# Patient Record
Sex: FEMALE | Race: WHITE | Employment: OTHER | URBAN - METROPOLITAN AREA
[De-identification: names, ages, dates, MRNs, and addresses within clinical notes are randomized per-mention and may not be internally consistent; named-entity substitution may affect disease eponyms.]

---

## 2017-01-03 ENCOUNTER — OFFICE VISIT (OUTPATIENT)
Dept: INTERNAL MEDICINE CLINIC | Facility: CLINIC | Age: 82
End: 2017-01-03

## 2017-01-03 VITALS
TEMPERATURE: 98 F | BODY MASS INDEX: 26.5 KG/M2 | OXYGEN SATURATION: 98 % | SYSTOLIC BLOOD PRESSURE: 154 MMHG | HEIGHT: 60 IN | WEIGHT: 135 LBS | DIASTOLIC BLOOD PRESSURE: 64 MMHG | HEART RATE: 65 BPM

## 2017-01-03 DIAGNOSIS — N39.0 UTI DUE TO KLEBSIELLA SPECIES: ICD-10-CM

## 2017-01-03 DIAGNOSIS — K52.9 GASTROENTERITIS: Primary | ICD-10-CM

## 2017-01-03 DIAGNOSIS — Z87.19 HISTORY OF SMALL BOWEL OBSTRUCTION: ICD-10-CM

## 2017-01-03 DIAGNOSIS — B96.89 UTI DUE TO KLEBSIELLA SPECIES: ICD-10-CM

## 2017-01-03 PROCEDURE — G0463 HOSPITAL OUTPT CLINIC VISIT: HCPCS | Performed by: INTERNAL MEDICINE

## 2017-01-03 PROCEDURE — 99214 OFFICE O/P EST MOD 30 MIN: CPT | Performed by: INTERNAL MEDICINE

## 2017-01-03 NOTE — PROGRESS NOTES
Gil Hannon is a 80year old female who presents for     Post hospital  Was hospitalized 12/26/16  to 12/29/16 with 3 days --presented with abd cramps. Was felt to have dehydration, acute cystitis-ESBL Klebsiella UTI, ileus versus enteritis. .   had v LLL nodule resolved on serial CT scans   • Diverticulitis 2010     hospitalized 3/10 and 5/10   • Lumbar stenosis 2004     MRI lumbar 2004-djd spinal stenosis-xray lumbar 12/13-DJD scoliosis, wedge comp T8, 9, 10, L1   • Osteopenia    • Renal insufficienc blood in the urine  Dermatologic:  No rash or itching.       EXAM:   /64 mmHg  Pulse 65  Temp(Src) 97.6 °F (36.4 °C)  Ht 5' (1.524 m)  Wt 135 lb (61.236 kg)  BMI 26.37 kg/m2  SpO2 98%      Wt Readings from Last 6 Encounters:  01/03/17 : 135 lb (61.236 further sx of SVT since on diltiazem  mg daily since 3/8/16 phone call.    30 day event monitor showed SVT. asthma COPD:  On Flovent inhaler 220 mcg 1 puff twice a day and Combivent Respimat.  Also takes Phenergan with codeine cough syrup as neede FLOVENT  MCG/ACT Inhalation Aerosol INHALE 1 PUFF TWICE DAILY Disp: 36 g Rfl: 3   Pantoprazole Sodium (PROTONIX) 40 MG Oral Tab EC Take 1 tablet (40 mg total) by mouth every morning before breakfast. Disp: 90 tablet Rfl: 3   promethazine-codeine 6

## 2017-01-04 ENCOUNTER — LAB ENCOUNTER (OUTPATIENT)
Dept: LAB | Age: 82
DRG: 481 | End: 2017-01-04
Attending: INTERNAL MEDICINE
Payer: MEDICARE

## 2017-01-04 ENCOUNTER — TELEPHONE (OUTPATIENT)
Dept: INTERNAL MEDICINE CLINIC | Facility: CLINIC | Age: 82
End: 2017-01-04

## 2017-01-04 DIAGNOSIS — K52.9 GASTROENTERITIS: ICD-10-CM

## 2017-01-04 DIAGNOSIS — N39.0 UTI DUE TO KLEBSIELLA SPECIES: ICD-10-CM

## 2017-01-04 DIAGNOSIS — B96.89 UTI DUE TO KLEBSIELLA SPECIES: ICD-10-CM

## 2017-01-04 LAB
BASOPHILS # BLD: 0 K/UL (ref 0–0.2)
BASOPHILS NFR BLD: 1 %
EOSINOPHIL # BLD: 0.2 K/UL (ref 0–0.7)
EOSINOPHIL NFR BLD: 4 %
ERYTHROCYTE [DISTWIDTH] IN BLOOD BY AUTOMATED COUNT: 14.8 % (ref 11–15)
HCT VFR BLD AUTO: 36.3 % (ref 35–48)
HGB BLD-MCNC: 12.3 G/DL (ref 12–16)
LYMPHOCYTES # BLD: 1.5 K/UL (ref 1–4)
LYMPHOCYTES NFR BLD: 32 %
MCH RBC QN AUTO: 30.4 PG (ref 27–32)
MCHC RBC AUTO-ENTMCNC: 33.9 G/DL (ref 32–37)
MCV RBC AUTO: 89.7 FL (ref 80–100)
MONOCYTES # BLD: 0.4 K/UL (ref 0–1)
MONOCYTES NFR BLD: 8 %
NEUTROPHILS # BLD AUTO: 2.6 K/UL (ref 1.8–7.7)
NEUTROPHILS NFR BLD: 55 %
PLATELET # BLD AUTO: 262 K/UL (ref 140–400)
PMV BLD AUTO: 8.6 FL (ref 7.4–10.3)
RBC # BLD AUTO: 4.04 M/UL (ref 3.7–5.4)
WBC # BLD AUTO: 4.7 K/UL (ref 4–11)

## 2017-01-04 PROCEDURE — 85025 COMPLETE CBC W/AUTO DIFF WBC: CPT

## 2017-01-04 PROCEDURE — 36415 COLL VENOUS BLD VENIPUNCTURE: CPT

## 2017-01-04 NOTE — TELEPHONE ENCOUNTER
To nursing,   please tell patient CBC done on 1/4/17–results are normal.  No anemia. Normal white blood cell count. Thanks.

## 2017-01-05 ENCOUNTER — HOSPITAL ENCOUNTER (INPATIENT)
Facility: HOSPITAL | Age: 82
LOS: 4 days | Discharge: NURSING, REHAB OR LONG-TERM CARE | DRG: 481 | End: 2017-01-09
Attending: EMERGENCY MEDICINE | Admitting: HOSPITALIST
Payer: MEDICARE

## 2017-01-05 ENCOUNTER — APPOINTMENT (OUTPATIENT)
Dept: GENERAL RADIOLOGY | Facility: HOSPITAL | Age: 82
DRG: 481 | End: 2017-01-05
Attending: EMERGENCY MEDICINE
Payer: MEDICARE

## 2017-01-05 DIAGNOSIS — S72.142A CLOSED DISPLACED INTERTROCHANTERIC FRACTURE OF LEFT FEMUR, INITIAL ENCOUNTER (HCC): Primary | ICD-10-CM

## 2017-01-05 PROBLEM — R73.9 HYPERGLYCEMIA: Status: ACTIVE | Noted: 2017-01-05

## 2017-01-05 LAB
ANION GAP SERPL CALC-SCNC: 11 MMOL/L (ref 0–18)
BASOPHILS # BLD: 0 K/UL (ref 0–0.2)
BASOPHILS NFR BLD: 1 %
BUN SERPL-MCNC: 15 MG/DL (ref 8–20)
BUN/CREAT SERPL: 15.2 (ref 10–20)
CALCIUM SERPL-MCNC: 9.3 MG/DL (ref 8.5–10.5)
CHLORIDE SERPL-SCNC: 93 MMOL/L (ref 95–110)
CO2 SERPL-SCNC: 25 MMOL/L (ref 22–32)
CREAT SERPL-MCNC: 0.99 MG/DL (ref 0.5–1.5)
EOSINOPHIL # BLD: 0.2 K/UL (ref 0–0.7)
EOSINOPHIL NFR BLD: 4 %
ERYTHROCYTE [DISTWIDTH] IN BLOOD BY AUTOMATED COUNT: 14.9 % (ref 11–15)
GLUCOSE BLDC GLUCOMTR-MCNC: 125 MG/DL (ref 70–99)
GLUCOSE SERPL-MCNC: 169 MG/DL (ref 70–99)
HCT VFR BLD AUTO: 36.8 % (ref 35–48)
HGB BLD-MCNC: 12.6 G/DL (ref 12–16)
LYMPHOCYTES # BLD: 1.3 K/UL (ref 1–4)
LYMPHOCYTES NFR BLD: 23 %
MCH RBC QN AUTO: 30.6 PG (ref 27–32)
MCHC RBC AUTO-ENTMCNC: 34.1 G/DL (ref 32–37)
MCV RBC AUTO: 89.7 FL (ref 80–100)
MONOCYTES # BLD: 0.4 K/UL (ref 0–1)
MONOCYTES NFR BLD: 7 %
MRSA NASAL: NEGATIVE
NEUTROPHILS # BLD AUTO: 3.7 K/UL (ref 1.8–7.7)
NEUTROPHILS NFR BLD: 66 %
OSMOLALITY UR CALC.SUM OF ELEC: 273 MOSM/KG (ref 275–295)
PLATELET # BLD AUTO: 257 K/UL (ref 140–400)
PMV BLD AUTO: 8.2 FL (ref 7.4–10.3)
POTASSIUM SERPL-SCNC: 4.7 MMOL/L (ref 3.3–5.1)
RBC # BLD AUTO: 4.11 M/UL (ref 3.7–5.4)
SODIUM SERPL-SCNC: 129 MMOL/L (ref 136–144)
STAPH A BY PCR: NEGATIVE
WBC # BLD AUTO: 5.6 K/UL (ref 4–11)

## 2017-01-05 PROCEDURE — 73502 X-RAY EXAM HIP UNI 2-3 VIEWS: CPT

## 2017-01-05 PROCEDURE — 99223 1ST HOSP IP/OBS HIGH 75: CPT | Performed by: HOSPITALIST

## 2017-01-05 RX ORDER — ONDANSETRON 2 MG/ML
4 INJECTION INTRAMUSCULAR; INTRAVENOUS EVERY 6 HOURS PRN
Status: DISCONTINUED | OUTPATIENT
Start: 2017-01-05 | End: 2017-01-09

## 2017-01-05 RX ORDER — MORPHINE SULFATE 4 MG/ML
4 INJECTION, SOLUTION INTRAMUSCULAR; INTRAVENOUS EVERY 2 HOUR PRN
Status: DISCONTINUED | OUTPATIENT
Start: 2017-01-05 | End: 2017-01-09

## 2017-01-05 RX ORDER — DEXTROSE MONOHYDRATE 25 G/50ML
50 INJECTION, SOLUTION INTRAVENOUS AS NEEDED
Status: DISCONTINUED | OUTPATIENT
Start: 2017-01-05 | End: 2017-01-09

## 2017-01-05 RX ORDER — MORPHINE SULFATE 2 MG/ML
2 INJECTION, SOLUTION INTRAMUSCULAR; INTRAVENOUS ONCE
Status: COMPLETED | OUTPATIENT
Start: 2017-01-05 | End: 2017-01-05

## 2017-01-05 RX ORDER — MORPHINE SULFATE 2 MG/ML
1 INJECTION, SOLUTION INTRAMUSCULAR; INTRAVENOUS EVERY 2 HOUR PRN
Status: DISCONTINUED | OUTPATIENT
Start: 2017-01-05 | End: 2017-01-09

## 2017-01-05 RX ORDER — MORPHINE SULFATE 2 MG/ML
2 INJECTION, SOLUTION INTRAMUSCULAR; INTRAVENOUS EVERY 2 HOUR PRN
Status: DISCONTINUED | OUTPATIENT
Start: 2017-01-05 | End: 2017-01-09

## 2017-01-05 RX ORDER — DEXTROSE, SODIUM CHLORIDE, AND POTASSIUM CHLORIDE 5; .45; .15 G/100ML; G/100ML; G/100ML
INJECTION INTRAVENOUS CONTINUOUS
Status: DISCONTINUED | OUTPATIENT
Start: 2017-01-05 | End: 2017-01-09

## 2017-01-05 RX ORDER — SODIUM CHLORIDE 9 MG/ML
INJECTION, SOLUTION INTRAVENOUS ONCE
Status: COMPLETED | OUTPATIENT
Start: 2017-01-05 | End: 2017-01-05

## 2017-01-05 NOTE — ED PROVIDER NOTES
Patient Seen in: [facility] Emergency Department    History   Patient presents with:  Lower Extremity Injury (musculoskeletal)    Stated Complaint: left hip pain    HPI    Patient presents the emergency department complaining of left hip pain afte Fornatto-both [facility]    KNEE REPLACEMENT SURGERY Left 2000    KNEE REPLACEMENT SURGERY Right 2008    ABDOMEN SURGERY PROC UNLISTED  2007    Comment laparotomy for TERESA and internal hernia    ABDOMEN SURGERY PROC UNLISTED  2009    Comment TERESA and small bow Brother    • Heart Disease Brother      cause of death-age 62   • Diabetes Sister    • Stroke Sister      cause of death age 66   • Heart Attack Brother      cause of death   • Cancer Brother       of pancreas or lung cancer   • 5 children [Other] [OTH Neurological: She is alert and oriented to person, place, and time. Skin: Skin is warm and dry. No rash noted. Nursing note and vitals reviewed.           ED Course     Labs Reviewed   BASIC METABOLIC PANEL (8) - Abnormal; Notable for the following: following:     POC Glucose  151 (*)     All other components within normal limits   POCT GLUCOSE - Abnormal; Notable for the following:     POC Glucose  151 (*)     All other components within normal limits   POCT GLUCOSE - Abnormal; Notable for the follow DIFFERENTIAL - Abnormal; Notable for the following:     RBC 2.58 (*)     HGB 7.9 (*)     HCT 23.1 (*)     RDW 15.2 (*)     All other components within normal limits   PHOSPHORUS - Normal   FERRITIN - Normal   MRSA/SA SCRN BY PCR:EMERG ORTHO SURG ONLY - Nor ------                     CBC W/ DIFFERENTIAL[[id number]]          Abnormal            Final result                 Please view results for these tests on the individual orders.    HEMOGLOBIN   PLATELET COUNT   DIFF/WBC   PHOSPHORUS   BASIC METABOLI femur ([facility] [[email or web address]]ca 75.) S72.142A 1/5/2017 Unknown    Hyperglycemia R73.9 1/5/2017 Yes

## 2017-01-05 NOTE — TELEPHONE ENCOUNTER
Called patient and left voicemail per hipaa relaying Dr Otero Neither message. Instructed patient to call back with any questions.

## 2017-01-05 NOTE — H&P
[facility]    PATIENT'S NAME: Guille Barber   ATTENDING PHYSICIAN: Valeriano Regalado MD   PATIENT ACCOUNT#:   [id number]    LOCATION:  [place] [address] [address] [place] [address]  MEDICAL RECORD #:   [id number]       YOB: 1931  ADMISSION DATE:       01/05/2 object at home, and she lost her balance falling on her left hip. Immediately after the fall was not able to bear weight on her left lower extremity. The patient denies any loss of consciousness, dizziness, or chest pain.   Other 12-point review of system

## 2017-01-06 ENCOUNTER — APPOINTMENT (OUTPATIENT)
Dept: GENERAL RADIOLOGY | Facility: HOSPITAL | Age: 82
DRG: 481 | End: 2017-01-06
Attending: ORTHOPAEDIC SURGERY
Payer: MEDICARE

## 2017-01-06 ENCOUNTER — ANESTHESIA (OUTPATIENT)
Dept: SURGERY | Facility: HOSPITAL | Age: 82
DRG: 481 | End: 2017-01-06
Payer: MEDICARE

## 2017-01-06 ENCOUNTER — SURGERY (OUTPATIENT)
Age: 82
End: 2017-01-06

## 2017-01-06 ENCOUNTER — ANESTHESIA EVENT (OUTPATIENT)
Dept: SURGERY | Facility: HOSPITAL | Age: 82
DRG: 481 | End: 2017-01-06
Payer: MEDICARE

## 2017-01-06 LAB
ANION GAP SERPL CALC-SCNC: 3 MMOL/L (ref 0–18)
ANTIBODY SCREEN: NEGATIVE
BASOPHILS # BLD: 0 K/UL (ref 0–0.2)
BASOPHILS NFR BLD: 0 %
BUN SERPL-MCNC: 9 MG/DL (ref 8–20)
BUN/CREAT SERPL: 10.5 (ref 10–20)
CALCIUM SERPL-MCNC: 8.4 MG/DL (ref 8.5–10.5)
CHLORIDE SERPL-SCNC: 98 MMOL/L (ref 95–110)
CO2 SERPL-SCNC: 26 MMOL/L (ref 22–32)
CREAT SERPL-MCNC: 0.86 MG/DL (ref 0.5–1.5)
EOSINOPHIL # BLD: 0.1 K/UL (ref 0–0.7)
EOSINOPHIL NFR BLD: 1 %
ERYTHROCYTE [DISTWIDTH] IN BLOOD BY AUTOMATED COUNT: 14.7 % (ref 11–15)
GLUCOSE BLDC GLUCOMTR-MCNC: 142 MG/DL (ref 70–99)
GLUCOSE BLDC GLUCOMTR-MCNC: 151 MG/DL (ref 70–99)
GLUCOSE BLDC GLUCOMTR-MCNC: 151 MG/DL (ref 70–99)
GLUCOSE BLDC GLUCOMTR-MCNC: 154 MG/DL (ref 70–99)
GLUCOSE BLDC GLUCOMTR-MCNC: 157 MG/DL (ref 70–99)
GLUCOSE BLDC GLUCOMTR-MCNC: 200 MG/DL (ref 70–99)
GLUCOSE SERPL-MCNC: 162 MG/DL (ref 70–99)
HCT VFR BLD AUTO: 30.7 % (ref 35–48)
HGB BLD-MCNC: 10.3 G/DL (ref 12–16)
LYMPHOCYTES # BLD: 1.2 K/UL (ref 1–4)
LYMPHOCYTES NFR BLD: 19 %
MCH RBC QN AUTO: 30 PG (ref 27–32)
MCHC RBC AUTO-ENTMCNC: 33.4 G/DL (ref 32–37)
MCV RBC AUTO: 89.9 FL (ref 80–100)
MONOCYTES # BLD: 0.7 K/UL (ref 0–1)
MONOCYTES NFR BLD: 11 %
NEUTROPHILS # BLD AUTO: 4.3 K/UL (ref 1.8–7.7)
NEUTROPHILS NFR BLD: 68 %
OSMOLALITY UR CALC.SUM OF ELEC: 266 MOSM/KG (ref 275–295)
PLATELET # BLD AUTO: 227 K/UL (ref 140–400)
PMV BLD AUTO: 8.5 FL (ref 7.4–10.3)
POTASSIUM SERPL-SCNC: 4.9 MMOL/L (ref 3.3–5.1)
POTASSIUM SERPL-SCNC: 5.4 MMOL/L (ref 3.3–5.1)
RBC # BLD AUTO: 3.42 M/UL (ref 3.7–5.4)
RH BLOOD TYPE: POSITIVE
SODIUM SERPL-SCNC: 127 MMOL/L (ref 136–144)
WBC # BLD AUTO: 6.4 K/UL (ref 4–11)

## 2017-01-06 PROCEDURE — 76001 XR OR HIP (2 VIEWS) >60 C-ARM  (CPT=73502/76001): CPT

## 2017-01-06 PROCEDURE — 99232 SBSQ HOSP IP/OBS MODERATE 35: CPT | Performed by: HOSPITALIST

## 2017-01-06 PROCEDURE — 73502 X-RAY EXAM HIP UNI 2-3 VIEWS: CPT

## 2017-01-06 PROCEDURE — 0QH734Z INSERTION OF INTERNAL FIXATION DEVICE INTO LEFT UPPER FEMUR, PERCUTANEOUS APPROACH: ICD-10-PCS | Performed by: ORTHOPAEDIC SURGERY

## 2017-01-06 DEVICE — IMPLANTABLE DEVICE: Type: IMPLANTABLE DEVICE | Site: HIP | Status: FUNCTIONAL

## 2017-01-06 DEVICE — PLATE TUBE STD 135DEG 4 HOLE: Type: IMPLANTABLE DEVICE | Site: HIP | Status: FUNCTIONAL

## 2017-01-06 RX ORDER — HYDROMORPHONE HYDROCHLORIDE 1 MG/ML
0.2 INJECTION, SOLUTION INTRAMUSCULAR; INTRAVENOUS; SUBCUTANEOUS EVERY 5 MIN PRN
Status: DISCONTINUED | OUTPATIENT
Start: 2017-01-06 | End: 2017-01-06 | Stop reason: HOSPADM

## 2017-01-06 RX ORDER — MORPHINE SULFATE 4 MG/ML
4 INJECTION, SOLUTION INTRAMUSCULAR; INTRAVENOUS EVERY 10 MIN PRN
Status: DISCONTINUED | OUTPATIENT
Start: 2017-01-06 | End: 2017-01-06 | Stop reason: HOSPADM

## 2017-01-06 RX ORDER — MORPHINE SULFATE 2 MG/ML
2 INJECTION, SOLUTION INTRAMUSCULAR; INTRAVENOUS EVERY 10 MIN PRN
Status: DISCONTINUED | OUTPATIENT
Start: 2017-01-06 | End: 2017-01-06 | Stop reason: HOSPADM

## 2017-01-06 RX ORDER — HYDROCODONE BITARTRATE AND ACETAMINOPHEN 5; 325 MG/1; MG/1
1 TABLET ORAL AS NEEDED
Status: DISCONTINUED | OUTPATIENT
Start: 2017-01-06 | End: 2017-01-06 | Stop reason: HOSPADM

## 2017-01-06 RX ORDER — BISACODYL 10 MG
10 SUPPOSITORY, RECTAL RECTAL
Status: DISCONTINUED | OUTPATIENT
Start: 2017-01-06 | End: 2017-01-09

## 2017-01-06 RX ORDER — DEXAMETHASONE SODIUM PHOSPHATE 4 MG/ML
VIAL (ML) INJECTION AS NEEDED
Status: DISCONTINUED | OUTPATIENT
Start: 2017-01-06 | End: 2017-01-06 | Stop reason: SURG

## 2017-01-06 RX ORDER — DOCUSATE SODIUM 100 MG/1
100 CAPSULE, LIQUID FILLED ORAL 2 TIMES DAILY
Status: DISCONTINUED | OUTPATIENT
Start: 2017-01-06 | End: 2017-01-09

## 2017-01-06 RX ORDER — SUCCINYLCHOLINE CHLORIDE 20 MG/ML
INJECTION INTRAMUSCULAR; INTRAVENOUS AS NEEDED
Status: DISCONTINUED | OUTPATIENT
Start: 2017-01-06 | End: 2017-01-06 | Stop reason: SURG

## 2017-01-06 RX ORDER — METOCLOPRAMIDE HYDROCHLORIDE 5 MG/ML
10 INJECTION INTRAMUSCULAR; INTRAVENOUS EVERY 6 HOURS PRN
Status: ACTIVE | OUTPATIENT
Start: 2017-01-06 | End: 2017-01-08

## 2017-01-06 RX ORDER — DILTIAZEM HYDROCHLORIDE 120 MG/1
120 CAPSULE, COATED, EXTENDED RELEASE ORAL DAILY
Status: DISCONTINUED | OUTPATIENT
Start: 2017-01-06 | End: 2017-01-09

## 2017-01-06 RX ORDER — ONDANSETRON 2 MG/ML
4 INJECTION INTRAMUSCULAR; INTRAVENOUS ONCE AS NEEDED
Status: DISCONTINUED | OUTPATIENT
Start: 2017-01-06 | End: 2017-01-06 | Stop reason: HOSPADM

## 2017-01-06 RX ORDER — LIDOCAINE HYDROCHLORIDE 10 MG/ML
INJECTION, SOLUTION EPIDURAL; INFILTRATION; INTRACAUDAL; PERINEURAL AS NEEDED
Status: DISCONTINUED | OUTPATIENT
Start: 2017-01-06 | End: 2017-01-06 | Stop reason: SURG

## 2017-01-06 RX ORDER — SODIUM PHOSPHATE, DIBASIC AND SODIUM PHOSPHATE, MONOBASIC 7; 19 G/133ML; G/133ML
1 ENEMA RECTAL ONCE AS NEEDED
Status: ACTIVE | OUTPATIENT
Start: 2017-01-06 | End: 2017-01-06

## 2017-01-06 RX ORDER — SODIUM CHLORIDE, SODIUM LACTATE, POTASSIUM CHLORIDE, CALCIUM CHLORIDE 600; 310; 30; 20 MG/100ML; MG/100ML; MG/100ML; MG/100ML
INJECTION, SOLUTION INTRAVENOUS CONTINUOUS
Status: DISCONTINUED | OUTPATIENT
Start: 2017-01-06 | End: 2017-01-09

## 2017-01-06 RX ORDER — TEMAZEPAM 15 MG/1
15 CAPSULE ORAL NIGHTLY PRN
Status: DISCONTINUED | OUTPATIENT
Start: 2017-01-06 | End: 2017-01-09

## 2017-01-06 RX ORDER — SENNOSIDES 8.6 MG
17.2 TABLET ORAL NIGHTLY
Status: DISCONTINUED | OUTPATIENT
Start: 2017-01-06 | End: 2017-01-09

## 2017-01-06 RX ORDER — PANTOPRAZOLE SODIUM 40 MG/1
40 TABLET, DELAYED RELEASE ORAL
Status: DISCONTINUED | OUTPATIENT
Start: 2017-01-07 | End: 2017-01-09

## 2017-01-06 RX ORDER — ONDANSETRON 2 MG/ML
INJECTION INTRAMUSCULAR; INTRAVENOUS AS NEEDED
Status: DISCONTINUED | OUTPATIENT
Start: 2017-01-06 | End: 2017-01-06 | Stop reason: SURG

## 2017-01-06 RX ORDER — NEOSTIGMINE METHYLSULFATE 0.5 MG/ML
INJECTION INTRAVENOUS AS NEEDED
Status: DISCONTINUED | OUTPATIENT
Start: 2017-01-06 | End: 2017-01-06 | Stop reason: SURG

## 2017-01-06 RX ORDER — DIPHENHYDRAMINE HYDROCHLORIDE 50 MG/ML
25 INJECTION INTRAMUSCULAR; INTRAVENOUS ONCE AS NEEDED
Status: ACTIVE | OUTPATIENT
Start: 2017-01-06 | End: 2017-01-06

## 2017-01-06 RX ORDER — DIPHENHYDRAMINE HYDROCHLORIDE 50 MG/ML
12.5 INJECTION INTRAMUSCULAR; INTRAVENOUS EVERY 4 HOURS PRN
Status: DISCONTINUED | OUTPATIENT
Start: 2017-01-06 | End: 2017-01-09

## 2017-01-06 RX ORDER — NALOXONE HYDROCHLORIDE 0.4 MG/ML
80 INJECTION, SOLUTION INTRAMUSCULAR; INTRAVENOUS; SUBCUTANEOUS AS NEEDED
Status: DISCONTINUED | OUTPATIENT
Start: 2017-01-06 | End: 2017-01-06 | Stop reason: HOSPADM

## 2017-01-06 RX ORDER — HYDROMORPHONE HYDROCHLORIDE 1 MG/ML
0.6 INJECTION, SOLUTION INTRAMUSCULAR; INTRAVENOUS; SUBCUTANEOUS EVERY 5 MIN PRN
Status: DISCONTINUED | OUTPATIENT
Start: 2017-01-06 | End: 2017-01-06 | Stop reason: HOSPADM

## 2017-01-06 RX ORDER — ROCURONIUM BROMIDE 10 MG/ML
INJECTION, SOLUTION INTRAVENOUS AS NEEDED
Status: DISCONTINUED | OUTPATIENT
Start: 2017-01-06 | End: 2017-01-06 | Stop reason: SURG

## 2017-01-06 RX ORDER — MORPHINE SULFATE 10 MG/ML
6 INJECTION, SOLUTION INTRAMUSCULAR; INTRAVENOUS EVERY 10 MIN PRN
Status: DISCONTINUED | OUTPATIENT
Start: 2017-01-06 | End: 2017-01-06 | Stop reason: HOSPADM

## 2017-01-06 RX ORDER — SODIUM CHLORIDE 9 MG/ML
INJECTION, SOLUTION INTRAVENOUS CONTINUOUS PRN
Status: DISCONTINUED | OUTPATIENT
Start: 2017-01-06 | End: 2017-01-06 | Stop reason: SURG

## 2017-01-06 RX ORDER — ENALAPRIL MALEATE 10 MG/1
10 TABLET ORAL DAILY
Status: DISCONTINUED | OUTPATIENT
Start: 2017-01-06 | End: 2017-01-09

## 2017-01-06 RX ORDER — ONDANSETRON 2 MG/ML
4 INJECTION INTRAMUSCULAR; INTRAVENOUS EVERY 4 HOURS PRN
Status: DISCONTINUED | OUTPATIENT
Start: 2017-01-06 | End: 2017-01-09

## 2017-01-06 RX ORDER — HYDROMORPHONE HYDROCHLORIDE 1 MG/ML
0.4 INJECTION, SOLUTION INTRAMUSCULAR; INTRAVENOUS; SUBCUTANEOUS EVERY 5 MIN PRN
Status: DISCONTINUED | OUTPATIENT
Start: 2017-01-06 | End: 2017-01-06 | Stop reason: HOSPADM

## 2017-01-06 RX ORDER — POLYETHYLENE GLYCOL 3350 17 G/17G
17 POWDER, FOR SOLUTION ORAL DAILY PRN
Status: DISCONTINUED | OUTPATIENT
Start: 2017-01-06 | End: 2017-01-08

## 2017-01-06 RX ORDER — HYDROCODONE BITARTRATE AND ACETAMINOPHEN 5; 325 MG/1; MG/1
2 TABLET ORAL AS NEEDED
Status: DISCONTINUED | OUTPATIENT
Start: 2017-01-06 | End: 2017-01-06 | Stop reason: HOSPADM

## 2017-01-06 RX ORDER — GLYCOPYRROLATE 0.2 MG/ML
INJECTION INTRAMUSCULAR; INTRAVENOUS AS NEEDED
Status: DISCONTINUED | OUTPATIENT
Start: 2017-01-06 | End: 2017-01-06 | Stop reason: SURG

## 2017-01-06 RX ORDER — DIPHENHYDRAMINE HCL 25 MG
25 CAPSULE ORAL EVERY 4 HOURS PRN
Status: DISCONTINUED | OUTPATIENT
Start: 2017-01-06 | End: 2017-01-09

## 2017-01-06 RX ORDER — HALOPERIDOL 5 MG/ML
0.25 INJECTION INTRAMUSCULAR ONCE AS NEEDED
Status: DISCONTINUED | OUTPATIENT
Start: 2017-01-06 | End: 2017-01-06 | Stop reason: HOSPADM

## 2017-01-06 RX ADMIN — NEOSTIGMINE METHYLSULFATE 2 MG: 0.5 INJECTION INTRAVENOUS at 14:10:00

## 2017-01-06 RX ADMIN — SUCCINYLCHOLINE CHLORIDE 80 MG: 20 INJECTION INTRAMUSCULAR; INTRAVENOUS at 13:08:00

## 2017-01-06 RX ADMIN — LIDOCAINE HYDROCHLORIDE 30 MG: 10 INJECTION, SOLUTION EPIDURAL; INFILTRATION; INTRACAUDAL; PERINEURAL at 13:08:00

## 2017-01-06 RX ADMIN — ROCURONIUM BROMIDE 15 MG: 10 INJECTION, SOLUTION INTRAVENOUS at 13:14:00

## 2017-01-06 RX ADMIN — SODIUM CHLORIDE: 9 INJECTION, SOLUTION INTRAVENOUS at 13:02:00

## 2017-01-06 RX ADMIN — SODIUM CHLORIDE: 9 INJECTION, SOLUTION INTRAVENOUS at 13:13:00

## 2017-01-06 RX ADMIN — GLYCOPYRROLATE 0.4 MG: 0.2 INJECTION INTRAMUSCULAR; INTRAVENOUS at 14:10:00

## 2017-01-06 RX ADMIN — ONDANSETRON 4 MG: 2 INJECTION INTRAMUSCULAR; INTRAVENOUS at 13:35:00

## 2017-01-06 RX ADMIN — DEXAMETHASONE SODIUM PHOSPHATE 4 MG: 4 MG/ML VIAL (ML) INJECTION at 13:05:00

## 2017-01-06 RX ADMIN — SODIUM CHLORIDE: 9 INJECTION, SOLUTION INTRAVENOUS at 14:10:00

## 2017-01-06 RX ADMIN — ROCURONIUM BROMIDE 5 MG: 10 INJECTION, SOLUTION INTRAVENOUS at 13:08:00

## 2017-01-06 RX ADMIN — SODIUM CHLORIDE: 9 INJECTION, SOLUTION INTRAVENOUS at 14:15:00

## 2017-01-06 NOTE — DISCHARGE PLANNING
1/6CM-MD orders received in regards to discharge planning. The Patient was seen at bedside. The Patient resides with her son in [place] in a single family home.   Prior to hospitalization, the Patient was driving, doing grocery shopping, errands, and is in

## 2017-01-06 NOTE — CONSULTS
[facility]    Report of Consultation    Leandra Santos Patient Status:  Inpatient    1931 MRN [id number]   Location [facility] 4W/SW/SE Attending Tigist Orantes MD   Hosp Day # 1 PCP Pablo Farris MD     Date of Admiss History    APPENDECTOMY      CHOLECYSTECTOMY      OTHER SURGICAL HISTORY      Comment cystocele repair    OTHER SURGICAL HISTORY Bilateral     Comment ear surgery-Dr. Garry Lozano eardrums    KNEE REPLACEMENT SURGERY Left 2000    KNEE REPLACEMENT SURGERY 50 mL, Intravenous, PRN  •  CeFAZolin Sodium (ANCEF/KEFZOL) IVPB premix 2 g, 2 g, Intravenous, Once    Review of Systems:  10 point review of symptoms found to be Non Contributory    Physical Exam:    General: Alert, orientated x3. Cooperative.   No appare

## 2017-01-06 NOTE — ANESTHESIA PREPROCEDURE EVALUATION
Anesthesia PreOp Note    HPI:     Rosemary Mo is a 80year old female who presents for preoperative consultation requested by: Niki Peralta MD    Date of Surgery: 1/5/2017 - 1/6/2017    Procedure(s):  HIP PINNING  Indication: Closed displaced intert 6/2016   • Glaucoma    • Uterine prolapse 2006     had pessary in 2006 Dr. Hanny Villegas sling procedure Dr Karthik Khan in 6/2016   • COPD (chronic obstructive pulmonary disease) [facility])    • Diverticulosis 2003     cscopy '03, '07, '10   • Renal calculus 2010 every 6 (six) hours as needed for cough.  take one teaspoon every 6hours as needed for cough Disp: 180 mL Rfl: 3 Taking   ZETIA 10 MG Oral Tab TAKE 1 TABLET BY MOUTH EVERY DAY Disp: 90 tablet Rfl: 3 Taking   Diltiazem HCl ER Coated Beads (CARDIZEM CD) 120 M Intravenous Q2H PRN Anshu Case MD 2 mg at 01/06/17 4835   Or        morphINE sulfate (PF) 4 MG/ML injection 4 mg 4 mg Intravenous Q2H PRN Anshu Case MD    ondansetron HCl (ZOFRAN) injection 4 mg 4 mg Intravenous Q6H PRN Anshu Case MD    d 127* 01/06/2017   K 4.9 01/06/2017   CL 98 01/06/2017   CO2 26 01/06/2017   BUN 9 01/06/2017   CREATSERUM 0.86 01/06/2017   * 01/06/2017   CA 8.4* 01/06/2017          Vital Signs:   height is 1.549 m (5' 1\") and weight is 60.782 kg (134 lb).  Her or

## 2017-01-06 NOTE — ANESTHESIA POSTPROCEDURE EVALUATION
Patient: Brionna Orozco    Procedure Summary     Date Anesthesia Start Anesthesia Stop Room / Location    01/06/17 1302 1431 300 [facility] MAIN OR 11 / 300 [facility] MAIN OR       Procedure Diagnosis Surgeon Responsible Provider    HIP PINNING (Left Hip) Closed displaced int

## 2017-01-06 NOTE — PROGRESS NOTES
[facility]  Progress  Note     Gil Hannon Patient Status:  Inpatient    1931  80year old CSN [id number]   Location 417/417-A Attending Robby Hernandez MD   Hosp Day # 1 PCP Kristie Paredes MD     ASSESSMENT/PLAN    1.      Acut last 72 hours.

## 2017-01-06 NOTE — BRIEF OP NOTE
[facility] OPERATING ROOM  Brief Op Note     Elle Screws Location: OR   CSN [id number] MRN [id number]   Admission Date 2017 Operation Date 2017   Attending Physician Oren Redd MD Operating Physician Kathi Zapata MD       Pre-Operati

## 2017-01-07 LAB
ANION GAP SERPL CALC-SCNC: 6 MMOL/L (ref 0–18)
BASOPHILS # BLD: 0 K/UL (ref 0–0.2)
BASOPHILS NFR BLD: 0 %
BUN SERPL-MCNC: 9 MG/DL (ref 8–20)
BUN/CREAT SERPL: 10.1 (ref 10–20)
CALCIUM SERPL-MCNC: 8.1 MG/DL (ref 8.5–10.5)
CHLORIDE SERPL-SCNC: 98 MMOL/L (ref 95–110)
CO2 SERPL-SCNC: 24 MMOL/L (ref 22–32)
CREAT SERPL-MCNC: 0.89 MG/DL (ref 0.5–1.5)
EOSINOPHIL # BLD: 0 K/UL (ref 0–0.7)
EOSINOPHIL NFR BLD: 0 %
ERYTHROCYTE [DISTWIDTH] IN BLOOD BY AUTOMATED COUNT: 14.8 % (ref 11–15)
GLUCOSE BLDC GLUCOMTR-MCNC: 126 MG/DL (ref 70–99)
GLUCOSE BLDC GLUCOMTR-MCNC: 129 MG/DL (ref 70–99)
GLUCOSE BLDC GLUCOMTR-MCNC: 139 MG/DL (ref 70–99)
GLUCOSE BLDC GLUCOMTR-MCNC: 162 MG/DL (ref 70–99)
GLUCOSE SERPL-MCNC: 146 MG/DL (ref 70–99)
HCT VFR BLD AUTO: 28.2 % (ref 35–48)
HGB BLD-MCNC: 9.6 G/DL (ref 12–16)
IRON SATN MFR SERPL: 13 % (ref 15–50)
IRON SERPL-MCNC: 28 MCG/DL (ref 28–170)
LYMPHOCYTES # BLD: 0.8 K/UL (ref 1–4)
LYMPHOCYTES NFR BLD: 12 %
MAGNESIUM SERPL-MCNC: 1.5 MG/DL (ref 1.8–2.5)
MCH RBC QN AUTO: 30.8 PG (ref 27–32)
MCHC RBC AUTO-ENTMCNC: 34.2 G/DL (ref 32–37)
MCV RBC AUTO: 90.1 FL (ref 80–100)
MONOCYTES # BLD: 0.7 K/UL (ref 0–1)
MONOCYTES NFR BLD: 10 %
NEUTROPHILS # BLD AUTO: 5.3 K/UL (ref 1.8–7.7)
NEUTROPHILS NFR BLD: 77 %
OSMOLALITY UR CALC.SUM OF ELEC: 267 MOSM/KG (ref 275–295)
PHOSPHATE SERPL-MCNC: 2.7 MG/DL (ref 2.4–4.7)
PLATELET # BLD AUTO: 214 K/UL (ref 140–400)
PMV BLD AUTO: 9.2 FL (ref 7.4–10.3)
POTASSIUM SERPL-SCNC: 4.6 MMOL/L (ref 3.3–5.1)
RBC # BLD AUTO: 3.13 M/UL (ref 3.7–5.4)
SODIUM SERPL-SCNC: 128 MMOL/L (ref 136–144)
TIBC SERPL-MCNC: 215 MCG/DL (ref 228–428)
TRANSFERRIN SERPL-MCNC: 163 MG/DL (ref 192–382)
WBC # BLD AUTO: 6.8 K/UL (ref 4–11)

## 2017-01-07 PROCEDURE — 99232 SBSQ HOSP IP/OBS MODERATE 35: CPT | Performed by: HOSPITALIST

## 2017-01-07 RX ORDER — MAGNESIUM OXIDE 400 MG (241.3 MG MAGNESIUM) TABLET
800 TABLET ONCE
Status: COMPLETED | OUTPATIENT
Start: 2017-01-07 | End: 2017-01-07

## 2017-01-07 RX ORDER — SODIUM CHLORIDE 9 MG/ML
INJECTION, SOLUTION INTRAVENOUS CONTINUOUS
Status: DISCONTINUED | OUTPATIENT
Start: 2017-01-07 | End: 2017-01-09

## 2017-01-07 RX ORDER — HYDROCODONE BITARTRATE AND ACETAMINOPHEN 5; 325 MG/1; MG/1
1 TABLET ORAL EVERY 4 HOURS PRN
Status: DISCONTINUED | OUTPATIENT
Start: 2017-01-07 | End: 2017-01-09

## 2017-01-07 NOTE — OPERATIVE REPORT
[facility]    PATIENT'S NAME: Rodrick Hwang   ATTENDING PHYSICIAN: Solo Wright MD   OPERATING PHYSICIAN: Akila Vallejo MD   PATIENT ACCOUNT#:   [de-identified]    LOCATION:  [facility] [address] PACU [address] [place] [address]  MEDICAL RECORD #:   [id number]       DATE OF screw and held in position. Four screws were placed in standard fashion. C-arm fluoroscopy confirmed proper alignment of the fracture on AP and lateral x-rays and proper alignment of the hardware.   The wound was then irrigated; 0 Vicryl was used to repai

## 2017-01-07 NOTE — PROGRESS NOTES
[facility]    Progress Note    Deal Diver Patient Status:  Inpatient    1931 MRN [id number]   Location [facility] 4W/SW/SE Attending Roosvelt Habermann, MD   Hosp Day # 2 PCP Oksana Perkins MD     Subjective:  Maria C Mcknight

## 2017-01-07 NOTE — DISCHARGE PLANNING
SW follow up w/ SNF choices w/ pt. Pt requested referrals be sent to [facility] as first choice and [facility] as second choice. SW contacted [facility] for referrals. SW contacted [facility] for DON screen.     Maura Velasco, [address]

## 2017-01-07 NOTE — PLAN OF CARE
DISCHARGE PLANNING    • Discharge to home or other facility with appropriate resources Progressing        Diabetes/Glucose Control    • Glucose maintained within prescribed range Progressing        PAIN - ADULT    • Verbalizes/displays adequate comfort lev

## 2017-01-07 NOTE — PHYSICAL THERAPY NOTE
PHYSICAL THERAPY HIP EVALUATION - INPATIENT     Room Number: 417/417-A  Evaluation Date: 1/7/2017  Type of Evaluation:Initial   Physician Order: PT Eval and Treat    Presenting Problem: L hip pin  Reason for Therapy: Mobility Dysfunction and Discharge Plan CHOLECYSTECTOMY      OTHER SURGICAL HISTORY      Comment cystocele repair    OTHER SURGICAL HISTORY Bilateral     Comment ear surgery-Dr. Adela Velez eardrums    KNEE REPLACEMENT SURGERY Left 2000    KNEE REPLACEMENT SURGERY Right 2008    ABDOMEN SURGERY Tolerated sitting EOB for several minutes and an additional 5-6 minutes of activity. AM-PAC '6-Clicks' INPATIENT SHORT FORM - BASIC MOBILITY  How much difficulty does the patient currently have. ..  -   Turning over in bed (including adjusting bedclothe transfers, gait, self cares. The patient is below her baseline and would benefit from skilled inpatient PT to address the above deficits to assist patient in returning to prior level of function.     DISCHARGE RECOMMENDATIONS  PT Discharge Recommendations:

## 2017-01-07 NOTE — OCCUPATIONAL THERAPY NOTE
OCCUPATIONAL THERAPY EVALUATION - INPATIENT      Room Number: 417/417-A  Evaluation Date: 1/7/2017  Type of Evaluation: Initial  Presenting Problem: L hip fx, S/P pinning.      Physician Order: IP Consult to Occupational Therapy  Reason for Therapy: ADL/IAD • Diverticulosis 2003     cscopy '03, '07, '10   • Renal calculus 2010     on CT abd 5/10-6 mm or less in L lower kidney   • Lung nodule 4/06-3/03     LLL nodule resolved on serial CT scans   • Diverticulitis 2010     hospitalized 3/10 and 5/10   • Olive as Tolerated    PAIN ASSESSMENT  Rating: 3  Location: L hip  Management Techniques: Relaxation;Repositioning; Other (Comment) (ice)    ACTIVITY TOLERANCE  Room air    COGNITION  WFL for ADL    RANGE OF MOTION   Upper extremity ROM is within functional limit OTR/L  Occupational Therapist

## 2017-01-08 LAB
ANION GAP SERPL CALC-SCNC: 8 MMOL/L (ref 0–18)
BASOPHILS # BLD: 0 K/UL (ref 0–0.2)
BASOPHILS NFR BLD: 0 %
BUN SERPL-MCNC: 13 MG/DL (ref 8–20)
BUN/CREAT SERPL: 14.3 (ref 10–20)
CALCIUM SERPL-MCNC: 8.1 MG/DL (ref 8.5–10.5)
CHLORIDE SERPL-SCNC: 98 MMOL/L (ref 95–110)
CO2 SERPL-SCNC: 24 MMOL/L (ref 22–32)
CREAT SERPL-MCNC: 0.91 MG/DL (ref 0.5–1.5)
EOSINOPHIL # BLD: 0 K/UL (ref 0–0.7)
EOSINOPHIL NFR BLD: 1 %
ERYTHROCYTE [DISTWIDTH] IN BLOOD BY AUTOMATED COUNT: 15.2 % (ref 11–15)
FERRITIN SERPL IA-MCNC: 72 NG/ML (ref 11–307)
GLUCOSE BLDC GLUCOMTR-MCNC: 163 MG/DL (ref 70–99)
GLUCOSE BLDC GLUCOMTR-MCNC: 173 MG/DL (ref 70–99)
GLUCOSE BLDC GLUCOMTR-MCNC: 174 MG/DL (ref 70–99)
GLUCOSE BLDC GLUCOMTR-MCNC: 179 MG/DL (ref 70–99)
GLUCOSE SERPL-MCNC: 162 MG/DL (ref 70–99)
HCT VFR BLD AUTO: 23.1 % (ref 35–48)
HGB BLD-MCNC: 7.9 G/DL (ref 12–16)
LYMPHOCYTES # BLD: 1.2 K/UL (ref 1–4)
LYMPHOCYTES NFR BLD: 20 %
MAGNESIUM SERPL-MCNC: 1.6 MG/DL (ref 1.8–2.5)
MCH RBC QN AUTO: 30.5 PG (ref 27–32)
MCHC RBC AUTO-ENTMCNC: 34.2 G/DL (ref 32–37)
MCV RBC AUTO: 89.4 FL (ref 80–100)
MONOCYTES # BLD: 0.7 K/UL (ref 0–1)
MONOCYTES NFR BLD: 12 %
NEUTROPHILS # BLD AUTO: 4 K/UL (ref 1.8–7.7)
NEUTROPHILS NFR BLD: 68 %
OSMOLALITY UR CALC.SUM OF ELEC: 274 MOSM/KG (ref 275–295)
PLATELET # BLD AUTO: 207 K/UL (ref 140–400)
PMV BLD AUTO: 8.5 FL (ref 7.4–10.3)
POTASSIUM SERPL-SCNC: 4.4 MMOL/L (ref 3.3–5.1)
RBC # BLD AUTO: 2.58 M/UL (ref 3.7–5.4)
SODIUM SERPL-SCNC: 130 MMOL/L (ref 136–144)
WBC # BLD AUTO: 5.9 K/UL (ref 4–11)

## 2017-01-08 PROCEDURE — 99232 SBSQ HOSP IP/OBS MODERATE 35: CPT | Performed by: HOSPITALIST

## 2017-01-08 RX ORDER — HYDROCODONE BITARTRATE AND ACETAMINOPHEN 5; 325 MG/1; MG/1
1 TABLET ORAL EVERY 4 HOURS PRN
Qty: 40 TABLET | Refills: 0 | Status: SHIPPED | OUTPATIENT
Start: 2017-01-08 | End: 2017-01-24

## 2017-01-08 RX ORDER — POLYETHYLENE GLYCOL 3350 17 G/17G
17 POWDER, FOR SOLUTION ORAL DAILY
Status: DISCONTINUED | OUTPATIENT
Start: 2017-01-09 | End: 2017-01-09

## 2017-01-08 NOTE — PHYSICAL THERAPY NOTE
PHYSICAL THERAPY TREATMENT NOTE - INPATIENT    Room Number: 417/417-A       Presenting Problem: L hip pin    Problem List  Principal Problem:    Closed displaced intertrochanteric fracture of left femur, initial encounter [facility])  Active Problems:    Closed adjusting bedclothes, sheets and blankets)?: A Little   -   Sitting down on and standing up from a chair with arms (e.g., wheelchair, bedside commode, etc.): A Little   -   Moving from lying on back to sitting on the side of the bed?: A Little   How much h

## 2017-01-08 NOTE — PROGRESS NOTES
[facility]    Progress Note    Amy Bailey Patient Status:  Inpatient    1931 MRN [id number]   Location [facility] 4W/SW/SE Attending Derick Harris MD   Hosp Day # 3 PCP Dread Ga MD       Subjective:   Doing

## 2017-01-09 VITALS
BODY MASS INDEX: 25.3 KG/M2 | HEIGHT: 61 IN | RESPIRATION RATE: 16 BRPM | DIASTOLIC BLOOD PRESSURE: 48 MMHG | HEART RATE: 77 BPM | WEIGHT: 134 LBS | OXYGEN SATURATION: 99 % | TEMPERATURE: 98 F | SYSTOLIC BLOOD PRESSURE: 122 MMHG

## 2017-01-09 LAB
ANION GAP SERPL CALC-SCNC: 7 MMOL/L (ref 0–18)
BASOPHILS # BLD: 0 K/UL (ref 0–0.2)
BASOPHILS NFR BLD: 0 %
BUN SERPL-MCNC: 19 MG/DL (ref 8–20)
BUN/CREAT SERPL: 20.9 (ref 10–20)
CALCIUM SERPL-MCNC: 8.2 MG/DL (ref 8.5–10.5)
CHLORIDE SERPL-SCNC: 96 MMOL/L (ref 95–110)
CO2 SERPL-SCNC: 25 MMOL/L (ref 22–32)
CREAT SERPL-MCNC: 0.91 MG/DL (ref 0.5–1.5)
EOSINOPHIL # BLD: 0.1 K/UL (ref 0–0.7)
EOSINOPHIL NFR BLD: 1 %
GLUCOSE BLDC GLUCOMTR-MCNC: 139 MG/DL (ref 70–99)
GLUCOSE BLDC GLUCOMTR-MCNC: 160 MG/DL (ref 70–99)
GLUCOSE SERPL-MCNC: 152 MG/DL (ref 70–99)
HGB BLD-MCNC: 7.7 G/DL (ref 12–16)
LYMPHOCYTES # BLD: 1.2 K/UL (ref 1–4)
LYMPHOCYTES NFR BLD: 18 %
MONOCYTES # BLD: 0.6 K/UL (ref 0–1)
MONOCYTES NFR BLD: 9 %
NEUTROPHILS # BLD AUTO: 4.9 K/UL (ref 1.8–7.7)
NEUTROPHILS NFR BLD: 72 %
OSMOLALITY UR CALC.SUM OF ELEC: 271 MOSM/KG (ref 275–295)
PHOSPHATE SERPL-MCNC: 2.3 MG/DL (ref 2.4–4.7)
PLATELET # BLD AUTO: 228 K/UL (ref 140–400)
POTASSIUM SERPL-SCNC: 4.6 MMOL/L (ref 3.3–5.1)
SODIUM SERPL-SCNC: 128 MMOL/L (ref 136–144)
WBC # BLD AUTO: 6.8 K/UL (ref 4–11)

## 2017-01-09 PROCEDURE — 99239 HOSP IP/OBS DSCHRG MGMT >30: CPT | Performed by: HOSPITALIST

## 2017-01-09 RX ORDER — ENALAPRIL MALEATE 5 MG/1
5 TABLET ORAL DAILY
Status: DISCONTINUED | OUTPATIENT
Start: 2017-01-10 | End: 2017-01-09

## 2017-01-09 NOTE — PROGRESS NOTES
[facility]    Progress Note    Domi Muniz Patient Status:  Inpatient    1931 MRN [id number]   Location [facility] 4W/SW/SE Attending Chester Crouch MD   Hosp Day # 4 PCP Demarcus Conner MD       Subjective:   Doing

## 2017-01-09 NOTE — OCCUPATIONAL THERAPY NOTE
OCCUPATIONAL THERAPY TREATMENT NOTE - INPATIENT     Room Number: 417/417-A    Presenting Problem: L hip fx, S/P pinning.      Problem List  Principal Problem:    Closed displaced intertrochanteric fracture of left femur[facility]  Active Prob Putting on and taking off regular upper body clothing?: A Little  -   Taking care of personal grooming such as brushing teeth?: None  -   Eating meals?: None    AM-PAC Score:  Score: 17  Approx Degree of Impairment: 50.11%  Standardized Score (AM-PAC Scale

## 2017-01-09 NOTE — DISCHARGE PLANNING
The pt. Is scheduled to discharge to [facility] today 1/9 at 3p, via [address]. The pt. Is aware of medicar costs. The pt. Is calling her son to notify him of the discharge.     Report Mendel 113, [address] [facility]

## 2017-01-09 NOTE — PROGRESS NOTES
[facility]    Progress Note    Holger Door Patient Status:  Inpatient    1931 MRN [id number]   Location [facility] 4W/SW/SE Attending Chelsie Wakefield MD   Hosp Day # 4 PCP Marychuy Adams MD     Subjective:  Marcus Maria

## 2017-01-09 NOTE — DISCHARGE PLANNING
CARSON spoke with the pt. And her son regarding rehab options. The pt's first choice is [facility], second is [facility] and third is [address] [place] [address] [facility]. Referrals have been sent to both. Waiting on bed confirmation for probable discharge today.   Insurance approval is

## 2017-01-09 NOTE — PHYSICAL THERAPY NOTE
PHYSICAL THERAPY TREATMENT NOTE - INPATIENT    Room Number: 417/417-A       Presenting Problem: L hip pin    Problem List  Principal Problem:    Closed displaced intertrochanteric fracture of left femur, initial encounter [facility])  Active Problems:    Closed sheets and blankets)?: A Little   -   Sitting down on and standing up from a chair with arms (e.g., wheelchair, bedside commode, etc.): A Lot   -   Moving from lying on back to sitting on the side of the bed?: A Little   How much help from another person d

## 2017-01-10 NOTE — DISCHARGE SUMMARY
[facility] HOSPITALIST  DISCHARGE SUMMARY     Savannah Troy Patient Status:  Inpatient    1931 MRN [id number]   Location [facility] 4W/SW/SE Attending No att. providers found   Hosp Day # 4 PCP Zeke Mcintosh MD     DATE OF ADMISSION:  rebound/guarding. Neuro: Normal reflexes, CN. Sensory/motor exams grossly normal deficit. Coordination  and gait normal.   MS: Incision clean dry and intact  Skin: Skin is warm and dry. No rashes, erythema, diaphoresis. Psych: Normal mood and affect.  B Polyethylene Glycol 3350        Take  by mouth.  take 15 milliliter (17G)  by oral route  every day powder mixed with 8 oz. water, juice, soda, coffee, or tea    Refills:  0       Pantoprazole Sodium 40 MG Tbec   Last time this was given:  40 mg on 1/9/201 follow-up instructions were reviewed with the patient and they verbalized understanding and agreement. They understand to return to the emergency room for any concerning signs or symptoms. Greater than 30 minutes spent on discharge.

## 2017-01-12 NOTE — PAYOR COMM NOTE
IF YOU NEED SOMETHING ADDITIONAL THAT I DIDN'T SEND, JUST LMK! THANKS! Darinel Lakhani [phone]    ADMISSION DATE:       01/05/2017    HISTORY AND PHYSICAL EXAMINATION    REASON FOR ADMISSION:  Left intertrochanteric femur fracture.     HISTORY OF PRESENT Katherne Crumb left lower extremity.  The patient denies any loss of consciousness, dizziness, or chest pain.  Other 12-point review of systems is negative.      PHYSICAL EXAMINATION:     GENERAL:  Alert.  Oriented to time, place and person.  Moderate distress.   VITAL SI controlled.  Eager to have surgery.  No chest pain or shortness of breath.     OBJECTIVE    Vital signs:  Temp:  [97.2 °F (36.2 °C)-99.1 °F (37.3 °C)] 97.9 °F (36.6 °C)  Pulse:  [55-86] 76  Resp:  [12-21] 16  BP: (121-193)/(48-77) 128/77 mmHg    Physical Ex 01/07/17  0157  01/07/17  0450  01/07/17  0820    BP:  149/57  138/62  139/50  104/81    Pulse:  81  88  75  77    Temp:  97.9 °F (36.6 °C)  98 °F (36.7 °C)  98 °F (36.7 °C)  98.4 °F (36.9 °C)    TempSrc:  Oral  Oral  Oral  Oral    Resp:  18  18  18  18  01/07/2017    HGB  9.6*  01/07/2017    HCT  28.2*  01/07/2017    PLT  214  01/07/2017    CREATSERUM  0.89  01/07/2017    BUN  9  01/07/2017    NA  128*  01/07/2017    K  4.6  01/07/2017    CL  98  01/07/2017    CO2  24  01/07/2017    GLU  146*  01/07/2017  insulin. 4.       Hyponatremia.  Stable    Consider discharge to skilled nursing facility for rehab tomorrow if hemoglobin is stable.         1/9  DATE OF ADMISSION: 1/5/2017  DATE OF DISCHARGE: 01/10/2017  DISPOSITION: Rehab  CONDITION ON DISCHARGE: Allison Harvey Coordination  and gait normal.   MS: Incision clean dry and intact  Skin: Skin is warm and dry. No rashes, erythema, diaphoresis. Psych:   Normal mood and affect.  Behavior and judgment normal.     DISCHARGE MEDICATIONS      Discharge Medications        S Polyethylene Glycol 3350           Take  by mouth.  take 15 milliliter (17G)  by oral route  every day powder mixed with 8 oz. water, juice, soda, coffee, or tea      Refills:  0          Pantoprazole Sodium 40 MG Tbec    Last time this was given:  40 mg on [address]  [phone]    In 2 weeks  Post Discharge Followup    The above plan and follow-up instructions were reviewed with the patient and they verbalized understanding and agreement.  They understand to return to the emergency room for any concerning

## 2017-01-18 ENCOUNTER — TELEPHONE (OUTPATIENT)
Dept: INTERNAL MEDICINE CLINIC | Facility: CLINIC | Age: 82
End: 2017-01-18

## 2017-01-18 NOTE — TELEPHONE ENCOUNTER
Pt is pt is at a rehab center now and she will be moving to assisting live in place not sure where yet but she will need in order.  Call daughter Haroon Mendez [phone] (F)

## 2017-01-18 NOTE — TELEPHONE ENCOUNTER
Daughter, Dirk Guillen, states pt fractured hip and is currently in rehab. Pt will will discharged on Monday and will be going to assisted living facility.  Daughter states that they have not yet figured out which assisted living facility pt will go to but they wi

## 2017-01-24 ENCOUNTER — LAB ENCOUNTER (OUTPATIENT)
Dept: LAB | Age: 82
End: 2017-01-24
Attending: INTERNAL MEDICINE
Payer: MEDICARE

## 2017-01-24 ENCOUNTER — OFFICE VISIT (OUTPATIENT)
Dept: INTERNAL MEDICINE CLINIC | Facility: CLINIC | Age: 82
End: 2017-01-24

## 2017-01-24 VITALS
OXYGEN SATURATION: 97 % | DIASTOLIC BLOOD PRESSURE: 60 MMHG | HEIGHT: 61 IN | HEART RATE: 63 BPM | BODY MASS INDEX: 24.92 KG/M2 | SYSTOLIC BLOOD PRESSURE: 132 MMHG | TEMPERATURE: 98 F | WEIGHT: 132 LBS

## 2017-01-24 DIAGNOSIS — D62 ANEMIA DUE TO BLOOD LOSS, ACUTE: ICD-10-CM

## 2017-01-24 DIAGNOSIS — I10 ESSENTIAL HYPERTENSION: ICD-10-CM

## 2017-01-24 DIAGNOSIS — S72.002S CLOSED LEFT HIP FRACTURE, SEQUELA: Primary | ICD-10-CM

## 2017-01-24 LAB
BASOPHILS # BLD: 0 K/UL (ref 0–0.2)
BASOPHILS NFR BLD: 1 %
EOSINOPHIL # BLD: 0.1 K/UL (ref 0–0.7)
EOSINOPHIL NFR BLD: 2 %
ERYTHROCYTE [DISTWIDTH] IN BLOOD BY AUTOMATED COUNT: 16.6 % (ref 11–15)
HCT VFR BLD AUTO: 29.3 % (ref 35–48)
HGB BLD-MCNC: 9.7 G/DL (ref 12–16)
LYMPHOCYTES # BLD: 1.2 K/UL (ref 1–4)
LYMPHOCYTES NFR BLD: 22 %
MCH RBC QN AUTO: 30.5 PG (ref 27–32)
MCHC RBC AUTO-ENTMCNC: 33.2 G/DL (ref 32–37)
MCV RBC AUTO: 91.8 FL (ref 80–100)
MONOCYTES # BLD: 0.3 K/UL (ref 0–1)
MONOCYTES NFR BLD: 7 %
NEUTROPHILS # BLD AUTO: 3.6 K/UL (ref 1.8–7.7)
NEUTROPHILS NFR BLD: 69 %
PLATELET # BLD AUTO: 472 K/UL (ref 140–400)
PMV BLD AUTO: 8.5 FL (ref 7.4–10.3)
RBC # BLD AUTO: 3.2 M/UL (ref 3.7–5.4)
WBC # BLD AUTO: 5.2 K/UL (ref 4–11)

## 2017-01-24 PROCEDURE — 85025 COMPLETE CBC W/AUTO DIFF WBC: CPT

## 2017-01-24 PROCEDURE — G0463 HOSPITAL OUTPT CLINIC VISIT: HCPCS | Performed by: INTERNAL MEDICINE

## 2017-01-24 PROCEDURE — 99214 OFFICE O/P EST MOD 30 MIN: CPT | Performed by: INTERNAL MEDICINE

## 2017-01-24 PROCEDURE — 36415 COLL VENOUS BLD VENIPUNCTURE: CPT

## 2017-01-24 RX ORDER — FUROSEMIDE 40 MG/1
40 TABLET ORAL DAILY
COMMUNITY
End: 2017-03-08

## 2017-01-24 RX ORDER — HYDROCODONE BITARTRATE AND ACETAMINOPHEN 5; 325 MG/1; MG/1
1 TABLET ORAL EVERY 4 HOURS PRN
Qty: 100 TABLET | Refills: 0 | Status: SHIPPED | OUTPATIENT
Start: 2017-01-24 | End: 2017-07-11

## 2017-01-24 RX ORDER — LISINOPRIL 10 MG/1
10 TABLET ORAL DAILY
COMMUNITY
End: 2017-03-08

## 2017-01-24 NOTE — PROGRESS NOTES
Ministerio Lund is a 80year old female who presents for    Here with her dtr Rizwana Mccoy.       Post hospital 1/5–1/9/17 at [facility] and then [facility] rehab  Had fx L hip--on x-ray was intertrochanteric left proximal femur fracture–after fell going up UTI treated in [facility] with meropenem when in hosp for enteritis. (Dr Susie Maldonado).            Past Surgical History    APPENDECTOMY      CHOLECYSTECTOMY      OTHER SURGICAL HISTORY      Comment cystocele repair    OTHER SURGICAL HISTORY Bilateral     Comment ear BMI 24.95 kg/m2  SpO2 97%      Wt Readings from Last 6 Encounters:  01/24/17 : 132 lb (59.875 kg)  01/05/17 : 134 lb (60.782 kg)  01/03/17 : 135 lb (61.236 kg)  12/26/16 : 130 lb 2 oz (59.024 kg)  10/26/16 : 140 lb (63.504 kg)  08/09/16 : 141 lb (63.957 k AVM --hgb ok off Fe. Healthcare maintenance  Mammo was ok 4/11/16. cscopy was in 5/10 Dr. Darnell Numbers with diver int hem. 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 Vaccines: had Pneumovax 11/98 and had PCV13 in 11/2014.  Shingles vac is available at a pharmacy.    Flu leanne Diltiazem HCl ER Coated Beads (CARDIZEM CD) 120 MG Oral Capsule SR 24 Hr Take 1 capsule (120 mg total) by mouth daily.  Disp: 90 capsule Rfl: 3   Ipratropium-Albuterol (COMBIVENT RESPIMAT)  MCG/ACT Inhalation Aero Soln Inhale 1 puff into the lungs 4

## 2017-01-25 ENCOUNTER — TELEPHONE (OUTPATIENT)
Dept: INTERNAL MEDICINE CLINIC | Facility: CLINIC | Age: 82
End: 2017-01-25

## 2017-01-25 NOTE — TELEPHONE ENCOUNTER
To nursing,   please tell patient the CBC done on 1/24/17–results show the anemia is improving. Her hemoglobin is now 9.7 compared to 8.8 on 1/19/17. If she can tolerate an iron pill,  take over-the-counter ferrous sulfate 65 mg daily.     If she cannot

## 2017-01-26 ENCOUNTER — TELEPHONE (OUTPATIENT)
Dept: INTERNAL MEDICINE CLINIC | Facility: CLINIC | Age: 82
End: 2017-01-26

## 2017-01-26 RX ORDER — FERROUS SULFATE TAB EC 324 MG (65 MG FE EQUIVALENT) 324 (65 FE) MG
TABLET DELAYED RESPONSE ORAL
Qty: 1 TABLET | Refills: 0 | COMMUNITY
Start: 2017-01-26 | End: 2017-03-20

## 2017-01-26 NOTE — TELEPHONE ENCOUNTER
[address] [facility] nurse called  Did Dr Kt Ordonez want pt to continue medications begun at nursing home? Will Dr Kt Ordonez be signing orders for Home Care?   Please call Guera to discuss/advise  Tasked to nursing

## 2017-01-26 NOTE — TELEPHONE ENCOUNTER
Called patients daughter per hipaa and relayed DR. LEONE message  -verbalized understanding .  Med module updated

## 2017-01-27 NOTE — TELEPHONE ENCOUNTER
Call in morning 1/27 to confirm medications from nursing home. Confirmed with home health after hours that ok to start home health, and ok to continue medications.

## 2017-01-30 ENCOUNTER — TELEPHONE (OUTPATIENT)
Dept: INTERNAL MEDICINE CLINIC | Facility: CLINIC | Age: 82
End: 2017-01-30

## 2017-01-31 ENCOUNTER — TELEPHONE (OUTPATIENT)
Dept: INTERNAL MEDICINE CLINIC | Facility: CLINIC | Age: 82
End: 2017-01-31

## 2017-01-31 NOTE — TELEPHONE ENCOUNTER
Spoke with PA at urgent care where pt was seen today for LLE edema. Pt with recent hip fx treated at [address] [place] [address] on xarelto. Was recently d/c'ed from SNF (rehab) -- during her stay, she was treated for LLE edema with lasix.   She was discharged home a few days

## 2017-02-02 ENCOUNTER — TELEPHONE (OUTPATIENT)
Dept: INTERNAL MEDICINE CLINIC | Facility: CLINIC | Age: 82
End: 2017-02-02

## 2017-02-02 DIAGNOSIS — Z98.890 STATUS POST HIP SURGERY: Primary | ICD-10-CM

## 2017-02-02 NOTE — TELEPHONE ENCOUNTER
Dr. Clifton Sheehan, can you advise for Dr. Yennifer Amaro had left hip pinning at [address] [place] [address] beginning of January after a femur fx. Has been getting PT at inpatient rehab. She is getting discharged from rehab-- ok to order outpatient PT through [facility]? Referral pended.

## 2017-02-02 NOTE — TELEPHONE ENCOUNTER
Pt needs an order to Out Patient Therapy, Pt has been released from rehab on 1/23, please call daughter Chalice Boxer [phone]    Tasked to nursing      She would like this to go to  on call

## 2017-02-03 NOTE — TELEPHONE ENCOUNTER
Karla English called back. Relayed message from Dr. Keila Alberts. She verbalized understanding and states she will call Dr. Peña Pitts office for order.

## 2017-02-06 RX ORDER — PROMETHAZINE HYDROCHLORIDE AND CODEINE PHOSPHATE 6.25; 1 MG/5ML; MG/5ML
SYRUP ORAL
Qty: 180 ML | OUTPATIENT
Start: 2017-02-06

## 2017-02-06 NOTE — TELEPHONE ENCOUNTER
Per Dr. Kendra Robertson recent OV note, patient uses TAC cream for eczema PRN. Ok to order Dr. Riccardo Leonard (on call for Dr. Kendra Robertson). Order pended.

## 2017-02-07 RX ORDER — PROMETHAZINE HYDROCHLORIDE AND CODEINE PHOSPHATE 6.25; 1 MG/5ML; MG/5ML
5 SYRUP ORAL EVERY 6 HOURS PRN
Qty: 180 ML | Refills: 0 | COMMUNITY
Start: 2017-02-07 | End: 2017-05-08

## 2017-02-07 NOTE — TELEPHONE ENCOUNTER
No information Re: promethazine with codeine use; Rx denied; when did she start? Dose she need? How often does she take?  Please call pt and inquire

## 2017-02-07 NOTE — TELEPHONE ENCOUNTER
[place];   Only one refill used---called in one refill in Dr.Fabs roy

## 2017-02-07 NOTE — TELEPHONE ENCOUNTER
Spoke to patient who states that she takes the promethazine with codeine cough syrup every bedtime and sometimes during the day if needed, patient states that she has chronic COPD and Jd Wilkinson is aware that she is taking it.  Patient states the she did

## 2017-02-08 RX ORDER — DILTIAZEM HYDROCHLORIDE 120 MG/1
CAPSULE, COATED, EXTENDED RELEASE ORAL
Qty: 90 CAPSULE | Refills: 3 | Status: SHIPPED | OUTPATIENT
Start: 2017-02-08 | End: 2017-12-18

## 2017-03-08 ENCOUNTER — LAB ENCOUNTER (OUTPATIENT)
Dept: LAB | Age: 82
End: 2017-03-08
Attending: INTERNAL MEDICINE
Payer: MEDICARE

## 2017-03-08 ENCOUNTER — OFFICE VISIT (OUTPATIENT)
Dept: INTERNAL MEDICINE CLINIC | Facility: CLINIC | Age: 82
End: 2017-03-08

## 2017-03-08 VITALS
HEART RATE: 72 BPM | WEIGHT: 130 LBS | SYSTOLIC BLOOD PRESSURE: 140 MMHG | BODY MASS INDEX: 25.52 KG/M2 | TEMPERATURE: 97 F | DIASTOLIC BLOOD PRESSURE: 60 MMHG | HEIGHT: 60 IN | OXYGEN SATURATION: 98 %

## 2017-03-08 DIAGNOSIS — I10 ESSENTIAL HYPERTENSION: ICD-10-CM

## 2017-03-08 DIAGNOSIS — D62 ANEMIA DUE TO BLOOD LOSS, ACUTE: ICD-10-CM

## 2017-03-08 DIAGNOSIS — M79.89 LEFT LEG SWELLING: ICD-10-CM

## 2017-03-08 DIAGNOSIS — S72.002S CLOSED LEFT HIP FRACTURE, SEQUELA: Primary | ICD-10-CM

## 2017-03-08 LAB
ANION GAP SERPL CALC-SCNC: 9 MMOL/L (ref 0–18)
BASOPHILS # BLD: 0.1 K/UL (ref 0–0.2)
BASOPHILS NFR BLD: 1 %
BUN SERPL-MCNC: 15 MG/DL (ref 8–20)
BUN/CREAT SERPL: 18.8 (ref 10–20)
CALCIUM SERPL-MCNC: 9.5 MG/DL (ref 8.5–10.5)
CHLORIDE SERPL-SCNC: 92 MMOL/L (ref 95–110)
CO2 SERPL-SCNC: 26 MMOL/L (ref 22–32)
CREAT SERPL-MCNC: 0.8 MG/DL (ref 0.5–1.5)
EOSINOPHIL # BLD: 0.1 K/UL (ref 0–0.7)
EOSINOPHIL NFR BLD: 1 %
ERYTHROCYTE [DISTWIDTH] IN BLOOD BY AUTOMATED COUNT: 15.2 % (ref 11–15)
GLUCOSE SERPL-MCNC: 109 MG/DL (ref 70–99)
HCT VFR BLD AUTO: 33.1 % (ref 35–48)
HGB BLD-MCNC: 11.1 G/DL (ref 12–16)
LYMPHOCYTES # BLD: 1.7 K/UL (ref 1–4)
LYMPHOCYTES NFR BLD: 23 %
MCH RBC QN AUTO: 29.8 PG (ref 27–32)
MCHC RBC AUTO-ENTMCNC: 33.6 G/DL (ref 32–37)
MCV RBC AUTO: 88.6 FL (ref 80–100)
MONOCYTES # BLD: 0.6 K/UL (ref 0–1)
MONOCYTES NFR BLD: 8 %
NEUTROPHILS # BLD AUTO: 4.8 K/UL (ref 1.8–7.7)
NEUTROPHILS NFR BLD: 67 %
OSMOLALITY UR CALC.SUM OF ELEC: 265 MOSM/KG (ref 275–295)
PLATELET # BLD AUTO: 304 K/UL (ref 140–400)
PMV BLD AUTO: 8.8 FL (ref 7.4–10.3)
POTASSIUM SERPL-SCNC: 4.5 MMOL/L (ref 3.3–5.1)
RBC # BLD AUTO: 3.74 M/UL (ref 3.7–5.4)
SODIUM SERPL-SCNC: 127 MMOL/L (ref 136–144)
WBC # BLD AUTO: 7.2 K/UL (ref 4–11)

## 2017-03-08 PROCEDURE — 99214 OFFICE O/P EST MOD 30 MIN: CPT | Performed by: INTERNAL MEDICINE

## 2017-03-08 PROCEDURE — G0463 HOSPITAL OUTPT CLINIC VISIT: HCPCS | Performed by: INTERNAL MEDICINE

## 2017-03-08 PROCEDURE — 85025 COMPLETE CBC W/AUTO DIFF WBC: CPT

## 2017-03-08 PROCEDURE — 36415 COLL VENOUS BLD VENIPUNCTURE: CPT

## 2017-03-08 PROCEDURE — 80048 BASIC METABOLIC PNL TOTAL CA: CPT

## 2017-03-08 RX ORDER — FUROSEMIDE 20 MG/1
20 TABLET ORAL DAILY
Qty: 90 TABLET | Refills: 3 | Status: SHIPPED | OUTPATIENT
Start: 2017-03-08 | End: 2017-04-14

## 2017-03-08 NOTE — PROGRESS NOTES
Holger Turpin is a 80year old female who presents for     Check at 1 1/2 months. L hip fx post pinning in 1/2017-Dr. Cathy Jauregui.    Doing well. Walking with walker. Is going to PT. Is back at her home--her son is with her. Pain in hip is better.  Mariano Kunz monitor 2/2016-SVT          Past Surgical History    APPENDECTOMY      CHOLECYSTECTOMY      OTHER SURGICAL HISTORY      Comment cystocele repair    OTHER SURGICAL HISTORY Bilateral     Comment ear surgery-Dr. Beatriz Gauthier eardrmaribel    KNEE REPLACEMENT SURG kg)  01/31/17 : 141 lb (63.957 kg)  01/24/17 : 132 lb (59.875 kg)  01/05/17 : 134 lb (60.782 kg)  01/03/17 : 135 lb (61.236 kg)  12/26/16 : 130 lb 2 oz (59.024 kg)      General: appears well nourished and in no acute distress  Lungs: clear to auscultation 11/98 and had PCV13 in 11/2014. Shingles vac is available at a pharmacy.    Flu shot was gotten already.    MRI of panc duct strictures-area of dilation on MRI in 5/13 --no change to 3/12--no f/u felt needed as radiologist feels is sequelae of prior pancrea Polyethylene Glycol 3350 (MIRALAX) Oral Powder Take  by mouth.  take 15 milliliter (17G)  by oral route  every day powder mixed with 8 oz. water, juice, soda, coffee, or tea Disp:  Rfl:          Kristen Cristina MD  3/8/2017

## 2017-03-10 ENCOUNTER — TELEPHONE (OUTPATIENT)
Dept: INTERNAL MEDICINE CLINIC | Facility: CLINIC | Age: 82
End: 2017-03-10

## 2017-03-10 ENCOUNTER — TELEPHONE (OUTPATIENT)
Dept: GASTROENTEROLOGY | Facility: CLINIC | Age: 82
End: 2017-03-10

## 2017-03-10 DIAGNOSIS — E87.1 HYPONATREMIA: Primary | ICD-10-CM

## 2017-03-10 NOTE — TELEPHONE ENCOUNTER
To nursing,   please tell pt lab done on 3/8/17-cbc bmp-  Results show her anemia is improved--Hgb up to 11.1--from 9.7 in January after the hip pinning. Stop the iron pills.      Her BMP shows low sodium level of 127--with normal kidney function (creat 0.8

## 2017-03-10 NOTE — TELEPHONE ENCOUNTER
Pt states that she has a history of bowel obstruction and is having problems with constipation and would like to be seen sooner than first available. Please call.

## 2017-03-10 NOTE — TELEPHONE ENCOUNTER
Pt contacted and notified that she needs to go to the ED is sxs worsen. She accepted appt with [facility] on 3/17/17 @9:30am, [facility].

## 2017-03-14 RX ORDER — CYCLOBENZAPRINE HCL 5 MG
5 TABLET ORAL AS NEEDED
COMMUNITY
End: 2017-12-18 | Stop reason: ALTCHOICE

## 2017-03-14 NOTE — TELEPHONE ENCOUNTER
To nursing, please tell pt if the foot tingling doesn't improve, see me in the office so I can check it. Thanks.

## 2017-03-14 NOTE — TELEPHONE ENCOUNTER
Patient called back. Relayed message from Dr. Arthor Bernheim. Patient verbalized understanding of information and repeated back instructions.  Patient also wanted to mention to Dr. Arthor Bernheim that she went to see the doctor who did her hip and was given Cyclobenzaprine 5 mg PRN

## 2017-03-15 RX ORDER — ENALAPRIL MALEATE 10 MG/1
TABLET ORAL
Qty: 90 TABLET | Refills: 3 | Status: SHIPPED | OUTPATIENT
Start: 2017-03-15 | End: 2018-03-25

## 2017-03-15 NOTE — TELEPHONE ENCOUNTER
I agree - especially in light of patient's history (bowel obstruction) she should not take this lightly. I am happy to see this patient on 3/17 at the scheduled time.  If patient calls back with worsening symptoms between now and then, I agree with ARELIS Ceja

## 2017-03-16 NOTE — TELEPHONE ENCOUNTER
Per verbal order Negin BELLAMY, she cannot see a 2nd opinion pt. She spoke to Dr Aron Monsalve and he will see pt next Monday at [facility], arrival 9:45am. Pt contacted and given detailed directions to [place] [facility] [place] Cancelled appt with Negin for tomorrow.

## 2017-03-16 NOTE — TELEPHONE ENCOUNTER
Discussed with Dr. Nikole Guillen - review of chart brings to question possible second opinion and there was a referral for the patient to see a ?surgeon at the [facility]. A second opinion should ultimately be a consult with a physician.  In an attempt to avoid having

## 2017-03-17 NOTE — TELEPHONE ENCOUNTER
Pt wanted to clarify if this was for testing or just speaking with the doctor. Reviewed this was just a consultation and she will be speaking with the doctor.  No testing on Monday

## 2017-03-20 ENCOUNTER — OFFICE VISIT (OUTPATIENT)
Dept: GASTROENTEROLOGY | Facility: CLINIC | Age: 82
End: 2017-03-20

## 2017-03-20 VITALS
HEART RATE: 67 BPM | HEIGHT: 59.5 IN | DIASTOLIC BLOOD PRESSURE: 61 MMHG | BODY MASS INDEX: 26.06 KG/M2 | SYSTOLIC BLOOD PRESSURE: 127 MMHG | WEIGHT: 131 LBS

## 2017-03-20 DIAGNOSIS — Z87.19 HISTORY OF SMALL BOWEL OBSTRUCTION: Primary | ICD-10-CM

## 2017-03-20 DIAGNOSIS — R93.3 ABNORMAL COMPUTED TOMOGRAPHY OF SMALL INTESTINE: ICD-10-CM

## 2017-03-20 DIAGNOSIS — K59.01 SLOW TRANSIT CONSTIPATION: ICD-10-CM

## 2017-03-20 DIAGNOSIS — R14.0 ABDOMINAL DISTENTION, NON-GASEOUS: ICD-10-CM

## 2017-03-20 DIAGNOSIS — R10.84 ABDOMINAL PAIN, GENERALIZED: ICD-10-CM

## 2017-03-20 PROCEDURE — 99204 OFFICE O/P NEW MOD 45 MIN: CPT | Performed by: INTERNAL MEDICINE

## 2017-03-20 PROCEDURE — G0463 HOSPITAL OUTPT CLINIC VISIT: HCPCS | Performed by: INTERNAL MEDICINE

## 2017-03-20 NOTE — PROGRESS NOTES
HPI:    Patient ID: Zunilda Sánchez is a jud 80year old woman with complex abdominal surgical history below, history of hypertension, elevated glucose levels, asthma/COPD, dyslipidemia who presents for further evaluation of history of small bowel obstr this and at another times she has managed to weather this out at home. This worsening abdominal cramping accompanies and follows the distention.   When this resolves spontaneously, she describes fairly severe diarrhea, emptying out, and then being very wea resection of both scars and the anastomosis was performed with side to side re-anastomosis with stapler. Dr. Rosa Maria Clark operative report of December 18, 2007 reviewed. Preoperative diagnosis was complete small bowel obstruction.     Surgical repor daily basis. Patient complains of excessive gas, bloating, and belching which may be  precipitated by Lactulose use. She denies vomiting. There has been no  gross bleeding. Colonoscopy was advised to further evaluate.      TECHNIQUE: After 4 mg of intra lobe. No                      biliary ductal dilatation or suspicious hepatic                      lesion. BILIARY:            Post cholecystectomy. No biliary ductal dilatation. SPLEEN:             No enlargement or focal lesion.   STOMACH:            No present within the vagina. No visible                      mass.  Pelvic organs appropriate for patient age. BONES:              Moderate degenerative endplate change and disc disease                      within the thoracolumbar spine.  No suspicious bony for Health, MRI              MRCP Josh [address], 5/07/2013, 13:12.      INDICATIONS:  Generalized abdominal pain and history of small bowel                obstruction.  Anastomotic stricture suggested on recent CT.            MRI ENTEROGRAPHY FINDING distention has improved since recently                      performed CT. There is subtle wall thickening and                      luminal narrowing at the level of the anastomosis (29                      image 30).  This appears to be fixed luminal narrow ====================    Final result (12/27/2016  8:02 AM) Open        Study Result      PROCEDURE:  CT ABDOMEN PELVIS IV CONTRAST NO ORAL (ER)      Darlin Chairez, MRI ENTEROGRAPHY ABDOMEN [facility], 6/14/2016, 10:34.  Trentonpaulo Hernandez colon.  URINARY BLADDER:    Mildly thickened bladder wall. ASCITES:                        None. PELVIC ORGANS:        Uterine fibroids including a 2.7 cm anterior uterine body fibroid with subserosal component.  Trace gas in the endometrial canal likely changes. HPI    Review of Systems         Current Outpatient Prescriptions:  ENALAPRIL MALEATE 10 MG Oral Tab TAKE 1 TABLET DAILY Disp: 90 tablet Rfl: 3   Cyclobenzaprine HCl 5 MG Oral Tab Take 5 mg by mouth as needed for Muscle spasms.  Disp:  Rfl: Comment:Other reaction(s): Unknown   PHYSICAL EXAM:   Physical Exam   Constitutional: She is oriented to person, place, and time. She appears well-developed. Comes in using a 4 point walker. Fairly spry and animated.   Unable to climb up on if episodes worsen. 3.  We discussed avoidance of any raw vegetables and following a low roughage diet. Already quite attentive to that. Raw spinach may have caused one of the recent obstructive events.     4.  As always, come in to the emergency room

## 2017-03-24 ENCOUNTER — APPOINTMENT (OUTPATIENT)
Dept: LAB | Age: 82
End: 2017-03-24
Attending: INTERNAL MEDICINE
Payer: MEDICARE

## 2017-03-24 DIAGNOSIS — E87.1 HYPONATREMIA: ICD-10-CM

## 2017-03-24 LAB
ANION GAP SERPL CALC-SCNC: 10 MMOL/L (ref 0–18)
BUN SERPL-MCNC: 17 MG/DL (ref 8–20)
BUN/CREAT SERPL: 18.7 (ref 10–20)
CALCIUM SERPL-MCNC: 9.4 MG/DL (ref 8.5–10.5)
CHLORIDE SERPL-SCNC: 99 MMOL/L (ref 95–110)
CO2 SERPL-SCNC: 27 MMOL/L (ref 22–32)
CREAT SERPL-MCNC: 0.91 MG/DL (ref 0.5–1.5)
GLUCOSE SERPL-MCNC: 117 MG/DL (ref 70–99)
OSMOLALITY UR CALC.SUM OF ELEC: 285 MOSM/KG (ref 275–295)
POTASSIUM SERPL-SCNC: 5.1 MMOL/L (ref 3.3–5.1)
SODIUM SERPL-SCNC: 136 MMOL/L (ref 136–144)

## 2017-03-24 PROCEDURE — 80048 BASIC METABOLIC PNL TOTAL CA: CPT

## 2017-03-24 PROCEDURE — 36415 COLL VENOUS BLD VENIPUNCTURE: CPT

## 2017-03-28 ENCOUNTER — TELEPHONE (OUTPATIENT)
Dept: INTERNAL MEDICINE CLINIC | Facility: CLINIC | Age: 82
End: 2017-03-28

## 2017-03-28 NOTE — TELEPHONE ENCOUNTER
To nursing, please tell patient lab test done on 3/24/17–BMP–results are ok. Her serum sodium is now up to normal at 136–compared to 127 on 3/8/17. See me on 5/8/17 as planned–can move up appointment to sooner if she is having any problems.     Thanks

## 2017-04-14 ENCOUNTER — OFFICE VISIT (OUTPATIENT)
Dept: INTERNAL MEDICINE CLINIC | Facility: CLINIC | Age: 82
End: 2017-04-14

## 2017-04-14 VITALS
SYSTOLIC BLOOD PRESSURE: 126 MMHG | BODY MASS INDEX: 25.13 KG/M2 | HEIGHT: 60 IN | HEART RATE: 72 BPM | DIASTOLIC BLOOD PRESSURE: 58 MMHG | TEMPERATURE: 98 F | WEIGHT: 128 LBS

## 2017-04-14 DIAGNOSIS — M79.89 BILATERAL SWELLING OF FEET: ICD-10-CM

## 2017-04-14 DIAGNOSIS — E11.40 CONTROLLED TYPE 2 DIABETES MELLITUS WITH DIABETIC NEUROPATHY, WITHOUT LONG-TERM CURRENT USE OF INSULIN (HCC): ICD-10-CM

## 2017-04-14 DIAGNOSIS — G62.9 NEUROPATHY: Primary | ICD-10-CM

## 2017-04-14 PROCEDURE — 99214 OFFICE O/P EST MOD 30 MIN: CPT | Performed by: INTERNAL MEDICINE

## 2017-04-14 PROCEDURE — G0463 HOSPITAL OUTPT CLINIC VISIT: HCPCS | Performed by: INTERNAL MEDICINE

## 2017-04-14 RX ORDER — GABAPENTIN 100 MG/1
100 CAPSULE ORAL NIGHTLY
Qty: 90 CAPSULE | Refills: 3 | Status: SHIPPED | OUTPATIENT
Start: 2017-04-14 | End: 2018-04-28

## 2017-04-14 RX ORDER — FUROSEMIDE 20 MG/1
TABLET ORAL
Qty: 90 TABLET | Refills: 3 | COMMUNITY
Start: 2017-04-14 | End: 2018-04-04

## 2017-04-14 NOTE — PROGRESS NOTES
Tiarra James is a 80year old female who presents for     Last seen 3/8/17. Pain and swelling in the feet.:  Has pain and swelling on both feet for over a month. The feet-- \"the bones and everything are being stressed out.  All those little bones a wedge comp T8, 9, 10, L1   • Osteopenia    • Renal insufficiency    • UTI due to Klebsiella species 12/2016     ESBL Klebsiella UTI treated in [facility] with meropenem when in hosp for enteritis. (Dr Brian Aldrich).     • SVT (supraventricular tachycardia) ([facility] [address] constipation.          EXAM:   /58 mmHg  Pulse 72  Temp(Src) 97.7 °F (36.5 °C) (Oral)  Ht 5' (1.524 m)  Wt 128 lb (58.06 kg)  BMI 25.00 kg/m2      Wt Readings from Last 6 Encounters:  04/14/17 : 128 lb (58.06 kg)  03/20/17 : 131 lb (59.421 kg)  03/08/ Fridays. Disp: 90 tablet Rfl: 3   gabapentin 100 MG Oral Cap Take 1 capsule (100 mg total) by mouth nightly.  Disp: 90 capsule Rfl: 3   ENALAPRIL MALEATE 10 MG Oral Tab TAKE 1 TABLET DAILY Disp: 90 tablet Rfl: 3   Cyclobenzaprine HCl 5 MG Oral Tab Take 5 mg

## 2017-04-17 ENCOUNTER — APPOINTMENT (OUTPATIENT)
Dept: LAB | Age: 82
End: 2017-04-17
Attending: INTERNAL MEDICINE
Payer: MEDICARE

## 2017-04-17 DIAGNOSIS — E11.40 CONTROLLED TYPE 2 DIABETES MELLITUS WITH DIABETIC NEUROPATHY, WITHOUT LONG-TERM CURRENT USE OF INSULIN (HCC): ICD-10-CM

## 2017-04-17 DIAGNOSIS — G62.9 NEUROPATHY: ICD-10-CM

## 2017-04-17 PROCEDURE — 83036 HEMOGLOBIN GLYCOSYLATED A1C: CPT

## 2017-04-17 PROCEDURE — 36415 COLL VENOUS BLD VENIPUNCTURE: CPT

## 2017-04-17 PROCEDURE — 84443 ASSAY THYROID STIM HORMONE: CPT

## 2017-04-17 PROCEDURE — 82607 VITAMIN B-12: CPT

## 2017-04-18 ENCOUNTER — TELEPHONE (OUTPATIENT)
Dept: INTERNAL MEDICINE CLINIC | Facility: CLINIC | Age: 82
End: 2017-04-18

## 2017-04-18 NOTE — TELEPHONE ENCOUNTER
No answer x2 at pt's home. Dr. Denisa Cervantes' message relayed to pt's daughter Muriel Singh who verbalized understanding. Med module updated.

## 2017-04-18 NOTE — TELEPHONE ENCOUNTER
To nursing, please tell pt lab done on 4/17/17–TSH, B12 level, A1c–results are ok except B12 level is elevated (> 1500). Please stop the oral B12 tablets. Thanks.

## 2017-05-08 ENCOUNTER — OFFICE VISIT (OUTPATIENT)
Dept: INTERNAL MEDICINE CLINIC | Facility: CLINIC | Age: 82
End: 2017-05-08

## 2017-05-08 VITALS
TEMPERATURE: 98 F | WEIGHT: 131 LBS | DIASTOLIC BLOOD PRESSURE: 60 MMHG | HEART RATE: 71 BPM | RESPIRATION RATE: 18 BRPM | SYSTOLIC BLOOD PRESSURE: 120 MMHG | OXYGEN SATURATION: 98 % | BODY MASS INDEX: 25.72 KG/M2 | HEIGHT: 60 IN

## 2017-05-08 DIAGNOSIS — J44.9 ASTHMA WITH COPD (CHRONIC OBSTRUCTIVE PULMONARY DISEASE) (HCC): ICD-10-CM

## 2017-05-08 DIAGNOSIS — E11.40 CONTROLLED TYPE 2 DIABETES MELLITUS WITH DIABETIC NEUROPATHY, WITHOUT LONG-TERM CURRENT USE OF INSULIN (HCC): ICD-10-CM

## 2017-05-08 DIAGNOSIS — I10 ESSENTIAL HYPERTENSION: Primary | ICD-10-CM

## 2017-05-08 DIAGNOSIS — G62.9 NEUROPATHY: ICD-10-CM

## 2017-05-08 PROBLEM — J44.89 ASTHMA WITH COPD (CHRONIC OBSTRUCTIVE PULMONARY DISEASE) (HCC): Chronic | Status: ACTIVE | Noted: 2017-05-08

## 2017-05-08 PROBLEM — J44.89 ASTHMA WITH COPD (CHRONIC OBSTRUCTIVE PULMONARY DISEASE): Status: ACTIVE | Noted: 2017-05-08

## 2017-05-08 PROBLEM — J44.89 ASTHMA WITH COPD (CHRONIC OBSTRUCTIVE PULMONARY DISEASE): Chronic | Status: ACTIVE | Noted: 2017-05-08

## 2017-05-08 PROBLEM — J44.89 ASTHMA WITH COPD (CHRONIC OBSTRUCTIVE PULMONARY DISEASE) (HCC): Status: ACTIVE | Noted: 2017-05-08

## 2017-05-08 PROCEDURE — 99214 OFFICE O/P EST MOD 30 MIN: CPT | Performed by: INTERNAL MEDICINE

## 2017-05-08 PROCEDURE — G0463 HOSPITAL OUTPT CLINIC VISIT: HCPCS | Performed by: INTERNAL MEDICINE

## 2017-05-08 NOTE — PROGRESS NOTES
Joselito Ramirez is a 80year old female who presents for     Last seen 4/14/17. Pain and swelling in feet follow up-  Is doing better. wears sandals at home that \"don't irritate the bone\". Soaking in epsom salts helps. Didn't do EMG.  Pt also feels Deven).     • SVT (supraventricular tachycardia) ([facility])      event monitor 2/2016-SVT          Past Surgical History    APPENDECTOMY      CHOLECYSTECTOMY      OTHER SURGICAL HISTORY      Comment cystocele repair    OTHER SURGICAL HISTORY Bilateral     Com (59.421 kg)  BMI 25.58 kg/m2  SpO2 98%      Wt Readings from Last 6 Encounters:  05/08/17 : 131 lb (59.421 kg)  04/14/17 : 128 lb (58.06 kg)  03/20/17 : 131 lb (59.421 kg)  03/08/17 : 130 lb (58.968 kg)  01/31/17 : 141 lb (63.957 kg)  01/24/17 : 132 lb (59 3/22/16. 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 Healthcare maintenance  Mammo was ok 4/11/16. Pt wants to forego mammo this yr (2017). rachel-bennie was in 5/10 Dr. Mer Alanis with diver int hem. 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   Vaccines: had Pneumovax 11/98 and Disp: 90 tablet Rfl: 3   ZETIA 10 MG Oral Tab TAKE 1 TABLET BY MOUTH EVERY DAY Disp: 90 tablet Rfl: 3   Polyethylene Glycol 3350 (MIRALAX) Oral Powder Take  by mouth.  take 15 milliliter (17G)  by oral route  every day powder mixed with 8 oz. water, juice,

## 2017-06-09 ENCOUNTER — TELEPHONE (OUTPATIENT)
Dept: INTERNAL MEDICINE CLINIC | Facility: CLINIC | Age: 82
End: 2017-06-09

## 2017-06-09 RX ORDER — PROMETHAZINE HYDROCHLORIDE AND CODEINE PHOSPHATE 6.25; 1 MG/5ML; MG/5ML
5 SYRUP ORAL EVERY 6 HOURS PRN
Qty: 180 ML | Refills: 3 | Status: SHIPPED
Start: 2017-06-09 | End: 2017-12-18

## 2017-06-09 NOTE — TELEPHONE ENCOUNTER
To nursing, refill printed to fax to [facility] for promethazine with codeine–180 mL with 3 refills. Please let patient know you faxed the refill to [facility]. Thanks.

## 2017-06-09 NOTE — TELEPHONE ENCOUNTER
Pt requesting RX renewal for:  Promethazine with codeine syrup  Pt uses [facility] for this refill  Pt is out of medication  Please call pt if RX needs to be picked up  Tasked to [employer]

## 2017-06-12 NOTE — TELEPHONE ENCOUNTER
PtShayd Kimball Rx sent to [facility] pharmacy  On [address]   Ph. # [phone]    Routed to Rx

## 2017-06-14 RX ORDER — PROMETHAZINE HYDROCHLORIDE AND CODEINE PHOSPHATE 6.25; 1 MG/5ML; MG/5ML
SYRUP ORAL
Qty: 180 ML | Refills: 0 | OUTPATIENT
Start: 2017-06-14

## 2017-06-14 NOTE — TELEPHONE ENCOUNTER
To nursing, please call the requesting pharmacy– promethazine with codeine was already sent in on 6/9/17 template. If they did not get it and you need to do it again by a phone call in, please do this. Thanks.

## 2017-06-21 RX ORDER — EZETIMIBE 10 MG/1
TABLET ORAL
Qty: 90 TABLET | Refills: 1 | Status: SHIPPED | OUTPATIENT
Start: 2017-06-21 | End: 2017-12-18

## 2017-07-11 ENCOUNTER — OFFICE VISIT (OUTPATIENT)
Dept: INTERNAL MEDICINE CLINIC | Facility: CLINIC | Age: 82
End: 2017-07-11

## 2017-07-11 VITALS
HEIGHT: 59 IN | HEART RATE: 72 BPM | SYSTOLIC BLOOD PRESSURE: 118 MMHG | TEMPERATURE: 98 F | BODY MASS INDEX: 25.8 KG/M2 | DIASTOLIC BLOOD PRESSURE: 60 MMHG | WEIGHT: 128 LBS | OXYGEN SATURATION: 97 %

## 2017-07-11 DIAGNOSIS — I10 ESSENTIAL HYPERTENSION: ICD-10-CM

## 2017-07-11 DIAGNOSIS — G62.9 NEUROPATHY: ICD-10-CM

## 2017-07-11 DIAGNOSIS — E11.40 CONTROLLED TYPE 2 DIABETES MELLITUS WITH DIABETIC NEUROPATHY, WITHOUT LONG-TERM CURRENT USE OF INSULIN (HCC): ICD-10-CM

## 2017-07-11 DIAGNOSIS — J44.9 ASTHMA WITH COPD (CHRONIC OBSTRUCTIVE PULMONARY DISEASE) (HCC): ICD-10-CM

## 2017-07-11 DIAGNOSIS — J40 BRONCHITIS: ICD-10-CM

## 2017-07-11 PROCEDURE — 99214 OFFICE O/P EST MOD 30 MIN: CPT | Performed by: INTERNAL MEDICINE

## 2017-07-11 PROCEDURE — G0463 HOSPITAL OUTPT CLINIC VISIT: HCPCS | Performed by: INTERNAL MEDICINE

## 2017-07-11 RX ORDER — CEFUROXIME AXETIL 250 MG/1
250 TABLET ORAL 2 TIMES DAILY
Qty: 20 TABLET | Refills: 0 | Status: SHIPPED | OUTPATIENT
Start: 2017-07-11 | End: 2017-09-13 | Stop reason: ALTCHOICE

## 2017-07-11 RX ORDER — HYDROCODONE BITARTRATE AND ACETAMINOPHEN 5; 325 MG/1; MG/1
1 TABLET ORAL EVERY 6 HOURS PRN
Qty: 100 TABLET | Refills: 0 | Status: SHIPPED | OUTPATIENT
Start: 2017-07-11 | End: 2018-03-12

## 2017-07-11 RX ORDER — CEFUROXIME AXETIL 250 MG/1
250 TABLET ORAL 2 TIMES DAILY
Qty: 20 TABLET | Refills: 0 | Status: SHIPPED | OUTPATIENT
Start: 2017-07-11 | End: 2017-07-11

## 2017-07-11 NOTE — PROGRESS NOTES
Brionna Orozco is a 80year old female who presents for     Check at 2 mo. Coughing up thick yellow mucus for 2 wks. Pt feels it is from post nasal drip. Blows nose of thick yellow mucus. No wheezing or SOB. No fever. Tried prometh with cod syrup.  He hernia  2009: ABDOMEN SURGERY PROC UNLISTED      Comment: TERESA and small bowel resection-Dr Jaramillo  No date: APPENDECTOMY  No date: CHOLECYSTECTOMY  1/2017: FRACTURE SURGERY Left      Comment: L hip fx pinning 1/2017 Dr. Domenic Doll  2000: Jackelin Cleaves Last 6 Encounters:  07/11/17 : 128 lb (58.1 kg)  05/08/17 : 131 lb (59.4 kg)  04/14/17 : 128 lb (58.1 kg)  03/20/17 : 131 lb (59.4 kg)  03/08/17 : 130 lb (59 kg)  01/31/17 : 141 lb (64 kg)      General: appears well nourished and in no acute distress  HEEN diver int hem. 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 Vaccines: Pneumovax 11/98, PCV13 in 11/2014. Shingles vac avail at pharmacy.  Flu shot was gotten.    MRI of panc duct strictures-area of dilation on MRI in 5/13 --no change to 3/12--no f/u felt needed as radiologi  MCG/ACT Inhalation Aerosol INHALE 1 PUFF TWICE DAILY Disp: 36 g Rfl: 3   Pantoprazole Sodium (PROTONIX) 40 MG Oral Tab EC Take 1 tablet (40 mg total) by mouth every morning before breakfast. Disp: 90 tablet Rfl: 3   Polyethylene Glycol 3350 (MIRALA

## 2017-07-19 NOTE — TELEPHONE ENCOUNTER
Refill sent to [address] [place] [address]. 2% cream-apply twice daily as needed. 30 g with 2 refills.

## 2017-08-07 RX ORDER — PANTOPRAZOLE SODIUM 40 MG/1
TABLET, DELAYED RELEASE ORAL
Qty: 90 TABLET | Refills: 3 | Status: SHIPPED | OUTPATIENT
Start: 2017-08-07 | End: 2018-08-31

## 2017-08-07 NOTE — TELEPHONE ENCOUNTER
Pt confirmed all 3 pharmacies - using [facility] for pantoprazole.   Refill request is for a maintenance medication and has met the criteria specified in the Ambulatory Medication Refill Standing Order for eligibility, visits, laboratory, alerts and was sent to

## 2017-08-21 RX ORDER — AMOXICILLIN 250 MG
2 CAPSULE ORAL 2 TIMES DAILY PRN
Qty: 60 TABLET | Refills: 1 | Status: SHIPPED | OUTPATIENT
Start: 2017-08-21 | End: 2018-01-03

## 2017-08-21 NOTE — TELEPHONE ENCOUNTER
Patient needs refill for:    Originally ordered by Dr. Karlee Parra MD    Sennalax 8.6 - 50mg  -  Two times daily, but patient sometimes takes it less, as she also takes Miralax at night.     Send to [place]

## 2017-08-21 NOTE — TELEPHONE ENCOUNTER
I sent to [facility] pharmacy-refill for Senna-time S-take 2 tabs bid prn constipation. #60 with 1 refill.   (Sennosides–docusate sodium)

## 2017-09-13 ENCOUNTER — OFFICE VISIT (OUTPATIENT)
Dept: INTERNAL MEDICINE CLINIC | Facility: CLINIC | Age: 82
End: 2017-09-13

## 2017-09-13 VITALS
BODY MASS INDEX: 26.21 KG/M2 | HEIGHT: 59 IN | HEART RATE: 75 BPM | DIASTOLIC BLOOD PRESSURE: 50 MMHG | SYSTOLIC BLOOD PRESSURE: 138 MMHG | OXYGEN SATURATION: 98 % | TEMPERATURE: 99 F | WEIGHT: 130 LBS

## 2017-09-13 DIAGNOSIS — E11.40 CONTROLLED TYPE 2 DIABETES MELLITUS WITH DIABETIC NEUROPATHY, WITHOUT LONG-TERM CURRENT USE OF INSULIN (HCC): ICD-10-CM

## 2017-09-13 DIAGNOSIS — G62.9 NEUROPATHY: ICD-10-CM

## 2017-09-13 DIAGNOSIS — H91.93 BILATERAL HEARING LOSS, UNSPECIFIED HEARING LOSS TYPE: ICD-10-CM

## 2017-09-13 DIAGNOSIS — I10 ESSENTIAL HYPERTENSION: ICD-10-CM

## 2017-09-13 DIAGNOSIS — J44.9 ASTHMA WITH COPD (CHRONIC OBSTRUCTIVE PULMONARY DISEASE) (HCC): ICD-10-CM

## 2017-09-13 PROCEDURE — G0008 ADMIN INFLUENZA VIRUS VAC: HCPCS | Performed by: INTERNAL MEDICINE

## 2017-09-13 PROCEDURE — G0463 HOSPITAL OUTPT CLINIC VISIT: HCPCS | Performed by: INTERNAL MEDICINE

## 2017-09-13 PROCEDURE — 90653 IIV ADJUVANT VACCINE IM: CPT | Performed by: INTERNAL MEDICINE

## 2017-09-13 PROCEDURE — 99214 OFFICE O/P EST MOD 30 MIN: CPT | Performed by: INTERNAL MEDICINE

## 2017-09-13 NOTE — PROGRESS NOTES
Amy Bailey is a 80year old female who presents for     Check at 2 mo. Feels good. Hearing problem--\"My kids tell me I'm having trouble hearing. \" hard to hear TV. Hard to hear speakers who talk fast fast. 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  Hx bi in 2006 Dr. Abraham Jay sling procedure Dr Rhea Jiménez in 6/2016   • UTI due to Klebsiella species 12/2016    ESBL Klebsiella UTI treated in [address] [place] [address] with meropenem when in hosp for enteritis. (Dr Rip Holden).        Past Surgical History:  2007: ABDOMEN SURGERY PRO arm, Patient Position: Sitting, Cuff Size: adult)   Pulse 75   Temp 98.6 °F (37 °C)   Ht 4' 11\" (1.499 m)   Wt 130 lb (59 kg)   SpO2 98%   BMI 26.26 kg/m²       Wt Readings from Last 6 Encounters:  09/13/17 : 130 lb (59 kg)  07/11/17 : 128 lb (58.1 kg)  0 30 day event monitor 2/2016-showed SVT.      Hyperlipidemia--On Zetia 10 mg daily.  on 10/19/16.     GERD-on protonix 40 mg daily started 3/22/16. 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      Healthcare maintenance  Mammo ok 4/11/16.  Pt wants to as needed for Muscle spasms. Disp:  Rfl:    triamcinolone acetonide 0.1 % External Cream Apply topically 2 (two) times daily as needed. Disp: 1 Tube Rfl: 3   DILTIAZEM HCL ER COATED BEADS 120 MG Oral Capsule SR 24 Hr TAKE 1 CAPSULE BY MOUTH DAILY.  Disp: 90

## 2017-12-14 ENCOUNTER — LAB ENCOUNTER (OUTPATIENT)
Dept: LAB | Age: 82
End: 2017-12-14
Attending: INTERNAL MEDICINE
Payer: MEDICARE

## 2017-12-14 DIAGNOSIS — I10 ESSENTIAL HYPERTENSION: ICD-10-CM

## 2017-12-14 DIAGNOSIS — E11.40 CONTROLLED TYPE 2 DIABETES MELLITUS WITH DIABETIC NEUROPATHY, WITHOUT LONG-TERM CURRENT USE OF INSULIN (HCC): ICD-10-CM

## 2017-12-14 PROCEDURE — 80053 COMPREHEN METABOLIC PANEL: CPT

## 2017-12-14 PROCEDURE — 83036 HEMOGLOBIN GLYCOSYLATED A1C: CPT

## 2017-12-14 PROCEDURE — 80061 LIPID PANEL: CPT

## 2017-12-14 PROCEDURE — 36415 COLL VENOUS BLD VENIPUNCTURE: CPT

## 2017-12-14 PROCEDURE — 85025 COMPLETE CBC W/AUTO DIFF WBC: CPT

## 2017-12-18 ENCOUNTER — OFFICE VISIT (OUTPATIENT)
Dept: INTERNAL MEDICINE CLINIC | Facility: CLINIC | Age: 82
End: 2017-12-18

## 2017-12-18 VITALS
TEMPERATURE: 98 F | BODY MASS INDEX: 26.37 KG/M2 | SYSTOLIC BLOOD PRESSURE: 114 MMHG | HEIGHT: 59 IN | WEIGHT: 130.81 LBS | HEART RATE: 73 BPM | DIASTOLIC BLOOD PRESSURE: 56 MMHG | OXYGEN SATURATION: 98 %

## 2017-12-18 DIAGNOSIS — I10 ESSENTIAL HYPERTENSION: ICD-10-CM

## 2017-12-18 DIAGNOSIS — J40 BRONCHITIS: ICD-10-CM

## 2017-12-18 DIAGNOSIS — J44.9 ASTHMA WITH COPD (CHRONIC OBSTRUCTIVE PULMONARY DISEASE) (HCC): ICD-10-CM

## 2017-12-18 DIAGNOSIS — R00.2 PALPITATIONS: Primary | ICD-10-CM

## 2017-12-18 DIAGNOSIS — I47.1 SVT (SUPRAVENTRICULAR TACHYCARDIA) (HCC): ICD-10-CM

## 2017-12-18 PROCEDURE — G0463 HOSPITAL OUTPT CLINIC VISIT: HCPCS | Performed by: INTERNAL MEDICINE

## 2017-12-18 PROCEDURE — 99214 OFFICE O/P EST MOD 30 MIN: CPT | Performed by: INTERNAL MEDICINE

## 2017-12-18 RX ORDER — PREDNISONE 10 MG/1
TABLET ORAL
Qty: 20 TABLET | Refills: 0 | Status: SHIPPED | OUTPATIENT
Start: 2017-12-18 | End: 2018-04-04

## 2017-12-18 RX ORDER — EZETIMIBE 10 MG/1
10 TABLET ORAL
Qty: 90 TABLET | Refills: 3 | Status: SHIPPED | OUTPATIENT
Start: 2017-12-18 | End: 2018-10-15 | Stop reason: ALTCHOICE

## 2017-12-18 RX ORDER — PROMETHAZINE HYDROCHLORIDE AND CODEINE PHOSPHATE 6.25; 1 MG/5ML; MG/5ML
5 SYRUP ORAL EVERY 6 HOURS PRN
Qty: 180 ML | Refills: 3 | Status: SHIPPED
Start: 2017-12-18 | End: 2018-07-15

## 2017-12-18 RX ORDER — AZITHROMYCIN 250 MG/1
TABLET, FILM COATED ORAL
Qty: 1 PACKAGE | Refills: 0 | Status: SHIPPED | OUTPATIENT
Start: 2017-12-18 | End: 2018-04-04

## 2017-12-18 RX ORDER — DILTIAZEM HYDROCHLORIDE 120 MG/1
120 CAPSULE, COATED, EXTENDED RELEASE ORAL
Qty: 90 CAPSULE | Refills: 3 | Status: SHIPPED | OUTPATIENT
Start: 2017-12-18 | End: 2018-12-17

## 2017-12-18 NOTE — PROGRESS NOTES
Madai Oliveira is a 80year old female who presents for     Check at 3 mo.      Feels ok. getting a \"feeling\" sometimes which \"lasts a few seconds, I don't even feel like I need to sit down. I don't know if it's a lightheadedness.  I check my pulse and i prolapse 2006    had pessary in 2006 Dr. Thu Garibay sling procedure Dr Melisa Hansen in 6/2016   • UTI due to Klebsiella species 12/2016    ESBL Klebsiella UTI treated in [address] [place] [address] with meropenem when in hosp for enteritis. (Dr Alfa Crouch).        Past Surgical Histor constipation        EXAM:   /56 (BP Location: Right arm, Patient Position: Sitting, Cuff Size: adult)   Pulse 73   Temp 97.8 °F (36.6 °C) (Oral)   Ht 4' 11\" (1.499 m)   Wt 130 lb 12.8 oz (59.3 kg)   SpO2 98%   BMI 26.42 kg/m²       Wt Readings from takes senalax bid prn for constipation. Dr. Yokasta Duarte 3/20/17- recom avoid raw veg and if sx  ER and do CT scan.     SVT-on diltiazem  mg daily. 30 day event monitor 2/2016-showed SVT.      Hyperlipidemia--On Zetia 10 mg daily.  LDL 96 on 12/14/17.     needed for constipation.  Disp: 60 tablet Rfl: 1   PANTOPRAZOLE SODIUM 40 MG Oral Tab EC TAKE 1 TABLET BY MOUTH EVERY MORNING , BEFORE BREAKFAST Disp: 90 tablet Rfl: 3   PROCTO-MED HC 2.5 % Rectal Cream APPLY RECTALLY TWICE DAILY AS NEEDED Disp: 30 g Rfl: 2

## 2018-01-03 RX ORDER — AMOXICILLIN 250 MG
2 CAPSULE ORAL 2 TIMES DAILY PRN
Qty: 60 TABLET | Refills: 1 | Status: SHIPPED | OUTPATIENT
Start: 2018-01-03 | End: 2018-04-28

## 2018-01-03 NOTE — TELEPHONE ENCOUNTER
Refill of Sennosides–docusate sodium (senna–time S) 2 twice daily as needed constipation. #60 with 1 refill. Sent to [facility].

## 2018-03-12 RX ORDER — HYDROCODONE BITARTRATE AND ACETAMINOPHEN 5; 325 MG/1; MG/1
1 TABLET ORAL EVERY 6 HOURS PRN
Qty: 100 TABLET | Refills: 0 | Status: SHIPPED | OUTPATIENT
Start: 2018-03-12 | End: 2018-09-24

## 2018-03-12 NOTE — TELEPHONE ENCOUNTER
Patient requesting refill on Hydrocodone-acetaminophen. Please call when ready to   Patient [phone]  Routed to rx.

## 2018-03-12 NOTE — TELEPHONE ENCOUNTER
To Dr. Kb Fall MD:  The above refill request is for a controlled substance. Please indicate yes or no to refill 30 days supply plus one refill.   If more refills are appropriate, please indicate quantity

## 2018-03-13 NOTE — TELEPHONE ENCOUNTER
disregard msg Dr. Americo Jade for [address] was found and placed at the  for pickup. Patient called and notified.

## 2018-03-26 RX ORDER — ENALAPRIL MALEATE 10 MG/1
TABLET ORAL
Qty: 90 TABLET | Refills: 3 | Status: SHIPPED | OUTPATIENT
Start: 2018-03-26 | End: 2019-04-04

## 2018-03-26 NOTE — TELEPHONE ENCOUNTER
To  - p lease call pt to schedule f/u - was to return for 1 month check in January. Then back to Rx - thank you!

## 2018-04-04 ENCOUNTER — OFFICE VISIT (OUTPATIENT)
Dept: INTERNAL MEDICINE CLINIC | Facility: CLINIC | Age: 83
End: 2018-04-04

## 2018-04-04 VITALS
BODY MASS INDEX: 27.42 KG/M2 | WEIGHT: 136 LBS | HEART RATE: 68 BPM | TEMPERATURE: 98 F | DIASTOLIC BLOOD PRESSURE: 60 MMHG | SYSTOLIC BLOOD PRESSURE: 122 MMHG | HEIGHT: 59 IN

## 2018-04-04 DIAGNOSIS — G62.9 NEUROPATHY: ICD-10-CM

## 2018-04-04 DIAGNOSIS — I10 ESSENTIAL HYPERTENSION: ICD-10-CM

## 2018-04-04 DIAGNOSIS — E11.40 CONTROLLED TYPE 2 DIABETES MELLITUS WITH DIABETIC NEUROPATHY, WITHOUT LONG-TERM CURRENT USE OF INSULIN (HCC): ICD-10-CM

## 2018-04-04 DIAGNOSIS — J44.9 ASTHMA WITH COPD (CHRONIC OBSTRUCTIVE PULMONARY DISEASE) (HCC): ICD-10-CM

## 2018-04-04 DIAGNOSIS — G56.03 BILATERAL CARPAL TUNNEL SYNDROME: Primary | ICD-10-CM

## 2018-04-04 DIAGNOSIS — Z12.31 VISIT FOR SCREENING MAMMOGRAM: ICD-10-CM

## 2018-04-04 PROCEDURE — G0463 HOSPITAL OUTPT CLINIC VISIT: HCPCS | Performed by: INTERNAL MEDICINE

## 2018-04-04 PROCEDURE — 99214 OFFICE O/P EST MOD 30 MIN: CPT | Performed by: INTERNAL MEDICINE

## 2018-04-04 NOTE — PROGRESS NOTES
Anisha Walden is a 80year old female who presents for     Check at 4 mo. \"I have carpal tunnel. \"   \"the fingers are numb and tingly at night when they don't move. \"  Thomas Soliz a glove and tried soaking in epsom salts.    R hand in last few mo and the Comment: laparotomy for TERESA and internal hernia  2009: ABDOMEN SURGERY PROC UNLISTED      Comment: TERESA and small bowel resection-Dr Jaramillo  No date: APPENDECTOMY  No date: CHOLECYSTECTOMY  1/2017: FRACTURE SURGERY Left      Comment: L hip fx pinning 1/ (59.3 kg)  09/13/17 : 130 lb (59 kg)  07/11/17 : 128 lb (58.1 kg)  05/08/17 : 131 lb (59.4 kg)  04/14/17 : 128 lb (58.1 kg)      General: appears well nourished and in no acute distress  Lungs: clear to auscultation  Heart: regular rhythm, S1S2 normal with Shingles vac avail at pharmacy.  Flu shot 9/13/17.   MRI panc duct strictures-area of dilation on MRI in 5/13 --no change to 3/12--no f/u felt needed as radiologist feels is sequelae of prior pancreatitis.   has TAC cream--uses prn for eczema.       lab 4/1 Powder Take  by mouth.  take 15 milliliter (17G)  by oral route  every day powder mixed with 8 oz. water, juice, soda, coffee, or tea Disp:  Rfl:          Cydney Vick MD  4/4/2018

## 2018-04-16 NOTE — TELEPHONE ENCOUNTER
Patient called for refill of sennosides docusate   Has 2 tablets left - requests refill to be sent to [place] today DISPLAY PLAN FREE TEXT

## 2018-04-23 ENCOUNTER — HOSPITAL ENCOUNTER (OUTPATIENT)
Dept: MAMMOGRAPHY | Facility: HOSPITAL | Age: 83
Discharge: HOME | End: 2018-04-23
Attending: INTERNAL MEDICINE
Payer: MEDICARE

## 2018-04-23 DIAGNOSIS — Z12.31 VISIT FOR SCREENING MAMMOGRAM: ICD-10-CM

## 2018-04-23 PROCEDURE — 77067 SCR MAMMO BI INCL CAD: CPT | Performed by: INTERNAL MEDICINE

## 2018-04-29 RX ORDER — ASPIRIN 81 MG
TABLET, DELAYED RELEASE (ENTERIC COATED) ORAL
Qty: 60 TABLET | Refills: 1 | Status: SHIPPED | OUTPATIENT
Start: 2018-04-29 | End: 2018-09-14

## 2018-04-29 RX ORDER — GABAPENTIN 100 MG/1
CAPSULE ORAL
Qty: 90 CAPSULE | Refills: 3 | Status: SHIPPED | OUTPATIENT
Start: 2018-04-29 | End: 2019-05-13

## 2018-06-11 ENCOUNTER — OFFICE VISIT (OUTPATIENT)
Dept: OTOLARYNGOLOGY | Facility: CLINIC | Age: 83
End: 2018-06-11

## 2018-06-11 ENCOUNTER — OFFICE VISIT (OUTPATIENT)
Dept: AUDIOLOGY | Facility: CLINIC | Age: 83
End: 2018-06-11

## 2018-06-11 VITALS
HEART RATE: 71 BPM | SYSTOLIC BLOOD PRESSURE: 130 MMHG | HEIGHT: 59 IN | WEIGHT: 136 LBS | DIASTOLIC BLOOD PRESSURE: 59 MMHG | BODY MASS INDEX: 27.42 KG/M2 | TEMPERATURE: 99 F

## 2018-06-11 DIAGNOSIS — H69.93 EUSTACHIAN TUBE DISORDER, BILATERAL: ICD-10-CM

## 2018-06-11 DIAGNOSIS — H90.6 MIXED HEARING LOSS, BILATERAL: ICD-10-CM

## 2018-06-11 DIAGNOSIS — H91.93 BILATERAL HEARING LOSS, UNSPECIFIED HEARING LOSS TYPE: Primary | ICD-10-CM

## 2018-06-11 PROCEDURE — 92557 COMPREHENSIVE HEARING TEST: CPT | Performed by: AUDIOLOGIST

## 2018-06-11 PROCEDURE — 99203 OFFICE O/P NEW LOW 30 MIN: CPT | Performed by: OTOLARYNGOLOGY

## 2018-06-11 PROCEDURE — G0463 HOSPITAL OUTPT CLINIC VISIT: HCPCS | Performed by: OTOLARYNGOLOGY

## 2018-06-11 PROCEDURE — 92567 TYMPANOMETRY: CPT | Performed by: AUDIOLOGIST

## 2018-06-12 NOTE — PROGRESS NOTES
Nicko Pearson is a 80year old female. Patient presents with:  Consult  Hearing Loss    HPI:   She has been experiencing problems with her hearing for many years. 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   She still has occasiona Diverticulitis 2010    hospitalized 3/10 and 5/10   • Diverticulosis 2003    cscopy '03, '07, '10   • GERD (gastroesophageal reflux disease)    • Glaucoma    • Hyperlipidemia    • Hypertension, essential    • Irritable bowel syndrome    • Lumbar stenosis 2 Tonsils - Normal, Tongue - Normal    Nasal Normal External nose - Normal. Nasal septum - Normal, Turbinates - Normal   Neurological Normal Memory - Normal. Cranial nerves - Cranial nerves II through XII grossly intact.    Neck Exam Normal Inspection - [place]

## 2018-06-12 NOTE — PROGRESS NOTES
AUDIOGRAM     Uyen Castillo was referred for testing by Bonilla MIRANDA   12/11/1931  [id number]    Pt noted history of middle ear surgery and overall decreased hearing.     Otoscopic Inspection:  Right ear:  No cerumen  Left ear:  No cerumen    Audiomet

## 2018-07-11 ENCOUNTER — OFFICE VISIT (OUTPATIENT)
Dept: INTERNAL MEDICINE CLINIC | Facility: CLINIC | Age: 83
End: 2018-07-11

## 2018-07-11 VITALS
BODY MASS INDEX: 29.97 KG/M2 | TEMPERATURE: 98 F | HEART RATE: 67 BPM | DIASTOLIC BLOOD PRESSURE: 54 MMHG | WEIGHT: 137 LBS | SYSTOLIC BLOOD PRESSURE: 110 MMHG | HEIGHT: 56.5 IN | OXYGEN SATURATION: 97 %

## 2018-07-11 DIAGNOSIS — E78.00 HYPERCHOLESTEROLEMIA: ICD-10-CM

## 2018-07-11 DIAGNOSIS — Z00.00 MEDICARE ANNUAL WELLNESS VISIT, SUBSEQUENT: ICD-10-CM

## 2018-07-11 DIAGNOSIS — K21.9 GASTROESOPHAGEAL REFLUX DISEASE WITHOUT ESOPHAGITIS: ICD-10-CM

## 2018-07-11 DIAGNOSIS — H91.93 HEARING DIFFICULTY OF BOTH EARS: ICD-10-CM

## 2018-07-11 DIAGNOSIS — Z87.19 HISTORY OF SMALL BOWEL OBSTRUCTION: ICD-10-CM

## 2018-07-11 DIAGNOSIS — M15.9 PRIMARY OSTEOARTHRITIS INVOLVING MULTIPLE JOINTS: ICD-10-CM

## 2018-07-11 DIAGNOSIS — Z87.81 HISTORY OF FRACTURE OF LEFT HIP: ICD-10-CM

## 2018-07-11 DIAGNOSIS — I47.1 SVT (SUPRAVENTRICULAR TACHYCARDIA) (HCC): ICD-10-CM

## 2018-07-11 DIAGNOSIS — L84 CALLUS OF FOOT: ICD-10-CM

## 2018-07-11 DIAGNOSIS — G56.03 BILATERAL CARPAL TUNNEL SYNDROME: ICD-10-CM

## 2018-07-11 DIAGNOSIS — E11.40 CONTROLLED TYPE 2 DIABETES MELLITUS WITH DIABETIC NEUROPATHY, WITHOUT LONG-TERM CURRENT USE OF INSULIN (HCC): ICD-10-CM

## 2018-07-11 DIAGNOSIS — G62.9 NEUROPATHY: ICD-10-CM

## 2018-07-11 DIAGNOSIS — J44.9 ASTHMA WITH COPD (CHRONIC OBSTRUCTIVE PULMONARY DISEASE) (HCC): ICD-10-CM

## 2018-07-11 DIAGNOSIS — I10 ESSENTIAL HYPERTENSION: ICD-10-CM

## 2018-07-11 PROBLEM — I47.10 SVT (SUPRAVENTRICULAR TACHYCARDIA) (HCC): Status: ACTIVE | Noted: 2018-07-11

## 2018-07-11 PROBLEM — I47.10 SVT (SUPRAVENTRICULAR TACHYCARDIA): Status: ACTIVE | Noted: 2018-07-11

## 2018-07-11 PROCEDURE — G0439 PPPS, SUBSEQ VISIT: HCPCS | Performed by: INTERNAL MEDICINE

## 2018-07-11 NOTE — PROGRESS NOTES
Joselito Ramirez is a 80year old female who presents for     Check at [facility] and Medicare annual wellness (medicare advantage).      carpal tunnel--\"it's good. I put the splints on at night. \"      Hx asthma with COPD--no current sx.     Still taking 1/2 no and internal hernia  2009: ABDOMEN SURGERY PROC UNLISTED      Comment: TERESA and small bowel resection-Dr Jaramillo  No date: APPENDECTOMY  No date: CHOLECYSTECTOMY  1/2017: FRACTURE SURGERY Left      Comment: L hip fx pinning 1/2017 Dr. Fab Morales  2000: Geraldo La Encounters:  07/11/18 : 137 lb (62.1 kg)  06/11/18 : 136 lb (61.7 kg)  04/04/18 : 136 lb (61.7 kg)  12/18/17 : 130 lb 12.8 oz (59.3 kg)  09/13/17 : 130 lb (59 kg)  07/11/17 : 128 lb (58.1 kg)      General: appears well nourished and in no acute distress  N primary--takes 1/2 norco daily. Callus R 2nd toe--refer to Dr. Iman Vargas maintenance  Mammo ok 4/23/18-ok. dexa 2010 osteopenia.   c-bennie  5/10 Dr. Josemanuel Guevara -diver int hem. 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   Vaccines: Pneumovax 11/98, Prevnar 11/ capsule Rfl: 3   Fluticasone Propionate HFA (FLOVENT HFA) 220 MCG/ACT Inhalation Aerosol Inhale 1 puff into the lungs 2 (two) times daily.  Disp: 36 g Rfl: 11   PANTOPRAZOLE SODIUM 40 MG Oral Tab EC TAKE 1 TABLET BY MOUTH EVERY MORNING , BEFORE BREAKFAST Maddi Sorensen Fall/Risk Scorin          Depression Screening (PHQ-2/PHQ-9): Over the LAST 2 WEEKS   Little interest or pleasure in doing things (over the last two weeks)?: Not at all    Feeling down, depressed, or hopeless (over th Correct    Recall \"Ball\": Correct    Recall \"Flag\": Correct    Recall \"Tree\": Correct

## 2018-07-30 ENCOUNTER — TELEPHONE (OUTPATIENT)
Dept: INTERNAL MEDICINE CLINIC | Facility: CLINIC | Age: 83
End: 2018-07-30

## 2018-07-30 RX ORDER — PROMETHAZINE HYDROCHLORIDE AND CODEINE PHOSPHATE 6.25; 1 MG/5ML; MG/5ML
2.5 SYRUP ORAL EVERY 6 HOURS PRN
Qty: 180 ML | Refills: 1 | COMMUNITY
Start: 2018-07-30 | End: 2018-12-17

## 2018-07-30 NOTE — TELEPHONE ENCOUNTER
Please advise - called patient who states she has chronic cough and takes it only when she goes to [employer] or sometimes at night time  ( syrup with codeine)- to DR. Severo Pastures

## 2018-07-30 NOTE — TELEPHONE ENCOUNTER
Pt is asking for a prescription for cough syrup with codeine. She said she doesn't take it all the time, just when she goes to [employer], and not every 6 hours.  Pt uses [facility] in [place].   To Nursing

## 2018-07-30 NOTE — TELEPHONE ENCOUNTER
To nursing, please call in refill for Promethazine with codeine syrup 6.25–10 mg/5 mL--take 1 teaspoon every 6 hours as needed cough. Dispense 180 mL with 1 refill. Thanks.

## 2018-08-31 RX ORDER — PANTOPRAZOLE SODIUM 40 MG/1
TABLET, DELAYED RELEASE ORAL
Qty: 90 TABLET | Refills: 3 | Status: SHIPPED | OUTPATIENT
Start: 2018-08-31 | End: 2019-06-23

## 2018-09-14 RX ORDER — ASPIRIN 81 MG
TABLET, DELAYED RELEASE (ENTERIC COATED) ORAL
Qty: 100 TABLET | Refills: 6 | Status: SHIPPED | OUTPATIENT
Start: 2018-09-14 | End: 2019-10-21

## 2018-09-24 RX ORDER — HYDROCODONE BITARTRATE AND ACETAMINOPHEN 5; 325 MG/1; MG/1
1 TABLET ORAL EVERY 6 HOURS PRN
Qty: 100 TABLET | Refills: 0 | Status: SHIPPED | OUTPATIENT
Start: 2018-09-24 | End: 2019-01-18

## 2018-09-24 RX ORDER — HYDROCODONE BITARTRATE AND ACETAMINOPHEN 5; 325 MG/1; MG/1
TABLET ORAL
Qty: 100 TABLET | Refills: 0 | OUTPATIENT
Start: 2018-09-24

## 2018-09-24 NOTE — TELEPHONE ENCOUNTER
To MD:  The above refill request is for a controlled substance. Please indicate yes or no to refill 30 days supply plus one refill.   If more refills are appropriate, please indicate quantity    LR - 3/12/2018 # 100/0

## 2018-10-11 ENCOUNTER — LAB ENCOUNTER (OUTPATIENT)
Dept: LAB | Age: 83
End: 2018-10-11
Attending: INTERNAL MEDICINE
Payer: MEDICARE

## 2018-10-11 DIAGNOSIS — E11.40 CONTROLLED TYPE 2 DIABETES MELLITUS WITH DIABETIC NEUROPATHY, WITHOUT LONG-TERM CURRENT USE OF INSULIN (HCC): ICD-10-CM

## 2018-10-11 PROCEDURE — 80061 LIPID PANEL: CPT

## 2018-10-11 PROCEDURE — 87077 CULTURE AEROBIC IDENTIFY: CPT | Performed by: INTERNAL MEDICINE

## 2018-10-11 PROCEDURE — 87186 SC STD MICRODIL/AGAR DIL: CPT | Performed by: INTERNAL MEDICINE

## 2018-10-11 PROCEDURE — 82570 ASSAY OF URINE CREATININE: CPT

## 2018-10-11 PROCEDURE — 84443 ASSAY THYROID STIM HORMONE: CPT

## 2018-10-11 PROCEDURE — 87086 URINE CULTURE/COLONY COUNT: CPT | Performed by: INTERNAL MEDICINE

## 2018-10-11 PROCEDURE — 82043 UR ALBUMIN QUANTITATIVE: CPT

## 2018-10-11 PROCEDURE — 36415 COLL VENOUS BLD VENIPUNCTURE: CPT

## 2018-10-11 PROCEDURE — 83036 HEMOGLOBIN GLYCOSYLATED A1C: CPT

## 2018-10-11 PROCEDURE — 81001 URINALYSIS AUTO W/SCOPE: CPT | Performed by: INTERNAL MEDICINE

## 2018-10-11 PROCEDURE — 80053 COMPREHEN METABOLIC PANEL: CPT

## 2018-10-11 PROCEDURE — 85025 COMPLETE CBC W/AUTO DIFF WBC: CPT

## 2018-10-15 ENCOUNTER — OFFICE VISIT (OUTPATIENT)
Dept: INTERNAL MEDICINE CLINIC | Facility: CLINIC | Age: 83
End: 2018-10-15
Payer: COMMERCIAL

## 2018-10-15 VITALS
WEIGHT: 133.81 LBS | OXYGEN SATURATION: 99 % | BODY MASS INDEX: 29.27 KG/M2 | HEIGHT: 56.5 IN | HEART RATE: 70 BPM | SYSTOLIC BLOOD PRESSURE: 122 MMHG | DIASTOLIC BLOOD PRESSURE: 52 MMHG | TEMPERATURE: 98 F

## 2018-10-15 DIAGNOSIS — E78.00 HYPERCHOLESTEROLEMIA: ICD-10-CM

## 2018-10-15 DIAGNOSIS — I10 ESSENTIAL HYPERTENSION: Primary | ICD-10-CM

## 2018-10-15 DIAGNOSIS — N39.0 ACUTE UTI: ICD-10-CM

## 2018-10-15 DIAGNOSIS — J44.9 ASTHMA WITH COPD (CHRONIC OBSTRUCTIVE PULMONARY DISEASE) (HCC): ICD-10-CM

## 2018-10-15 DIAGNOSIS — E11.40 CONTROLLED TYPE 2 DIABETES MELLITUS WITH DIABETIC NEUROPATHY, WITHOUT LONG-TERM CURRENT USE OF INSULIN (HCC): ICD-10-CM

## 2018-10-15 DIAGNOSIS — G62.9 NEUROPATHY: ICD-10-CM

## 2018-10-15 PROCEDURE — G0463 HOSPITAL OUTPT CLINIC VISIT: HCPCS | Performed by: INTERNAL MEDICINE

## 2018-10-15 PROCEDURE — 99214 OFFICE O/P EST MOD 30 MIN: CPT | Performed by: INTERNAL MEDICINE

## 2018-10-15 PROCEDURE — G0008 ADMIN INFLUENZA VIRUS VAC: HCPCS | Performed by: INTERNAL MEDICINE

## 2018-10-15 PROCEDURE — 90653 IIV ADJUVANT VACCINE IM: CPT | Performed by: INTERNAL MEDICINE

## 2018-10-15 RX ORDER — NITROFURANTOIN 25; 75 MG/1; MG/1
100 CAPSULE ORAL 2 TIMES DAILY
Qty: 14 CAPSULE | Refills: 0 | Status: SHIPPED | OUTPATIENT
Start: 2018-10-15 | End: 2019-01-18

## 2018-10-15 RX ORDER — ATORVASTATIN CALCIUM 10 MG/1
10 TABLET, FILM COATED ORAL DAILY
Qty: 90 TABLET | Refills: 3 | Status: SHIPPED | OUTPATIENT
Start: 2018-10-15 | End: 2019-10-02

## 2018-10-15 NOTE — PROGRESS NOTES
Nicko Pearson is a 80year old female who presents for     Check at 3 mo       Hx asthma with COPD--no current sx. Takes 1/2 tsp prometh w cod at bedtime for yrs.  Helps prevent chr bronchitis cough at night.       Still taking 1/2 norco at [facility] needs it ABDOMEN SURGERY PROC UNLISTED  2009    TERESA and small bowel resection-Dr Jaramillo   • APPENDECTOMY     • CHOLECYSTECTOMY     • FRACTURE SURGERY Left 1/2017    L hip fx pinning 1/2017 Dr. Brown Host   • HIP PINNING Left 1/6/2017    Performed by Mark Cerrato 12.8 oz (60.7 kg)   SpO2 99%   BMI 29.47 kg/m²       Wt Readings from Last 6 Encounters:  10/15/18 : 133 lb 12.8 oz (60.7 kg)  07/11/18 : 137 lb (62.1 kg)  06/11/18 : 136 lb (61.7 kg)  04/04/18 : 136 lb (61.7 kg)  12/18/17 : 130 lb 12.8 oz (59.3 kg)  09/13 scan.     GERD-on protonix 40 mg daily started 3/22/16. 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      Osteoarthritis multiple sites, primary--takes 1/2 norco daily.      Carpal tunnel bilat--wrist splints bilat at night.  EMGs if not better.     [employer] Take 1 capsule (120 mg total) by mouth once daily. Disp: 90 capsule Rfl: 3   Fluticasone Propionate HFA (FLOVENT HFA) 220 MCG/ACT Inhalation Aerosol Inhale 1 puff into the lungs 2 (two) times daily.  Disp: 36 g Rfl: 11   PROCTO-MED HC 2.5 % Rectal Cream LANCE

## 2018-12-12 NOTE — TELEPHONE ENCOUNTER
Refill sent to [address] [place] [address] for Anusol HC 2.5% rectal cream twice daily as needed. 30 g with 2 refills.

## 2018-12-17 RX ORDER — PROMETHAZINE HYDROCHLORIDE AND CODEINE PHOSPHATE 6.25; 1 MG/5ML; MG/5ML
2.5 SYRUP ORAL EVERY 6 HOURS PRN
Qty: 180 ML | Refills: 1 | Status: SHIPPED
Start: 2018-12-17 | End: 2019-04-16

## 2018-12-17 RX ORDER — DILTIAZEM HYDROCHLORIDE 120 MG/1
120 CAPSULE, COATED, EXTENDED RELEASE ORAL
Qty: 90 CAPSULE | Refills: 3 | Status: SHIPPED | OUTPATIENT
Start: 2018-12-17 | End: 2019-11-27

## 2018-12-17 NOTE — TELEPHONE ENCOUNTER
Pt requesting a refill on DilTIAZem HCl ER Coated Beads 120 MG Oral Capsule SR 24 Hr. Best call back is [phone].    Pharmacy is [facility]    Pt also wondering about the medication, promethazine-codeine 6.25-10 MG/5ML Oral Syrup, pt states she does not vahid

## 2018-12-17 NOTE — TELEPHONE ENCOUNTER
To nursing, please fax refill to [phone] for   promethazine–codeine 6.25-10 mg / 5 mL oral syrup–take 2.5 mL by mouth every 6 hours as needed for cough. Dispense 180 ml.  1 refill. Please let patient know you faxed in the refill. Thanks.

## 2019-01-18 ENCOUNTER — OFFICE VISIT (OUTPATIENT)
Dept: INTERNAL MEDICINE CLINIC | Facility: CLINIC | Age: 84
End: 2019-01-18
Payer: COMMERCIAL

## 2019-01-18 VITALS
BODY MASS INDEX: 28.87 KG/M2 | HEIGHT: 56.5 IN | HEART RATE: 68 BPM | TEMPERATURE: 98 F | WEIGHT: 132 LBS | DIASTOLIC BLOOD PRESSURE: 60 MMHG | SYSTOLIC BLOOD PRESSURE: 136 MMHG

## 2019-01-18 DIAGNOSIS — J44.9 ASTHMA WITH COPD (CHRONIC OBSTRUCTIVE PULMONARY DISEASE) (HCC): Primary | ICD-10-CM

## 2019-01-18 DIAGNOSIS — E78.00 HYPERCHOLESTEROLEMIA: ICD-10-CM

## 2019-01-18 DIAGNOSIS — G62.9 NEUROPATHY: ICD-10-CM

## 2019-01-18 DIAGNOSIS — L30.9 ECZEMA, UNSPECIFIED TYPE: ICD-10-CM

## 2019-01-18 DIAGNOSIS — E11.40 CONTROLLED TYPE 2 DIABETES MELLITUS WITH DIABETIC NEUROPATHY, WITHOUT LONG-TERM CURRENT USE OF INSULIN (HCC): ICD-10-CM

## 2019-01-18 PROCEDURE — 99214 OFFICE O/P EST MOD 30 MIN: CPT | Performed by: INTERNAL MEDICINE

## 2019-01-18 PROCEDURE — G0463 HOSPITAL OUTPT CLINIC VISIT: HCPCS | Performed by: INTERNAL MEDICINE

## 2019-01-18 RX ORDER — HYDROCODONE BITARTRATE AND ACETAMINOPHEN 5; 325 MG/1; MG/1
1 TABLET ORAL EVERY 6 HOURS PRN
Qty: 100 TABLET | Refills: 0 | Status: SHIPPED | OUTPATIENT
Start: 2019-01-18 | End: 2019-07-30

## 2019-01-18 NOTE — PROGRESS NOTES
Estuardo Ordonez is a 80year old female who presents for     Check at 3 mo       Hx asthma with COPD--no current sx. Takes 1/2 tsp prometh w cod at bedtime for yrs. Had a cough before [place] and now it's gone.      Taking 1/2 norco at bedtime.   Gladis Caceres I in hosp for enteritis. (Dr Amadeo Gay).        Past Surgical History:   Procedure Laterality Date   • ABDOMEN SURGERY PROC UNLISTED  2007    laparotomy for TERESA and internal hernia   • ABDOMEN SURGERY PROC UNLISTED  2009    TERESA and small bowel resection- [facility]. 136/60   Pulse 68   Temp 97.6 °F (36.4 °C) (Oral)   Ht 4' 8.5\" (1.435 m)   Wt 132 lb (59.9 kg)   BMI 29.07 kg/m²       Wt Readings from Last 6 Encounters:  01/18/19 : 132 lb (59.9 kg)  10/15/18 : 133 lb 12.8 oz (60.7 kg)  07/11/18 : 137 lb (62.1 kg)  06/1 prn for constipation. Dr. Ricardo Long 3/20/17- recom avoid raw veg and if sx ER and do CT scan.     GERD-on protonix 40 mg daily started 3/22/16.  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      Osteoarthritis multiple sites, primary--takes 1/2 norco daily.     TAKE 2 TABLETS BY MOUTH TWICE DAILY AS NEEDED FOR CONSTIPATION Disp: 100 tablet Rfl: 6   PANTOPRAZOLE SODIUM 40 MG Oral Tab EC TAKE 1 TABLET BY MOUTH EVERY MORNING BEFORE BREAKFAST Disp: 90 tablet Rfl: 3   GABAPENTIN 100 MG Oral Cap TAKE 1 CAPSULE BY MOUTH

## 2019-01-30 RX ORDER — FLUTICASONE PROPIONATE 220 UG/1
AEROSOL, METERED RESPIRATORY (INHALATION)
Qty: 36 G | Refills: 11 | Status: SHIPPED | OUTPATIENT
Start: 2019-01-30 | End: 2019-10-28

## 2019-04-04 RX ORDER — ENALAPRIL MALEATE 10 MG/1
TABLET ORAL
Qty: 90 TABLET | Refills: 3 | Status: SHIPPED | OUTPATIENT
Start: 2019-04-04 | End: 2020-05-01

## 2019-04-04 NOTE — TELEPHONE ENCOUNTER
Spoke with patient. Reminded her she is due for a 3 month follow up this month and due for fasting blood tests. Patient verbalized understanding. I transferred her to  to make appt.     Refill request is for a maintenance medication and has met t

## 2019-04-06 ENCOUNTER — APPOINTMENT (OUTPATIENT)
Dept: LAB | Age: 84
End: 2019-04-06
Attending: INTERNAL MEDICINE
Payer: MEDICARE

## 2019-04-06 DIAGNOSIS — E78.00 HYPERCHOLESTEROLEMIA: ICD-10-CM

## 2019-04-06 DIAGNOSIS — E11.40 CONTROLLED TYPE 2 DIABETES MELLITUS WITH DIABETIC NEUROPATHY, WITHOUT LONG-TERM CURRENT USE OF INSULIN (HCC): ICD-10-CM

## 2019-04-06 PROCEDURE — 36415 COLL VENOUS BLD VENIPUNCTURE: CPT

## 2019-04-06 PROCEDURE — 80053 COMPREHEN METABOLIC PANEL: CPT

## 2019-04-06 PROCEDURE — 82043 UR ALBUMIN QUANTITATIVE: CPT

## 2019-04-06 PROCEDURE — 83036 HEMOGLOBIN GLYCOSYLATED A1C: CPT

## 2019-04-06 PROCEDURE — 82570 ASSAY OF URINE CREATININE: CPT

## 2019-04-06 PROCEDURE — 80061 LIPID PANEL: CPT

## 2019-04-16 ENCOUNTER — OFFICE VISIT (OUTPATIENT)
Dept: INTERNAL MEDICINE CLINIC | Facility: CLINIC | Age: 84
End: 2019-04-16
Payer: COMMERCIAL

## 2019-04-16 VITALS
WEIGHT: 137 LBS | TEMPERATURE: 98 F | SYSTOLIC BLOOD PRESSURE: 140 MMHG | HEART RATE: 65 BPM | BODY MASS INDEX: 29.97 KG/M2 | DIASTOLIC BLOOD PRESSURE: 54 MMHG | HEIGHT: 56.5 IN | OXYGEN SATURATION: 98 %

## 2019-04-16 DIAGNOSIS — E78.00 HYPERCHOLESTEROLEMIA: ICD-10-CM

## 2019-04-16 DIAGNOSIS — J44.9 ASTHMA WITH COPD (CHRONIC OBSTRUCTIVE PULMONARY DISEASE) (HCC): Primary | ICD-10-CM

## 2019-04-16 DIAGNOSIS — Z12.31 VISIT FOR SCREENING MAMMOGRAM: ICD-10-CM

## 2019-04-16 DIAGNOSIS — I10 ESSENTIAL HYPERTENSION: ICD-10-CM

## 2019-04-16 DIAGNOSIS — N18.30 CKD (CHRONIC KIDNEY DISEASE) STAGE 3, GFR 30-59 ML/MIN (HCC): Chronic | ICD-10-CM

## 2019-04-16 DIAGNOSIS — G62.9 NEUROPATHY: ICD-10-CM

## 2019-04-16 DIAGNOSIS — E11.40 CONTROLLED TYPE 2 DIABETES MELLITUS WITH DIABETIC NEUROPATHY, WITHOUT LONG-TERM CURRENT USE OF INSULIN (HCC): ICD-10-CM

## 2019-04-16 PROCEDURE — 99214 OFFICE O/P EST MOD 30 MIN: CPT | Performed by: INTERNAL MEDICINE

## 2019-04-16 PROCEDURE — G0463 HOSPITAL OUTPT CLINIC VISIT: HCPCS | Performed by: INTERNAL MEDICINE

## 2019-04-16 RX ORDER — PROMETHAZINE HYDROCHLORIDE AND CODEINE PHOSPHATE 6.25; 1 MG/5ML; MG/5ML
2.5 SYRUP ORAL EVERY 6 HOURS PRN
Qty: 180 ML | Refills: 1 | Status: SHIPPED | OUTPATIENT
Start: 2019-04-16 | End: 2019-07-30

## 2019-04-16 NOTE — PROGRESS NOTES
Uyen Castillo is a 80year old female who presents for     Check at 3 mo       Hx asthma with COPD--no current sx. Takes 1/2 tsp prometh w cod at bedtime for yrs. Needs it before going to [employer].     Taking 1/2 norco at bedtime.   but feels she needs it b Procedure Laterality Date   • ABDOMEN SURGERY PROC UNLISTED  2007    laparotomy for TERESA and internal hernia   • ABDOMEN SURGERY PROC UNLISTED  2009    TERESA and small bowel resection-Dr Jaramillo   • APPENDECTOMY     • CHOLECYSTECTOMY     • FRACTURE SURGE (Oral)   Ht 4' 8.5\" (1.435 m)   Wt 137 lb (62.1 kg)   SpO2 98%   BMI 30.17 kg/m²       Wt Readings from Last 6 Encounters:  04/16/19 : 137 lb (62.1 kg)  01/18/19 : 132 lb (59.9 kg)  10/15/18 : 133 lb 12.8 oz (60.7 kg)  07/11/18 : 137 lb (62.1 kg)  06/11/1 daily started 3/22/16. 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      Osteoarthritis multiple sites, primary--takes 1/2 norco daily.      Carpal tunnel bilat--wrist splints bilat at night.  EMGs if not better.     Healthcare maintenance  Mammo ok 4/23/18-o total) by mouth daily.  Disp: 90 tablet Rfl: 3   SENNA PLUS 8.6-50 MG Oral Tab TAKE 2 TABLETS BY MOUTH TWICE DAILY AS NEEDED FOR CONSTIPATION Disp: 100 tablet Rfl: 6   PANTOPRAZOLE SODIUM 40 MG Oral Tab EC TAKE 1 TABLET BY MOUTH EVERY MORNING BEFORE BREAKFA

## 2019-05-13 RX ORDER — GABAPENTIN 100 MG/1
CAPSULE ORAL
Qty: 90 CAPSULE | Refills: 3 | Status: SHIPPED | OUTPATIENT
Start: 2019-05-13 | End: 2020-05-15

## 2019-06-18 RX ORDER — PANTOPRAZOLE SODIUM 40 MG/1
TABLET, DELAYED RELEASE ORAL
Qty: 90 TABLET | Refills: 3 | OUTPATIENT
Start: 2019-06-18

## 2019-06-18 NOTE — TELEPHONE ENCOUNTER
Current refill request refused due to refill is either a duplicate request or has active refills at the pharmacy. Check previous templates.     Requested Prescriptions     Refused Prescriptions Disp Refills   • PANTOPRAZOLE SODIUM 40 MG Oral Tab EC [Pharma

## 2019-06-27 RX ORDER — PANTOPRAZOLE SODIUM 40 MG/1
TABLET, DELAYED RELEASE ORAL
Qty: 90 TABLET | Refills: 3 | Status: SHIPPED | OUTPATIENT
Start: 2019-06-27 | End: 2020-07-27

## 2019-07-22 ENCOUNTER — MA CHART PREP (OUTPATIENT)
Dept: FAMILY MEDICINE CLINIC | Facility: CLINIC | Age: 84
End: 2019-07-22

## 2019-07-22 PROBLEM — H91.93 BILATERAL HEARING LOSS: Status: ACTIVE | Noted: 2019-07-22

## 2019-07-30 ENCOUNTER — OFFICE VISIT (OUTPATIENT)
Dept: INTERNAL MEDICINE CLINIC | Facility: CLINIC | Age: 84
End: 2019-07-30
Payer: COMMERCIAL

## 2019-07-30 ENCOUNTER — TELEPHONE (OUTPATIENT)
Dept: INTERNAL MEDICINE CLINIC | Facility: CLINIC | Age: 84
End: 2019-07-30

## 2019-07-30 VITALS
DIASTOLIC BLOOD PRESSURE: 64 MMHG | HEART RATE: 65 BPM | BODY MASS INDEX: 29.18 KG/M2 | OXYGEN SATURATION: 95 % | TEMPERATURE: 98 F | SYSTOLIC BLOOD PRESSURE: 124 MMHG | WEIGHT: 133.38 LBS | HEIGHT: 56.5 IN

## 2019-07-30 DIAGNOSIS — H91.93 BILATERAL HEARING LOSS, UNSPECIFIED HEARING LOSS TYPE: ICD-10-CM

## 2019-07-30 DIAGNOSIS — R09.89 LEFT CAROTID BRUIT: ICD-10-CM

## 2019-07-30 DIAGNOSIS — M15.9 PRIMARY OSTEOARTHRITIS INVOLVING MULTIPLE JOINTS: ICD-10-CM

## 2019-07-30 DIAGNOSIS — J44.9 ASTHMA WITH COPD (CHRONIC OBSTRUCTIVE PULMONARY DISEASE) (HCC): ICD-10-CM

## 2019-07-30 DIAGNOSIS — G62.9 NEUROPATHY: ICD-10-CM

## 2019-07-30 DIAGNOSIS — K21.9 GASTROESOPHAGEAL REFLUX DISEASE WITHOUT ESOPHAGITIS: ICD-10-CM

## 2019-07-30 DIAGNOSIS — G56.03 BILATERAL CARPAL TUNNEL SYNDROME: ICD-10-CM

## 2019-07-30 DIAGNOSIS — I10 ESSENTIAL HYPERTENSION: ICD-10-CM

## 2019-07-30 DIAGNOSIS — E11.40 CONTROLLED TYPE 2 DIABETES MELLITUS WITH DIABETIC NEUROPATHY, WITHOUT LONG-TERM CURRENT USE OF INSULIN (HCC): ICD-10-CM

## 2019-07-30 DIAGNOSIS — N18.30 CKD (CHRONIC KIDNEY DISEASE) STAGE 3, GFR 30-59 ML/MIN (HCC): ICD-10-CM

## 2019-07-30 DIAGNOSIS — I47.1 SVT (SUPRAVENTRICULAR TACHYCARDIA) (HCC): ICD-10-CM

## 2019-07-30 DIAGNOSIS — E78.00 HYPERCHOLESTEROLEMIA: ICD-10-CM

## 2019-07-30 DIAGNOSIS — L30.9 ECZEMA, UNSPECIFIED TYPE: ICD-10-CM

## 2019-07-30 DIAGNOSIS — M75.01 ADHESIVE CAPSULITIS OF RIGHT SHOULDER: ICD-10-CM

## 2019-07-30 DIAGNOSIS — Z00.00 MEDICARE ANNUAL WELLNESS VISIT, SUBSEQUENT: Primary | ICD-10-CM

## 2019-07-30 DIAGNOSIS — Z87.19 HISTORY OF SMALL BOWEL OBSTRUCTION: ICD-10-CM

## 2019-07-30 DIAGNOSIS — E11.21 TYPE 2 DIABETES WITH NEPHROPATHY (HCC): ICD-10-CM

## 2019-07-30 PROCEDURE — 96160 PT-FOCUSED HLTH RISK ASSMT: CPT | Performed by: INTERNAL MEDICINE

## 2019-07-30 PROCEDURE — G0439 PPPS, SUBSEQ VISIT: HCPCS | Performed by: INTERNAL MEDICINE

## 2019-07-30 PROCEDURE — 99397 PER PM REEVAL EST PAT 65+ YR: CPT | Performed by: INTERNAL MEDICINE

## 2019-07-30 RX ORDER — HYDROCODONE BITARTRATE AND ACETAMINOPHEN 5; 325 MG/1; MG/1
1 TABLET ORAL 2 TIMES DAILY PRN
Qty: 60 TABLET | Refills: 0 | COMMUNITY
Start: 2019-07-30 | End: 2019-12-12

## 2019-07-30 RX ORDER — PROMETHAZINE HYDROCHLORIDE AND CODEINE PHOSPHATE 6.25; 1 MG/5ML; MG/5ML
2.5 SYRUP ORAL EVERY 6 HOURS PRN
Qty: 180 ML | Refills: 1 | Status: SHIPPED | OUTPATIENT
Start: 2019-07-30 | End: 2019-12-05

## 2019-07-30 RX ORDER — HYDROCODONE BITARTRATE AND ACETAMINOPHEN 5; 325 MG/1; MG/1
1 TABLET ORAL 2 TIMES DAILY PRN
Qty: 100 TABLET | Refills: 0 | Status: SHIPPED | OUTPATIENT
Start: 2019-07-30 | End: 2019-07-30

## 2019-07-30 NOTE — PROGRESS NOTES
Aleyda Fournier is a 80year old female who presents for     Check at 3 mo and Medicare annual    Going thru dental work.      Hx asthma with COPD--no current sx.  Takes 1/2 tsp prometh w cod at bedtime for yrs.     Taking 1/2 norco at bedtime--nothing else Procedure Laterality Date   • ABDOMEN SURGERY PROC UNLISTED  2007    laparotomy for TERESA and internal hernia   • ABDOMEN SURGERY PROC UNLISTED  2009    TERESA and small bowel resection-Dr Jaramillo   • APPENDECTOMY     • CHOLECYSTECTOMY     • FRACTURE SURGE (60.5 kg)   SpO2 95%   BMI 29.38 kg/m²       Wt Readings from Last 6 Encounters:  07/30/19 : 133 lb 6 oz (60.5 kg)  04/16/19 : 137 lb (62.1 kg)  01/18/19 : 132 lb (59.9 kg)  10/15/18 : 133 lb 12.8 oz (60.7 kg)  07/11/18 : 137 lb (62.1 kg)  06/11/18 : 136 l 3/20/17- recom avoid raw veg and if sx ER and do CT scan.     GERD-protonix 40 mg daily started 3/22/16.  EGD 2007 with duod AVM --hgb ok off Fe.      Osteoarthritis multiple sites, primary--takes 1/2 norco daily.      Carpal tunnel bilat--wrist splints vilma tablet Rfl: 3   FLOVENT  MCG/ACT Inhalation Aerosol INHALE 1 PUFF INTO THE LUNGS TWICE DAILY Disp: 36 g Rfl: 11   triamcinolone acetonide 0.1 % External Cream Apply topically 2 (two) times daily as needed.  Disp: 1 Tube Rfl: 3   DilTIAZem HCl ER Coat help    Are you able to afford your medications?: Yes    Hearing Problems?: Yes     Functional Status     Hearing Problems?: Yes    Vision Problems? : No    Difficulty walking?: No    Difficulty dressing or bathing?: No    Problems with daily activities? : such as at a meeting or place of [employer]:  Yes   Many people I talk to seem to mumble (or don't speak clearly):  Sometimes People get annoyed because I misunderstand what they say:  Sometimes   I misunderstand what others are saying and make inappropriate Pap+HPV every 5 yrs age 33-67, age 72 and older at high risk   There are no preventive care reminders to display for this patient.  Update Health Maintenance if applicable    Chlamydia  Annually if high risk No results found for: CHLAMYDIA No flowsheet data No flowsheet data found.

## 2019-07-30 NOTE — TELEPHONE ENCOUNTER
Received call from Aguilar gerald at [facility]. She reports the law only allows them to fill one month of Norco at a time. They are unable to keep additional tablets on file. I provided okay for them to fill 30 day supply of Norco, qty 60.  Med module updat

## 2019-08-19 ENCOUNTER — HOSPITAL ENCOUNTER (OUTPATIENT)
Dept: ULTRASOUND IMAGING | Facility: HOSPITAL | Age: 84
Discharge: HOME | End: 2019-08-19
Attending: INTERNAL MEDICINE
Payer: MEDICARE

## 2019-08-19 DIAGNOSIS — R09.89 LEFT CAROTID BRUIT: ICD-10-CM

## 2019-08-19 PROCEDURE — 93880 EXTRACRANIAL BILAT STUDY: CPT | Performed by: INTERNAL MEDICINE

## 2019-08-20 ENCOUNTER — TELEPHONE (OUTPATIENT)
Dept: INTERNAL MEDICINE CLINIC | Facility: CLINIC | Age: 84
End: 2019-08-20

## 2019-08-20 NOTE — TELEPHONE ENCOUNTER
To nursing, please tell patient carotid Dopplers results are okay–there is some plaque, but no significant narrowing. Continue atorvastatin 10 mg daily. Thanks. Note to self–  8/19/19–carotid Dopplers– bilateral plaque, no significant stenosis.

## 2019-08-20 NOTE — TELEPHONE ENCOUNTER
Spoke to patient and relayed MD message and instructions, patient verbalizes understanding.  Patient states she is pretty sure she has been taking atorvastatin 10mg daily but she would like to check her medication cabinet and call us back after she confirms

## 2019-08-20 NOTE — TELEPHONE ENCOUNTER
Patient called back. She confirmed she is taking Atorvastatin 10 mg daily and will continue this as directed.

## 2019-10-02 RX ORDER — ATORVASTATIN CALCIUM 10 MG/1
TABLET, FILM COATED ORAL
Qty: 90 TABLET | Refills: 3 | Status: ON HOLD | OUTPATIENT
Start: 2019-10-02 | End: 2020-06-24

## 2019-10-21 RX ORDER — DOCUSATE SODIUM, SENNOSIDES 50; 8.6 MG/1; MG/1
TABLET ORAL
Qty: 100 TABLET | Refills: 6 | Status: SHIPPED | OUTPATIENT
Start: 2019-10-21 | End: 2021-03-02

## 2019-10-22 NOTE — TELEPHONE ENCOUNTER
Pt has appt 10/28/19    Refill request is for a maintenance medication and has met the criteria specified in the Ambulatory Medication Refill Standing Order for eligibility, visits, laboratory, alerts and was sent to the requested pharmacy.     Requested Pr

## 2019-10-28 ENCOUNTER — OFFICE VISIT (OUTPATIENT)
Dept: INTERNAL MEDICINE CLINIC | Facility: CLINIC | Age: 84
End: 2019-10-28
Payer: COMMERCIAL

## 2019-10-28 VITALS
BODY MASS INDEX: 29.31 KG/M2 | DIASTOLIC BLOOD PRESSURE: 70 MMHG | SYSTOLIC BLOOD PRESSURE: 110 MMHG | WEIGHT: 134 LBS | HEART RATE: 72 BPM | TEMPERATURE: 98 F | HEIGHT: 56.5 IN

## 2019-10-28 DIAGNOSIS — G62.9 NEUROPATHY: ICD-10-CM

## 2019-10-28 DIAGNOSIS — E78.00 HYPERCHOLESTEROLEMIA: ICD-10-CM

## 2019-10-28 DIAGNOSIS — J44.9 ASTHMA WITH COPD (CHRONIC OBSTRUCTIVE PULMONARY DISEASE) (HCC): Primary | ICD-10-CM

## 2019-10-28 DIAGNOSIS — I10 ESSENTIAL HYPERTENSION: ICD-10-CM

## 2019-10-28 DIAGNOSIS — N18.30 CKD (CHRONIC KIDNEY DISEASE) STAGE 3, GFR 30-59 ML/MIN (HCC): ICD-10-CM

## 2019-10-28 DIAGNOSIS — E11.40 CONTROLLED TYPE 2 DIABETES MELLITUS WITH DIABETIC NEUROPATHY, WITHOUT LONG-TERM CURRENT USE OF INSULIN (HCC): ICD-10-CM

## 2019-10-28 PROCEDURE — 99214 OFFICE O/P EST MOD 30 MIN: CPT | Performed by: INTERNAL MEDICINE

## 2019-10-28 PROCEDURE — G0463 HOSPITAL OUTPT CLINIC VISIT: HCPCS | Performed by: INTERNAL MEDICINE

## 2019-10-28 PROCEDURE — 90662 IIV NO PRSV INCREASED AG IM: CPT | Performed by: INTERNAL MEDICINE

## 2019-10-28 PROCEDURE — G0008 ADMIN INFLUENZA VIRUS VAC: HCPCS | Performed by: INTERNAL MEDICINE

## 2019-10-28 NOTE — PROGRESS NOTES
Cris Sharpe is a 80year old female who presents for     Check at 3 mo     Going thru dental work--still working on [employer]" of implants.      Hx asthma with COPD--no current sx.  Takes 1/2 tsp prometh w cod at bedtime for yrs.     HTN--enalapril when in hosp for enteritis. (Dr Tyson Ferreira).        Past Surgical History:   Procedure Laterality Date   • ABDOMEN SURGERY PROC UNLISTED  2007    laparotomy for TERESA and internal hernia   • ABDOMEN SURGERY PROC UNLISTED  2009    TERESA and small bowel resection-D Pulse 72   Temp 97.8 °F (36.6 °C) (Oral)   Ht 4' 8.5\" (1.435 m)   Wt 134 lb (60.8 kg)   BMI 29.51 kg/m²       Wt Readings from Last 6 Encounters:  10/28/19 : 134 lb (60.8 kg)  07/30/19 : 133 lb 6 oz (60.5 kg)  04/16/19 : 137 lb (62.1 kg)  01/18/19 : 132 recurrent partial SBOs -MRI enterography 6/2016--no high grade obstruction-but poss anastomotic stricture.     miralax and takes senalax bid prn for constipation.  Dr. Branden Gonzalez 3/20/17- recom avoid raw veg and if sx ER and do CT scan.     GERD-protonix 40 mg as needed for Pain., Disp: 60 tablet, Rfl: 0  PANTOPRAZOLE SODIUM 40 MG Oral Tab EC, TAKE 1 TABLET BY MOUTH EVERY MORNING BEFORE BREAKFAST, Disp: 90 tablet, Rfl: 3  GABAPENTIN 100 MG Oral Cap, TAKE 1 CAPSULE BY MOUTH NIGHTLY, Disp: 90 capsule, Rfl: 3  ENAL

## 2019-11-04 ENCOUNTER — TELEPHONE (OUTPATIENT)
Dept: INTERNAL MEDICINE CLINIC | Facility: CLINIC | Age: 84
End: 2019-11-04

## 2019-11-04 ENCOUNTER — APPOINTMENT (OUTPATIENT)
Dept: CT IMAGING | Facility: HOSPITAL | Age: 84
End: 2019-11-04
Attending: EMERGENCY MEDICINE
Payer: MEDICARE

## 2019-11-04 ENCOUNTER — HOSPITAL ENCOUNTER (EMERGENCY)
Facility: HOSPITAL | Age: 84
Discharge: HOME | End: 2019-11-05
Attending: EMERGENCY MEDICINE
Payer: MEDICARE

## 2019-11-04 ENCOUNTER — APPOINTMENT (OUTPATIENT)
Dept: GENERAL RADIOLOGY | Facility: HOSPITAL | Age: 84
End: 2019-11-04
Attending: EMERGENCY MEDICINE
Payer: MEDICARE

## 2019-11-04 DIAGNOSIS — K59.00 CONSTIPATION, UNSPECIFIED CONSTIPATION TYPE: Primary | ICD-10-CM

## 2019-11-04 DIAGNOSIS — N30.00 ACUTE CYSTITIS WITHOUT HEMATURIA: ICD-10-CM

## 2019-11-04 PROCEDURE — 74019 RADEX ABDOMEN 2 VIEWS: CPT | Performed by: EMERGENCY MEDICINE

## 2019-11-04 PROCEDURE — 85025 COMPLETE CBC W/AUTO DIFF WBC: CPT | Performed by: EMERGENCY MEDICINE

## 2019-11-04 PROCEDURE — 74176 CT ABD & PELVIS W/O CONTRAST: CPT | Performed by: EMERGENCY MEDICINE

## 2019-11-04 PROCEDURE — 99285 EMERGENCY DEPT VISIT HI MDM: CPT

## 2019-11-04 PROCEDURE — 87186 SC STD MICRODIL/AGAR DIL: CPT | Performed by: EMERGENCY MEDICINE

## 2019-11-04 PROCEDURE — 87077 CULTURE AEROBIC IDENTIFY: CPT | Performed by: EMERGENCY MEDICINE

## 2019-11-04 PROCEDURE — 87086 URINE CULTURE/COLONY COUNT: CPT | Performed by: EMERGENCY MEDICINE

## 2019-11-04 PROCEDURE — 81001 URINALYSIS AUTO W/SCOPE: CPT | Performed by: EMERGENCY MEDICINE

## 2019-11-04 PROCEDURE — 80048 BASIC METABOLIC PNL TOTAL CA: CPT | Performed by: EMERGENCY MEDICINE

## 2019-11-04 PROCEDURE — 96360 HYDRATION IV INFUSION INIT: CPT

## 2019-11-04 PROCEDURE — 96361 HYDRATE IV INFUSION ADD-ON: CPT

## 2019-11-04 RX ORDER — SODIUM CHLORIDE 9 MG/ML
125 INJECTION, SOLUTION INTRAVENOUS CONTINUOUS
Status: DISCONTINUED | OUTPATIENT
Start: 2019-11-04 | End: 2019-11-05

## 2019-11-04 NOTE — TELEPHONE ENCOUNTER
To nursing, if not having abdominal pain, ask her to give herself a Dulcolax suppository. If she does not have results with this, go to ER. If having abdominal pain, go directly to ER. She has had bowel obstructions in the past.  Thanks.

## 2019-11-04 NOTE — ED INITIAL ASSESSMENT (HPI)
The patient who reports history of bowel obstruction complains of constipation for 5 days despite use of dulcolax suppository at 1400 pm today.

## 2019-11-04 NOTE — TELEPHONE ENCOUNTER
Patient calling. Has not had a bowel movement for 6 days. Friday night did edema. Started laxative on [place] morning 11/3. No movement yet. Having no cramping or discomfort. Received shot in back for sciatica 10/30.  Wondering if this could contribu

## 2019-11-05 VITALS
RESPIRATION RATE: 18 BRPM | HEIGHT: 59 IN | DIASTOLIC BLOOD PRESSURE: 53 MMHG | SYSTOLIC BLOOD PRESSURE: 123 MMHG | OXYGEN SATURATION: 95 % | HEART RATE: 65 BPM | TEMPERATURE: 98 F | BODY MASS INDEX: 27 KG/M2

## 2019-11-05 RX ORDER — CEPHALEXIN 250 MG/1
250 CAPSULE ORAL 3 TIMES DAILY
Qty: 9 CAPSULE | Refills: 0 | Status: SHIPPED | OUTPATIENT
Start: 2019-11-05 | End: 2019-11-08

## 2019-11-05 NOTE — ED NOTES
Patient states she has not had a  Bowel movement in 6 days, hx of bowel obstructions, denies any pain at this time.

## 2019-11-05 NOTE — ED PROVIDER NOTES
Patient Seen in: [facility] AND [facility] Emergency Department      History   Patient presents with:  Constipation (gastrointestinal)    Stated Complaint:     HPI    Patient is an 79-year-old female with a history of constipation and small bowel obstruction who SURGERY PROC UNLISTED  2009    TERESA and small bowel resection-Dr Jaramillo   • APPENDECTOMY     • CHOLECYSTECTOMY     • FRACTURE SURGERY Left 1/2017    L hip fx pinning 1/2017 Dr. Marybel Palumbo   • HIP PINNING Left 1/6/2017    Performed by Cris Rose MD at [facility]: Pulmonary effort is normal.      Breath sounds: Normal breath sounds. Abdominal:      General: Bowel sounds are normal. There is no distension. Palpations: Abdomen is soft. There is no mass. Tenderness: There is no tenderness.  There i DIFFERENTIAL[[id number]]          Abnormal            Final result                 Please view results for these tests on the individual orders.    RAINBOW DRAW BLUE   RAINBOW DRAW LAVENDER   RAINBOW DRAW LIGHT GREEN   RAINBOW DRAW GOLD   URINE CULTURE, ROUT hydronephrosis. Bilateral renal cystiv lesions have increased in size. Aortic calcification without aneurysm. Streak artifact from left hip intramedullary shea and femoral neck screw.   Infraumbilical midline ventral wall hernia contains minimal small

## 2019-11-05 NOTE — TELEPHONE ENCOUNTER
I spoke with patient and relayed Dr. Laisha Adair message. She verbalized understanding. Invited patient to call back with any questions or concerns. Explained that she can always call MD on call if she needs anything. She verbalized understanding.

## 2019-11-05 NOTE — TELEPHONE ENCOUNTER
To nursing, please tell patient to take MiraLAX 1 teaspoon in juice or water up to 3 times a day until good bowel movements. Have her see me in the office this week. Thanks. ER visit 11/4/19–. Obstructive series–nonobstructive bowel gas pattern.   C

## 2019-11-05 NOTE — TELEPHONE ENCOUNTER
To Dr. Jenni Howe - F/U - see below - pt did go to ER (suppository did not work)- see note. Pt reports \"just a tiny little piece of stool came out\" after ER enema. Pt is making sure she is taking Senna. Usual BM \"is little pieces of stool\".   Should p

## 2019-11-05 NOTE — TELEPHONE ENCOUNTER
I spoke with patient and relayed Dr. Maryan Marcus message. She verbalized understanding. She asked if she should continue with senna. She would like a call back to clarify. Scheduled follow up on 11/8 at 1:30 p.m.

## 2019-11-08 ENCOUNTER — OFFICE VISIT (OUTPATIENT)
Dept: INTERNAL MEDICINE CLINIC | Facility: CLINIC | Age: 84
End: 2019-11-08
Payer: COMMERCIAL

## 2019-11-08 VITALS
DIASTOLIC BLOOD PRESSURE: 60 MMHG | SYSTOLIC BLOOD PRESSURE: 136 MMHG | WEIGHT: 134 LBS | TEMPERATURE: 98 F | HEART RATE: 72 BPM | BODY MASS INDEX: 27 KG/M2

## 2019-11-08 DIAGNOSIS — K43.9 VENTRAL HERNIA WITHOUT OBSTRUCTION OR GANGRENE: ICD-10-CM

## 2019-11-08 DIAGNOSIS — N39.0 ACUTE UTI: ICD-10-CM

## 2019-11-08 DIAGNOSIS — K59.01 SLOW TRANSIT CONSTIPATION: Primary | ICD-10-CM

## 2019-11-08 DIAGNOSIS — Z87.19 HISTORY OF SMALL BOWEL OBSTRUCTION: ICD-10-CM

## 2019-11-08 PROCEDURE — G0463 HOSPITAL OUTPT CLINIC VISIT: HCPCS | Performed by: INTERNAL MEDICINE

## 2019-11-08 PROCEDURE — 99214 OFFICE O/P EST MOD 30 MIN: CPT | Performed by: INTERNAL MEDICINE

## 2019-11-08 NOTE — PROGRESS NOTES
Ian Calderon is a 80year old female who presents for   Last seen 10/28/19. ER follow up   ER visit 11/4/19– for constipation. No BM for 6 days. Had tried laxative and dulcolax suppos. Obstructive series–nonobstructive bowel gas pattern.   CT abdom Klebsiella species 12/2016    ESBL Klebsiella UTI treated in [facility] with meropenem when in hosp for enteritis. (Dr Russell Baker).        Past Surgical History:   Procedure Laterality Date   • ABDOMEN SURGERY PROC UNLISTED  2007    laparotomy for TERESA and internal h /60   Pulse 72   Temp 97.7 °F (36.5 °C) (Oral)   Wt 134 lb (60.8 kg)   BMI 27.06 kg/m²       Wt Readings from Last 6 Encounters:  11/08/19 : 134 lb (60.8 kg)  10/28/19 : 134 lb (60.8 kg)  07/30/19 : 133 lb 6 oz (60.5 kg)  04/16/19 : 137 lb (62.1 kg daily. LDL 82 on 4/6/19, LDL goal is <100.      Neuropathy -feet pain. gabapentin 100 mg HS. norco 1/2 tab at HS. declined EMG.   Saw podatrist Dr. Alden Tay.     Hypertension--enalapril 10 mg daily, diltiazem  mg daily. lasix 20 mg MWF prn swelling.     C Ucx ER 11/4/19-2 organisms on -e coli and klebsiella. .     Ret in 3 days.     Patient expresses understanding of above issues and plan.   This is a 25 minute visit and greater than 50% of the time was spent counseling the patient and/or coordinating care

## 2019-11-11 ENCOUNTER — OFFICE VISIT (OUTPATIENT)
Dept: INTERNAL MEDICINE CLINIC | Facility: CLINIC | Age: 84
End: 2019-11-11
Payer: COMMERCIAL

## 2019-11-11 VITALS
SYSTOLIC BLOOD PRESSURE: 130 MMHG | WEIGHT: 134 LBS | TEMPERATURE: 98 F | DIASTOLIC BLOOD PRESSURE: 80 MMHG | HEIGHT: 59 IN | BODY MASS INDEX: 27.01 KG/M2

## 2019-11-11 DIAGNOSIS — M54.50 ACUTE LEFT-SIDED LOW BACK PAIN WITHOUT SCIATICA: ICD-10-CM

## 2019-11-11 DIAGNOSIS — K59.00 INTRACTABLE CONSTIPATION: Primary | ICD-10-CM

## 2019-11-11 PROCEDURE — G0463 HOSPITAL OUTPT CLINIC VISIT: HCPCS | Performed by: INTERNAL MEDICINE

## 2019-11-11 PROCEDURE — 99213 OFFICE O/P EST LOW 20 MIN: CPT | Performed by: INTERNAL MEDICINE

## 2019-11-11 RX ORDER — LACTULOSE 10 G/15ML
SOLUTION ORAL
Qty: 1 BOTTLE | Refills: 1 | Status: SHIPPED | OUTPATIENT
Start: 2019-11-11 | End: 2021-03-02

## 2019-11-11 NOTE — PROGRESS NOTES
Altha Rubinstein is a 80year old female who presents for     3 day check    Constipation follow up. After 3 doses MOM 30 ml, pt passes stools x 3 yesterday--12 am yest like \"chocolate pudding\" stools, \"not a real diarrhea. \", 2 am yest--pieces of stoo Past Surgical History:   Procedure Laterality Date   • ABDOMEN SURGERY PROC UNLISTED  2007    laparotomy for TERESA and internal hernia   • ABDOMEN SURGERY PROC UNLISTED  2009    TERESA and small bowel resection-Dr Jaramillo   • APPENDECTOMY     • CHOLECYSTEC (59.9 kg)    /80 on med asst check --on my recheck is 130/80  General: appears well nourished and in no acute distress  Breasts: no mass or axillary adenopathy at 4/16/19 visit  Abdomen: soft, nontender, NABS. Ventral hernia upper abd--soft nontend. podatrist Dr. Amelia Romo.     Hypertension--enalapril 10 mg daily, diltiazem  mg daily. lasix 20 mg MWF prn swelling. 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 Urine MA 33 on 4/6/19. Cont enalapril.      Eczema--TAC cr plan.    - XR LUMBAR SPINE (MIN 4 VIEWS) (CPT=72110); Future      Current Outpatient Medications   Medication Sig Dispense Refill   • lactulose 10 GM/15ML Oral Solution Take 2 tbsp three times a day until passing bowel movements.  1 Bottle 1   • SENNA-PLUS

## 2019-11-13 ENCOUNTER — HOSPITAL ENCOUNTER (OUTPATIENT)
Dept: GENERAL RADIOLOGY | Facility: HOSPITAL | Age: 84
Discharge: HOME | End: 2019-11-13
Attending: INTERNAL MEDICINE
Payer: MEDICARE

## 2019-11-13 DIAGNOSIS — M54.50 ACUTE LEFT-SIDED LOW BACK PAIN WITHOUT SCIATICA: ICD-10-CM

## 2019-11-13 PROCEDURE — 72110 X-RAY EXAM L-2 SPINE 4/>VWS: CPT | Performed by: INTERNAL MEDICINE

## 2019-11-14 ENCOUNTER — TELEPHONE (OUTPATIENT)
Dept: INTERNAL MEDICINE CLINIC | Facility: CLINIC | Age: 84
End: 2019-11-14

## 2019-11-14 NOTE — TELEPHONE ENCOUNTER
To nursing,   Please tell pt 11/14/19--xray lumbar spine--shows as curvature (Dextroscoliosis) w advanced multilevel degenerative arthritis. No fracture. Can try over the counter salonpas patch or bengay. Tylenol is ok.    I'd like her to avoid norco du

## 2019-11-18 ENCOUNTER — OFFICE VISIT (OUTPATIENT)
Dept: INTERNAL MEDICINE CLINIC | Facility: CLINIC | Age: 84
End: 2019-11-18
Payer: COMMERCIAL

## 2019-11-18 VITALS
HEART RATE: 55 BPM | WEIGHT: 134 LBS | SYSTOLIC BLOOD PRESSURE: 142 MMHG | BODY MASS INDEX: 27 KG/M2 | TEMPERATURE: 98 F | OXYGEN SATURATION: 99 % | RESPIRATION RATE: 16 BRPM | DIASTOLIC BLOOD PRESSURE: 58 MMHG

## 2019-11-18 DIAGNOSIS — M54.50 ACUTE LEFT-SIDED LOW BACK PAIN WITHOUT SCIATICA: ICD-10-CM

## 2019-11-18 DIAGNOSIS — M47.816 ARTHRITIS OF LUMBAR SPINE: ICD-10-CM

## 2019-11-18 DIAGNOSIS — K59.00 INTRACTABLE CONSTIPATION: Primary | ICD-10-CM

## 2019-11-18 PROCEDURE — 99213 OFFICE O/P EST LOW 20 MIN: CPT | Performed by: INTERNAL MEDICINE

## 2019-11-18 PROCEDURE — G0463 HOSPITAL OUTPT CLINIC VISIT: HCPCS | Performed by: INTERNAL MEDICINE

## 2019-11-18 RX ORDER — METHYLPREDNISOLONE 4 MG/1
TABLET ORAL
Qty: 1 KIT | Refills: 0 | Status: SHIPPED | OUTPATIENT
Start: 2019-11-18 | End: 2019-12-10

## 2019-11-18 NOTE — PROGRESS NOTES
Gil Hannon is a 80year old female who presents for     7 day check    Constipation follow up. After started lactulose, had small amount of stools until 2 days ago (Saturday 11/16) had \"a good amount of stool. \"  Also passed \"5 little circles of s hernia   • ABDOMEN SURGERY PROC UNLISTED  2009    TERESA and small bowel resection-Dr Jaramillo   • APPENDECTOMY     • CHOLECYSTECTOMY     • FRACTURE SURGERY Left 1/2017    L hip fx pinning 1/2017 Dr. Franklyn Grace   • HIP PINNING Left 1/6/2017    Performed by Brittany Marcial distress  Breasts: no mass or axillary adenopathy at 4/16/19 visit  Abdomen: soft, nontender. Rectal-no mass--no mass or stool in rectal vault when checked 11/8/19. Back--mild tend L paralumbar. SLR neg.   Extremities: no edema  Neurologic: alert and o daily. lasix 20 mg MWF prn swelling. 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 Urine MA 33 on 4/6/19. Cont enalapril.      Eczema--TAC cream prn.      SVT--taking diltiazem  mg daily. 30 day event monitor 2/2016-s Medication Sig Dispense Refill   • methylPREDNISolone (MEDROL) 4 MG Oral Tablet Therapy Pack As directed. 1 kit 0   • lactulose 10 GM/15ML Oral Solution Take 2 tbsp three times a day until passing bowel movements.  1 Bottle 1   • SENNA-PLUS 8.6-50 MG Oral

## 2019-11-20 ENCOUNTER — TELEPHONE (OUTPATIENT)
Dept: INTERNAL MEDICINE CLINIC | Facility: CLINIC | Age: 84
End: 2019-11-20

## 2019-11-20 DIAGNOSIS — R39.9 UTI SYMPTOMS: Primary | ICD-10-CM

## 2019-11-20 NOTE — TELEPHONE ENCOUNTER
Patient called back and I relayed Dr Anat Sharma message to her. She verbalized understanding and will try to do the urine tests tomorrow. She wants to do the tests at [facility], not [facility]. I did discuss this with her.

## 2019-11-20 NOTE — TELEPHONE ENCOUNTER
Patient was here on Monday & forgot to mention that she is experiencing frequent urination with slight burning. Her urine also seems to be noticeably warm. [facility] uses [facility].       She can be reached at:  [phone]

## 2019-11-20 NOTE — TELEPHONE ENCOUNTER
I spoke to Collins Soria. She states her urinary symptoms started yesterday. She feels like she has to urinate, but only a small amount comes out and the urine is very warm. It appears to be a darker color. She has to go frequently.   She has burning with urinatio

## 2019-11-20 NOTE — TELEPHONE ENCOUNTER
To nursing, tell pt I entered orders for her to do Urinalysis and uine culture. Thanks. Note to self--  Recent UTI-rx in ER 11/4/19-UA 11/4/19 ER-29 wbc 4 rbc. Ucx ER 11/4/19-e coli and klebsiella.    Sens to cefazolin.   keflex 250 mg tid #9 was rx

## 2019-11-21 ENCOUNTER — TELEPHONE (OUTPATIENT)
Dept: INTERNAL MEDICINE CLINIC | Facility: CLINIC | Age: 84
End: 2019-11-21

## 2019-11-21 ENCOUNTER — APPOINTMENT (OUTPATIENT)
Dept: LAB | Facility: HOSPITAL | Age: 84
End: 2019-11-21
Attending: INTERNAL MEDICINE
Payer: MEDICARE

## 2019-11-21 PROCEDURE — 87088 URINE BACTERIA CULTURE: CPT | Performed by: INTERNAL MEDICINE

## 2019-11-21 PROCEDURE — 87086 URINE CULTURE/COLONY COUNT: CPT | Performed by: INTERNAL MEDICINE

## 2019-11-21 PROCEDURE — 87186 SC STD MICRODIL/AGAR DIL: CPT | Performed by: INTERNAL MEDICINE

## 2019-11-21 PROCEDURE — 81001 URINALYSIS AUTO W/SCOPE: CPT | Performed by: INTERNAL MEDICINE

## 2019-11-23 RX ORDER — NITROFURANTOIN 25; 75 MG/1; MG/1
100 CAPSULE ORAL 2 TIMES DAILY
Qty: 14 CAPSULE | Refills: 0 | Status: SHIPPED | OUTPATIENT
Start: 2019-11-23 | End: 2019-12-10

## 2019-11-23 NOTE — TELEPHONE ENCOUNTER
[phone]  I called pt--discussed results. Ucx >100K E. coli. Rx sent to [facility] for macrobid bid #14. Sees me next 12/10/19.

## 2019-11-27 ENCOUNTER — TELEPHONE (OUTPATIENT)
Dept: INTERNAL MEDICINE CLINIC | Facility: CLINIC | Age: 84
End: 2019-11-27

## 2019-11-27 RX ORDER — DIAZEPAM 2 MG/1
TABLET ORAL
Qty: 2 TABLET | Refills: 0 | Status: SHIPPED | OUTPATIENT
Start: 2019-11-27 | End: 2020-01-13 | Stop reason: ALTCHOICE

## 2019-11-27 RX ORDER — DILTIAZEM HYDROCHLORIDE 120 MG/1
CAPSULE, COATED, EXTENDED RELEASE ORAL
Qty: 90 CAPSULE | Refills: 3 | Status: SHIPPED | OUTPATIENT
Start: 2019-11-27 | End: 2020-11-18

## 2019-11-27 NOTE — TELEPHONE ENCOUNTER
To nursing, I sent Rx to [facility] for Valium 2 mg–take 1 tablet 1 hour before MRI scan, or may repeat dose in 1 hour if needed. She should not drive after taking this. Thanks.

## 2019-11-27 NOTE — TELEPHONE ENCOUNTER
Pt. Is having an MRI done on Friday she is requesting something to make her relax please advise ph. # [phone]   Felipe ph.  # [phone]  Routed high to clinical

## 2019-11-29 ENCOUNTER — HOSPITAL ENCOUNTER (OUTPATIENT)
Dept: MRI IMAGING | Facility: HOSPITAL | Age: 84
Discharge: HOME | End: 2019-11-29
Attending: INTERNAL MEDICINE
Payer: MEDICARE

## 2019-11-29 DIAGNOSIS — M54.50 ACUTE LEFT-SIDED LOW BACK PAIN WITHOUT SCIATICA: ICD-10-CM

## 2019-11-29 PROCEDURE — 72148 MRI LUMBAR SPINE W/O DYE: CPT | Performed by: INTERNAL MEDICINE

## 2019-12-01 ENCOUNTER — TELEPHONE (OUTPATIENT)
Dept: INTERNAL MEDICINE CLINIC | Facility: CLINIC | Age: 84
End: 2019-12-01

## 2019-12-01 NOTE — TELEPHONE ENCOUNTER
To nursing, please tell pt MRI lumbar spine 11/29/19 shows a compression fracture of the 11th thoracic vertebrae. This is consistent with effects of osteoporosis. There is also arthritis of the lumbar spine.    If back pain isn't getting better, next step

## 2019-12-05 RX ORDER — PROMETHAZINE HYDROCHLORIDE AND CODEINE PHOSPHATE 6.25; 1 MG/5ML; MG/5ML
2.5 SYRUP ORAL EVERY 6 HOURS PRN
Qty: 180 ML | Refills: 1 | Status: SHIPPED | OUTPATIENT
Start: 2019-12-05 | End: 2020-01-13

## 2019-12-05 NOTE — TELEPHONE ENCOUNTER
Refill sent electronically to [facility] for promethazine w codeine 6.25-10 mg/5 ml. Disp 180 ml with 1 RF.

## 2019-12-05 NOTE — TELEPHONE ENCOUNTER
Patent asking for refill for:     Promethazine w/Codeine cough syrup be sent to [facility]. Patient has a chronic cough & uses 1/2 teaspoon for sleeping & when in [employer].       Patient can be reached at:  [phone]

## 2019-12-05 NOTE — TELEPHONE ENCOUNTER
To Dr. Radha Fall MD:  The above refill request is for a controlled substance. Please review pended medication order. Print and sign for staff to fax to pharmacy or prescribe electronically.

## 2019-12-10 ENCOUNTER — APPOINTMENT (OUTPATIENT)
Dept: LAB | Age: 84
End: 2019-12-10
Attending: INTERNAL MEDICINE
Payer: MEDICARE

## 2019-12-10 ENCOUNTER — OFFICE VISIT (OUTPATIENT)
Dept: INTERNAL MEDICINE CLINIC | Facility: CLINIC | Age: 84
End: 2019-12-10
Payer: COMMERCIAL

## 2019-12-10 VITALS
BODY MASS INDEX: 25.8 KG/M2 | TEMPERATURE: 99 F | DIASTOLIC BLOOD PRESSURE: 62 MMHG | HEIGHT: 59 IN | SYSTOLIC BLOOD PRESSURE: 144 MMHG | HEART RATE: 72 BPM | WEIGHT: 128 LBS

## 2019-12-10 DIAGNOSIS — E11.40 CONTROLLED TYPE 2 DIABETES MELLITUS WITH DIABETIC NEUROPATHY, WITHOUT LONG-TERM CURRENT USE OF INSULIN (HCC): ICD-10-CM

## 2019-12-10 DIAGNOSIS — S22.080A COMPRESSION FRACTURE OF T11 VERTEBRA, INITIAL ENCOUNTER (HCC): ICD-10-CM

## 2019-12-10 DIAGNOSIS — S22.080A COMPRESSION FRACTURE OF T11 VERTEBRA, INITIAL ENCOUNTER (HCC): Primary | ICD-10-CM

## 2019-12-10 DIAGNOSIS — N39.0 E. COLI UTI: ICD-10-CM

## 2019-12-10 DIAGNOSIS — K59.01 SLOW TRANSIT CONSTIPATION: ICD-10-CM

## 2019-12-10 DIAGNOSIS — B96.20 E. COLI UTI: ICD-10-CM

## 2019-12-10 DIAGNOSIS — M80.00XA AGE-RELATED OSTEOPOROSIS WITH CURRENT PATHOLOGICAL FRACTURE, INITIAL ENCOUNTER: ICD-10-CM

## 2019-12-10 LAB
ALBUMIN SERPL-MCNC: 3.5 G/DL
ALP SERPL-CCNC: 141 U/L
ALT SERPL-CCNC: 18 U/L
ANION GAP SERPL CALC-SCNC: 5 MMOL/L
AST SERPL-CCNC: 13 U/L
BILIRUB SERPL-MCNC: 0.4 MG/DL
BUN SERPL-MCNC: 14 MG/DL
CALCIUM SERPL-MCNC: 9.6 MG/DL
CHLORIDE SERPL-SCNC: 99 MMOL/L
CO2 SERPL-SCNC: 28 MMOL/L
CREAT SERPL-MCNC: 0.93 MG/DL
GLOBULIN SER-MCNC: 3.9 G/DL
GLUCOSE SERPL-MCNC: 117 MG/DL
LENGTH OF FAST TIME PATIENT: NO H
POTASSIUM SERPL-SCNC: 4.9 MMOL/L
PROT SERPL-MCNC: 7.4 G/DL
SODIUM SERPL-SCNC: 132 MMOL/L

## 2019-12-10 PROCEDURE — 83036 HEMOGLOBIN GLYCOSYLATED A1C: CPT

## 2019-12-10 PROCEDURE — 86334 IMMUNOFIX E-PHORESIS SERUM: CPT

## 2019-12-10 PROCEDURE — 82306 VITAMIN D 25 HYDROXY: CPT | Performed by: INTERNAL MEDICINE

## 2019-12-10 PROCEDURE — 36415 COLL VENOUS BLD VENIPUNCTURE: CPT

## 2019-12-10 PROCEDURE — G0463 HOSPITAL OUTPT CLINIC VISIT: HCPCS | Performed by: INTERNAL MEDICINE

## 2019-12-10 PROCEDURE — 84165 PROTEIN E-PHORESIS SERUM: CPT

## 2019-12-10 PROCEDURE — 80053 COMPREHEN METABOLIC PANEL: CPT

## 2019-12-10 PROCEDURE — 99214 OFFICE O/P EST MOD 30 MIN: CPT | Performed by: INTERNAL MEDICINE

## 2019-12-10 RX ORDER — ALENDRONATE SODIUM 70 MG/1
70 TABLET ORAL WEEKLY
Qty: 12 TABLET | Refills: 3 | Status: SHIPPED | OUTPATIENT
Start: 2019-12-10 | End: 2020-11-27

## 2019-12-10 NOTE — PROGRESS NOTES
Anisha Walden is a 80year old female who presents for     3-4-week check    Constipation follow up. \"today I had a good one\" (BM). Off and on small stools. Taking senna, lactulose. Discussed doing colonoscopy--pt not wanting to do at present. Nestor in 6/2016   • UTI due to Klebsiella species 12/2016    ESBL Klebsiella UTI treated in [address] [place] [address] with meropenem when in hosp for enteritis. (Dr Deepika Mane).        Past Surgical History:   Procedure Laterality Date   • ABDOMEN SURGERY PROC UNLISTED  2007 Encounters:  12/10/19 : 128 lb (58.1 kg)  11/18/19 : 134 lb (60.8 kg)  11/11/19 : 134 lb (60.8 kg)  11/08/19 : 134 lb (60.8 kg)  10/28/19 : 134 lb (60.8 kg)  07/30/19 : 133 lb 6 oz (60.5 kg)      General: appears well nourished and in no acute distress  He 2001. A1c 6.7 on 4/6/19. .     Hypercholesterolemia- lipitor 10 mg daily. LDL 82 on 4/6/19, LDL goal is <100.      Neuropathy -feet pain. gabapentin 100 mg HS. norco 1/2 tab at HS. declined EMG.   Saw podatrist Dr. Lata Jung.     Hypertension--enalapril 10 mg da ER-cbc--hgb 11.3. bmp--Na 131, creat 0.88 gfr 59, UA-29 wbc 4 rbc. Ucx ER 11/4/19-2 organisms on -e coli and klebsiella. .    Check lab today Vit D level, ZACH SPE A1c CMP.    Ret in 1 mo.     Patient expresses understanding of above issues and plan. 12/10/2019 ) 180 mL 1   • HYDROcodone-acetaminophen 5-325 MG Oral Tab Take 1 tablet by mouth 2 (two) times daily as needed for Pain.  60 tablet 0         Rashaad Reynolds MD  12/10/2019

## 2019-12-12 ENCOUNTER — TELEPHONE (OUTPATIENT)
Dept: INTERNAL MEDICINE CLINIC | Facility: CLINIC | Age: 84
End: 2019-12-12

## 2019-12-12 NOTE — TELEPHONE ENCOUNTER
TO MD:   The pended medication is for a schedule CII medication;   Please review   Print and sign if appropriate and staff will contact the patient for . Last written 7/30/19 #60 with 0 refills. Per [place]  last dispensed 7/30/19.      Per OV 12/

## 2019-12-16 RX ORDER — HYDROCODONE BITARTRATE AND ACETAMINOPHEN 5; 325 MG/1; MG/1
TABLET ORAL
Qty: 60 TABLET | Refills: 0 | Status: ON HOLD | OUTPATIENT
Start: 2019-12-16 | End: 2021-01-09

## 2019-12-17 ENCOUNTER — TELEPHONE (OUTPATIENT)
Dept: INTERNAL MEDICINE CLINIC | Facility: CLINIC | Age: 84
End: 2019-12-17

## 2019-12-17 RX ORDER — VITAMIN B COMPLEX
TABLET ORAL
Qty: 60 TABLET | Refills: 0 | COMMUNITY
Start: 2019-12-17 | End: 2020-01-13

## 2019-12-17 NOTE — TELEPHONE ENCOUNTER
To nursing, please tell patient lab done last week–results are okay except low vitamin D level. Start over-the-counter vitamin D 2000 units daily. Thanks. Note to self–  12/10/19–Vit D level, ZACH SPE A1c CMP--A1c 7.1. Vitamin D 18.8.   Start vitam

## 2019-12-17 NOTE — TELEPHONE ENCOUNTER
I spoke with patient and relayed Dr. Mely Prince message. She verbalized understanding. She says she has a brace from ortho. She asks if she should have injection on Friday. She is asking if she will need more tests before the injection on Friday.  She is s

## 2019-12-17 NOTE — TELEPHONE ENCOUNTER
I spoke with patient and relayed Dr. Liudmila Thurman message. She verbalized understanding. She says maybe she is just scheduled for a consult on Friday. I recommended that she call the doctor she has an appointment to clarify what her appointment will be.  She

## 2019-12-17 NOTE — TELEPHONE ENCOUNTER
Problem: Kidney Disease, Chronic/End Stage Renal Disease (Adult)  Goal: Signs and Symptoms of Listed Potential Problems Will be Absent, Minimized or Managed (Kidney Disease, Chronic/End Stage Renal Disease)  Outcome: Ongoing (interventions implemented as appropriate)  Pt pain free with no nausea or distress        To nursing, I have no tests for her to do before injection. Thanks. (?kyphoplasty-Dr Silva).

## 2020-01-01 ENCOUNTER — EXTERNAL RECORD (OUTPATIENT)
Dept: OTHER | Age: 85
End: 2020-01-01

## 2020-01-12 NOTE — PROGRESS NOTES
Yani Shields is a 80year old female who presents for     1 mo check    Back pain-follow up T11 compr fx. Is better. \"I don't think it's too bad. \"  \"I used to get the backaches from sitting but I don't get them as much. \"  Dr Linette Melendrez gave a back br monitor 2/2016-SVT   • Type 2 diabetes mellitus ([facility] [address] 2001   • Uterine prolapse    • Uterine prolapse 2006    had pessary in 2006 Dr. Michele Bello sling procedure Dr Mojgan Ward in 6/2016   • UTI due to Klebsiella species 12/2016    ESBL Klebsiella UTI treate Unknown      EXAM:   /60   Pulse 85   Temp 98.1 °F (36.7 °C) (Oral)   Resp 16   Ht 4' 11\" (1.499 m)   Wt 127 lb (57.6 kg)   SpO2 98%   BMI 25.65 kg/m²       Wt Readings from Last 6 Encounters:  01/13/20 : 127 lb (57.6 kg)  12/10/19 : 128 lb (58.1 kg 11/21/19--UA--167 wbc, 21 rbc. Ucx >100K E. Coli. Rx Macrobid. Prior UTI rx in ER 11/4/19-UA 29 wbc 4 rbc. Ucx 11/4/19-e coli and klebsiella. rx keflex. Asthma with COPD- Flovent inhaler 220 mcg 1 p bid. Past rx Combivent Respimat.   Phenergan w codei strictures-area of dilation on MRI in 5/13 --no change to 3/12--no f/u felt needed as radiologist feels is sequelae of prior pancreatitis.      Lab 10/11/18- cbc cmp lipid tsh A1c ua/ma- A1c 6.3, LDL 1145, Ua 20 wbc's Ucx >100K klebsiella, Urine MA 58 but Tab TAKE 1 TABLET DAILY 90 tablet 3   • Fluticasone Propionate HFA (FLOVENT HFA) 220 MCG/ACT Inhalation Aerosol Inhale 1 puff into the lungs 2 (two) times daily. 36 g 11   • Polyethylene Glycol 3350 (MIRALAX) Oral Powder Take  by mouth.  take 15 milliliter

## 2020-01-13 ENCOUNTER — OFFICE VISIT (OUTPATIENT)
Dept: INTERNAL MEDICINE CLINIC | Facility: CLINIC | Age: 85
End: 2020-01-13
Payer: COMMERCIAL

## 2020-01-13 VITALS
RESPIRATION RATE: 16 BRPM | SYSTOLIC BLOOD PRESSURE: 140 MMHG | BODY MASS INDEX: 25.6 KG/M2 | WEIGHT: 127 LBS | HEART RATE: 85 BPM | DIASTOLIC BLOOD PRESSURE: 60 MMHG | OXYGEN SATURATION: 98 % | TEMPERATURE: 98 F | HEIGHT: 59 IN

## 2020-01-13 DIAGNOSIS — S22.080A COMPRESSION FRACTURE OF T11 VERTEBRA, INITIAL ENCOUNTER (HCC): Primary | ICD-10-CM

## 2020-01-13 DIAGNOSIS — K59.01 SLOW TRANSIT CONSTIPATION: ICD-10-CM

## 2020-01-13 DIAGNOSIS — M25.552 LEFT HIP PAIN: ICD-10-CM

## 2020-01-13 DIAGNOSIS — E55.9 VITAMIN D DEFICIENCY: ICD-10-CM

## 2020-01-13 DIAGNOSIS — M80.00XA AGE-RELATED OSTEOPOROSIS WITH CURRENT PATHOLOGICAL FRACTURE, INITIAL ENCOUNTER: ICD-10-CM

## 2020-01-13 PROCEDURE — G0463 HOSPITAL OUTPT CLINIC VISIT: HCPCS | Performed by: INTERNAL MEDICINE

## 2020-01-13 PROCEDURE — 99213 OFFICE O/P EST LOW 20 MIN: CPT | Performed by: INTERNAL MEDICINE

## 2020-01-13 RX ORDER — PROMETHAZINE HYDROCHLORIDE AND CODEINE PHOSPHATE 6.25; 1 MG/5ML; MG/5ML
2.5 SYRUP ORAL EVERY 6 HOURS PRN
Qty: 180 ML | Refills: 1 | Status: SHIPPED | OUTPATIENT
Start: 2020-01-13 | End: 2020-07-02

## 2020-01-13 RX ORDER — VITAMIN B COMPLEX
2 TABLET ORAL DAILY
Qty: 120 TABLET | Refills: 6 | Status: SHIPPED | OUTPATIENT
Start: 2020-01-13 | End: 2021-01-26

## 2020-01-17 ENCOUNTER — TELEPHONE (OUTPATIENT)
Dept: INTERNAL MEDICINE CLINIC | Facility: CLINIC | Age: 85
End: 2020-01-17

## 2020-02-25 ENCOUNTER — OFFICE VISIT (OUTPATIENT)
Dept: INTERNAL MEDICINE CLINIC | Facility: CLINIC | Age: 85
End: 2020-02-25
Payer: COMMERCIAL

## 2020-02-25 VITALS
BODY MASS INDEX: 25.8 KG/M2 | HEART RATE: 72 BPM | TEMPERATURE: 98 F | DIASTOLIC BLOOD PRESSURE: 62 MMHG | SYSTOLIC BLOOD PRESSURE: 138 MMHG | WEIGHT: 128 LBS | HEIGHT: 59 IN

## 2020-02-25 DIAGNOSIS — M80.00XD AGE-RELATED OSTEOPOROSIS WITH CURRENT PATHOLOGICAL FRACTURE WITH ROUTINE HEALING, SUBSEQUENT ENCOUNTER: ICD-10-CM

## 2020-02-25 DIAGNOSIS — K63.9 BOWEL TROUBLE: Primary | ICD-10-CM

## 2020-02-25 DIAGNOSIS — S22.080D COMPRESSION FRACTURE OF T11 VERTEBRA WITH ROUTINE HEALING, SUBSEQUENT ENCOUNTER: ICD-10-CM

## 2020-02-25 PROCEDURE — G0463 HOSPITAL OUTPT CLINIC VISIT: HCPCS | Performed by: INTERNAL MEDICINE

## 2020-02-25 PROCEDURE — 99213 OFFICE O/P EST LOW 20 MIN: CPT | Performed by: INTERNAL MEDICINE

## 2020-02-25 NOTE — PROGRESS NOTES
Joselito Ramirez is a 80year old female who presents for     6 wk check    Back pain-follow up T11 compr fx. \"it's doing ok. I still wear my brace. \"Dr Shrerell Santoro gave a back brace 12/9/19.    Dr Tigre Nielsen 12/20/19 said pain better and hold off on kyphoplas diabetes mellitus ([facility] [address] 2001   • Uterine prolapse    • Uterine prolapse 2006    had pessary in 2006 Dr. Partha Craig sling procedure Dr Yessy Mcclain in 6/2016   • UTI due to Klebsiella species 12/2016    ESBL Klebsiella UTI treated in [address] [place] [address] with meropenem when i Temp 97.7 °F (36.5 °C) (Oral)   Ht 4' 11\" (1.499 m)   Wt 128 lb (58.1 kg)   BMI 25.85 kg/m²       Wt Readings from Last 6 Encounters:  02/25/20 : 128 lb (58.1 kg)  01/13/20 : 127 lb (57.6 kg)  12/10/19 : 128 lb (58.1 kg)  11/18/19 : 134 lb (60.8 kg)  11 pharmacy. E. coli UTI-lab 11/21/19--UA--167 wbc, 21 rbc. Ucx >100K E. Coli. Rx Macrobid. Prior UTI rx in ER 11/4/19-UA 29 wbc 4 rbc. Ucx 11/4/19-e coli and klebsiella. rx keflex. Asthma with COPD- Flovent inhaler 220 mcg 1 p bid.  Past rx Combiven 10/28/19.   MRI panc duct strictures-area of dilation on MRI in 5/13 --no change to 3/12--no f/u felt needed as radiologist feels is sequelae of prior pancreatitis.      Lab 10/11/18- cbc cmp lipid tsh A1c ua/ma- A1c 6.3, LDL 1145, Ua 20 wbc's Ucx >100K kl 2.5 % Rectal Cream APPLY RECTALLY TWICE DAILY AS NEEDED 30 g 2   • PANTOPRAZOLE SODIUM 40 MG Oral Tab EC TAKE 1 TABLET BY MOUTH EVERY MORNING BEFORE BREAKFAST 90 tablet 3   • GABAPENTIN 100 MG Oral Cap TAKE 1 CAPSULE BY MOUTH NIGHTLY 90 capsule 3   • ENALA

## 2020-05-01 RX ORDER — ENALAPRIL MALEATE 10 MG/1
TABLET ORAL
Qty: 90 TABLET | Refills: 3 | Status: SHIPPED | OUTPATIENT
Start: 2020-05-01 | End: 2020-06-18

## 2020-05-13 ENCOUNTER — TELEPHONE (OUTPATIENT)
Dept: INTERNAL MEDICINE CLINIC | Facility: CLINIC | Age: 85
End: 2020-05-13

## 2020-05-13 ENCOUNTER — HOSPITAL ENCOUNTER (OUTPATIENT)
Dept: CT IMAGING | Facility: HOSPITAL | Age: 85
Discharge: HOME | End: 2020-05-13
Attending: INTERNAL MEDICINE
Payer: MEDICARE

## 2020-05-13 ENCOUNTER — HOSPITAL ENCOUNTER (OUTPATIENT)
Dept: GENERAL RADIOLOGY | Facility: HOSPITAL | Age: 85
Discharge: HOME | End: 2020-05-13
Attending: INTERNAL MEDICINE
Payer: MEDICARE

## 2020-05-13 ENCOUNTER — OFFICE VISIT (OUTPATIENT)
Dept: INTERNAL MEDICINE CLINIC | Facility: CLINIC | Age: 85
End: 2020-05-13
Payer: COMMERCIAL

## 2020-05-13 VITALS
WEIGHT: 128 LBS | HEART RATE: 67 BPM | BODY MASS INDEX: 26 KG/M2 | SYSTOLIC BLOOD PRESSURE: 110 MMHG | TEMPERATURE: 97 F | OXYGEN SATURATION: 98 % | RESPIRATION RATE: 16 BRPM | DIASTOLIC BLOOD PRESSURE: 58 MMHG

## 2020-05-13 DIAGNOSIS — S01.01XA LACERATION OF OCCIPITAL SCALP, INITIAL ENCOUNTER: ICD-10-CM

## 2020-05-13 DIAGNOSIS — M54.50 ACUTE LEFT-SIDED LOW BACK PAIN WITHOUT SCIATICA: ICD-10-CM

## 2020-05-13 DIAGNOSIS — S09.90XA TRAUMATIC INJURY OF HEAD, INITIAL ENCOUNTER: ICD-10-CM

## 2020-05-13 DIAGNOSIS — R55 SYNCOPE, UNSPECIFIED SYNCOPE TYPE: Primary | ICD-10-CM

## 2020-05-13 PROCEDURE — 72110 X-RAY EXAM L-2 SPINE 4/>VWS: CPT | Performed by: INTERNAL MEDICINE

## 2020-05-13 PROCEDURE — 93010 ELECTROCARDIOGRAM REPORT: CPT | Performed by: INTERNAL MEDICINE

## 2020-05-13 PROCEDURE — G0463 HOSPITAL OUTPT CLINIC VISIT: HCPCS | Performed by: INTERNAL MEDICINE

## 2020-05-13 PROCEDURE — 99215 OFFICE O/P EST HI 40 MIN: CPT | Performed by: INTERNAL MEDICINE

## 2020-05-13 PROCEDURE — 93005 ELECTROCARDIOGRAM TRACING: CPT | Performed by: INTERNAL MEDICINE

## 2020-05-13 PROCEDURE — 70450 CT HEAD/BRAIN W/O DYE: CPT | Performed by: INTERNAL MEDICINE

## 2020-05-13 NOTE — TELEPHONE ENCOUNTER
Pt called, scheduled appt for today at 2:00  Has back issue, pt did fall yesterday wanted to be seen by Dr Stephanie Means  Pt took laxative so hopes she will be ok at that time to come in to the office  Britta Gauthier for pt to be scheduled  Tasked to Dr Stephanie Means as Gisel Navarrete

## 2020-05-13 NOTE — TELEPHONE ENCOUNTER
To nursing, please let CT dept know that I saw the stat hold and call CT head results. They might have called and I missed their call. I tried to call back but went into voice mail. Tell them to tell pt that results ok. Pt can leave.    Please tell pt resu

## 2020-05-13 NOTE — PROGRESS NOTES
Spoke to Lorri Severe on 5/13/2020 at [facility] from [address] [facility] and was advised no PA required for stat CT head or brain.

## 2020-05-13 NOTE — PROGRESS NOTES
Alex Hall is a 80year old female who presents for     Fall/syncope  Pt notes yesterday at home at 8:30 am and \"I started putting up my blinds and I started to walk away, not dizzy or anything.  I felt that little flush, and the next thing I knew I w 10, L1   • Lung nodule 4/06-3/03    LLL nodule resolved on serial CT scans   • Osteoarthritis    • Osteopenia    • Renal calculus 2010    on CT abd 5/10-6 mm or less in L lower kidney   • Renal insufficiency    • Shingles 2011   • Small bowel obstruction ( Sister         stroke- age 80      Social History:   Social History    Tobacco Use      Smoking status: Never Smoker      Smokeless tobacco: Never Used    Alcohol use: No      Alcohol/week: 0.0 standard drinks    Drug use: No         Allergies:    Adhe longer sinus bradycardia). Do 48 hr holter. Check cbc cmp. No driving--pt notes she no longer drives and her license lapsed. - ELECTROCARDIOGRAM, COMPLETE  - CARD MONITOR HOLTER 48 HOUR (CPT=93225);  Future  - CBC WITH DIFFERENTIAL WITH PLATELET; 4 rbc. Ucx 11/4/19-e coli and klebsiella. rx keflex. Asthma with COPD- Flovent inhaler 220 mcg 1 p bid. Past Combivent Respimat. Phenergan w codeine syrup as needed. (usually 1/2 tsp at HS).       Type 2 diabetes w neuropathy--Diet control since 2001. A ur ma 33--was 58.  11/4/19 ER-cbc--hgb 11.3. bmp--Na 131, creat 0.88 gfr 59, UA-29 wbc 4 rbc. Ucx ER 11/4/19-2 organisms on -e coli and klebsiella. .  12/10/19–Vit D, ZACH SPE A1c CMP--A1c 7.1. Vit D 18.8. Lab today--cbc cmp .     Ret in 2 wks.  .    Powder Take  by mouth.  take 15 milliliter (17G)  by oral route  every day powder mixed with 8 oz. water, juice, soda, coffee, or tea     • HYDROCODONE-ACETAMINOPHEN 5-325 MG Oral Tab TAKE 1 TABLET BY MOUTH TWICE DAILY AS NEEDED FOR PAIN (Patient not taking

## 2020-05-13 NOTE — TELEPHONE ENCOUNTER
Notified CT. They said pt was notified and left already.     Relayed MD's message to patient---verbalized understanding

## 2020-05-15 RX ORDER — GABAPENTIN 100 MG/1
CAPSULE ORAL
Qty: 90 CAPSULE | Refills: 3 | Status: SHIPPED | OUTPATIENT
Start: 2020-05-15 | End: 2021-08-11

## 2020-05-15 NOTE — TELEPHONE ENCOUNTER
Refill request received for gabapentin. Per 5/13/20 office visit note pt had recent fall. To Dr. Bryan Merino for review.

## 2020-05-19 ENCOUNTER — HOSPITAL ENCOUNTER (OUTPATIENT)
Dept: CV DIAGNOSTICS | Facility: HOSPITAL | Age: 85
Discharge: HOME | End: 2020-05-19
Attending: INTERNAL MEDICINE
Payer: MEDICARE

## 2020-05-19 DIAGNOSIS — R55 SYNCOPE, UNSPECIFIED SYNCOPE TYPE: ICD-10-CM

## 2020-05-19 PROCEDURE — 93227 XTRNL ECG REC<48 HR R&I: CPT | Performed by: INTERNAL MEDICINE

## 2020-05-19 PROCEDURE — 93225 XTRNL ECG REC<48 HRS REC: CPT | Performed by: INTERNAL MEDICINE

## 2020-05-19 PROCEDURE — 93226 XTRNL ECG REC<48 HR SCAN A/R: CPT | Performed by: INTERNAL MEDICINE

## 2020-05-27 ENCOUNTER — TELEPHONE (OUTPATIENT)
Dept: INTERNAL MEDICINE CLINIC | Facility: CLINIC | Age: 85
End: 2020-05-27

## 2020-05-27 NOTE — TELEPHONE ENCOUNTER
STASTCB on voicemail. To nursing,   When pt calls back--or please try her tomorrow, tell her the home monitor results show some episodes of fast heart beating that can last up to 3 min. She should see a cardiologist for this.    She saw Dr. Yasir Vital

## 2020-05-28 NOTE — TELEPHONE ENCOUNTER
Called and Relayed MD's message to patient---verbalized understanding  Contact information provided for Dr. Siena Chao.  If she has any trouble scheduling she will call us back

## 2020-06-01 NOTE — TELEPHONE ENCOUNTER
Please call pt  She scheduled with Dr Sarah Brar for 6/8/20  Is this date ok with Dr Daniele Childers?   Tasked to nursing

## 2020-06-02 RX ORDER — HYDROCORTISONE 25 MG/G
CREAM TOPICAL
Qty: 30 G | Refills: 2 | Status: SHIPPED | OUTPATIENT
Start: 2020-06-02

## 2020-06-04 PROBLEM — I10 HYPERTENSION: Status: ACTIVE | Noted: 2020-06-04

## 2020-06-04 PROBLEM — I47.10 SVT (SUPRAVENTRICULAR TACHYCARDIA): Status: ACTIVE | Noted: 2020-06-04

## 2020-06-04 PROBLEM — G62.9 NEUROPATHY: Status: ACTIVE | Noted: 2020-06-04

## 2020-06-04 PROBLEM — N18.9 CKD (CHRONIC KIDNEY DISEASE): Status: ACTIVE | Noted: 2020-06-04

## 2020-06-04 PROBLEM — E78.00 HYPERCHOLESTEROLEMIA: Status: ACTIVE | Noted: 2020-06-04

## 2020-06-04 PROBLEM — J44.89 ASTHMA WITH COPD: Status: ACTIVE | Noted: 2020-06-04

## 2020-06-04 PROBLEM — E11.21 CONTROLLED TYPE 2 DIABETES MELLITUS WITH DIABETIC NEPHROPATHY, WITHOUT LONG-TERM CURRENT USE OF INSULIN (CMD): Status: ACTIVE | Noted: 2020-06-04

## 2020-06-04 PROBLEM — G56.00 CARPAL TUNNEL SYNDROME: Status: ACTIVE | Noted: 2020-06-04

## 2020-06-04 RX ORDER — ENALAPRIL MALEATE 10 MG/1
10 TABLET ORAL DAILY
COMMUNITY
Start: 2020-05-01 | End: 2020-06-08 | Stop reason: SDUPTHER

## 2020-06-04 RX ORDER — TRIAMCINOLONE ACETONIDE 1 MG/G
1 CREAM TOPICAL 2 TIMES DAILY
COMMUNITY
Start: 2020-01-13

## 2020-06-04 RX ORDER — GABAPENTIN 100 MG/1
100 CAPSULE ORAL NIGHTLY
COMMUNITY
Start: 2020-05-15

## 2020-06-04 RX ORDER — VITAMIN B COMPLEX
2 TABLET ORAL DAILY
COMMUNITY
Start: 2020-01-13

## 2020-06-04 RX ORDER — ALENDRONATE SODIUM 70 MG/1
70 TABLET ORAL
COMMUNITY
Start: 2019-12-10

## 2020-06-04 RX ORDER — DILTIAZEM HYDROCHLORIDE 120 MG/1
120 CAPSULE, COATED, EXTENDED RELEASE ORAL DAILY
COMMUNITY
Start: 2019-11-27

## 2020-06-04 RX ORDER — HYDROCODONE BITARTRATE AND ACETAMINOPHEN 5; 325 MG/1; MG/1
1 TABLET ORAL 2 TIMES DAILY PRN
COMMUNITY
Start: 2019-12-16

## 2020-06-04 RX ORDER — FLUTICASONE PROPIONATE 220 UG/1
1 AEROSOL, METERED RESPIRATORY (INHALATION) 2 TIMES DAILY
COMMUNITY
Start: 2020-02-04

## 2020-06-04 RX ORDER — AMOXICILLIN 250 MG
1 CAPSULE ORAL 2 TIMES DAILY
COMMUNITY
Start: 2019-10-21

## 2020-06-04 RX ORDER — ATORVASTATIN CALCIUM 10 MG/1
10 TABLET, FILM COATED ORAL DAILY
COMMUNITY
Start: 2019-10-02

## 2020-06-04 RX ORDER — PANTOPRAZOLE SODIUM 40 MG/1
40 TABLET, DELAYED RELEASE ORAL DAILY
COMMUNITY
Start: 2019-06-27

## 2020-06-04 RX ORDER — PROMETHAZINE HYDROCHLORIDE AND CODEINE PHOSPHATE 6.25; 1 MG/5ML; MG/5ML
2.5 SYRUP ORAL EVERY 6 HOURS PRN
COMMUNITY
Start: 2020-01-13

## 2020-06-08 ENCOUNTER — OFFICE VISIT (OUTPATIENT)
Dept: CARDIOLOGY | Age: 85
End: 2020-06-08

## 2020-06-08 VITALS
SYSTOLIC BLOOD PRESSURE: 126 MMHG | HEART RATE: 74 BPM | DIASTOLIC BLOOD PRESSURE: 46 MMHG | WEIGHT: 130 LBS | OXYGEN SATURATION: 97 % | HEIGHT: 58 IN | BODY MASS INDEX: 27.29 KG/M2

## 2020-06-08 DIAGNOSIS — R94.31 ABNORMAL ELECTROCARDIOGRAM (ECG) (EKG): ICD-10-CM

## 2020-06-08 DIAGNOSIS — E78.00 HYPERCHOLESTEROLEMIA: ICD-10-CM

## 2020-06-08 DIAGNOSIS — I47.10 SVT (SUPRAVENTRICULAR TACHYCARDIA): Primary | ICD-10-CM

## 2020-06-08 DIAGNOSIS — R55 SYNCOPE AND COLLAPSE: ICD-10-CM

## 2020-06-08 DIAGNOSIS — N18.9 CHRONIC KIDNEY DISEASE, UNSPECIFIED CKD STAGE: ICD-10-CM

## 2020-06-08 DIAGNOSIS — I10 ESSENTIAL HYPERTENSION: ICD-10-CM

## 2020-06-08 PROCEDURE — 93000 ELECTROCARDIOGRAM COMPLETE: CPT | Performed by: INTERNAL MEDICINE

## 2020-06-08 PROCEDURE — 99214 OFFICE O/P EST MOD 30 MIN: CPT | Performed by: INTERNAL MEDICINE

## 2020-06-08 RX ORDER — ENALAPRIL MALEATE 5 MG/1
5 TABLET ORAL DAILY
Qty: 30 TABLET | Refills: 3 | Status: SHIPPED | OUTPATIENT
Start: 2020-06-08

## 2020-06-08 SDOH — HEALTH STABILITY: MENTAL HEALTH: HOW OFTEN DO YOU HAVE A DRINK CONTAINING ALCOHOL?: NEVER

## 2020-06-08 ASSESSMENT — PATIENT HEALTH QUESTIONNAIRE - PHQ9
SUM OF ALL RESPONSES TO PHQ9 QUESTIONS 1 AND 2: 0
CLINICAL INTERPRETATION OF PHQ2 SCORE: NO FURTHER SCREENING NEEDED
SUM OF ALL RESPONSES TO PHQ9 QUESTIONS 1 AND 2: 0
2. FEELING DOWN, DEPRESSED OR HOPELESS: NOT AT ALL
1. LITTLE INTEREST OR PLEASURE IN DOING THINGS: NOT AT ALL
CLINICAL INTERPRETATION OF PHQ9 SCORE: NO FURTHER SCREENING NEEDED

## 2020-06-12 ENCOUNTER — APPOINTMENT (OUTPATIENT)
Dept: CARDIOLOGY | Age: 85
End: 2020-06-12
Attending: INTERNAL MEDICINE

## 2020-06-12 ENCOUNTER — ANCILLARY PROCEDURE (OUTPATIENT)
Dept: CARDIOLOGY | Age: 85
End: 2020-06-12
Attending: INTERNAL MEDICINE

## 2020-06-12 ENCOUNTER — TELEPHONE (OUTPATIENT)
Dept: CARDIOLOGY | Age: 85
End: 2020-06-12

## 2020-06-12 VITALS — BODY MASS INDEX: 27.29 KG/M2 | HEIGHT: 58 IN | WEIGHT: 130 LBS

## 2020-06-12 DIAGNOSIS — R94.31 ABNORMAL ELECTROCARDIOGRAM (ECG) (EKG): ICD-10-CM

## 2020-06-12 DIAGNOSIS — E78.00 HYPERCHOLESTEROLEMIA: ICD-10-CM

## 2020-06-12 DIAGNOSIS — R55 SYNCOPE AND COLLAPSE: ICD-10-CM

## 2020-06-12 DIAGNOSIS — I47.10 SVT (SUPRAVENTRICULAR TACHYCARDIA) (CMD): ICD-10-CM

## 2020-06-12 DIAGNOSIS — I10 ESSENTIAL HYPERTENSION: ICD-10-CM

## 2020-06-12 DIAGNOSIS — N18.9 CHRONIC KIDNEY DISEASE, UNSPECIFIED CKD STAGE: ICD-10-CM

## 2020-06-12 DIAGNOSIS — I47.10 SVT (SUPRAVENTRICULAR TACHYCARDIA): ICD-10-CM

## 2020-06-12 LAB
LV EF: 79 %
STRESS POST EXERCISE DUR SEC: 36 SEC
STRESS TARGET HR: 132 BPM

## 2020-06-12 PROCEDURE — 78452 HT MUSCLE IMAGE SPECT MULT: CPT | Performed by: INTERNAL MEDICINE

## 2020-06-12 PROCEDURE — 93015 CV STRESS TEST SUPVJ I&R: CPT | Performed by: INTERNAL MEDICINE

## 2020-06-12 PROCEDURE — 93306 TTE W/DOPPLER COMPLETE: CPT | Performed by: INTERNAL MEDICINE

## 2020-06-12 PROCEDURE — A9502 TC99M TETROFOSMIN: HCPCS | Performed by: INTERNAL MEDICINE

## 2020-06-12 RX ORDER — REGADENOSON 0.08 MG/ML
0.4 INJECTION, SOLUTION INTRAVENOUS ONCE
Status: COMPLETED | OUTPATIENT
Start: 2020-06-12 | End: 2020-06-12

## 2020-06-12 RX ADMIN — REGADENOSON 0.4 MG: 0.08 INJECTION, SOLUTION INTRAVENOUS at 10:10

## 2020-06-12 ASSESSMENT — EXERCISE STRESS TEST
PEAK_HR: 67
PEAK_HR: 74
STAGE_CATEGORIES: RECOVERY 1
STAGE_CATEGORIES: 1
PEAK_BP: 128/52
STAGE_CATEGORIES: RECOVERY 0
COMMENTS: INJECTED AT :30
PEAK_RPP: 7936
PEAK_RPP: 8140
STAGE_CATEGORIES: RESTING
STAGE_CATEGORIES: RECOVERY 2
COMMENTS: 4 MINUTE RECOVERY
PEAK_BP: 128/50
PEAK_BP: 120/48
PEAK_HR: 62
PEAK_RPP: 9240
STOPPAGE_REASON: PROTOCOL COMPLETE
STRESS_SYMPTOMS: LIGHTHEADEDNESS
COMMENTS: 2 MINUTE RECOVERY
PEAK_BP: 110/50
PEAK_RPP: 10496
PEAK_HR: 77
PEAK_HR: 82

## 2020-06-16 ENCOUNTER — TELEPHONE (OUTPATIENT)
Dept: CARDIOLOGY | Age: 85
End: 2020-06-16

## 2020-06-16 DIAGNOSIS — R94.39 ABNORMAL NUCLEAR STRESS TEST: Primary | ICD-10-CM

## 2020-06-17 PROBLEM — R94.39 ABNORMAL NUCLEAR STRESS TEST: Status: ACTIVE | Noted: 2020-06-17

## 2020-06-18 ENCOUNTER — TELEPHONE (OUTPATIENT)
Dept: CARDIOLOGY | Age: 85
End: 2020-06-18

## 2020-06-18 ENCOUNTER — TELEPHONE (OUTPATIENT)
Dept: INTERNAL MEDICINE CLINIC | Facility: CLINIC | Age: 85
End: 2020-06-18

## 2020-06-18 RX ORDER — ENALAPRIL MALEATE 10 MG/1
5 TABLET ORAL DAILY
Qty: 90 TABLET | Refills: 3 | COMMUNITY
Start: 2020-06-18 | End: 2020-10-19 | Stop reason: DRUGHIGH

## 2020-06-18 NOTE — TELEPHONE ENCOUNTER
[phone]  I called pt. Pt saw Dr Duckworth Loss 6/8/20. He said SVT controlled on cardizem 120 mg daily. He cut enalapril 5 mg daily. No further syncopal episodes. He ordered regadenosin stress test done 6/12/20-reversible apical defect.    Pt is sched

## 2020-06-18 NOTE — TELEPHONE ENCOUNTER
To Dr. Kb Ellington - se below. Attempted to call pt - ringing to busy. See Dr. Fadia Martinez notes in Epic under Encounters - as well as ordered cardiology tests.

## 2020-06-18 NOTE — TELEPHONE ENCOUNTER
Pt like  to know she is still seeing Karan Bullard and she have more tests coming up with him. She wanted to know if  is getting the reports.

## 2020-06-19 ENCOUNTER — HOSPITAL ENCOUNTER (OUTPATIENT)
Dept: GENERAL RADIOLOGY | Facility: HOSPITAL | Age: 85
Discharge: HOME | End: 2020-06-19
Attending: INTERNAL MEDICINE
Payer: MEDICARE

## 2020-06-19 ENCOUNTER — LAB ENCOUNTER (OUTPATIENT)
Dept: LAB | Facility: HOSPITAL | Age: 85
End: 2020-06-19
Attending: INTERNAL MEDICINE
Payer: MEDICARE

## 2020-06-19 DIAGNOSIS — Z01.818 PRE-OP TESTING: ICD-10-CM

## 2020-06-19 LAB
BUN SERPL-MCNC: 22 MG/DL
CALCIUM SERPL-MCNC: 9.1 MG/DL
CHLORIDE SERPL-SCNC: 102 MMOL/L
CREAT SERPL-MCNC: 0.96 MG/DL
GLUCOSE SERPL-MCNC: 113 MG/DL
HCT VFR BLD CALC: 32.6 %
HGB BLD-MCNC: 10.8 G/DL
PLATELET # BLD: 297 K/MCL
POTASSIUM SERPL-SCNC: 4.8 MMOL/L
RBC # BLD: 3.68 10*6/UL
SODIUM SERPL-SCNC: 133 MMOL/L
WBC # BLD: 5.7 K/MCL

## 2020-06-19 PROCEDURE — 80048 BASIC METABOLIC PNL TOTAL CA: CPT

## 2020-06-19 PROCEDURE — 85025 COMPLETE CBC W/AUTO DIFF WBC: CPT

## 2020-06-19 PROCEDURE — 36415 COLL VENOUS BLD VENIPUNCTURE: CPT

## 2020-06-19 PROCEDURE — 71046 X-RAY EXAM CHEST 2 VIEWS: CPT | Performed by: INTERNAL MEDICINE

## 2020-06-20 ENCOUNTER — LAB ENCOUNTER (OUTPATIENT)
Dept: LAB | Facility: HOSPITAL | Age: 85
End: 2020-06-20
Attending: INTERNAL MEDICINE
Payer: MEDICARE

## 2020-06-20 DIAGNOSIS — Z01.818 PRE-OP TESTING: ICD-10-CM

## 2020-06-20 LAB
SARS-COV-2 RNA SPEC QL NAA+PROBE: NOT DETECTED
SPECIMEN SOURCE: NORMAL

## 2020-06-22 ENCOUNTER — CLINICAL ABSTRACT (OUTPATIENT)
Dept: CARDIOLOGY | Age: 85
End: 2020-06-22

## 2020-06-23 ENCOUNTER — HOSPITAL ENCOUNTER (OUTPATIENT)
Dept: INTERVENTIONAL RADIOLOGY/VASCULAR | Facility: HOSPITAL | Age: 85
Discharge: HOME | End: 2020-06-24
Attending: INTERNAL MEDICINE | Admitting: INTERNAL MEDICINE
Payer: MEDICARE

## 2020-06-23 DIAGNOSIS — Z01.818 PRE-OP TESTING: Primary | ICD-10-CM

## 2020-06-23 DIAGNOSIS — R94.39 ABNORMAL STRESS TEST: ICD-10-CM

## 2020-06-23 PROCEDURE — 99152 MOD SED SAME PHYS/QHP 5/>YRS: CPT | Performed by: INTERNAL MEDICINE

## 2020-06-23 PROCEDURE — 027135Z DILATION OF CORONARY ARTERY, TWO ARTERIES WITH TWO DRUG-ELUTING INTRALUMINAL DEVICES, PERCUTANEOUS APPROACH: ICD-10-PCS | Performed by: INTERNAL MEDICINE

## 2020-06-23 PROCEDURE — 93454 CORONARY ARTERY ANGIO S&I: CPT

## 2020-06-23 PROCEDURE — 92978 ENDOLUMINL IVUS OCT C 1ST: CPT | Performed by: INTERNAL MEDICINE

## 2020-06-23 PROCEDURE — 92928 PRQ TCAT PLMT NTRAC ST 1 LES: CPT | Performed by: INTERNAL MEDICINE

## 2020-06-23 PROCEDURE — 92978 ENDOLUMINL IVUS OCT C 1ST: CPT

## 2020-06-23 PROCEDURE — 99152 MOD SED SAME PHYS/QHP 5/>YRS: CPT

## 2020-06-23 PROCEDURE — B240ZZ3 ULTRASONOGRAPHY OF SINGLE CORONARY ARTERY, INTRAVASCULAR: ICD-10-PCS | Performed by: INTERNAL MEDICINE

## 2020-06-23 PROCEDURE — 93454 CORONARY ARTERY ANGIO S&I: CPT | Performed by: INTERNAL MEDICINE

## 2020-06-23 PROCEDURE — B2111ZZ FLUOROSCOPY OF MULTIPLE CORONARY ARTERIES USING LOW OSMOLAR CONTRAST: ICD-10-PCS | Performed by: INTERNAL MEDICINE

## 2020-06-23 PROCEDURE — 99153 MOD SED SAME PHYS/QHP EA: CPT

## 2020-06-23 PROCEDURE — 92979 ENDOLUMINL IVUS OCT C EA: CPT

## 2020-06-23 RX ORDER — TRAMADOL HYDROCHLORIDE 50 MG/1
50 TABLET ORAL EVERY 6 HOURS PRN
Status: DISCONTINUED | OUTPATIENT
Start: 2020-06-23 | End: 2020-06-24

## 2020-06-23 RX ORDER — ENALAPRIL MALEATE 2.5 MG/1
5 TABLET ORAL DAILY
Status: DISCONTINUED | OUTPATIENT
Start: 2020-06-23 | End: 2020-06-24

## 2020-06-23 RX ORDER — GABAPENTIN 100 MG/1
100 CAPSULE ORAL NIGHTLY
Status: DISCONTINUED | OUTPATIENT
Start: 2020-06-23 | End: 2020-06-24

## 2020-06-23 RX ORDER — TRAMADOL HYDROCHLORIDE 50 MG/1
100 TABLET ORAL EVERY 6 HOURS PRN
Status: DISCONTINUED | OUTPATIENT
Start: 2020-06-23 | End: 2020-06-24

## 2020-06-23 RX ORDER — CLOPIDOGREL BISULFATE 75 MG/1
600 TABLET ORAL ONCE
Status: COMPLETED | OUTPATIENT
Start: 2020-06-23 | End: 2020-06-23

## 2020-06-23 RX ORDER — ASPIRIN 81 MG/1
81 TABLET ORAL DAILY
Status: DISCONTINUED | OUTPATIENT
Start: 2020-06-24 | End: 2020-06-24

## 2020-06-23 RX ORDER — NITROGLYCERIN 20 MG/100ML
INJECTION INTRAVENOUS
Status: COMPLETED
Start: 2020-06-23 | End: 2020-06-23

## 2020-06-23 RX ORDER — CLOPIDOGREL BISULFATE 75 MG/1
75 TABLET ORAL DAILY
Status: DISCONTINUED | OUTPATIENT
Start: 2020-06-24 | End: 2020-06-24

## 2020-06-23 RX ORDER — ATORVASTATIN CALCIUM 10 MG/1
10 TABLET, FILM COATED ORAL DAILY
Status: DISCONTINUED | OUTPATIENT
Start: 2020-06-23 | End: 2020-06-24

## 2020-06-23 RX ORDER — SODIUM CHLORIDE 9 MG/ML
INJECTION, SOLUTION INTRAVENOUS CONTINUOUS
Status: ACTIVE | OUTPATIENT
Start: 2020-06-23 | End: 2020-06-23

## 2020-06-23 RX ORDER — CLOPIDOGREL BISULFATE 75 MG/1
TABLET ORAL
Status: COMPLETED
Start: 2020-06-23 | End: 2020-06-23

## 2020-06-23 RX ORDER — PANTOPRAZOLE SODIUM 40 MG/1
40 TABLET, DELAYED RELEASE ORAL
Status: DISCONTINUED | OUTPATIENT
Start: 2020-06-24 | End: 2020-06-24

## 2020-06-23 RX ORDER — LIDOCAINE HYDROCHLORIDE 20 MG/ML
INJECTION, SOLUTION EPIDURAL; INFILTRATION; INTRACAUDAL; PERINEURAL
Status: COMPLETED
Start: 2020-06-23 | End: 2020-06-23

## 2020-06-23 RX ORDER — MIDAZOLAM HYDROCHLORIDE 1 MG/ML
INJECTION INTRAMUSCULAR; INTRAVENOUS
Status: COMPLETED
Start: 2020-06-23 | End: 2020-06-23

## 2020-06-23 RX ORDER — DILTIAZEM HYDROCHLORIDE 120 MG/1
120 CAPSULE, EXTENDED RELEASE ORAL DAILY
Status: DISCONTINUED | OUTPATIENT
Start: 2020-06-23 | End: 2020-06-24

## 2020-06-23 RX ORDER — SODIUM CHLORIDE 9 MG/ML
INJECTION, SOLUTION INTRAVENOUS
Status: ACTIVE | OUTPATIENT
Start: 2020-06-23 | End: 2020-06-23

## 2020-06-23 RX ORDER — ACETAMINOPHEN 325 MG/1
650 TABLET ORAL EVERY 4 HOURS PRN
Status: DISCONTINUED | OUTPATIENT
Start: 2020-06-23 | End: 2020-06-24

## 2020-06-23 RX ADMIN — GABAPENTIN 100 MG: 100 CAPSULE ORAL at 20:00:00

## 2020-06-23 RX ADMIN — DILTIAZEM HYDROCHLORIDE 120 MG: 120 CAPSULE, EXTENDED RELEASE ORAL at 14:48:00

## 2020-06-23 RX ADMIN — ENALAPRIL MALEATE 5 MG: 2.5 TABLET ORAL at 14:48:00

## 2020-06-23 RX ADMIN — SODIUM CHLORIDE: 9 INJECTION, SOLUTION INTRAVENOUS at 13:33:00

## 2020-06-23 RX ADMIN — ATORVASTATIN CALCIUM 10 MG: 10 TABLET, FILM COATED ORAL at 14:48:00

## 2020-06-23 NOTE — INTERVAL H&P NOTE
Pre-op Diagnosis: * No pre-op diagnosis entered *    The above referenced H&P was reviewed by Georgine Rinne, MD on 6/23/2020, the patient was examined and no significant changes have occurred in the patient's condition since the H&P was performed.   I discu

## 2020-06-23 NOTE — PROGRESS NOTES
Angiomax drip at 20.7cc/hr discontinued at 12:15pm. Patient voided prior to transport to room. Groin site dry and intact. Pt voices no co's.

## 2020-06-23 NOTE — PROCEDURES
[place]  S/DUSTIN Cardiac Cath Procedure Note  Joselito Ramirez Patient Status:  Outpatient in a Bed    1931 MRN [id number]   Location [facility] Attending [place],  [facility] Se Day # 0 PCP Ana María Angulo, Selective coronary angiography performed with JR4 catheter for RCA and JL3.5 catheter for LCA. Angiography performed in standard projections. Selective right femoral angiogram done assess anatomy for closure.       Specimen sent to: No specimen

## 2020-06-23 NOTE — PROGRESS NOTES
Therapeutic interchange from flovent HFA 220mcg  to Arniuity 200mcg per P&T approved protocol.      Margarie Kocher, AnniaD

## 2020-06-24 VITALS
OXYGEN SATURATION: 96 % | HEIGHT: 59 IN | WEIGHT: 128.63 LBS | RESPIRATION RATE: 17 BRPM | HEART RATE: 70 BPM | DIASTOLIC BLOOD PRESSURE: 41 MMHG | BODY MASS INDEX: 25.93 KG/M2 | SYSTOLIC BLOOD PRESSURE: 123 MMHG | TEMPERATURE: 97 F

## 2020-06-24 PROCEDURE — 99214 OFFICE O/P EST MOD 30 MIN: CPT | Performed by: NURSE PRACTITIONER

## 2020-06-24 PROCEDURE — 80048 BASIC METABOLIC PNL TOTAL CA: CPT | Performed by: INTERNAL MEDICINE

## 2020-06-24 RX ORDER — ATORVASTATIN CALCIUM 20 MG/1
10 TABLET, FILM COATED ORAL DAILY
Qty: 30 TABLET | Refills: 2 | Status: SHIPPED | OUTPATIENT
Start: 2020-06-24 | End: 2020-07-30

## 2020-06-24 RX ORDER — CLOPIDOGREL BISULFATE 75 MG/1
75 TABLET ORAL DAILY
Qty: 30 TABLET | Refills: 11 | Status: SHIPPED | OUTPATIENT
Start: 2020-06-24 | End: 2021-10-19

## 2020-06-24 RX ORDER — ASPIRIN 81 MG/1
81 TABLET ORAL DAILY
Qty: 30 TABLET | Refills: 11 | Status: SHIPPED | OUTPATIENT
Start: 2020-06-24 | End: 2021-02-19

## 2020-06-24 RX ADMIN — CLOPIDOGREL BISULFATE 75 MG: 75 TABLET ORAL at 08:51:00

## 2020-06-24 RX ADMIN — ASPIRIN 81 MG: 81 TABLET ORAL at 08:51:00

## 2020-06-24 RX ADMIN — PANTOPRAZOLE SODIUM 40 MG: 40 TABLET, DELAYED RELEASE ORAL at 06:00:00

## 2020-06-24 RX ADMIN — ENALAPRIL MALEATE 5 MG: 2.5 TABLET ORAL at 08:51:00

## 2020-06-24 RX ADMIN — DILTIAZEM HYDROCHLORIDE 120 MG: 120 CAPSULE, EXTENDED RELEASE ORAL at 08:50:00

## 2020-06-24 RX ADMIN — ATORVASTATIN CALCIUM 10 MG: 10 TABLET, FILM COATED ORAL at 08:51:00

## 2020-06-24 NOTE — PROGRESS NOTES
[facility] - [facility]    Progress Note    Asia Rodriguez Patient Status:  Observation    1931 MRN [id number]   Location [address] [facility] Attending Margie Medeiros MD   Hosp Day # 0 PCP J Luis Tsai MD        Subjective:     Yamel Ramirez ALB 3.75 12/10/2019    ALB 3.5 12/10/2019    ALKPHO 141 12/10/2019    BILT 0.4 12/10/2019    TP 6.9 12/10/2019    TP 7.4 12/10/2019    AST 13 (L) 12/10/2019    ALT 18 12/10/2019    TSH 1.36 10/11/2018    MG 1.6 (L) 01/08/2017    PHOS 2.3 (L) 01/09/2017

## 2020-06-24 NOTE — CARDIAC REHAB
Cardiac Rehab Phase I    Activity:  Distance Per self  Assistance needed No  Patient tolerated activity Well per patient. Education:  Handouts provided and reviewed: [address] Diet: Healthy Cardiac diet reviewed.     Disease Pr

## 2020-06-24 NOTE — DIETARY NOTE
NUTRITION EDUCATION NOTE    Received consult for nutrition education per cardiac rehab order set. Appropriate education and handout(s) provided. See education section of Epic for specifics.     Vianney Pryor RDN, LDN  Clinical Nutrition  Ext [phone]

## 2020-06-24 NOTE — PLAN OF CARE
Problem: Patient Centered Care  Goal: Patient preferences are identified and integrated in the patient's plan of care  Description  Interventions:  - What would you like us to know as we care for you?  From home  - Provide timely, complete, and accurate i

## 2020-06-25 ENCOUNTER — PATIENT OUTREACH (OUTPATIENT)
Dept: CASE MANAGEMENT | Age: 85
End: 2020-06-25

## 2020-06-25 DIAGNOSIS — I47.1 SVT (SUPRAVENTRICULAR TACHYCARDIA) (HCC): ICD-10-CM

## 2020-06-25 DIAGNOSIS — Z02.9 ENCOUNTERS FOR UNSPECIFIED ADMINISTRATIVE PURPOSE: ICD-10-CM

## 2020-06-25 PROCEDURE — 1111F DSCHRG MED/CURRENT MED MERGE: CPT

## 2020-06-25 NOTE — PROGRESS NOTES
Initial Post Discharge Follow Up   Discharge Date: 6/24/20  Contact Date: 6/25/2020    Consent Verification:  Assessment Completed With: Patient  HIPAA Verified?   Yes    Discharge Dx:  Abnormal stress test    General:   • How have you been since your Raf capsule 3   • Fluticasone Propionate HFA (FLOVENT HFA) 220 MCG/ACT Inhalation Aerosol Inhale 1 puff into the lungs 2 (two) times daily. 3 Inhaler 3   • triamcinolone acetonide 0.1 % External Cream Apply topically 2 (two) times daily as needed.  1 Tube 3   • taking your medication as prescribed? No  Are you having any concerns with constipation? No    Referrals/orders at D/C:  Home Health/Services ordered at D/C? No  DME ordered at D/C? No      Needs post D/C:   Now that you are home, are there any needs or co outpatient medications with patient,  and orders reviewed and discussed. Any changes or updates to medications and or orders sent to PCP.

## 2020-06-27 PROBLEM — I25.10 CORONARY ARTERY DISEASE INVOLVING NATIVE CORONARY ARTERY OF NATIVE HEART WITHOUT ANGINA PECTORIS: Status: ACTIVE | Noted: 2020-06-27

## 2020-06-27 PROBLEM — Z95.5 PRESENCE OF CORONARY ANGIOPLASTY IMPLANT AND GRAFT: Status: ACTIVE | Noted: 2020-06-27

## 2020-06-30 ENCOUNTER — OFFICE VISIT (OUTPATIENT)
Dept: CARDIOLOGY | Age: 85
End: 2020-06-30

## 2020-06-30 VITALS
DIASTOLIC BLOOD PRESSURE: 48 MMHG | SYSTOLIC BLOOD PRESSURE: 118 MMHG | WEIGHT: 130 LBS | HEIGHT: 58 IN | BODY MASS INDEX: 27.29 KG/M2 | HEART RATE: 61 BPM | OXYGEN SATURATION: 97 %

## 2020-06-30 DIAGNOSIS — I25.10 CORONARY ARTERY DISEASE INVOLVING NATIVE CORONARY ARTERY OF NATIVE HEART WITHOUT ANGINA PECTORIS: Primary | ICD-10-CM

## 2020-06-30 DIAGNOSIS — I10 ESSENTIAL HYPERTENSION: ICD-10-CM

## 2020-06-30 DIAGNOSIS — E78.00 HYPERCHOLESTEROLEMIA: ICD-10-CM

## 2020-06-30 DIAGNOSIS — Z95.5 PRESENCE OF CORONARY ANGIOPLASTY IMPLANT AND GRAFT: ICD-10-CM

## 2020-06-30 PROCEDURE — 99214 OFFICE O/P EST MOD 30 MIN: CPT | Performed by: NURSE PRACTITIONER

## 2020-06-30 RX ORDER — CLOPIDOGREL BISULFATE 75 MG/1
75 TABLET ORAL DAILY
COMMUNITY
Start: 2020-06-24

## 2020-06-30 RX ORDER — HYDROCORTISONE 25 MG/G
CREAM TOPICAL
COMMUNITY
Start: 2020-06-02

## 2020-06-30 RX ORDER — BIMATOPROST 0.1 MG/ML
SOLUTION/ DROPS OPHTHALMIC
COMMUNITY
Start: 2020-05-15

## 2020-06-30 RX ORDER — ASPIRIN 81 MG/1
81 TABLET ORAL DAILY
COMMUNITY
Start: 2020-06-24 | End: 2021-02-01 | Stop reason: ALTCHOICE

## 2020-06-30 SDOH — HEALTH STABILITY: MENTAL HEALTH: HOW OFTEN DO YOU HAVE A DRINK CONTAINING ALCOHOL?: NEVER

## 2020-06-30 ASSESSMENT — PATIENT HEALTH QUESTIONNAIRE - PHQ9
CLINICAL INTERPRETATION OF PHQ2 SCORE: NO FURTHER SCREENING NEEDED
CLINICAL INTERPRETATION OF PHQ9 SCORE: NO FURTHER SCREENING NEEDED
2. FEELING DOWN, DEPRESSED OR HOPELESS: NOT AT ALL
1. LITTLE INTEREST OR PLEASURE IN DOING THINGS: NOT AT ALL
SUM OF ALL RESPONSES TO PHQ9 QUESTIONS 1 AND 2: 0
SUM OF ALL RESPONSES TO PHQ9 QUESTIONS 1 AND 2: 0

## 2020-07-01 ENCOUNTER — LAB ENCOUNTER (OUTPATIENT)
Dept: LAB | Age: 85
End: 2020-07-01
Attending: INTERNAL MEDICINE
Payer: MEDICARE

## 2020-07-01 ENCOUNTER — TELEPHONE (OUTPATIENT)
Dept: INTERNAL MEDICINE CLINIC | Facility: CLINIC | Age: 85
End: 2020-07-01

## 2020-07-01 ENCOUNTER — OFFICE VISIT (OUTPATIENT)
Dept: INTERNAL MEDICINE CLINIC | Facility: CLINIC | Age: 85
End: 2020-07-01
Payer: COMMERCIAL

## 2020-07-01 VITALS
DIASTOLIC BLOOD PRESSURE: 70 MMHG | HEIGHT: 59 IN | SYSTOLIC BLOOD PRESSURE: 110 MMHG | TEMPERATURE: 98 F | BODY MASS INDEX: 26.21 KG/M2 | WEIGHT: 130 LBS | HEART RATE: 72 BPM

## 2020-07-01 DIAGNOSIS — E55.9 VITAMIN D DEFICIENCY: ICD-10-CM

## 2020-07-01 DIAGNOSIS — E78.00 HYPERCHOLESTEROLEMIA: ICD-10-CM

## 2020-07-01 DIAGNOSIS — R55 SYNCOPE, UNSPECIFIED SYNCOPE TYPE: ICD-10-CM

## 2020-07-01 DIAGNOSIS — I25.10 CORONARY ARTERY DISEASE INVOLVING NATIVE CORONARY ARTERY OF NATIVE HEART WITHOUT ANGINA PECTORIS: Primary | ICD-10-CM

## 2020-07-01 DIAGNOSIS — I47.1 SVT (SUPRAVENTRICULAR TACHYCARDIA) (HCC): ICD-10-CM

## 2020-07-01 DIAGNOSIS — E11.21 TYPE 2 DIABETES WITH NEPHROPATHY (HCC): ICD-10-CM

## 2020-07-01 DIAGNOSIS — Z87.898 HISTORY OF SYNCOPE: ICD-10-CM

## 2020-07-01 DIAGNOSIS — Z95.5 STENTED CORONARY ARTERY: ICD-10-CM

## 2020-07-01 DIAGNOSIS — D64.9 MILD ANEMIA: Primary | ICD-10-CM

## 2020-07-01 LAB
ALBUMIN SERPL-MCNC: 3.8 G/DL
ALBUMIN SERPL-MCNC: 3.8 G/DL (ref 3.4–5)
ALBUMIN/GLOB SERPL: 1 {RATIO} (ref 1–2)
ALP LIVER SERPL-CCNC: 101 U/L (ref 55–142)
ALP SERPL-CCNC: 101 U/L
ALT SERPL-CCNC: 19 U/L (ref 13–56)
ALT SERPL-CCNC: 19 UNITS/L
ANION GAP SERPL CALC-SCNC: 8 MMOL/L
ANION GAP SERPL CALC-SCNC: 8 MMOL/L (ref 0–18)
AST SERPL-CCNC: 15 U/L (ref 15–37)
AST SERPL-CCNC: 15 UNITS/L
BASOPHILS # BLD AUTO: 0.03 X10(3) UL (ref 0–0.2)
BASOPHILS NFR BLD AUTO: 0.6 %
BILIRUB SERPL-MCNC: 0.5 MG/DL
BILIRUB SERPL-MCNC: 0.5 MG/DL (ref 0.1–2)
BUN BLD-MCNC: 24 MG/DL (ref 7–18)
BUN SERPL-MCNC: 24 MG/DL
BUN/CREAT SERPL: 21.8 (ref 10–20)
CALCIUM BLD-MCNC: 9.1 MG/DL (ref 8.5–10.1)
CALCIUM SERPL-MCNC: 9.1 MG/DL
CHLORIDE SERPL-SCNC: 101 MMOL/L
CHLORIDE SERPL-SCNC: 101 MMOL/L (ref 98–112)
CHOLEST SERPL-MCNC: 119 MG/DL
CHOLEST SMN-MCNC: 119 MG/DL (ref ?–200)
CHOLEST/HDLC SERPL: NORMAL {RATIO}
CO2 SERPL-SCNC: 25 MMOL/L
CO2 SERPL-SCNC: 25 MMOL/L (ref 21–32)
CREAT BLD-MCNC: 1.1 MG/DL (ref 0.55–1.02)
CREAT SERPL-MCNC: 1.1 MG/DL
DEPRECATED RDW RBC AUTO: 49.8 FL (ref 35.1–46.3)
EOSINOPHIL # BLD AUTO: 0.2 X10(3) UL (ref 0–0.7)
EOSINOPHIL NFR BLD AUTO: 4 %
ERYTHROCYTE [DISTWIDTH] IN BLOOD BY AUTOMATED COUNT: 15.2 % (ref 11–15)
EST. AVERAGE GLUCOSE BLD GHB EST-MCNC: 137 MG/DL (ref 68–126)
GLOBULIN PLAS-MCNC: 3.8 G/DL (ref 2.8–4.4)
GLOBULIN SER-MCNC: 3.8 G/DL
GLUCOSE BLD-MCNC: 100 MG/DL (ref 70–99)
GLUCOSE SERPL-MCNC: 100 MG/DL
HBA1C MFR BLD HPLC: 6.4 % (ref ?–5.7)
HCT VFR BLD AUTO: 30.3 % (ref 35–48)
HCT VFR BLD CALC: 30.3 %
HDLC SERPL-MCNC: 34 MG/DL
HDLC SERPL-MCNC: 34 MG/DL (ref 40–59)
HGB BLD-MCNC: 10 G/DL
HGB BLD-MCNC: 10 G/DL (ref 12–16)
IMM GRANULOCYTES # BLD AUTO: 0.01 X10(3) UL (ref 0–1)
IMM GRANULOCYTES NFR BLD: 0.2 %
LDLC SERPL CALC-MCNC: 64 MG/DL
LDLC SERPL CALC-MCNC: 64 MG/DL (ref ?–100)
LENGTH OF FAST TIME PATIENT: YES H
LENGTH OF FAST TIME PATIENT: YES H
LYMPHOCYTES # BLD AUTO: 1.54 X10(3) UL (ref 1–4)
LYMPHOCYTES NFR BLD AUTO: 31 %
M PROTEIN MFR SERPL ELPH: 7.6 G/DL (ref 6.4–8.2)
MCH RBC QN AUTO: 29.5 PG
MCH RBC QN AUTO: 29.5 PG (ref 26–34)
MCHC RBC AUTO-ENTMCNC: 33 G/DL
MCHC RBC AUTO-ENTMCNC: 33 G/DL (ref 31–37)
MCV RBC AUTO: 89.4 FL
MCV RBC AUTO: 89.4 FL (ref 80–100)
MONOCYTES # BLD AUTO: 0.41 X10(3) UL (ref 0.1–1)
MONOCYTES NFR BLD AUTO: 8.2 %
NEUTROPHILS # BLD AUTO: 2.78 X10 (3) UL (ref 1.5–7.7)
NEUTROPHILS # BLD AUTO: 2.78 X10(3) UL (ref 1.5–7.7)
NEUTROPHILS NFR BLD AUTO: 56 %
NONHDLC SERPL-MCNC: 85 MG/DL
NONHDLC SERPL-MCNC: 85 MG/DL (ref ?–130)
OSMOLALITY SERPL CALC.SUM OF ELEC: 282 MOSM/KG (ref 275–295)
PATIENT FASTING Y/N/NP: YES
PATIENT FASTING Y/N/NP: YES
PLATELET # BLD AUTO: 256 10(3)UL (ref 150–450)
PLATELET # BLD: 256 K/MCL
POTASSIUM SERPL-SCNC: 4.6 MMOL/L
POTASSIUM SERPL-SCNC: 4.6 MMOL/L (ref 3.5–5.1)
PROT SERPL-MCNC: 7.6 G/DL
RBC # BLD AUTO: 3.39 X10(6)UL (ref 3.8–5.3)
RBC # BLD: 3.39 10*6/UL
SODIUM SERPL-SCNC: 134 MMOL/L
SODIUM SERPL-SCNC: 134 MMOL/L (ref 136–145)
TRIGL SERPL-MCNC: 104 MG/DL
TRIGL SERPL-MCNC: 104 MG/DL (ref 30–149)
TSI SER-ACNC: 1.67 MIU/ML (ref 0.36–3.74)
VLDLC SERPL CALC-MCNC: 21 MG/DL
VLDLC SERPL CALC-MCNC: 21 MG/DL (ref 0–30)
WBC # BLD AUTO: 5 X10(3) UL (ref 4–11)
WBC # BLD: 5 K/MCL

## 2020-07-01 PROCEDURE — 80061 LIPID PANEL: CPT

## 2020-07-01 PROCEDURE — 83036 HEMOGLOBIN GLYCOSYLATED A1C: CPT

## 2020-07-01 PROCEDURE — 80053 COMPREHEN METABOLIC PANEL: CPT

## 2020-07-01 PROCEDURE — 85025 COMPLETE CBC W/AUTO DIFF WBC: CPT

## 2020-07-01 PROCEDURE — 36415 COLL VENOUS BLD VENIPUNCTURE: CPT

## 2020-07-01 PROCEDURE — 99495 TRANSJ CARE MGMT MOD F2F 14D: CPT | Performed by: INTERNAL MEDICINE

## 2020-07-01 PROCEDURE — 84443 ASSAY THYROID STIM HORMONE: CPT

## 2020-07-01 NOTE — PROGRESS NOTES
Zahra Giordano is a 80year old female who presents for     TCM visit  Hospitalized 6/23–6/24/20 at [facility] for cardiac cath after abnormal outpatient Regadenoson stress test 6/12/20 (Dr. Donita Oshea) showed reversible apical defect.   Cardiac cath 6 (supraventricular tachycardia) ([facility]     event monitor 2/2016-SVT   • Syncope 05/2020    48-hour Holter 5/19/20--bursts of atrial tachycardia up to 3 minutes, PVCs, rare Wenkebach. Saw Dr. Carlos Camacho.    • Type 2 diabetes mellitus ([facility] 2001   • Uterine prola drinks    Drug use: No         Allergies:    Adhesive Tape               Comment:Other reaction(s): ADHESIVE TAPE  Latex                       Comment:Other reaction(s): Unknown     Review of systems:  Constitutional:  No fever, loss of appetite or uninten TR.    Hypercholesterolemia- lipitor 10 mg daily. LDL 82 on 4/6/19, LDL goal <100. check lipids. - CBC WITH DIFFERENTIAL WITH PLATELET; Future  - COMP METABOLIC PANEL (14); Future  - LIPID PANEL;  Future  - TSH W REFLEX TO FREE T4; Future    Type 2 diabete Cont enalapril.      Eczema--TAC cream prn.      SVT--taking diltiazem  mg daily. 30 day event monitor 2/2016-showed SVT.      Bilat hearing loss-Dr. Melisa Herr and Zara Lovell on 6/11/18 recom hearing aides. hx eardrum surgery and possibly has a tub Dispense Refill   • aspirin 81 MG Oral Tab EC Take 1 tablet (81 mg total) by mouth daily. 30 tablet 11   • Clopidogrel Bisulfate 75 MG Oral Tab Take 1 tablet (75 mg total) by mouth daily.  30 tablet 11   • atorvastatin 20 MG Oral Tab Take 0.5 tablets (10 mg Dorota Oneil MD  7/1/2020         Gil Hannon is a 80year old female here today for hospital follow up.    She was discharged from Inpatient hospital, [facility]  to Home   Admission Date: 6/23/20   Discharge Date: 6/24/20  Hospital Discharge Diag

## 2020-07-01 NOTE — TELEPHONE ENCOUNTER
To nursing, please tell patient lab results are okay except mild anemia with hemoglobin 10.0. Cholesterol level is good with LDL cholesterol 64. Recommend she repeat her CBC in 1 month to be sure it is stable.   Also do creatinine and vitamin D level then

## 2020-07-02 ENCOUNTER — TELEPHONE (OUTPATIENT)
Dept: INTERNAL MEDICINE CLINIC | Facility: CLINIC | Age: 85
End: 2020-07-02

## 2020-07-02 RX ORDER — PROMETHAZINE HYDROCHLORIDE AND CODEINE PHOSPHATE 6.25; 1 MG/5ML; MG/5ML
SOLUTION ORAL
Qty: 180 ML | Refills: 1 | Status: ON HOLD | OUTPATIENT
Start: 2020-07-02 | End: 2021-01-09

## 2020-07-02 NOTE — TELEPHONE ENCOUNTER
Refill sent electronically to [facility] for promethazine w codeine 6.25-10 mg/5 ml. Take 2.5 ml q 6 hrs prn cough. Disp 180 ml with 1 RF.

## 2020-07-15 ENCOUNTER — TELEPHONE (OUTPATIENT)
Dept: CASE MANAGEMENT | Age: 85
End: 2020-07-15

## 2020-07-15 NOTE — TELEPHONE ENCOUNTER
Patient is eligible for a 2020 Medicare Advantage Supervisit. Discussed in detail w/patient. Appt scheduled for 9/4/20.

## 2020-07-24 ENCOUNTER — OFFICE VISIT (OUTPATIENT)
Dept: CARDIOLOGY | Age: 85
End: 2020-07-24

## 2020-07-24 VITALS
SYSTOLIC BLOOD PRESSURE: 124 MMHG | HEART RATE: 72 BPM | HEIGHT: 58 IN | OXYGEN SATURATION: 97 % | BODY MASS INDEX: 27.5 KG/M2 | WEIGHT: 131 LBS | DIASTOLIC BLOOD PRESSURE: 50 MMHG

## 2020-07-24 DIAGNOSIS — I25.10 CORONARY ARTERY DISEASE INVOLVING NATIVE CORONARY ARTERY OF NATIVE HEART WITHOUT ANGINA PECTORIS: ICD-10-CM

## 2020-07-24 DIAGNOSIS — I10 ESSENTIAL HYPERTENSION: ICD-10-CM

## 2020-07-24 DIAGNOSIS — J44.89 ASTHMA WITH COPD: ICD-10-CM

## 2020-07-24 DIAGNOSIS — G62.9 NEUROPATHY: Primary | ICD-10-CM

## 2020-07-24 DIAGNOSIS — I47.10 SVT (SUPRAVENTRICULAR TACHYCARDIA): ICD-10-CM

## 2020-07-24 DIAGNOSIS — Z95.5 PRESENCE OF CORONARY ANGIOPLASTY IMPLANT AND GRAFT: ICD-10-CM

## 2020-07-24 PROCEDURE — 99214 OFFICE O/P EST MOD 30 MIN: CPT | Performed by: INTERNAL MEDICINE

## 2020-07-24 RX ORDER — POLYETHYLENE GLYCOL 3350 17 G/17G
17 POWDER, FOR SOLUTION ORAL NIGHTLY
COMMUNITY

## 2020-07-24 ASSESSMENT — PATIENT HEALTH QUESTIONNAIRE - PHQ9
CLINICAL INTERPRETATION OF PHQ9 SCORE: NO FURTHER SCREENING NEEDED
1. LITTLE INTEREST OR PLEASURE IN DOING THINGS: NOT AT ALL
2. FEELING DOWN, DEPRESSED OR HOPELESS: NOT AT ALL
CLINICAL INTERPRETATION OF PHQ2 SCORE: NO FURTHER SCREENING NEEDED
SUM OF ALL RESPONSES TO PHQ9 QUESTIONS 1 AND 2: 0
SUM OF ALL RESPONSES TO PHQ9 QUESTIONS 1 AND 2: 0

## 2020-07-27 RX ORDER — PANTOPRAZOLE SODIUM 40 MG/1
TABLET, DELAYED RELEASE ORAL
Qty: 90 TABLET | Refills: 3 | Status: SHIPPED | OUTPATIENT
Start: 2020-07-27 | End: 2021-07-28

## 2020-07-29 ENCOUNTER — TELEPHONE (OUTPATIENT)
Dept: INTERNAL MEDICINE CLINIC | Facility: CLINIC | Age: 85
End: 2020-07-29

## 2020-07-29 RX ORDER — ATORVASTATIN CALCIUM 20 MG/1
10 TABLET, FILM COATED ORAL DAILY
Qty: 30 TABLET | Refills: 2 | Status: CANCELLED | OUTPATIENT
Start: 2020-07-29

## 2020-07-29 NOTE — TELEPHONE ENCOUNTER
Patient has been taking atorvastatin 10 MG Oral Tab. Patient states she has been taking 10 MG, not 20 MG, per Dr Ingrid Villalobos. Patient she has been taking it once a day. Patient would like refill to be sent to [facility].      Best call back for patient

## 2020-07-30 RX ORDER — ATORVASTATIN CALCIUM 10 MG/1
10 TABLET, FILM COATED ORAL NIGHTLY
Qty: 90 TABLET | Refills: 3 | Status: SHIPPED | OUTPATIENT
Start: 2020-07-30 | End: 2021-10-05

## 2020-08-03 NOTE — TELEPHONE ENCOUNTER
Per OV 7/1/20  Eczema--TAC cream prn.     Requested Prescriptions     Signed Prescriptions Disp Refills   • TRIAMCINOLONE ACETONIDE 0.1 % External Cream 30 g 2     Sig: AAA BID PRN     Authorizing Provider: Michael Meléndez     Ordering User: Andrea Davenport

## 2020-09-04 ENCOUNTER — LAB ENCOUNTER (OUTPATIENT)
Dept: LAB | Age: 85
End: 2020-09-04
Attending: INTERNAL MEDICINE
Payer: MEDICARE

## 2020-09-04 ENCOUNTER — OFFICE VISIT (OUTPATIENT)
Dept: INTERNAL MEDICINE CLINIC | Facility: CLINIC | Age: 85
End: 2020-09-04
Payer: COMMERCIAL

## 2020-09-04 VITALS
SYSTOLIC BLOOD PRESSURE: 126 MMHG | OXYGEN SATURATION: 99 % | RESPIRATION RATE: 16 BRPM | BODY MASS INDEX: 26 KG/M2 | TEMPERATURE: 98 F | HEART RATE: 71 BPM | WEIGHT: 128 LBS | DIASTOLIC BLOOD PRESSURE: 68 MMHG

## 2020-09-04 DIAGNOSIS — L30.9 ECZEMA, UNSPECIFIED TYPE: ICD-10-CM

## 2020-09-04 DIAGNOSIS — D64.9 ANEMIA, UNSPECIFIED TYPE: ICD-10-CM

## 2020-09-04 DIAGNOSIS — Z87.19 HISTORY OF SMALL BOWEL OBSTRUCTION: ICD-10-CM

## 2020-09-04 DIAGNOSIS — J44.9 ASTHMA WITH COPD (CHRONIC OBSTRUCTIVE PULMONARY DISEASE) (HCC): ICD-10-CM

## 2020-09-04 DIAGNOSIS — M81.0 AGE-RELATED OSTEOPOROSIS WITHOUT CURRENT PATHOLOGICAL FRACTURE: ICD-10-CM

## 2020-09-04 DIAGNOSIS — E11.21 TYPE 2 DIABETES WITH NEPHROPATHY (HCC): ICD-10-CM

## 2020-09-04 DIAGNOSIS — Z87.898 HISTORY OF SYNCOPE: ICD-10-CM

## 2020-09-04 DIAGNOSIS — E78.00 HYPERCHOLESTEROLEMIA: ICD-10-CM

## 2020-09-04 DIAGNOSIS — Z87.440 HISTORY OF UTI: ICD-10-CM

## 2020-09-04 DIAGNOSIS — E55.9 VITAMIN D DEFICIENCY: ICD-10-CM

## 2020-09-04 DIAGNOSIS — I10 ESSENTIAL HYPERTENSION: ICD-10-CM

## 2020-09-04 DIAGNOSIS — Z12.31 VISIT FOR SCREENING MAMMOGRAM: ICD-10-CM

## 2020-09-04 DIAGNOSIS — Z95.5 STENTED CORONARY ARTERY: ICD-10-CM

## 2020-09-04 DIAGNOSIS — E11.22 CKD STAGE 3 DUE TO TYPE 2 DIABETES MELLITUS (HCC): ICD-10-CM

## 2020-09-04 DIAGNOSIS — G62.9 NEUROPATHY: ICD-10-CM

## 2020-09-04 DIAGNOSIS — D64.9 MILD ANEMIA: ICD-10-CM

## 2020-09-04 DIAGNOSIS — K21.9 GASTROESOPHAGEAL REFLUX DISEASE WITHOUT ESOPHAGITIS: ICD-10-CM

## 2020-09-04 DIAGNOSIS — H91.93 BILATERAL HEARING LOSS, UNSPECIFIED HEARING LOSS TYPE: ICD-10-CM

## 2020-09-04 DIAGNOSIS — G56.03 CARPAL TUNNEL SYNDROME, BILATERAL: ICD-10-CM

## 2020-09-04 DIAGNOSIS — Z00.00 MEDICARE ANNUAL WELLNESS VISIT, SUBSEQUENT: Primary | ICD-10-CM

## 2020-09-04 DIAGNOSIS — I25.10 CORONARY ARTERY DISEASE INVOLVING NATIVE CORONARY ARTERY OF NATIVE HEART WITHOUT ANGINA PECTORIS: ICD-10-CM

## 2020-09-04 DIAGNOSIS — I47.1 SVT (SUPRAVENTRICULAR TACHYCARDIA) (HCC): ICD-10-CM

## 2020-09-04 DIAGNOSIS — R09.89 BRUIT OF LEFT CAROTID ARTERY: ICD-10-CM

## 2020-09-04 DIAGNOSIS — Z87.81 HISTORY OF VERTEBRAL COMPRESSION FRACTURE: ICD-10-CM

## 2020-09-04 DIAGNOSIS — M15.9 PRIMARY OSTEOARTHRITIS INVOLVING MULTIPLE JOINTS: ICD-10-CM

## 2020-09-04 DIAGNOSIS — N18.30 CKD STAGE 3 DUE TO TYPE 2 DIABETES MELLITUS (HCC): ICD-10-CM

## 2020-09-04 DIAGNOSIS — K63.9 BOWEL TROUBLE: ICD-10-CM

## 2020-09-04 LAB
BASOPHILS # BLD AUTO: 0.03 X10(3) UL (ref 0–0.2)
BASOPHILS NFR BLD AUTO: 0.7 %
CREAT BLD-MCNC: 1.03 MG/DL (ref 0.55–1.02)
DEPRECATED RDW RBC AUTO: 47.4 FL (ref 35.1–46.3)
EOSINOPHIL # BLD AUTO: 0.25 X10(3) UL (ref 0–0.7)
EOSINOPHIL NFR BLD AUTO: 5.8 %
ERYTHROCYTE [DISTWIDTH] IN BLOOD BY AUTOMATED COUNT: 14.5 % (ref 11–15)
HCT VFR BLD AUTO: 32.6 % (ref 35–48)
HGB BLD-MCNC: 10.6 G/DL (ref 12–16)
IMM GRANULOCYTES # BLD AUTO: 0.01 X10(3) UL (ref 0–1)
IMM GRANULOCYTES NFR BLD: 0.2 %
LYMPHOCYTES # BLD AUTO: 1.36 X10(3) UL (ref 1–4)
LYMPHOCYTES NFR BLD AUTO: 31.6 %
MCH RBC QN AUTO: 29 PG (ref 26–34)
MCHC RBC AUTO-ENTMCNC: 32.5 G/DL (ref 31–37)
MCV RBC AUTO: 89.3 FL (ref 80–100)
MONOCYTES # BLD AUTO: 0.37 X10(3) UL (ref 0.1–1)
MONOCYTES NFR BLD AUTO: 8.6 %
NEUTROPHILS # BLD AUTO: 2.29 X10 (3) UL (ref 1.5–7.7)
NEUTROPHILS # BLD AUTO: 2.29 X10(3) UL (ref 1.5–7.7)
NEUTROPHILS NFR BLD AUTO: 53.1 %
PLATELET # BLD AUTO: 267 10(3)UL (ref 150–450)
RBC # BLD AUTO: 3.65 X10(6)UL (ref 3.8–5.3)
WBC # BLD AUTO: 4.3 X10(3) UL (ref 4–11)

## 2020-09-04 PROCEDURE — G0439 PPPS, SUBSEQ VISIT: HCPCS | Performed by: INTERNAL MEDICINE

## 2020-09-04 PROCEDURE — 82306 VITAMIN D 25 HYDROXY: CPT

## 2020-09-04 PROCEDURE — 3074F SYST BP LT 130 MM HG: CPT | Performed by: INTERNAL MEDICINE

## 2020-09-04 PROCEDURE — 3078F DIAST BP <80 MM HG: CPT | Performed by: INTERNAL MEDICINE

## 2020-09-04 PROCEDURE — 85025 COMPLETE CBC W/AUTO DIFF WBC: CPT

## 2020-09-04 PROCEDURE — 82565 ASSAY OF CREATININE: CPT

## 2020-09-04 PROCEDURE — 96160 PT-FOCUSED HLTH RISK ASSMT: CPT | Performed by: INTERNAL MEDICINE

## 2020-09-04 PROCEDURE — 99397 PER PM REEVAL EST PAT 65+ YR: CPT | Performed by: INTERNAL MEDICINE

## 2020-09-04 PROCEDURE — 36415 COLL VENOUS BLD VENIPUNCTURE: CPT

## 2020-09-04 NOTE — PROGRESS NOTES
Joselito Ramirez is a 80year old female who presents for     Check at     Carpal tunnel bilat--\"getting worse\". \"If fingers bent, in a long position\"--feels numbness \"a constant funny feeling at the tips or my fingers. \"  Tried wrist splints.  Tried g procedure Dr Lyly Stokes in 6/2016   • UTI due to Klebsiella species 12/2016    ESBL Klebsiella UTI treated in [address] [place] [address] with meropenem when in hosp for enteritis. (Dr Natalie Muniz).        Past Surgical History:   Procedure Laterality Date   • ABDOMEN SURGERY PROC [facility] pain  Gastrointestinal: No abdominal pain, vomiting, diarrhea -- bowels moving well at present. No blood in stool or black stool.            EXAM:   /68   Pulse 71   Temp 97.8 °F (36.6 °C) (Temporal)   Resp 16   Wt 128 lb (58.1 kg)   SpO2 99%   BMI fx T11 ~50%,  Also DJD. Dr. Tonny GONZÁLES 12/9/19-rx brace, Dr Rosa Monterroso 12/20/19--not need kyphoplasty. Osteoporosis-calcium in diet (pills might constipate more). Vit D 1000 u/d added 12/2019.  started fosamax 70 mg weekly -rx at 12/10/19 vi 9/4/20 visit--sx worse. - EMG; Future    L carotid bruit--8/19/19–carotid Dopplers– bilateral plaque, no significant stenosis. Anemia-Hgb 10 on 7/1/20. Repeat cbc.  Discussed may need to see GI.      Healthcare maintenance  Mammo ok 4/23/18-ok. mammo or total) by mouth daily. 30 tablet 11   • Enalapril Maleate 10 MG Oral Tab Take 0.5 tablets (5 mg total) by mouth daily.  90 tablet 3   • Hydrocortisone 2.5 % External Cream APPLY RECTALLY TWICE DAILY AS NEEDED 30 g 2   • GABAPENTIN 100 MG Oral Cap 1C PO NIGH some help    Toileting: Able without help    Dressing: Need some help    Eating: Need some help    Driving: Cannot do without help    Preparing your meals: Able without help    Managing money/bills: Able without help    Taking medications as prescribed:  Ab Value   07/01/2020 100 (H)    No flowsheet data found.    Cardiovascular Disease Screening     LDL Annually LDL Cholesterol (mg/dL)   Date Value   07/01/2020 64        EKG One Time     Colorectal Cancer Screening      Colonoscopy Screen every 10 years There Persistent     Medications (ACE/ARB, digoxin, diuretics)    Potassium  Annually Potassium (mmol/L)   Date Value   07/01/2020 4.6     POTASSIUM (P) (mmol/L)   Date Value   07/06/2015 4.8    No flowsheet data found.     Creatinine  Annually Creatinine (mg/dL)

## 2020-09-07 LAB — 25(OH)D3 SERPL-MCNC: 43.8 NG/ML (ref 30–100)

## 2020-09-08 ENCOUNTER — TELEPHONE (OUTPATIENT)
Dept: INTERNAL MEDICINE CLINIC | Facility: CLINIC | Age: 85
End: 2020-09-08

## 2020-09-08 NOTE — TELEPHONE ENCOUNTER
To nursing, please tell patient lab tests show vitamin D level is now normal.  Kidney function is stable. Her hemoglobin is 10.6 relatively stable-but still anemic.   The next step for persistent anemia is to see the gastroenterologist Dr. Magdaleno Kmi

## 2020-09-08 NOTE — TELEPHONE ENCOUNTER
Spoke with patient and relayed message from Dr. Demetrius Montanez. Patient verbalized understanding of message and instructions. Patient verbalized agreement with plan. She prefers to see Dr. Giovanni Lomax if she has seen him before.  This RN provided her with Dr. Giovanni Lomax'

## 2020-09-10 ENCOUNTER — TELEPHONE (OUTPATIENT)
Dept: INTERNAL MEDICINE CLINIC | Facility: CLINIC | Age: 85
End: 2020-09-10

## 2020-09-10 NOTE — TELEPHONE ENCOUNTER
Please call pt  She called to schedule colonoscopy with Dr Joey Hughes but first available appt is not until 1/4/20  Would Dr aT Walsh recommend someone else for pt?   Tasked to nursing

## 2020-09-10 NOTE — TELEPHONE ENCOUNTER
To nursing, please see if [facility] APRN at GI office can see pt in the next few wks. Reason --anemia. (has seen Dr Jp Pace in the past). Otherwise, try different group Dr Liza Law.  Thanks,

## 2020-09-14 NOTE — TELEPHONE ENCOUNTER
Recent labs reviewed. Hemoglobin was running 7.7g-9.7g January 2017, then 11.0g-11.3g 4442–1756, 10.8g on 6/19/2020, 10.6g 9/4/2020. Colonoscopy report of May 2010 reviewed - reassuring.     Ms. Maria Fernanda Griffiths was on a four-point walker when I saw her last in

## 2020-09-14 NOTE — TELEPHONE ENCOUNTER
Prisca Wheat, thank you so much for your potential help. My ideas summarized below. Ms. Vann Darrius should see one of us for an office visit next few weeks, first available for an add-on spot, RN hold spot.     - cb

## 2020-09-14 NOTE — TELEPHONE ENCOUNTER
Dr. Tavares Dickson recommendations are noted and appreciated.      GI nursing staff: I do not typically see Dr. Jacy Mejia pts, however I would be happy to make an exception as it has been >3 yrs since Dr. Dorita Reynaga last saw this pt and I can forward my OV notes to

## 2020-09-14 NOTE — TELEPHONE ENCOUNTER
Dr Zander Pérez, please see Erasto GONZÁLES and Dr Daniele Childers messages below. Was it ok for Erasto Baugh to see, or do you prefer to? If so, please advise when you could see, as earliest at this point is Oct 19. Thanks.

## 2020-09-15 NOTE — TELEPHONE ENCOUNTER
I attempted to reach patient to offer appt with [facility], as she has earlier availability. Phone rings, then disconnects. Will attempt again later.

## 2020-09-15 NOTE — TELEPHONE ENCOUNTER
Attempted to reach patient again. Phone rings several times, then disconnects. I contacted Robina Diego and they have the same # [phone].  I looked in verbal releases, and have SHAYNA for daughter Hilda's phone [phone]--[place] [employer] message on voicemail to call

## 2020-09-15 NOTE — TELEPHONE ENCOUNTER
Pt returned call and scheduled appt with Juan Tay on 10/5/20 at [facility].

## 2020-09-15 NOTE — TELEPHONE ENCOUNTER
Dr. Olivia Mccabe recommendations are noted and very much appreciated.  Pt may be seen by either myself or add to Dr. Olivia Mccabe schedule as indicated below

## 2020-09-21 NOTE — H&P
[address] [facility] [address] Gastroenterology                                                                                                  Clinic History and Physical     Pa constipation - describes very hard stool \"like jelly beans\" that can progress to watery stool accompanied by occasional cramping that self resolves. Reports her bowels are now more stable. PRN use of senna and fiber supplements are helpful.  Increased acti essential    • Irritable bowel syndrome    • Lumbar stenosis 2004    MRI lumbar 2004-djd spinal stenosis-xray lumbar 12/13-DJD scoliosis, wedge comp T8, 9, 10, L1   • Lung nodule 4/06-3/03    LLL nodule resolved on serial CT scans   • Osteoarthritis    • O • Stroke Sister         cause of death age 66   • Heart Attack Brother         cause of death   • Cancer Brother          of pancreas or lung cancer   • Other (5 children [Other]) Other    • Glaucoma Brother    • Other (intestinal blockage [Other]) B Oral Solution Take 2 tbsp three times a day until passing bowel movements.  1 Bottle 1   • SENNA-PLUS 8.6-50 MG Oral Tab TAKE 2 TABLETS BY MOUTH TWICE DAILY AS NEEDED FOR CONSTIPATION 100 tablet 6   • Polyethylene Glycol 3350 (MIRALAX) Oral Powder Take  by evident.    Neuro: Alert and oriented x4, and patient is having movements of all 4 extremities   Psych: Pt has a normal mood and affect, behavior is normal    Nursing note and vitals reviewed    Labs/Imaging:     Patient's labs and imaging were reviewed and cautiously observe in light of this. This would be reasonable. Additionally, she has not noted any significant lower GI bleeding or pain. Her weight is stable. Will forward my office visit note to Dr. Parish Gambino.   If the patient continues t diagnostic information/codes. All questions were answered to the patient’s satisfaction.  The patient has agreed to sign an informed consent and elected to proceed with upper endoscopy/enteroscopy with possible intervention [i.e. polypectomy, ablation, saran

## 2020-10-05 ENCOUNTER — OFFICE VISIT (OUTPATIENT)
Dept: GASTROENTEROLOGY | Facility: CLINIC | Age: 85
End: 2020-10-05
Payer: COMMERCIAL

## 2020-10-05 ENCOUNTER — TELEPHONE (OUTPATIENT)
Dept: GASTROENTEROLOGY | Facility: CLINIC | Age: 85
End: 2020-10-05

## 2020-10-05 VITALS
HEART RATE: 62 BPM | BODY MASS INDEX: 26.21 KG/M2 | HEIGHT: 59 IN | SYSTOLIC BLOOD PRESSURE: 150 MMHG | WEIGHT: 130 LBS | DIASTOLIC BLOOD PRESSURE: 70 MMHG

## 2020-10-05 DIAGNOSIS — D64.9 ANEMIA, UNSPECIFIED TYPE: Primary | ICD-10-CM

## 2020-10-05 DIAGNOSIS — K59.00 CONSTIPATION, UNSPECIFIED CONSTIPATION TYPE: ICD-10-CM

## 2020-10-05 PROCEDURE — 3078F DIAST BP <80 MM HG: CPT | Performed by: NURSE PRACTITIONER

## 2020-10-05 PROCEDURE — 99203 OFFICE O/P NEW LOW 30 MIN: CPT | Performed by: NURSE PRACTITIONER

## 2020-10-05 PROCEDURE — 3008F BODY MASS INDEX DOCD: CPT | Performed by: NURSE PRACTITIONER

## 2020-10-05 PROCEDURE — G0463 HOSPITAL OUTPT CLINIC VISIT: HCPCS | Performed by: NURSE PRACTITIONER

## 2020-10-05 PROCEDURE — 3077F SYST BP >= 140 MM HG: CPT | Performed by: NURSE PRACTITIONER

## 2020-10-05 NOTE — TELEPHONE ENCOUNTER
Scheduled for:  EGD [id number]  Provider Name:  Dr. Kelle Beckwith  Date:  10/14/2020  Location:  [facility]  Sedation:  MAC  Time:  1:30 pm, (pt is aware that [facility] will call the day before to confirm arrival time)  Prep:  NPO after midnight  Meds/Allergies Reconciled?:  Brina Kauffman

## 2020-10-05 NOTE — TELEPHONE ENCOUNTER
Dr Lonny Carrizales,    Please advise if this pt could stop Plavix for 5 days prior to his colonoscopy on 10/14/2020

## 2020-10-05 NOTE — TELEPHONE ENCOUNTER
GI RN's    Patient is schedule next Wednesday, 10/14/2020 for EGD. Please see Nathalie's note below. Thank you.     \"-Anti-platelets and anti-coagulants: ASA - continue as prescribed, Plavix (Please contact prescribing MD (Dr. Jo Valladares) for specific recommend

## 2020-10-05 NOTE — TELEPHONE ENCOUNTER
VISH Beckwith - please see my office visit note related to office visit today related to topic as detailed below.

## 2020-10-05 NOTE — PATIENT INSTRUCTIONS
-Schedule EGD w/ possible biopsy w/Dr. Bob Bryson w/ MAC  Dx: anemia  -Eligible for NE: No r/t medical hx  -Anti-platelets and anti-coagulants: ASA - continue as prescribed, Plavix (Please contact prescribing MD (Dr. Annel Wilkinson) for specific recommendations inclu

## 2020-10-06 NOTE — TELEPHONE ENCOUNTER
Not a patient at [address] [facility],   [address] clinic cardiology. Please contact [facility] at fax [phone] for question.

## 2020-10-07 NOTE — TELEPHONE ENCOUNTER
Fax sent to Dr Roberto Carlos Cain office @ [facility] to ask re: Plavix hold orders

## 2020-10-07 NOTE — TELEPHONE ENCOUNTER
I spoke to Fredo Mcdonough at Dr Logan Ruiz office and she states they received the fax and it was given to either [employer] or [employer]

## 2020-10-08 ENCOUNTER — TELEPHONE (OUTPATIENT)
Dept: CARDIOLOGY | Age: 85
End: 2020-10-08

## 2020-10-08 ENCOUNTER — TELEPHONE (OUTPATIENT)
Dept: GASTROENTEROLOGY | Facility: CLINIC | Age: 85
End: 2020-10-08

## 2020-10-08 ENCOUNTER — TELEPHONE (OUTPATIENT)
Dept: INTERNAL MEDICINE CLINIC | Facility: CLINIC | Age: 85
End: 2020-10-08

## 2020-10-08 NOTE — TELEPHONE ENCOUNTER
To Dr. Murali Rodriguez - see below  Spoke with pt who states she would rather not go thru the bowel prep which is her mail reluctance to colonoscopy.   Explained to pt that EGD and colonoscopy examine 2 different areas of the body and both may be needed, Micaela Olivo

## 2020-10-08 NOTE — TELEPHONE ENCOUNTER
Please call pt  She is wondering if it is ok with Dr Sarah Albert to get endoscopy rather colonoscopy?  Test is recommended by Dr Fritz Paris  Pt would like to discuss with Dr Sarah Albert  Pt understood Dr Sarah Albert out of office today  Teresita Sandoval to leave message if pt unab

## 2020-10-08 NOTE — TELEPHONE ENCOUNTER
I spoke with Manasa Ray at Dr. Lora Daley office and she will send message to the nurse regarding Plavix orders.     Awaiting orders

## 2020-10-08 NOTE — TELEPHONE ENCOUNTER
Sim Chaparro from [facility] calling stating patient is confused as to what procedure she is having on 10/14/2020. Patient contacted and advised she is scheduled for EGD on 10/14/2020 with Dr. Ricardo Long. Patient voiced understanding.

## 2020-10-09 ENCOUNTER — TELEPHONE (OUTPATIENT)
Dept: GASTROENTEROLOGY | Facility: CLINIC | Age: 85
End: 2020-10-09

## 2020-10-09 NOTE — TELEPHONE ENCOUNTER
Saw Bethanne Hammans APRN 10/5/20-anemia and constipation. Pt decided to forego colonoscopy and just do EGD. I discussed my preference is for her to also do colonoscopy keshia b/c she has bowel sx.  Pt expresses understanding and only wishes to do EGD for now

## 2020-10-09 NOTE — TELEPHONE ENCOUNTER
EGD scheduled for 10/14/2020. Review nurse called from [address] [place] [address] requesting cardiac clearance from Dr. Chapo Clemens. Request sent to Dr. Chapo Clemens at [phone] with return confirmation. Phone [phone]  Patient had stent placed in 06/2020.     Awaiting Plavix

## 2020-10-09 NOTE — TELEPHONE ENCOUNTER
EGD scheduled for 10/14/2020. I spoke with Lamberto Tafoya from Dr. Sellers [place] office who states they received our faxes and will consult with Dr. Chitra Tavares today and get back to us either today or Monday. Patient had recent stent placed in June.      Awaiting cardi rolling walker

## 2020-10-10 NOTE — TELEPHONE ENCOUNTER
My office encounter of 3/20/2017 reviewed regarding abdominal symptoms and complex abdominal surgical history. History of chronic constipation and MiraLAX, Senokot use. Recent visit GUI Bravo of 10/5/2020 reviewed.   Dr. Kary Pham had sent he

## 2020-10-10 NOTE — TELEPHONE ENCOUNTER
Dr. Herlinda Kwan--    Please see Dr. Doss's/Cardiology response below regarding holding plavix prior to upcoming EGD on 10/14/2020. Please advise if pt may proceed as scheduled WITHOUT holding anticoagulation or if pt should reschedule, thank you.

## 2020-10-10 NOTE — TELEPHONE ENCOUNTER
Telephone Encounter - Heidi Jones RN - 10/09/2020 4:57 PM CDT  Will send below to Dr. Brooke Noonan at [phone]    Electronically signed by Sydell Aase, RN at 10/09/2020 4:57 PM CDT      Telephone Encounter - Jenae Pastor MD - 10/09/2020 4:42 P

## 2020-10-11 ENCOUNTER — LAB REQUISITION (OUTPATIENT)
Dept: LAB | Facility: HOSPITAL | Age: 85
End: 2020-10-11
Payer: MEDICARE

## 2020-10-11 DIAGNOSIS — Z01.818 ENCOUNTER FOR OTHER PREPROCEDURAL EXAMINATION: ICD-10-CM

## 2020-10-12 NOTE — TELEPHONE ENCOUNTER
Spoke to Ursula at Dr. Iam Mirza office ([phone]) to review that  [facility] has reviewed their recommendations and patient will proceed with EGD w/o holding anticoagulation.  However, the [facility] is still requesting a letter of cardiac clearance in order to

## 2020-10-12 NOTE — TELEPHONE ENCOUNTER
EGD cardiac clearance received from cardiology via fax/ sent copy to scan/sent copy to [facility] via fax at [phone], confirmation received 10/12/2020 @ 6724:    Pt also contacted and informed of all. She is scheduled at [facility] (not [facility]).  Pt verbalized unders

## 2020-10-12 NOTE — TELEPHONE ENCOUNTER
Spoke to [facility] office ([phone]) and reviewed Dr. Huong Pelaez is OK with not holding anticoagulation, however, [facility] still requires a cardiac clearance letter.  She states she will discuss w/ covering provider and fax us a cardiac clearance ASAP,

## 2020-10-14 ENCOUNTER — SURGERY CENTER DOCUMENTATION (OUTPATIENT)
Dept: SURGERY | Age: 85
End: 2020-10-14

## 2020-10-14 NOTE — PROCEDURES
EGD PROCEDURE REPORT    DATE OF PROCEDURE:  10/14/2020    PCP: Fco Son MD     PREOPERATIVE DIAGNOSIS:  Anemia, history duodenal AVM lesion     POSTOPERATIVE DIAGNOSIS:  See impression. SURGEON:  KEYSHAWN Sellers has fluctuated over the past 4 years [7.7g - 11.3g - 12.6g] . · Continue to discuss a safe daily bowel regimen to prevent the previously documented constipation and fecal retention.    This delicate 64-BBTY-HAB woman with a history of recent coronary art

## 2020-10-19 ENCOUNTER — TELEPHONE (OUTPATIENT)
Dept: INTERNAL MEDICINE CLINIC | Facility: CLINIC | Age: 85
End: 2020-10-19

## 2020-10-19 RX ORDER — ENALAPRIL MALEATE 5 MG/1
5 TABLET ORAL DAILY
Qty: 90 TABLET | Refills: 3 | Status: SHIPPED | OUTPATIENT
Start: 2020-10-19 | End: 2021-12-14

## 2020-10-19 RX ORDER — ENALAPRIL MALEATE 10 MG/1
5 TABLET ORAL DAILY
Qty: 90 TABLET | Refills: 3 | Status: CANCELLED | OUTPATIENT
Start: 2020-10-19

## 2020-10-19 NOTE — TELEPHONE ENCOUNTER
Please tell patient I sent Rx to [facility] for  Enalapril 5 mg daily #90 w 3 refills.   thx.

## 2020-10-19 NOTE — TELEPHONE ENCOUNTER
Patient calling regarding her medication, Enalapril Maleate 10 MG Oral Tab. Patient states she saw Dr Chapo Clemens who informed her to take half the dosage that Dr Alicia Bridges prescribed.  Patient informed pharmacy of this change and the pharmacy is now looking fo

## 2020-11-06 ENCOUNTER — TELEPHONE (OUTPATIENT)
Dept: GASTROENTEROLOGY | Facility: CLINIC | Age: 85
End: 2020-11-06

## 2020-11-06 NOTE — TELEPHONE ENCOUNTER
Overdue reminder letter mailed out to patient.     Labs:   Ferritin      Iron And Tibc      CBC W Differential W Platelet

## 2020-11-10 ENCOUNTER — LAB ENCOUNTER (OUTPATIENT)
Dept: LAB | Age: 85
End: 2020-11-10
Attending: INTERNAL MEDICINE
Payer: MEDICARE

## 2020-11-10 ENCOUNTER — OFFICE VISIT (OUTPATIENT)
Dept: INTERNAL MEDICINE CLINIC | Facility: CLINIC | Age: 85
End: 2020-11-10
Payer: COMMERCIAL

## 2020-11-10 VITALS
WEIGHT: 130 LBS | TEMPERATURE: 97 F | HEART RATE: 78 BPM | SYSTOLIC BLOOD PRESSURE: 144 MMHG | OXYGEN SATURATION: 99 % | BODY MASS INDEX: 26 KG/M2 | RESPIRATION RATE: 18 BRPM | DIASTOLIC BLOOD PRESSURE: 60 MMHG

## 2020-11-10 DIAGNOSIS — I10 ESSENTIAL HYPERTENSION: ICD-10-CM

## 2020-11-10 DIAGNOSIS — R09.82 POST-NASAL DRAINAGE: Primary | ICD-10-CM

## 2020-11-10 DIAGNOSIS — D64.9 ANEMIA, UNSPECIFIED TYPE: ICD-10-CM

## 2020-11-10 DIAGNOSIS — D64.9 CHRONIC ANEMIA: ICD-10-CM

## 2020-11-10 PROCEDURE — 82728 ASSAY OF FERRITIN: CPT

## 2020-11-10 PROCEDURE — 3078F DIAST BP <80 MM HG: CPT | Performed by: INTERNAL MEDICINE

## 2020-11-10 PROCEDURE — 3077F SYST BP >= 140 MM HG: CPT | Performed by: INTERNAL MEDICINE

## 2020-11-10 PROCEDURE — 85025 COMPLETE CBC W/AUTO DIFF WBC: CPT

## 2020-11-10 PROCEDURE — 99214 OFFICE O/P EST MOD 30 MIN: CPT | Performed by: INTERNAL MEDICINE

## 2020-11-10 PROCEDURE — G0008 ADMIN INFLUENZA VIRUS VAC: HCPCS | Performed by: INTERNAL MEDICINE

## 2020-11-10 PROCEDURE — 90662 IIV NO PRSV INCREASED AG IM: CPT | Performed by: INTERNAL MEDICINE

## 2020-11-10 PROCEDURE — 83540 ASSAY OF IRON: CPT

## 2020-11-10 PROCEDURE — G0463 HOSPITAL OUTPT CLINIC VISIT: HCPCS | Performed by: INTERNAL MEDICINE

## 2020-11-10 PROCEDURE — 84466 ASSAY OF TRANSFERRIN: CPT

## 2020-11-10 PROCEDURE — 36415 COLL VENOUS BLD VENIPUNCTURE: CPT

## 2020-11-10 NOTE — PROGRESS NOTES
Rosemary Mo is a 80year old female who presents for     Check at 2 mo. Chronic PN drip. \"hard time coughing it up. \" using nose drops. Saw Tomas Fernandez APRN 10/5/20-anemia and constipation.    Had EGD 10/14/20 Dr June joaquin's ring 05/2020    48-hour Holter 5/19/20--bursts of atrial tachycardia up to 3 minutes, PVCs, rare Wenkebach. Saw Dr. Tono Kidd.    • Type 2 diabetes mellitus ([facility] 2001   • Uterine prolapse    • Uterine prolapse 2006    had pessary in 2006 Dr. Isidro Carrero sling p Comment:Other reaction(s): ADHESIVE TAPE  Latex                       Comment:Other reaction(s): Unknown     Review of systems:  Constitutional:  No fever, loss of appetite or unintentional weight loss  Respiratory: No dyspnea  Cardiac: No chest pain  Dre Romario–Stent PCI of LAD and RCA. Taking aspirin 81 mg daily and Plavix 75 mg daily. History of syncope  SVT (supraventricular tachycardia) (HCC)-on cardizem 120 mg daily  Hx 30 day event monitor 2/2016-showed SVT.    48-hr Holter 5/19/20--bursts atri HS. decl LE EMG. Saw Dr. Delvin Holm. CKD 3 assoc w diabetes---creat 1.1 GFR 45 on 7/1/20. Ur MA 33 on 4/6/19. Cont enalapril.      Eczema--TAC cream prn.        Bilat hearing loss-Dr. Quique Connor and Love Nguyen.NICOLE on 6/11/18 recom hearing aides. hx eardrum surg than 50% of the time was spent counseling the patient and/or coordinating care. Current Outpatient Medications   Medication Sig Dispense Refill   • Enalapril Maleate 5 MG Oral Tab Take 1 tablet (5 mg total) by mouth daily.  90 tablet 3   • TRIAMCINO AS NEEDED FOR PAIN (Patient not taking: Reported on 10/5/2020) 60 tablet 0         Jenny Salcedo MD  11/10/2020

## 2020-11-11 ENCOUNTER — TELEPHONE (OUTPATIENT)
Dept: GASTROENTEROLOGY | Facility: CLINIC | Age: 85
End: 2020-11-11

## 2020-11-11 NOTE — TELEPHONE ENCOUNTER
----- Message from GUI Claudio sent at 11/11/2020  7:49 AM CST -----  Nursing: Please let the patient know that her hemoglobin has improved compared to last few months.   She has a known history of anemia that has fluctuated but has been in the 10-

## 2020-11-12 ENCOUNTER — TELEPHONE (OUTPATIENT)
Dept: INTERNAL MEDICINE CLINIC | Facility: CLINIC | Age: 85
End: 2020-11-12

## 2020-11-12 NOTE — TELEPHONE ENCOUNTER
Pt returned call from automated call  Is pt due to schedule any labs or tests?   Pt was a bit confused  Please call pt to advise  Tasked to nursing

## 2020-11-12 NOTE — TELEPHONE ENCOUNTER
Called patient checked chart due back in 5 months according to Dr. Kt Ordonez note and had labs in July 2020.

## 2020-11-18 RX ORDER — DILTIAZEM HYDROCHLORIDE 120 MG/1
CAPSULE, COATED, EXTENDED RELEASE ORAL
Qty: 90 CAPSULE | Refills: 3 | Status: SHIPPED | OUTPATIENT
Start: 2020-11-18 | End: 2022-01-06

## 2020-11-27 RX ORDER — ALENDRONATE SODIUM 70 MG/1
TABLET ORAL
Qty: 12 TABLET | Refills: 3 | Status: SHIPPED | OUTPATIENT
Start: 2020-11-27 | End: 2021-12-28

## 2020-12-02 ENCOUNTER — TELEPHONE (OUTPATIENT)
Dept: NEUROLOGY | Facility: CLINIC | Age: 85
End: 2020-12-02

## 2020-12-02 ENCOUNTER — OFFICE VISIT (OUTPATIENT)
Dept: NEUROLOGY | Facility: CLINIC | Age: 85
End: 2020-12-02
Payer: COMMERCIAL

## 2020-12-02 VITALS — BODY MASS INDEX: 26.61 KG/M2 | WEIGHT: 132 LBS | HEIGHT: 59 IN

## 2020-12-02 DIAGNOSIS — I10 ESSENTIAL HYPERTENSION: ICD-10-CM

## 2020-12-02 DIAGNOSIS — Z95.5 STENTED CORONARY ARTERY: ICD-10-CM

## 2020-12-02 DIAGNOSIS — I47.1 SVT (SUPRAVENTRICULAR TACHYCARDIA) (HCC): ICD-10-CM

## 2020-12-02 DIAGNOSIS — G56.03 BILATERAL CARPAL TUNNEL SYNDROME: Primary | ICD-10-CM

## 2020-12-02 DIAGNOSIS — N18.30 CKD STAGE 3 DUE TO TYPE 2 DIABETES MELLITUS (HCC): ICD-10-CM

## 2020-12-02 DIAGNOSIS — I25.10 CORONARY ARTERY DISEASE INVOLVING NATIVE CORONARY ARTERY OF NATIVE HEART WITHOUT ANGINA PECTORIS: ICD-10-CM

## 2020-12-02 DIAGNOSIS — E11.22 CKD STAGE 3 DUE TO TYPE 2 DIABETES MELLITUS (HCC): ICD-10-CM

## 2020-12-02 PROCEDURE — 3008F BODY MASS INDEX DOCD: CPT | Performed by: PHYSICAL MEDICINE & REHABILITATION

## 2020-12-02 PROCEDURE — 99204 OFFICE O/P NEW MOD 45 MIN: CPT | Performed by: PHYSICAL MEDICINE & REHABILITATION

## 2020-12-02 NOTE — PATIENT INSTRUCTIONS
-EMG of bilateral upper extremities  -Occupational therapy   -Carpal tunnel splints at nighttime  -Ultrasound guided median nerve sheath injection

## 2020-12-02 NOTE — TELEPHONE ENCOUNTER
Checked [facility] Provider Portal to initiate authorization for Ultrasound guided right median nerve sheath injection CPT [id number] Marilia Villegas required-Decision ID# [id number]    Status: Authorized-no authorization required per health plan    Routing to [facility]

## 2020-12-03 NOTE — PROGRESS NOTES
[address] PATIENT EVALUATION    Consultation as a request of Dr. Katarzyna Deleon    Chief Complaint: Hand pain    HISTORY OF PRESENT ILLNESS:   Patient presents with:  Carpal Tunnel Syndrome: New right handed p pulmonary disease) [facility])    • Coronary artery disease 06/2020    Cardiac cath 6/23/20 Dr. Fela Roman PCI of LAD and RCA was performed.    • Diverticulitis 2010    hospitalized 3/10 and 5/10   • Diverticulosis 2003    cscopy '03, '07, '10   • GERD (gastro Bilateral     ear surgery-Dr. Paresh Pérez eardrums   • SLING  6/30/2016    [facility] Yamile Felix         CURRENT MEDICATIONS:     Current Outpatient Medications   Medication Sig Dispense Refill   • ALENDRONATE 70 MG Oral Tab TAKE 1 T 10/5/2020) 60 tablet 0   • lactulose 10 GM/15ML Oral Solution Take 2 tbsp three times a day until passing bowel movements.  1 Bottle 1         ALLERGIES:     Adhesive Tape               Comment:Other reaction(s): ADHESIVE TAPE  Latex                       C denies   Neurological  Loss of Strength Since last Visit: denies  Tingling/Numbness: admits  Balance: denies   Psychiatric  Anxiety: denies  Depressed Mood: denies       PHYSICAL EXAM:   Ht 59\"   Wt 132 lb (59.9 kg)   BMI 26.66 kg/m²   General: No immedia 3.8 07/01/2020    GLOBULIN 3.8 07/01/2020    AGRATIO 1.3 07/06/2015     (L) 07/01/2020    K 4.6 07/01/2020     07/01/2020    CO2 25.0 07/01/2020     No results found for: PTP, PT, INR  Lab Results   Component Value Date    VITD 43.8 09/04/2020

## 2020-12-09 ENCOUNTER — OFFICE VISIT (OUTPATIENT)
Dept: NEUROLOGY | Facility: CLINIC | Age: 85
End: 2020-12-09
Payer: COMMERCIAL

## 2020-12-09 VITALS — WEIGHT: 132 LBS | HEIGHT: 59 IN | BODY MASS INDEX: 26.61 KG/M2

## 2020-12-09 DIAGNOSIS — G56.03 BILATERAL CARPAL TUNNEL SYNDROME: Primary | ICD-10-CM

## 2020-12-09 PROCEDURE — 76942 ECHO GUIDE FOR BIOPSY: CPT | Performed by: PHYSICAL MEDICINE & REHABILITATION

## 2020-12-09 PROCEDURE — 3008F BODY MASS INDEX DOCD: CPT | Performed by: PHYSICAL MEDICINE & REHABILITATION

## 2020-12-09 PROCEDURE — 20526 THER INJECTION CARP TUNNEL: CPT | Performed by: PHYSICAL MEDICINE & REHABILITATION

## 2020-12-09 RX ORDER — TRIAMCINOLONE ACETONIDE 40 MG/ML
40 INJECTION, SUSPENSION INTRA-ARTICULAR; INTRAMUSCULAR ONCE
Status: COMPLETED | OUTPATIENT
Start: 2020-12-09 | End: 2020-12-09

## 2020-12-09 RX ORDER — LIDOCAINE HYDROCHLORIDE 10 MG/ML
1 INJECTION, SOLUTION INFILTRATION; PERINEURAL ONCE
Status: COMPLETED | OUTPATIENT
Start: 2020-12-09 | End: 2020-12-09

## 2020-12-10 NOTE — PROCEDURES
Procedure:  Ultrasound guided right carpal tunnel injection  The risks, benefits and anticipated outcomes of the procedure, the risks and benefits of the alternatives to the procedure, and the roles and tasks of the personnel to be involved, were discussed

## 2020-12-28 ENCOUNTER — TELEPHONE (OUTPATIENT)
Dept: CARDIOLOGY | Age: 85
End: 2020-12-28

## 2021-01-01 ENCOUNTER — EXTERNAL RECORD (OUTPATIENT)
Dept: HEALTH INFORMATION MANAGEMENT | Facility: OTHER | Age: 86
End: 2021-01-01

## 2021-01-04 ENCOUNTER — APPOINTMENT (OUTPATIENT)
Dept: CT IMAGING | Facility: HOSPITAL | Age: 86
DRG: 177 | End: 2021-01-04
Attending: EMERGENCY MEDICINE
Payer: MEDICARE

## 2021-01-04 ENCOUNTER — LAB ENCOUNTER (OUTPATIENT)
Dept: LAB | Facility: HOSPITAL | Age: 86
End: 2021-01-04
Attending: INTERNAL MEDICINE
Payer: MEDICARE

## 2021-01-04 ENCOUNTER — HOSPITAL ENCOUNTER (INPATIENT)
Facility: HOSPITAL | Age: 86
LOS: 4 days | Discharge: HOME HEALTH | DRG: 177 | End: 2021-01-09
Attending: EMERGENCY MEDICINE | Admitting: HOSPITALIST
Payer: MEDICARE

## 2021-01-04 ENCOUNTER — TELEPHONE (OUTPATIENT)
Dept: INTERNAL MEDICINE CLINIC | Facility: CLINIC | Age: 86
End: 2021-01-04

## 2021-01-04 DIAGNOSIS — E87.1 HYPONATREMIA: ICD-10-CM

## 2021-01-04 DIAGNOSIS — Z20.822 EXPOSURE TO COVID-19 VIRUS: Primary | ICD-10-CM

## 2021-01-04 DIAGNOSIS — Z20.822 EXPOSURE TO COVID-19 VIRUS: ICD-10-CM

## 2021-01-04 DIAGNOSIS — N30.00 ACUTE CYSTITIS WITHOUT HEMATURIA: Primary | ICD-10-CM

## 2021-01-04 LAB
ALBUMIN SERPL-MCNC: 3.2 G/DL (ref 3.4–5)
ALBUMIN/GLOB SERPL: 0.8 {RATIO} (ref 1–2)
ALP LIVER SERPL-CCNC: 68 U/L
ALT SERPL-CCNC: 33 U/L
ANION GAP SERPL CALC-SCNC: 8 MMOL/L (ref 0–18)
AST SERPL-CCNC: 38 U/L (ref 15–37)
BASOPHILS # BLD AUTO: 0.01 X10(3) UL (ref 0–0.2)
BASOPHILS NFR BLD AUTO: 0.3 %
BILIRUB SERPL-MCNC: 0.8 MG/DL (ref 0.1–2)
BILIRUB UR QL: NEGATIVE
BUN BLD-MCNC: 18 MG/DL (ref 7–18)
BUN/CREAT SERPL: 18.4 (ref 10–20)
CALCIUM BLD-MCNC: 8.2 MG/DL (ref 8.5–10.1)
CHLORIDE SERPL-SCNC: 97 MMOL/L (ref 98–112)
CO2 SERPL-SCNC: 24 MMOL/L (ref 21–32)
COLOR UR: YELLOW
CREAT BLD-MCNC: 0.98 MG/DL
DEPRECATED RDW RBC AUTO: 51.4 FL (ref 35.1–46.3)
EOSINOPHIL # BLD AUTO: 0 X10(3) UL (ref 0–0.7)
EOSINOPHIL NFR BLD AUTO: 0 %
ERYTHROCYTE [DISTWIDTH] IN BLOOD BY AUTOMATED COUNT: 15.7 % (ref 11–15)
GLOBULIN PLAS-MCNC: 3.9 G/DL (ref 2.8–4.4)
GLUCOSE BLD-MCNC: 104 MG/DL (ref 70–99)
GLUCOSE UR-MCNC: NEGATIVE MG/DL
HCT VFR BLD AUTO: 33.2 %
HGB BLD-MCNC: 11.2 G/DL
IMM GRANULOCYTES # BLD AUTO: 0.01 X10(3) UL (ref 0–1)
IMM GRANULOCYTES NFR BLD: 0.3 %
KETONES UR-MCNC: 20 MG/DL
LYMPHOCYTES # BLD AUTO: 0.35 X10(3) UL (ref 1–4)
LYMPHOCYTES NFR BLD AUTO: 8.9 %
M PROTEIN MFR SERPL ELPH: 7.1 G/DL (ref 6.4–8.2)
MCH RBC QN AUTO: 29.9 PG (ref 26–34)
MCHC RBC AUTO-ENTMCNC: 33.7 G/DL (ref 31–37)
MCV RBC AUTO: 88.8 FL
MONOCYTES # BLD AUTO: 0.22 X10(3) UL (ref 0.1–1)
MONOCYTES NFR BLD AUTO: 5.6 %
NEUTROPHILS # BLD AUTO: 3.36 X10 (3) UL (ref 1.5–7.7)
NEUTROPHILS # BLD AUTO: 3.36 X10(3) UL (ref 1.5–7.7)
NEUTROPHILS NFR BLD AUTO: 84.9 %
NITRITE UR QL STRIP.AUTO: POSITIVE
OSMOLALITY SERPL CALC.SUM OF ELEC: 270 MOSM/KG (ref 275–295)
PH UR: 5 [PH] (ref 5–8)
PLATELET # BLD AUTO: 161 10(3)UL (ref 150–450)
POTASSIUM SERPL-SCNC: 4.1 MMOL/L (ref 3.5–5.1)
PROT UR-MCNC: >=500 MG/DL
RBC # BLD AUTO: 3.74 X10(6)UL
RBC #/AREA URNS AUTO: 2 /HPF
SODIUM SERPL-SCNC: 129 MMOL/L (ref 136–145)
SP GR UR STRIP: 1.02 (ref 1–1.03)
UROBILINOGEN UR STRIP-ACNC: 2
WBC # BLD AUTO: 4 X10(3) UL (ref 4–11)
WBC #/AREA URNS AUTO: 30 /HPF

## 2021-01-04 PROCEDURE — 74176 CT ABD & PELVIS W/O CONTRAST: CPT | Performed by: EMERGENCY MEDICINE

## 2021-01-04 NOTE — TELEPHONE ENCOUNTER
Left message to call back. Please assist with phone or video visit per Dr. Siobhan Chavez message below.

## 2021-01-04 NOTE — TELEPHONE ENCOUNTER
To nursing, agree with Covid test ordered. Patient needs to self isolate/quarantine at home for 14 days from last exposure to her son. Can do video or phone visit in regards to her digestive issues. Can do at 3:30 pm today. Thanks.      1. Exposure t

## 2021-01-04 NOTE — TELEPHONE ENCOUNTER
Respiratory infection triage:    Fever:  [x]  No fever  []  Fever>100.4    Cough:  [] Tight cough  [] Cough with exertion  [] Dry cough  [] Sputum production, Color:     Breathing:  [] Mild shortness of breath interfering with activity  [] Wheezing  [] Mennie Flavin

## 2021-01-04 NOTE — TELEPHONE ENCOUNTER
Pt's son tested positive for Covid  Was with him about 5 days ago while he was in visiting from [place]    Also having digestive issues   -  which has been going on for some time  She is feeling weak from this  Wanted to schedule an appt with Belen Mejia

## 2021-01-05 ENCOUNTER — APPOINTMENT (OUTPATIENT)
Dept: ULTRASOUND IMAGING | Facility: HOSPITAL | Age: 86
DRG: 177 | End: 2021-01-05
Attending: INTERNAL MEDICINE
Payer: MEDICARE

## 2021-01-05 ENCOUNTER — APPOINTMENT (OUTPATIENT)
Dept: GENERAL RADIOLOGY | Facility: HOSPITAL | Age: 86
DRG: 177 | End: 2021-01-05
Attending: NURSE PRACTITIONER
Payer: MEDICARE

## 2021-01-05 ENCOUNTER — APPOINTMENT (OUTPATIENT)
Dept: NUCLEAR MEDICINE | Facility: HOSPITAL | Age: 86
DRG: 177 | End: 2021-01-05
Attending: INTERNAL MEDICINE
Payer: MEDICARE

## 2021-01-05 PROBLEM — E87.1 HYPONATREMIA: Status: ACTIVE | Noted: 2021-01-05

## 2021-01-05 LAB
BASOPHILS # BLD AUTO: 0.01 X10(3) UL (ref 0–0.2)
BASOPHILS NFR BLD AUTO: 0.4 %
CK SERPL-CCNC: 160 U/L
CRP SERPL-MCNC: 7.46 MG/DL (ref ?–0.3)
D DIMER PPP FEU-MCNC: 2.09 UG/ML FEU (ref ?–0.89)
DEPRECATED HBV CORE AB SER IA-ACNC: 145.2 NG/ML
DEPRECATED RDW RBC AUTO: 51.8 FL (ref 35.1–46.3)
EOSINOPHIL # BLD AUTO: 0 X10(3) UL (ref 0–0.7)
EOSINOPHIL NFR BLD AUTO: 0 %
ERYTHROCYTE [DISTWIDTH] IN BLOOD BY AUTOMATED COUNT: 15.8 % (ref 11–15)
EST. AVERAGE GLUCOSE BLD GHB EST-MCNC: 140 MG/DL
EST. AVERAGE GLUCOSE BLD GHB EST-MCNC: 140 MG/DL (ref 68–126)
GLUCOSE BLDC GLUCOMTR-MCNC: 112 MG/DL (ref 70–99)
GLUCOSE BLDC GLUCOMTR-MCNC: 88 MG/DL (ref 70–99)
HBA1C MFR BLD HPLC: 6.5 % (ref ?–5.7)
HBA1C MFR BLD: 6.5 %
HCT VFR BLD AUTO: 28.7 %
HGB BLD-MCNC: 9.6 G/DL
IMM GRANULOCYTES # BLD AUTO: 0.01 X10(3) UL (ref 0–1)
IMM GRANULOCYTES NFR BLD: 0.4 %
LDH SERPL L TO P-CCNC: 216 U/L
LYMPHOCYTES # BLD AUTO: 0.59 X10(3) UL (ref 1–4)
LYMPHOCYTES NFR BLD AUTO: 20.8 %
MCH RBC QN AUTO: 29.8 PG (ref 26–34)
MCHC RBC AUTO-ENTMCNC: 33.4 G/DL (ref 31–37)
MCV RBC AUTO: 89.1 FL
MONOCYTES # BLD AUTO: 0.25 X10(3) UL (ref 0.1–1)
MONOCYTES NFR BLD AUTO: 8.8 %
NEUTROPHILS # BLD AUTO: 1.97 X10 (3) UL (ref 1.5–7.7)
NEUTROPHILS # BLD AUTO: 1.97 X10(3) UL (ref 1.5–7.7)
NEUTROPHILS NFR BLD AUTO: 69.6 %
NT-PROBNP SERPL-MCNC: 823 PG/ML
NT-PROBNP SERPL-MCNC: 823 PG/ML (ref ?–450)
PLATELET # BLD AUTO: 133 10(3)UL (ref 150–450)
PROCALCITONIN SERPL-MCNC: 0.1 NG/ML (ref ?–0.16)
RBC # BLD AUTO: 3.22 X10(6)UL
SARS-COV-2 RNA RESP QL NAA+PROBE: DETECTED
TROPONIN I SERPL-MCNC: <0.045 NG/ML (ref ?–0.04)
WBC # BLD AUTO: 2.8 X10(3) UL (ref 4–11)

## 2021-01-05 PROCEDURE — 99223 1ST HOSP IP/OBS HIGH 75: CPT | Performed by: HOSPITALIST

## 2021-01-05 PROCEDURE — 99223 1ST HOSP IP/OBS HIGH 75: CPT | Performed by: INTERNAL MEDICINE

## 2021-01-05 PROCEDURE — 99222 1ST HOSP IP/OBS MODERATE 55: CPT | Performed by: INTERNAL MEDICINE

## 2021-01-05 PROCEDURE — XW033E5 INTRODUCTION OF REMDESIVIR ANTI-INFECTIVE INTO PERIPHERAL VEIN, PERCUTANEOUS APPROACH, NEW TECHNOLOGY GROUP 5: ICD-10-PCS | Performed by: HOSPITALIST

## 2021-01-05 PROCEDURE — 71045 X-RAY EXAM CHEST 1 VIEW: CPT | Performed by: NURSE PRACTITIONER

## 2021-01-05 PROCEDURE — 93970 EXTREMITY STUDY: CPT | Performed by: INTERNAL MEDICINE

## 2021-01-05 PROCEDURE — 78580 LUNG PERFUSION IMAGING: CPT | Performed by: INTERNAL MEDICINE

## 2021-01-05 RX ORDER — ONDANSETRON 2 MG/ML
4 INJECTION INTRAMUSCULAR; INTRAVENOUS EVERY 6 HOURS PRN
Status: DISCONTINUED | OUTPATIENT
Start: 2021-01-05 | End: 2021-01-09

## 2021-01-05 RX ORDER — FAMOTIDINE 20 MG/1
20 TABLET ORAL DAILY
Status: DISCONTINUED | OUTPATIENT
Start: 2021-01-05 | End: 2021-01-05

## 2021-01-05 RX ORDER — METOCLOPRAMIDE HYDROCHLORIDE 5 MG/ML
5 INJECTION INTRAMUSCULAR; INTRAVENOUS EVERY 8 HOURS PRN
Status: DISCONTINUED | OUTPATIENT
Start: 2021-01-05 | End: 2021-01-09

## 2021-01-05 RX ORDER — DILTIAZEM HYDROCHLORIDE 120 MG/1
120 CAPSULE, EXTENDED RELEASE ORAL DAILY
Status: DISCONTINUED | OUTPATIENT
Start: 2021-01-05 | End: 2021-01-09

## 2021-01-05 RX ORDER — PANTOPRAZOLE SODIUM 40 MG/1
40 TABLET, DELAYED RELEASE ORAL
Status: DISCONTINUED | OUTPATIENT
Start: 2021-01-05 | End: 2021-01-09

## 2021-01-05 RX ORDER — SODIUM CHLORIDE 9 MG/ML
INJECTION, SOLUTION INTRAVENOUS CONTINUOUS
Status: ACTIVE | OUTPATIENT
Start: 2021-01-05 | End: 2021-01-05

## 2021-01-05 RX ORDER — ASPIRIN 81 MG/1
81 TABLET ORAL DAILY
Status: DISCONTINUED | OUTPATIENT
Start: 2021-01-05 | End: 2021-01-09

## 2021-01-05 RX ORDER — DEXTROSE MONOHYDRATE 25 G/50ML
50 INJECTION, SOLUTION INTRAVENOUS
Status: DISCONTINUED | OUTPATIENT
Start: 2021-01-05 | End: 2021-01-09

## 2021-01-05 RX ORDER — HEPARIN SODIUM 5000 [USP'U]/ML
5000 INJECTION, SOLUTION INTRAVENOUS; SUBCUTANEOUS EVERY 12 HOURS SCHEDULED
Status: DISCONTINUED | OUTPATIENT
Start: 2021-01-05 | End: 2021-01-06

## 2021-01-05 RX ORDER — DILTIAZEM HYDROCHLORIDE 5 MG/ML
10 INJECTION INTRAVENOUS ONCE
Status: COMPLETED | OUTPATIENT
Start: 2021-01-05 | End: 2021-01-06

## 2021-01-05 RX ORDER — CLOPIDOGREL BISULFATE 75 MG/1
75 TABLET ORAL DAILY
Status: DISCONTINUED | OUTPATIENT
Start: 2021-01-05 | End: 2021-01-09

## 2021-01-05 RX ORDER — ZINC SULFATE 50(220)MG
220 CAPSULE ORAL DAILY
Status: DISCONTINUED | OUTPATIENT
Start: 2021-01-05 | End: 2021-01-09

## 2021-01-05 RX ORDER — ATORVASTATIN CALCIUM 10 MG/1
10 TABLET, FILM COATED ORAL NIGHTLY
Status: DISCONTINUED | OUTPATIENT
Start: 2021-01-05 | End: 2021-01-09

## 2021-01-05 RX ORDER — GABAPENTIN 100 MG/1
100 CAPSULE ORAL NIGHTLY
Status: DISCONTINUED | OUTPATIENT
Start: 2021-01-05 | End: 2021-01-09

## 2021-01-05 RX ORDER — ASCORBIC ACID 500 MG
500 TABLET ORAL DAILY
Status: DISCONTINUED | OUTPATIENT
Start: 2021-01-05 | End: 2021-01-09

## 2021-01-05 RX ORDER — MAGNESIUM OXIDE 400 MG (241.3 MG MAGNESIUM) TABLET
3 TABLET NIGHTLY PRN
Status: DISCONTINUED | OUTPATIENT
Start: 2021-01-05 | End: 2021-01-09

## 2021-01-05 RX ORDER — ENALAPRIL MALEATE 5 MG/1
5 TABLET ORAL DAILY
Status: DISCONTINUED | OUTPATIENT
Start: 2021-01-05 | End: 2021-01-09

## 2021-01-05 NOTE — PLAN OF CARE
Problem: Patient Centered Care  Goal: Patient preferences are identified and integrated in the patient's plan of care  Description: Interventions:  - What would you like us to know as we care for you? \"I live with my son. \"  - Provide timely, complete, values  - Obtain nutritional consult as needed  - Optimize oral hygiene and moisture  - Encourage food from home; allow for food preferences  - Enhance eating environment  Outcome: Progressing     A+Ox3, room air, no c/o SOB or pain at this time.   IV remde

## 2021-01-05 NOTE — PROGRESS NOTES
[facility] Pharmacy Note:  Renal Dose Adjustment    Gil Hannon has been prescribed famotidine (PEPCID) 20 mg orally every 12 hours. Estimated Creatinine Clearance: 35.2 mL/min (based on SCr of 0.98 mg/dL).     Calculated creatinine clearance is < 50 ml/min

## 2021-01-05 NOTE — ED INITIAL ASSESSMENT (HPI)
Patient complaining of abdominal pain, nausea and weakness for the past few days. States she spoke with her PCP today who recommended she be evaluated in the ED. Denies vomiting or diarrhea.

## 2021-01-05 NOTE — CM/SW NOTE
1530:  CARSON received MDO for POLST. CARSON placed POLST form on chart. MD to discuss w/ pt/family, fill out middle section of POLST form, and sign prior to SW/CTL/RN witnessing POLST form. CARSON self-referred to patient chart for eventual DC planning needs.

## 2021-01-05 NOTE — PAYOR COMM NOTE
Appropriate for inpatient status per guidelines for loss of appetite and abd pain due to COVID pneumonia.  Also with UTI and AF.       --------------  ADMISSION REVIEW     Payor: [address] [place] [address] #:  [id number]  Authorization Number: [id number]    • Shingles 2011   • Small bowel obstruction ([address] [id number], 6/2016    multiple hospitalizations for [id number], 6/2016   • SVT (supraventricular tachycardia) ([facility]     event monitor 2/2016-SVT   • Syncope 05/2020    48-hour Holter 5/19 Constitutional: AAOx3, well nourished, NAD  Head: Normocephalic and atraumatic. Ears: TM's clear b/l  Nose: Nose normal.   Mouth/Throat: MMM, post OP clear with no exudates  Eyes: Conjunctivae and EOM are normal. PERRLA  Neck: Normal range of motion.  Dylan Magui Abnormality         Status                     ---------                               -----------         ------                     CBC W/ DIFFERENTIAL[[id number]]          Abnormal            Final result                 Ple she denied any shortness of breath and reported a mild cough. She was not hypoxic when admitted to the ER. Her Covid test came back positive. She was seen by ID and pulmonary and the decision was made to start remdesivir.   She also will have a V/Q and l adenopathy, no carotid bruit, no JVD, supple, symmetrical, trachea midline and thyroid not enlarged, symmetric, no tenderness/mass/nodules  Back: symmetric, no curvature. ROM normal. No CVA tenderness.   Pulmonary:  clear to auscultation bilaterally  Cardio confluent regions of opacification which either represent more pronounced scar or superimposed pneumonia. Traction bronchiectasis in both lower lungs. 2. No acute finding in abdomen/pelvis.  3. Ventral incisional hernia containing nonobstructed protruded t cardiology consult.  -Restart oral Cardizem and monitor on telemetry.   No indication for anticoagulation at this time.     HTN - cont oral meds      HL - cont statin      Diabetes Accu-Cheks before meals and at bedtime     DVT prophylaxis with heparin.    remdesivir at this time  -Hold Decadron at this time given no significant hypoxia noted  -Hold Actemra at this time  -Ceftriaxone per ID recommendations  -DVT prophylaxis: Heparin  -Reviewed notes, labs imaging            1/5 CARDS    Recommendations:  Pro 20 151/74 96 % 130 lb None (Room air)

## 2021-01-05 NOTE — H&P
[address] Patient Status:  Inpatient    1931 MRN [id number]   Location [facility] Attending Kenya Salinas, [facility] Day # 0 PCP Phoebe Hamlin MD     Date:  2021  Date of Lumbar stenosis 2004    MRI lumbar 2004-djd spinal stenosis-xray lumbar 12/13-DJD scoliosis, wedge comp T8, 9, 10, L1   • Lung nodule 4/06-3/03    LLL nodule resolved on serial CT scans   • Osteoarthritis    • Osteopenia    • Renal calculus 2010    on CT a Attack Brother         cause of death   • Cancer Brother          of pancreas or lung cancer   • Other (5 children [Other]) Other    • Glaucoma Brother    • Other (intestinal blockage [Other]) Brother         cause of death age 80   • Stroke Sister MCG/ACT Inhalation Aerosol, Inhale 1 puff into the lungs 2 (two) times daily. •  Cholecalciferol (VITAMIN D) 25 MCG (1000 UT) Oral Tab, Take 2 tablets by mouth daily.     •  HYDROCODONE-ACETAMINOPHEN 5-325 MG Oral Tab, TAKE 1 TABLET BY MOUTH TWICE DAILY S1, S2 normal, no murmur, click, rub or gallop, regular rate and rhythm  Abdominal: soft, non-tender; bowel sounds normal; no masses,  no organomegaly  Extremities: extremities normal, atraumatic, no cyanosis or edema  Pulses: 2+ and symmetric  Skin: Skin density foci in small bowel loops likely reflect ingested high density contents-similar to previous. . 6. Post cholecystectomy and appendectomy. 7. Chronic pancreatic atrophy. 8. Severe chronic T11 vertebral compression fracture.  1.   Dictated by (CST): Sim history, reviewing previous medical records, going over test results/imaging and discussing plan of care. All questions answered.        Alonzo Chicas,   6/8/2834  **Certification      PHYSICIAN Certification of Need for Inpatient Hospitalization - Init

## 2021-01-05 NOTE — PLAN OF CARE
Received a call from in2nite that patient has heart rate of 145-aflutte at 0609r. /60, t 97.3. Dr. Edmar Ferris was in the nurses' station, ordered to transfer to  to start a Cardizem drip.   Patient converted back to sinus rhythm even before any i

## 2021-01-05 NOTE — CONSULTS
[facility]    Cardiology Consultation    Lisa Paulino Patient Status:  Inpatient    1931 MRN [id number]   Location [address] [facility] Attending Aman Obando, [address] [facility] Day # 0 PCP Brendno Mckenna MD     2021  Reason fo • Shingles 2011   • Small bowel obstruction ([facility] [id number], 6/2016    multiple hospitalizations for [id number], 6/2016   • SVT (supraventricular tachycardia) ([facility]     event monitor 2/2016-SVT   • Syncope 05/2020    48-hour Holter 5/19/20--bursts of has never used smokeless tobacco. She reports that she does not drink alcohol or use drugs.     Allergies:    Adhesive Tape               Comment:Other reaction(s): ADHESIVE TAPE  Latex                       Comment:Other reaction(s): Unknown    Medications sodium chloride 0.9% 250 mL IVPB, 100 mg, Intravenous, Q24H    Review of Systems:  GENERAL HEALTH: feels weak.   SKIN: denies any unusual skin lesions or rashes  RESPIRATORY: denies shortness of breath with exertion  CARDIOVASCULAR: See HPI  GI: denies abdo and 1.3 cm left Baker's cysts.     Dictated by (CST): Alissa Palacio MD on 1/05/2021 at 2:32 PM     Finalized by (CST): Alissa Palacio MD on 1/05/2021 at 2:34 PM          Ct Abdomen+pelvis(cpt=74176)    Result Date: 1/5/2021  CONCLUSION:  1. Scarring 3 due to type 2 diabetes mellitus (HCC)     Bilateral hearing loss     Coronary artery disease involving native coronary artery of native heart without angina pectoris     Stented coronary artery     Acute cystitis without hematuria     Hyponatremia

## 2021-01-05 NOTE — CONSULTS
[facility] - [facility] ID CONSULT NOTE    Cris Sharpe Patient Status:  Inpatient    1931 MRN [id number]   Location [address] [facility] Attending Bouchra Sitnson, [address] [facility] [address] Day # 0 PCP Lico Reno MD       Reason for [facility] • Renal calculus 2010    on CT abd 5/10-6 mm or less in L lower kidney   • Renal insufficiency    • Shingles 2011   • Small bowel obstruction ([address] [id number], 6/2016    multiple hospitalizations for [id number], 6/2016   • SVT (supraventricular tachycard cause of death age 80   • Stroke Sister         stroke- age 80      reports that she has never smoked. She has never used smokeless tobacco. She reports that she does not drink alcohol or use drugs.     Allergies:    Adhesive Tape               Com Review of Systems:  CONSTITUTIONAL:  No weight loss, +weakness or +fatigue. HEENT:  Eyes:  No visual loss, blurred vision, double vision or yellow sclerae. Ears, Nose, Throat:  No hearing loss, sneezing, congestion, runny nose or sore throat.   SKIN:  No r 57-year-old female with a history of ESBL Klebsiella UTI, uterine prolapse status post vaginal sling in 2016, diabetes, shingles, CKD, HTN, HLD, CAD, COPD was admitted for fatigue and found to be Covid positive.   Multiple family members positive including

## 2021-01-05 NOTE — CONSULTS
[facility]    Report of Consultation    Layne Angeles Patient Status:  Inpatient    1931 MRN [id number]   Location [address] [facility] Attending Chris Tse, [address] [facility] Day # 0 PCP Nani Schulte MD     Date of Admission: mellitus ([facility] 2001   • Uterine prolapse    • Uterine prolapse 2006    had pessary in 2006 Dr. Julian English sling procedure Dr Kylie Cisneros in 6/2016   • UTI due to Klebsiella species 12/2016    ESBL Klebsiella UTI treated in [facility] with meropenem when in hosp fo with stairs.             Current Medications:    •  sodium chloride 0.9% IV bolus 500 mL, 500 mL, Intravenous, PRN    •  Heparin Sodium (Porcine) 5000 UNIT/ML injection 5,000 Units, 5,000 Units, Subcutaneous, Q12H INA    •  melatonin tab TABS 3 mg, 3 mg, Or (5 mg total) by mouth daily. •  atorvastatin 10 MG Oral Tab, Take 1 tablet (10 mg total) by mouth nightly.     •  PANTOPRAZOLE SODIUM 40 MG Oral Tab EC, TAKE 1 TABLET BY MOUTH EVERY MORNING BEFORE BREAKFAST    •  Clopidogrel Bisulfate 75 MG Oral Tab, Natasha Ang nausea, vomiting, abdominal pain  : denies dysuria, hematuria  Musculoskeletal: denies arthralgia, myalgia  Integumentary: denies rash, itching  Neurological: denies syncope, weakness, dizziness,   Psychiatric: denies depression, anxiety  Hematologic: de bowel loops likely reflect ingested high density contents-similar to previous. . 6. Post cholecystectomy and appendectomy. 7. Chronic pancreatic atrophy. 8. Severe chronic T11 vertebral compression fracture.  1.   Dictated by (CST): Karina Hughes MD on 1/05

## 2021-01-05 NOTE — PROGRESS NOTES
Pt covid+ 1/4. Symptoms started 12/29 with loss of appetite and abdominal discomfort. Currently on RA SpO2 96%, afebrile. Inflammatory markers: CRP and d-dimer elevated; LDH and Ferritin WNL.     CCP On hold  Actemra On hold  RDV day 1/5  Continue Hepari

## 2021-01-05 NOTE — HOME CARE LIAISON
Received referral from [address] [place] [address]. Residential  unable to take this patient at this jorge . Pt re-referred to [facility] and reserved via [employer]. CARSON Plascencia updated.

## 2021-01-05 NOTE — ED PROVIDER NOTES
Patient Seen in: [facility] AND [facility] Emergency Department      History   Patient presents with:  Abdomen/Flank Pain  Dehydration     Stated Complaint: abdominal pain/weakness    HPI/Subjective:   HPI    12-year-old female with past medical history signific minutes, PVCs, rare Wenkebach. Saw Dr. Paul Leader.    • Type 2 diabetes mellitus ([facility] 2001   • Uterine prolapse    • Uterine prolapse 2006    had pessary in 2006 Dr. Cedric Hernandez sling procedure Dr Mata Nuñez in 6/2016   • UTI due to Klebsiella species 12/2016 normal.   Mouth/Throat: MMM, post OP clear with no exudates  Eyes: Conjunctivae and EOM are normal. PERRLA  Neck: Normal range of motion. Neck supple. No tracheal deviation present.    CV: s1s2+, RRR, no m/r/g, normal distal pulses  Pulmonary/Chest: CTA b/l -----------         ------                     CBC W/ DIFFERENTIAL[[id number]]          Abnormal            Final result                 Please view results for these tests on the individual orders.    [facility] has received 0.9 normal saline fluid bolus. [facility] patient for [facility]. Patient and family are comfortable with plan and have no further questions.     Critical care time exclusive of procedure time spent on

## 2021-01-06 ENCOUNTER — APPOINTMENT (OUTPATIENT)
Dept: CV DIAGNOSTICS | Facility: HOSPITAL | Age: 86
DRG: 177 | End: 2021-01-06
Attending: HOSPITALIST
Payer: MEDICARE

## 2021-01-06 ENCOUNTER — TELEPHONE (OUTPATIENT)
Dept: INTERNAL MEDICINE CLINIC | Facility: CLINIC | Age: 86
End: 2021-01-06

## 2021-01-06 LAB
ALBUMIN SERPL-MCNC: 2.3 G/DL (ref 3.4–5)
ALBUMIN/GLOB SERPL: 0.7 {RATIO} (ref 1–2)
ALP LIVER SERPL-CCNC: 57 U/L
ALT SERPL-CCNC: 23 U/L
ANION GAP SERPL CALC-SCNC: 9 MMOL/L (ref 0–18)
ANTIBODY SCREEN: NEGATIVE
AST SERPL-CCNC: 35 U/L (ref 15–37)
BASOPHILS # BLD AUTO: 0 X10(3) UL (ref 0–0.2)
BASOPHILS NFR BLD AUTO: 0 %
BILIRUB SERPL-MCNC: 0.3 MG/DL (ref 0.1–2)
BUN BLD-MCNC: 15 MG/DL (ref 7–18)
BUN/CREAT SERPL: 19.2 (ref 10–20)
CALCIUM BLD-MCNC: 7.2 MG/DL (ref 8.5–10.1)
CHLORIDE SERPL-SCNC: 103 MMOL/L (ref 98–112)
CO2 SERPL-SCNC: 20 MMOL/L (ref 21–32)
CREAT BLD-MCNC: 0.78 MG/DL
CRP SERPL-MCNC: 5.74 MG/DL (ref ?–0.3)
D DIMER PPP FEU-MCNC: 2.36 UG/ML FEU (ref ?–0.89)
DEPRECATED HBV CORE AB SER IA-ACNC: 156.9 NG/ML
DEPRECATED RDW RBC AUTO: 50.4 FL (ref 35.1–46.3)
EOSINOPHIL # BLD AUTO: 0.01 X10(3) UL (ref 0–0.7)
EOSINOPHIL NFR BLD AUTO: 0.3 %
ERYTHROCYTE [DISTWIDTH] IN BLOOD BY AUTOMATED COUNT: 15.5 % (ref 11–15)
GLOBULIN PLAS-MCNC: 3.4 G/DL (ref 2.8–4.4)
GLUCOSE BLD-MCNC: 88 MG/DL (ref 70–99)
GLUCOSE BLDC GLUCOMTR-MCNC: 127 MG/DL (ref 70–99)
GLUCOSE BLDC GLUCOMTR-MCNC: 128 MG/DL (ref 70–99)
GLUCOSE BLDC GLUCOMTR-MCNC: 129 MG/DL (ref 70–99)
GLUCOSE BLDC GLUCOMTR-MCNC: 98 MG/DL (ref 70–99)
HCT VFR BLD AUTO: 28.2 %
HGB BLD-MCNC: 9.4 G/DL
IMM GRANULOCYTES # BLD AUTO: 0.02 X10(3) UL (ref 0–1)
IMM GRANULOCYTES NFR BLD: 0.6 %
LDH SERPL L TO P-CCNC: 251 U/L
LYMPHOCYTES # BLD AUTO: 0.47 X10(3) UL (ref 1–4)
LYMPHOCYTES NFR BLD AUTO: 14.5 %
M PROTEIN MFR SERPL ELPH: 5.7 G/DL (ref 6.4–8.2)
MCH RBC QN AUTO: 29.5 PG (ref 26–34)
MCHC RBC AUTO-ENTMCNC: 33.3 G/DL (ref 31–37)
MCV RBC AUTO: 88.4 FL
MONOCYTES # BLD AUTO: 0.2 X10(3) UL (ref 0.1–1)
MONOCYTES NFR BLD AUTO: 6.2 %
NEUTROPHILS # BLD AUTO: 2.54 X10 (3) UL (ref 1.5–7.7)
NEUTROPHILS # BLD AUTO: 2.54 X10(3) UL (ref 1.5–7.7)
NEUTROPHILS NFR BLD AUTO: 78.4 %
OSMOLALITY SERPL CALC.SUM OF ELEC: 274 MOSM/KG (ref 275–295)
PLATELET # BLD AUTO: 141 10(3)UL (ref 150–450)
POTASSIUM SERPL-SCNC: 3.7 MMOL/L (ref 3.5–5.1)
RBC # BLD AUTO: 3.19 X10(6)UL
RH BLOOD TYPE: POSITIVE
SARS-COV-2 BY PCR: DETECTED
SODIUM SERPL-SCNC: 132 MMOL/L (ref 136–145)
WBC # BLD AUTO: 3.2 X10(3) UL (ref 4–11)

## 2021-01-06 PROCEDURE — 99232 SBSQ HOSP IP/OBS MODERATE 35: CPT | Performed by: INTERNAL MEDICINE

## 2021-01-06 PROCEDURE — 93306 TTE W/DOPPLER COMPLETE: CPT | Performed by: HOSPITALIST

## 2021-01-06 PROCEDURE — 99232 SBSQ HOSP IP/OBS MODERATE 35: CPT | Performed by: NURSE PRACTITIONER

## 2021-01-06 PROCEDURE — XW13325 TRANSFUSION OF CONVALESCENT PLASMA (NONAUTOLOGOUS) INTO PERIPHERAL VEIN, PERCUTANEOUS APPROACH, NEW TECHNOLOGY GROUP 5: ICD-10-PCS | Performed by: HOSPITALIST

## 2021-01-06 PROCEDURE — 99233 SBSQ HOSP IP/OBS HIGH 50: CPT | Performed by: HOSPITALIST

## 2021-01-06 RX ORDER — SODIUM CHLORIDE 9 MG/ML
INJECTION, SOLUTION INTRAVENOUS ONCE
Status: COMPLETED | OUTPATIENT
Start: 2021-01-06 | End: 2021-01-06

## 2021-01-06 RX ORDER — ACETAMINOPHEN 325 MG/1
TABLET ORAL
Status: DISPENSED
Start: 2021-01-06 | End: 2021-01-06

## 2021-01-06 RX ORDER — SENNOSIDES 8.6 MG
8.6 TABLET ORAL 2 TIMES DAILY
Status: DISCONTINUED | OUTPATIENT
Start: 2021-01-06 | End: 2021-01-09

## 2021-01-06 RX ORDER — LATANOPROST 50 UG/ML
1 SOLUTION/ DROPS OPHTHALMIC NIGHTLY
Status: DISCONTINUED | OUTPATIENT
Start: 2021-01-06 | End: 2021-01-09

## 2021-01-06 RX ORDER — ACETAMINOPHEN 325 MG/1
650 TABLET ORAL EVERY 6 HOURS PRN
Status: DISCONTINUED | OUTPATIENT
Start: 2021-01-06 | End: 2021-01-09

## 2021-01-06 NOTE — TELEPHONE ENCOUNTER
To nursing, form signed okay to fax back. Thanks. Note to self–  Patient currently in hospital with Covid pneumonia– arranging eventual [facility] care [facility] [employer].   [employer] [facility] asking me to sign H&P by the hospitalist Isreal Schultz

## 2021-01-06 NOTE — PROGRESS NOTES
Pt covid+ 1/4. Symptoms started 12/29  Currently on RA SpO2 95%, afebrile. Temp 101.2 @0230 on 1/6  Inflammatory markers trending up except CRP trending down.   1/5 VQ scan low probability for PE.     CCP ordered 1/6  Actemra On hold  RDV day 2/5  Continue

## 2021-01-06 NOTE — TELEPHONE ENCOUNTER
Patient currently in the hospital with Covid pneumonia.   I note in her inpatient chart the following note from the hospital  dated 1/5/21:     1600 Addendum:   [facility]SON was notified by [facility] Patel that they are unable to accept; however, [facility] [place]

## 2021-01-06 NOTE — PLAN OF CARE
Pt on room air, remdesivir started. On IV rocephin for UTI and PO cardizem for UTI.  Pt had a 9 min run of afib/flutter around midnight per tele, HR was 100-120s and again around 1 AM when pt was up to the bathroom, HR 150s, converted back to NSR both times additional Care Plan goals for specific interventions  Outcome: Progressing     Problem: GASTROINTESTINAL - ADULT  Goal: Maintains adequate nutritional intake (undernourished)  Description: INTERVENTIONS:  - Monitor percentage of each meal consumed  - Iden

## 2021-01-06 NOTE — PROGRESS NOTES
[facility]    Progress Note    Anisha Escort Patient Status:  Inpatient    1931 MRN [id number]   Location [address] [facility] Attending Eloy Pittman Day # 1 PCP Rashaad Reynolds MD        Subjective:     HEN consult.  -Restart oral Cardizem and monitor on telemetry. No indication for anticoagulation at this time.     HTN - cont oral meds      HL - cont statin      Diabetes Accu-Cheks before meals and at bedtime     DVT prophylaxis with heparin.     xr images r and appendectomy. 7. Chronic pancreatic atrophy. 8. Severe chronic T11 vertebral compression fracture.  1.   Dictated by (CST): Elisa Corrales MD on 1/05/2021 at 7:13 AM     Finalized by (CST): Elisa Corrales MD on 1/05/2021 at 7:22 AM          Nm Lung Perf

## 2021-01-06 NOTE — PROGRESS NOTES
[facility]     Progress Note        Alex Hall Patient Status:  Inpatient    1931 MRN [id number]   Location 12[address] [facility] Attending Eloy Pittman Day # 1 PCP Dandre Goodrich MD       Subjective:   Laretta Runner Daily    •  Clopidogrel Bisulfate (PLAVIX) tab 75 mg, 75 mg, Oral, Daily    •  dilTIAZem (DILACOR XR) 24 hr cap 120 mg, 120 mg, Oral, Daily    •  Enalapril Maleate (VASOTEC) tab 5 mg, 5 mg, Oral, Daily    •  Fluticasone Furoate (ARNUITY ELLIPTA) 100 MCG/AC (ylt=02720)    Result Date: 1/5/2021  CONCLUSION:   No evidence of bilateral lower extremity DVT. 1.8 cm right and 1.3 cm left Baker's cysts.     Dictated by (CST): Alexx Maria MD on 1/05/2021 at 2:32 PM     Finalized by (CST): Alexx Maria MD o voltage in limb leads. ABNORMAL When compared with ECG of 01/05/2017 12:49:52 IL interval has decreased Electronically signed on 01/05/2021 at 12:23 by Claudette Saddler, MD      Assessment   1. COVID-19 pneumonia  2. COPD  3. UTI  4.   Diabetes mellitus

## 2021-01-06 NOTE — PROGRESS NOTES
[facility]    Progress Note    Jovanny Banegas Patient Status:  Inpatient    1931 MRN [id number]   Location [facility] Attending Mikayla Trinidad,*   Hosp Day # 1 PCP Oral Lizarraga MD        Subjective:     Alin Diaz 01/06/2021    CA 7.2 (L) 01/06/2021    ALB 2.3 (L) 01/06/2021    ALKPHO 57 01/06/2021    BILT 0.3 01/06/2021    TP 5.7 (L) 01/06/2021    AST 35 01/06/2021    ALT 23 01/06/2021    TSH 1.670 07/01/2020    DDIMER 2.36 (H) 01/06/2021    CRP 5.74 (H) 01/06/2021 represent more pronounced scar or superimposed pneumonia.     Dictated by (CST): Alissa Gale MD on 1/05/2021 at 8:28 AM     Finalized by (CST): Alissa Gale MD on 1/05/2021 at 8:30 AM          Ekg 12-lead    Result Date: 1/6/2021  ECG Report  Interpret

## 2021-01-06 NOTE — PROGRESS NOTES
INFECTIOUS DISEASE PROGRESS NOTE  [facility] ID TELEMEDICINE PROGRESS NOTE    Ministerio Lund Patient Status:  Inpatient    1931 MRN [id number]   Location [facility]5W Attending Eloy Pittman 41-year-old female with a history of ESBL Klebsiella UTI, uterine prolapse status post vaginal sling in 2016, diabetes, shingles, CKD, HTN, HLD, CAD, COPD was admitted for fatigue and found to be Covid positive.   Multiple family members positive including

## 2021-01-06 NOTE — PROGRESS NOTES
@ 0140, tele called pt back in afib/flutter 130s. Dr Isela Wright on call for cardiology, made aware pt only on heparin sub q, no new orders to be evaluated in AM.     HR sustaining 140s-150s, hard to tell if afib rvr or sinus tachy.  12Lead EKG ordered and read by

## 2021-01-06 NOTE — COVID NURSING ASSESSMENT
COVID-19 Daily Discharge Readiness-Nursing    O2 Sat at Rest:  100%   O2 Sat with Exertion: 99% on    RA  Temperature max from last 24 hrs: Temp (24hrs), Av.2 °F (36.8 °C), Min:97.3 °F (36.3 °C), Max:100.1 °F (37.8 °C)    Inflammatory Markers:   Recent

## 2021-01-06 NOTE — TELEPHONE ENCOUNTER
Received orders from [facility] via fax for Dr Nargis Wyatt to sign and fax back to them. Forms placed in Dr Nargis Wyatt' mailbox with barcode.

## 2021-01-07 LAB
ALBUMIN SERPL-MCNC: 2.5 G/DL (ref 3.4–5)
ALBUMIN/GLOB SERPL: 0.8 {RATIO} (ref 1–2)
ALP LIVER SERPL-CCNC: 97 U/L
ALT SERPL-CCNC: 27 U/L
ANION GAP SERPL CALC-SCNC: 7 MMOL/L (ref 0–18)
AST SERPL-CCNC: 36 U/L (ref 15–37)
BASOPHILS # BLD AUTO: 0 X10(3) UL (ref 0–0.2)
BASOPHILS NFR BLD AUTO: 0 %
BILIRUB SERPL-MCNC: 0.3 MG/DL (ref 0.1–2)
BUN BLD-MCNC: 22 MG/DL (ref 7–18)
BUN/CREAT SERPL: 27.5 (ref 10–20)
CALCIUM BLD-MCNC: 7.5 MG/DL (ref 8.5–10.1)
CHLORIDE SERPL-SCNC: 105 MMOL/L (ref 98–112)
CO2 SERPL-SCNC: 23 MMOL/L (ref 21–32)
CREAT BLD-MCNC: 0.8 MG/DL
CRP SERPL-MCNC: 5.98 MG/DL (ref ?–0.3)
D DIMER PPP FEU-MCNC: 2.07 UG/ML FEU (ref ?–0.89)
DEPRECATED HBV CORE AB SER IA-ACNC: 134 NG/ML
DEPRECATED RDW RBC AUTO: 51.4 FL (ref 35.1–46.3)
EOSINOPHIL # BLD AUTO: 0.04 X10(3) UL (ref 0–0.7)
EOSINOPHIL NFR BLD AUTO: 1.7 %
ERYTHROCYTE [DISTWIDTH] IN BLOOD BY AUTOMATED COUNT: 15.8 % (ref 11–15)
GLOBULIN PLAS-MCNC: 3.3 G/DL (ref 2.8–4.4)
GLUCOSE BLD-MCNC: 117 MG/DL (ref 70–99)
GLUCOSE BLDC GLUCOMTR-MCNC: 112 MG/DL (ref 70–99)
GLUCOSE BLDC GLUCOMTR-MCNC: 115 MG/DL (ref 70–99)
GLUCOSE BLDC GLUCOMTR-MCNC: 124 MG/DL (ref 70–99)
GLUCOSE BLDC GLUCOMTR-MCNC: 133 MG/DL (ref 70–99)
HCT VFR BLD AUTO: 27.2 %
HGB BLD-MCNC: 8.9 G/DL
IMM GRANULOCYTES # BLD AUTO: 0.01 X10(3) UL (ref 0–1)
IMM GRANULOCYTES NFR BLD: 0.4 %
LDH SERPL L TO P-CCNC: 284 U/L
LYMPHOCYTES # BLD AUTO: 0.55 X10(3) UL (ref 1–4)
LYMPHOCYTES NFR BLD AUTO: 23.3 %
M PROTEIN MFR SERPL ELPH: 5.8 G/DL (ref 6.4–8.2)
MCH RBC QN AUTO: 29.3 PG (ref 26–34)
MCHC RBC AUTO-ENTMCNC: 32.7 G/DL (ref 31–37)
MCV RBC AUTO: 89.5 FL
MONOCYTES # BLD AUTO: 0.18 X10(3) UL (ref 0.1–1)
MONOCYTES NFR BLD AUTO: 7.6 %
NEUTROPHILS # BLD AUTO: 1.58 X10 (3) UL (ref 1.5–7.7)
NEUTROPHILS # BLD AUTO: 1.58 X10(3) UL (ref 1.5–7.7)
NEUTROPHILS NFR BLD AUTO: 67 %
OSMOLALITY SERPL CALC.SUM OF ELEC: 284 MOSM/KG (ref 275–295)
PLATELET # BLD AUTO: 153 10(3)UL (ref 150–450)
POTASSIUM SERPL-SCNC: 3.6 MMOL/L (ref 3.5–5.1)
RBC # BLD AUTO: 3.04 X10(6)UL
SODIUM SERPL-SCNC: 135 MMOL/L (ref 136–145)
WBC # BLD AUTO: 2.4 X10(3) UL (ref 4–11)

## 2021-01-07 PROCEDURE — 99233 SBSQ HOSP IP/OBS HIGH 50: CPT | Performed by: HOSPITALIST

## 2021-01-07 PROCEDURE — 99232 SBSQ HOSP IP/OBS MODERATE 35: CPT | Performed by: NURSE PRACTITIONER

## 2021-01-07 PROCEDURE — 99232 SBSQ HOSP IP/OBS MODERATE 35: CPT | Performed by: PHYSICIAN ASSISTANT

## 2021-01-07 RX ORDER — POLYETHYLENE GLYCOL 3350 17 G/17G
17 POWDER, FOR SOLUTION ORAL DAILY
Status: DISCONTINUED | OUTPATIENT
Start: 2021-01-07 | End: 2021-01-09

## 2021-01-07 RX ORDER — CIPROFLOXACIN 500 MG/1
500 TABLET, FILM COATED ORAL EVERY 12 HOURS SCHEDULED
Status: DISCONTINUED | OUTPATIENT
Start: 2021-01-07 | End: 2021-01-09

## 2021-01-07 NOTE — PROGRESS NOTES
Pt covid+ 1/4. Symptoms started 12/29  Currently on RA SpO2 98%, afebrile. Temp 101.2 @0230 on 1/6  Inflammatory markers trending up except CRP trending down.   1/5 VQ scan low probability for PE.     CCP given 1/6  Actemra On hold  RDV day 3/5  Continue He

## 2021-01-07 NOTE — PROGRESS NOTES
[facility]    Progress Note    Lisa Paulino Patient Status:  Inpatient    1931 MRN [id number]   Location [facility] Attending Eloy Pittman Day # 2 PCP Brendon Mckenna MD        Subjective:    The p baseline  -Plan: Continue Arnuity Ellipta    DVT prophylaxis: Eliquis    Results:     Lab Results   Component Value Date    WBC 2.4 (L) 01/07/2021    HGB 8.9 (L) 01/07/2021    HCT 27.2 (L) 01/07/2021    .0 01/07/2021    CREATSERUM 0.80 01/07/2021 01/05/2021 06:46:34 SVT is new Electronically signed on 01/06/2021 at 15:52 by MD Mellisa Roberts PA-C  [facility] Pulmonary Medicine  1/7/2021

## 2021-01-07 NOTE — PLAN OF CARE
Pt resting comfortably, no calls from tele overnight. S/P CCP and 2nd dose of Remdesivir. On ceftriaxone for UTI.    Problem: Patient Centered Care  Goal: Patient preferences are identified and integrated in the patient's plan of care  Description: Interven contributing to decreased intake, treat as appropriate  - Assist with meals as needed  - Monitor I&O, WT and lab values  - Obtain nutritional consult as needed  - Optimize oral hygiene and moisture  - Encourage food from home; allow for food preferences  -

## 2021-01-07 NOTE — PROGRESS NOTES
INFECTIOUS DISEASE PROGRESS NOTE  [facility] ID TELEMEDICINE PROGRESS NOTE    Julian Flores Patient Status:  Inpatient    1931 MRN [id number]   Location [facility]5W Attending Eloy Pittman 80-year-old female with a history of ESBL Klebsiella UTI, uterine prolapse status post vaginal sling in 2016, diabetes, shingles, CKD, HTN, HLD, CAD, COPD was admitted for fatigue and found to be Covid positive.   Multiple family members positive including

## 2021-01-07 NOTE — PROGRESS NOTES
[facility]    Progress Note    Alex Hall Patient Status:  Inpatient    1931 MRN [id number]   Location 12[address] [facility] Attending Eloy Pittman Day # 2 PCP Dandre Goodrich MD        Subjective:     HEN oral Cardizem and monitor on telemetry. No indication for anticoagulation at this time.     HTN - cont oral meds      HL - cont statin      Diabetes Accu-Cheks before meals and at bedtime     DVT prophylaxis with heparin.     Dispo: dw son who prefers marquez 01/05/2021 06:46:34 SVT is new Electronically signed on 01/06/2021 at 15:52 by MD Bravo Tim MD  1/6/2021

## 2021-01-07 NOTE — PLAN OF CARE
Patient cooperative and pleasant; forgetful at times; asks about medications; able to get non-formulary substitute for lumigan; pt received 2 of 4 doses remdesivir and currently getting dose of plasma; patient ambulates well with walker; likes to sit in [place] Short Term Goal  Description: Patient's Short Term Goal: to feel better    Interventions:   - antiemetics as ordered  - small frequent meals  - increase PO fluid intake  - See additional Care Plan goals for specific interventions  Outcome: Progressing

## 2021-01-07 NOTE — PROGRESS NOTES
[facility]    Progress Note    Cris Sharpe Patient Status:  Inpatient    1931 MRN [id number]   Location 1265 [facility] Attending James Loera,*   Hosp Day # 2 PCP Lico Reno MD        Subjective:     Yonny Diane 07/01/2020    DDIMER 2.07 (H) 01/07/2021    CRP 5.98 (H) 01/07/2021    MG 1.6 (L) 01/08/2017    PHOS 2.3 (L) 01/09/2017    TROP <0.045 01/05/2021     01/05/2021    B12 >1,500 (H) 04/17/2017       Us Venous Doppler Leg Bilat - Diag Img (cpt=93970)

## 2021-01-08 LAB
ALBUMIN SERPL-MCNC: 2.6 G/DL (ref 3.4–5)
ALBUMIN/GLOB SERPL: 0.9 {RATIO} (ref 1–2)
ALP LIVER SERPL-CCNC: 70 U/L
ALT SERPL-CCNC: 29 U/L
ANION GAP SERPL CALC-SCNC: 7 MMOL/L (ref 0–18)
AST SERPL-CCNC: 33 U/L (ref 15–37)
BASOPHILS # BLD AUTO: 0.01 X10(3) UL (ref 0–0.2)
BASOPHILS NFR BLD AUTO: 0.4 %
BILIRUB SERPL-MCNC: 0.4 MG/DL (ref 0.1–2)
BLOOD TYPE BARCODE: 6200
BUN BLD-MCNC: 20 MG/DL (ref 7–18)
BUN/CREAT SERPL: 26.7 (ref 10–20)
CALCIUM BLD-MCNC: 7.7 MG/DL (ref 8.5–10.1)
CHLORIDE SERPL-SCNC: 104 MMOL/L (ref 98–112)
CO2 SERPL-SCNC: 25 MMOL/L (ref 21–32)
CREAT BLD-MCNC: 0.75 MG/DL
D DIMER PPP FEU-MCNC: 1.98 UG/ML FEU (ref ?–0.89)
DEPRECATED RDW RBC AUTO: 52.2 FL (ref 35.1–46.3)
EOSINOPHIL # BLD AUTO: 0.04 X10(3) UL (ref 0–0.7)
EOSINOPHIL NFR BLD AUTO: 1.5 %
ERYTHROCYTE [DISTWIDTH] IN BLOOD BY AUTOMATED COUNT: 15.8 % (ref 11–15)
GLOBULIN PLAS-MCNC: 3 G/DL (ref 2.8–4.4)
GLUCOSE BLD-MCNC: 101 MG/DL (ref 70–99)
GLUCOSE BLDC GLUCOMTR-MCNC: 103 MG/DL (ref 70–99)
GLUCOSE BLDC GLUCOMTR-MCNC: 121 MG/DL (ref 70–99)
GLUCOSE BLDC GLUCOMTR-MCNC: 126 MG/DL (ref 70–99)
GLUCOSE BLDC GLUCOMTR-MCNC: 144 MG/DL (ref 70–99)
HAV IGM SER QL: 1.8 MG/DL (ref 1.6–2.6)
HCT VFR BLD AUTO: 27.5 %
HGB BLD-MCNC: 9 G/DL
IMM GRANULOCYTES # BLD AUTO: 0.02 X10(3) UL (ref 0–1)
IMM GRANULOCYTES NFR BLD: 0.7 %
LYMPHOCYTES # BLD AUTO: 0.6 X10(3) UL (ref 1–4)
LYMPHOCYTES NFR BLD AUTO: 22.1 %
M PROTEIN MFR SERPL ELPH: 5.6 G/DL (ref 6.4–8.2)
MCH RBC QN AUTO: 29.4 PG (ref 26–34)
MCHC RBC AUTO-ENTMCNC: 32.7 G/DL (ref 31–37)
MCV RBC AUTO: 89.9 FL
MONOCYTES # BLD AUTO: 0.25 X10(3) UL (ref 0.1–1)
MONOCYTES NFR BLD AUTO: 9.2 %
NEUTROPHILS # BLD AUTO: 1.79 X10 (3) UL (ref 1.5–7.7)
NEUTROPHILS # BLD AUTO: 1.79 X10(3) UL (ref 1.5–7.7)
NEUTROPHILS NFR BLD AUTO: 66.1 %
OSMOLALITY SERPL CALC.SUM OF ELEC: 285 MOSM/KG (ref 275–295)
PHOSPHATE SERPL-MCNC: 2.5 MG/DL (ref 2.5–4.9)
PLATELET # BLD AUTO: 149 10(3)UL (ref 150–450)
POTASSIUM SERPL-SCNC: 3.9 MMOL/L (ref 3.5–5.1)
RBC # BLD AUTO: 3.06 X10(6)UL
SODIUM SERPL-SCNC: 136 MMOL/L (ref 136–145)
WBC # BLD AUTO: 2.7 X10(3) UL (ref 4–11)

## 2021-01-08 PROCEDURE — 99233 SBSQ HOSP IP/OBS HIGH 50: CPT | Performed by: HOSPITALIST

## 2021-01-08 PROCEDURE — 99232 SBSQ HOSP IP/OBS MODERATE 35: CPT | Performed by: INTERNAL MEDICINE

## 2021-01-08 RX ORDER — CIPROFLOXACIN 500 MG/1
500 TABLET, FILM COATED ORAL EVERY 12 HOURS SCHEDULED
Qty: 10 TABLET | Refills: 0 | Status: SHIPPED | OUTPATIENT
Start: 2021-01-08 | End: 2021-01-13

## 2021-01-08 RX ORDER — VANCOMYCIN HYDROCHLORIDE 125 MG/1
125 CAPSULE ORAL DAILY
Status: DISCONTINUED | OUTPATIENT
Start: 2021-01-08 | End: 2021-01-09

## 2021-01-08 RX ORDER — MAGNESIUM OXIDE 400 MG (241.3 MG MAGNESIUM) TABLET
400 TABLET ONCE
Status: COMPLETED | OUTPATIENT
Start: 2021-01-08 | End: 2021-01-08

## 2021-01-08 NOTE — PHYSICAL THERAPY NOTE
PHYSICAL THERAPY EVALUATION - INPATIENT     Room Number: 060/538-F  Evaluation Date: 1/8/2021  Type of Evaluation: Initial   Physician Order: PT Eval and Treat    Presenting Problem: acute cystitis  +COVID (1/4/21)  Reason for Therapy: Mobility Dysfunctio and intermittent supervision. Patient will benefit from continued IP PT services to address these deficits in preparation for discharge. DISCHARGE RECOMMENDATIONS  PT Discharge Recommendations: Home with home health PT; Intermittent Supervision    UVALDO prolapse    • Uterine prolapse 2006    had pessary in 2006 Dr. Brown Body sling procedure Dr Zhu Lab in 6/2016   • UTI due to Klebsiella species 12/2016    ESBL Klebsiella UTI treated in [address] [place] [address] with meropenem when in hosp for enteritis. (Dr Jarrell Rivas). BALANCE  Static Sitting: Good  Dynamic Sitting: Fair +  Static Standing: Fair  Dynamic Standing: Fair -    ACTIVITY TOLERANCE  Pulse: 54  Heart Rate Source: Monitor     BP: 128/51  BP Location: Right arm  BP Method: Automatic  Patient Position: Sitting assistance level: supervision with walker - rolling     Goal #2  Current Status    Goal #3 Patient is able to ambulate 100 feet with assist device: walker - rolling at assistance level: supervision   Goal #3   Current Status    Goal #4 Patient will negotia

## 2021-01-08 NOTE — CM/SW NOTE
Received MD order for \"MINA vs C. \" PT/OT evaluated pt today for discharge recommendations. They are recommending [address] at time of discharge. Pt is accepted by [facility] for [facility]. Will baldo order complete.      &

## 2021-01-08 NOTE — PLAN OF CARE
Patient looking forward to going home; continually asking about if she can take her remdesevir early; this RN promised her all her medication times are in the computer and she will get them when it's time; patient understood; patient concerned about not ha small frequent meals  - increase PO fluid intake  - See additional Care Plan goals for specific interventions  Outcome: Progressing     Problem: GASTROINTESTINAL - ADULT  Goal: Maintains adequate nutritional intake (undernourished)  Description: INTERVENTI

## 2021-01-08 NOTE — CM/SW NOTE
Spoke with Dr. Mahnaz Hwang, and he stated that the family was interested in rehab. Spoke to PT/OT. OT stated that pt is standby assist to supervision for ADL's would not be appropriate for a MINA stay.  Pt is also insurance - insurance would most likely deny a

## 2021-01-08 NOTE — OCCUPATIONAL THERAPY NOTE
OCCUPATIONAL THERAPY EVALUATION - INPATIENT      Room Number: 597/401-K  Evaluation Date: 1/8/2021  Type of Evaluation: Initial       Physician Order: IP Consult to Occupational Therapy  Reason for Therapy: ADL/IADL Dysfunction and Discharge Planning    OC provide assist while maintaining isolation and appropriate social distancing.     PPE worn: mask, gloves, goggles, gown, N95    Patient's SPO2 >93% on RA with activity and at rest.    DISCHARGE RECOMMENDATIONS  OT Discharge Recommendations: 24 hour care/sup Louis Gonzales.    • Type 2 diabetes mellitus ([facility] [address] 2001   • Uterine prolapse    • Uterine prolapse 2006    had pessary in 2006 Dr. Mendoza Pat sling procedure Dr Ambreen Jenkins in 6/2016   • UTI due to Klebsiella species 12/2016    ESBL Klebsiella UTI treated in [facility] limits     STRENGTH ASSESSMENT  Upper extremity strength is within functional limits   ACTIVITIES OF DAILY LIVING ASSESSMENT  AM-PAC ‘6-Clicks’ Inpatient Daily Activity Short Form  How much help from another person does the patient currently need…  -   Put

## 2021-01-08 NOTE — PROGRESS NOTES
[facility]    [facility] Note    Yani Shields Patient Status:  Inpatient    1931 MRN [id number]   Location [facility] Attending Indu Marino,*   Hosp Day # 3 PCP Alcira Jones MD        Subjective:     Argelia Flores  01/08/2021    K 3.9 01/08/2021     01/08/2021    CO2 25.0 01/08/2021     (H) 01/08/2021    CA 7.7 (L) 01/08/2021    ALB 2.6 (L) 01/08/2021    ALKPHO 70 01/08/2021    BILT 0.4 01/08/2021    TP 5.6 (L) 01/08/2021    AST 33 01/08/2021    A

## 2021-01-08 NOTE — PROGRESS NOTES
INFECTIOUS DISEASE PROGRESS NOTE  [facility] ID TELEMEDICINE PROGRESS NOTE    Cris Alberta Patient Status:  Inpatient    1931 MRN [id number]   Location [facility]5W Attending Cassi Pittman 15-year-old female with a history of ESBL Klebsiella UTI, uterine prolapse status post vaginal sling in 2016, diabetes, shingles, CKD, HTN, HLD, CAD, COPD was admitted for fatigue and found to be Covid positive.   Multiple family members positive including

## 2021-01-08 NOTE — PLAN OF CARE
Patient alert x4, can be forgetful at times. On RA. No complaints throughout the night. Educated pt to call for assistance, call light within reach.  Pt remains on IV RDV, IV ABX switched to Oral ABX, Pt also now on PO Vanco.     Problem: Patient Centered C (undernourished)  Description: INTERVENTIONS:  - Monitor percentage of each meal consumed  - Identify factors contributing to decreased intake, treat as appropriate  - Assist with meals as needed  - Monitor I&O, WT and lab values  - Obtain nutritional cons

## 2021-01-08 NOTE — PROGRESS NOTES
[facility]    Progress Note    Zunilda Sánchez Patient Status:  Inpatient    1931 MRN [id number]   Location 1265 [facility] Attending Eloy Pittman Day # 3 PCP Sue Lopez MD        Subjective:     HEN consult.  -Restart oral Cardizem and monitor on telemetry. No indication for anticoagulation at this time.     HTN - cont oral meds      HL - cont statin      Diabetes Accu-Cheks before meals and at bedtime     DVT prophylaxis with heparin.     Dispo: yudelka chanel

## 2021-01-09 VITALS
TEMPERATURE: 98 F | HEART RATE: 58 BPM | HEIGHT: 59 IN | BODY MASS INDEX: 26.11 KG/M2 | SYSTOLIC BLOOD PRESSURE: 136 MMHG | OXYGEN SATURATION: 98 % | RESPIRATION RATE: 18 BRPM | WEIGHT: 129.5 LBS | DIASTOLIC BLOOD PRESSURE: 39 MMHG

## 2021-01-09 LAB
ALBUMIN SERPL-MCNC: 2.3 G/DL
ALBUMIN SERPL-MCNC: 2.3 G/DL (ref 3.4–5)
ALBUMIN/GLOB SERPL: 0.7 {RATIO}
ALBUMIN/GLOB SERPL: 0.7 {RATIO} (ref 1–2)
ALP LIVER SERPL-CCNC: 66 U/L
ALP SERPL-CCNC: 66 U/L
ALT SERPL-CCNC: 27 U/L
ALT SERPL-CCNC: 27 UNITS/L
ANION GAP SERPL CALC-SCNC: 5 MMOL/L
ANION GAP SERPL CALC-SCNC: 5 MMOL/L (ref 0–18)
AST SERPL-CCNC: 31 U/L (ref 15–37)
AST SERPL-CCNC: 31 UNITS/L
BASOPHILS # BLD AUTO: 0.02 X10(3) UL (ref 0–0.2)
BASOPHILS NFR BLD AUTO: 0.6 %
BILIRUB SERPL-MCNC: 0.4 MG/DL
BILIRUB SERPL-MCNC: 0.4 MG/DL (ref 0.1–2)
BUN BLD-MCNC: 17 MG/DL (ref 7–18)
BUN SERPL-MCNC: 17 MG/DL
BUN/CREAT SERPL: 25
BUN/CREAT SERPL: 25 (ref 10–20)
CALCIUM BLD-MCNC: 8 MG/DL (ref 8.5–10.1)
CALCIUM SERPL-MCNC: 8 MG/DL
CHLORIDE SERPL-SCNC: 106 MMOL/L
CHLORIDE SERPL-SCNC: 106 MMOL/L (ref 98–112)
CO2 SERPL-SCNC: 24 MMOL/L
CO2 SERPL-SCNC: 24 MMOL/L (ref 21–32)
CREAT BLD-MCNC: 0.68 MG/DL
CREAT SERPL-MCNC: 0.68 MG/DL
DEPRECATED RDW RBC AUTO: 54.1 FL (ref 35.1–46.3)
EOSINOPHIL # BLD AUTO: 0.07 X10(3) UL (ref 0–0.7)
EOSINOPHIL NFR BLD AUTO: 2.3 %
ERYTHROCYTE [DISTWIDTH] IN BLOOD BY AUTOMATED COUNT: 16.2 % (ref 11–15)
GLOBULIN PLAS-MCNC: 3.2 G/DL (ref 2.8–4.4)
GLOBULIN SER-MCNC: 3.2 G/DL
GLUCOSE BLD-MCNC: 101 MG/DL (ref 70–99)
GLUCOSE BLDC GLUCOMTR-MCNC: 111 MG/DL (ref 70–99)
GLUCOSE BLDC GLUCOMTR-MCNC: 114 MG/DL (ref 70–99)
GLUCOSE SERPL-MCNC: 101 MG/DL
HAV IGM SER QL: 1.9 MG/DL (ref 1.6–2.6)
HCT VFR BLD AUTO: 26.4 %
HCT VFR BLD CALC: 26.4 %
HGB BLD-MCNC: 8.9 G/DL
HGB BLD-MCNC: 8.9 G/DL
IMM GRANULOCYTES # BLD AUTO: 0.03 X10(3) UL (ref 0–1)
IMM GRANULOCYTES NFR BLD: 1 %
LYMPHOCYTES # BLD AUTO: 0.72 X10(3) UL (ref 1–4)
LYMPHOCYTES NFR BLD AUTO: 23.2 %
M PROTEIN MFR SERPL ELPH: 5.5 G/DL (ref 6.4–8.2)
MCH RBC QN AUTO: 30.4 PG (ref 26–34)
MCHC RBC AUTO-ENTMCNC: 33.7 G/DL (ref 31–37)
MCV RBC AUTO: 90.1 FL
MONOCYTES # BLD AUTO: 0.27 X10(3) UL (ref 0.1–1)
MONOCYTES NFR BLD AUTO: 8.7 %
NEUTROPHILS # BLD AUTO: 1.99 X10 (3) UL (ref 1.5–7.7)
NEUTROPHILS # BLD AUTO: 1.99 X10(3) UL (ref 1.5–7.7)
NEUTROPHILS NFR BLD AUTO: 64.2 %
OSMOLALITY SERPL CALC.SUM OF ELEC: 282 MOSM/KG (ref 275–295)
PLATELET # BLD AUTO: 187 10(3)UL (ref 150–450)
PLATELET # BLD: 187 K/MCL
POTASSIUM SERPL-SCNC: 4.4 MMOL/L
POTASSIUM SERPL-SCNC: 4.4 MMOL/L (ref 3.5–5.1)
PROT SERPL-MCNC: 5.5 G/DL
RBC # BLD AUTO: 2.93 X10(6)UL
RBC # BLD: 2.93 10*6/UL
SODIUM SERPL-SCNC: 135 MMOL/L
SODIUM SERPL-SCNC: 135 MMOL/L (ref 136–145)
WBC # BLD AUTO: 3.1 X10(3) UL (ref 4–11)
WBC # BLD: 3.1 K/MCL

## 2021-01-09 PROCEDURE — 99239 HOSP IP/OBS DSCHRG MGMT >30: CPT | Performed by: HOSPITALIST

## 2021-01-09 RX ORDER — ZINC SULFATE 50(220)MG
220 CAPSULE ORAL DAILY
Qty: 30 CAPSULE | Refills: 0 | Status: SHIPPED | OUTPATIENT
Start: 2021-01-10 | End: 2021-01-12

## 2021-01-09 RX ORDER — MAGNESIUM OXIDE 400 MG (241.3 MG MAGNESIUM) TABLET
3 TABLET NIGHTLY
Qty: 30 TABLET | Refills: 0 | Status: SHIPPED | OUTPATIENT
Start: 2021-01-09 | End: 2021-01-12

## 2021-01-09 RX ORDER — ACETAMINOPHEN 325 MG/1
650 TABLET ORAL EVERY 6 HOURS PRN
Qty: 1 TABLET | Refills: 0 | Status: SHIPPED | COMMUNITY
Start: 2021-01-09 | End: 2021-01-12

## 2021-01-09 RX ORDER — ASCORBIC ACID 500 MG
500 TABLET ORAL DAILY
Qty: 30 TABLET | Refills: 0 | Status: SHIPPED | OUTPATIENT
Start: 2021-01-10

## 2021-01-09 RX ORDER — VANCOMYCIN HYDROCHLORIDE 125 MG/1
125 CAPSULE ORAL DAILY
Qty: 7 CAPSULE | Refills: 0 | Status: SHIPPED | OUTPATIENT
Start: 2021-01-10 | End: 2021-02-19

## 2021-01-09 NOTE — DISCHARGE SUMMARY
Dc summary#[id number]  > 30 min spent on [address] Discharge Diagnoses: rrafib/covid    Lace+ Score: 77  59-90 High Risk  29-58 Medium Risk  0-28   Low Risk. TCM Follow-Up Recommendation:  LACE > 58:  High Risk of readmission after discharge from the

## 2021-01-09 NOTE — CM/SW NOTE
RN requested Eliquis coupon 30 day free trial offer. CM sent via tube#150. DC summary in aidin to [facility] agency of  [facility]  [address] [facility], [address] [place] [address]  Phone: [phone]  Fax: [phone]    [employer] secure mess

## 2021-01-09 NOTE — PLAN OF CARE
Problem: Patient Centered Care  Goal: Patient preferences are identified and integrated in the patient's plan of care  Description: Interventions:  - What would you like us to know as we care for you? \"I live with my son. \"  - Provide timely, complete, values  - Obtain nutritional consult as needed  - Optimize oral hygiene and moisture  - Encourage food from home; allow for food preferences  - Enhance eating environment  Outcome: Progressing     Pt A&Ox4, forgetful, on RA. IV remdesivir.  PO ciprofloxacin

## 2021-01-09 NOTE — BH PROGRESS NOTE
COVID-19 Daily Discharge Readiness-Nursing    O2 Sat at Rest:  SPO2 on Room Air at Rest: 97  %   O2 Sat with Exertion:    % on    liters   Temperature max from last 24 hrs: Temp (24hrs), Av.8 °F (36.6 °C), Min:97.4 °F (36.3 °C), Max:98 °F (36.7 °C)

## 2021-01-09 NOTE — PLAN OF CARE
VS stable. Discharge orders received. Prescriptions faxed to [address] in [place] per patient request. Discharge medications and follow up appointments reviewed with patient.  Bleeding precautions while on Eliquis reviewed with patient, verbalized goals for specific interventions  Outcome: Completed     Problem: GASTROINTESTINAL - ADULT  Goal: Maintains adequate nutritional intake (undernourished)  Description: INTERVENTIONS:  - Monitor percentage of each meal consumed  - Identify factors contributi

## 2021-01-09 NOTE — PLAN OF CARE
Problem: Patient Centered Care  Goal: Patient preferences are identified and integrated in the patient's plan of care  Description: Interventions:  - What would you like us to know as we care for you? \"I live with my son. \"  - Provide timely, complete, values  - Obtain nutritional consult as needed  - Optimize oral hygiene and moisture  - Encourage food from home; allow for food preferences  - Enhance eating environment  Outcome: Progressing    Patient had no diarrhea, nausea or shortness of breath overn

## 2021-01-10 NOTE — PAYOR COMM NOTE
--------------  DISCHARGE REVIEW    Payor: [facility] Dawit Cantu Maiden #:  [id number]  Authorization Number: [id number]    Admit date: 1/5/21  Admit time:  0200  Discharge Date: 1/9/2021  4:07 PM     Admitting Physician: Kumar Evans MD

## 2021-01-11 ENCOUNTER — PATIENT OUTREACH (OUTPATIENT)
Dept: CASE MANAGEMENT | Age: 86
End: 2021-01-11

## 2021-01-11 DIAGNOSIS — Z02.0 ENCOUNTER FOR SCHOOL EXAMINATION: ICD-10-CM

## 2021-01-11 PROCEDURE — 1111F DSCHRG MED/CURRENT MED MERGE: CPT

## 2021-01-11 NOTE — DISCHARGE SUMMARY
[facility]    PATIENT'S NAME: Josh Jain   ATTENDING PHYSICIAN: Ganesh Pittman MD   PATIENT ACCOUNT#:   [id number]    LOCATION:  [address] [place] [address] RECORD #:   [id number]       YOB: 1931  ADMISSION DATE:       01/04/20 cooperative but hard of hearing. LABORATORY STUDIES:  Please see chart. ASSESSMENT AND PLAN:    1. COVID pneumonia on remdesivir. Patient has no VQ is low probability. She has no clots. Hopefully will continue to do well. 2.   Hyponatremia. Ciprofloxacin 500 mg q.12 h. for 5 days. Watch for Achilles tendon pain, visual changes. 21.   Melatonin 3 mg nightly for sleep. 22.   Vancomycin 125 mg daily for 7 days for C difficile prophylaxis. 23.   Zinc sulfate 220 mg daily.      Please note,

## 2021-01-11 NOTE — PROGRESS NOTES
1st attempt SCC/PNA apt request    [facility]  [address]  [phone]  [address]    Unable to contact patient. Will try again.

## 2021-01-11 NOTE — PROGRESS NOTES
Initial Post Discharge Follow Up   Discharge Date: 1/9/21  Contact Date: 1/11/2021    Consent Verification:  Assessment Completed With: Patient  HIPAA Verified?   Yes    Discharge Dx:  rrafib/covid      General:   • How have you been since your discharge 1 TABLET (70MG) BY MOUTH ONCE EACH WEEK 12 tablet 3   • DILTIAZEM HCL ER COATED BEADS 120 MG Oral Capsule SR 24 Hr TAKE 1 CAPSULE BY MOUTH EVERY DAY 90 capsule 3   • Enalapril Maleate 5 MG Oral Tab Take 1 tablet (5 mg total) by mouth daily.  90 tablet 3   • sure we are not missing anything? yes  • Are there any reasons that keep you from taking your medication as prescribed? No  Are you having any concerns with constipation? No    Referrals/orders at D/C:  Home Health/Services ordered at D/C?   Yes   What servi an appt with Dr. Artist Sacks at the end of this month       Interventions by GEORGIE: GEORGIE reviewed discharge instructions and when to seek medical attention with the patient. She states that her breathing is fine.  She does not have a pulse ox but denies having any

## 2021-01-12 ENCOUNTER — TELEPHONE (OUTPATIENT)
Dept: INTERNAL MEDICINE CLINIC | Facility: CLINIC | Age: 86
End: 2021-01-12

## 2021-01-12 ENCOUNTER — VIRTUAL PHONE E/M (OUTPATIENT)
Dept: INTERNAL MEDICINE CLINIC | Facility: CLINIC | Age: 86
End: 2021-01-12
Payer: COMMERCIAL

## 2021-01-12 DIAGNOSIS — N39.0 E. COLI UTI: ICD-10-CM

## 2021-01-12 DIAGNOSIS — I48.0 PAF (PAROXYSMAL ATRIAL FIBRILLATION) (HCC): ICD-10-CM

## 2021-01-12 DIAGNOSIS — U07.1 PNEUMONIA DUE TO COVID-19 VIRUS: Primary | ICD-10-CM

## 2021-01-12 DIAGNOSIS — J12.82 PNEUMONIA DUE TO COVID-19 VIRUS: Primary | ICD-10-CM

## 2021-01-12 DIAGNOSIS — D72.819 LEUKOPENIA, UNSPECIFIED TYPE: ICD-10-CM

## 2021-01-12 DIAGNOSIS — J44.9 ASTHMA WITH COPD (CHRONIC OBSTRUCTIVE PULMONARY DISEASE) (HCC): ICD-10-CM

## 2021-01-12 DIAGNOSIS — B96.20 E. COLI UTI: ICD-10-CM

## 2021-01-12 PROCEDURE — 99496 TRANSJ CARE MGMT HIGH F2F 7D: CPT | Performed by: INTERNAL MEDICINE

## 2021-01-12 RX ORDER — MELATONIN
3 NIGHTLY
COMMUNITY

## 2021-01-12 NOTE — PROGRESS NOTES
2nd attempt SCC/PNA apt request     [facility]  [address]  [phone]  [address]     Unable to contact patient. Will try again.

## 2021-01-12 NOTE — PROGRESS NOTES
Madai Oliveira is a 80year old female who presents for     Virtual Telephone Check-In    Madai Oliveira verbally consents to a Virtual/Telephone Check-In visit on 01/12/21.   Patient has been referred to the [facility] website at [email or web address] to re Hypertension, essential    • Irritable bowel syndrome    • Lumbar stenosis 2004    MRI lumbar 2004-djd spinal stenosis-xray lumbar 12/13-DJD scoliosis, wedge comp T8, 9, 10, L1   • Lung nodule 4/06-3/03    LLL nodule resolved on serial CT scans   • Osteoar • Stroke Sister         cause of death age 66   • Heart Attack Brother         cause of death   • Cancer Brother          of pancreas or lung cancer   • Other (5 children [Other]) Other    • Glaucoma Brother    • 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 WBC 3.1 hgb 8.9 on 1/9/21. Check cbc cmp.     5. Asthma with COPD (chronic obstructive pulmonary disease) ([facility] [address]  Doing well. Past dx--not entered at this visit. Chronic anemia-- Hgb 10.6 (stable) on 9/4/20.  Hgb daily.  pt stopped lactulose 30 ml tid prn. Was to see Dr Steve Cueva. Not wanting to do cscopy. Last cscopy 2010 -diverticulosis and int hemorrhoids.    CT abd pelvis ER 11/5/19– ventral abd scarring and incisional herniation large and small bowel, without  Flu shot 11/10/20.   MRI panc duct strictures-area of dilation on MRI in 5/13 --no change to 3/12--no f/u felt needed as radiologist feels is sequelae of prior pancreatitis.      Lab 4/6/19-cmp lipid A1c ur ma-creat 1.04, GFR 48, LDL 82, A1c 6.7, ur ma 33 EC Take 1 tablet (81 mg total) by mouth daily. 30 tablet 11   • Clopidogrel Bisulfate 75 MG Oral Tab Take 1 tablet (75 mg total) by mouth daily.  30 tablet 11   • Hydrocortisone 2.5 % External Cream APPLY RECTALLY TWICE DAILY AS NEEDED 30 g 2   • GABAPENTIN care  ? Education given to patient on self-management, independent living, and activities of daily living. ? Referrals established for Home health. Medication Reconciliation:  I am aware of an inpatient discharge within the last 30 days.   The discharge

## 2021-01-13 NOTE — PROGRESS NOTES
3rd attempt SCC/PNA apt request     [facility]  [address] [facility]  [phone]  [address]    Unable to contact patient after multiple attempts. No apt made. Closing encounter.

## 2021-01-25 RX ORDER — LANOLIN ALCOHOL/MO/W.PET/CERES
3 CREAM (GRAM) TOPICAL
COMMUNITY

## 2021-01-25 RX ORDER — ASCORBIC ACID 500 MG
500 TABLET ORAL
COMMUNITY
Start: 2021-01-10

## 2021-01-26 DIAGNOSIS — Z23 NEED FOR VACCINATION: ICD-10-CM

## 2021-01-26 RX ORDER — MELATONIN
2000 DAILY
Qty: 180 TABLET | Refills: 3 | Status: SHIPPED | OUTPATIENT
Start: 2021-01-26

## 2021-01-27 ENCOUNTER — APPOINTMENT (OUTPATIENT)
Dept: CARDIOLOGY | Age: 86
End: 2021-01-27

## 2021-01-28 ENCOUNTER — MED REC SCAN ONLY (OUTPATIENT)
Dept: INTERNAL MEDICINE CLINIC | Facility: CLINIC | Age: 86
End: 2021-01-28

## 2021-01-28 NOTE — PROGRESS NOTES
[employer] care givers [facility] plan of care signed/ LOV note,  faxed back to [phone], conformation received. Original sent to scan. Copy given to [facility].

## 2021-02-01 ENCOUNTER — OFFICE VISIT (OUTPATIENT)
Dept: CARDIOLOGY | Age: 86
End: 2021-02-01

## 2021-02-01 VITALS
SYSTOLIC BLOOD PRESSURE: 116 MMHG | HEART RATE: 69 BPM | DIASTOLIC BLOOD PRESSURE: 52 MMHG | WEIGHT: 130 LBS | BODY MASS INDEX: 27.29 KG/M2 | HEIGHT: 58 IN | OXYGEN SATURATION: 97 %

## 2021-02-01 DIAGNOSIS — I10 ESSENTIAL HYPERTENSION: Primary | ICD-10-CM

## 2021-02-01 DIAGNOSIS — I47.10 SVT (SUPRAVENTRICULAR TACHYCARDIA): ICD-10-CM

## 2021-02-01 DIAGNOSIS — N18.9 CHRONIC KIDNEY DISEASE, UNSPECIFIED CKD STAGE: ICD-10-CM

## 2021-02-01 DIAGNOSIS — E78.00 HYPERCHOLESTEROLEMIA: ICD-10-CM

## 2021-02-01 DIAGNOSIS — Z95.5 PRESENCE OF CORONARY ANGIOPLASTY IMPLANT AND GRAFT: ICD-10-CM

## 2021-02-01 PROCEDURE — 99214 OFFICE O/P EST MOD 30 MIN: CPT | Performed by: INTERNAL MEDICINE

## 2021-02-01 RX ORDER — ZINC SULFATE 50(220)MG
220 CAPSULE ORAL DAILY
COMMUNITY
Start: 2021-01-09

## 2021-02-01 SDOH — HEALTH STABILITY: MENTAL HEALTH: HOW OFTEN DO YOU HAVE A DRINK CONTAINING ALCOHOL?: NEVER

## 2021-02-01 ASSESSMENT — PATIENT HEALTH QUESTIONNAIRE - PHQ9
SUM OF ALL RESPONSES TO PHQ9 QUESTIONS 1 AND 2: 0
CLINICAL INTERPRETATION OF PHQ9 SCORE: NO FURTHER SCREENING NEEDED
2. FEELING DOWN, DEPRESSED OR HOPELESS: NOT AT ALL
CLINICAL INTERPRETATION OF PHQ2 SCORE: NO FURTHER SCREENING NEEDED
SUM OF ALL RESPONSES TO PHQ9 QUESTIONS 1 AND 2: 0
1. LITTLE INTEREST OR PLEASURE IN DOING THINGS: NOT AT ALL

## 2021-02-11 ENCOUNTER — TELEPHONE (OUTPATIENT)
Dept: NEUROLOGY | Facility: CLINIC | Age: 86
End: 2021-02-11

## 2021-02-17 ENCOUNTER — OFFICE VISIT (OUTPATIENT)
Dept: NEUROLOGY | Facility: CLINIC | Age: 86
End: 2021-02-17
Payer: COMMERCIAL

## 2021-02-17 VITALS
HEIGHT: 59 IN | BODY MASS INDEX: 26 KG/M2 | HEART RATE: 72 BPM | OXYGEN SATURATION: 97 % | DIASTOLIC BLOOD PRESSURE: 52 MMHG | SYSTOLIC BLOOD PRESSURE: 132 MMHG | WEIGHT: 129 LBS

## 2021-02-17 DIAGNOSIS — I25.10 CORONARY ARTERY DISEASE INVOLVING NATIVE CORONARY ARTERY OF NATIVE HEART WITHOUT ANGINA PECTORIS: ICD-10-CM

## 2021-02-17 DIAGNOSIS — G56.03 CARPAL TUNNEL SYNDROME ON BOTH SIDES: Primary | ICD-10-CM

## 2021-02-17 DIAGNOSIS — E11.22 CKD STAGE 3 DUE TO TYPE 2 DIABETES MELLITUS (HCC): ICD-10-CM

## 2021-02-17 DIAGNOSIS — I10 ESSENTIAL HYPERTENSION: ICD-10-CM

## 2021-02-17 DIAGNOSIS — N18.30 CKD STAGE 3 DUE TO TYPE 2 DIABETES MELLITUS (HCC): ICD-10-CM

## 2021-02-17 PROCEDURE — 3075F SYST BP GE 130 - 139MM HG: CPT | Performed by: PHYSICAL MEDICINE & REHABILITATION

## 2021-02-17 PROCEDURE — 3078F DIAST BP <80 MM HG: CPT | Performed by: PHYSICAL MEDICINE & REHABILITATION

## 2021-02-17 PROCEDURE — 99214 OFFICE O/P EST MOD 30 MIN: CPT | Performed by: PHYSICAL MEDICINE & REHABILITATION

## 2021-02-17 PROCEDURE — 3008F BODY MASS INDEX DOCD: CPT | Performed by: PHYSICAL MEDICINE & REHABILITATION

## 2021-02-17 NOTE — PATIENT INSTRUCTIONS
-Start occupational therapy and home exercises  -Night splints to be worn nightly   -Follow up in 6 weeks

## 2021-02-18 NOTE — PROGRESS NOTES
[address]  FOLLOW UP EVALUATION      Chief Complaint: Hand pain    HISTORY OF PRESENT ILLNESS:   Patient presents with: Injection: Ultrasound guided right carpal tunnel 12/09/2020.  Patient states that the problem. She feels that the pain is interfering with her ability to sleep and she often has to shake her hands in the middle the night to relieve the symptoms and has a positive flick sign. She has not had any imaging or other treatment for this problem. • ABDOMEN SURGERY PROC UNLISTED  2009    TERESA and small bowel resection-Dr Jaramillo   • APPENDECTOMY     • CHOLECYSTECTOMY     • FRACTURE SURGERY Left 1/2017    L hip fx pinning 1/2017 Dr. Karen Alatorre   • HIP PINNING Left 1/6/2017    Performed by Karen Alatorre, External Cream APPLY RECTALLY TWICE DAILY AS NEEDED 30 g 2   • GABAPENTIN 100 MG Oral Cap 1C PO NIGHTLY 90 capsule 3   • Fluticasone Propionate HFA (FLOVENT HFA) 220 MCG/ACT Inhalation Aerosol Inhale 1 puff into the lungs 2 (two) times daily.  3 Inhaler 3 Incontinence: denies   Musculoskeletal  Joint Stiffness: denies  Painful Joints: denies   Neurological  Loss of Strength Since last Visit: admits  Tingling/Numbness: admits       PHYSICAL EXAM:   /52   Pulse 72   Ht 59\"   Wt 129 lb (58.5 kg)   SpO2 01/09/2021    TP 5.5 (L) 01/09/2021    ALB 2.3 (L) 01/09/2021    GLOBULIN 3.2 01/09/2021    AGRATIO 1.3 07/06/2015     (L) 01/09/2021    K 4.4 01/09/2021     01/09/2021    CO2 24.0 01/09/2021     No results found for: PTP, PT, INR  Lab Results

## 2021-02-19 ENCOUNTER — OFFICE VISIT (OUTPATIENT)
Dept: INTERNAL MEDICINE CLINIC | Facility: CLINIC | Age: 86
End: 2021-02-19
Payer: COMMERCIAL

## 2021-02-19 ENCOUNTER — LAB ENCOUNTER (OUTPATIENT)
Dept: LAB | Age: 86
End: 2021-02-19
Attending: INTERNAL MEDICINE
Payer: MEDICARE

## 2021-02-19 ENCOUNTER — TELEPHONE (OUTPATIENT)
Dept: INTERNAL MEDICINE CLINIC | Facility: CLINIC | Age: 86
End: 2021-02-19

## 2021-02-19 VITALS
HEIGHT: 59 IN | WEIGHT: 130 LBS | TEMPERATURE: 98 F | DIASTOLIC BLOOD PRESSURE: 60 MMHG | OXYGEN SATURATION: 98 % | HEART RATE: 72 BPM | BODY MASS INDEX: 26.21 KG/M2 | SYSTOLIC BLOOD PRESSURE: 144 MMHG

## 2021-02-19 DIAGNOSIS — U07.1 PNEUMONIA DUE TO COVID-19 VIRUS: ICD-10-CM

## 2021-02-19 DIAGNOSIS — G56.00 CARPAL TUNNEL SYNDROME, UNSPECIFIED LATERALITY: ICD-10-CM

## 2021-02-19 DIAGNOSIS — J44.9 ASTHMA WITH COPD (CHRONIC OBSTRUCTIVE PULMONARY DISEASE) (HCC): ICD-10-CM

## 2021-02-19 DIAGNOSIS — J12.82 PNEUMONIA DUE TO COVID-19 VIRUS: ICD-10-CM

## 2021-02-19 DIAGNOSIS — I48.0 PAF (PAROXYSMAL ATRIAL FIBRILLATION) (HCC): ICD-10-CM

## 2021-02-19 DIAGNOSIS — D72.819 LEUKOPENIA, UNSPECIFIED TYPE: ICD-10-CM

## 2021-02-19 DIAGNOSIS — U07.1 PNEUMONIA DUE TO COVID-19 VIRUS: Primary | ICD-10-CM

## 2021-02-19 DIAGNOSIS — J12.82 PNEUMONIA DUE TO COVID-19 VIRUS: Primary | ICD-10-CM

## 2021-02-19 LAB
ALBUMIN SERPL-MCNC: 3.7 G/DL (ref 3.4–5)
ALBUMIN/GLOB SERPL: 0.9 {RATIO} (ref 1–2)
ALP LIVER SERPL-CCNC: 102 U/L
ALT SERPL-CCNC: 20 U/L
ANION GAP SERPL CALC-SCNC: 6 MMOL/L (ref 0–18)
AST SERPL-CCNC: 14 U/L (ref 15–37)
BASOPHILS # BLD AUTO: 0.06 X10(3) UL (ref 0–0.2)
BASOPHILS NFR BLD AUTO: 1.2 %
BILIRUB SERPL-MCNC: 0.6 MG/DL (ref 0.1–2)
BUN BLD-MCNC: 17 MG/DL (ref 7–18)
BUN/CREAT SERPL: 19.8 (ref 10–20)
CALCIUM BLD-MCNC: 9.8 MG/DL (ref 8.5–10.1)
CHLORIDE SERPL-SCNC: 99 MMOL/L (ref 98–112)
CO2 SERPL-SCNC: 28 MMOL/L (ref 21–32)
CREAT BLD-MCNC: 0.86 MG/DL
DEPRECATED RDW RBC AUTO: 53 FL (ref 35.1–46.3)
EOSINOPHIL # BLD AUTO: 0.1 X10(3) UL (ref 0–0.7)
EOSINOPHIL NFR BLD AUTO: 1.9 %
ERYTHROCYTE [DISTWIDTH] IN BLOOD BY AUTOMATED COUNT: 15.9 % (ref 11–15)
GLOBULIN PLAS-MCNC: 3.9 G/DL (ref 2.8–4.4)
GLUCOSE BLD-MCNC: 119 MG/DL (ref 70–99)
HCT VFR BLD AUTO: 31.3 %
HGB BLD-MCNC: 10.1 G/DL
IMM GRANULOCYTES # BLD AUTO: 0.02 X10(3) UL (ref 0–1)
IMM GRANULOCYTES NFR BLD: 0.4 %
LYMPHOCYTES # BLD AUTO: 1.21 X10(3) UL (ref 1–4)
LYMPHOCYTES NFR BLD AUTO: 23.5 %
M PROTEIN MFR SERPL ELPH: 7.6 G/DL (ref 6.4–8.2)
MCH RBC QN AUTO: 29.7 PG (ref 26–34)
MCHC RBC AUTO-ENTMCNC: 32.3 G/DL (ref 31–37)
MCV RBC AUTO: 92.1 FL
MONOCYTES # BLD AUTO: 0.55 X10(3) UL (ref 0.1–1)
MONOCYTES NFR BLD AUTO: 10.7 %
NEUTROPHILS # BLD AUTO: 3.2 X10 (3) UL (ref 1.5–7.7)
NEUTROPHILS # BLD AUTO: 3.2 X10(3) UL (ref 1.5–7.7)
NEUTROPHILS NFR BLD AUTO: 62.3 %
OSMOLALITY SERPL CALC.SUM OF ELEC: 279 MOSM/KG (ref 275–295)
PATIENT FASTING Y/N/NP: YES
PLATELET # BLD AUTO: 278 10(3)UL (ref 150–450)
POTASSIUM SERPL-SCNC: 5.1 MMOL/L (ref 3.5–5.1)
RBC # BLD AUTO: 3.4 X10(6)UL
SODIUM SERPL-SCNC: 133 MMOL/L (ref 136–145)
WBC # BLD AUTO: 5.1 X10(3) UL (ref 4–11)

## 2021-02-19 PROCEDURE — 99214 OFFICE O/P EST MOD 30 MIN: CPT | Performed by: INTERNAL MEDICINE

## 2021-02-19 PROCEDURE — 80053 COMPREHEN METABOLIC PANEL: CPT

## 2021-02-19 PROCEDURE — 3008F BODY MASS INDEX DOCD: CPT | Performed by: INTERNAL MEDICINE

## 2021-02-19 PROCEDURE — 3078F DIAST BP <80 MM HG: CPT | Performed by: INTERNAL MEDICINE

## 2021-02-19 PROCEDURE — 3077F SYST BP >= 140 MM HG: CPT | Performed by: INTERNAL MEDICINE

## 2021-02-19 PROCEDURE — 85025 COMPLETE CBC W/AUTO DIFF WBC: CPT

## 2021-02-19 PROCEDURE — 36415 COLL VENOUS BLD VENIPUNCTURE: CPT

## 2021-02-19 NOTE — PROGRESS NOTES
Rosemary Mo is a 80year old female who presents for     1 mo check. Last visit was phone visit 1/12/21. covid pneumonia  As review- hosp early 1/2021- Covid pneumonia rx remdesivir/ Covid conv. plasma. .  Feels she is doing well.  Coughs off and on-- Renal calculus 2010    on CT abd 5/10-6 mm or less in L lower kidney   • Renal insufficiency    • Shingles 2011   • Small bowel obstruction ([address] [id number], 6/2016    multiple hospitalizations for [id number], 6/2016   • SVT (supraventricular tachycardia age 80   • Stroke Sister         stroke- age 80      Social History:   Social History    Tobacco Use      Smoking status: Never Smoker      Smokeless tobacco: Never Used    Alcohol use: No      Alcohol/week: 0.0 standard drinks    Drug use:  No bilat at night. EMG ord at 9/4/20 visit--didn't do emg. Dr Lila Barahona inj R wrist 12/2020 (improved) and in 2/2021--he ord OT. Past dx--not entered at this visit. Chronic anemia--hgb 8.9 on 1/9/21 in hosp for covid. Hgb 10.6 (stable) on 9/4/20. abd pelvis ER 11/5/19– ventral abd scarring and incisional herniation large and small bowel, without obstruction. Heavy stool burden throughout proximal colon. Indeterm nodular opacity LLL–could be pleuro-parenchymal scarring–\"F/U recommended. \"    Vit d pancreatitis.        7/1/20–cbc cmp tsh lipid P9l--X0v 6.4 creat 1.1 GFR 45 LDL 64 Hgb 10.0.  9/4/20–CBC creat vit D–-creat 1.03 GFR 49 (Stable). Hgb 10.6 (stable). Vit D normal at 43.8.  11/10/20 per Kayli APRN--Hgb 11 fe sat ferriting nl .      Lab ord daily. 3 Inhaler 3   • lactulose 10 GM/15ML Oral Solution Take 2 tbsp three times a day until passing bowel movements.  1 Bottle 1   • SENNA-PLUS 8.6-50 MG Oral Tab TAKE 2 TABLETS BY MOUTH TWICE DAILY AS NEEDED FOR CONSTIPATION 100 tablet 6   • Polyethylene

## 2021-02-19 NOTE — TELEPHONE ENCOUNTER
To nursing, please tell patient lab results of today are okay. Her anemia is improved from when she was in the hospital last month. Her white blood cell count is now normal.  Thanks. Note to self–  2/19/21–CBC CMP–Hgb 10. 1–improved from 8.9 1/9/21.   Phyllis Graham

## 2021-02-22 ENCOUNTER — TELEPHONE (OUTPATIENT)
Dept: INTERNAL MEDICINE CLINIC | Facility: CLINIC | Age: 86
End: 2021-02-22

## 2021-02-23 RX ORDER — PROMETHAZINE HYDROCHLORIDE AND CODEINE PHOSPHATE 6.25; 1 MG/5ML; MG/5ML
SOLUTION ORAL
Qty: 180 ML | Refills: 1 | Status: SHIPPED | OUTPATIENT
Start: 2021-02-23 | End: 2021-08-16

## 2021-02-23 NOTE — TELEPHONE ENCOUNTER
Electronic refill sent to [address] [place] [address]  Promethazine–codeine 6.25-10 mg / 5 mL oral solution take 1/2 teaspoon q 6 hrs prn cough. 180 mL with 1 refill.

## 2021-02-23 NOTE — TELEPHONE ENCOUNTER
Promethazine/Codeine not on active med list. D/c'd by hospitalist Dr. Xiomy Camacho on 1/9/21; reason: \"stop taking at discharge\". Patient reports that she would like to have RX on hand for her \"chronic cough\".  She likes to take it when going to [facility]

## 2021-02-26 ENCOUNTER — TELEPHONE (OUTPATIENT)
Dept: PHYSICAL THERAPY | Facility: HOSPITAL | Age: 86
End: 2021-02-26

## 2021-03-01 ENCOUNTER — APPOINTMENT (OUTPATIENT)
Dept: PHYSICAL THERAPY | Age: 86
End: 2021-03-01
Attending: PHYSICAL MEDICINE & REHABILITATION
Payer: MEDICARE

## 2021-03-02 ENCOUNTER — TELEPHONE (OUTPATIENT)
Dept: INTERNAL MEDICINE CLINIC | Facility: CLINIC | Age: 86
End: 2021-03-02

## 2021-03-02 RX ORDER — ASPIRIN 81 MG
TABLET, DELAYED RELEASE (ENTERIC COATED) ORAL
Qty: 100 TABLET | Refills: 5 | Status: SHIPPED | OUTPATIENT
Start: 2021-03-02 | End: 2021-04-20 | Stop reason: ALTCHOICE

## 2021-03-02 RX ORDER — LACTULOSE 10 G/15ML
SOLUTION ORAL
Qty: 236 ML | Refills: 1 | Status: SHIPPED | OUTPATIENT
Start: 2021-03-02 | End: 2021-04-28

## 2021-03-02 NOTE — TELEPHONE ENCOUNTER
Refill sent to [facility]  Lactulose 10 g/15 mL take 2 tablespoons 3 times daily until passing BMs. Dispense 236 mL with 1 refill.

## 2021-03-02 NOTE — TELEPHONE ENCOUNTER
To Dr. Nargis Wyatt - see pended, 2/21/21 office visit note. Pt confirms she would like refill - takes only as needed. Pt asking for smaller amt - 2 smaller sizes available.   Pt also takes Senna

## 2021-03-03 ENCOUNTER — APPOINTMENT (OUTPATIENT)
Dept: OCCUPATIONAL MEDICINE | Facility: HOSPITAL | Age: 86
End: 2021-03-03
Attending: PHYSICAL MEDICINE & REHABILITATION
Payer: MEDICARE

## 2021-03-03 ENCOUNTER — APPOINTMENT (OUTPATIENT)
Dept: PHYSICAL THERAPY | Age: 86
End: 2021-03-03
Attending: PHYSICAL MEDICINE & REHABILITATION
Payer: MEDICARE

## 2021-03-08 ENCOUNTER — APPOINTMENT (OUTPATIENT)
Dept: PHYSICAL THERAPY | Age: 86
End: 2021-03-08
Payer: MEDICARE

## 2021-03-10 ENCOUNTER — APPOINTMENT (OUTPATIENT)
Dept: PHYSICAL THERAPY | Age: 86
End: 2021-03-10
Payer: MEDICARE

## 2021-03-15 ENCOUNTER — APPOINTMENT (OUTPATIENT)
Dept: PHYSICAL THERAPY | Age: 86
End: 2021-03-15
Payer: MEDICARE

## 2021-03-17 ENCOUNTER — APPOINTMENT (OUTPATIENT)
Dept: PHYSICAL THERAPY | Age: 86
End: 2021-03-17
Payer: MEDICARE

## 2021-03-18 ENCOUNTER — TELEPHONE (OUTPATIENT)
Dept: PHYSICAL THERAPY | Facility: HOSPITAL | Age: 86
End: 2021-03-18

## 2021-03-19 ENCOUNTER — APPOINTMENT (OUTPATIENT)
Dept: CT IMAGING | Facility: HOSPITAL | Age: 86
End: 2021-03-19
Attending: EMERGENCY MEDICINE
Payer: MEDICARE

## 2021-03-19 ENCOUNTER — HOSPITAL ENCOUNTER (EMERGENCY)
Facility: HOSPITAL | Age: 86
Discharge: HOME | End: 2021-03-20
Attending: EMERGENCY MEDICINE
Payer: MEDICARE

## 2021-03-19 DIAGNOSIS — S12.101A CLOSED NONDISPLACED FRACTURE OF SECOND CERVICAL VERTEBRA, UNSPECIFIED FRACTURE MORPHOLOGY, INITIAL ENCOUNTER (HCC): Primary | ICD-10-CM

## 2021-03-19 DIAGNOSIS — S01.311A COMPLEX LACERATION OF RIGHT EAR, INITIAL ENCOUNTER: ICD-10-CM

## 2021-03-19 LAB
ANION GAP SERPL CALC-SCNC: 7 MMOL/L (ref 0–18)
BASOPHILS # BLD AUTO: 0.03 X10(3) UL (ref 0–0.2)
BASOPHILS NFR BLD AUTO: 0.5 %
BUN BLD-MCNC: 18 MG/DL (ref 7–18)
BUN/CREAT SERPL: 19.6 (ref 10–20)
CALCIUM BLD-MCNC: 9.2 MG/DL (ref 8.5–10.1)
CHLORIDE SERPL-SCNC: 98 MMOL/L (ref 98–112)
CO2 SERPL-SCNC: 27 MMOL/L (ref 21–32)
CREAT BLD-MCNC: 0.92 MG/DL
DEPRECATED RDW RBC AUTO: 50 FL (ref 35.1–46.3)
EOSINOPHIL # BLD AUTO: 0.11 X10(3) UL (ref 0–0.7)
EOSINOPHIL NFR BLD AUTO: 1.9 %
ERYTHROCYTE [DISTWIDTH] IN BLOOD BY AUTOMATED COUNT: 15.1 % (ref 11–15)
GLUCOSE BLD-MCNC: 156 MG/DL (ref 70–99)
HCT VFR BLD AUTO: 30.8 %
HGB BLD-MCNC: 9.9 G/DL
IMM GRANULOCYTES # BLD AUTO: 0.05 X10(3) UL (ref 0–1)
IMM GRANULOCYTES NFR BLD: 0.9 %
LYMPHOCYTES # BLD AUTO: 1.14 X10(3) UL (ref 1–4)
LYMPHOCYTES NFR BLD AUTO: 19.7 %
MCH RBC QN AUTO: 28.9 PG (ref 26–34)
MCHC RBC AUTO-ENTMCNC: 32.1 G/DL (ref 31–37)
MCV RBC AUTO: 90.1 FL
MONOCYTES # BLD AUTO: 0.49 X10(3) UL (ref 0.1–1)
MONOCYTES NFR BLD AUTO: 8.5 %
NEUTROPHILS # BLD AUTO: 3.96 X10 (3) UL (ref 1.5–7.7)
NEUTROPHILS # BLD AUTO: 3.96 X10(3) UL (ref 1.5–7.7)
NEUTROPHILS NFR BLD AUTO: 68.5 %
OSMOLALITY SERPL CALC.SUM OF ELEC: 279 MOSM/KG (ref 275–295)
PLATELET # BLD AUTO: 271 10(3)UL (ref 150–450)
POTASSIUM SERPL-SCNC: 4.5 MMOL/L (ref 3.5–5.1)
RBC # BLD AUTO: 3.42 X10(6)UL
SODIUM SERPL-SCNC: 132 MMOL/L (ref 136–145)
WBC # BLD AUTO: 5.8 X10(3) UL (ref 4–11)

## 2021-03-19 PROCEDURE — 99285 EMERGENCY DEPT VISIT HI MDM: CPT

## 2021-03-19 PROCEDURE — 36415 COLL VENOUS BLD VENIPUNCTURE: CPT

## 2021-03-19 PROCEDURE — 70498 CT ANGIOGRAPHY NECK: CPT | Performed by: EMERGENCY MEDICINE

## 2021-03-19 PROCEDURE — 93005 ELECTROCARDIOGRAM TRACING: CPT

## 2021-03-19 PROCEDURE — 12011 RPR F/E/E/N/L/M 2.5 CM/<: CPT

## 2021-03-19 PROCEDURE — 70450 CT HEAD/BRAIN W/O DYE: CPT | Performed by: EMERGENCY MEDICINE

## 2021-03-19 PROCEDURE — 93010 ELECTROCARDIOGRAM REPORT: CPT | Performed by: EMERGENCY MEDICINE

## 2021-03-19 PROCEDURE — 85025 COMPLETE CBC W/AUTO DIFF WBC: CPT | Performed by: EMERGENCY MEDICINE

## 2021-03-19 PROCEDURE — 72125 CT NECK SPINE W/O DYE: CPT | Performed by: EMERGENCY MEDICINE

## 2021-03-19 PROCEDURE — 80048 BASIC METABOLIC PNL TOTAL CA: CPT | Performed by: EMERGENCY MEDICINE

## 2021-03-19 NOTE — ED INITIAL ASSESSMENT (HPI)
Pt brought via ems for a fall,pt reports that she stumbled and fell, pt reports she hit her head on a cabinet, denies loc, +eliquis use. Pt right ear laceration, bleeding controlled. Pt reports neck pain, pt arrived in ccollar.

## 2021-03-20 VITALS
RESPIRATION RATE: 18 BRPM | TEMPERATURE: 98 F | SYSTOLIC BLOOD PRESSURE: 183 MMHG | HEIGHT: 59 IN | DIASTOLIC BLOOD PRESSURE: 61 MMHG | WEIGHT: 132 LBS | HEART RATE: 80 BPM | BODY MASS INDEX: 26.61 KG/M2 | OXYGEN SATURATION: 96 %

## 2021-03-20 NOTE — ED NOTES
Patient and son requested a different size aspen collar because patient was uncomfortable. New Adult 2 1/4\" collar placed and patient states she is more comfortable. Patient sitting upright in bed and awaiting discharge.

## 2021-03-20 NOTE — ED PROVIDER NOTES
Patient Seen in: [facility] Emergency Department      History   Patient presents with:  Fall    Stated Complaint: fall    HPI/Subjective:   HPI    The patient is an 40-year-old female who lost her balance and stumbled striking her head on a dresse rare Chaparro. Saw Dr. Samanta Carrera.    • Type 2 diabetes mellitus ([facility] 2001   • Uterine prolapse    • Uterine prolapse 2006    had pessary in 2006 Dr. Hanny Villegas sling procedure Dr Karthik Khan in 6/2016   • UTI due to Klebsiella species 12/2016    ESBL Earlene Sheehan Constitutional:       General: She is not in acute distress. Appearance: Normal appearance. She is well-developed. She is not ill-appearing. HENT:      Head: Normocephalic and atraumatic.  No raccoon eyes, Hernandez's sign or contusion (No swelling or rash.   Neurological:      Mental Status: She is alert and oriented to person, place, and time. GCS: GCS eye subscore is 4. GCS verbal subscore is 5. GCS motor subscore is 6. Cranial Nerves: No cranial nerve deficit.       Sensory: No sensory defi explored. There were no deep structures involved. The wound was repaired with number two 5-0 nylon. The wound repair was simple. The procedure was performed by myself. Laceration repair:    Verbal consent was obtained from the patient.   The 1.5 c small hematoma. Dictated by (CST): Aleksandr Anand MD on 3/19/2021 at 8:20 PM     Finalized by (CST): Aleksandr Anand MD on 3/19/2021 at 8:25 PM          CT SPINE CERVICAL (CPT=72125)    Result Date: 3/19/2021  CONCLUSION:  1.  A minimally displaced fractu

## 2021-03-20 NOTE — ED PROVIDER NOTES
Signout from Dr. Camilo Matute pending CT listed below. Pt in rec'd collar already. Confirmed w/ pt and son she is on eliquis and plavix. D/w Dr. Renny Stearns and reviewed pt's medications and CT with him and he felt the pt is still safe for discharge home.

## 2021-03-22 ENCOUNTER — TELEPHONE (OUTPATIENT)
Dept: PHYSICAL THERAPY | Facility: HOSPITAL | Age: 86
End: 2021-03-22

## 2021-03-22 ENCOUNTER — OFFICE VISIT (OUTPATIENT)
Dept: INTERNAL MEDICINE CLINIC | Facility: CLINIC | Age: 86
End: 2021-03-22
Payer: COMMERCIAL

## 2021-03-22 VITALS
HEIGHT: 59 IN | WEIGHT: 132 LBS | BODY MASS INDEX: 26.61 KG/M2 | HEART RATE: 80 BPM | SYSTOLIC BLOOD PRESSURE: 144 MMHG | DIASTOLIC BLOOD PRESSURE: 68 MMHG | TEMPERATURE: 98 F

## 2021-03-22 DIAGNOSIS — S12.101D CLOSED NONDISPLACED FRACTURE OF SECOND CERVICAL VERTEBRA WITH ROUTINE HEALING, UNSPECIFIED FRACTURE MORPHOLOGY, SUBSEQUENT ENCOUNTER: Primary | ICD-10-CM

## 2021-03-22 DIAGNOSIS — S01.311D LACERATION OF RIGHT EAR, SUBSEQUENT ENCOUNTER: ICD-10-CM

## 2021-03-22 DIAGNOSIS — I77.74 DISSECTION, VERTEBRAL ARTERY (HCC): ICD-10-CM

## 2021-03-22 PROCEDURE — 3078F DIAST BP <80 MM HG: CPT | Performed by: INTERNAL MEDICINE

## 2021-03-22 PROCEDURE — 3077F SYST BP >= 140 MM HG: CPT | Performed by: INTERNAL MEDICINE

## 2021-03-22 PROCEDURE — 99215 OFFICE O/P EST HI 40 MIN: CPT | Performed by: INTERNAL MEDICINE

## 2021-03-22 PROCEDURE — 3008F BODY MASS INDEX DOCD: CPT | Performed by: INTERNAL MEDICINE

## 2021-03-22 NOTE — PROGRESS NOTES
Tiarra James is a 80year old female who presents for     Here with her son Clarice [place]  ER f/u  Seen in ER 3/19/21. Had fallen at home, lost balance (no LOC), and hit her head on a cabinet.   She had pain on the right side of her neck right ear had a laceratio cscopy '03, '07, '10   • Fracture of C2 vertebra, closed ([facility] [place] [address] 03/2021   • GERD (gastroesophageal reflux disease)    • Glaucoma    • Hyperlipidemia    • Hypertension, essential    • Irritable bowel syndrome    • Lumbar stenosis 2004    MRI lumbar 2004-djd SLING  2016    vag sling procedure at [facility] -Dr Lyly Stokes      Family History   Problem Relation Age of Onset   • Stroke Father         cause of death-age77   • Diabetes Mother          age 59   • Diabetes Brother    • Heart Disease Brother cervical vertebra with routine healing, unspecified fracture morphology, subsequent encounter  ER 3/19/21--CT cervical spine-- fracture C2 posterior lateral vertebral body and left C2 lamina. Pt is wearing [place] collar. ER spoke to Dr Bret Elizabeth.  Pt's s OT.    Chronic anemia--hgb 9.9 on 3/19/21--was 8.9 on 1/9/21 in hosp for covid. fe sat ferriting nl on 11/10/20 per Cristy Medina APRN. EGD 10/14/20 Dr López--mild schatzki's ring or peptic esophageal stricture GE junction--left alone.  Mild–mod atrophic jordana nodular opacity LLL–could be pleuro-parenchymal scarring–\"F/U recommended. \"    Vit d deficiency-12/10/19–Vitamin D 18. 8. added vitamin D 2000 units daily. Ck level. Hx UTI-lab 11/21/19--UA--167 wbc, 21 rbc. Ucx >100K E. Coli. Rx Macrobid.   Prior UTI 43.8.  11/10/20 per Erasto Baugh APRN--Hgb 11 fe sat ferriting nl .  2/19/21–CBC CMP–Hgb 10. 1–improved from 8.9 1/9/21. WBC 5.1, impr from 3.1 on 1/9/21.  3/19/21–CBC BMP in ER–Hgb 9.9. Hx chronic anemia. Ret in 1 wk-remove sutures.  .  (Consider booster TAKE 1 TABLET BY MOUTH EVERY MORNING BEFORE BREAKFAST 90 tablet 3   • Clopidogrel Bisulfate 75 MG Oral Tab Take 1 tablet (75 mg total) by mouth daily.  30 tablet 11   • Hydrocortisone 2.5 % External Cream APPLY RECTALLY TWICE DAILY AS NEEDED 30 g 2   • ASHVIN

## 2021-03-22 NOTE — CM/SW NOTE
Called by son Magi Gooden stating \"My mom needs rehab\"    Referrals made in [place]    Spoke with [employer] who stated \"we need Pt and OT evals to submit for ins authorization and a COVID screening\"    Discussed with patients son    Son cheyenne guerita

## 2021-03-23 ENCOUNTER — SNF ADMIT/H&P (OUTPATIENT)
Dept: INTERNAL MEDICINE CLINIC | Facility: SKILLED NURSING FACILITY | Age: 86
End: 2021-03-23

## 2021-03-23 ENCOUNTER — APPOINTMENT (OUTPATIENT)
Dept: OCCUPATIONAL MEDICINE | Facility: HOSPITAL | Age: 86
End: 2021-03-23
Attending: PHYSICAL MEDICINE & REHABILITATION
Payer: MEDICARE

## 2021-03-23 ENCOUNTER — HOSPITAL ENCOUNTER (EMERGENCY)
Facility: HOSPITAL | Age: 86
Discharge: HOME | End: 2021-03-23
Attending: EMERGENCY MEDICINE
Payer: MEDICARE

## 2021-03-23 VITALS
BODY MASS INDEX: 26.21 KG/M2 | HEIGHT: 59 IN | HEART RATE: 83 BPM | TEMPERATURE: 98 F | RESPIRATION RATE: 16 BRPM | SYSTOLIC BLOOD PRESSURE: 168 MMHG | WEIGHT: 130 LBS | DIASTOLIC BLOOD PRESSURE: 82 MMHG | OXYGEN SATURATION: 96 %

## 2021-03-23 DIAGNOSIS — S12.101D CLOSED NONDISPLACED FRACTURE OF SECOND CERVICAL VERTEBRA WITH ROUTINE HEALING, UNSPECIFIED FRACTURE MORPHOLOGY, SUBSEQUENT ENCOUNTER: ICD-10-CM

## 2021-03-23 DIAGNOSIS — R53.1 WEAKNESS: ICD-10-CM

## 2021-03-23 DIAGNOSIS — S12.101D CLOSED NONDISPLACED FRACTURE OF SECOND CERVICAL VERTEBRA WITH ROUTINE HEALING, UNSPECIFIED FRACTURE MORPHOLOGY, SUBSEQUENT ENCOUNTER: Primary | ICD-10-CM

## 2021-03-23 DIAGNOSIS — I77.74 VERTEBRAL ARTERY DISSECTION (HCC): ICD-10-CM

## 2021-03-23 LAB — SARS-COV-2 RNA RESP QL NAA+PROBE: NOT DETECTED

## 2021-03-23 PROCEDURE — 97166 OT EVAL MOD COMPLEX 45 MIN: CPT

## 2021-03-23 PROCEDURE — 99310 SBSQ NF CARE HIGH MDM 45: CPT | Performed by: NURSE PRACTITIONER

## 2021-03-23 PROCEDURE — 97162 PT EVAL MOD COMPLEX 30 MIN: CPT

## 2021-03-23 PROCEDURE — 1123F ACP DISCUSS/DSCN MKR DOCD: CPT | Performed by: NURSE PRACTITIONER

## 2021-03-23 PROCEDURE — 99285 EMERGENCY DEPT VISIT HI MDM: CPT

## 2021-03-23 RX ORDER — HYDROCODONE BITARTRATE AND ACETAMINOPHEN 5; 325 MG/1; MG/1
1 TABLET ORAL ONCE
Status: COMPLETED | OUTPATIENT
Start: 2021-03-23 | End: 2021-03-23

## 2021-03-23 NOTE — ED PROVIDER NOTES
Patient Seen in: [facility] Emergency Department      History   Patient presents with:   Other    Stated Complaint: SNF placement    HPI/Subjective:   HPI    Patient presents to the emergency department with son for placement in skilled nursing fac monitor 2/2016-SVT   • Syncope 05/2020    48-hour Holter 5/19/20--bursts of atrial tachycardia up to 3 minutes, PVCs, rare Wenkebach. Saw Dr. Joan Calderón.    • Type 2 diabetes mellitus ([facility] [address] 2001   • Uterine prolapse    • Uterine prolapse 2006    had pessary i Physical Exam  Vitals and nursing note reviewed. Constitutional:       General: She is not in acute distress. Appearance: She is well-developed. HENT:      Head: Normocephalic.       Nose: Nose normal.      Mouth/Throat:      Mouth: Mucous m healing, unspecified fracture morphology, subsequent encounter  (primary encounter diagnosis)    Disposition:  Discharge  3/23/2021  2:30 pm    Follow-up:  Roger Miles MD  [address] [place] [address]

## 2021-03-23 NOTE — ED INITIAL ASSESSMENT (HPI)
Pt here for rehab placement, pt currently has a c spine fracture, pt's son spoke with case management regarding pt to placed in rehab. Pt denies any new complaints today.

## 2021-03-23 NOTE — PROGRESS NOTES
HPI: Madai Oliveira  Is an 79 yo female who presented to [address] [place] [address] ER 3/19/21 after a fall hitting her head and was found to have a a C2 nondisplaced fracture and laceration of the right ear.  CT brain no acute intracranial finding except right occipital scalp l LLL nodule resolved on serial CT scans   • Osteoarthritis    • Osteopenia    • PAF (paroxysmal atrial fibrillation) ([facility] [address] 01/2021   • Pneumonia due to COVID-19 virus 01/2021    hosp and rec'd conv plasma and remdesivir.    • Renal calculus 2010    on CT abd Brother         cause of death   • Cancer Brother          of pancreas or lung cancer   • Other (5 children [Other]) Other    • Glaucoma Brother    • Other (intestinal blockage [Other]) Brother         cause of death age 80   • Stroke Sister         st body and left C2 lamina.     [place] collar  F/u Dr Norm Bowling- nursing to schedule  Imaging reviewed by dr Norm Bowling per ER  Tylenol for pain control    Dissection, vertebral artery   -CTA carotid arteries–ER 3/19/21--subacute appearing dissection flap involvin Marcelo Purpura Au.D on 6/11/18 recom hearing aides. hx eardrum surgery and possibly has a tube L eardrum.     L carotid bruit--8/19/19–carotid Dopplers– bilateral plaque, no significant stenosis    GUI Becker    03/23/21   3:39 PM

## 2021-03-23 NOTE — PHYSICAL THERAPY NOTE
PHYSICAL THERAPY EVALUATION - INPATIENT     Room Number: 46/46  Evaluation Date: 3/23/2021  Type of Evaluation: Initial   Physician Order: PT Eval and Treat    Presenting Problem: C2 fx; unable to manage at home  Reason for Therapy: Mobility Dysfunction a below her functional mobility baseline. She is NOT safe to return home.  Recommend sub-acute rehab at hospital d/c.     DISCHARGE RECOMMENDATIONS  PT Discharge Recommendations: Sub-acute rehabilitation    PLAN  PT Treatment Plan: Bed mobility;Transfer train hospitalizations for [id number], 6/2016   • SVT (supraventricular tachycardia) ([facility] [address]     event monitor 2/2016-SVT   • Syncope 05/2020    48-hour Holter 5/19/20--bursts of atrial tachycardia up to 3 minutes, PVCs, rare Wenkebach. Saw Dr. Joan Calderón.    • Ty Spine;Cervical brace ([place] collar)  Fall Risk: High fall risk    WEIGHT BEARING RESTRICTION  Weight Bearing Restriction: None                PAIN ASSESSMENT  Rating:  (did not rate)  Location: shoulder  Management Techniques: Activity promotion; Body mecha met;Call light within reach;RN aware of session/findings; All patient questions and concerns addressed; Family present; Discussed recommendations with /    CURRENT GOALS    Goals to be met by: 4/6/21  Patient Goal Patient's self-state

## 2021-03-23 NOTE — CM/SW NOTE
Spoke to Dr Anabel Garcia to see if he could see the pt in the ED but he is in the office. The pt has an appointment at [phone] today in [facility]. He mentioned that the pt will probably need long term care.     I spoke to Jenn Hernandez at [facility] and asked if we co

## 2021-03-23 NOTE — CM/SW NOTE
Per Madan Lamas at [facility] the pt can leave the ED at 2pm to go to [facility]. Contacted [facility] and medicar will arrive at 1430. PCS completed.

## 2021-03-23 NOTE — CM/SW NOTE
Spoke to Geovanna Dave at [facility] and he is waiting for the PT notes and COVID test results. As soon as we have the notes EDCM will place in [place].

## 2021-03-23 NOTE — CM/SW NOTE
PT notified of needed eval. They are going over their schedules for the day and will let us know ETA.

## 2021-03-23 NOTE — OCCUPATIONAL THERAPY NOTE
OCCUPATIONAL THERAPY EVALUATION - INPATIENT     Room Number: 46/46  Evaluation Date: 3/23/2021  Type of Evaluation: Initial       Physician Order: IP Consult to Occupational Therapy  Reason for Therapy: ADL/IADL Dysfunction and Discharge [address] [place] [address] Recommendations: TBD    PLAN  OT Treatment Plan: Balance activities; Energy conservation/work simplification techniques;ADL training;Functional transfer training; Endurance training;Patient/Family education;Patient/Family training;Equipment eval/education; Co sling procedure Dr Radha Mcrae in 6/2016   • UTI due to Klebsiella species 12/2016    ESBL Klebsiella UTI treated in [facility] with meropenem when in hosp for enteritis. (Dr Tyson Ferreira).         Past Surgical History  Past Surgical History:   Procedure Laterality Date perceived pain.      RANGE OF MOTION   B elbow flexion/extension WFL     STRENGTH ASSESSMENT   strength in B hands WFL    ACTIVITIES OF DAILY LIVING ASSESSMENT  AM-PAC ‘6-Clicks’ Inpatient Daily Activity Short Form  How much help from another person ching Comment:    Patient will complete item retrieval with sba  Comment:          Goals  on: 3/30  Frequency: 3-5x week    Cheryl Banda OTR/L  [facility]   #[phone]

## 2021-03-23 NOTE — ED NOTES
Report given to Sherif Olivares @ [facility] pt going to room [address] coming at 1430

## 2021-03-23 NOTE — ED NOTES
80year old female here for NH placement, pt has neck fracture after fall last week and needs more assistance than she can get at home

## 2021-03-24 ENCOUNTER — TELEPHONE (OUTPATIENT)
Dept: INTERNAL MEDICINE CLINIC | Facility: CLINIC | Age: 86
End: 2021-03-24

## 2021-03-24 ENCOUNTER — PATIENT MESSAGE (OUTPATIENT)
Dept: INTERNAL MEDICINE CLINIC | Facility: CLINIC | Age: 86
End: 2021-03-24

## 2021-03-24 NOTE — TELEPHONE ENCOUNTER
I sent pt's son Carlin Kennedy a my chart response:  Hi Nicholas, good to hear your mother is now at [facility]. [facility] has their own staff doctors, so I am not able to manage her care there.     Please contact her physician there directly, Dr Thao Navarro, who is man

## 2021-03-24 NOTE — TELEPHONE ENCOUNTER
----- Message from Lizbet Stiles sent at 3/24/2021 10:22 AM CDT -----  Regarding: Prescription Question  Contact: [phone]  I have a question about COMP METABOLIC PANEL (14) resulted on 3/24/21 at 9:00 AM.  This is her son, Willian Glass.   Thank you for your

## 2021-03-25 ENCOUNTER — EXTERNAL FACILITY (OUTPATIENT)
Dept: INTERNAL MEDICINE CLINIC | Facility: CLINIC | Age: 86
End: 2021-03-25

## 2021-03-25 ENCOUNTER — APPOINTMENT (OUTPATIENT)
Dept: OCCUPATIONAL MEDICINE | Facility: HOSPITAL | Age: 86
End: 2021-03-25
Payer: MEDICARE

## 2021-03-25 DIAGNOSIS — G56.03 BILATERAL CARPAL TUNNEL SYNDROME: ICD-10-CM

## 2021-03-25 DIAGNOSIS — I25.10 CORONARY ARTERY DISEASE INVOLVING NATIVE CORONARY ARTERY OF NATIVE HEART WITHOUT ANGINA PECTORIS: ICD-10-CM

## 2021-03-25 DIAGNOSIS — E78.00 HYPERCHOLESTEROLEMIA: ICD-10-CM

## 2021-03-25 DIAGNOSIS — M15.9 PRIMARY OSTEOARTHRITIS INVOLVING MULTIPLE JOINTS: ICD-10-CM

## 2021-03-25 DIAGNOSIS — G62.9 NEUROPATHY: ICD-10-CM

## 2021-03-25 DIAGNOSIS — H91.93 BILATERAL HEARING LOSS, UNSPECIFIED HEARING LOSS TYPE: ICD-10-CM

## 2021-03-25 DIAGNOSIS — I10 ESSENTIAL HYPERTENSION: ICD-10-CM

## 2021-03-25 PROCEDURE — 99306 1ST NF CARE HIGH MDM 50: CPT | Performed by: INTERNAL MEDICINE

## 2021-03-25 NOTE — PROGRESS NOTES
pt seen 3/25/21 at [facility]     seen in room, with complain of neck pain only on Tylenol which is not heling much  will add Tylenol with codeine and see how she does if not better will adjust the pain med gain     whos notes seen     will follow close       H disease)   • Glaucoma   • Hyperlipidemia   • Hypertension, essential   • Irritable bowel syndrome   • Lumbar stenosis 2004   MRI lumbar 2004-djd spinal stenosis-xray lumbar 12/13-DJD scoliosis, wedge comp T8, 9, 10, L1  • Lung nodule 4/06-3/03   LLL nodule death-age75  • Diabetes Mother         age 59  • Diabetes Brother   • Heart Disease Brother        cause of death-age 62  • Diabetes Sister   • Stroke Sister        cause of death age 66  • Heart Attack Brother        cause of death  • Cancer Brother appropriate      ASSESSMENT/PLAN  PT/OT/ST eval  Cbc cmp in am  F/u Dr Yvette Hamlin- nursing to schedule    Closed nondisplaced fracture of second cervical vertebra   ER 3/19/21--CT cervical spine-- fracture C2 posterior lateral vertebral body and left C2 lami compression fracture T11    Osteoporosis  vit d, fosamax    Constipation/possible IBS/ hx recurrent partial SBOs/GERD  Cont laxatives  Cont ppi  F/u GI    CKD3  Monitor bmp bun 18 creat 0.92 3/19    Bilateral hearing loss  Dr. Genna Damon and Rip Moore Au.D o

## 2021-03-26 ENCOUNTER — TELEPHONE (OUTPATIENT)
Dept: INTERNAL MEDICINE CLINIC | Facility: CLINIC | Age: 86
End: 2021-03-26

## 2021-03-26 NOTE — TELEPHONE ENCOUNTER
Daughter, Karla English, called back and informed  that she no longer needs a call back.  \"Everything has been taken care of.\"

## 2021-03-26 NOTE — TELEPHONE ENCOUNTER
I spoke with Eloy Dominguez. Jackson Emmanuel says patient is at [facility] and patient wishes to stay there. Jackson Emmanuel says she sounds better than yesterday. To Dr. Geeta Miranda.

## 2021-03-26 NOTE — TELEPHONE ENCOUNTER
Please call pt daughter, Arelis Cabot  Pt had asked daughter to call to ask Dr Moses Crenshaw to call her  She is in so much pain from rehab  Has been trying to do just can't  Doesn't think [facility] can respond quickly enough either when she needs assistance  Maybe

## 2021-03-29 ENCOUNTER — TELEPHONE (OUTPATIENT)
Dept: INTERNAL MEDICINE CLINIC | Facility: CLINIC | Age: 86
End: 2021-03-29

## 2021-03-29 ENCOUNTER — TELEPHONE (OUTPATIENT)
Dept: PHYSICAL THERAPY | Facility: HOSPITAL | Age: 86
End: 2021-03-29

## 2021-03-29 ENCOUNTER — SNF VISIT (OUTPATIENT)
Dept: INTERNAL MEDICINE CLINIC | Facility: SKILLED NURSING FACILITY | Age: 86
End: 2021-03-29

## 2021-03-29 ENCOUNTER — APPOINTMENT (OUTPATIENT)
Dept: OCCUPATIONAL MEDICINE | Facility: HOSPITAL | Age: 86
End: 2021-03-29
Attending: PHYSICAL MEDICINE & REHABILITATION
Payer: MEDICARE

## 2021-03-29 DIAGNOSIS — R53.1 WEAKNESS: ICD-10-CM

## 2021-03-29 DIAGNOSIS — K59.00 CONSTIPATION, UNSPECIFIED CONSTIPATION TYPE: ICD-10-CM

## 2021-03-29 DIAGNOSIS — S12.101D CLOSED NONDISPLACED FRACTURE OF SECOND CERVICAL VERTEBRA WITH ROUTINE HEALING, UNSPECIFIED FRACTURE MORPHOLOGY, SUBSEQUENT ENCOUNTER: ICD-10-CM

## 2021-03-29 DIAGNOSIS — N18.9 CHRONIC KIDNEY DISEASE, UNSPECIFIED CKD STAGE: ICD-10-CM

## 2021-03-29 PROCEDURE — 99309 SBSQ NF CARE MODERATE MDM 30: CPT | Performed by: NURSE PRACTITIONER

## 2021-03-29 NOTE — PROGRESS NOTES
HPI: Stevo Thornton  Is an 79 yo female who presented to [address] [place] [address] ER 3/19/21 after a fall hitting her head and was found to have a a C2 nondisplaced fracture and laceration of the right ear.  CT brain no acute intracranial finding except right occipital scalp l 4/06-3/03    LLL nodule resolved on serial CT scans   • Osteoarthritis    • Osteopenia    • PAF (paroxysmal atrial fibrillation) ([facility] [address] 01/2021   • Pneumonia due to COVID-19 virus 01/2021    hosp and rec'd conv plasma and remdesivir.    • Renal calculus 2010 MEDICATIONS: Reviewed on SNF EMR  VITALS: Reviewed   LABS/Imaging: Reviewed     SUBJECTIVE/REVIEW OF SYSTEMS: Seen in the morning. C/o pain in neck, waiting for pain pill. No bm in 4 days.  Did not eat breakfast because her brace is bothering her and feels patent. Dissection is most likely posttraumatic as there is an adjacent acute minimally displaced R C2 vertebral body fracture.   - Imaging reviewed by dr Chloe Villagran per ER, To see neurosurgery, Dr Chloe Villagran - nursing to schedule    [facility] ear laceration  S/u s

## 2021-03-29 NOTE — TELEPHONE ENCOUNTER
Son requesting to know if patient is able to obtain a new neck bracelet. Patient has been struggling to eat and [facility] is taking the initiative to order a new one or assist patient. Patient has had the same neck bracelet since 03/19 and might need a new one. Son is not sure who to reach out for help. Please advise.

## 2021-03-29 NOTE — TELEPHONE ENCOUNTER
I spoke with nurse practitioner at the [facility] [address]  she will call the son  to let him know that she has follow up with neurosurgery 3/31 and he  will need to change to c-collar if he thinks that's what is needed  in the meantime told her that the staff needs to help patient with drinking and eating. Labs noted from today sodium is slightly on the low side will repeat in 2 days.

## 2021-03-30 ENCOUNTER — TELEPHONE (OUTPATIENT)
Dept: INTERNAL MEDICINE CLINIC | Facility: CLINIC | Age: 86
End: 2021-03-30

## 2021-03-30 PROCEDURE — 99309 SBSQ NF CARE MODERATE MDM 30: CPT | Performed by: INTERNAL MEDICINE

## 2021-03-30 NOTE — TELEPHONE ENCOUNTER
To Dr. Sarah green -see below. Spoke with pt who would like more home health care while at [facility] to help with ADL as she is quite limited.    Advised pt to speak with [employer] worker who might be able to arrange for additional care - keyla

## 2021-03-30 NOTE — TELEPHONE ENCOUNTER
Pt. Is at [facility] and would like to speak with VASU Suarez regarding a broken bone in her neck pt is in excruciating pain ph.  # [phone]   Routed to clinical

## 2021-03-31 ENCOUNTER — TELEPHONE (OUTPATIENT)
Dept: INTERNAL MEDICINE CLINIC | Facility: CLINIC | Age: 86
End: 2021-03-31

## 2021-03-31 ENCOUNTER — APPOINTMENT (OUTPATIENT)
Dept: OCCUPATIONAL MEDICINE | Facility: HOSPITAL | Age: 86
End: 2021-03-31
Attending: PHYSICAL MEDICINE & REHABILITATION
Payer: MEDICARE

## 2021-03-31 ENCOUNTER — EXTERNAL FACILITY (OUTPATIENT)
Dept: INTERNAL MEDICINE CLINIC | Facility: CLINIC | Age: 86
End: 2021-03-31

## 2021-03-31 ENCOUNTER — SNF VISIT (OUTPATIENT)
Dept: INTERNAL MEDICINE CLINIC | Facility: SKILLED NURSING FACILITY | Age: 86
End: 2021-03-31

## 2021-03-31 DIAGNOSIS — E11.22 CKD STAGE 3 DUE TO TYPE 2 DIABETES MELLITUS (HCC): ICD-10-CM

## 2021-03-31 DIAGNOSIS — E87.1 HYPONATREMIA: ICD-10-CM

## 2021-03-31 DIAGNOSIS — S12.101D CLOSED NONDISPLACED FRACTURE OF SECOND CERVICAL VERTEBRA WITH ROUTINE HEALING, UNSPECIFIED FRACTURE MORPHOLOGY, SUBSEQUENT ENCOUNTER: ICD-10-CM

## 2021-03-31 DIAGNOSIS — I25.10 CORONARY ARTERY DISEASE INVOLVING NATIVE CORONARY ARTERY OF NATIVE HEART WITHOUT ANGINA PECTORIS: ICD-10-CM

## 2021-03-31 DIAGNOSIS — I10 ESSENTIAL HYPERTENSION: ICD-10-CM

## 2021-03-31 DIAGNOSIS — G56.03 BILATERAL CARPAL TUNNEL SYNDROME: ICD-10-CM

## 2021-03-31 DIAGNOSIS — N18.30 CKD STAGE 3 DUE TO TYPE 2 DIABETES MELLITUS (HCC): ICD-10-CM

## 2021-03-31 PROCEDURE — 99309 SBSQ NF CARE MODERATE MDM 30: CPT | Performed by: NURSE PRACTITIONER

## 2021-03-31 NOTE — PROGRESS NOTES
HPI: Bjorn Limon  Is an 81 yo female who presented to [address] [place] [address] ER 3/19/21 after a fall hitting her head and was found to have a a C2 nondisplaced fracture and laceration of the right ear.  CT brain no acute intracranial finding except right occipital scalp l • Lung nodule 4/06-3/03    LLL nodule resolved on serial CT scans   • Osteoarthritis    • Osteopenia    • PAF (paroxysmal atrial fibrillation) ([facility] [address] 01/2021   • Pneumonia due to COVID-19 virus 01/2021    hosp and rec'd conv plasma and remdesivir.    • [place] SYSTEMS: Seen in room. Neck brace is loose/ not fitting properly. Patient is not comfortable when therapists have tightened it. Being assisted with feedings because of pain and discomfort with brace. Seeing her neurosurgeon today. No sob.  No cp/palpitation grossly patent. Dissection is most likely posttraumatic as there is an adjacent acute minimally displaced R C2 vertebral body fracture.   - Imaging reviewed by dr Yuri Casey per ER, To see neurosurgery, Dr Yuri Casey - nursing to schedule    [facility] ear lacjob

## 2021-03-31 NOTE — TELEPHONE ENCOUNTER
Pt's son Jg Thakkar called. He states his mother is in [facility] [employer] with a fracture in her neck and back. Pt. And family are not happy with [employer]. They forgot to feed her today. They cancelled a Dr. Vania Jimenez. Without telling the pt.   Son asking if his Mom can

## 2021-03-31 NOTE — TELEPHONE ENCOUNTER
To nursing, please call patient's son and tell him he should talk to the  at [facility] about [facility] for [facility]. They are the ones who would help arrange this. Thanks.

## 2021-03-31 NOTE — PROGRESS NOTES
pt seen 3/30/2021 at [facility]    seen in room sitting in chair, c collar on , pt has been having a lot of discomfort due to the c collar , she has appointment with NS on 3/31/21 so will address the size and fitting on the c collar    staff aware to help pt wi there is an adjacent acute minimally displaced R C2 vertebral body fracture.   - Imaging reviewed by dr Fadi Lester per ER, To see neurosurgery, Dr Fadi Lester - nursing to schedule    [facility] ear laceration  S/u suture x 7-10days  Remove sutures Monday 3/29/21 by branden

## 2021-04-01 ENCOUNTER — EXTERNAL FACILITY (OUTPATIENT)
Dept: INTERNAL MEDICINE CLINIC | Facility: CLINIC | Age: 86
End: 2021-04-01

## 2021-04-01 DIAGNOSIS — E11.22 CKD STAGE 3 DUE TO TYPE 2 DIABETES MELLITUS (HCC): ICD-10-CM

## 2021-04-01 DIAGNOSIS — I25.10 CORONARY ARTERY DISEASE INVOLVING NATIVE CORONARY ARTERY OF NATIVE HEART WITHOUT ANGINA PECTORIS: ICD-10-CM

## 2021-04-01 DIAGNOSIS — E87.1 HYPONATREMIA: ICD-10-CM

## 2021-04-01 DIAGNOSIS — N18.30 CKD STAGE 3 DUE TO TYPE 2 DIABETES MELLITUS (HCC): ICD-10-CM

## 2021-04-01 DIAGNOSIS — I47.1 SVT (SUPRAVENTRICULAR TACHYCARDIA) (HCC): ICD-10-CM

## 2021-04-01 DIAGNOSIS — J44.9 ASTHMA WITH COPD (CHRONIC OBSTRUCTIVE PULMONARY DISEASE) (HCC): ICD-10-CM

## 2021-04-01 PROCEDURE — 99309 SBSQ NF CARE MODERATE MDM 30: CPT | Performed by: INTERNAL MEDICINE

## 2021-04-02 ENCOUNTER — APPOINTMENT (OUTPATIENT)
Dept: OCCUPATIONAL MEDICINE | Facility: HOSPITAL | Age: 86
End: 2021-04-02
Attending: PHYSICAL MEDICINE & REHABILITATION
Payer: MEDICARE

## 2021-04-02 ENCOUNTER — MED REC SCAN ONLY (OUTPATIENT)
Dept: INTERNAL MEDICINE CLINIC | Facility: CLINIC | Age: 86
End: 2021-04-02

## 2021-04-02 NOTE — PROGRESS NOTES
follow up , no complains , follow up on low na       SUBJECTIVE/REVIEW OF SYSTEMS:  feels ok ,     PHYSICAL EXAM:  GENERAL HEALTH:NAD  HEENT: R ear laceration with intact sutures, mucous membranes pink and moist, PERRLA, EOMI, sclera anicteric, conjunctiva 9/4/20 visit--didn't do emg. Dr Josie Alexander inj R wrist 12/2020 (improved) and in 2/2021--he ord OT. Chronic anemia  fe sat ferriting nl on 11/10/20    EGD 10/14/20 Dr López--mild schatzki's ring or peptic esophageal stricture GE junction--left alone.

## 2021-04-05 ENCOUNTER — SNF VISIT (OUTPATIENT)
Dept: INTERNAL MEDICINE CLINIC | Facility: SKILLED NURSING FACILITY | Age: 86
End: 2021-04-05

## 2021-04-05 ENCOUNTER — APPOINTMENT (OUTPATIENT)
Dept: OCCUPATIONAL MEDICINE | Facility: HOSPITAL | Age: 86
End: 2021-04-05
Attending: PHYSICAL MEDICINE & REHABILITATION
Payer: MEDICARE

## 2021-04-05 DIAGNOSIS — N30.00 ACUTE CYSTITIS WITHOUT HEMATURIA: ICD-10-CM

## 2021-04-05 DIAGNOSIS — E87.1 HYPONATREMIA: ICD-10-CM

## 2021-04-05 DIAGNOSIS — S12.101D CLOSED NONDISPLACED FRACTURE OF SECOND CERVICAL VERTEBRA WITH ROUTINE HEALING, UNSPECIFIED FRACTURE MORPHOLOGY, SUBSEQUENT ENCOUNTER: ICD-10-CM

## 2021-04-05 PROCEDURE — 99309 SBSQ NF CARE MODERATE MDM 30: CPT | Performed by: NURSE PRACTITIONER

## 2021-04-05 NOTE — PROGRESS NOTES
HPI: Christelle Zelaya  Is an 79 yo female who presented to [address] [place] [address] ER 3/19/21 after a fall hitting her head and was found to have a a C2 nondisplaced fracture and laceration of the right ear.  CT brain no acute intracranial finding except right occipital scalp l L1   • Lung nodule 4/06-3/03    LLL nodule resolved on serial CT scans   • Osteoarthritis    • Osteopenia    • PAF (paroxysmal atrial fibrillation) ([facility] [address] 01/2021   • Pneumonia due to COVID-19 virus 01/2021    hosp and rec'd conv plasma and remdesivir.    • SYSTEMS: Seen in room. More comfortable with neck brace, appears that she has a new brace. No sob. No cp/palpitations. No hematuria/dysruia/frequency. Afebrile.       PHYSICAL EXAM:  GENERAL HEALTH:NAD  HEENT:  mucous membranes pink and moist, PERRLA, EOMI, urine osmolality 535  4/5 na 125 started on sodium tabs 1g tid  1.2 L fluid restriction   BMP 4/7    UTI, Ecoli  macrobid     CKD3/chronic hyponatremia  Monitor bmp   Renal consult following  4/1  urine sodium 45 , urine osmolality 535  4/5 na 125 started

## 2021-04-06 PROCEDURE — 99308 SBSQ NF CARE LOW MDM 20: CPT | Performed by: INTERNAL MEDICINE

## 2021-04-07 ENCOUNTER — SNF VISIT (OUTPATIENT)
Dept: INTERNAL MEDICINE CLINIC | Facility: SKILLED NURSING FACILITY | Age: 86
End: 2021-04-07

## 2021-04-07 ENCOUNTER — APPOINTMENT (OUTPATIENT)
Dept: OCCUPATIONAL MEDICINE | Facility: HOSPITAL | Age: 86
End: 2021-04-07
Attending: PHYSICAL MEDICINE & REHABILITATION
Payer: MEDICARE

## 2021-04-07 ENCOUNTER — EXTERNAL FACILITY (OUTPATIENT)
Dept: INTERNAL MEDICINE CLINIC | Facility: CLINIC | Age: 86
End: 2021-04-07

## 2021-04-07 DIAGNOSIS — R60.0 EDEMA OF LOWER EXTREMITY: ICD-10-CM

## 2021-04-07 DIAGNOSIS — N18.30 CKD STAGE 3 DUE TO TYPE 2 DIABETES MELLITUS (HCC): ICD-10-CM

## 2021-04-07 DIAGNOSIS — G56.03 BILATERAL CARPAL TUNNEL SYNDROME: ICD-10-CM

## 2021-04-07 DIAGNOSIS — S12.101D CLOSED NONDISPLACED FRACTURE OF SECOND CERVICAL VERTEBRA WITH ROUTINE HEALING, UNSPECIFIED FRACTURE MORPHOLOGY, SUBSEQUENT ENCOUNTER: ICD-10-CM

## 2021-04-07 DIAGNOSIS — I47.1 SVT (SUPRAVENTRICULAR TACHYCARDIA) (HCC): ICD-10-CM

## 2021-04-07 DIAGNOSIS — R53.1 WEAKNESS: ICD-10-CM

## 2021-04-07 DIAGNOSIS — E87.1 HYPONATREMIA: ICD-10-CM

## 2021-04-07 DIAGNOSIS — I10 ESSENTIAL HYPERTENSION: ICD-10-CM

## 2021-04-07 DIAGNOSIS — I25.10 CORONARY ARTERY DISEASE INVOLVING NATIVE CORONARY ARTERY OF NATIVE HEART WITHOUT ANGINA PECTORIS: ICD-10-CM

## 2021-04-07 DIAGNOSIS — E11.22 CKD STAGE 3 DUE TO TYPE 2 DIABETES MELLITUS (HCC): ICD-10-CM

## 2021-04-07 DIAGNOSIS — E78.00 HYPERCHOLESTEROLEMIA: ICD-10-CM

## 2021-04-07 DIAGNOSIS — G62.9 NEUROPATHY: ICD-10-CM

## 2021-04-07 DIAGNOSIS — J44.9 ASTHMA WITH COPD (CHRONIC OBSTRUCTIVE PULMONARY DISEASE) (HCC): ICD-10-CM

## 2021-04-07 PROCEDURE — 99309 SBSQ NF CARE MODERATE MDM 30: CPT | Performed by: NURSE PRACTITIONER

## 2021-04-07 NOTE — PROGRESS NOTES
HPI: Christelle Zelaya  Is an 81 yo female who presented to [facility] ER 3/19/21 after a fall hitting her head and was found to have a a C2 nondisplaced fracture and laceration of the right ear.  CT brain no acute intracranial finding except right occipital scalp l L1   • Lung nodule 4/06-3/03    LLL nodule resolved on serial CT scans   • Osteoarthritis    • Osteopenia    • PAF (paroxysmal atrial fibrillation) ([facility] [address] 01/2021   • Pneumonia due to COVID-19 virus 01/2021    hosp and rec'd conv plasma and remdesivir.    • SYSTEMS: Seen in room. Increased edema of BLE. Patient not comfortable with tightness of the new neck brace as well and loosens it but it becomes too loose and it covers her mouth.  Brace has had to be adjusted daily by therapist. She has needs assistance w appearing dissection flap involving the distal R vertebral artery at the level of the R C2 foramen transversarium. This does not result in significant flow limitation more distal R vertebral artery and basilar artery appear grossly patent.   Dissection is 6/11/18 recom hearing aides. hx eardrum surgery and possibly has a tube L eardrum.     L carotid bruit--8/19/19–carotid Dopplers– bilateral plaque, no significant stenosis

## 2021-04-08 ENCOUNTER — TELEPHONE (OUTPATIENT)
Dept: INTERNAL MEDICINE CLINIC | Facility: CLINIC | Age: 86
End: 2021-04-08

## 2021-04-08 ENCOUNTER — PATIENT MESSAGE (OUTPATIENT)
Dept: INTERNAL MEDICINE CLINIC | Facility: CLINIC | Age: 86
End: 2021-04-08

## 2021-04-08 NOTE — TELEPHONE ENCOUNTER
To Dr. Nicola Collins - see message below. Spoke with son Melissa Guerrier who suggested I call pt for more detail. Pt cell phone: [phone]. L great toe on the bottom - tenderness. Pt unsure of onset - could not give too many details.   Advised office visit - spoke wi

## 2021-04-08 NOTE — TELEPHONE ENCOUNTER
To nursing, Pt is currently under the care of the medical staff at [facility] subacute rehab--Dr Kendall Griffin. She should show her toe to the nurse at [facility] today there so they can have Dr Wilfred Peterson or his nurse practicioner look at the toe.  She shouldn't

## 2021-04-08 NOTE — TELEPHONE ENCOUNTER
Spoke to patient and patients son Elijah Hernandez, relayed MD message. Patient & son verbalized understanding and agrees with plan. Instructed patient to call back with any questions or concerns. Spontaneous, unlabored and symmetrical

## 2021-04-08 NOTE — PROGRESS NOTES
pt seen 4/6/2021 at [facility]    seen in room, c collar on,       follow up , no complains , follow up on low na      latest nursing notes and vitals noted    latest labs noted        [address]:  feels ok ,     PHYSICAL EXAM:  [id number] [facility] well    Paroxysmal Afib  Cont eliquis and diltiazem    Asthma/copd  Cont inhaler    Carpal tunnel  -wrist splints bilat at night. EMG ord at 9/4/20 visit--didn't do emg. Dr Braswell People inj R wrist 12/2020 (improved) and in 2/2021--he ord OT.     Chronic an

## 2021-04-08 NOTE — TELEPHONE ENCOUNTER
Patient is calling she will be released from [facility] on Sunday 4/11  Her toe on her left foot may be getting infected  The toe is tender    She asked [address] [facility] to fax a request for an Amoxicilin, did Dr Sarah Albert receive the request  She doesn't

## 2021-04-09 PROCEDURE — 99308 SBSQ NF CARE LOW MDM 20: CPT | Performed by: INTERNAL MEDICINE

## 2021-04-10 ENCOUNTER — EXTERNAL FACILITY (OUTPATIENT)
Dept: INTERNAL MEDICINE CLINIC | Facility: CLINIC | Age: 86
End: 2021-04-10

## 2021-04-10 DIAGNOSIS — I25.10 CORONARY ARTERY DISEASE INVOLVING NATIVE CORONARY ARTERY OF NATIVE HEART WITHOUT ANGINA PECTORIS: ICD-10-CM

## 2021-04-10 DIAGNOSIS — I10 ESSENTIAL HYPERTENSION: ICD-10-CM

## 2021-04-10 DIAGNOSIS — G62.9 NEUROPATHY: ICD-10-CM

## 2021-04-10 DIAGNOSIS — N18.30 CKD STAGE 3 DUE TO TYPE 2 DIABETES MELLITUS (HCC): ICD-10-CM

## 2021-04-10 DIAGNOSIS — H91.93 BILATERAL HEARING LOSS, UNSPECIFIED HEARING LOSS TYPE: ICD-10-CM

## 2021-04-10 DIAGNOSIS — E78.00 HYPERCHOLESTEROLEMIA: ICD-10-CM

## 2021-04-10 DIAGNOSIS — J44.9 ASTHMA WITH COPD (CHRONIC OBSTRUCTIVE PULMONARY DISEASE) (HCC): ICD-10-CM

## 2021-04-10 DIAGNOSIS — E11.22 CKD STAGE 3 DUE TO TYPE 2 DIABETES MELLITUS (HCC): ICD-10-CM

## 2021-04-10 DIAGNOSIS — I47.1 SVT (SUPRAVENTRICULAR TACHYCARDIA) (HCC): ICD-10-CM

## 2021-04-10 NOTE — PROGRESS NOTES
pt seen 4/9/2021 at [facility]    seen in room, c collar on,       no new complaints     latest nursing notes and vitals noted    latest labs noted        SUBJECTIVE/REVIEW OF SYSTEMS:  feels ok ,     PHYSICAL EXAM:  GENERAL HEALTH:NAD  HEENT: R ear laceration diltiazem    Asthma/copd  Cont inhaler    Carpal tunnel  -wrist splints bilat at night. EMG ord at 9/4/20 visit--didn't do emg. Dr Aleyda Donahue inj R wrist 12/2020 (improved) and in 2/2021--he ord OT.     Chronic anemia  fe sat ferriting nl on 11/10/20    E

## 2021-04-14 ENCOUNTER — TELEPHONE (OUTPATIENT)
Dept: INTERNAL MEDICINE CLINIC | Facility: CLINIC | Age: 86
End: 2021-04-14

## 2021-04-19 ENCOUNTER — LAB REQUISITION (OUTPATIENT)
Dept: LAB | Facility: HOSPITAL | Age: 86
End: 2021-04-19
Payer: MEDICARE

## 2021-04-19 DIAGNOSIS — E11.9 TYPE 2 DIABETES MELLITUS WITHOUT COMPLICATIONS (HCC): ICD-10-CM

## 2021-04-19 PROCEDURE — 83036 HEMOGLOBIN GLYCOSYLATED A1C: CPT | Performed by: INTERNAL MEDICINE

## 2021-04-20 ENCOUNTER — OFFICE VISIT (OUTPATIENT)
Dept: INTERNAL MEDICINE CLINIC | Facility: CLINIC | Age: 86
End: 2021-04-20
Payer: COMMERCIAL

## 2021-04-20 VITALS
BODY MASS INDEX: 26 KG/M2 | OXYGEN SATURATION: 97 % | HEART RATE: 68 BPM | WEIGHT: 130 LBS | TEMPERATURE: 98 F | DIASTOLIC BLOOD PRESSURE: 60 MMHG | RESPIRATION RATE: 14 BRPM | SYSTOLIC BLOOD PRESSURE: 142 MMHG

## 2021-04-20 DIAGNOSIS — K21.9 GASTROESOPHAGEAL REFLUX DISEASE WITHOUT ESOPHAGITIS: ICD-10-CM

## 2021-04-20 DIAGNOSIS — M79.89 LEG SWELLING: ICD-10-CM

## 2021-04-20 DIAGNOSIS — S12.101S CLOSED NONDISPLACED FRACTURE OF SECOND CERVICAL VERTEBRA, UNSPECIFIED FRACTURE MORPHOLOGY, SEQUELA: Primary | ICD-10-CM

## 2021-04-20 DIAGNOSIS — E87.1 HYPONATREMIA: ICD-10-CM

## 2021-04-20 PROBLEM — T14.8XXA CLOSED FRACTURE: Status: ACTIVE | Noted: 2021-04-20

## 2021-04-20 PROCEDURE — 99215 OFFICE O/P EST HI 40 MIN: CPT | Performed by: INTERNAL MEDICINE

## 2021-04-20 PROCEDURE — 3077F SYST BP >= 140 MM HG: CPT | Performed by: INTERNAL MEDICINE

## 2021-04-20 PROCEDURE — 3078F DIAST BP <80 MM HG: CPT | Performed by: INTERNAL MEDICINE

## 2021-04-20 RX ORDER — ACETAMINOPHEN 500 MG
500 TABLET ORAL 2 TIMES DAILY
COMMUNITY

## 2021-04-20 RX ORDER — SACCHAROMYCES BOULARDII 250 MG
250 CAPSULE ORAL DAILY
COMMUNITY
End: 2021-04-28

## 2021-04-20 RX ORDER — FLUTICASONE PROPIONATE 50 MCG
SPRAY, SUSPENSION (ML) NASAL DAILY
COMMUNITY

## 2021-04-20 RX ORDER — BISACODYL 10 MG
10 SUPPOSITORY, RECTAL RECTAL DAILY PRN
COMMUNITY
End: 2021-09-09

## 2021-04-20 RX ORDER — SODIUM CHLORIDE 1000 MG
1 TABLET, SOLUBLE MISCELLANEOUS
COMMUNITY
End: 2021-04-20

## 2021-04-20 RX ORDER — ACETAMINOPHEN AND CODEINE PHOSPHATE 300; 30 MG/1; MG/1
1 TABLET ORAL EVERY 6 HOURS PRN
COMMUNITY
End: 2021-04-21

## 2021-04-20 NOTE — PROGRESS NOTES
Lisa Paulino is a 80year old female who presents for     Here with her son Loli Vu    Cervical spine fx C2 diagnosed in ER 3/19/21  ER directly transferred to [facility] on 3/22/21. Given Tylenol #3 for pain. Saw Dr. Norm Bowling in office 3/31/21.   Ne remdesivir.    • Renal calculus 2010    on CT abd 5/10-6 mm or less in L lower kidney   • Renal insufficiency    • Shingles 2011   • Small bowel obstruction ([facility] [address] [id number], 6/2016    multiple hospitalizations for [id number], 6/2016   • SVT (supraventric Aspen neck brace.    Lungs: clear to auscultation  Heart: regular rhythm, S1S2 normal without murmur   Breasts: no mass or axillary adenopathy at 7/1/20 visit  Abdomen: soft, nontender   Extremities: 1+ LE edema  Neurologic: alert and oriented      ASSESSME diltiazem  mg daily. lasix 20 mg MWF prn swelling. Coronary artery disease native coronary artery without angina pectoris  Stented coronary artery  Cardiac cath 6/23/20 Dr. Alesia Cat PCI of LAD and RCA.   Taking Plavix 75 mg daily--also on eliq covid--cipro with vanco prophylaxis. Asthma with COPD- Flovent inhaler 220 mcg 1 p bid. Past Combivent Respimat. Phenergan w codeine syrup as needed. (usually 1/2 tsp at HS).        Neuropathy-feet. gabapentin 100 mg HS. norco 1/2 tab at HS. decl LE EMG too.  Spent 45 min reviewing chart/precharting, history, exam, reviewing assessment/ plan, orders and completing documentation. Discussed w son, too.        Current Outpatient Medications   Medication Sig Dispense Refill   • saccharomyces boulardii 250 MG O by mouth nightly. 90 tablet 3   • PANTOPRAZOLE SODIUM 40 MG Oral Tab EC TAKE 1 TABLET BY MOUTH EVERY MORNING BEFORE BREAKFAST 90 tablet 3   • Clopidogrel Bisulfate 75 MG Oral Tab Take 1 tablet (75 mg total) by mouth daily.  30 tablet 11   • Hydrocortisone 2

## 2021-04-21 ENCOUNTER — APPOINTMENT (OUTPATIENT)
Dept: GENERAL RADIOLOGY | Facility: HOSPITAL | Age: 86
End: 2021-04-21
Attending: EMERGENCY MEDICINE
Payer: MEDICARE

## 2021-04-21 ENCOUNTER — HOSPITAL ENCOUNTER (EMERGENCY)
Facility: HOSPITAL | Age: 86
Discharge: HOME | End: 2021-04-21
Attending: EMERGENCY MEDICINE
Payer: MEDICARE

## 2021-04-21 ENCOUNTER — TELEPHONE (OUTPATIENT)
Dept: INTERNAL MEDICINE CLINIC | Facility: CLINIC | Age: 86
End: 2021-04-21

## 2021-04-21 VITALS
BODY MASS INDEX: 26.61 KG/M2 | SYSTOLIC BLOOD PRESSURE: 140 MMHG | OXYGEN SATURATION: 96 % | WEIGHT: 132 LBS | DIASTOLIC BLOOD PRESSURE: 62 MMHG | HEART RATE: 71 BPM | TEMPERATURE: 97 F | HEIGHT: 59 IN | RESPIRATION RATE: 20 BRPM

## 2021-04-21 DIAGNOSIS — S70.02XA CONTUSION OF LEFT HIP, INITIAL ENCOUNTER: Primary | ICD-10-CM

## 2021-04-21 DIAGNOSIS — R60.9 PERIPHERAL EDEMA: ICD-10-CM

## 2021-04-21 PROCEDURE — 73502 X-RAY EXAM HIP UNI 2-3 VIEWS: CPT | Performed by: EMERGENCY MEDICINE

## 2021-04-21 PROCEDURE — 83880 ASSAY OF NATRIURETIC PEPTIDE: CPT | Performed by: EMERGENCY MEDICINE

## 2021-04-21 PROCEDURE — 99284 EMERGENCY DEPT VISIT MOD MDM: CPT

## 2021-04-21 PROCEDURE — 85025 COMPLETE CBC W/AUTO DIFF WBC: CPT | Performed by: EMERGENCY MEDICINE

## 2021-04-21 PROCEDURE — 80048 BASIC METABOLIC PNL TOTAL CA: CPT | Performed by: EMERGENCY MEDICINE

## 2021-04-21 PROCEDURE — 36415 COLL VENOUS BLD VENIPUNCTURE: CPT

## 2021-04-21 RX ORDER — ACETAMINOPHEN AND CODEINE PHOSPHATE 300; 30 MG/1; MG/1
1 TABLET ORAL 2 TIMES DAILY PRN
Qty: 15 TABLET | Refills: 0 | COMMUNITY
Start: 2021-04-21 | End: 2022-02-01

## 2021-04-21 NOTE — TELEPHONE ENCOUNTER
Spoke with Vidhya Gottlieb from [facility] and relayed MD message, she verbalizes understanding. She will find an ambulance to call and take patient to [address] [place] [address]. Nursing to follow up tomorrow, 4/22.

## 2021-04-21 NOTE — TELEPHONE ENCOUNTER
Zohreh from [facility] is calling Pt. Had a fall 15-20 mins ago she was trying to self transfer pt. Has no injuries  Ph.  # [phone]  Routed to clinical

## 2021-04-21 NOTE — TELEPHONE ENCOUNTER
Spoke to Mohinder carranza from [facility], patient had fall to L hip while trying to transfer independently. Patient at first said she did not hit her head, then said she didn't remember. Mohinder carranza felt patient's head and noticed to bumps or pain.  She states there

## 2021-04-21 NOTE — ED INITIAL ASSESSMENT (HPI)
Patient presents to ER via EMS post mechanical fall. Patient was leaning while sitting in wheelchair and she fell out of the chair landing on left hip. Patient denies hitting head, no LOC.   Per EMS was sent due to previous C2 fracture, C-collar placed

## 2021-04-21 NOTE — TELEPHONE ENCOUNTER
To nursing, she currently has a cervical C2 fx in an [place] collar. I recom she go to ER to checked. Thanks.

## 2021-04-22 ENCOUNTER — HOSPITAL ENCOUNTER (OUTPATIENT)
Dept: CT IMAGING | Facility: HOSPITAL | Age: 86
Discharge: HOME | End: 2021-04-22
Attending: NEUROLOGICAL SURGERY
Payer: MEDICARE

## 2021-04-22 DIAGNOSIS — S12.100A: ICD-10-CM

## 2021-04-22 PROCEDURE — 72125 CT NECK SPINE W/O DYE: CPT | Performed by: NEUROLOGICAL SURGERY

## 2021-04-22 NOTE — ED QUICK NOTES
[facility] at bedside. Patient safe to DC home per MD.   Patient updated and aware of plan. Daughter also updated, and park [facility] aware of patient return.

## 2021-04-22 NOTE — TELEPHONE ENCOUNTER
Form faxed to [facility] [phone]
To nursing, please fax back to [facility] the forms I filled out (in my out pile). Thanks.     Note to self–  [facility] sent request for 2 Rx's with note Chely Goncalves RN patient is no longer in rehab she has returned to assisted living and
Normal

## 2021-04-22 NOTE — TELEPHONE ENCOUNTER
Noted.   ER visit 4/21/21 after a fall [facility] [address] [facility]. Xray L hip--no fx--prior ORIF without complication. Cbc bmp bnp---Hgb 10.1 -stable.

## 2021-04-22 NOTE — TELEPHONE ENCOUNTER
JILLI to Dr. Salvadore Skiff upon return----    Spoke to [facility] for update. Reports pt was discharged from ED, no new fractures noted after fall. ED MD did order CT cervical spine to monitor already existing c-spine fx today at 1pm at [address] [place] [address].      Reports patient is minim

## 2021-04-24 NOTE — ED PROVIDER NOTES
Patient Seen in: [facility] AND [facility] Emergency Department    History   Patient presents with:  Fall    Stated Complaint: mech fall    HPI    Patient complains of mechanical fall that occurred  Getting to wheelchair.   Patient complains of injury to l hip, a had pessary in 2006 Dr. Morales Sifuentes sling procedure Dr Yamile Felix in 6/2016   • UTI due to Klebsiella species 12/2016    ESBL Klebsiella UTI treated in [facility] with meropenem when in hosp for enteritis. (Dr Alycia Mathews).         Past Surgical History:   Procedure L times a day. bisacodyl 10 MG Rectal Suppos,  Place 10 mg rectally daily. Magnesium Hydroxide (MILK OF MAGNESIA OR),  Take by mouth daily as needed. psyllium 28 % Oral Powd Pack,  Take 1 packet by mouth 2 (two) times daily.    lactulose 10 GM/15ML Oral No      Review of Systems    Positive for stated complaint: mech fall  Other systems are as noted in HPI. Constitutional and vital signs reviewed. All other systems reviewed and negative except as noted above.     PSFH elements reviewed from today and Abnormality         Status                     ---------                               -----------         ------                     CBC W/ DIFFERENTIAL[[id number]]          Abnormal            Final result                 Please view results for by (CST): Rosa Baker MD on 4/22/2021 at 2:35 PM     Finalized by (CST): Rosa Baker MD on 4/22/2021 at 2:48 PM          XR HIP W OR WO PELVIS 2 OR 3 VIEWS, LEFT (CPT=73502)    Result Date: 4/21/2021  CONCLUSION:  1.   Post left hip ORIF without com

## 2021-04-26 ENCOUNTER — PATIENT MESSAGE (OUTPATIENT)
Dept: INTERNAL MEDICINE CLINIC | Facility: CLINIC | Age: 86
End: 2021-04-26

## 2021-04-26 RX ORDER — CEPHALEXIN 500 MG/1
500 CAPSULE ORAL 3 TIMES DAILY
Qty: 21 CAPSULE | Refills: 0 | Status: SHIPPED | OUTPATIENT
Start: 2021-04-26 | End: 2021-04-28

## 2021-04-26 RX ORDER — FUROSEMIDE 20 MG/1
20 TABLET ORAL DAILY PRN
Qty: 30 TABLET | Refills: 1 | Status: SHIPPED | OUTPATIENT
Start: 2021-04-26 | End: 2021-05-21

## 2021-04-26 NOTE — TELEPHONE ENCOUNTER
Patient reports swelling to bilateral feet and wound to L foot.  She reports her feet have been swelling for some time and she usually has a tiny hole on her L foot but she has noticed there is more swelling and the wound looks more red and has a little pus

## 2021-04-26 NOTE — TELEPHONE ENCOUNTER
Please call pt  She called to ask about appt availability  Scheduled appt for 4/28/21 at 11:30    Pt has been elevating feet 3 times a day    Tasked to nursing

## 2021-04-26 NOTE — TELEPHONE ENCOUNTER
Message noted. 1.  We can let son know that I received his message in the absence of Dr. Etelvina Danielle today. From the picture,  since there is a question of infection we can start Keflex 500 mg 3 times a day.   We can call in quantity #21 which will be for

## 2021-04-26 NOTE — TELEPHONE ENCOUNTER
Spoke to patient and relayed MD message and instructions, patient verbalizes understanding and agrees with plan.  Erx'd keflex as written by MD.

## 2021-04-27 NOTE — PROGRESS NOTES
Altha Rubinstein is a 80year old female who presents for     Here with her son Sergei Islam    Foot sore on skin and redness  Came home from [facility] 4/25/21. Patient's family noted a tiny opening left foot with wound red and pus draining.   Keflex 500 mg 3 times Osteoarthritis    • Osteopenia    • PAF (paroxysmal atrial fibrillation) ([facility] [address] 01/2021   • Pneumonia due to COVID-19 virus 01/2021    hosp and rec'd conv plasma and remdesivir.    • Renal calculus 2010    on CT abd 5/10-6 mm or less in L lower kidney   • Re kg)  02/19/21 : 130 lb (59 kg)      General: appears well nourished and in no acute distress  Neck: Is wearing an Aspen neck brace. Heart: regular rhythm, S1S2 normal without murmur    Extremities: 1+ LE edema. Edema not as tense as last wk.    L foot neena bilat at night. EMG ord at 9/4/20 visit--didn't do emg. Dr Jennie Dyer inj R wrist 12/2020 (improved) and in 2/2021--he ord OT. Chronic anemia--hgb 9.9 on 3/19/21--was 8.9 on 1/9/21 in hosp for covid. fe sat ferriting nl on 11/10/20 per Milka MESSER. incisional herniation large and small bowel, without obstruction. Heavy stool burden throughout proximal colon. Indeterm nodular opacity LLL–could be pleuro-parenchymal scarring–\"F/U recommended. \"    Vit d deficiency-12/10/19–Vitamin D 18. 8. added vitam GFR 45 LDL 64 Hgb 10.0.  9/4/20–CBC creat vit D–-creat 1.03 GFR 49 (Stable). Hgb 10.6 (stable). Vit D normal at 43.8.  11/10/20 per Nuzhat MESSER--Hgb 11 fe sat ferriting nl .  2/19/21–CBC CMP–Hgb 10. 1–improved from 8.9 1/9/21.   WBC 5.1, impr from 3.1 on 1 CAPSULE BY MOUTH EVERY DAY 90 capsule 3   • Enalapril Maleate 5 MG Oral Tab Take 1 tablet (5 mg total) by mouth daily. 90 tablet 3   • atorvastatin 10 MG Oral Tab Take 1 tablet (10 mg total) by mouth nightly.  90 tablet 3   • PANTOPRAZOLE SODIUM 40 MG Oral

## 2021-04-28 ENCOUNTER — PATIENT MESSAGE (OUTPATIENT)
Dept: INTERNAL MEDICINE CLINIC | Facility: CLINIC | Age: 86
End: 2021-04-28

## 2021-04-28 ENCOUNTER — OFFICE VISIT (OUTPATIENT)
Dept: INTERNAL MEDICINE CLINIC | Facility: CLINIC | Age: 86
End: 2021-04-28
Payer: COMMERCIAL

## 2021-04-28 VITALS
DIASTOLIC BLOOD PRESSURE: 60 MMHG | TEMPERATURE: 98 F | HEART RATE: 70 BPM | OXYGEN SATURATION: 99 % | SYSTOLIC BLOOD PRESSURE: 138 MMHG

## 2021-04-28 DIAGNOSIS — L97.521 ULCER OF LEFT FOOT, LIMITED TO BREAKDOWN OF SKIN (HCC): Primary | ICD-10-CM

## 2021-04-28 DIAGNOSIS — L03.116 CELLULITIS OF LEFT FOOT: ICD-10-CM

## 2021-04-28 DIAGNOSIS — M79.89 LEG SWELLING: ICD-10-CM

## 2021-04-28 PROCEDURE — 3075F SYST BP GE 130 - 139MM HG: CPT | Performed by: INTERNAL MEDICINE

## 2021-04-28 PROCEDURE — 3078F DIAST BP <80 MM HG: CPT | Performed by: INTERNAL MEDICINE

## 2021-04-28 PROCEDURE — 99214 OFFICE O/P EST MOD 30 MIN: CPT | Performed by: INTERNAL MEDICINE

## 2021-04-28 RX ORDER — APIXABAN 2.5 MG/1
TABLET, FILM COATED ORAL
Qty: 180 TABLET | Refills: 3 | Status: SHIPPED | OUTPATIENT
Start: 2021-04-28 | End: 2021-08-16

## 2021-04-28 RX ORDER — SERTRALINE HYDROCHLORIDE 25 MG/1
500 TABLET ORAL 3 TIMES DAILY
Qty: 21 CAPSULE | Refills: 0 | COMMUNITY
Start: 2021-04-28 | End: 2021-07-27 | Stop reason: ALTCHOICE

## 2021-04-28 NOTE — TELEPHONE ENCOUNTER
Eliquis has never been filled by this office.  To Dr. Liliane Carmen, please advise    Refill request has failed the Ambulatory Medication Refill Standing Order and is routed to the primary physician to review the following:    Requested Prescriptions     Pending Prescr
Refill sent to [facility] [address]:  eliquis 2.5 mg bid #180 with 3 RFs
no meds this am

## 2021-04-29 ENCOUNTER — IMMUNIZATION (OUTPATIENT)
Dept: LAB | Facility: HOSPITAL | Age: 86
End: 2021-04-29
Attending: HOSPITALIST
Payer: MEDICARE

## 2021-04-29 DIAGNOSIS — Z23 NEED FOR VACCINATION: Primary | ICD-10-CM

## 2021-04-29 PROCEDURE — 0011A SARSCOV2 VAC 100MCG/0.5ML IM: CPT

## 2021-05-03 RX ORDER — CEPHALEXIN 500 MG/1
500 CAPSULE ORAL 3 TIMES DAILY
Qty: 21 CAPSULE | Refills: 0 | OUTPATIENT
Start: 2021-05-03

## 2021-05-03 NOTE — TELEPHONE ENCOUNTER
Requested Prescriptions     Refused Prescriptions Disp Refills   • CEPHALEXIN 500 MG Oral Cap [Pharmacy Med Name: CEPHALEXIN 500MG] 21 capsule 0     Sig: TAKE 1 CAPSULE (500 MG TOTAL) BY MOUTH 3 (THREE) TIMES DAILY.      Refused By: Gricelda Zamora

## 2021-05-19 ENCOUNTER — TELEPHONE (OUTPATIENT)
Dept: INTERNAL MEDICINE CLINIC | Facility: CLINIC | Age: 86
End: 2021-05-19

## 2021-05-19 NOTE — TELEPHONE ENCOUNTER
Pat/OT[facility] left message on voicemail  Pt discharged today  Candy Barreto will fax discharge paperwork  Tasked to nursing as RAKEL

## 2021-05-20 NOTE — PROGRESS NOTES
Anisha Walden is a 80year old female who presents for     Here with her son Harvey Berkowitz    Check at 1 month    Is doing much better. Sore on L foot healed. No redness. Took keflex last mo. Leg swelling resolved. Hasn't taken lasix 20 mg in last 3 wks. 6/2016   • SVT (supraventricular tachycardia) ([facility]     event monitor 2/2016-SVT   • Syncope 05/2020    48-hour Holter 5/19/20--bursts of atrial tachycardia up to 3 minutes, PVCs, rare Wenkebach. Saw Dr. Paul Leader.    • Type 2 diabetes mellitus ([facility] 2001 foot dorsum --ulcer healed over, no redness. Neurologic: alert and oriented, walking w walker. ASSESSMENT AND PLAN:       Ulcer and cellulitis of left foot-4/28/21 visit resolved. Leg swelling--resolved.  Likely was v insuff and was taking sodium daily--also on eliquis    History of syncope  SVT (supraventricular tachycardia) (HCC)-on cardizem 120 mg daily  Hx 30 day event monitor 2/2016-SVT. 48-hr Holter 5/19/20--bursts atrial tachy up to 3 min, PVCs, rare Wenkebach. Saw Dr Chase Gaspar 6/8/20.  He c HS. decl LE EMG. Saw Dr. Lata Jung. CKD 3 assoc w diabetes---creat 1.1 GFR 45 on 7/1/20. Ur MA 33 on 4/6/19. Cont enalapril. Eczema--TAC cream prn.      Bilat hearing loss-Dr. Jenae Trivedi and Carlos Leonard Au.NICOLE on 6/11/18 recom hearing aides. hx eardrum surgery Application topically 2 (two) times daily. 30 g 1   • Acetaminophen-Codeine #3 300-30 MG Oral Tab Take 1 tablet by mouth 2 (two) times daily as needed for Pain. 15 tablet 0   • Fluticasone Propionate 50 MCG/ACT Nasal Suspension by Each Nare route daily. (17G)  by oral route  every day powder mixed with 8 oz. water, juice, soda, coffee, or tea      • cephALEXin (KEFLEX) 500 MG Oral Cap Take 1 capsule (500 mg total) by mouth 3 (three) times daily.  (Patient not taking: Reported on 5/21/2021 ) 21 capsule 0

## 2021-05-21 ENCOUNTER — OFFICE VISIT (OUTPATIENT)
Dept: INTERNAL MEDICINE CLINIC | Facility: CLINIC | Age: 86
End: 2021-05-21
Payer: COMMERCIAL

## 2021-05-21 VITALS
BODY MASS INDEX: 25.4 KG/M2 | HEIGHT: 59 IN | DIASTOLIC BLOOD PRESSURE: 58 MMHG | SYSTOLIC BLOOD PRESSURE: 120 MMHG | TEMPERATURE: 99 F | HEART RATE: 80 BPM | OXYGEN SATURATION: 97 % | WEIGHT: 126 LBS

## 2021-05-21 DIAGNOSIS — S12.101S CLOSED NONDISPLACED FRACTURE OF SECOND CERVICAL VERTEBRA, UNSPECIFIED FRACTURE MORPHOLOGY, SEQUELA: Primary | ICD-10-CM

## 2021-05-21 DIAGNOSIS — L97.521 ULCER OF LEFT FOOT, LIMITED TO BREAKDOWN OF SKIN (HCC): ICD-10-CM

## 2021-05-21 DIAGNOSIS — K21.9 GASTROESOPHAGEAL REFLUX DISEASE WITHOUT ESOPHAGITIS: ICD-10-CM

## 2021-05-21 PROCEDURE — 3008F BODY MASS INDEX DOCD: CPT | Performed by: INTERNAL MEDICINE

## 2021-05-21 PROCEDURE — 99214 OFFICE O/P EST MOD 30 MIN: CPT | Performed by: INTERNAL MEDICINE

## 2021-05-21 PROCEDURE — 3078F DIAST BP <80 MM HG: CPT | Performed by: INTERNAL MEDICINE

## 2021-05-21 PROCEDURE — 3074F SYST BP LT 130 MM HG: CPT | Performed by: INTERNAL MEDICINE

## 2021-05-27 ENCOUNTER — IMMUNIZATION (OUTPATIENT)
Dept: LAB | Facility: HOSPITAL | Age: 86
End: 2021-05-27
Attending: EMERGENCY MEDICINE
Payer: MEDICARE

## 2021-05-27 DIAGNOSIS — Z23 NEED FOR VACCINATION: Primary | ICD-10-CM

## 2021-05-27 PROCEDURE — 0012A SARSCOV2 VAC 100MCG/0.5ML IM: CPT

## 2021-06-09 ENCOUNTER — TELEPHONE (OUTPATIENT)
Dept: INTERNAL MEDICINE CLINIC | Facility: CLINIC | Age: 86
End: 2021-06-09

## 2021-06-09 ENCOUNTER — HOSPITAL ENCOUNTER (EMERGENCY)
Facility: HOSPITAL | Age: 86
Discharge: HOME | End: 2021-06-09
Attending: EMERGENCY MEDICINE
Payer: MEDICARE

## 2021-06-09 ENCOUNTER — APPOINTMENT (OUTPATIENT)
Dept: GENERAL RADIOLOGY | Facility: HOSPITAL | Age: 86
End: 2021-06-09
Attending: EMERGENCY MEDICINE
Payer: MEDICARE

## 2021-06-09 VITALS
SYSTOLIC BLOOD PRESSURE: 145 MMHG | HEART RATE: 82 BPM | DIASTOLIC BLOOD PRESSURE: 63 MMHG | TEMPERATURE: 101 F | HEIGHT: 59 IN | RESPIRATION RATE: 18 BRPM | BODY MASS INDEX: 26.61 KG/M2 | OXYGEN SATURATION: 97 % | WEIGHT: 132 LBS

## 2021-06-09 DIAGNOSIS — N30.00 ACUTE CYSTITIS WITHOUT HEMATURIA: Primary | ICD-10-CM

## 2021-06-09 PROCEDURE — 36415 COLL VENOUS BLD VENIPUNCTURE: CPT

## 2021-06-09 PROCEDURE — 99284 EMERGENCY DEPT VISIT MOD MDM: CPT

## 2021-06-09 PROCEDURE — 80048 BASIC METABOLIC PNL TOTAL CA: CPT | Performed by: EMERGENCY MEDICINE

## 2021-06-09 PROCEDURE — 87086 URINE CULTURE/COLONY COUNT: CPT | Performed by: EMERGENCY MEDICINE

## 2021-06-09 PROCEDURE — 71045 X-RAY EXAM CHEST 1 VIEW: CPT | Performed by: EMERGENCY MEDICINE

## 2021-06-09 PROCEDURE — 81001 URINALYSIS AUTO W/SCOPE: CPT | Performed by: EMERGENCY MEDICINE

## 2021-06-09 PROCEDURE — 85025 COMPLETE CBC W/AUTO DIFF WBC: CPT | Performed by: EMERGENCY MEDICINE

## 2021-06-09 PROCEDURE — 84145 PROCALCITONIN (PCT): CPT | Performed by: EMERGENCY MEDICINE

## 2021-06-09 RX ORDER — CEPHALEXIN 500 MG/1
500 CAPSULE ORAL 2 TIMES DAILY
Qty: 14 CAPSULE | Refills: 0 | Status: SHIPPED | OUTPATIENT
Start: 2021-06-09 | End: 2021-06-16

## 2021-06-09 RX ORDER — CEPHALEXIN 500 MG/1
500 CAPSULE ORAL ONCE
Status: COMPLETED | OUTPATIENT
Start: 2021-06-09 | End: 2021-06-09

## 2021-06-09 NOTE — TELEPHONE ENCOUNTER
Spoke with patient who reports chills and elevated temp since the day before last. The chills woke her up out of her sleep. Her teeth seem to be chattering a little while on the phone. She states she took her temp twice today and it was 101, then 102.  She

## 2021-06-09 NOTE — TELEPHONE ENCOUNTER
Pt experiencing bad chills off and on  Severe chills start in the afternoon and then fever spikes, it was between 101 and 102 when she took it last   Tasked to nursing

## 2021-06-09 NOTE — TELEPHONE ENCOUNTER
To nursing, please tell patient I instead advise she go to the emergency room. They will not have the ability to do all the testing in immediate care that they can in the emergency room. Thanks.

## 2021-06-09 NOTE — TELEPHONE ENCOUNTER
Spoke to patient and relayed MD message and instructions, patient verbalizes understanding and agrees with plan. She will have her son take her to [address] [place] [address]. Nursing to follow up tomorrow.

## 2021-06-10 NOTE — ED PROVIDER NOTES
Patient Seen in: [facility] AND [facility] Emergency Department      History   Patient presents with:  Fever    Stated Complaint: chills/fever    HPI/Subjective:   HPI    81 y/o female w/ MMP including COPD, GERD, pA-fib and prior Covid earlier this year here w/ prolapse 2006    had pessary in 2006 Dr. Culver Monday sling procedure Dr Frances Garcia in 6/2016   • UTI due to Klebsiella species 12/2016    ESBL Klebsiella UTI treated in [facility] with meropenem when in hosp for enteritis. (Dr Janie Dickson).                Past Surgica Normal rate, regular rhythm and intact distal pulses. Pulmonary/Chest: Effort normal. No respiratory distress. Mild cough noted  Abdominal: Soft. There is no tenderness. There is no guarding. Musculoskeletal: Normal range of motion.  No edema or tende these tests on the individual orders.    URINE CULTURE, ROUTINE          XR CHEST AP PORTABLE  (CPT=71045)    Result Date: 6/9/2021  PROCEDURE: XR CHEST AP PORTABLE  (CPT=71045) TIME: 20:35.   COMPARISON: [facility], CT ABDOMEN+PELVIS (CPT=7 dehydrated call her doctor tomorrow return before with any worsening or change.                              Disposition and Plan     Clinical Impression:  Acute cystitis without hematuria  (primary encounter diagnosis)     Disposition:  Discharge  6/9/2021

## 2021-06-10 NOTE — TELEPHONE ENCOUNTER
ER F/U: Pt states she is feeling better after 1st dose of Keflex in ER last night - reports no sx. Pt has not picked up remaining RX but states she will call pharmacy now to start ASAP.   Pt advised to complete full course of abx, maintain good fluid intak

## 2021-06-10 NOTE — TELEPHONE ENCOUNTER
Noted.  Seen in ER yesterday 6/9/21 for fever. Dx cystitis. CXR–no pneumonia. Lab CBC BMP UA/cx procalcitonin--Hgb 8.6, was 10.1 in April. Procalcitonin 0.77 (<0.16), UA 21-50 WBC, 3–5 RBC. Ucx–pending.   Was prescribed cephalexin 500 mg twice daily for

## 2021-06-17 ENCOUNTER — HOSPITAL ENCOUNTER (OUTPATIENT)
Dept: CT IMAGING | Facility: HOSPITAL | Age: 86
Discharge: HOME | End: 2021-06-17
Attending: PHYSICIAN ASSISTANT
Payer: MEDICARE

## 2021-06-17 DIAGNOSIS — S12.100D UNSPECIFIED DISPLACED FRACTURE OF SECOND CERVICAL VERTEBRA, SUBSEQUENT ENCOUNTER FOR FRACTURE WITH ROUTINE HEALING: ICD-10-CM

## 2021-06-17 PROCEDURE — 72125 CT NECK SPINE W/O DYE: CPT | Performed by: PHYSICIAN ASSISTANT

## 2021-06-18 ENCOUNTER — TELEPHONE (OUTPATIENT)
Dept: INTERNAL MEDICINE CLINIC | Facility: CLINIC | Age: 86
End: 2021-06-18

## 2021-06-18 NOTE — TELEPHONE ENCOUNTER
Patient calling to inform Dr. Gigi Das that she did CT Spine yesterday. Waiting for results.      Best call back number [phone]

## 2021-07-12 ENCOUNTER — TELEPHONE (OUTPATIENT)
Dept: CARDIOLOGY | Age: 86
End: 2021-07-12

## 2021-07-13 RX ORDER — CLOPIDOGREL BISULFATE 75 MG/1
TABLET ORAL
Qty: 30 TABLET | Refills: 10 | OUTPATIENT
Start: 2021-07-13

## 2021-07-26 ENCOUNTER — MA CHART PREP (OUTPATIENT)
Dept: FAMILY MEDICINE CLINIC | Facility: CLINIC | Age: 86
End: 2021-07-26

## 2021-07-26 NOTE — PROGRESS NOTES
Zunilda Sánchez is a 80year old female who presents for       Check at 2 months and Medicare annual    Seen in ER 6/9/21 for fever. Dx cystitis. CXR–no pneumonia. Lab CBC BMP UA/cx procalcitonin--Hgb 8.6, was 10.1 in April.   Procalcitonin 0.77 (<0.16), • Renal insufficiency    • Shingles 2011   • Small bowel obstruction ([facility] [address] [id number], 6/2016    multiple hospitalizations for [id number], 6/2016   • SVT (supraventricular tachycardia) ([facility] [address]     event monitor 2/2016-SVT   • Syncope 05/2020    48-hour H murmur    Breasts–no mass or axillary adenopathy. Abd-soft, nontender without mass or hepatosplenomegaly. Extremities: no edema  Neurologic: alert and oriented, walking w walker. R shoulder--decr rom--can only abd to ~45 degrees.        ASSESSMENT AND PL [place]. Saw Dr Purnima Fuller 6/8/20. He cut enalapril 5 mg daily. Echo [facility] 6/12/20–LVEF 55-60%, mild MR. LA mild to mod dilated, moderate TR. Hypercholesterolemia- lipitor 10 mg daily. LDL 64 on 7/1/20, LDL goal <100. .check lipids.      Type 2 diabe Rene 3/20/17- recom avoid raw veg and if sx ER and do CT scan.     Osteoarthritis multiple sites, primary--history taking 1/2 norco daily.      L carotid bruit--8/19/19–carotid Dopplers– bilateral plaque, no significant stenosis.     At risk for falls--fa Oral Tab Take 1 tablet by mouth 2 (two) times daily as needed for Pain. 15 tablet 0   • Fluticasone Propionate 50 MCG/ACT Nasal Suspension by Each Nare route daily. • acetaminophen 500 MG Oral Tab Take 500 mg by mouth 2 (two) times a day.      • bisacod 15 milliliter (17G)  by oral route  every day powder mixed with 8 oz. water, juice, soda, coffee, or tea            Esperanza Corbin MD  7/27/2021         General Health     In the past six months, have you lost more than 10 pounds without trying?: 2 - No Over the LAST 2 WEEKS      Little interest or pleasure in doing things (over the last two weeks)?: Not at all    Feeling down, depressed, or hopeless (over the last two weeks)?: Not at all    PHQ-2 SCORE: 0          Advance Directives     Do you have a hea flowsheet data found. Screening Mammogram      Mammogram  Annually No recommendations at this time Update Health Maintenance if applicable   Immunizations      Influenza No orders found for this or any previous visit.  Update Immunization Activity if marco No.

## 2021-07-27 ENCOUNTER — OFFICE VISIT (OUTPATIENT)
Dept: INTERNAL MEDICINE CLINIC | Facility: CLINIC | Age: 86
End: 2021-07-27
Payer: COMMERCIAL

## 2021-07-27 ENCOUNTER — LAB ENCOUNTER (OUTPATIENT)
Dept: LAB | Age: 86
End: 2021-07-27
Attending: INTERNAL MEDICINE
Payer: MEDICARE

## 2021-07-27 VITALS
TEMPERATURE: 98 F | DIASTOLIC BLOOD PRESSURE: 56 MMHG | BODY MASS INDEX: 26.54 KG/M2 | SYSTOLIC BLOOD PRESSURE: 124 MMHG | WEIGHT: 131.63 LBS | HEART RATE: 77 BPM | HEIGHT: 59 IN | OXYGEN SATURATION: 98 %

## 2021-07-27 DIAGNOSIS — I10 HYPERTENSION, ESSENTIAL: ICD-10-CM

## 2021-07-27 DIAGNOSIS — E11.40 TYPE 2 DIABETES MELLITUS WITH DIABETIC NEUROPATHY, WITHOUT LONG-TERM CURRENT USE OF INSULIN (HCC): ICD-10-CM

## 2021-07-27 DIAGNOSIS — Z91.81 AT RISK FOR FALLS: ICD-10-CM

## 2021-07-27 DIAGNOSIS — E11.22 CKD STAGE 3 DUE TO TYPE 2 DIABETES MELLITUS (HCC): ICD-10-CM

## 2021-07-27 DIAGNOSIS — N18.30 CKD STAGE 3 DUE TO TYPE 2 DIABETES MELLITUS (HCC): ICD-10-CM

## 2021-07-27 DIAGNOSIS — Z87.01 HISTORY OF PNEUMONIA: ICD-10-CM

## 2021-07-27 DIAGNOSIS — M81.0 AGE-RELATED OSTEOPOROSIS WITHOUT CURRENT PATHOLOGICAL FRACTURE: ICD-10-CM

## 2021-07-27 DIAGNOSIS — E78.00 HYPERCHOLESTEROLEMIA: ICD-10-CM

## 2021-07-27 DIAGNOSIS — H91.93 BILATERAL HEARING LOSS, UNSPECIFIED HEARING LOSS TYPE: ICD-10-CM

## 2021-07-27 DIAGNOSIS — J44.9 ASTHMA WITH COPD (CHRONIC OBSTRUCTIVE PULMONARY DISEASE) (HCC): ICD-10-CM

## 2021-07-27 DIAGNOSIS — D64.9 CHRONIC ANEMIA: ICD-10-CM

## 2021-07-27 DIAGNOSIS — G56.03 BILATERAL CARPAL TUNNEL SYNDROME: ICD-10-CM

## 2021-07-27 DIAGNOSIS — K21.9 GASTROESOPHAGEAL REFLUX DISEASE WITHOUT ESOPHAGITIS: ICD-10-CM

## 2021-07-27 DIAGNOSIS — Z90.49 HISTORY OF BOWEL RESECTION: ICD-10-CM

## 2021-07-27 DIAGNOSIS — K63.9 BOWEL TROUBLE: ICD-10-CM

## 2021-07-27 DIAGNOSIS — Z95.5 STENTED CORONARY ARTERY: ICD-10-CM

## 2021-07-27 DIAGNOSIS — M25.511 ACUTE PAIN OF RIGHT SHOULDER: ICD-10-CM

## 2021-07-27 DIAGNOSIS — E55.9 VITAMIN D DEFICIENCY: ICD-10-CM

## 2021-07-27 DIAGNOSIS — R09.89 LEFT CAROTID BRUIT: ICD-10-CM

## 2021-07-27 DIAGNOSIS — Z87.81 HISTORY OF VERTEBRAL COMPRESSION FRACTURE: ICD-10-CM

## 2021-07-27 DIAGNOSIS — Z00.00 MEDICARE ANNUAL WELLNESS VISIT, SUBSEQUENT: Primary | ICD-10-CM

## 2021-07-27 DIAGNOSIS — I47.1 SVT (SUPRAVENTRICULAR TACHYCARDIA) (HCC): ICD-10-CM

## 2021-07-27 DIAGNOSIS — S12.101S CLOSED NONDISPLACED FRACTURE OF SECOND CERVICAL VERTEBRA, UNSPECIFIED FRACTURE MORPHOLOGY, SEQUELA: ICD-10-CM

## 2021-07-27 DIAGNOSIS — M15.9 PRIMARY OSTEOARTHRITIS INVOLVING MULTIPLE JOINTS: ICD-10-CM

## 2021-07-27 DIAGNOSIS — I87.2 VENOUS INSUFFICIENCY OF BOTH LOWER EXTREMITIES: ICD-10-CM

## 2021-07-27 DIAGNOSIS — I48.0 PAF (PAROXYSMAL ATRIAL FIBRILLATION) (HCC): ICD-10-CM

## 2021-07-27 DIAGNOSIS — I25.10 CORONARY ARTERY DISEASE INVOLVING NATIVE CORONARY ARTERY OF NATIVE HEART WITHOUT ANGINA PECTORIS: ICD-10-CM

## 2021-07-27 DIAGNOSIS — Z87.440 HISTORY OF UTI: ICD-10-CM

## 2021-07-27 DIAGNOSIS — Z87.898 HISTORY OF SYNCOPE: ICD-10-CM

## 2021-07-27 DIAGNOSIS — G62.9 NEUROPATHY: ICD-10-CM

## 2021-07-27 DIAGNOSIS — L30.9 ECZEMA, UNSPECIFIED TYPE: ICD-10-CM

## 2021-07-27 LAB
ALBUMIN SERPL-MCNC: 3.6 G/DL (ref 3.4–5)
ALBUMIN/GLOB SERPL: 0.9 {RATIO} (ref 1–2)
ALP LIVER SERPL-CCNC: 94 U/L
ALT SERPL-CCNC: 16 U/L
ANION GAP SERPL CALC-SCNC: 9 MMOL/L (ref 0–18)
AST SERPL-CCNC: 10 U/L (ref 15–37)
BASOPHILS # BLD AUTO: 0.03 X10(3) UL (ref 0–0.2)
BASOPHILS NFR BLD AUTO: 0.5 %
BILIRUB SERPL-MCNC: 0.3 MG/DL (ref 0.1–2)
BUN BLD-MCNC: 20 MG/DL (ref 7–18)
BUN/CREAT SERPL: 20.8 (ref 10–20)
CALCIUM BLD-MCNC: 8.9 MG/DL (ref 8.5–10.1)
CHLORIDE SERPL-SCNC: 105 MMOL/L (ref 98–112)
CHOLEST SMN-MCNC: 122 MG/DL (ref ?–200)
CO2 SERPL-SCNC: 23 MMOL/L (ref 21–32)
CREAT BLD-MCNC: 0.96 MG/DL
DEPRECATED RDW RBC AUTO: 55.3 FL (ref 35.1–46.3)
EOSINOPHIL # BLD AUTO: 0.21 X10(3) UL (ref 0–0.7)
EOSINOPHIL NFR BLD AUTO: 3.8 %
ERYTHROCYTE [DISTWIDTH] IN BLOOD BY AUTOMATED COUNT: 17.2 % (ref 11–15)
GLOBULIN PLAS-MCNC: 4 G/DL (ref 2.8–4.4)
GLUCOSE BLD-MCNC: 124 MG/DL (ref 70–99)
HCT VFR BLD AUTO: 29.7 %
HDLC SERPL-MCNC: 37 MG/DL (ref 40–59)
HGB BLD-MCNC: 8.9 G/DL
IMM GRANULOCYTES # BLD AUTO: 0.02 X10(3) UL (ref 0–1)
IMM GRANULOCYTES NFR BLD: 0.4 %
LDLC SERPL CALC-MCNC: 67 MG/DL (ref ?–100)
LYMPHOCYTES # BLD AUTO: 1.35 X10(3) UL (ref 1–4)
LYMPHOCYTES NFR BLD AUTO: 24.2 %
M PROTEIN MFR SERPL ELPH: 7.6 G/DL (ref 6.4–8.2)
MCH RBC QN AUTO: 26.8 PG (ref 26–34)
MCHC RBC AUTO-ENTMCNC: 30 G/DL (ref 31–37)
MCV RBC AUTO: 89.5 FL
MONOCYTES # BLD AUTO: 0.51 X10(3) UL (ref 0.1–1)
MONOCYTES NFR BLD AUTO: 9.1 %
NEUTROPHILS # BLD AUTO: 3.47 X10 (3) UL (ref 1.5–7.7)
NEUTROPHILS # BLD AUTO: 3.47 X10(3) UL (ref 1.5–7.7)
NEUTROPHILS NFR BLD AUTO: 62 %
NONHDLC SERPL-MCNC: 85 MG/DL (ref ?–130)
OSMOLALITY SERPL CALC.SUM OF ELEC: 288 MOSM/KG (ref 275–295)
PATIENT FASTING Y/N/NP: NO
PATIENT FASTING Y/N/NP: NO
PLATELET # BLD AUTO: 286 10(3)UL (ref 150–450)
POTASSIUM SERPL-SCNC: 4.5 MMOL/L (ref 3.5–5.1)
RBC # BLD AUTO: 3.32 X10(6)UL
SODIUM SERPL-SCNC: 137 MMOL/L (ref 136–145)
TRIGL SERPL-MCNC: 97 MG/DL (ref 30–149)
TSI SER-ACNC: 1.56 MIU/ML (ref 0.36–3.74)
VLDLC SERPL CALC-MCNC: 15 MG/DL (ref 0–30)
WBC # BLD AUTO: 5.6 X10(3) UL (ref 4–11)

## 2021-07-27 PROCEDURE — G0439 PPPS, SUBSEQ VISIT: HCPCS | Performed by: INTERNAL MEDICINE

## 2021-07-27 PROCEDURE — 3078F DIAST BP <80 MM HG: CPT | Performed by: INTERNAL MEDICINE

## 2021-07-27 PROCEDURE — 82728 ASSAY OF FERRITIN: CPT

## 2021-07-27 PROCEDURE — 99397 PER PM REEVAL EST PAT 65+ YR: CPT | Performed by: INTERNAL MEDICINE

## 2021-07-27 PROCEDURE — 82607 VITAMIN B-12: CPT

## 2021-07-27 PROCEDURE — 96160 PT-FOCUSED HLTH RISK ASSMT: CPT | Performed by: INTERNAL MEDICINE

## 2021-07-27 PROCEDURE — 84443 ASSAY THYROID STIM HORMONE: CPT

## 2021-07-27 PROCEDURE — 80061 LIPID PANEL: CPT

## 2021-07-27 PROCEDURE — 3074F SYST BP LT 130 MM HG: CPT | Performed by: INTERNAL MEDICINE

## 2021-07-27 PROCEDURE — 83540 ASSAY OF IRON: CPT

## 2021-07-27 PROCEDURE — 36415 COLL VENOUS BLD VENIPUNCTURE: CPT

## 2021-07-27 PROCEDURE — 3008F BODY MASS INDEX DOCD: CPT | Performed by: INTERNAL MEDICINE

## 2021-07-27 PROCEDURE — 80053 COMPREHEN METABOLIC PANEL: CPT

## 2021-07-27 PROCEDURE — 85025 COMPLETE CBC W/AUTO DIFF WBC: CPT

## 2021-07-27 PROCEDURE — 84466 ASSAY OF TRANSFERRIN: CPT

## 2021-07-27 RX ORDER — CEPHALEXIN 500 MG/1
500 CAPSULE ORAL 2 TIMES DAILY
Qty: 14 CAPSULE | Refills: 0 | Status: SHIPPED | OUTPATIENT
Start: 2021-07-27 | End: 2021-08-16

## 2021-07-27 NOTE — PATIENT INSTRUCTIONS
complete power of  for health care form (if you don't have already) and bring in a copy for your file here. Xray Right shoulder. Fasting blood tests. See Dr Fredo Garcia for shoulder pain.

## 2021-07-28 ENCOUNTER — TELEPHONE (OUTPATIENT)
Dept: INTERNAL MEDICINE CLINIC | Facility: CLINIC | Age: 86
End: 2021-07-28

## 2021-07-28 DIAGNOSIS — E55.9 VITAMIN D DEFICIENCY: ICD-10-CM

## 2021-07-28 DIAGNOSIS — D64.9 CHRONIC ANEMIA: Primary | ICD-10-CM

## 2021-07-28 LAB
DEPRECATED HBV CORE AB SER IA-ACNC: 23.2 NG/ML
IRON SATURATION: 9 %
IRON SERPL-MCNC: 32 UG/DL
TOTAL IRON BINDING CAPACITY: 370 UG/DL (ref 240–450)
TRANSFERRIN SERPL-MCNC: 248 MG/DL (ref 200–360)
VIT B12 SERPL-MCNC: 199 PG/ML (ref 193–986)

## 2021-07-28 RX ORDER — FERROUS SULFATE 325(65) MG
325 TABLET ORAL
Refills: 0 | COMMUNITY
Start: 2021-07-28 | End: 2022-02-01

## 2021-07-28 RX ORDER — PANTOPRAZOLE SODIUM 40 MG/1
TABLET, DELAYED RELEASE ORAL
Qty: 90 TABLET | Refills: 3 | Status: SHIPPED | OUTPATIENT
Start: 2021-07-28

## 2021-07-28 NOTE — TELEPHONE ENCOUNTER
CBC CMP TSH lipid Vit D–7/27/21–-Hgb 8. 9–was 8.6 on 6/9/21. Vitamin D level wasn't done. .  Lab orders added to specimen:  1. Chronic anemia  - VITAMIN B12; Future  - IRON AND TIBC;  Future  - FERRITIN; Future    Addendum–added on lab results--Vit B12 199,

## 2021-07-28 NOTE — TELEPHONE ENCOUNTER
To nursing, please tell patient personally lab results okay except she has a continued anemia with hemoglobin 8.9. Additional labs show some deficiency of iron & deficiency of vitamin B12 which could contribute to anemia.   Ask her to write this down–start

## 2021-07-29 ENCOUNTER — OFFICE VISIT (OUTPATIENT)
Dept: NEUROLOGY | Facility: CLINIC | Age: 86
End: 2021-07-29
Payer: COMMERCIAL

## 2021-07-29 ENCOUNTER — TELEPHONE (OUTPATIENT)
Dept: NEUROLOGY | Facility: CLINIC | Age: 86
End: 2021-07-29

## 2021-07-29 ENCOUNTER — HOSPITAL ENCOUNTER (OUTPATIENT)
Dept: GENERAL RADIOLOGY | Facility: HOSPITAL | Age: 86
Discharge: HOME | End: 2021-07-29
Attending: INTERNAL MEDICINE
Payer: MEDICARE

## 2021-07-29 VITALS
OXYGEN SATURATION: 93 % | HEART RATE: 71 BPM | BODY MASS INDEX: 26.61 KG/M2 | DIASTOLIC BLOOD PRESSURE: 50 MMHG | HEIGHT: 59 IN | WEIGHT: 132 LBS | SYSTOLIC BLOOD PRESSURE: 122 MMHG

## 2021-07-29 DIAGNOSIS — M25.511 ACUTE PAIN OF RIGHT SHOULDER: ICD-10-CM

## 2021-07-29 DIAGNOSIS — E11.22 CKD STAGE 3 DUE TO TYPE 2 DIABETES MELLITUS (HCC): ICD-10-CM

## 2021-07-29 DIAGNOSIS — I47.1 SVT (SUPRAVENTRICULAR TACHYCARDIA) (HCC): ICD-10-CM

## 2021-07-29 DIAGNOSIS — N18.30 CKD STAGE 3 DUE TO TYPE 2 DIABETES MELLITUS (HCC): ICD-10-CM

## 2021-07-29 DIAGNOSIS — M19.011 OSTEOARTHRITIS OF RIGHT GLENOHUMERAL JOINT: Primary | ICD-10-CM

## 2021-07-29 DIAGNOSIS — Z95.5 STENTED CORONARY ARTERY: ICD-10-CM

## 2021-07-29 DIAGNOSIS — I10 ESSENTIAL HYPERTENSION: ICD-10-CM

## 2021-07-29 DIAGNOSIS — I25.10 CORONARY ARTERY DISEASE INVOLVING NATIVE CORONARY ARTERY OF NATIVE HEART WITHOUT ANGINA PECTORIS: ICD-10-CM

## 2021-07-29 DIAGNOSIS — G56.03 CARPAL TUNNEL SYNDROME ON BOTH SIDES: ICD-10-CM

## 2021-07-29 PROCEDURE — 99214 OFFICE O/P EST MOD 30 MIN: CPT | Performed by: PHYSICAL MEDICINE & REHABILITATION

## 2021-07-29 PROCEDURE — 3008F BODY MASS INDEX DOCD: CPT | Performed by: PHYSICAL MEDICINE & REHABILITATION

## 2021-07-29 PROCEDURE — 3078F DIAST BP <80 MM HG: CPT | Performed by: PHYSICAL MEDICINE & REHABILITATION

## 2021-07-29 PROCEDURE — 3074F SYST BP LT 130 MM HG: CPT | Performed by: PHYSICAL MEDICINE & REHABILITATION

## 2021-07-29 PROCEDURE — 73030 X-RAY EXAM OF SHOULDER: CPT | Performed by: INTERNAL MEDICINE

## 2021-07-29 NOTE — TELEPHONE ENCOUNTER
[facility] PPO   for authorization of approval of Right glenohumeral joint aspiration and injection with corticosteroid cpt codes 596393, . Talked to Sharri Bishop. Authorization is not required. Reference # [id number]. Will  inform Nursing.

## 2021-07-31 PROBLEM — M19.011 OSTEOARTHRITIS OF RIGHT GLENOHUMERAL JOINT: Status: ACTIVE | Noted: 2021-07-31

## 2021-07-31 NOTE — PROGRESS NOTES
[address]  FOLLOW UP EVALUATION      Chief Complaint: Shoulder pain    HISTORY OF PRESENT ILLNESS:   Patient presents with:  Carpal Tunnel Syndrome: LOV 02/17/21 c/o tingling/numbness in bilateral fingers. presents with bilateral hand pain right worse than left. Patient denies any injury or trauma. Her onset of symptoms was 3 to 6 months ago. She initially had mild symptoms however now they have progressed to worsening pain.   She is experiencing numbness resolved on serial CT scans   • Osteoarthritis    • Osteopenia    • PAF (paroxysmal atrial fibrillation) ([facility] [address] 01/2021   • Pneumonia due to COVID-19 virus 01/2021    hosp and rec'd conv plasma and remdesivir.    • Renal calculus 2010    on CT abd 5/10-6 mm MORNING BEFORE BREAKFAST 90 tablet 3   • ELIQUIS 2.5 MG Oral Tab TAKE 1 TABLET BY MOUTH TWICE DAILY 180 tablet 3   • silver sulfADIAZINE 1 % External Cream Apply 1 Application topically 2 (two) times daily.  30 g 1   • Acetaminophen-Codeine #3 300-30 MG Ora Aerosol Inhale 1 puff into the lungs 2 (two) times daily. 3 Inhaler 3   • Polyethylene Glycol 3350 (MIRALAX) Oral Powder Take by mouth nightly.  take 15 milliliter (17G)  by oral route  every day powder mixed with 8 oz. water, juice, soda, coffee, or tea /50   Pulse 71   Ht 59\"   Wt 132 lb (59.9 kg)   SpO2 93%   BMI 26.66 kg/m²   General: No immediate distress  Head: Normocephalic/ Atraumatic  Eyes: Extra-occular movements intact.    Ears: No auricular hematoma or deformities  Mouth: No lesions or 16 07/27/2021    ALKPHOS 83 07/06/2015    BILT 0.3 07/27/2021    TP 7.6 07/27/2021    ALB 3.6 07/27/2021    GLOBULIN 4.0 07/27/2021    AGRATIO 1.3 07/06/2015     07/27/2021    K 4.5 07/27/2021     07/27/2021    CO2 23.0 07/27/2021     No result

## 2021-08-04 ENCOUNTER — TELEPHONE (OUTPATIENT)
Dept: INTERNAL MEDICINE CLINIC | Facility: CLINIC | Age: 86
End: 2021-08-04

## 2021-08-04 NOTE — TELEPHONE ENCOUNTER
Pt asked that Dr Compa Machado please call  Pt was told by Dr Gladis Martinez office that she doesn't need to be seen going forward regarding the neck fracture  Should she now follow with Dr Aponte Peers? Did Dr Compa Machado receive all reports that Dr Jennifer Ruiz had?   Ta

## 2021-08-04 NOTE — TELEPHONE ENCOUNTER
Called Dr. Chris Silverman [phone] requested his office notes on patient. She is confused as to if she needs to see him again and is looking for direction as to how much she needs to wear her brace. Wait office notes.

## 2021-08-04 NOTE — TELEPHONE ENCOUNTER
To nursing, pls tell pt  Dr. Carmona Smikary recommendation at 6/25/21 visit with him--use cervical-collar when in motor vehicle and when upright and walking with walker. Remove collar when sitting and when in bed.   He said no additional follow-up is necessary

## 2021-08-11 RX ORDER — APIXABAN 2.5 MG/1
TABLET, FILM COATED ORAL
Qty: 60 TABLET | Refills: 1 | OUTPATIENT
Start: 2021-08-11

## 2021-08-11 RX ORDER — GABAPENTIN 100 MG/1
CAPSULE ORAL
Qty: 90 CAPSULE | Refills: 3 | Status: SHIPPED | OUTPATIENT
Start: 2021-08-11

## 2021-08-13 ENCOUNTER — TELEPHONE (OUTPATIENT)
Dept: INTERNAL MEDICINE CLINIC | Facility: CLINIC | Age: 86
End: 2021-08-13

## 2021-08-13 NOTE — PROGRESS NOTES
Colby Ramos is a 80year old female who presents for     Last seen 7/27/21    History of cervical fracture  Cervical spine fx C2 diagnosed in ER 3/19/21--was at [facility] til 4/25/21. Dr. Anabel Garcia removed neck brace 6/2021.   Dr. Agustina wilkinso fibrillation) ([facility] [address] 01/2021   • Pneumonia due to COVID-19 virus 01/2021    hosp and rec'd conv plasma and remdesivir.    • Renal calculus 2010    on CT abd 5/10-6 mm or less in L lower kidney   • Renal insufficiency    • Shingles 2011   • Small bowel obstru (57.2 kg)  04/21/21 : 132 lb (59.9 kg)      General: appears well nourished and in no acute distress  Neck: nontender, no nodes. Lungs-clear.   Heart: regular rhythm, S1S2 normal without murmur    Breasts–no mass or axillary adenopathy at 7/27/21 visit  A needed. (usually 1/2 tsp at HS).      Carpal tunnel--wrist splints bilat at night. EMG ord at 9/4/20 visit--didn't do emg. Dr Jerzy Villavicencio inj R wrist 12/2020 (improved) and in 2/2021--he ord OT.      Chronic anemia--Hgb 8.6 on 6/9/21 ER–was 9.9 on 3/19/21--w scarring and incisional herniation large and small bowel, without obstruction. Heavy stool burden throughout proximal colon. Indeterm nodular opacity LLL--> scarring on CT chest abdomen 1/4/21. Hx UTI-lab 11/21/19--UA--167 wbc, 21 rbc. Ucx >100K E.  Col 6/9/21.  added on lab--Vit B12 199, ferritin--nl. Fe sat 9%.  Started B12 1000 mcg daily and FeSO4 65 mg daily.      CBC vitamin D in early Sept.    Ret 10/26/21 as planned.     Patient expresses understanding of above issues and plan.        Current Outpat Bimatoprost (LUMIGAN OP) Place 1 drop into both eyes daily. Right eye.       • ALENDRONATE 70 MG Oral Tab TAKE 1 TABLET (70MG) BY MOUTH ONCE EACH WEEK 12 tablet 3   • DILTIAZEM HCL ER COATED BEADS 120 MG Oral Capsule SR 24 Hr TAKE 1 CAPSULE BY MOUTH EVERY D

## 2021-08-13 NOTE — TELEPHONE ENCOUNTER
Patient is calling she wants to see Dr Kelsey Bacon for her Neck Fracture    She has a care giver coming on Monday 8/16, can patient be seen in MD slot at 11:30?     Please call patient [phone]

## 2021-08-16 ENCOUNTER — OFFICE VISIT (OUTPATIENT)
Dept: INTERNAL MEDICINE CLINIC | Facility: CLINIC | Age: 86
End: 2021-08-16
Payer: COMMERCIAL

## 2021-08-16 VITALS
TEMPERATURE: 98 F | OXYGEN SATURATION: 98 % | HEART RATE: 75 BPM | DIASTOLIC BLOOD PRESSURE: 42 MMHG | WEIGHT: 133 LBS | HEIGHT: 59 IN | SYSTOLIC BLOOD PRESSURE: 126 MMHG | BODY MASS INDEX: 26.81 KG/M2

## 2021-08-16 DIAGNOSIS — M19.011 PRIMARY OSTEOARTHRITIS OF RIGHT SHOULDER: ICD-10-CM

## 2021-08-16 DIAGNOSIS — S12.101S CLOSED NONDISPLACED FRACTURE OF SECOND CERVICAL VERTEBRA, UNSPECIFIED FRACTURE MORPHOLOGY, SEQUELA: Primary | ICD-10-CM

## 2021-08-16 DIAGNOSIS — D64.9 CHRONIC ANEMIA: ICD-10-CM

## 2021-08-16 PROCEDURE — 3074F SYST BP LT 130 MM HG: CPT | Performed by: INTERNAL MEDICINE

## 2021-08-16 PROCEDURE — 3078F DIAST BP <80 MM HG: CPT | Performed by: INTERNAL MEDICINE

## 2021-08-16 PROCEDURE — 3008F BODY MASS INDEX DOCD: CPT | Performed by: INTERNAL MEDICINE

## 2021-08-16 PROCEDURE — 99214 OFFICE O/P EST MOD 30 MIN: CPT | Performed by: INTERNAL MEDICINE

## 2021-08-16 RX ORDER — PROMETHAZINE HYDROCHLORIDE AND CODEINE PHOSPHATE 6.25; 1 MG/5ML; MG/5ML
2.5 SOLUTION ORAL EVERY 6 HOURS PRN
Qty: 180 ML | Refills: 1 | Status: CANCELLED | OUTPATIENT
Start: 2021-08-16

## 2021-08-16 RX ORDER — PROMETHAZINE HYDROCHLORIDE AND CODEINE PHOSPHATE 6.25; 1 MG/5ML; MG/5ML
2.5 SOLUTION ORAL EVERY 6 HOURS PRN
Qty: 180 ML | Refills: 1 | Status: SHIPPED | OUTPATIENT
Start: 2021-08-16 | End: 2021-10-25

## 2021-08-16 NOTE — PATIENT INSTRUCTIONS
Lab tests in early September. Return 10/26/21 as planned. If you want a different opinion on your neck--see Dr Hari Pollock.

## 2021-08-17 ENCOUNTER — TELEPHONE (OUTPATIENT)
Dept: INTERNAL MEDICINE CLINIC | Facility: CLINIC | Age: 86
End: 2021-08-17

## 2021-08-17 DIAGNOSIS — Z12.31 ENCOUNTER FOR SCREENING MAMMOGRAM FOR MALIGNANT NEOPLASM OF BREAST: Primary | ICD-10-CM

## 2021-08-17 NOTE — TELEPHONE ENCOUNTER
[address] [facility] Central Scheduling is calling patient is trying to schedule a Mammogram    Her current Mammogram order expires on 9/4/21, there are no openings at the location she would like to go to until after 9/4/21    Please enter a new Mammogram order

## 2021-08-17 NOTE — TELEPHONE ENCOUNTER
To nursing--please tell central scheduling the mammogram order has been placed again today. Thanks. 1. Encounter for screening mammogram for malignant neoplasm of breast  - [facility] CARROLL 2D+3D SCREENING BILAT (CPT=[id number]);  Future

## 2021-08-17 NOTE — TELEPHONE ENCOUNTER
Spoke with Kimberly Montiel from central scheduling to inform her that mammo order is placed, she will reach out to patient to schedule.

## 2021-08-23 ENCOUNTER — OFFICE VISIT (OUTPATIENT)
Dept: NEUROLOGY | Facility: CLINIC | Age: 86
End: 2021-08-23
Payer: COMMERCIAL

## 2021-08-23 DIAGNOSIS — M19.011 OSTEOARTHRITIS OF RIGHT GLENOHUMERAL JOINT: Primary | ICD-10-CM

## 2021-08-23 PROCEDURE — 20611 DRAIN/INJ JOINT/BURSA W/US: CPT | Performed by: PHYSICAL MEDICINE & REHABILITATION

## 2021-08-24 ENCOUNTER — TELEPHONE (OUTPATIENT)
Dept: NEUROLOGY | Facility: CLINIC | Age: 86
End: 2021-08-24

## 2021-08-24 RX ORDER — LIDOCAINE HYDROCHLORIDE 10 MG/ML
7 INJECTION, SOLUTION INFILTRATION; PERINEURAL ONCE
Status: COMPLETED | OUTPATIENT
Start: 2021-08-24 | End: 2021-08-24

## 2021-08-24 RX ORDER — TRIAMCINOLONE ACETONIDE 40 MG/ML
40 INJECTION, SUSPENSION INTRA-ARTICULAR; INTRAMUSCULAR ONCE
Status: COMPLETED | OUTPATIENT
Start: 2021-08-24 | End: 2021-08-24

## 2021-08-24 RX ADMIN — LIDOCAINE HYDROCHLORIDE 7 ML: 10 INJECTION, SOLUTION INFILTRATION; PERINEURAL at 10:40:00

## 2021-08-24 RX ADMIN — TRIAMCINOLONE ACETONIDE 40 MG: 40 INJECTION, SUSPENSION INTRA-ARTICULAR; INTRAMUSCULAR at 10:40:00

## 2021-08-24 NOTE — PROCEDURES
Procedure:  Ultrasound guided RIGHT glenohumeral joint aspiration and injection with corticosteroid  The risks, benefits and anticipated outcomes of the procedure, the risks and benefits of the alternatives to the procedure, and the roles and tasks of the

## 2021-09-08 ENCOUNTER — TELEPHONE (OUTPATIENT)
Dept: INTERNAL MEDICINE CLINIC | Facility: CLINIC | Age: 86
End: 2021-09-08

## 2021-09-08 NOTE — TELEPHONE ENCOUNTER
To nursing, not sure why she is having difficulty with the appointment for Dr. Nyla Munroe. She can try to see him again in 2 weeks. Other option Dr. Leopold Cheney in [facility]. Thanks.

## 2021-09-08 NOTE — TELEPHONE ENCOUNTER
Patient is calling to find out if she should still Dr Janie Cobb or is there someone else that is recommended she see.   Patient has gone to his office twice thinking she had an appointment and both times Dr Luis Ewing was not in the office  She thought

## 2021-09-09 ENCOUNTER — OFFICE VISIT (OUTPATIENT)
Dept: PHYSICAL MEDICINE AND REHAB | Facility: CLINIC | Age: 86
End: 2021-09-09
Payer: COMMERCIAL

## 2021-09-09 ENCOUNTER — MED REC SCAN ONLY (OUTPATIENT)
Dept: PHYSICAL MEDICINE AND REHAB | Facility: CLINIC | Age: 86
End: 2021-09-09

## 2021-09-09 VITALS
DIASTOLIC BLOOD PRESSURE: 48 MMHG | SYSTOLIC BLOOD PRESSURE: 138 MMHG | HEART RATE: 71 BPM | OXYGEN SATURATION: 99 % | WEIGHT: 132 LBS | BODY MASS INDEX: 26.61 KG/M2 | HEIGHT: 59 IN

## 2021-09-09 DIAGNOSIS — G56.03 CARPAL TUNNEL SYNDROME ON BOTH SIDES: Primary | ICD-10-CM

## 2021-09-09 PROCEDURE — 3078F DIAST BP <80 MM HG: CPT | Performed by: PHYSICAL MEDICINE & REHABILITATION

## 2021-09-09 PROCEDURE — 3008F BODY MASS INDEX DOCD: CPT | Performed by: PHYSICAL MEDICINE & REHABILITATION

## 2021-09-09 PROCEDURE — 3075F SYST BP GE 130 - 139MM HG: CPT | Performed by: PHYSICAL MEDICINE & REHABILITATION

## 2021-09-09 PROCEDURE — 99214 OFFICE O/P EST MOD 30 MIN: CPT | Performed by: PHYSICAL MEDICINE & REHABILITATION

## 2021-09-09 PROCEDURE — 20526 THER INJECTION CARP TUNNEL: CPT | Performed by: PHYSICAL MEDICINE & REHABILITATION

## 2021-09-09 RX ORDER — LIDOCAINE HYDROCHLORIDE 10 MG/ML
1.5 INJECTION, SOLUTION INFILTRATION; PERINEURAL ONCE
Status: COMPLETED | OUTPATIENT
Start: 2021-09-09 | End: 2021-09-09

## 2021-09-09 RX ORDER — TRIAMCINOLONE ACETONIDE 40 MG/ML
20 INJECTION, SUSPENSION INTRA-ARTICULAR; INTRAMUSCULAR ONCE
Status: COMPLETED | OUTPATIENT
Start: 2021-09-09 | End: 2021-09-09

## 2021-09-09 NOTE — PROCEDURES
Procedure:  Ultrasound guided LEFT carpal tunnel injection  The risks, benefits and anticipated outcomes of the procedure, the risks and benefits of the alternatives to the procedure, and the roles and tasks of the personnel to be involved, were discussed

## 2021-09-09 NOTE — PROGRESS NOTES
[address]  FOLLOW UP EVALUATION      Chief Complaint: Shoulder pain    HISTORY OF PRESENT ILLNESS:   Patient presents with:  Shoulder Pain: f/u after  RIGHT glenohumeral joint aspiration.  Patient states she tunnel syndrome and hand pain. She had a ultrasound-guided right carpal tunnel injection which provided great improvement overall. She has some numbness tingling sensation still in the first 3 digits of the right hand.   Overall, she has noted good improv Cardiac cath 6/23/20 Dr. Rosenbaum Folds PCI of LAD and RCA was performed.    • Diverticulitis 2010    hospitalized 3/10 and 5/10   • Diverticulosis 2003    cscopy '03, '07, '10   • Fracture of C2 vertebra, closed ([facility] [address] 03/2021    ER 3/19/21--CT cervical s REPLACEMENT SURGERY Left 2000   • KNEE REPLACEMENT SURGERY Right 2008   • OTHER SURGICAL HISTORY      cystocele repair   • OTHER SURGICAL HISTORY Bilateral     ear surgery-Dr. Gregory Caba eardrums   • SLING  6/30/2016    vag sling procedure at [facility] - ONCE EACH WEEK 12 tablet 3   • DILTIAZEM HCL ER COATED BEADS 120 MG Oral Capsule SR 24 Hr TAKE 1 CAPSULE BY MOUTH EVERY DAY 90 capsule 3   • Enalapril Maleate 5 MG Oral Tab Take 1 tablet (5 mg total) by mouth daily.  90 tablet 3   • atorvastatin 10 MG Oral OF SYSTEMS:   Patient-reported ROS  Constitutional  Sleep Disturbance: denies   Cardiovascular  Chest Pain: denies  Irregular Heartbeat: denies   Gastrointestinal  Bowel Incontinence: denies  Heartburn: denies   Genitourinary  Difficulty Urinating: denies RDW 17.2 (H) 07/27/2021    .0 07/27/2021    MPV 8.3 10/11/2018     Lab Results   Component Value Date     (H) 07/27/2021    BUN 20 (H) 07/27/2021    BUNCREA 20.8 (H) 07/27/2021    CREATSERUM 0.96 07/27/2021    ANIONGAP 9 07/27/2021    GFRNAA

## 2021-09-21 NOTE — TELEPHONE ENCOUNTER
Patient is calling she saw Dr Aimee Whiting yesterday and he did not examine her or place any orders    Dr Aimee Whiting thinks she should see Dr Tong Less, patient thinks Dr Chung Shadow  told her he is finished seeing her she needs to see her primary    Pleas

## 2021-09-23 NOTE — TELEPHONE ENCOUNTER
Pt. Called stating she is a little confused about whats up ahead. She will be seeing Dr. Chiki Kidd on 10/8/21. Is there anything else she should know or be doing prior to the visit with Dr. Chiki Kidd?

## 2021-10-01 ENCOUNTER — HOSPITAL ENCOUNTER (OUTPATIENT)
Dept: MAMMOGRAPHY | Age: 86
Discharge: HOME | End: 2021-10-01
Attending: INTERNAL MEDICINE
Payer: MEDICARE

## 2021-10-01 DIAGNOSIS — Z12.31 ENCOUNTER FOR SCREENING MAMMOGRAM FOR MALIGNANT NEOPLASM OF BREAST: ICD-10-CM

## 2021-10-01 PROCEDURE — 77063 BREAST TOMOSYNTHESIS BI: CPT | Performed by: INTERNAL MEDICINE

## 2021-10-01 PROCEDURE — 77067 SCR MAMMO BI INCL CAD: CPT | Performed by: INTERNAL MEDICINE

## 2021-10-01 RX ORDER — LACTULOSE 10 G/15ML
SOLUTION ORAL
Qty: 236 ML | Refills: 0 | OUTPATIENT
Start: 2021-10-01

## 2021-10-01 NOTE — TELEPHONE ENCOUNTER
Not on active med list  Refill request has failed the Ambulatory Medication Refill Standing Order and is routed to the primary physician to review the following:    Requested Prescriptions     Refused Prescriptions Disp Refills   • LACTULOSE 10 GM/15ML Nancy Lou

## 2021-10-05 RX ORDER — ATORVASTATIN CALCIUM 10 MG/1
10 TABLET, FILM COATED ORAL NIGHTLY
Qty: 90 TABLET | Refills: 3 | Status: SHIPPED | OUTPATIENT
Start: 2021-10-05

## 2021-10-11 ENCOUNTER — OFFICE VISIT (OUTPATIENT)
Dept: PHYSICAL MEDICINE AND REHAB | Facility: CLINIC | Age: 86
End: 2021-10-11
Payer: COMMERCIAL

## 2021-10-11 VITALS
BODY MASS INDEX: 26.61 KG/M2 | WEIGHT: 132 LBS | DIASTOLIC BLOOD PRESSURE: 68 MMHG | SYSTOLIC BLOOD PRESSURE: 120 MMHG | RESPIRATION RATE: 16 BRPM | HEIGHT: 59 IN | HEART RATE: 64 BPM | OXYGEN SATURATION: 95 %

## 2021-10-11 DIAGNOSIS — I25.10 CORONARY ARTERY DISEASE INVOLVING NATIVE CORONARY ARTERY OF NATIVE HEART WITHOUT ANGINA PECTORIS: ICD-10-CM

## 2021-10-11 DIAGNOSIS — G56.03 CARPAL TUNNEL SYNDROME ON BOTH SIDES: Primary | ICD-10-CM

## 2021-10-11 DIAGNOSIS — E11.22 CKD STAGE 3 DUE TO TYPE 2 DIABETES MELLITUS (HCC): ICD-10-CM

## 2021-10-11 DIAGNOSIS — N18.30 CKD STAGE 3 DUE TO TYPE 2 DIABETES MELLITUS (HCC): ICD-10-CM

## 2021-10-11 DIAGNOSIS — I10 HYPERTENSION, ESSENTIAL: ICD-10-CM

## 2021-10-11 DIAGNOSIS — M19.011 OSTEOARTHRITIS OF RIGHT GLENOHUMERAL JOINT: ICD-10-CM

## 2021-10-11 PROCEDURE — 99213 OFFICE O/P EST LOW 20 MIN: CPT | Performed by: PHYSICAL MEDICINE & REHABILITATION

## 2021-10-11 PROCEDURE — 3074F SYST BP LT 130 MM HG: CPT | Performed by: PHYSICAL MEDICINE & REHABILITATION

## 2021-10-11 PROCEDURE — 3008F BODY MASS INDEX DOCD: CPT | Performed by: PHYSICAL MEDICINE & REHABILITATION

## 2021-10-11 PROCEDURE — 3078F DIAST BP <80 MM HG: CPT | Performed by: PHYSICAL MEDICINE & REHABILITATION

## 2021-10-11 RX ORDER — BIMATOPROST 0.01 %
1 DROPS OPHTHALMIC (EYE)
COMMUNITY
Start: 2021-09-17

## 2021-10-11 NOTE — PROGRESS NOTES
[address]  FOLLOW UP EVALUATION      Chief Complaint: Shoulder pain    HISTORY OF PRESENT ILLNESS:   Patient presents with: Follow - Up: f/u carpal tunnel syndrome in both hands.  c/o slight numbness when h shoulder where she has very severe limited range of motion. She has pain when sleeping at nighttime. She has completed occupational therapy for the left hand but had to stop due to her recent fall.   She notices pain with any movement of the shoulder note problem. She feels that the pain is interfering with her ability to sleep and she often has to shake her hands in the middle the night to relieve the symptoms and has a positive flick sign. She has not had any imaging or other treatment for this problem. Klebsiella species 12/2016    ESBL Klebsiella UTI treated in [address] [place] [address] with meropenem when in hosp for enteritis. (Dr Omaira Correa).           PAST SURGICAL HISTORY:     Past Surgical History:   Procedure Laterality Date   • ABDOMEN SURGERY PROC UNLISTED  2007    lap Pain. 15 tablet 0   • Fluticasone Propionate 50 MCG/ACT Nasal Suspension by Each Nare route daily. • acetaminophen 500 MG Oral Tab Take 500 mg by mouth 2 (two) times a day.      • psyllium 28 % Oral Powd Pack Take 1 packet by mouth 2 (two) times daily a cause of death age 66   • Heart Attack Brother         cause of death   • Cancer Brother          of pancreas or lung cancer   • Other (5 children [Other]) Other    • Glaucoma Brother    • Other (intestinal blockage [Other]) Brother         caus Aminah's nodes in the fingertips noted  Palpation: no ttp in carpal bones or areas of pain  ROM: intact to all planes of motion  Strength: 5/5  Sensation: Intact to light touch in all dermatomes of the lower extremities  Tinel's test: Positive for reprod left hand pain secondary to carpal tunnel.   She has responded well to the recent ultrasound-guided injection and I recommend that she continue her home exercise program, night splints and topical Voltaren gel which was advised to her today as well as icing

## 2021-10-15 RX ORDER — LACTULOSE 10 G/15ML
SOLUTION ORAL
Qty: 236 ML | Refills: 0 | Status: SHIPPED | OUTPATIENT
Start: 2021-10-15 | End: 2022-02-01

## 2021-10-15 NOTE — TELEPHONE ENCOUNTER
See 3/2/21 TT for MD order  Refill request is for a maintenance medication and has met the criteria specified in the Ambulatory Medication Refill Standing Order for eligibility, visits, laboratory, alerts and was sent to the requested pharmacy.     Requeste

## 2021-10-18 NOTE — TELEPHONE ENCOUNTER
Pt unable to get clopidogrel refilled from   Dr Antwan Antonio, pt was given a courtesy supply and only has one tablet left   Can Dr Alease Goltz refill for her

## 2021-10-19 RX ORDER — CLOPIDOGREL BISULFATE 75 MG/1
TABLET ORAL
Qty: 90 TABLET | Refills: 3 | Status: SHIPPED | OUTPATIENT
Start: 2021-10-19

## 2021-10-19 NOTE — TELEPHONE ENCOUNTER
To nursing, I sent refill for clopidogrel 75 mg daily #90 with 3 refills. Tell patient is also important she follow-up in the office with Dr. Jaz Pineda for her heart. Thanks.

## 2021-10-25 ENCOUNTER — TELEPHONE (OUTPATIENT)
Dept: INTERNAL MEDICINE CLINIC | Facility: CLINIC | Age: 86
End: 2021-10-25

## 2021-10-25 ENCOUNTER — OFFICE VISIT (OUTPATIENT)
Dept: INTERNAL MEDICINE CLINIC | Facility: CLINIC | Age: 86
End: 2021-10-25
Payer: COMMERCIAL

## 2021-10-25 ENCOUNTER — LAB ENCOUNTER (OUTPATIENT)
Dept: LAB | Age: 86
End: 2021-10-25
Attending: INTERNAL MEDICINE
Payer: MEDICARE

## 2021-10-25 VITALS
TEMPERATURE: 98 F | DIASTOLIC BLOOD PRESSURE: 70 MMHG | SYSTOLIC BLOOD PRESSURE: 120 MMHG | BODY MASS INDEX: 27.21 KG/M2 | HEIGHT: 59 IN | HEART RATE: 68 BPM | WEIGHT: 135 LBS

## 2021-10-25 DIAGNOSIS — D64.9 CHRONIC ANEMIA: ICD-10-CM

## 2021-10-25 DIAGNOSIS — M19.011 PRIMARY OSTEOARTHRITIS OF RIGHT SHOULDER: ICD-10-CM

## 2021-10-25 DIAGNOSIS — E55.9 VITAMIN D DEFICIENCY: ICD-10-CM

## 2021-10-25 DIAGNOSIS — S12.101S CLOSED NONDISPLACED FRACTURE OF SECOND CERVICAL VERTEBRA, UNSPECIFIED FRACTURE MORPHOLOGY, SEQUELA: Primary | ICD-10-CM

## 2021-10-25 PROCEDURE — 3078F DIAST BP <80 MM HG: CPT | Performed by: INTERNAL MEDICINE

## 2021-10-25 PROCEDURE — 3074F SYST BP LT 130 MM HG: CPT | Performed by: INTERNAL MEDICINE

## 2021-10-25 PROCEDURE — 3008F BODY MASS INDEX DOCD: CPT | Performed by: INTERNAL MEDICINE

## 2021-10-25 PROCEDURE — 90662 IIV NO PRSV INCREASED AG IM: CPT | Performed by: INTERNAL MEDICINE

## 2021-10-25 PROCEDURE — 85025 COMPLETE CBC W/AUTO DIFF WBC: CPT

## 2021-10-25 PROCEDURE — G0008 ADMIN INFLUENZA VIRUS VAC: HCPCS | Performed by: INTERNAL MEDICINE

## 2021-10-25 PROCEDURE — 36415 COLL VENOUS BLD VENIPUNCTURE: CPT

## 2021-10-25 PROCEDURE — 99214 OFFICE O/P EST MOD 30 MIN: CPT | Performed by: INTERNAL MEDICINE

## 2021-10-25 PROCEDURE — 82306 VITAMIN D 25 HYDROXY: CPT | Performed by: INTERNAL MEDICINE

## 2021-10-25 RX ORDER — PROMETHAZINE HYDROCHLORIDE AND CODEINE PHOSPHATE 6.25; 1 MG/5ML; MG/5ML
2.5 SOLUTION ORAL EVERY 6 HOURS PRN
Qty: 180 ML | Refills: 1 | Status: SHIPPED | OUTPATIENT
Start: 2021-10-25 | End: 2022-02-01

## 2021-10-25 NOTE — TELEPHONE ENCOUNTER
To nursing,  please tell patient lab results are good. Her anemia is much improved from July with hemoglobin 11.2, up from 8.9 in July. Continue iron and vitamin B12 pills daily. Vitamin D level is normal.  Continue vitamin D 2000 units daily.   Thanks

## 2021-10-25 NOTE — PROGRESS NOTES
Nicko Pearson is a 80year old female who presents for     Check at 2.5 mo. History of cervical fracture  Cervical spine fx C2 diagnosed in ER 3/19/21--was at [facility] til 4/25/21. Dr. Fransisco Xiao removed neck brace 6/2021.     Pt asked for another opinio lumbar 2004-djd spinal stenosis-xray lumbar 12/13-DJD scoliosis, wedge comp T8, 9, 10, L1   • Lung nodule 4/06-3/03    LLL nodule resolved on serial CT scans   • Osteoarthritis    • Osteopenia    • PAF (paroxysmal atrial fibrillation) ([address] 01/2021   • Pne lb (61.2 kg)   BMI 27.27 kg/m²       Wt Readings from Last 6 Encounters:  10/25/21 : 135 lb (61.2 kg)  10/11/21 : 132 lb (59.9 kg)  09/09/21 : 132 lb (59.9 kg)  08/16/21 : 133 lb (60.3 kg)  07/29/21 : 132 lb (59.9 kg)  07/27/21 : 131 lb 9.6 oz (59.7 kg) 220 mcg 1 p bid. Past Combivent Respimat. Phenergan w codeine syrup as needed. (usually 1/2 tsp at HS).      Carpal tunnel--wrist splints bilat at night. EMG ord at 9/4/20 visit--didn't do emg.   Dr Raudel Pratt inj R wrist 12/2020 (improved) and in 2/2021--h -diverticulosis and int hemorrhoids. CT abd pelvis ER 11/5/19– ventral abd scarring and incisional herniation large and small bowel, without obstruction. Heavy stool burden throughout proximal colon.   Indeterm nodular opacity LLL--> scarring on CT chest 1/9/21.  3/19/21–CBC BMP in ER–Hgb 9.9. Hx chronic anemia. 4/19/21--A1c 6.5.  7/27/21–CBC CMP TSH lipid–-Hgb 8. 9–was 8.6 on 6/9/21.  added on lab--Vit B12 199, ferritin--nl.  Fe sat 9%.  Started B12 1000 mcg daily and FeSO4 65 mg daily.      CBC vitamin D Pack Take 1 packet by mouth 2 (two) times daily as needed. • triamcinolone acetonide 0.1 % External Cream Apply 1 Application topically 2 (two) times daily as needed.  30 g 2   • Vitamin D3 25 MCG (1000 UT) Oral Tab Take 2 tablets (2,000 Units total)

## 2021-11-08 ENCOUNTER — TELEPHONE (OUTPATIENT)
Dept: INTERNAL MEDICINE CLINIC | Facility: CLINIC | Age: 86
End: 2021-11-08

## 2021-11-08 NOTE — TELEPHONE ENCOUNTER
Pt recommending Dr Yue Cates recommendation for a specialist/ENT for extra testing on left ear  If patient unable to answer please leave a message  Tasked to nursing

## 2021-11-12 ENCOUNTER — OFFICE VISIT (OUTPATIENT)
Dept: AUDIOLOGY | Facility: CLINIC | Age: 86
End: 2021-11-12
Payer: COMMERCIAL

## 2021-11-12 ENCOUNTER — OFFICE VISIT (OUTPATIENT)
Dept: OTOLARYNGOLOGY | Facility: CLINIC | Age: 86
End: 2021-11-12
Payer: COMMERCIAL

## 2021-11-12 VITALS — BODY MASS INDEX: 26.61 KG/M2 | HEIGHT: 59 IN | WEIGHT: 132 LBS

## 2021-11-12 DIAGNOSIS — H90.3 SENSORINEURAL HEARING LOSS (SNHL) OF BOTH EARS: Primary | ICD-10-CM

## 2021-11-12 DIAGNOSIS — H91.93 BILATERAL HEARING LOSS, UNSPECIFIED HEARING LOSS TYPE: Primary | ICD-10-CM

## 2021-11-12 PROCEDURE — 99203 OFFICE O/P NEW LOW 30 MIN: CPT | Performed by: OTOLARYNGOLOGY

## 2021-11-12 PROCEDURE — 3008F BODY MASS INDEX DOCD: CPT | Performed by: OTOLARYNGOLOGY

## 2021-11-12 PROCEDURE — 92567 TYMPANOMETRY: CPT | Performed by: AUDIOLOGIST

## 2021-11-12 PROCEDURE — 92557 COMPREHENSIVE HEARING TEST: CPT | Performed by: AUDIOLOGIST

## 2021-11-12 NOTE — PROGRESS NOTES
Kaz Olivas is a 80year old female.   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      • GERD (gastroesophageal reflux disease)    • Glaucoma ear surgery-Dr. Linda Catalan eardrums   • SLING  6/30/2016    vag sling procedure at [facility] -Dr Pamela Adams         REVIEW OF SYSTEMS    System Neg/Pos Details   Constitutional Negative Fatigue, fever and weight loss. ENMT Negative Drooling.    Eyes Nega Right: Normal Left: Normal. Septum -Normal  Turbinates - Right: Normal, Left: Normal.       Current Outpatient Medications:   •  CLOPIDOGREL 75 MG Oral Tab, TAKE 1 TABLET ORALLY ONCE A DAY., Disp: 90 tablet, Rfl: 3  •  LUMIGAN 0.01 % Ophthalmic Solution, P Take 1 tablet (5 mg total) by mouth daily. , Disp: 90 tablet, Rfl: 3  •  Hydrocortisone 2.5 % External Cream, APPLY RECTALLY TWICE DAILY AS NEEDED, Disp: 30 g, Rfl: 2  •  Fluticasone Propionate HFA (FLOVENT HFA) 220 MCG/ACT Inhalation Aerosol, Inhale 1 puff

## 2021-11-19 RX ORDER — FERROUS SULFATE 325(65) MG
325 TABLET ORAL
Refills: 0 | OUTPATIENT
Start: 2021-11-19

## 2021-11-19 NOTE — TELEPHONE ENCOUNTER
Per 7/28/21 telephone encounter, \"Ask her to write this down–start over-the-counter vitamin B12 1000 mcg daily and ferrous sulfate 65 mg daily. Next lab in 1 month–CBC and vitamin D level. Refill request denied as this is OTC.     Requested Prescriptio

## 2021-12-14 RX ORDER — ENALAPRIL MALEATE 5 MG/1
5 TABLET ORAL DAILY
Qty: 90 TABLET | Refills: 3 | Status: SHIPPED | OUTPATIENT
Start: 2021-12-14

## 2021-12-23 ENCOUNTER — IMMUNIZATION (OUTPATIENT)
Dept: LAB | Facility: HOSPITAL | Age: 86
End: 2021-12-23
Attending: EMERGENCY MEDICINE
Payer: MEDICARE

## 2021-12-23 DIAGNOSIS — Z23 NEED FOR VACCINATION: Primary | ICD-10-CM

## 2021-12-23 PROCEDURE — 0064A SARSCOV2 VAC 50MCG/0.25ML IM: CPT

## 2021-12-28 RX ORDER — ALENDRONATE SODIUM 70 MG/1
TABLET ORAL
Qty: 12 TABLET | Refills: 3 | Status: SHIPPED | OUTPATIENT
Start: 2021-12-28

## 2022-01-06 RX ORDER — DILTIAZEM HYDROCHLORIDE 120 MG/1
CAPSULE, COATED, EXTENDED RELEASE ORAL
Qty: 90 CAPSULE | Refills: 3 | Status: SHIPPED | OUTPATIENT
Start: 2022-01-06

## 2022-01-10 ENCOUNTER — TELEPHONE (OUTPATIENT)
Dept: NEUROLOGY | Facility: CLINIC | Age: 87
End: 2022-01-10

## 2022-01-10 ENCOUNTER — OFFICE VISIT (OUTPATIENT)
Dept: PHYSICAL MEDICINE AND REHAB | Facility: CLINIC | Age: 87
End: 2022-01-10
Payer: COMMERCIAL

## 2022-01-10 VITALS
SYSTOLIC BLOOD PRESSURE: 110 MMHG | OXYGEN SATURATION: 98 % | HEART RATE: 72 BPM | WEIGHT: 132 LBS | DIASTOLIC BLOOD PRESSURE: 50 MMHG | BODY MASS INDEX: 26.61 KG/M2 | HEIGHT: 59 IN

## 2022-01-10 DIAGNOSIS — N18.30 CKD STAGE 3 DUE TO TYPE 2 DIABETES MELLITUS (HCC): ICD-10-CM

## 2022-01-10 DIAGNOSIS — G56.01 CARPAL TUNNEL SYNDROME ON RIGHT: Primary | ICD-10-CM

## 2022-01-10 DIAGNOSIS — G56.03 CARPAL TUNNEL SYNDROME ON BOTH SIDES: ICD-10-CM

## 2022-01-10 DIAGNOSIS — I25.10 CORONARY ARTERY DISEASE INVOLVING NATIVE CORONARY ARTERY OF NATIVE HEART WITHOUT ANGINA PECTORIS: ICD-10-CM

## 2022-01-10 DIAGNOSIS — E11.22 CKD STAGE 3 DUE TO TYPE 2 DIABETES MELLITUS (HCC): ICD-10-CM

## 2022-01-10 DIAGNOSIS — I10 HYPERTENSION, ESSENTIAL: ICD-10-CM

## 2022-01-10 PROCEDURE — 3078F DIAST BP <80 MM HG: CPT | Performed by: PHYSICAL MEDICINE & REHABILITATION

## 2022-01-10 PROCEDURE — 76942 ECHO GUIDE FOR BIOPSY: CPT | Performed by: PHYSICAL MEDICINE & REHABILITATION

## 2022-01-10 PROCEDURE — 3008F BODY MASS INDEX DOCD: CPT | Performed by: PHYSICAL MEDICINE & REHABILITATION

## 2022-01-10 PROCEDURE — 99214 OFFICE O/P EST MOD 30 MIN: CPT | Performed by: PHYSICAL MEDICINE & REHABILITATION

## 2022-01-10 PROCEDURE — 3074F SYST BP LT 130 MM HG: CPT | Performed by: PHYSICAL MEDICINE & REHABILITATION

## 2022-01-10 PROCEDURE — 20526 THER INJECTION CARP TUNNEL: CPT | Performed by: PHYSICAL MEDICINE & REHABILITATION

## 2022-01-10 RX ORDER — TRIAMCINOLONE ACETONIDE 40 MG/ML
40 INJECTION, SUSPENSION INTRA-ARTICULAR; INTRAMUSCULAR ONCE
Status: COMPLETED | OUTPATIENT
Start: 2022-01-10 | End: 2022-01-10

## 2022-01-10 RX ORDER — LIDOCAINE HYDROCHLORIDE 10 MG/ML
1 INJECTION, SOLUTION INFILTRATION; PERINEURAL ONCE
Status: COMPLETED | OUTPATIENT
Start: 2022-01-10 | End: 2022-01-10

## 2022-01-10 NOTE — PROGRESS NOTES
[address]  FOLLOW UP EVALUATION      Chief Complaint: Right hand pain    HISTORY OF PRESENT ILLNESS:   Patient presents with:  Carpal Tunnel Syndrome: LOV: 10/11/2021 pt comesin today withbilateral carpal t return. She feels that the shoulder is not as worse as it was previously however. She rates it to be 4 out of 10. However the numbness tingling sensation in his left hand is the worst.  This continues to affect her function. She is wearing a c-collar. osteoarthritis who presents with bilateral hand pain right worse than left. Patient denies any injury or trauma. Her onset of symptoms was 3 to 6 months ago. She initially had mild symptoms however now they have progressed to worsening pain.   She is exp LLL nodule resolved on serial CT scans   • Osteoarthritis    • Osteopenia    • PAF (paroxysmal atrial fibrillation) ([facility] [address] 01/2021   • Pneumonia due to COVID-19 virus 01/2021    hosp and rec'd conv plasma and remdesivir.    • Renal calculus 2010    on CT 90 tablet 3   • Promethazine-Codeine 6.25-10 MG/5ML Oral Solution Take 2.5 mL by mouth every 6 (six) hours as needed. 180 mL 1   • CLOPIDOGREL 75 MG Oral Tab TAKE 1 TABLET ORALLY ONCE A DAY.  90 tablet 3   • LACTULOSE 10 GM/15ML Oral Solution TAKE 2 TABLESP into both eyes daily. Right eye. • Hydrocortisone 2.5 % External Cream APPLY RECTALLY TWICE DAILY AS NEEDED 30 g 2   • Fluticasone Propionate HFA (FLOVENT HFA) 220 MCG/ACT Inhalation Aerosol Inhale 1 puff into the lungs 2 (two) times daily.  3 Inhaler 59\"   Wt 132 lb (59.9 kg)   SpO2 98%   BMI 26.66 kg/m²   General: No immediate distress  Head: Normocephalic/ Atraumatic  Eyes: Extra-occular movements intact.    Ears: No auricular hematoma or deformities  Mouth: No lesions or ulcerations  Heart: peripher PTP, PT, INR  Lab Results   Component Value Date    VITD 47.0 10/25/2021       IMAGING:   Xray Right shoulder 7/29/21  I personally reviewed x-ray imaging which is notable for severe osteoarthritis of the right glenohumeral joint    All imaging results wer

## 2022-01-10 NOTE — TELEPHONE ENCOUNTER
This [employer] members plan does not currently require a prior authorization for these services  Ultrasound guided right carpal tunnel injection with corticosteroid cpt codes 01036, 59263, .  Decision ID #:[id number], Will inf

## 2022-02-01 ENCOUNTER — OFFICE VISIT (OUTPATIENT)
Dept: INTERNAL MEDICINE CLINIC | Facility: CLINIC | Age: 87
End: 2022-02-01
Payer: COMMERCIAL

## 2022-02-01 ENCOUNTER — HOSPITAL ENCOUNTER (OUTPATIENT)
Dept: GENERAL RADIOLOGY | Facility: HOSPITAL | Age: 87
Discharge: HOME | End: 2022-02-01
Attending: INTERNAL MEDICINE
Payer: MEDICARE

## 2022-02-01 VITALS
BODY MASS INDEX: 28.22 KG/M2 | TEMPERATURE: 98 F | SYSTOLIC BLOOD PRESSURE: 120 MMHG | WEIGHT: 140 LBS | HEIGHT: 59 IN | DIASTOLIC BLOOD PRESSURE: 60 MMHG | HEART RATE: 68 BPM

## 2022-02-01 DIAGNOSIS — D64.9 CHRONIC ANEMIA: Primary | ICD-10-CM

## 2022-02-01 DIAGNOSIS — R07.89 CHEST WALL PAIN: ICD-10-CM

## 2022-02-01 DIAGNOSIS — M19.011 PRIMARY OSTEOARTHRITIS OF RIGHT SHOULDER: ICD-10-CM

## 2022-02-01 DIAGNOSIS — S12.101S CLOSED NONDISPLACED FRACTURE OF SECOND CERVICAL VERTEBRA, UNSPECIFIED FRACTURE MORPHOLOGY, SEQUELA: ICD-10-CM

## 2022-02-01 DIAGNOSIS — E55.9 VITAMIN D DEFICIENCY: ICD-10-CM

## 2022-02-01 PROCEDURE — 99214 OFFICE O/P EST MOD 30 MIN: CPT | Performed by: INTERNAL MEDICINE

## 2022-02-01 PROCEDURE — 71046 X-RAY EXAM CHEST 2 VIEWS: CPT | Performed by: INTERNAL MEDICINE

## 2022-02-01 PROCEDURE — 3074F SYST BP LT 130 MM HG: CPT | Performed by: INTERNAL MEDICINE

## 2022-02-01 PROCEDURE — 3078F DIAST BP <80 MM HG: CPT | Performed by: INTERNAL MEDICINE

## 2022-02-01 PROCEDURE — 3008F BODY MASS INDEX DOCD: CPT | Performed by: INTERNAL MEDICINE

## 2022-02-01 PROCEDURE — 71100 X-RAY EXAM RIBS UNI 2 VIEWS: CPT | Performed by: INTERNAL MEDICINE

## 2022-02-01 RX ORDER — PROMETHAZINE HYDROCHLORIDE AND CODEINE PHOSPHATE 6.25; 1 MG/5ML; MG/5ML
2.5 SOLUTION ORAL EVERY 6 HOURS PRN
Qty: 180 ML | Refills: 1 | Status: SHIPPED | OUTPATIENT
Start: 2022-02-01

## 2022-02-01 RX ORDER — LACTULOSE 10 G/15ML
30 SOLUTION ORAL 3 TIMES DAILY PRN
Qty: 236 ML | Refills: 3 | Status: SHIPPED | OUTPATIENT
Start: 2022-02-01

## 2022-02-01 RX ORDER — FERROUS SULFATE 325(65) MG
325 TABLET ORAL
Qty: 90 TABLET | Refills: 3 | Status: SHIPPED | OUTPATIENT
Start: 2022-02-01

## 2022-02-02 ENCOUNTER — TELEPHONE (OUTPATIENT)
Dept: INTERNAL MEDICINE CLINIC | Facility: CLINIC | Age: 87
End: 2022-02-02

## 2022-02-04 ENCOUNTER — OFFICE VISIT (OUTPATIENT)
Dept: OTOLARYNGOLOGY | Facility: CLINIC | Age: 87
End: 2022-02-04
Payer: COMMERCIAL

## 2022-02-04 VITALS — WEIGHT: 140 LBS | HEIGHT: 59 IN | BODY MASS INDEX: 28.22 KG/M2

## 2022-02-04 DIAGNOSIS — H61.22 IMPACTED CERUMEN OF LEFT EAR: ICD-10-CM

## 2022-02-04 DIAGNOSIS — H91.93 BILATERAL HEARING LOSS, UNSPECIFIED HEARING LOSS TYPE: Primary | ICD-10-CM

## 2022-02-04 PROCEDURE — 3008F BODY MASS INDEX DOCD: CPT | Performed by: OTOLARYNGOLOGY

## 2022-02-04 PROCEDURE — 99213 OFFICE O/P EST LOW 20 MIN: CPT | Performed by: OTOLARYNGOLOGY

## 2022-02-04 PROCEDURE — 69210 REMOVE IMPACTED EAR WAX UNI: CPT | Performed by: OTOLARYNGOLOGY

## 2022-02-07 ENCOUNTER — OFFICE VISIT (OUTPATIENT)
Dept: PHYSICAL MEDICINE AND REHAB | Facility: CLINIC | Age: 87
End: 2022-02-07
Payer: COMMERCIAL

## 2022-02-07 ENCOUNTER — TELEPHONE (OUTPATIENT)
Dept: PHYSICAL MEDICINE AND REHAB | Facility: CLINIC | Age: 87
End: 2022-02-07

## 2022-02-07 VITALS
HEIGHT: 59 IN | HEART RATE: 62 BPM | WEIGHT: 140.19 LBS | BODY MASS INDEX: 28.26 KG/M2 | DIASTOLIC BLOOD PRESSURE: 58 MMHG | OXYGEN SATURATION: 98 % | SYSTOLIC BLOOD PRESSURE: 124 MMHG

## 2022-02-07 DIAGNOSIS — E11.22 CKD STAGE 3 DUE TO TYPE 2 DIABETES MELLITUS (HCC): ICD-10-CM

## 2022-02-07 DIAGNOSIS — I25.10 CORONARY ARTERY DISEASE INVOLVING NATIVE CORONARY ARTERY OF NATIVE HEART WITHOUT ANGINA PECTORIS: ICD-10-CM

## 2022-02-07 DIAGNOSIS — G56.01 CARPAL TUNNEL SYNDROME ON RIGHT: Primary | ICD-10-CM

## 2022-02-07 DIAGNOSIS — I10 HYPERTENSION, ESSENTIAL: ICD-10-CM

## 2022-02-07 DIAGNOSIS — N18.30 CKD STAGE 3 DUE TO TYPE 2 DIABETES MELLITUS (HCC): ICD-10-CM

## 2022-02-07 PROCEDURE — 99214 OFFICE O/P EST MOD 30 MIN: CPT | Performed by: PHYSICAL MEDICINE & REHABILITATION

## 2022-02-07 PROCEDURE — 3008F BODY MASS INDEX DOCD: CPT | Performed by: PHYSICAL MEDICINE & REHABILITATION

## 2022-02-07 PROCEDURE — 3078F DIAST BP <80 MM HG: CPT | Performed by: PHYSICAL MEDICINE & REHABILITATION

## 2022-02-07 PROCEDURE — 3074F SYST BP LT 130 MM HG: CPT | Performed by: PHYSICAL MEDICINE & REHABILITATION

## 2022-02-07 NOTE — PATIENT INSTRUCTIONS
-Continue brace at nighttime  -Copper gloves  -Voltaren gel as tolerated  -Start therapy when you can for the hand  -Follow up in 3 months

## 2022-03-15 ENCOUNTER — TELEPHONE (OUTPATIENT)
Dept: PHYSICAL THERAPY | Facility: HOSPITAL | Age: 87
End: 2022-03-15

## 2022-03-16 ENCOUNTER — OFFICE VISIT (OUTPATIENT)
Dept: OCCUPATIONAL MEDICINE | Facility: HOSPITAL | Age: 87
End: 2022-03-16
Attending: PHYSICAL MEDICINE & REHABILITATION
Payer: MEDICARE

## 2022-03-16 DIAGNOSIS — G56.01 CARPAL TUNNEL SYNDROME ON RIGHT: ICD-10-CM

## 2022-03-16 PROCEDURE — 97110 THERAPEUTIC EXERCISES: CPT | Performed by: OCCUPATIONAL THERAPIST

## 2022-03-16 PROCEDURE — 97166 OT EVAL MOD COMPLEX 45 MIN: CPT | Performed by: OCCUPATIONAL THERAPIST

## 2022-03-21 ENCOUNTER — OFFICE VISIT (OUTPATIENT)
Dept: OCCUPATIONAL MEDICINE | Facility: HOSPITAL | Age: 87
End: 2022-03-21
Attending: PHYSICAL MEDICINE & REHABILITATION
Payer: MEDICARE

## 2022-03-21 PROCEDURE — 97140 MANUAL THERAPY 1/> REGIONS: CPT | Performed by: OCCUPATIONAL THERAPIST

## 2022-03-21 PROCEDURE — 97110 THERAPEUTIC EXERCISES: CPT | Performed by: OCCUPATIONAL THERAPIST

## 2022-03-21 NOTE — PROGRESS NOTES
Dx:  Carpal tunnel syndrome on right (G56.01)        Authorized # of Visits:  8        Next MD visit: none scheduled  Fall Risk: standard         Precautions:COPD, Diabetes, Afib , bruises easily  Medication Changes since last visit?: No  Subjective: \"I had tingling in my hands this morning, not numb though. \"    Objective: Treatment began with moist heat follow by IASTM of forearm flexors, MFR of bilateral palms, digit tendon glides, wrist flexor stretches, gripping, fine motor exercises. See flow sheet for details    Date 03/16/22 03/21/22               Visit # 1  2                                  Evaluation x                Manual                   IASTM of bilateral forearm flexors, scraping gentle fanning   10 min                MFR of bilateral palms     5 min                                   Ther ex                   Digit MP flexion x10  x10               Digit IP flexion x10  x10                digit abduction/adduction stretch  x10  x10                wrist flexor stretches  x10     x10                  red web wrist flexor stretches 10 sec     x5                Velcro checkers, alternate two point pinch, replace with three point pinch of red CP    x40                                   HEP instruction     Review HEP issued on 03/16/22                                   Therapeutic Activity                                       Neuromuscular Re-education                                                             Modalities                                                                 Assessment: Observed increasing bruising along dorsum of hand with hand exercises. Patient reports she bruises easily. Performed IASTM with minimal pressure to flexors, no bruising observed volarly on forearm or palm. Patient requires frequent demonstration and instruction to complete exercises correctly. Goals:   Pt complaints of paresthesia will decrease by 50% in left hand.   Pt will be independent and compliant with comprehensive HEP to maintain progress achieved in OT. Patient to complete sensation assessment with Shanon stauffer by third visit. Patient will demonstrate increase in left wrist flexion to 65 degrees for ease in opening jar. Patient will demonstrate increase in left  strength to at least 20 lbs for ease in picking up bag of groceries.     Plan: Assess sensation next session, Continue with POC      Charges: MT, TE2   Total Timed Treatment: 40 min  Total Treatment Time: 45 min

## 2022-03-28 ENCOUNTER — OFFICE VISIT (OUTPATIENT)
Dept: OCCUPATIONAL MEDICINE | Facility: HOSPITAL | Age: 87
End: 2022-03-28
Attending: PHYSICAL MEDICINE & REHABILITATION
Payer: MEDICARE

## 2022-03-28 PROCEDURE — 97110 THERAPEUTIC EXERCISES: CPT | Performed by: OCCUPATIONAL THERAPIST

## 2022-03-28 PROCEDURE — 97035 APP MDLTY 1+ULTRASOUND EA 15: CPT | Performed by: OCCUPATIONAL THERAPIST

## 2022-03-28 PROCEDURE — 97140 MANUAL THERAPY 1/> REGIONS: CPT | Performed by: OCCUPATIONAL THERAPIST

## 2022-03-28 NOTE — PROGRESS NOTES
Dx:  Carpal tunnel syndrome on right (G56.01)        Authorized # of Visits:  8        Next MD visit: none scheduled  Fall Risk: standard         Precautions:COPD, Diabetes, Afib , bruises easily  Medication Changes since last visit?: No  Subjective: \"I tried the splint you suggested but it was different I took it back. \"    Objective: Treatment began with moist heat follow by STM of forearm flexors, digit tendon glides, wrist flexor stretches, gripping, fine motor exercises, ultrasound See flow sheet for details    Date 03/16/22 03/21/22 03/28/22             Visit # 1  2                                  Evaluation x                Manual                   IASTM of bilateral forearm flexors, scraping gentle fanning   10 min  STM of bilateral forearm 8 min              MFR of bilateral palms     5 min                                   Ther ex                   Digit MP flexion x10  x10  x10             Digit IP flexion x10  x10  x10              digit abduction/adduction stretch  x10  x10                wrist flexor stretches  x10     x10    x10              red web wrist flexor stretches 10 sec     x5  intrinsic hand strengthening with rubber band abd/adduction x 10, 2 sets              Velcro checkers, alternate two point pinch, replace with three point pinch of red CP    x40                                   HEP instruction     Review HEP issued on 03/16/22  Review HEP issued on 03/16/22                                 Therapeutic Activity                                       Neuromuscular Re-education                                                             Modalities                    moist heat      3 min              ultrasound cont. 3.0 w/cm2 ,1.0 w/cm2      8 min over right carpal tunnel                 Assessment: Patient continues to complain of paresthesia in fingertips. Left digits worse than right. Patient having difficulty with following HEP, reviewed exercises once again.  Patient prefers to continue to use her off the shelf wrist cockup braces , rejects recommended Futuro overnight brace. Initiated continuous ultrasound exercises today for left wrist to reduce nerve irritation in carpal tunnel. Goals:   Pt complaints of paresthesia will decrease by 50% in left hand. Pt will be independent and compliant with comprehensive HEP to maintain progress achieved in OT. Patient to complete sensation assessment with Suyapa stauffer by third visit. Patient will demonstrate increase in left wrist flexion to 65 degrees for ease in opening jar. Patient will demonstrate increase in left  strength to at least 20 lbs for ease in picking up bag of groceries.     Plan: Assess sensation next session, Continue with POC      Charges: MT, TE,US   Total Timed Treatment: 40 min  Total Treatment Time: 45 min

## 2022-04-06 ENCOUNTER — TELEPHONE (OUTPATIENT)
Dept: PHYSICAL THERAPY | Facility: HOSPITAL | Age: 87
End: 2022-04-06

## 2022-04-07 ENCOUNTER — OFFICE VISIT (OUTPATIENT)
Dept: OCCUPATIONAL MEDICINE | Facility: HOSPITAL | Age: 87
End: 2022-04-07
Attending: PHYSICAL MEDICINE & REHABILITATION
Payer: MEDICARE

## 2022-04-07 PROCEDURE — 97110 THERAPEUTIC EXERCISES: CPT | Performed by: OCCUPATIONAL THERAPIST

## 2022-04-07 PROCEDURE — 97140 MANUAL THERAPY 1/> REGIONS: CPT | Performed by: OCCUPATIONAL THERAPIST

## 2022-04-07 NOTE — PROGRESS NOTES
Dx:  Carpal tunnel syndrome on right (G56.01)        Authorized # of Visits:  8        Next MD visit: none scheduled  Fall Risk: standard         Precautions:COPD, Diabetes, Afib , bruises easily  Medication Changes since last visit?: No  Subjective: \"I still have some tingling in my fingers and numbness. \"    Objective: Treatment began with moist heat follow by STM of forearm flexors, digit tendon glides, wrist flexor stretches, gripping, fine motor exercises, ultrasound See flow sheet for details  Michellebejulian Luna assessment of volar fingertips       Right  D1  4.31 Diminished protective sensation  D2-D5  3.22 Diminished light touch       Left  D1-D3  3.61 Diminished light touch  D4-D5  2.83 Normal light touch      Date 03/16/22 03/21/22 03/28/22 04/07/22           Visit # 1  2  3  4                              Evaluation x                Manual                   IASTM of bilateral forearm flexors, scraping gentle fanning   10 min  STM of bilateral forearm 8 min  STM of bilateral forearm 8 min            MFR of bilateral palms     5 min                                   Ther ex                   Digit MP flexion x10  x10  x10  x10           Digit IP flexion x10  x10  x10              digit abduction/adduction stretch  x10  x10    wrist flexor stretches on table  x10            wrist flexor stretches  x10     x10    x10  red web wrist flexor stretches 10 sec  X5, bilateral            red web wrist flexor stretches 10 sec     x5  intrinsic hand strengthening with rubber band abd/adduction x 10, 2 sets  intrinsic hand strengthening with rubber band abd/adduction x 20  1set            Velcro checkers, alternate two point pinch, replace with three point pinch of red CP    x40    x40            Red web         x20           HEP instruction     Review HEP issued on 03/16/22  Review HEP issued on 03/16/22                                 Therapeutic Activity                                       Neuromuscular Re-education                                                             Modalities                    moist heat      3 min  3 min            ultrasound cont. 3.0 w/cm2 ,1.0 w/cm2      8 min over right carpal tunnel                 Assessment:  Patient having difficulty with following HEP, reviewed exercises once again. Assessed for sensation in bilateral volar digits, indicate diminished protective sensation in right thumb and diminished light touch in left thumb. Less paresthesia reported today. Goals:   Pt complaints of paresthesia will decrease by 50% in left hand. Pt will be independent and compliant with comprehensive HEP to maintain progress achieved in OT. Patient to complete sensation assessment with Meek stauffer by third visit. Marshall Corley .(Achieved)  Patient will demonstrate increase in left wrist flexion to 65 degrees for ease in opening jar. Patient will demonstrate increase in left  strength to at least 20 lbs for ease in picking up bag of groceries. Plan: Continue to work towards strengthening bilateral hands.       Charges: MT, TE2  Total Timed Treatment: 40 min  Total Treatment Time: 45 min

## 2022-04-08 ENCOUNTER — OFFICE VISIT (OUTPATIENT)
Dept: INTERNAL MEDICINE CLINIC | Facility: CLINIC | Age: 87
End: 2022-04-08
Payer: COMMERCIAL

## 2022-04-08 ENCOUNTER — TELEPHONE (OUTPATIENT)
Dept: INTERNAL MEDICINE CLINIC | Facility: CLINIC | Age: 87
End: 2022-04-08

## 2022-04-08 VITALS
HEIGHT: 59 IN | TEMPERATURE: 97 F | BODY MASS INDEX: 27.82 KG/M2 | DIASTOLIC BLOOD PRESSURE: 50 MMHG | WEIGHT: 138 LBS | HEART RATE: 81 BPM | SYSTOLIC BLOOD PRESSURE: 140 MMHG | OXYGEN SATURATION: 97 %

## 2022-04-08 DIAGNOSIS — S81.801A WOUND OF RIGHT LOWER EXTREMITY, INITIAL ENCOUNTER: Primary | ICD-10-CM

## 2022-04-08 PROCEDURE — 3077F SYST BP >= 140 MM HG: CPT | Performed by: INTERNAL MEDICINE

## 2022-04-08 PROCEDURE — 99213 OFFICE O/P EST LOW 20 MIN: CPT | Performed by: INTERNAL MEDICINE

## 2022-04-08 PROCEDURE — 3008F BODY MASS INDEX DOCD: CPT | Performed by: INTERNAL MEDICINE

## 2022-04-08 PROCEDURE — 3078F DIAST BP <80 MM HG: CPT | Performed by: INTERNAL MEDICINE

## 2022-04-08 NOTE — TELEPHONE ENCOUNTER
Pt was seen earlier today by Dr Johanna Viveros  Pt scheduled appt for wound clinic at [facility] for 4/18/22  Is it ok for patient to wait for that appointment? Should patient try somewhere else?   Tasked to nursing

## 2022-04-08 NOTE — TELEPHONE ENCOUNTER
To nursing, her appointment 4/18/22 is 10 days from now. Ask the wound center to move it up if possible. If they can't, ask pt to see a plastic surgeon who does wound evaluation and care--such as Dr Win Babb. Thanks.

## 2022-04-11 ENCOUNTER — TELEPHONE (OUTPATIENT)
Dept: INTERNAL MEDICINE CLINIC | Facility: CLINIC | Age: 87
End: 2022-04-11

## 2022-04-11 NOTE — TELEPHONE ENCOUNTER
To nursing, please tell patient to stop the aspirin if she is taking it. Kathy Ashford for pt to come in when she is able. Thanks.     (I already see documentation from the cardiologist about his recommendation not to take aspirin)

## 2022-04-11 NOTE — TELEPHONE ENCOUNTER
Dr. Sherri Mederos spoke with Marcello GONZÁLES and relayed your message. She verbalized understanding. She says that the office should check with patient's cardiologist. She feels she saw a bottle of prescription aspirin from Dr. Yulia Alejandro. Explained that I would relay this to Dr. Domonique Avalos. She says that the patient was not in any distress but that her lungs sounded \"junky\" on exam. Explained that I would call the patient to offer her an appointment. I spoke with Sharon Clancy. She is doing ok. She feels Tuesday might be filled up for her. She asks if she could wait a day or two. She will check her schedule and she will call in the morning.

## 2022-04-11 NOTE — TELEPHONE ENCOUNTER
Ortiz Banda nurse prac with [facility]  Seeing pt today for annual exam  Called with concern about pt's medication  Pt is taking Eliquis, Clopidogrel & Aspirin 81 mg  Requests call back to confirm that pt is supposed to be taking all 3 medications    Pt's lung sounds are also concerning, quite a bit of congestion - pt is coughing   - Rhonchi in both lower lung lobes, also has a lot of sinus drainage  Sounds like it could be pneumonia    Any questions Mike Link can be reached at [phone]

## 2022-04-11 NOTE — TELEPHONE ENCOUNTER
I spoke with Daria Xie and relayed Dr. Mark Lloyd message. She verbalized understanding. She says recently Dr. Jose Ruth asked her to start ASA again at her last appointment. She says the pill is a \"tiny little one\" like a baby aspirin. Patient tried to look through her prescriptions from [place]. Patient is having a hard time looking through her medications. She is not sure without her glasses. It may say 81mg? CHW 10 mg? She thinks this is from Dr Jose Ruth on 3/16/22 or this could be when they filled it for her. She says she will bring these in when she comes to see the doctor. She will plan to call back tomorrow.

## 2022-04-11 NOTE — TELEPHONE ENCOUNTER
I spoke with the wound care center. Currently, there are no sooner openings. However, they will put her on the top of the wait list in the event they have a cancellation. Left message to call back for patient to inform. In addition, patient can try getting in sooner with Dr. Serina Hoyos. Please provide contact information.

## 2022-04-12 NOTE — TELEPHONE ENCOUNTER
To nursing, please call [facility] and ask them to fax pt's last visit with Dr Noe Yañez. Thanks.

## 2022-04-12 NOTE — TELEPHONE ENCOUNTER
Patient calling back. Wound Center called patient today and gave her appointment for tomorrow 4/13 at 3:00.

## 2022-04-13 ENCOUNTER — TELEPHONE (OUTPATIENT)
Dept: INTERNAL MEDICINE CLINIC | Facility: CLINIC | Age: 87
End: 2022-04-13

## 2022-04-13 ENCOUNTER — OFFICE VISIT (OUTPATIENT)
Dept: WOUND CARE | Facility: HOSPITAL | Age: 87
End: 2022-04-13
Attending: INTERNAL MEDICINE
Payer: MEDICARE

## 2022-04-13 ENCOUNTER — OFFICE VISIT (OUTPATIENT)
Dept: OCCUPATIONAL MEDICINE | Facility: HOSPITAL | Age: 87
End: 2022-04-13
Attending: PHYSICAL MEDICINE & REHABILITATION
Payer: MEDICARE

## 2022-04-13 VITALS
RESPIRATION RATE: 20 BRPM | HEART RATE: 76 BPM | DIASTOLIC BLOOD PRESSURE: 80 MMHG | SYSTOLIC BLOOD PRESSURE: 142 MMHG | OXYGEN SATURATION: 100 % | TEMPERATURE: 98 F

## 2022-04-13 DIAGNOSIS — L97.913 NON-PRESSURE CHRONIC ULCER OF RIGHT LOWER LEG WITH NECROSIS OF MUSCLE (HCC): ICD-10-CM

## 2022-04-13 DIAGNOSIS — S81.801A WOUND OF RIGHT LOWER EXTREMITY, INITIAL ENCOUNTER: Primary | ICD-10-CM

## 2022-04-13 DIAGNOSIS — S91.301A WOUND OF RIGHT FOOT: ICD-10-CM

## 2022-04-13 DIAGNOSIS — M21.41 ACQUIRED RIGHT FLAT FOOT: ICD-10-CM

## 2022-04-13 PROBLEM — Z89.421 S/P AMPUTATION OF LESSER TOE, RIGHT (HCC): Status: ACTIVE | Noted: 2022-04-13

## 2022-04-13 PROCEDURE — 97140 MANUAL THERAPY 1/> REGIONS: CPT | Performed by: OCCUPATIONAL THERAPIST

## 2022-04-13 PROCEDURE — 99214 OFFICE O/P EST MOD 30 MIN: CPT

## 2022-04-13 PROCEDURE — 97597 DBRDMT OPN WND 1ST 20 CM/<: CPT | Performed by: NURSE PRACTITIONER

## 2022-04-13 PROCEDURE — 97110 THERAPEUTIC EXERCISES: CPT | Performed by: OCCUPATIONAL THERAPIST

## 2022-04-13 PROCEDURE — 97035 APP MDLTY 1+ULTRASOUND EA 15: CPT | Performed by: OCCUPATIONAL THERAPIST

## 2022-04-13 NOTE — PROGRESS NOTES
Dx:  Carpal tunnel syndrome on right (G56.01)        Authorized # of Visits:  8        Next MD visit: none scheduled  Fall Risk: standard         Precautions:COPD, Diabetes, Afib , bruises easily  Medication Changes since last visit?: No  Subjective: \"My left hand is worse than my right today, once I take off the brace the tingling starts. \"    Objective: Treatment began with moist heat follow by STM of forearm flexors, digit tendon glides, wrist flexor stretches, gripping, fine motor exercises, ultrasound See flow sheet for details  Measurement: left wrist  Flexion 60 degrees, Extension: 75 degrees, UD 35 degrees, RD 15 degrees           Date 03/16/22 03/21/22 03/28/22 04/07/22 04/13/22         Visit # 1  2  3  4  5                            Evaluation x                Manual                   IASTM of bilateral forearm flexors, scraping gentle fanning   10 min  STM of bilateral forearm 8 min  STM of bilateral forearm 8 min  STM of bilateral forearm 8 min          MFR of bilateral palms     5 min                                   Ther ex                   Digit MP flexion x10  x10  x10  x10  x10         Digit IP flexion x10  x10  x10    x10          digit abduction/adduction stretch  x10  x10    wrist flexor stretches on table  x10  digit abduction/adduction stretch x10          wrist flexor stretches  x10     x10    x10  red web wrist flexor stretches 10 sec  X5, bilateral            red web wrist flexor stretches 10 sec     x5  intrinsic hand strengthening with rubber band abd/adduction x 10, 2 sets  intrinsic hand strengthening with rubber band abd/adduction x 20  1set  intrinsic hand strengthening with rubber band abd/adduction x 20  1set          Velcro checkers, alternate two point pinch, replace with three point pinch of red CP    x40    x40  x40          Red web         x20           HEP instruction     Review HEP issued on 03/16/22  Review HEP issued on 03/16/22 Therapeutic Activity                                       Neuromuscular Re-education                                                             Modalities                    moist heat      3 min  3 min  3 min          ultrasound cont. 3.0 w/cm2 ,1.0 w/cm2      8 min over right carpal tunnel    50% pulsed 8 min over left carpal tunne             Assessment:  Frequent complaint of right shoulder pain with any use of right hand. Patient demonstrated independence with HEP after review. Patient having difficulty with following directions, requires repeated instruction. Post session today patient reporting hands veel \"abnormal\" , unable to clarify symptom. Improved left wrist flexion by 5 degrees. Patient expressed anxiety about podiatrist visit planned for later today due to sore on her foot. Goals:   Pt complaints of paresthesia will decrease by 50% in left hand. Pt will be independent and compliant with comprehensive HEP to maintain progress achieved in OT. ..(made progress toward)  Patient to complete sensation assessment with Sonia satuffer by third visit. Rj Burciaga .(Achieved)  Patient will demonstrate increase in left wrist flexion to 65 degrees for ease in opening jar. ..(made progress toward)  Patient will demonstrate increase in left  strength to at least 20 lbs for ease in picking up bag of groceries. Plan: Continue to work towards strengthening bilateral hands.     Charges: MT, TE,US Total Timed Treatment: 40 min  Total Treatment Time: 45 min

## 2022-04-13 NOTE — PROGRESS NOTES
Dx:  Carpal tunnel syndrome on right (G56.01)        Authorized # of Visits:  8        Next MD visit: none scheduled  Fall Risk: standard         Precautions:COPD, Diabetes, Afib , bruises easily  Medication Changes since last visit?: No  Subjective: \"My left hand is worse than my right today, once I take off the brace the tingling starts. \"    Objective: Treatment began with moist heat follow by STM of forearm flexors, digit tendon glides, wrist flexor stretches, gripping, fine motor exercises, ultrasound See flow sheet for details  Measurement: left wrist  Flexion 60 degrees, Extension: 75 degrees, UD 35 degrees, RD 15 degrees           Date 03/16/22 03/21/22 03/28/22 04/07/22 04/13/22         Visit # 1  2  3  4  5                            Evaluation x                Manual                   IASTM of bilateral forearm flexors, scraping gentle fanning   10 min  STM of bilateral forearm 8 min  STM of bilateral forearm 8 min  STM of bilateral forearm 8 min          MFR of bilateral palms     5 min                                   Ther ex                   Digit MP flexion x10  x10  x10  x10  x10         Digit IP flexion x10  x10  x10    x10          digit abduction/adduction stretch  x10  x10    wrist flexor stretches on table  x10  digit abduction/adduction stretch x10          wrist flexor stretches  x10     x10    x10  red web wrist flexor stretches 10 sec  X5, bilateral            red web wrist flexor stretches 10 sec     x5  intrinsic hand strengthening with rubber band abd/adduction x 10, 2 sets  intrinsic hand strengthening with rubber band abd/adduction x 20  1set  intrinsic hand strengthening with rubber band abd/adduction x 20  1set          Velcro checkers, alternate two point pinch, replace with three point pinch of red CP    x40    x40  x40          Red web         x20           HEP instruction     Review HEP issued on 03/16/22  Review HEP issued on 03/16/22 Therapeutic Activity                                       Neuromuscular Re-education                                                             Modalities                    moist heat      3 min  3 min  3 min          ultrasound cont. 3.0 w/cm2 ,1.0 w/cm2      8 min over right carpal tunnel    50% pulsed 8 min over left carpal tunne             Assessment:  Frequent complaint of right shoulder pain with any use of right hand. Patient demonstrated independence with HEP after review. Patient having difficulty with following directions, requires repeated instruction. Post session today patient reporting hands veel \"abnormal\" , unable to clarify symptom. Improved left wrist flexion by 5 degrees. Patient expressed anxiety about podiatrist visit planned for later today due to sore on her foot. Goals:   Pt complaints of paresthesia will decrease by 50% in left hand. Pt will be independent and compliant with comprehensive HEP to maintain progress achieved in OT. ..(made progress toward)  Patient to complete sensation assessment with Glory Sullivan filaments by third visit. Elsie Zarate .(Achieved)  Patient will demonstrate increase in left wrist flexion to 65 degrees for ease in opening jar. ..(made progress toward)  Patient will demonstrate increase in left  strength to at least 20 lbs for ease in picking up bag of groceries. Plan: Continue to work towards strengthening bilateral hands.     Charges: MT, TE2,US Total Timed Treatment: 40 min  Total Treatment Time: 45 min

## 2022-04-13 NOTE — TELEPHONE ENCOUNTER
Patient is calling she said she received a voicemail that Dr Mynor Snider wants her to stop in the office so she can listen to her lungs. Does patient need to schedule an appointment for this?   Please call patient [phone] ok to leave voicemail

## 2022-04-14 NOTE — TELEPHONE ENCOUNTER
To nursing,   pls tell pt the recommendation for visit here was based on [facility]'s home visit on 4/11/22 by nurse practicioner--her  lung exam.  By description the lung sounds were as I have usually heard in the office (rhonchi). Since I just saw pt 4/8/22, I only need her to make an appt to come in if she feels her condition has changed such as worsened cough or breathing. Thanks. (CXR 2/1/22--ok). Has known asthma/copd.

## 2022-04-15 ENCOUNTER — APPOINTMENT (OUTPATIENT)
Dept: OCCUPATIONAL MEDICINE | Facility: HOSPITAL | Age: 87
End: 2022-04-15
Attending: PHYSICAL MEDICINE & REHABILITATION
Payer: MEDICARE

## 2022-04-18 ENCOUNTER — APPOINTMENT (OUTPATIENT)
Dept: WOUND CARE | Facility: HOSPITAL | Age: 87
End: 2022-04-18
Attending: CLINICAL NURSE SPECIALIST
Payer: MEDICARE

## 2022-04-18 RX ORDER — MELATONIN
2000 DAILY
Qty: 180 TABLET | Refills: 2 | Status: SHIPPED | OUTPATIENT
Start: 2022-04-18

## 2022-04-20 ENCOUNTER — TELEPHONE (OUTPATIENT)
Dept: INTERNAL MEDICINE CLINIC | Facility: CLINIC | Age: 87
End: 2022-04-20

## 2022-04-20 ENCOUNTER — OFFICE VISIT (OUTPATIENT)
Dept: OCCUPATIONAL MEDICINE | Facility: HOSPITAL | Age: 87
End: 2022-04-20
Attending: PHYSICAL MEDICINE & REHABILITATION
Payer: MEDICARE

## 2022-04-20 ENCOUNTER — HOSPITAL ENCOUNTER (OUTPATIENT)
Dept: GENERAL RADIOLOGY | Facility: HOSPITAL | Age: 87
Discharge: HOME | End: 2022-04-20
Attending: INTERNAL MEDICINE
Payer: MEDICARE

## 2022-04-20 ENCOUNTER — OFFICE VISIT (OUTPATIENT)
Dept: WOUND CARE | Facility: HOSPITAL | Age: 87
End: 2022-04-20
Attending: NURSE PRACTITIONER
Payer: MEDICARE

## 2022-04-20 ENCOUNTER — LAB ENCOUNTER (OUTPATIENT)
Dept: LAB | Facility: HOSPITAL | Age: 87
End: 2022-04-20
Attending: NURSE PRACTITIONER
Payer: MEDICARE

## 2022-04-20 VITALS
DIASTOLIC BLOOD PRESSURE: 66 MMHG | SYSTOLIC BLOOD PRESSURE: 160 MMHG | HEART RATE: 75 BPM | OXYGEN SATURATION: 99 % | RESPIRATION RATE: 20 BRPM | TEMPERATURE: 97 F

## 2022-04-20 DIAGNOSIS — S81.801A WOUND OF RIGHT LOWER EXTREMITY, INITIAL ENCOUNTER: Primary | ICD-10-CM

## 2022-04-20 DIAGNOSIS — S81.801A WOUND OF RIGHT LOWER EXTREMITY, INITIAL ENCOUNTER: ICD-10-CM

## 2022-04-20 DIAGNOSIS — L97.913 NON-PRESSURE CHRONIC ULCER OF RIGHT LOWER LEG WITH NECROSIS OF MUSCLE (HCC): ICD-10-CM

## 2022-04-20 DIAGNOSIS — L97.912 NON-PRESSURE CHRONIC ULCER OF RIGHT LOWER LEG WITH FAT LAYER EXPOSED (HCC): ICD-10-CM

## 2022-04-20 LAB
ALBUMIN SERPL-MCNC: 3.8 G/DL (ref 3.4–5)
ALBUMIN/GLOB SERPL: 1.2 {RATIO} (ref 1–2)
ALP LIVER SERPL-CCNC: 90 U/L
ALT SERPL-CCNC: 17 U/L
ANION GAP SERPL CALC-SCNC: 6 MMOL/L (ref 0–18)
AST SERPL-CCNC: 18 U/L (ref 15–37)
BASOPHILS # BLD AUTO: 0.03 X10(3) UL (ref 0–0.2)
BASOPHILS NFR BLD AUTO: 0.4 %
BILIRUB SERPL-MCNC: 0.6 MG/DL (ref 0.1–2)
BUN BLD-MCNC: 19 MG/DL (ref 7–18)
BUN/CREAT SERPL: 17.4 (ref 10–20)
CALCIUM BLD-MCNC: 9.7 MG/DL (ref 8.5–10.1)
CHLORIDE SERPL-SCNC: 102 MMOL/L (ref 98–112)
CO2 SERPL-SCNC: 27 MMOL/L (ref 21–32)
CREAT BLD-MCNC: 1.09 MG/DL
CRP SERPL-MCNC: 1.16 MG/DL (ref ?–0.3)
DEPRECATED RDW RBC AUTO: 55.2 FL (ref 35.1–46.3)
EOSINOPHIL # BLD AUTO: 0.14 X10(3) UL (ref 0–0.7)
EOSINOPHIL NFR BLD AUTO: 2 %
ERYTHROCYTE [DISTWIDTH] IN BLOOD BY AUTOMATED COUNT: 15.6 % (ref 11–15)
ERYTHROCYTE [SEDIMENTATION RATE] IN BLOOD: 29 MM/HR
FASTING STATUS PATIENT QL REPORTED: NO
GLOBULIN PLAS-MCNC: 3.1 G/DL (ref 2.8–4.4)
GLUCOSE BLD-MCNC: 119 MG/DL (ref 70–99)
HCT VFR BLD AUTO: 32.4 %
HGB BLD-MCNC: 10.4 G/DL
IMM GRANULOCYTES # BLD AUTO: 0.02 X10(3) UL (ref 0–1)
IMM GRANULOCYTES NFR BLD: 0.3 %
LYMPHOCYTES # BLD AUTO: 1.55 X10(3) UL (ref 1–4)
LYMPHOCYTES NFR BLD AUTO: 22.3 %
MCH RBC QN AUTO: 31 PG (ref 26–34)
MCHC RBC AUTO-ENTMCNC: 32.1 G/DL (ref 31–37)
MCV RBC AUTO: 96.4 FL
MONOCYTES # BLD AUTO: 0.6 X10(3) UL (ref 0.1–1)
MONOCYTES NFR BLD AUTO: 8.6 %
NEUTROPHILS # BLD AUTO: 4.62 X10 (3) UL (ref 1.5–7.7)
NEUTROPHILS # BLD AUTO: 4.62 X10(3) UL (ref 1.5–7.7)
NEUTROPHILS NFR BLD AUTO: 66.4 %
OSMOLALITY SERPL CALC.SUM OF ELEC: 283 MOSM/KG (ref 275–295)
PLATELET # BLD AUTO: 353 10(3)UL (ref 150–450)
POTASSIUM SERPL-SCNC: 5.2 MMOL/L (ref 3.5–5.1)
PROT SERPL-MCNC: 6.9 G/DL (ref 6.4–8.2)
RBC # BLD AUTO: 3.36 X10(6)UL
SODIUM SERPL-SCNC: 135 MMOL/L (ref 136–145)
WBC # BLD AUTO: 7 X10(3) UL (ref 4–11)

## 2022-04-20 PROCEDURE — 97140 MANUAL THERAPY 1/> REGIONS: CPT | Performed by: OCCUPATIONAL THERAPIST

## 2022-04-20 PROCEDURE — 86140 C-REACTIVE PROTEIN: CPT

## 2022-04-20 PROCEDURE — 99214 OFFICE O/P EST MOD 30 MIN: CPT | Performed by: NURSE PRACTITIONER

## 2022-04-20 PROCEDURE — 85025 COMPLETE CBC W/AUTO DIFF WBC: CPT

## 2022-04-20 PROCEDURE — 80053 COMPREHEN METABOLIC PANEL: CPT

## 2022-04-20 PROCEDURE — 85652 RBC SED RATE AUTOMATED: CPT

## 2022-04-20 PROCEDURE — 73590 X-RAY EXAM OF LOWER LEG: CPT | Performed by: INTERNAL MEDICINE

## 2022-04-20 PROCEDURE — 97110 THERAPEUTIC EXERCISES: CPT | Performed by: OCCUPATIONAL THERAPIST

## 2022-04-20 PROCEDURE — 36415 COLL VENOUS BLD VENIPUNCTURE: CPT

## 2022-04-20 NOTE — PROGRESS NOTES
Dx:  Carpal tunnel syndrome on right (G56.01)        Authorized # of Visits:  8        Next MD visit: none scheduled  Fall Risk: standard         Precautions:COPD, Diabetes, Afib , bruises easily  Medication Changes since last visit?: No  Subjective: \"My left hand is worse th    Objective: Treatment began with moist heat follow by STM of forearm flexors, digit tendon glides, wrist flexor stretches, gripping, fine motor exercises, ultrasound See flow sheet for details  Measurement: left wrist  Flexion 60 degrees, Extension: 75 degrees, UD 35 degrees, RD 15 degrees           Date 03/16/22 03/21/22 03/28/22 04/07/22 04/13/22 04/20/22       Visit # 1  2  3  4  5  6                          Evaluation x                Manual                   IASTM of bilateral forearm flexors, scraping gentle fanning   10 min  STM of bilateral forearm 8 min  STM of bilateral forearm 8 min  STM of bilateral forearm 8 min  STM of bilateral forearm 8 min        MFR of bilateral palms     5 min                                   Ther ex                   Digit MP flexion x10  x10  x10  x10  x10  wrist extension stretch 4 sec bilaterally x 5       Digit IP flexion x10  x10  x10    x10  x10, bilaterally        digit abduction/adduction stretch  x10  x10    wrist flexor stretches on table  x10  digit abduction/adduction stretch x10  digit abduction/adduction stretch x10        wrist flexor stretches  x10     x10    x10  red web wrist flexor stretches 10 sec  X5, bilateral    red web wrist flexor stretches 10 sec  X5, bilateral        red web wrist flexor stretches 10 sec     x5  intrinsic hand strengthening with rubber band abd/adduction x 10, 2 sets  intrinsic hand strengthening with rubber band abd/adduction x 20  1set  intrinsic hand strengthening with rubber band abd/adduction x 20  1set  intrinsic hand strengthening with rubber band abd/adduction x 20  1set        Velcro checkers, alternate two point pinch, replace with three point pinch of red CP    x40    x40  x40  red putty , twist, pull, pinch 6 min        Red web         x20    pronation/supination 1 wt x 20       HEP instruction     Review HEP issued on 03/16/22  Review HEP issued on 03/16/22                                 Therapeutic Activity                                       Neuromuscular Re-education                                                             Modalities                    moist heat      3 min  3 min  3 min  3 min        ultrasound cont. 3.0 w/cm2 ,1.0 w/cm2      8 min over right carpal tunnel    50% pulsed 8 min over left carpal tunne             Assessment:  Patient more focused on activities today, upgraded strengthening exercises today with no increase in paresthesia. Goals:   Pt complaints of paresthesia will decrease by 50% in left hand. Pt will be independent and compliant with comprehensive HEP to maintain progress achieved in OT. ..(made progress toward)  Patient to complete sensation assessment with Luvenia Hernandez filaments by third visit. Stevan Aragon .(Achieved)  Patient will demonstrate increase in left wrist flexion to 65 degrees for ease in opening jar. ..(made progress toward)  Patient will demonstrate increase in left  strength to at least 20 lbs for ease in picking up bag of groceries. Plan: Continue to work towards strengthening bilateral hands. Reevaluate next session.     Charges: MT, TE2 Total Timed Treatment: 40 min  Total Treatment Time: 45 min

## 2022-04-21 ENCOUNTER — TELEPHONE (OUTPATIENT)
Dept: INTERNAL MEDICINE CLINIC | Facility: CLINIC | Age: 87
End: 2022-04-21

## 2022-04-22 ENCOUNTER — OFFICE VISIT (OUTPATIENT)
Dept: OCCUPATIONAL MEDICINE | Facility: HOSPITAL | Age: 87
End: 2022-04-22
Attending: INTERNAL MEDICINE
Payer: MEDICARE

## 2022-04-22 ENCOUNTER — APPOINTMENT (OUTPATIENT)
Dept: OCCUPATIONAL MEDICINE | Facility: HOSPITAL | Age: 87
End: 2022-04-22
Attending: PHYSICAL MEDICINE & REHABILITATION
Payer: MEDICARE

## 2022-04-22 PROCEDURE — 97110 THERAPEUTIC EXERCISES: CPT | Performed by: OCCUPATIONAL THERAPIST

## 2022-04-22 PROCEDURE — 97140 MANUAL THERAPY 1/> REGIONS: CPT | Performed by: OCCUPATIONAL THERAPIST

## 2022-04-22 NOTE — PROGRESS NOTES
Dx:  Carpal tunnel syndrome on right (G56.01)        Authorized # of Visits:  8        Next MD visit: none scheduled  Fall Risk: standard         Precautions:COPD, Diabetes, Afib , bruises easily  Medication Changes since last visit?: No  Subjective: \"My hands were worse this morning, it's hard to keep the splint on overnight. \"    Objective: Treatment began with reevaluation follow by moist heat follow by STM of forearm flexors, digit tendon glides, wrist flexor stretches, gripping, fine motor exercises.   See flow sheet for details    Date 03/16/22 03/21/22 03/28/22 04/07/22 04/13/22 04/20/22 04/22/22     Visit # 1  2  3  4  5  6  7                   (1cx)     Evaluation x                Manual                   IASTM of bilateral forearm flexors, scraping gentle fanning   10 min  STM of bilateral forearm 8 min  STM of bilateral forearm 8 min  STM of bilateral forearm 8 min  STM of bilateral forearm 8 min  STM of bilateral forearm 8 min      MFR of bilateral palms     5 min                                   Ther ex                   Digit MP flexion x10  x10  x10  x10  x10  wrist extension stretch 4 sec bilaterally x 5  wrist extension stretch 4 sec bilaterally x 5     Digit IP flexion x10  x10  x10    x10  x10, bilaterally  x10      digit abduction/adduction stretch  x10  x10    wrist flexor stretches on table  x10  digit abduction/adduction stretch x10  digit abduction/adduction stretch x10  digit abduction/adduction stretch x10      wrist flexor stretches  x10     x10    x10  red web wrist flexor stretches 10 sec  X5, bilateral    red web wrist flexor stretches 10 sec  X5, bilateral  red web wrist flexor stretches 10 sec  X5,      red web wrist flexor stretches 10 sec     x5  intrinsic hand strengthening with rubber band abd/adduction x 10, 2 sets  intrinsic hand strengthening with rubber band abd/adduction x 20  1set  intrinsic hand strengthening with rubber band abd/adduction x 20  1set  intrinsic hand strengthening with rubber band abd/adduction x 20  1set  intrinsic hand strengthening with rubber band abd/adduction x 20  1set      Velcro checkers, alternate two point pinch, replace with three point pinch of red CP    x40    x40  x40  red putty , twist, pull, pinch 6 min  red putty , twist, pull, pinch 6 min      Red web         x20    pronation/supination 1 wt x 20  pronation/supination 1 wt x 20     HEP instruction     Review HEP issued on 03/16/22  Review HEP issued on 03/16/22                                 Therapeutic Activity                                       Neuromuscular Re-education                                                             Modalities                    moist heat      3 min  3 min  3 min  3 min  3 min      ultrasound cont. 3.0 w/cm2 ,1.0 w/cm2      8 min over right carpal tunnel    50% pulsed 8 min over left carpal tunne             Assessment:  Patient complaining of increased paresthesia in AM upon waking. Discussed importance of wearing wrist braces overnight. Once again had patient try on Futuro overnight wrist brace and advised her to purchase for overnight. Patient demonstrates limited progress , recommend discharge. Goals: See OT Discharge note below for details. Plan: See OT Discharge note below for details. Charges: MT, TE2 Total Timed Treatment: 40 min  Total Treatment Time: 45 min              OT Discharge Summary    Pt has attended 7 cancelled 1, and no shown 0 visits in Occupational Therapy. Subjective: \"I wear the wrist braces a lot during the day, not so much overnight. \"    Assessment: Patient has made some progress in OT. She improved left wrist flexion by 10 degrees, and left  strength increased by 9 lbs. Paresthesia in both hands is unchanged. She has been compliant with tendon glide exercises however has not been compliant with using wrist brace overnight and prefers to wear throughout the day.  Once again discussed and had patient try on Futuro overnight wrist brace recommended she purchase for overnight. Patient progress has been limited, recommend discharge. Objective: See below for objective measurements    AROM right wrist flexion: 60 degrees, extension: 70 degrees  AROM left wrist flexion: 65 degrees, extension: 75 degrees    Right  strength: 17,16,20   AV lbs   Left  strength: 15,15,20   AVlbs  Right key pinch: 6 lbs     Left key pinch : 5 lbs  Right three point pinch: 5 lbs     Left three point pinch : 6 lbs      Goals:   Pt complaints of paresthesia will decrease by 50% in left hand. .. .(Not Achieved)  Pt will be independent and compliant with comprehensive HEP to maintain progress achieved in OT. Rj Burciaga .(Achieved)  Patient to complete sensation assessment with Sonia stauffer by third visit. Rj Burciaga .(Achieved)  Patient will demonstrate increase in left wrist flexion to 65 degrees for ease in opening jar. Rj Burciaga .(Achieved)  Patient will demonstrate increase in left  strength to at least 20 lbs for ease in picking up bag of groceries. .. .(Not Achieved)    Plan: Patient has achieved maximum progress in OT, recommend discharge and continue with HEP and using overnight wrist brace. Patient was advised of these findings, precautions, and treatment options and has agreed to actively participate in planning and for this course of care. Thank you for your referral. If you have any questions, please contact me at Dept: [phone].     Sincerely,  Electronically signed by therapist: LIANNA Abraham/ELIANE, EDMUNDT

## 2022-04-25 ENCOUNTER — TELEPHONE (OUTPATIENT)
Dept: INTERNAL MEDICINE CLINIC | Facility: CLINIC | Age: 87
End: 2022-04-25

## 2022-04-25 NOTE — TELEPHONE ENCOUNTER
Called Andrei Wagner and advised Dr. Urban Machado can't all him as he isnt on his moms HIPPA form. She has an upcoming visit and will nee to update her HIPPA form at that time. He expressed understanding.

## 2022-04-25 NOTE — TELEPHONE ENCOUNTER
Lexine Gosselin is calling to speak with Dr Mika Xiao. Jaz Sterling is looking for a call back from Dr Mika Xiao, not a nurse. Jaz Sterling states the patient is going to a wound care doctor and foot doctors. When visiting the patient yesterday, the patient's leg has a wrap around it due to a wound leaking and swelling in her feet. Jaz Sterling would like to know what's going on and get some more updates/information shared with him. Ph # [phone], Jaz Sterling is not on the patient's most recent HIPAA.

## 2022-04-27 ENCOUNTER — OFFICE VISIT (OUTPATIENT)
Dept: WOUND CARE | Facility: HOSPITAL | Age: 87
End: 2022-04-27
Attending: NURSE PRACTITIONER
Payer: MEDICARE

## 2022-04-27 VITALS
HEART RATE: 63 BPM | DIASTOLIC BLOOD PRESSURE: 56 MMHG | TEMPERATURE: 98 F | RESPIRATION RATE: 18 BRPM | SYSTOLIC BLOOD PRESSURE: 144 MMHG | OXYGEN SATURATION: 98 %

## 2022-04-27 DIAGNOSIS — S81.801D WOUND OF RIGHT LOWER EXTREMITY, SUBSEQUENT ENCOUNTER: Primary | ICD-10-CM

## 2022-04-27 DIAGNOSIS — L97.913 NON-PRESSURE CHRONIC ULCER OF RIGHT LOWER LEG WITH NECROSIS OF MUSCLE (HCC): ICD-10-CM

## 2022-04-27 PROCEDURE — 99213 OFFICE O/P EST LOW 20 MIN: CPT | Performed by: NURSE PRACTITIONER

## 2022-05-04 ENCOUNTER — OFFICE VISIT (OUTPATIENT)
Dept: WOUND CARE | Facility: HOSPITAL | Age: 87
End: 2022-05-04
Attending: NURSE PRACTITIONER
Payer: MEDICARE

## 2022-05-04 VITALS
TEMPERATURE: 97 F | DIASTOLIC BLOOD PRESSURE: 69 MMHG | RESPIRATION RATE: 18 BRPM | SYSTOLIC BLOOD PRESSURE: 143 MMHG | HEART RATE: 77 BPM

## 2022-05-04 DIAGNOSIS — M21.41 ACQUIRED RIGHT FLAT FOOT: ICD-10-CM

## 2022-05-04 DIAGNOSIS — L97.913 NON-PRESSURE CHRONIC ULCER OF RIGHT LOWER LEG WITH NECROSIS OF MUSCLE (HCC): ICD-10-CM

## 2022-05-04 DIAGNOSIS — S81.801D WOUND OF RIGHT LOWER EXTREMITY, SUBSEQUENT ENCOUNTER: ICD-10-CM

## 2022-05-04 DIAGNOSIS — S91.301A WOUND OF RIGHT FOOT: ICD-10-CM

## 2022-05-04 PROCEDURE — 99213 OFFICE O/P EST LOW 20 MIN: CPT | Performed by: NURSE PRACTITIONER

## 2022-05-06 ENCOUNTER — OFFICE VISIT (OUTPATIENT)
Dept: INTERNAL MEDICINE CLINIC | Facility: CLINIC | Age: 87
End: 2022-05-06
Payer: COMMERCIAL

## 2022-05-06 VITALS
TEMPERATURE: 98 F | WEIGHT: 141 LBS | DIASTOLIC BLOOD PRESSURE: 64 MMHG | BODY MASS INDEX: 28 KG/M2 | SYSTOLIC BLOOD PRESSURE: 144 MMHG | HEART RATE: 80 BPM | OXYGEN SATURATION: 98 %

## 2022-05-06 DIAGNOSIS — H93.91 PROBLEM OF RIGHT EAR: ICD-10-CM

## 2022-05-06 DIAGNOSIS — S81.801D WOUND OF RIGHT LOWER EXTREMITY, SUBSEQUENT ENCOUNTER: Primary | ICD-10-CM

## 2022-05-06 DIAGNOSIS — K58.9 IRRITABLE BOWEL SYNDROME, UNSPECIFIED TYPE: ICD-10-CM

## 2022-05-06 PROCEDURE — 3078F DIAST BP <80 MM HG: CPT | Performed by: INTERNAL MEDICINE

## 2022-05-06 PROCEDURE — 99214 OFFICE O/P EST MOD 30 MIN: CPT | Performed by: INTERNAL MEDICINE

## 2022-05-06 PROCEDURE — 3077F SYST BP >= 140 MM HG: CPT | Performed by: INTERNAL MEDICINE

## 2022-05-06 RX ORDER — WHEAT DEXTRIN 3 G/3.8 G
POWDER (GRAM) ORAL
Refills: 0 | COMMUNITY
Start: 2022-05-06

## 2022-05-06 RX ORDER — FERROUS SULFATE 325(65) MG
TABLET ORAL
Qty: 90 TABLET | Refills: 3 | COMMUNITY
Start: 2022-05-06

## 2022-05-06 NOTE — PATIENT INSTRUCTIONS
Continue follow-up with the wound clinic nurse. Cut back iron pill to Monday Wednesday Friday. Start Benefiber 2 teaspoons daily in juice or water.

## 2022-05-09 ENCOUNTER — TELEPHONE (OUTPATIENT)
Dept: PHYSICAL MEDICINE AND REHAB | Facility: CLINIC | Age: 87
End: 2022-05-09

## 2022-05-09 ENCOUNTER — OFFICE VISIT (OUTPATIENT)
Dept: PHYSICAL MEDICINE AND REHAB | Facility: CLINIC | Age: 87
End: 2022-05-09
Payer: COMMERCIAL

## 2022-05-09 VITALS
BODY MASS INDEX: 28.43 KG/M2 | SYSTOLIC BLOOD PRESSURE: 108 MMHG | HEART RATE: 62 BPM | OXYGEN SATURATION: 98 % | HEIGHT: 59 IN | DIASTOLIC BLOOD PRESSURE: 62 MMHG | WEIGHT: 141 LBS

## 2022-05-09 DIAGNOSIS — I10 HYPERTENSION, ESSENTIAL: ICD-10-CM

## 2022-05-09 DIAGNOSIS — I25.10 CORONARY ARTERY DISEASE INVOLVING NATIVE CORONARY ARTERY OF NATIVE HEART WITHOUT ANGINA PECTORIS: ICD-10-CM

## 2022-05-09 DIAGNOSIS — N18.30 CKD STAGE 3 DUE TO TYPE 2 DIABETES MELLITUS (HCC): ICD-10-CM

## 2022-05-09 DIAGNOSIS — G56.03 BILATERAL CARPAL TUNNEL SYNDROME: Primary | ICD-10-CM

## 2022-05-09 DIAGNOSIS — E11.22 CKD STAGE 3 DUE TO TYPE 2 DIABETES MELLITUS (HCC): ICD-10-CM

## 2022-05-09 RX ORDER — TRIAMCINOLONE ACETONIDE 40 MG/ML
20 INJECTION, SUSPENSION INTRA-ARTICULAR; INTRAMUSCULAR ONCE
Status: COMPLETED | OUTPATIENT
Start: 2022-05-09 | End: 2022-05-09

## 2022-05-09 RX ORDER — LIDOCAINE HYDROCHLORIDE 10 MG/ML
1.5 INJECTION, SOLUTION INFILTRATION; PERINEURAL ONCE
Status: COMPLETED | OUTPATIENT
Start: 2022-05-09 | End: 2022-05-09

## 2022-05-09 RX ADMIN — TRIAMCINOLONE ACETONIDE 20 MG: 40 INJECTION, SUSPENSION INTRA-ARTICULAR; INTRAMUSCULAR at 16:21:00

## 2022-05-09 RX ADMIN — LIDOCAINE HYDROCHLORIDE 1.5 ML: 10 INJECTION, SOLUTION INFILTRATION; PERINEURAL at 16:21:00

## 2022-05-09 NOTE — TELEPHONE ENCOUNTER
Initiated authorization for Ultrasound guided left median nerve sheath injection with corticosteroid CPT [id number] with [facility] online  Status: Approved valid 5/9/22-8/8/22  Notification or Prior Authorization is not required for the requested services  This [facility] plan does not currently require a prior authorization for these services. Notification is not a guarantee of coverage or payment.   Decision ID #:[id number]

## 2022-05-09 NOTE — PATIENT INSTRUCTIONS
Steroid Injection Information  What to expect: The injection contains Lidocaine (which numbs the area) and Kenalog (a steroid which decreases inflammation). You may have pain relief within hours of the injection due to the Lidocaine. The Kenalog can take a couple days, up to a couple weeks, to reach the full effect. It is also possible to have a slight increase in symptoms over the first few days, but that should resolve fairly quickly. How long will the injection last?: The length of response to an injection is variable. Literally a couple weeks to a couple years. The injection will decrease the inflammation and the pain will return if/when the inflammation returns. Activity Recommendations: For the first 24 hours after injection, keep the area clean and dry. It is ok to shower but don't soak in a tub during that time. No vigorous activity such as running or heavy lifting for the first week but other than that you can gradually resume your normal activities immediately. If you have a significant decrease in pain, be careful not to do too much too soon. Again, the key is GRADUAL resumption of activites. Things to look out for: Common injection side effects include soreness at the injection site, bruising, flushing of the face or skin, and a temporary increase in your blood sugars and/or blood pressure. Infection is very rare but please notify my office [facility] for [address] [phone]) if you develop any fevers, drainage from the injection site, or severe increase in pain. If it is the weekend, go to an Emergency Room.         Follow up in 3 months

## 2022-05-09 NOTE — PROCEDURES
Procedure:  Ultrasound guided LEFT carpal tunnel injection  The risks, benefits and anticipated outcomes of the procedure, the risks and benefits of the alternatives to the procedure, and the roles and tasks of the personnel to be involved, were discussed with the patient, and the patient consents to the procedure and agrees to proceed. UNIVERSAL PROTOCOL / SAFETY CHECKLIST  Sign in Communication: Completed  Time Out:  Team Confirms the Correct Patient, Correct Procedure, Correct Site and Site Marking, Correct Position (if applicable), Prep and Dry Time (if applicable). Affirmation of Time Out: YES  Sign Out Discussion: Completed if applicable    The procedure was carried out under sterile prep  with sterile gel. The target site was anesthetized with a topical ethyl chloride spray. Then, A  25ga 1.5in needle was introduced and advanced with ultrasound guidance from radial to ulnar direction using an in plane approach with the transducer in transverse orientation. Following negative aspiration, a mixture of 1cc of 1% lidocaine and 1 cc of Kenalog (40mg/ml) was injected. Permanent pictures were taken and stored in the PACS system. Ultrasound interpretation was performed prior to the procedure to identify the target and any adjacent neurovascular structures, and during real-time needle guidance confirming placement. Post-intervention interpretation was also performed confirming appropriate injectate flow and hemostasis. The patient tolerated the procedure without complication and was instructed in post-procedure precautions. Please see patient instructions.     Andrea Keating DO, FAAPMR & CAQSM  Physical Medicine and Rehabilitation/Sports Medicine  [facility]

## 2022-05-11 ENCOUNTER — OFFICE VISIT (OUTPATIENT)
Dept: WOUND CARE | Facility: HOSPITAL | Age: 87
End: 2022-05-11
Attending: NURSE PRACTITIONER
Payer: MEDICARE

## 2022-05-11 VITALS
RESPIRATION RATE: 18 BRPM | TEMPERATURE: 98 F | SYSTOLIC BLOOD PRESSURE: 138 MMHG | OXYGEN SATURATION: 96 % | HEART RATE: 65 BPM | DIASTOLIC BLOOD PRESSURE: 65 MMHG

## 2022-05-11 DIAGNOSIS — S81.801A WOUND OF RIGHT LOWER EXTREMITY, INITIAL ENCOUNTER: ICD-10-CM

## 2022-05-11 DIAGNOSIS — L97.913 NON-PRESSURE CHRONIC ULCER OF RIGHT LOWER LEG WITH NECROSIS OF MUSCLE (HCC): Primary | ICD-10-CM

## 2022-05-11 DIAGNOSIS — S81.801D WOUND OF RIGHT LOWER EXTREMITY, SUBSEQUENT ENCOUNTER: ICD-10-CM

## 2022-05-11 PROCEDURE — 97597 DBRDMT OPN WND 1ST 20 CM/<: CPT | Performed by: NURSE PRACTITIONER

## 2022-05-17 ENCOUNTER — OFFICE VISIT (OUTPATIENT)
Dept: WOUND CARE | Facility: HOSPITAL | Age: 87
End: 2022-05-17
Attending: NURSE PRACTITIONER
Payer: MEDICARE

## 2022-05-17 VITALS
SYSTOLIC BLOOD PRESSURE: 148 MMHG | OXYGEN SATURATION: 96 % | DIASTOLIC BLOOD PRESSURE: 64 MMHG | RESPIRATION RATE: 18 BRPM | TEMPERATURE: 97 F | HEART RATE: 67 BPM

## 2022-05-17 DIAGNOSIS — S81.801D WOUND OF RIGHT LOWER EXTREMITY, SUBSEQUENT ENCOUNTER: Primary | ICD-10-CM

## 2022-05-17 DIAGNOSIS — L97.913 NON-PRESSURE CHRONIC ULCER OF RIGHT LOWER LEG WITH NECROSIS OF MUSCLE (HCC): ICD-10-CM

## 2022-05-17 DIAGNOSIS — S91.301A WOUND OF RIGHT FOOT: ICD-10-CM

## 2022-05-17 PROCEDURE — 99213 OFFICE O/P EST LOW 20 MIN: CPT | Performed by: NURSE PRACTITIONER

## 2022-05-18 ENCOUNTER — TELEPHONE (OUTPATIENT)
Dept: INTERNAL MEDICINE CLINIC | Facility: CLINIC | Age: 87
End: 2022-05-18

## 2022-05-24 ENCOUNTER — OFFICE VISIT (OUTPATIENT)
Dept: WOUND CARE | Facility: HOSPITAL | Age: 87
End: 2022-05-24
Attending: NURSE PRACTITIONER
Payer: MEDICARE

## 2022-05-24 VITALS
TEMPERATURE: 97 F | OXYGEN SATURATION: 100 % | HEART RATE: 61 BPM | SYSTOLIC BLOOD PRESSURE: 136 MMHG | RESPIRATION RATE: 18 BRPM | DIASTOLIC BLOOD PRESSURE: 60 MMHG

## 2022-05-24 DIAGNOSIS — L97.913 NON-PRESSURE CHRONIC ULCER OF RIGHT LOWER LEG WITH NECROSIS OF MUSCLE (HCC): ICD-10-CM

## 2022-05-24 DIAGNOSIS — S91.301A WOUND OF RIGHT FOOT: ICD-10-CM

## 2022-05-24 PROCEDURE — 99213 OFFICE O/P EST LOW 20 MIN: CPT | Performed by: NURSE PRACTITIONER

## 2022-05-31 ENCOUNTER — OFFICE VISIT (OUTPATIENT)
Dept: WOUND CARE | Facility: HOSPITAL | Age: 87
End: 2022-05-31
Attending: NURSE PRACTITIONER
Payer: MEDICARE

## 2022-05-31 VITALS
HEART RATE: 66 BPM | DIASTOLIC BLOOD PRESSURE: 63 MMHG | OXYGEN SATURATION: 96 % | TEMPERATURE: 96 F | RESPIRATION RATE: 18 BRPM | SYSTOLIC BLOOD PRESSURE: 137 MMHG

## 2022-05-31 DIAGNOSIS — S91.301A WOUND OF RIGHT FOOT: ICD-10-CM

## 2022-05-31 DIAGNOSIS — L97.913 NON-PRESSURE CHRONIC ULCER OF RIGHT LOWER LEG WITH NECROSIS OF MUSCLE (HCC): ICD-10-CM

## 2022-05-31 PROCEDURE — 99213 OFFICE O/P EST LOW 20 MIN: CPT | Performed by: NURSE PRACTITIONER

## 2022-06-01 ENCOUNTER — OFFICE VISIT (OUTPATIENT)
Dept: INTERNAL MEDICINE CLINIC | Facility: CLINIC | Age: 87
End: 2022-06-01
Payer: COMMERCIAL

## 2022-06-01 VITALS
HEIGHT: 59 IN | BODY MASS INDEX: 26.81 KG/M2 | TEMPERATURE: 98 F | DIASTOLIC BLOOD PRESSURE: 70 MMHG | HEART RATE: 72 BPM | WEIGHT: 133 LBS | SYSTOLIC BLOOD PRESSURE: 130 MMHG

## 2022-06-01 DIAGNOSIS — G56.03 BILATERAL CARPAL TUNNEL SYNDROME: ICD-10-CM

## 2022-06-01 DIAGNOSIS — H91.93 BILATERAL HEARING LOSS, UNSPECIFIED HEARING LOSS TYPE: ICD-10-CM

## 2022-06-01 DIAGNOSIS — E55.9 VITAMIN D DEFICIENCY: ICD-10-CM

## 2022-06-01 DIAGNOSIS — M81.0 AGE-RELATED OSTEOPOROSIS WITHOUT CURRENT PATHOLOGICAL FRACTURE: ICD-10-CM

## 2022-06-01 DIAGNOSIS — Z00.00 MEDICARE ANNUAL WELLNESS VISIT, SUBSEQUENT: Primary | ICD-10-CM

## 2022-06-01 DIAGNOSIS — E53.8 B12 DEFICIENCY: ICD-10-CM

## 2022-06-01 DIAGNOSIS — I10 HYPERTENSION, ESSENTIAL: ICD-10-CM

## 2022-06-01 DIAGNOSIS — M19.011 PRIMARY OSTEOARTHRITIS OF RIGHT SHOULDER: ICD-10-CM

## 2022-06-01 DIAGNOSIS — I47.1 SVT (SUPRAVENTRICULAR TACHYCARDIA) (HCC): ICD-10-CM

## 2022-06-01 DIAGNOSIS — I87.2 VENOUS INSUFFICIENCY OF BOTH LOWER EXTREMITIES: ICD-10-CM

## 2022-06-01 DIAGNOSIS — K21.9 GASTROESOPHAGEAL REFLUX DISEASE WITHOUT ESOPHAGITIS: ICD-10-CM

## 2022-06-01 DIAGNOSIS — L30.9 ECZEMA, UNSPECIFIED TYPE: ICD-10-CM

## 2022-06-01 DIAGNOSIS — D64.9 CHRONIC ANEMIA: ICD-10-CM

## 2022-06-01 DIAGNOSIS — E61.1 IRON DEFICIENCY: ICD-10-CM

## 2022-06-01 DIAGNOSIS — Z87.19 HISTORY OF SMALL BOWEL OBSTRUCTION: ICD-10-CM

## 2022-06-01 DIAGNOSIS — N18.30 CKD STAGE 3 DUE TO TYPE 2 DIABETES MELLITUS (HCC): ICD-10-CM

## 2022-06-01 DIAGNOSIS — H93.91 PROBLEM OF RIGHT EAR: ICD-10-CM

## 2022-06-01 DIAGNOSIS — Z87.81 HISTORY OF CERVICAL FRACTURE: ICD-10-CM

## 2022-06-01 DIAGNOSIS — E78.00 HYPERCHOLESTEROLEMIA: ICD-10-CM

## 2022-06-01 DIAGNOSIS — Z87.81 HISTORY OF VERTEBRAL COMPRESSION FRACTURE: ICD-10-CM

## 2022-06-01 DIAGNOSIS — S81.801D WOUND OF RIGHT LOWER EXTREMITY, SUBSEQUENT ENCOUNTER: ICD-10-CM

## 2022-06-01 DIAGNOSIS — Z95.5 STENTED CORONARY ARTERY: ICD-10-CM

## 2022-06-01 DIAGNOSIS — M15.9 PRIMARY OSTEOARTHRITIS INVOLVING MULTIPLE JOINTS: ICD-10-CM

## 2022-06-01 DIAGNOSIS — E11.40 TYPE 2 DIABETES MELLITUS WITH DIABETIC NEUROPATHY, WITHOUT LONG-TERM CURRENT USE OF INSULIN (HCC): ICD-10-CM

## 2022-06-01 DIAGNOSIS — Z87.01 HISTORY OF PNEUMONIA: ICD-10-CM

## 2022-06-01 DIAGNOSIS — Z87.440 HISTORY OF UTI: ICD-10-CM

## 2022-06-01 DIAGNOSIS — G62.9 NEUROPATHY: ICD-10-CM

## 2022-06-01 DIAGNOSIS — K58.9 IRRITABLE BOWEL SYNDROME, UNSPECIFIED TYPE: ICD-10-CM

## 2022-06-01 DIAGNOSIS — R09.89 LEFT CAROTID BRUIT: ICD-10-CM

## 2022-06-01 DIAGNOSIS — E11.22 CKD STAGE 3 DUE TO TYPE 2 DIABETES MELLITUS (HCC): ICD-10-CM

## 2022-06-01 DIAGNOSIS — I48.0 PAF (PAROXYSMAL ATRIAL FIBRILLATION) (HCC): ICD-10-CM

## 2022-06-01 DIAGNOSIS — I25.10 CORONARY ARTERY DISEASE INVOLVING NATIVE CORONARY ARTERY OF NATIVE HEART WITHOUT ANGINA PECTORIS: ICD-10-CM

## 2022-06-01 DIAGNOSIS — J44.9 ASTHMA WITH COPD (CHRONIC OBSTRUCTIVE PULMONARY DISEASE) (HCC): ICD-10-CM

## 2022-06-01 PROCEDURE — 3075F SYST BP GE 130 - 139MM HG: CPT | Performed by: INTERNAL MEDICINE

## 2022-06-01 PROCEDURE — 96160 PT-FOCUSED HLTH RISK ASSMT: CPT | Performed by: INTERNAL MEDICINE

## 2022-06-01 PROCEDURE — G0439 PPPS, SUBSEQ VISIT: HCPCS | Performed by: INTERNAL MEDICINE

## 2022-06-01 PROCEDURE — 3078F DIAST BP <80 MM HG: CPT | Performed by: INTERNAL MEDICINE

## 2022-06-01 PROCEDURE — 99397 PER PM REEVAL EST PAT 65+ YR: CPT | Performed by: INTERNAL MEDICINE

## 2022-06-01 PROCEDURE — 3008F BODY MASS INDEX DOCD: CPT | Performed by: INTERNAL MEDICINE

## 2022-06-04 RX ORDER — FLUTICASONE PROPIONATE 220 UG/1
1 AEROSOL, METERED RESPIRATORY (INHALATION) 2 TIMES DAILY
Qty: 1 EACH | Refills: 3 | Status: SHIPPED | OUTPATIENT
Start: 2022-06-04

## 2022-06-07 ENCOUNTER — OFFICE VISIT (OUTPATIENT)
Dept: WOUND CARE | Facility: HOSPITAL | Age: 87
End: 2022-06-07
Attending: NURSE PRACTITIONER
Payer: MEDICARE

## 2022-06-07 VITALS
OXYGEN SATURATION: 95 % | DIASTOLIC BLOOD PRESSURE: 67 MMHG | TEMPERATURE: 97 F | RESPIRATION RATE: 18 BRPM | SYSTOLIC BLOOD PRESSURE: 152 MMHG | HEART RATE: 67 BPM

## 2022-06-07 DIAGNOSIS — L97.913 NON-PRESSURE CHRONIC ULCER OF RIGHT LOWER LEG WITH NECROSIS OF MUSCLE (HCC): Primary | ICD-10-CM

## 2022-06-07 DIAGNOSIS — S81.801D WOUND OF RIGHT LOWER EXTREMITY, SUBSEQUENT ENCOUNTER: ICD-10-CM

## 2022-06-07 PROBLEM — S81.801A WOUND OF RIGHT LEG: Status: ACTIVE | Noted: 2022-06-07

## 2022-06-07 PROCEDURE — 99213 OFFICE O/P EST LOW 20 MIN: CPT | Performed by: NURSE PRACTITIONER

## 2022-06-13 ENCOUNTER — TELEPHONE (OUTPATIENT)
Dept: INTERNAL MEDICINE CLINIC | Facility: CLINIC | Age: 87
End: 2022-06-13

## 2022-06-13 DIAGNOSIS — Z01.10 ENCOUNTER FOR HEARING EXAMINATION, UNSPECIFIED WHETHER ABNORMAL FINDINGS: Primary | ICD-10-CM

## 2022-06-13 NOTE — TELEPHONE ENCOUNTER
Please advise - called patient who states she was given audiologist name of DR. Saldaña but cannot be seen sooner then 7/18 - she would like another recommendation - to DR. SWIFT

## 2022-06-13 NOTE — TELEPHONE ENCOUNTER
As FYI to DR. SWIFT - called patient who has hearing aids , something is going on with her left ear and they are trying to figure out if anything can be done to improve hearing.  She did not see DR Hercules Fleischer yet - she called them again today and has a sooner appointment

## 2022-06-13 NOTE — TELEPHONE ENCOUNTER
Patient is calling to request another recommendation for someone to complete an Ear Exam. Patient states she was given a name of someone from Dr Domonique Avalos but thet cannot get the patient in until 7/18/2022. Patient is looking for something sooner.  # [phone]  Patient was made aware that Dr Domonique Avalos is out of the office all week, hoping for another physician can assist in her absence.

## 2022-06-13 NOTE — TELEPHONE ENCOUNTER
I placed order for audiologist Jeovany Capps. We can tell patient and Dr. Clifford aLu is absent I placed referral for Jeovany Capps.     Please ask her what type of hearing problem that she has and what Dr. Moe Sotomayor said her hearing problem was

## 2022-06-14 ENCOUNTER — OFFICE VISIT (OUTPATIENT)
Dept: WOUND CARE | Facility: HOSPITAL | Age: 87
End: 2022-06-14
Attending: NURSE PRACTITIONER
Payer: MEDICARE

## 2022-06-14 VITALS
SYSTOLIC BLOOD PRESSURE: 138 MMHG | OXYGEN SATURATION: 96 % | DIASTOLIC BLOOD PRESSURE: 64 MMHG | RESPIRATION RATE: 17 BRPM | HEART RATE: 75 BPM | TEMPERATURE: 97 F

## 2022-06-14 DIAGNOSIS — L97.913 NON-PRESSURE CHRONIC ULCER OF RIGHT LOWER LEG WITH NECROSIS OF MUSCLE (HCC): ICD-10-CM

## 2022-06-14 PROCEDURE — 99213 OFFICE O/P EST LOW 20 MIN: CPT | Performed by: NURSE PRACTITIONER

## 2022-06-15 ENCOUNTER — TELEPHONE (OUTPATIENT)
Dept: OTOLARYNGOLOGY | Facility: CLINIC | Age: 87
End: 2022-06-15

## 2022-06-15 NOTE — TELEPHONE ENCOUNTER
Pt. States she actually would like to schedule f/u with audiologist, will call back to schedule with them.

## 2022-06-15 NOTE — TELEPHONE ENCOUNTER
Per pt she has an appointment with [facility] for aids, but states she has notes from her appointment with Dr. Cecilia Martin stating she has an abnormally shaped ear canal and states when she pushes on a spot near her ear she can hear. Per pt asking if there are drops she could take or any other treatment.  Please advise

## 2022-06-21 ENCOUNTER — OFFICE VISIT (OUTPATIENT)
Dept: WOUND CARE | Facility: HOSPITAL | Age: 87
End: 2022-06-21
Attending: NURSE PRACTITIONER
Payer: MEDICARE

## 2022-06-21 VITALS
HEART RATE: 69 BPM | RESPIRATION RATE: 18 BRPM | TEMPERATURE: 97 F | DIASTOLIC BLOOD PRESSURE: 67 MMHG | OXYGEN SATURATION: 98 % | SYSTOLIC BLOOD PRESSURE: 159 MMHG

## 2022-06-21 DIAGNOSIS — L97.913 NON-PRESSURE CHRONIC ULCER OF RIGHT LOWER LEG WITH NECROSIS OF MUSCLE (HCC): ICD-10-CM

## 2022-06-21 PROCEDURE — 99213 OFFICE O/P EST LOW 20 MIN: CPT | Performed by: NURSE PRACTITIONER

## 2022-06-28 ENCOUNTER — OFFICE VISIT (OUTPATIENT)
Dept: WOUND CARE | Facility: HOSPITAL | Age: 87
End: 2022-06-28
Attending: NURSE PRACTITIONER
Payer: MEDICARE

## 2022-06-28 VITALS
DIASTOLIC BLOOD PRESSURE: 63 MMHG | RESPIRATION RATE: 16 BRPM | SYSTOLIC BLOOD PRESSURE: 168 MMHG | HEART RATE: 68 BPM | OXYGEN SATURATION: 95 % | TEMPERATURE: 97 F

## 2022-06-28 DIAGNOSIS — L97.913 NON-PRESSURE CHRONIC ULCER OF RIGHT LOWER LEG WITH NECROSIS OF MUSCLE (HCC): ICD-10-CM

## 2022-06-28 PROCEDURE — 99213 OFFICE O/P EST LOW 20 MIN: CPT | Performed by: NURSE PRACTITIONER

## 2022-06-29 ENCOUNTER — TELEPHONE (OUTPATIENT)
Dept: INTERNAL MEDICINE CLINIC | Facility: CLINIC | Age: 87
End: 2022-06-29

## 2022-06-29 NOTE — TELEPHONE ENCOUNTER
Please call patient  She has a tooth that has to be pulled  Pt is on blood thinner  How long should patient be off medication before and after appt?   Appt will be scheduled once patient has this information  Please call patient to advise  Tasked to nursing

## 2022-06-30 NOTE — TELEPHONE ENCOUNTER
LMTCB - will discuss with pt current guidelines recommend no change in [facility] around dental extractions unless requested by dentist;  She can hold one dose the evening after the procedure if bleeding still continuing

## 2022-07-01 ENCOUNTER — TELEPHONE (OUTPATIENT)
Dept: INTERNAL MEDICINE CLINIC | Facility: CLINIC | Age: 87
End: 2022-07-01

## 2022-07-01 RX ORDER — PROMETHAZINE HYDROCHLORIDE AND CODEINE PHOSPHATE 6.25; 1 MG/5ML; MG/5ML
SOLUTION ORAL
COMMUNITY
Start: 2022-06-30

## 2022-07-01 NOTE — TELEPHONE ENCOUNTER
Called patient who states she is taking the cough syrup not very often and less then directed - does not need refill on medication

## 2022-07-01 NOTE — TELEPHONE ENCOUNTER
Patient is calling she spoke to her dentist this afternoon  He said he prefers she stops her blood thinner the day before her procedure  Is this okay with Dr Jorge Magana?   Please call patient [phone]

## 2022-07-01 NOTE — TELEPHONE ENCOUNTER
Janessa Chery requesting refill for:    Promethazine w/codeine syrup  Take 2.5ML by mouth every 6 hours as needed    Tasked to [employer]

## 2022-07-01 NOTE — TELEPHONE ENCOUNTER
Spoke to pt and relayed info;   She confirmed the dentist is not asking for any changes to [facility] but pt wanted to check before

## 2022-07-05 ENCOUNTER — OFFICE VISIT (OUTPATIENT)
Dept: WOUND CARE | Facility: HOSPITAL | Age: 87
End: 2022-07-05
Attending: NURSE PRACTITIONER
Payer: MEDICARE

## 2022-07-05 VITALS
DIASTOLIC BLOOD PRESSURE: 69 MMHG | SYSTOLIC BLOOD PRESSURE: 146 MMHG | OXYGEN SATURATION: 98 % | HEART RATE: 69 BPM | RESPIRATION RATE: 20 BRPM | TEMPERATURE: 97 F

## 2022-07-05 DIAGNOSIS — S81.801D WOUND OF RIGHT LOWER EXTREMITY, SUBSEQUENT ENCOUNTER: ICD-10-CM

## 2022-07-05 PROCEDURE — 99213 OFFICE O/P EST LOW 20 MIN: CPT | Performed by: NURSE PRACTITIONER

## 2022-08-05 RX ORDER — HYDROCORTISONE 25 MG/G
CREAM TOPICAL
Qty: 30 G | Refills: 2 | Status: SHIPPED | OUTPATIENT
Start: 2022-08-05

## 2022-08-05 RX ORDER — MELATONIN
2000 DAILY
Qty: 180 TABLET | Refills: 1 | Status: SHIPPED | OUTPATIENT
Start: 2022-08-05

## 2022-08-11 ENCOUNTER — TELEPHONE (OUTPATIENT)
Dept: INTERNAL MEDICINE CLINIC | Facility: CLINIC | Age: 87
End: 2022-08-11

## 2022-08-11 RX ORDER — LACTULOSE 10 G/15ML
30 SOLUTION ORAL 3 TIMES DAILY PRN
Qty: 236 ML | Refills: 3 | Status: CANCELLED | OUTPATIENT
Start: 2022-08-11

## 2022-08-11 RX ORDER — MELATONIN
2000 DAILY
Qty: 180 TABLET | Refills: 1 | Status: CANCELLED | OUTPATIENT
Start: 2022-08-11

## 2022-08-12 RX ORDER — LACTULOSE 10 G/15ML
30 SOLUTION ORAL DAILY PRN
Qty: 237 ML | Refills: 3 | Status: SHIPPED | OUTPATIENT
Start: 2022-08-12

## 2022-08-12 NOTE — TELEPHONE ENCOUNTER
Pt. Called stating she does still take it occasionally. She said when she takes it, she only takes about a teaspoon of it once daily when she is using it. Also pt. Mentions that she does not use MyChart, her son goes in there. Pt. Prefers a phone call directly to her.

## 2022-08-15 ENCOUNTER — OFFICE VISIT (OUTPATIENT)
Dept: PHYSICAL MEDICINE AND REHAB | Facility: CLINIC | Age: 87
End: 2022-08-15
Payer: COMMERCIAL

## 2022-08-15 VITALS — SYSTOLIC BLOOD PRESSURE: 146 MMHG | DIASTOLIC BLOOD PRESSURE: 72 MMHG

## 2022-08-15 DIAGNOSIS — G56.03 BILATERAL CARPAL TUNNEL SYNDROME: Primary | ICD-10-CM

## 2022-08-15 DIAGNOSIS — I25.10 CORONARY ARTERY DISEASE INVOLVING NATIVE CORONARY ARTERY OF NATIVE HEART WITHOUT ANGINA PECTORIS: ICD-10-CM

## 2022-08-15 DIAGNOSIS — E11.22 CKD STAGE 3 DUE TO TYPE 2 DIABETES MELLITUS (HCC): ICD-10-CM

## 2022-08-15 DIAGNOSIS — M19.011 OSTEOARTHRITIS OF RIGHT GLENOHUMERAL JOINT: ICD-10-CM

## 2022-08-15 DIAGNOSIS — N18.30 CKD STAGE 3 DUE TO TYPE 2 DIABETES MELLITUS (HCC): ICD-10-CM

## 2022-08-15 DIAGNOSIS — I10 HYPERTENSION, ESSENTIAL: ICD-10-CM

## 2022-08-15 PROCEDURE — 3078F DIAST BP <80 MM HG: CPT | Performed by: PHYSICAL MEDICINE & REHABILITATION

## 2022-08-15 PROCEDURE — 1125F AMNT PAIN NOTED PAIN PRSNT: CPT | Performed by: PHYSICAL MEDICINE & REHABILITATION

## 2022-08-15 PROCEDURE — 3077F SYST BP >= 140 MM HG: CPT | Performed by: PHYSICAL MEDICINE & REHABILITATION

## 2022-08-15 PROCEDURE — 99214 OFFICE O/P EST MOD 30 MIN: CPT | Performed by: PHYSICAL MEDICINE & REHABILITATION

## 2022-09-02 ENCOUNTER — OFFICE VISIT (OUTPATIENT)
Dept: INTERNAL MEDICINE CLINIC | Facility: CLINIC | Age: 87
End: 2022-09-02
Payer: COMMERCIAL

## 2022-09-02 VITALS
DIASTOLIC BLOOD PRESSURE: 54 MMHG | BODY MASS INDEX: 27.05 KG/M2 | RESPIRATION RATE: 16 BRPM | HEART RATE: 73 BPM | WEIGHT: 134.19 LBS | OXYGEN SATURATION: 96 % | TEMPERATURE: 98 F | SYSTOLIC BLOOD PRESSURE: 128 MMHG | HEIGHT: 59 IN

## 2022-09-02 DIAGNOSIS — E78.00 HYPERCHOLESTEROLEMIA: ICD-10-CM

## 2022-09-02 DIAGNOSIS — J31.0 CHRONIC RHINITIS: Primary | ICD-10-CM

## 2022-09-02 DIAGNOSIS — J44.9 ASTHMA WITH COPD (CHRONIC OBSTRUCTIVE PULMONARY DISEASE) (HCC): ICD-10-CM

## 2022-09-02 DIAGNOSIS — E11.40 TYPE 2 DIABETES MELLITUS WITH DIABETIC NEUROPATHY, WITHOUT LONG-TERM CURRENT USE OF INSULIN (HCC): ICD-10-CM

## 2022-09-02 DIAGNOSIS — I10 HYPERTENSION, ESSENTIAL: ICD-10-CM

## 2022-09-02 PROCEDURE — 3008F BODY MASS INDEX DOCD: CPT | Performed by: INTERNAL MEDICINE

## 2022-09-02 PROCEDURE — 1126F AMNT PAIN NOTED NONE PRSNT: CPT | Performed by: INTERNAL MEDICINE

## 2022-09-02 PROCEDURE — 99214 OFFICE O/P EST MOD 30 MIN: CPT | Performed by: INTERNAL MEDICINE

## 2022-09-02 PROCEDURE — 3074F SYST BP LT 130 MM HG: CPT | Performed by: INTERNAL MEDICINE

## 2022-09-02 PROCEDURE — 3078F DIAST BP <80 MM HG: CPT | Performed by: INTERNAL MEDICINE

## 2022-09-02 RX ORDER — PROMETHAZINE HYDROCHLORIDE AND CODEINE PHOSPHATE 6.25; 1 MG/5ML; MG/5ML
2.5 SYRUP ORAL EVERY 6 HOURS PRN
Qty: 180 ML | Refills: 1 | Status: SHIPPED | OUTPATIENT
Start: 2022-09-02

## 2022-09-08 ENCOUNTER — TELEPHONE (OUTPATIENT)
Dept: INTERNAL MEDICINE CLINIC | Facility: CLINIC | Age: 87
End: 2022-09-08

## 2022-09-09 RX ORDER — AMOXICILLIN 250 MG
CAPSULE ORAL
Qty: 100 TABLET | Refills: 3 | Status: SHIPPED | OUTPATIENT
Start: 2022-09-09

## 2022-09-29 RX ORDER — LACTULOSE 10 G/15ML
30 SOLUTION ORAL DAILY PRN
Qty: 237 ML | Refills: 3 | OUTPATIENT
Start: 2022-09-29

## 2022-09-29 RX ORDER — ALENDRONATE SODIUM 70 MG/1
70 TABLET ORAL DAILY
Qty: 12 TABLET | Refills: 3 | OUTPATIENT
Start: 2022-09-29

## 2022-09-29 RX ORDER — ALENDRONATE SODIUM 70 MG/1
70 TABLET ORAL WEEKLY
Qty: 12 TABLET | Refills: 3 | Status: SHIPPED | OUTPATIENT
Start: 2022-09-29

## 2022-09-29 RX ORDER — ATORVASTATIN CALCIUM 10 MG/1
10 TABLET, FILM COATED ORAL NIGHTLY
Qty: 90 TABLET | Refills: 3 | Status: SHIPPED | OUTPATIENT
Start: 2022-09-29

## 2022-10-03 RX ORDER — ASPIRIN 81 MG/1
81 TABLET ORAL DAILY
Qty: 1 TABLET | Refills: 0 | Status: CANCELLED | OUTPATIENT
Start: 2022-10-03

## 2022-10-05 RX ORDER — DILTIAZEM HYDROCHLORIDE 120 MG/1
120 CAPSULE, COATED, EXTENDED RELEASE ORAL DAILY
Qty: 90 CAPSULE | Refills: 3 | Status: SHIPPED | OUTPATIENT
Start: 2022-10-05

## 2022-10-05 RX ORDER — LACTULOSE 10 G/15ML
30 SOLUTION ORAL DAILY PRN
Qty: 237 ML | Refills: 3 | Status: CANCELLED | OUTPATIENT
Start: 2022-10-05

## 2022-10-05 RX ORDER — TRIAMCINOLONE ACETONIDE 1 MG/G
1 CREAM TOPICAL 2 TIMES DAILY PRN
Qty: 30 G | Refills: 1 | Status: CANCELLED | OUTPATIENT
Start: 2022-10-05

## 2022-10-20 ENCOUNTER — OFFICE VISIT (OUTPATIENT)
Dept: PHYSICAL MEDICINE AND REHAB | Facility: CLINIC | Age: 87
End: 2022-10-20
Payer: COMMERCIAL

## 2022-10-20 ENCOUNTER — TELEPHONE (OUTPATIENT)
Dept: NEUROLOGY | Facility: CLINIC | Age: 87
End: 2022-10-20

## 2022-10-20 VITALS
HEART RATE: 64 BPM | BODY MASS INDEX: 26 KG/M2 | WEIGHT: 130 LBS | SYSTOLIC BLOOD PRESSURE: 134 MMHG | DIASTOLIC BLOOD PRESSURE: 62 MMHG | OXYGEN SATURATION: 98 %

## 2022-10-20 DIAGNOSIS — G56.03 CARPAL TUNNEL SYNDROME ON BOTH SIDES: Primary | ICD-10-CM

## 2022-10-20 PROCEDURE — 3075F SYST BP GE 130 - 139MM HG: CPT | Performed by: PHYSICAL MEDICINE & REHABILITATION

## 2022-10-20 PROCEDURE — 20526 THER INJECTION CARP TUNNEL: CPT | Performed by: PHYSICAL MEDICINE & REHABILITATION

## 2022-10-20 PROCEDURE — 99214 OFFICE O/P EST MOD 30 MIN: CPT | Performed by: PHYSICAL MEDICINE & REHABILITATION

## 2022-10-20 PROCEDURE — 1126F AMNT PAIN NOTED NONE PRSNT: CPT | Performed by: PHYSICAL MEDICINE & REHABILITATION

## 2022-10-20 PROCEDURE — 3078F DIAST BP <80 MM HG: CPT | Performed by: PHYSICAL MEDICINE & REHABILITATION

## 2022-10-20 PROCEDURE — 76942 ECHO GUIDE FOR BIOPSY: CPT | Performed by: PHYSICAL MEDICINE & REHABILITATION

## 2022-10-20 RX ORDER — LIDOCAINE HYDROCHLORIDE 10 MG/ML
1.5 INJECTION, SOLUTION INFILTRATION; PERINEURAL ONCE
Status: COMPLETED | OUTPATIENT
Start: 2022-10-20 | End: 2022-10-20

## 2022-10-20 RX ORDER — TRIAMCINOLONE ACETONIDE 40 MG/ML
20 INJECTION, SUSPENSION INTRA-ARTICULAR; INTRAMUSCULAR ONCE
Status: COMPLETED | OUTPATIENT
Start: 2022-10-20 | End: 2022-10-20

## 2022-10-20 NOTE — PATIENT INSTRUCTIONS
Steroid Injection Information  What to expect: The injection contains Lidocaine (which numbs the area) and Kenalog (a steroid which decreases inflammation). You may have pain relief within hours of the injection due to the Lidocaine. The Kenalog can take a couple days, up to a couple weeks, to reach the full effect. It is also possible to have a slight increase in symptoms over the first few days, but that should resolve fairly quickly. How long will the injection last?: The length of response to an injection is variable. Literally a couple weeks to a couple years. The injection will decrease the inflammation and the pain will return if/when the inflammation returns. Activity Recommendations: For the first 24 hours after injection, keep the area clean and dry. It is ok to shower but don't soak in a tub during that time. No vigorous activity such as running or heavy lifting for the first week but other than that you can gradually resume your normal activities immediately. If you have a significant decrease in pain, be careful not to do too much too soon. Again, the key is GRADUAL resumption of activites. Things to look out for: Common injection side effects include soreness at the injection site, bruising, flushing of the face or skin, and a temporary increase in your blood sugars and/or blood pressure. Infection is very rare but please notify my office [facility] for [address] [place] [phone]) if you develop any fevers, drainage from the injection site, or severe increase in pain. If it is the weekend, go to an Emergency Room.

## 2022-10-20 NOTE — TELEPHONE ENCOUNTER
This [employer] plan does not currently require a prior authorization for these services Ultrasound guided left carpal tunnel injection with corticosteroid cpt codes 38606, [id number], [id number] ID #:[id number]. Will inform Nursing.

## 2022-10-25 NOTE — PROCEDURES
Procedure:  Ultrasound guided LEFT carpal tunnel injection  The risks, benefits and anticipated outcomes of the procedure, the risks and benefits of the alternatives to the procedure, and the roles and tasks of the personnel to be involved, were discussed with the patient, and the patient consents to the procedure and agrees to proceed. UNIVERSAL PROTOCOL / SAFETY CHECKLIST  Sign in Communication: Completed  Time Out:  Team Confirms the Correct Patient, Correct Procedure, Correct Site and Site Marking, Correct Position (if applicable), Prep and Dry Time (if applicable). Affirmation of Time Out: YES  Sign Out Discussion: Completed if applicable    The procedure was carried out under sterile prep  with sterile gel. The target site was anesthetized with a topical ethyl chloride spray. Then, A  25ga 1.5in needle was introduced and advanced with ultrasound guidance from radial to ulnar direction using an in plane approach with the transducer in transverse orientation. Following negative aspiration, a mixture of 1cc of 1% lidocaine and 1cc of Kenalog (40mg/ml) was injected. Permanent pictures were taken and stored in the PACS system. Ultrasound interpretation was performed prior to the procedure to identify the target and any adjacent neurovascular structures, and during real-time needle guidance confirming placement. Post-intervention interpretation was also performed confirming appropriate injectate flow and hemostasis. The patient tolerated the procedure without complication and was instructed in post-procedure precautions. Please see patient instructions.     Heaven Gaston DO, FAAPMR & CAQSM  Physical Medicine and Rehabilitation/Sports Medicine  [facility]

## 2022-10-26 NOTE — TELEPHONE ENCOUNTER
Patient calling for refills. Amoxicillin patient uses when gums are tender.     Rossana Bragg on Fredy [address]ne

## 2022-10-27 RX ORDER — GABAPENTIN 100 MG/1
100 CAPSULE ORAL NIGHTLY
Qty: 90 CAPSULE | Refills: 3 | Status: SHIPPED | OUTPATIENT
Start: 2022-10-27

## 2022-10-27 RX ORDER — PANTOPRAZOLE SODIUM 40 MG/1
40 TABLET, DELAYED RELEASE ORAL
Qty: 90 TABLET | Refills: 3 | Status: SHIPPED | OUTPATIENT
Start: 2022-10-27

## 2022-10-27 NOTE — TELEPHONE ENCOUNTER
Refill request is for a maintenance medication and has met the criteria specified in the Ambulatory Medication Refill Standing Order for eligibility, visits, laboratory, alerts and was sent to the requested pharmacy - pantoprazole and gabapentin. Sandra Gomez, r/e pt's enalapril, her K level back in April was at 5.2, this was ordered by NP. Ok to refill enalapril? I also checked with pt about her amoxicillin request and she gets that from her cardiologist - she will contact them for refills, please disregard. Pt was reminded to do her labs as well.

## 2022-10-28 ENCOUNTER — LAB ENCOUNTER (OUTPATIENT)
Dept: LAB | Facility: HOSPITAL | Age: 87
End: 2022-10-28
Attending: INTERNAL MEDICINE
Payer: MEDICARE

## 2022-10-28 ENCOUNTER — TELEPHONE (OUTPATIENT)
Dept: INTERNAL MEDICINE CLINIC | Facility: CLINIC | Age: 87
End: 2022-10-28

## 2022-10-28 DIAGNOSIS — E11.40 TYPE 2 DIABETES MELLITUS WITH DIABETIC NEUROPATHY, WITHOUT LONG-TERM CURRENT USE OF INSULIN (HCC): ICD-10-CM

## 2022-10-28 DIAGNOSIS — E78.00 HYPERCHOLESTEROLEMIA: ICD-10-CM

## 2022-10-28 LAB
ALBUMIN SERPL-MCNC: 3.9 G/DL (ref 3.4–5)
ALBUMIN/GLOB SERPL: 1.1 {RATIO} (ref 1–2)
ALP LIVER SERPL-CCNC: 84 U/L
ALT SERPL-CCNC: 17 U/L
ANION GAP SERPL CALC-SCNC: 9 MMOL/L (ref 0–18)
AST SERPL-CCNC: 14 U/L (ref 15–37)
BASOPHILS # BLD AUTO: 0.03 X10(3) UL (ref 0–0.2)
BASOPHILS NFR BLD AUTO: 0.4 %
BILIRUB SERPL-MCNC: 0.6 MG/DL (ref 0.1–2)
BUN BLD-MCNC: 23 MG/DL (ref 7–18)
BUN/CREAT SERPL: 24.7 (ref 10–20)
CALCIUM BLD-MCNC: 9 MG/DL (ref 8.5–10.1)
CHLORIDE SERPL-SCNC: 100 MMOL/L (ref 98–112)
CHOLEST SERPL-MCNC: 125 MG/DL (ref ?–200)
CO2 SERPL-SCNC: 23 MMOL/L (ref 21–32)
CREAT BLD-MCNC: 0.93 MG/DL
DEPRECATED RDW RBC AUTO: 52.5 FL (ref 35.1–46.3)
EOSINOPHIL # BLD AUTO: 0.12 X10(3) UL (ref 0–0.7)
EOSINOPHIL NFR BLD AUTO: 1.4 %
ERYTHROCYTE [DISTWIDTH] IN BLOOD BY AUTOMATED COUNT: 15.4 % (ref 11–15)
EST. AVERAGE GLUCOSE BLD GHB EST-MCNC: 140 MG/DL (ref 68–126)
FASTING PATIENT LIPID ANSWER: YES
FASTING STATUS PATIENT QL REPORTED: YES
GFR SERPLBLD BASED ON 1.73 SQ M-ARVRAT: 58 ML/MIN/1.73M2 (ref 60–?)
GLOBULIN PLAS-MCNC: 3.6 G/DL (ref 2.8–4.4)
GLUCOSE BLD-MCNC: 111 MG/DL (ref 70–99)
HBA1C MFR BLD: 6.5 % (ref ?–5.7)
HCT VFR BLD AUTO: 36.1 %
HDLC SERPL-MCNC: 48 MG/DL (ref 40–59)
HGB BLD-MCNC: 11.6 G/DL
IMM GRANULOCYTES # BLD AUTO: 0.03 X10(3) UL (ref 0–1)
IMM GRANULOCYTES NFR BLD: 0.4 %
LDLC SERPL CALC-MCNC: 64 MG/DL (ref ?–100)
LYMPHOCYTES # BLD AUTO: 1.4 X10(3) UL (ref 1–4)
LYMPHOCYTES NFR BLD AUTO: 16.8 %
MCH RBC QN AUTO: 30.1 PG (ref 26–34)
MCHC RBC AUTO-ENTMCNC: 32.1 G/DL (ref 31–37)
MCV RBC AUTO: 93.5 FL
MONOCYTES # BLD AUTO: 0.61 X10(3) UL (ref 0.1–1)
MONOCYTES NFR BLD AUTO: 7.3 %
NEUTROPHILS # BLD AUTO: 6.14 X10 (3) UL (ref 1.5–7.7)
NEUTROPHILS # BLD AUTO: 6.14 X10(3) UL (ref 1.5–7.7)
NEUTROPHILS NFR BLD AUTO: 73.7 %
NONHDLC SERPL-MCNC: 77 MG/DL (ref ?–130)
OSMOLALITY SERPL CALC.SUM OF ELEC: 278 MOSM/KG (ref 275–295)
PLATELET # BLD AUTO: 314 10(3)UL (ref 150–450)
POTASSIUM SERPL-SCNC: 4.9 MMOL/L (ref 3.5–5.1)
PROT SERPL-MCNC: 7.5 G/DL (ref 6.4–8.2)
RBC # BLD AUTO: 3.86 X10(6)UL
SODIUM SERPL-SCNC: 132 MMOL/L (ref 136–145)
TRIGL SERPL-MCNC: 62 MG/DL (ref 30–149)
VLDLC SERPL CALC-MCNC: 9 MG/DL (ref 0–30)
WBC # BLD AUTO: 8.3 X10(3) UL (ref 4–11)

## 2022-10-28 PROCEDURE — 80061 LIPID PANEL: CPT

## 2022-10-28 PROCEDURE — 36415 COLL VENOUS BLD VENIPUNCTURE: CPT

## 2022-10-28 PROCEDURE — 85025 COMPLETE CBC W/AUTO DIFF WBC: CPT

## 2022-10-28 PROCEDURE — 80053 COMPREHEN METABOLIC PANEL: CPT

## 2022-10-28 PROCEDURE — 83036 HEMOGLOBIN GLYCOSYLATED A1C: CPT

## 2022-10-28 RX ORDER — ENALAPRIL MALEATE 5 MG/1
5 TABLET ORAL DAILY
Qty: 90 TABLET | Refills: 3 | Status: SHIPPED | OUTPATIENT
Start: 2022-10-28

## 2022-10-28 NOTE — TELEPHONE ENCOUNTER
To nursing, please tell patient by phone-lab results are okay. Thanks. Note to self-  10/28/22-CBC CMP lipid A1c-ok. A1c 6.5 LDL 64.

## 2022-11-03 ENCOUNTER — TELEPHONE (OUTPATIENT)
Dept: INTERNAL MEDICINE CLINIC | Facility: CLINIC | Age: 87
End: 2022-11-03

## 2022-11-03 ENCOUNTER — APPOINTMENT (OUTPATIENT)
Dept: GENERAL RADIOLOGY | Age: 87
End: 2022-11-03
Attending: EMERGENCY MEDICINE
Payer: MEDICARE

## 2022-11-03 ENCOUNTER — HOSPITAL ENCOUNTER (OUTPATIENT)
Age: 87
Discharge: HOME | End: 2022-11-03
Attending: EMERGENCY MEDICINE
Payer: MEDICARE

## 2022-11-03 VITALS
TEMPERATURE: 98 F | SYSTOLIC BLOOD PRESSURE: 179 MMHG | HEART RATE: 70 BPM | OXYGEN SATURATION: 99 % | DIASTOLIC BLOOD PRESSURE: 100 MMHG | RESPIRATION RATE: 18 BRPM

## 2022-11-03 DIAGNOSIS — S42.214A CLOSED NONDISPLACED FRACTURE OF SURGICAL NECK OF RIGHT HUMERUS, UNSPECIFIED FRACTURE MORPHOLOGY, INITIAL ENCOUNTER: ICD-10-CM
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
## 2022-11-03 PROCEDURE — 99213 OFFICE O/P EST LOW 20 MIN: CPT | Performed by: EMERGENCY MEDICINE

## 2022-11-03 PROCEDURE — 73080 X-RAY EXAM OF ELBOW: CPT | Performed by: EMERGENCY MEDICINE

## 2022-11-03 PROCEDURE — 73030 X-RAY EXAM OF SHOULDER: CPT | Performed by: EMERGENCY MEDICINE

## 2022-11-03 NOTE — TELEPHONE ENCOUNTER
Please call patient  She left voicemail message    She slid, against well, hit upper part of her shoulder. Patient called Dr Diego Hall office, couldn't see patient until next Thursday  It is painful, doesn't want to do harm by waiting too long  Pt does not want to go to ER if she doesn't have to  Can xray be ordered?   Tasked to nursing

## 2022-11-03 NOTE — TELEPHONE ENCOUNTER
I spoke with patient. She had a fall in her bathroom in the middle of the night a week ago. She uses a walker at times or a cane. She did have her walker at the time but her foot slipped on the tile floor. She did not want to fall forward. She hit her right shoulder and elbow. She slid down the wall. Her son helped her up. She has some pain still. She can't lift anything. She does not want to bend her elbow. There is a wait to see Dr. Palacio Comes and she is scheduled to see him next week. She thinks she needs x-rays. Feels like when she injured her deltoid. She is on blood thinners. Explained that she needs to be evaluated today. She is not sure she could do this. I recommended evaluation today at the urgent care or walk in clinic since she prefers to avoid the ER. I provided her with the addresses for [place], [facility] and [place]. She verbalized understanding. She will call her sons to see who can take her this afternoon. Explained that Dr. James Kidd will be back tomorrow morning. Invited her to call back with any questions or concerns. Nursing follow up tomorrow.

## 2022-11-04 ENCOUNTER — TELEPHONE (OUTPATIENT)
Dept: ORTHOPEDICS CLINIC | Facility: CLINIC | Age: 87
End: 2022-11-04

## 2022-11-04 NOTE — ED INITIAL ASSESSMENT (HPI)
Mechanical fall 5 days ago, c/o right elbow and shoulder pain. Pt struck against wall. Denies hitting head.  On eliquis

## 2022-11-04 NOTE — TELEPHONE ENCOUNTER
Patient was seen in urgent care for slight right shoulder fracture. Patient asking for sooner than December 5. Please call at [phone],[phone].

## 2022-11-04 NOTE — TELEPHONE ENCOUNTER
Spoke to patient. Explained we are booked up until December. Gave her the phone number of Suzanne perez.  She also has another ortho in mind also

## 2022-11-04 NOTE — TELEPHONE ENCOUNTER
I spoke with Andrew Leonard and relayed Dr. Horne Mood message. She verbalized understanding. She is doing pretty good today but she is being very careful. She will call over to Dr. Negro Childers office to see if she can get an appointment. She will call back to provide an update.

## 2022-11-04 NOTE — TELEPHONE ENCOUNTER
To nursing, tell pt I see the immediate care doctor found on xray what is suspicous for a fracture in her R humerus. Go ahead and see orthopedics as they recom. She could go to Dr Brittany Reynolds whom she saw in the past--or to Dr Arley Hurtado or associate as the immed care referred her. Thanks. Noted--was seen in [address] care 11/3/22--Dr Parson. Xray R elbow--no fx. Xray R humerus--advanced OA, findings suspicious for acute nondisplaced fx R humeral neck. Pt was referred to Ortho Dr Arley Hurtado.

## 2022-11-09 ENCOUNTER — TELEPHONE (OUTPATIENT)
Dept: INTERNAL MEDICINE CLINIC | Facility: CLINIC | Age: 87
End: 2022-11-09

## 2022-11-09 NOTE — TELEPHONE ENCOUNTER
Fax rec'd from [facility] regarding RX for:  Eliquis  Triamcinolone  Aspirin    \"Patient has prescriptions from another pharmacy that are not transferable to us  Because they have  or the refills have run out\"    Fax placed in green folder if needed    Tasked to [employer] No chief complaint on file.      PMD: Asif Esparza MD       History of Present Illness: Pt here today for follow up s/p CT placement for recurrent L pleural effusion s/p CABG.  He states that his SOB, FIGUEROA have significantly improved.  He does still have significant output however.  Denies fever, chills, or N/V/D.  States that he is urinating, ambulating, and defecating without difficulty.      ALLERGIES:   Allergen Reactions   • Aspirin ANAPHYLAXIS   • Penicillins HIVES     When pt was a small child        Outpatient Encounter Medications as of 10/11/2019   Medication Sig Dispense Refill   • furosemide (LASIX) 40 MG tablet Take 1 tablet by mouth 2 times daily. 30 tablet 0   • spironolactone (ALDACTONE) 25 MG tablet Take 0.5 tablets by mouth daily. 30 tablet 2   • carvedilol (COREG) 6.25 MG tablet Take 2.5 tablets by mouth 2 times daily (with meals). Dose was increased 8/30/19 450 tablet 1   • insulin glargine (LANTUS SOLOSTAR) 100 UNIT/ML pen-injector Inject 50 Units into the skin nightly.  75 mL 0   • aluminum hydrox-magnesium hydrox-simethicone 400-400-40 MG/5ML suspension Take 20 mLs by mouth as needed (indigestion).     • lisinopril (ZESTRIL) 2.5 MG tablet Take 1 tablet by mouth daily. Do not start before August 11, 2019. 30 tablet 2   • acetaminophen (TYLENOL) 325 MG tablet Take 2 tablets by mouth every 4 hours as needed for Pain. (Patient taking differently: Take 650 mg by mouth every 4 hours as needed for Pain. Pt is taking one 500mg tablet q 6-8 hours prn pain.) 120 tablet 0   • metformin (GLUCOPHAGE) 1000 MG tablet Take 1 tablet by mouth every 12 hours. 180 tablet 3   • fluticasone (FLONASE) 50 MCG/ACT nasal spray Spray 2 sprays in each nostril daily. 16 g 12   • albuterol 108 (90 Base) MCG/ACT inhaler Inhale 2 puffs into the lungs every 4 hours as needed for Shortness of Breath or Wheezing. 1 Inhaler 5   • clopidogrel (PLAVIX) 75 MG tablet Take 1 tablet by mouth daily. 90 tablet 3   • rivaroxaban  (XARELTO) 20 MG Tab Take 1 tablet by mouth daily (with dinner). 90 tablet 2   • atorvastatin (LIPITOR) 80 MG tablet Take 1 tablet by mouth daily. 90 tablet 3   • DISPENSE SOLOSTAR PEN NEEDLES   .00   CHECKS BLOOD SUGAR  DAILY 10 each 0   • Glucose Blood (ONE TOUCH ULTRA TEST) strip CHECK BLOOD SUGAR DAILY    .00 100 each 3   • DISPENSE SOFT TOUCH LANCETS    TESTS BLOOD SUGAR DAILY  .00 100 each 3   • Glucose Blood strip Patient to test once to twice daily  Dx 250.00 150 each 2     No facility-administered encounter medications on file as of 10/11/2019.        PAST MEDICAL HX:    Diabetes mellitus (CMS/[facility])                                   Essential (primary) hypertension                              RAD (reactive airway disease)                                 Pseudophakia of right eye                                     Cataract                                                      Basal cell carcinoma                                          Cerebral infarction (CMS/HCC)                   11/2018       ELKE on CPAP                                                   CAD (coronary artery disease)                   08/2019         Comment: s/p CABG x 2 vessels    Chronic combined systolic and diastolic HF (he*               Ischemic cardiomyopathy                                       High cholesterol                                              PAST SURGICAL HX:    OCCULT BLOOD TEST TUBE                          01/03/2012    CATARACT EXTRACTION W/ INTRAOCULAR LENS IMPLANT 08/21/2002      Comment: Dr Dillon    SKIN CANCER EXCISION                            03/01/2018      Comment: chest, back and neck    Social History     Socioeconomic History   • Marital status: /Civil Union     Spouse name: Not on file   • Number of children: Not on file   • Years of education: Not on file   • Highest education level: Not on file   Occupational History   • Not on file   Social Needs   • Financial resource  strain: Not on file   • Food insecurity:     Worry: Not on file     Inability: Not on file   • Transportation needs:     Medical: Not on file     Non-medical: Not on file   Tobacco Use   • Smoking status: Former Smoker     Packs/day: 0.00     Types: Cigars     Last attempt to quit: 1979     Years since quittin.8   • Smokeless tobacco: Never Used   • Tobacco comment: ages [age] smoked cigars   Substance and Sexual Activity   • Alcohol use: Yes     Alcohol/week: 0.0 standard drinks     Frequency: Monthly or less     Drinks per session: 1 or 2     Binge frequency: Never     Comment: rarely   • Drug use: Never   • Sexual activity: Not on file   Lifestyle   • Physical activity:     Days per week: Not on file     Minutes per session: Not on file   • Stress: Not on file   Relationships   • Social connections:     Talks on phone: Not on file     Gets together: Not on file     Attends [employer] service: Not on file     Active member of club or organization: Not on file     Attends meetings of clubs or organizations: Not on file     Relationship status: Not on file   • Intimate partner violence:     Fear of current or ex partner: Not on file     Emotionally abused: Not on file     Physically abused: Not on file     Forced sexual activity: Not on file   Other Topics Concern   • Not on file   Social History Narrative   • Not on file         Physical Exam:   There were no vitals taken for this visit.    Head is normocephalic.  Neck is supple.  No JVD, trachea is midline.  Heart: regular rate and rhythm without murmur, rub, or gallop  Lungs: L basilar crackles, but good air flow.   Chest nontender with normal contour. With intact median sternotomy incision. It is clean and dry with a good wound healing ridge developing at this time.  Abdomen is soft and non-tender +BS  Ext:  Wounds are C/D/I without SOI.  Good wound healing ridge is developing at this time.  No edema noted.      Current laboratory, and imaging studies were  reviewed with the patient.    Assessment and Plan:      Change out atrium today  Pt to fluid restrict to 1.5 L, and will continue diuretics as written now.   JOSÉ hose to BLE  Supplement magnesium BID.   F/U with Ismael Corado next week with Chem 7, and CXR.

## 2022-11-11 RX ORDER — ASPIRIN 81 MG/1
81 TABLET ORAL DAILY
Qty: 100 TABLET | Refills: 3 | Status: SHIPPED | OUTPATIENT
Start: 2022-11-11

## 2022-11-22 ENCOUNTER — TELEPHONE (OUTPATIENT)
Dept: INTERNAL MEDICINE CLINIC | Facility: CLINIC | Age: 87
End: 2022-11-22

## 2022-11-22 NOTE — TELEPHONE ENCOUNTER
Pt. Called stating Dr. Khushi Serna recommended a ENT for her but she has misplaced the name. Could we get that info again for her? She said when she puts a Q-tip in her ear, there is always a little blood on it. Pt. Can be reached at [phone].

## 2022-11-28 NOTE — TELEPHONE ENCOUNTER
Patient is calling back. Patient called Dr Lacho Howe office and they cannot see the patient until January. Patient is looking for more names for ENT physician.       # [phone]

## 2022-12-06 ENCOUNTER — OFFICE VISIT (OUTPATIENT)
Dept: OTOLARYNGOLOGY | Facility: CLINIC | Age: 87
End: 2022-12-06
Payer: COMMERCIAL

## 2022-12-06 VITALS — BODY MASS INDEX: 27.21 KG/M2 | WEIGHT: 135 LBS | HEIGHT: 59 IN

## 2022-12-06 DIAGNOSIS — H69.90 EUSTACHIAN TUBE DISORDER, UNSPECIFIED LATERALITY: Primary | ICD-10-CM

## 2022-12-06 PROCEDURE — 3008F BODY MASS INDEX DOCD: CPT | Performed by: STUDENT IN AN ORGANIZED HEALTH CARE EDUCATION/TRAINING PROGRAM

## 2022-12-06 PROCEDURE — 99213 OFFICE O/P EST LOW 20 MIN: CPT | Performed by: STUDENT IN AN ORGANIZED HEALTH CARE EDUCATION/TRAINING PROGRAM

## 2022-12-06 PROCEDURE — 69210 REMOVE IMPACTED EAR WAX UNI: CPT | Performed by: STUDENT IN AN ORGANIZED HEALTH CARE EDUCATION/TRAINING PROGRAM

## 2022-12-06 RX ORDER — FLUTICASONE PROPIONATE 50 MCG
2 SPRAY, SUSPENSION (ML) NASAL 2 TIMES DAILY
Qty: 16 G | Refills: 3 | Status: SHIPPED | OUTPATIENT
Start: 2022-12-06

## 2022-12-06 RX ORDER — LORATADINE 10 MG/1
10 TABLET ORAL DAILY
Qty: 30 TABLET | Refills: 3 | Status: SHIPPED | OUTPATIENT
Start: 2022-12-06

## 2022-12-07 ENCOUNTER — OFFICE VISIT (OUTPATIENT)
Dept: INTERNAL MEDICINE CLINIC | Facility: CLINIC | Age: 87
End: 2022-12-07
Payer: COMMERCIAL

## 2022-12-07 VITALS
OXYGEN SATURATION: 99 % | HEART RATE: 77 BPM | WEIGHT: 131 LBS | DIASTOLIC BLOOD PRESSURE: 58 MMHG | BODY MASS INDEX: 26.41 KG/M2 | TEMPERATURE: 98 F | HEIGHT: 59 IN | SYSTOLIC BLOOD PRESSURE: 146 MMHG

## 2022-12-07 DIAGNOSIS — E78.00 HYPERCHOLESTEROLEMIA: ICD-10-CM

## 2022-12-07 DIAGNOSIS — J44.9 ASTHMA WITH COPD (CHRONIC OBSTRUCTIVE PULMONARY DISEASE) (HCC): ICD-10-CM

## 2022-12-07 DIAGNOSIS — I10 HYPERTENSION, ESSENTIAL: ICD-10-CM

## 2022-12-07 DIAGNOSIS — J31.0 CHRONIC RHINITIS: Primary | ICD-10-CM

## 2022-12-07 DIAGNOSIS — K64.9 HEMORRHOIDS, UNSPECIFIED HEMORRHOID TYPE: ICD-10-CM

## 2022-12-07 DIAGNOSIS — E11.40 TYPE 2 DIABETES MELLITUS WITH DIABETIC NEUROPATHY, WITHOUT LONG-TERM CURRENT USE OF INSULIN (HCC): ICD-10-CM

## 2022-12-07 PROCEDURE — 3008F BODY MASS INDEX DOCD: CPT | Performed by: INTERNAL MEDICINE

## 2022-12-07 PROCEDURE — 99214 OFFICE O/P EST MOD 30 MIN: CPT | Performed by: INTERNAL MEDICINE

## 2022-12-07 PROCEDURE — 90662 IIV NO PRSV INCREASED AG IM: CPT | Performed by: INTERNAL MEDICINE

## 2022-12-07 PROCEDURE — G0008 ADMIN INFLUENZA VIRUS VAC: HCPCS | Performed by: INTERNAL MEDICINE

## 2022-12-07 PROCEDURE — 1126F AMNT PAIN NOTED NONE PRSNT: CPT | Performed by: INTERNAL MEDICINE

## 2022-12-07 PROCEDURE — 3077F SYST BP >= 140 MM HG: CPT | Performed by: INTERNAL MEDICINE

## 2022-12-07 PROCEDURE — 3078F DIAST BP <80 MM HG: CPT | Performed by: INTERNAL MEDICINE

## 2022-12-07 RX ORDER — PROMETHAZINE HYDROCHLORIDE AND CODEINE PHOSPHATE 6.25; 1 MG/5ML; MG/5ML
2.5 SOLUTION ORAL EVERY 6 HOURS PRN
Qty: 180 ML | Refills: 1 | Status: SHIPPED | OUTPATIENT
Start: 2022-12-07

## 2022-12-07 RX ORDER — TRIAMCINOLONE ACETONIDE 1 MG/G
1 CREAM TOPICAL 2 TIMES DAILY PRN
Qty: 30 G | Refills: 1 | Status: SHIPPED | OUTPATIENT
Start: 2022-12-07

## 2022-12-21 ENCOUNTER — OFFICE VISIT (OUTPATIENT)
Dept: OTOLARYNGOLOGY | Facility: CLINIC | Age: 87
End: 2022-12-21
Payer: COMMERCIAL

## 2022-12-21 VITALS — BODY MASS INDEX: 26 KG/M2 | WEIGHT: 131 LBS

## 2022-12-21 DIAGNOSIS — H90.3 SENSORINEURAL HEARING LOSS (SNHL) OF BOTH EARS: Primary | ICD-10-CM

## 2023-01-25 ENCOUNTER — OFFICE VISIT (OUTPATIENT)
Dept: PHYSICAL MEDICINE AND REHAB | Facility: CLINIC | Age: 88
End: 2023-01-25
Payer: COMMERCIAL

## 2023-01-25 ENCOUNTER — TELEPHONE (OUTPATIENT)
Dept: PHYSICAL MEDICINE AND REHAB | Facility: CLINIC | Age: 88
End: 2023-01-25

## 2023-01-25 VITALS — HEART RATE: 56 BPM | OXYGEN SATURATION: 100 % | HEIGHT: 59 IN | WEIGHT: 131 LBS | BODY MASS INDEX: 26.41 KG/M2

## 2023-01-25 DIAGNOSIS — I10 HYPERTENSION, ESSENTIAL: ICD-10-CM

## 2023-01-25 DIAGNOSIS — G56.03 CARPAL TUNNEL SYNDROME, BILATERAL: Primary | ICD-10-CM

## 2023-01-25 DIAGNOSIS — N18.30 CKD STAGE 3 DUE TO TYPE 2 DIABETES MELLITUS (HCC): ICD-10-CM

## 2023-01-25 DIAGNOSIS — E11.22 CKD STAGE 3 DUE TO TYPE 2 DIABETES MELLITUS (HCC): ICD-10-CM

## 2023-01-25 DIAGNOSIS — I25.10 CORONARY ARTERY DISEASE INVOLVING NATIVE CORONARY ARTERY OF NATIVE HEART WITHOUT ANGINA PECTORIS: ICD-10-CM

## 2023-01-25 DIAGNOSIS — M19.011 OSTEOARTHRITIS OF RIGHT GLENOHUMERAL JOINT: ICD-10-CM

## 2023-01-25 PROCEDURE — 20526 THER INJECTION CARP TUNNEL: CPT | Performed by: PHYSICAL MEDICINE & REHABILITATION

## 2023-01-25 PROCEDURE — 76942 ECHO GUIDE FOR BIOPSY: CPT | Performed by: PHYSICAL MEDICINE & REHABILITATION

## 2023-01-25 PROCEDURE — 99214 OFFICE O/P EST MOD 30 MIN: CPT | Performed by: PHYSICAL MEDICINE & REHABILITATION

## 2023-01-25 PROCEDURE — 1125F AMNT PAIN NOTED PAIN PRSNT: CPT | Performed by: PHYSICAL MEDICINE & REHABILITATION

## 2023-01-25 PROCEDURE — 3008F BODY MASS INDEX DOCD: CPT | Performed by: PHYSICAL MEDICINE & REHABILITATION

## 2023-01-25 RX ORDER — TRIAMCINOLONE ACETONIDE 40 MG/ML
40 INJECTION, SUSPENSION INTRA-ARTICULAR; INTRAMUSCULAR ONCE
Status: COMPLETED | OUTPATIENT
Start: 2023-01-25 | End: 2023-01-25

## 2023-01-25 RX ORDER — LIDOCAINE HYDROCHLORIDE 10 MG/ML
3 INJECTION, SOLUTION INFILTRATION; PERINEURAL ONCE
Status: COMPLETED | OUTPATIENT
Start: 2023-01-25 | End: 2023-01-25

## 2023-01-25 RX ADMIN — LIDOCAINE HYDROCHLORIDE 3 ML: 10 INJECTION, SOLUTION INFILTRATION; PERINEURAL at 15:13:00

## 2023-01-25 RX ADMIN — TRIAMCINOLONE ACETONIDE 40 MG: 40 INJECTION, SUSPENSION INTRA-ARTICULAR; INTRAMUSCULAR at 15:14:00

## 2023-01-25 NOTE — PATIENT INSTRUCTIONS
Steroid Injection Information  What to expect: The injection contains Lidocaine (which numbs the area) and Kenalog (a steroid which decreases inflammation). You may have pain relief within hours of the injection due to the Lidocaine. The Kenalog can take a couple days, up to a couple weeks, to reach the full effect. It is also possible to have a slight increase in symptoms over the first few days, but that should resolve fairly quickly. How long will the injection last?: The length of response to an injection is variable. Literally a couple weeks to a couple years. The injection will decrease the inflammation and the pain will return if/when the inflammation returns. Activity Recommendations: For the first 24 hours after injection, keep the area clean and dry. It is ok to shower but don't soak in a tub during that time. No vigorous activity such as running or heavy lifting for the first week but other than that you can gradually resume your normal activities immediately. If you have a significant decrease in pain, be careful not to do too much too soon. Again, the key is GRADUAL resumption of activites. Things to look out for: Common injection side effects include soreness at the injection site, bruising, flushing of the face or skin, and a temporary increase in your blood sugars and/or blood pressure. Infection is very rare but please notify my office [facility] for [address] [place] [phone]) if you develop any fevers, drainage from the injection site, or severe increase in pain. If it is the weekend, go to an Emergency Room.       3 months in office

## 2023-01-25 NOTE — TELEPHONE ENCOUNTER
Initiated authorization for Ultrasound guided bilateral carpal tunnel injection with corticosteroid CPT [id number] with [facility]  Status: Approved valid 1/25/23-4/25/23    Notification or Prior Authorization is not required for the requested services    This [facility] does not currently require a prior authorization for these services. If you have general questions about the prior authorization requirements, please call us at [phone] or visit [employer] > Clinician Resources > Advance and Admission Notification Requirements. The number above acknowledges your notification. Please write this number down for future reference. Notification is not a guarantee of coverage or payment.     Decision ID #:[id number]

## 2023-01-26 NOTE — PROCEDURES
Procedure:  Ultrasound guided BILATERAL carpal tunnel injection  The risks, benefits and anticipated outcomes of the procedure, the risks and benefits of the alternatives to the procedure, and the roles and tasks of the personnel to be involved, were discussed with the patient, and the patient consents to the procedure and agrees to proceed. UNIVERSAL PROTOCOL / SAFETY CHECKLIST  Sign in Communication: Completed  Time Out:  Team Confirms the Correct Patient, Correct Procedure, Correct Site and Site Marking, Correct Position (if applicable), Prep and Dry Time (if applicable). Affirmation of Time Out: YES  Sign Out Discussion: Completed if applicable    The procedure was carried out under sterile prep  with sterile gel. The target site was anesthetized with a topical ethyl chloride spray. Then, A  25ga 1.5in needle was introduced and advanced with ultrasound guidance from radial to ulnar direction using an in plane approach with the transducer in transverse orientation. Following negative aspiration, a mixture of 1cc of 1% lidocaine and 1cc of Kenalog (40mg/ml) was injected. Permanent pictures were taken and stored in the PACS system. Ultrasound interpretation was performed prior to the procedure to identify the target and any adjacent neurovascular structures, and during real-time needle guidance confirming placement. Post-intervention interpretation was also performed confirming appropriate injectate flow and hemostasis. The patient tolerated the procedure without complication and was instructed in post-procedure precautions. Please see patient instructions.     Justin Can DO, FAAPMR & CAQSM  Physical Medicine and Rehabilitation/Sports Medicine  MEDICAL CENTER [facility]

## 2023-01-27 NOTE — TELEPHONE ENCOUNTER
Pt called needs Rx for Atorvastatin 10 mg sent to [facility]

## 2023-01-29 RX ORDER — ATORVASTATIN CALCIUM 10 MG/1
10 TABLET, FILM COATED ORAL NIGHTLY
Qty: 90 TABLET | Refills: 3 | Status: SHIPPED | OUTPATIENT
Start: 2023-01-29

## 2023-02-02 ENCOUNTER — OFFICE VISIT (OUTPATIENT)
Dept: AUDIOLOGY | Facility: CLINIC | Age: 88
End: 2023-02-02
Payer: COMMERCIAL

## 2023-02-02 DIAGNOSIS — H90.6 MIXED CONDUCTIVE AND SENSORINEURAL HEARING LOSS OF BOTH EARS: Primary | ICD-10-CM

## 2023-02-02 PROCEDURE — 92567 TYMPANOMETRY: CPT | Performed by: AUDIOLOGIST

## 2023-02-02 PROCEDURE — 92557 COMPREHENSIVE HEARING TEST: CPT | Performed by: AUDIOLOGIST

## 2023-02-26 ENCOUNTER — TELEPHONE (OUTPATIENT)
Dept: INTERNAL MEDICINE CLINIC | Facility: CLINIC | Age: 88
End: 2023-02-26

## 2023-02-26 NOTE — TELEPHONE ENCOUNTER
On call telephone call from pt with a \"pharm Issue\". I called pt back and left a message for pt to call back as there was no answer. I will forward this message to Dr Gale Saab as an Young Yuan and to nursing to call pt to see what her problem was/is.

## 2023-02-27 ENCOUNTER — OFFICE VISIT (OUTPATIENT)
Dept: OTOLARYNGOLOGY | Facility: CLINIC | Age: 88
End: 2023-02-27

## 2023-02-27 DIAGNOSIS — H91.90 HEARING LOSS, UNSPECIFIED HEARING LOSS TYPE, UNSPECIFIED LATERALITY: Primary | ICD-10-CM

## 2023-02-27 DIAGNOSIS — H90.6 MIXED CONDUCTIVE AND SENSORINEURAL HEARING LOSS OF BOTH EARS: ICD-10-CM

## 2023-02-27 RX ORDER — LACTULOSE 10 G/15ML
SOLUTION ORAL
Qty: 237 ML | Refills: 0 | OUTPATIENT
Start: 2023-02-27

## 2023-02-27 RX ORDER — TOBRAMYCIN AND DEXAMETHASONE 3; 1 MG/ML; MG/ML
SUSPENSION/ DROPS OPHTHALMIC
Qty: 10 ML | Refills: 0 | Status: SHIPPED | OUTPATIENT
Start: 2023-02-27

## 2023-02-27 RX ORDER — LACTULOSE 10 G/15ML
30 SOLUTION ORAL DAILY PRN
Qty: 237 ML | Refills: 3 | Status: SHIPPED | OUTPATIENT
Start: 2023-02-27

## 2023-03-07 ENCOUNTER — OFFICE VISIT (OUTPATIENT)
Dept: INTERNAL MEDICINE CLINIC | Facility: CLINIC | Age: 88
End: 2023-03-07

## 2023-03-07 VITALS
TEMPERATURE: 99 F | SYSTOLIC BLOOD PRESSURE: 130 MMHG | DIASTOLIC BLOOD PRESSURE: 70 MMHG | HEIGHT: 59 IN | WEIGHT: 129 LBS | BODY MASS INDEX: 26 KG/M2 | HEART RATE: 72 BPM

## 2023-03-07 DIAGNOSIS — E78.00 HYPERCHOLESTEROLEMIA: ICD-10-CM

## 2023-03-07 DIAGNOSIS — I10 HYPERTENSION, ESSENTIAL: ICD-10-CM

## 2023-03-07 DIAGNOSIS — J31.0 CHRONIC RHINITIS: Primary | ICD-10-CM

## 2023-03-07 DIAGNOSIS — E11.40 TYPE 2 DIABETES MELLITUS WITH DIABETIC NEUROPATHY, WITHOUT LONG-TERM CURRENT USE OF INSULIN (HCC): ICD-10-CM

## 2023-03-07 DIAGNOSIS — J44.9 ASTHMA WITH COPD (CHRONIC OBSTRUCTIVE PULMONARY DISEASE) (HCC): ICD-10-CM

## 2023-03-07 PROCEDURE — 99214 OFFICE O/P EST MOD 30 MIN: CPT | Performed by: INTERNAL MEDICINE

## 2023-03-07 PROCEDURE — 3078F DIAST BP <80 MM HG: CPT | Performed by: INTERNAL MEDICINE

## 2023-03-07 PROCEDURE — 3075F SYST BP GE 130 - 139MM HG: CPT | Performed by: INTERNAL MEDICINE

## 2023-03-07 PROCEDURE — 3008F BODY MASS INDEX DOCD: CPT | Performed by: INTERNAL MEDICINE

## 2023-03-07 RX ORDER — PROMETHAZINE HYDROCHLORIDE AND CODEINE PHOSPHATE 6.25; 1 MG/5ML; MG/5ML
2.5 SOLUTION ORAL EVERY 6 HOURS PRN
Qty: 180 ML | Refills: 2 | Status: SHIPPED | OUTPATIENT
Start: 2023-03-07

## 2023-03-14 ENCOUNTER — HOSPITAL ENCOUNTER (OUTPATIENT)
Dept: CT IMAGING | Facility: HOSPITAL | Age: 88
Discharge: HOME | End: 2023-03-14
Attending: STUDENT IN AN ORGANIZED HEALTH CARE EDUCATION/TRAINING PROGRAM
Payer: MEDICARE

## 2023-03-14 DIAGNOSIS — H90.6 MIXED CONDUCTIVE AND SENSORINEURAL HEARING LOSS OF BOTH EARS: ICD-10-CM

## 2023-03-14 PROCEDURE — 70480 CT ORBIT/EAR/FOSSA W/O DYE: CPT | Performed by: STUDENT IN AN ORGANIZED HEALTH CARE EDUCATION/TRAINING PROGRAM

## 2023-03-15 ENCOUNTER — OFFICE VISIT (OUTPATIENT)
Dept: OTOLARYNGOLOGY | Facility: CLINIC | Age: 88
End: 2023-03-15

## 2023-03-15 DIAGNOSIS — J32.2 CHRONIC ETHMOIDAL SINUSITIS: ICD-10-CM

## 2023-03-15 DIAGNOSIS — J01.01 ACUTE RECURRENT MAXILLARY SINUSITIS: ICD-10-CM

## 2023-03-15 DIAGNOSIS — H70.93 MASTOIDITIS OF BOTH SIDES: ICD-10-CM

## 2023-03-15 DIAGNOSIS — H71.22 CHOLESTEATOMA OF MASTOID CAVITY, LEFT: ICD-10-CM

## 2023-03-15 DIAGNOSIS — H72.813: ICD-10-CM

## 2023-03-15 DIAGNOSIS — H91.90 HEARING LOSS, UNSPECIFIED HEARING LOSS TYPE, UNSPECIFIED LATERALITY: Primary | ICD-10-CM

## 2023-03-15 PROCEDURE — 99214 OFFICE O/P EST MOD 30 MIN: CPT | Performed by: SPECIALIST

## 2023-03-15 RX ORDER — AZITHROMYCIN 250 MG/1
TABLET, FILM COATED ORAL
Qty: 6 TABLET | Refills: 0 | Status: SHIPPED | OUTPATIENT
Start: 2023-03-15

## 2023-03-15 RX ORDER — OFLOXACIN 3 MG/ML
10 SOLUTION AURICULAR (OTIC) NIGHTLY
Qty: 10 ML | Refills: 0 | Status: SHIPPED | OUTPATIENT
Start: 2023-03-15 | End: 2023-03-25

## 2023-03-15 NOTE — PATIENT INSTRUCTIONS
Multiple issues including axillary and ethmoid sinusitis. Chronic otomastoiditis bilaterally. And bilateral perforations. There appears to be bone protruding through the left tympanic membrane. I placed you on a Z-Cornelio and Floxin drops. I took a culture from the left middle meatus. I will call you with the results of the culture.

## 2023-03-16 ENCOUNTER — TELEPHONE (OUTPATIENT)
Dept: OTOLARYNGOLOGY | Facility: CLINIC | Age: 88
End: 2023-03-16

## 2023-03-16 NOTE — PROGRESS NOTES
Message for patient. Growing E. coli. Likely this is the reason we are not getting it cleared up. We will call him once we get sensitivities. May need to change antibiotics and drops.

## 2023-03-17 ENCOUNTER — HOSPITAL ENCOUNTER (EMERGENCY)
Facility: HOSPITAL | Age: 88
Discharge: HOME | End: 2023-03-18
Attending: EMERGENCY MEDICINE
Payer: MEDICARE

## 2023-03-17 ENCOUNTER — APPOINTMENT (OUTPATIENT)
Dept: CT IMAGING | Facility: HOSPITAL | Age: 88
End: 2023-03-17
Attending: EMERGENCY MEDICINE
Payer: MEDICARE

## 2023-03-17 ENCOUNTER — TELEPHONE (OUTPATIENT)
Dept: OTOLARYNGOLOGY | Facility: CLINIC | Age: 88
End: 2023-03-17

## 2023-03-17 DIAGNOSIS — R55 SYNCOPE AND COLLAPSE: ICD-10-CM

## 2023-03-17 DIAGNOSIS — N30.00 ACUTE CYSTITIS WITHOUT HEMATURIA: Primary | ICD-10-CM

## 2023-03-17 LAB
ANION GAP SERPL CALC-SCNC: 9 MMOL/L (ref 0–18)
BASOPHILS # BLD AUTO: 0.03 X10(3) UL (ref 0–0.2)
BASOPHILS NFR BLD AUTO: 0.4 %
BILIRUB UR QL: NEGATIVE
BUN BLD-MCNC: 20 MG/DL (ref 7–18)
BUN/CREAT SERPL: 17.4 (ref 10–20)
CALCIUM BLD-MCNC: 9.6 MG/DL (ref 8.5–10.1)
CHLORIDE SERPL-SCNC: 102 MMOL/L (ref 98–112)
CLARITY UR: CLEAR
CO2 SERPL-SCNC: 25 MMOL/L (ref 21–32)
COLOR UR: YELLOW
CREAT BLD-MCNC: 1.15 MG/DL
DEPRECATED RDW RBC AUTO: 51.3 FL (ref 35.1–46.3)
EOSINOPHIL # BLD AUTO: 0.08 X10(3) UL (ref 0–0.7)
EOSINOPHIL NFR BLD AUTO: 1.1 %
ERYTHROCYTE [DISTWIDTH] IN BLOOD BY AUTOMATED COUNT: 14.8 % (ref 11–15)
FLUAV + FLUBV RNA SPEC NAA+PROBE: NEGATIVE
FLUAV + FLUBV RNA SPEC NAA+PROBE: NEGATIVE
GFR SERPLBLD BASED ON 1.73 SQ M-ARVRAT: 45 ML/MIN/1.73M2 (ref 60–?)
GLUCOSE BLD-MCNC: 162 MG/DL (ref 70–99)
GLUCOSE BLDC GLUCOMTR-MCNC: 167 MG/DL (ref 70–99)
GLUCOSE UR-MCNC: NEGATIVE MG/DL
HCT VFR BLD AUTO: 35.8 %
HGB BLD-MCNC: 11.8 G/DL
HGB UR QL STRIP.AUTO: NEGATIVE
HYALINE CASTS #/AREA URNS AUTO: PRESENT /LPF
IMM GRANULOCYTES # BLD AUTO: 0.03 X10(3) UL (ref 0–1)
IMM GRANULOCYTES NFR BLD: 0.4 %
KETONES UR-MCNC: NEGATIVE MG/DL
LYMPHOCYTES # BLD AUTO: 1.76 X10(3) UL (ref 1–4)
LYMPHOCYTES NFR BLD AUTO: 25 %
MCH RBC QN AUTO: 31 PG (ref 26–34)
MCHC RBC AUTO-ENTMCNC: 33 G/DL (ref 31–37)
MCV RBC AUTO: 94 FL
MONOCYTES # BLD AUTO: 0.39 X10(3) UL (ref 0.1–1)
MONOCYTES NFR BLD AUTO: 5.5 %
NEUTROPHILS # BLD AUTO: 4.76 X10 (3) UL (ref 1.5–7.7)
NEUTROPHILS # BLD AUTO: 4.76 X10(3) UL (ref 1.5–7.7)
NEUTROPHILS NFR BLD AUTO: 67.6 %
NITRITE UR QL STRIP.AUTO: POSITIVE
OSMOLALITY SERPL CALC.SUM OF ELEC: 288 MOSM/KG (ref 275–295)
PH UR: 6 [PH] (ref 5–8)
PLATELET # BLD AUTO: 436 10(3)UL (ref 150–450)
POTASSIUM SERPL-SCNC: 4.4 MMOL/L (ref 3.5–5.1)
PROT UR-MCNC: NEGATIVE MG/DL
RBC # BLD AUTO: 3.81 X10(6)UL
RSV RNA SPEC NAA+PROBE: NEGATIVE
SARS-COV-2 RNA RESP QL NAA+PROBE: NOT DETECTED
SODIUM SERPL-SCNC: 136 MMOL/L (ref 136–145)
SP GR UR STRIP: 1.01 (ref 1–1.03)
TROPONIN I HIGH SENSITIVITY: 10 NG/L
UROBILINOGEN UR STRIP-ACNC: <2
VIT C UR-MCNC: NEGATIVE MG/DL
WBC # BLD AUTO: 7.1 X10(3) UL (ref 4–11)
WBC CLUMPS UR QL AUTO: PRESENT /HPF

## 2023-03-17 PROCEDURE — 80048 BASIC METABOLIC PNL TOTAL CA: CPT | Performed by: EMERGENCY MEDICINE

## 2023-03-17 PROCEDURE — 70450 CT HEAD/BRAIN W/O DYE: CPT | Performed by: EMERGENCY MEDICINE

## 2023-03-17 PROCEDURE — 99285 EMERGENCY DEPT VISIT HI MDM: CPT

## 2023-03-17 PROCEDURE — 96365 THER/PROPH/DIAG IV INF INIT: CPT

## 2023-03-17 PROCEDURE — 84484 ASSAY OF TROPONIN QUANT: CPT | Performed by: EMERGENCY MEDICINE

## 2023-03-17 PROCEDURE — 87088 URINE BACTERIA CULTURE: CPT | Performed by: EMERGENCY MEDICINE

## 2023-03-17 PROCEDURE — 87186 SC STD MICRODIL/AGAR DIL: CPT | Performed by: EMERGENCY MEDICINE

## 2023-03-17 PROCEDURE — 0241U SARS-COV-2/FLU A AND B/RSV BY PCR (GENEXPERT): CPT | Performed by: EMERGENCY MEDICINE

## 2023-03-17 PROCEDURE — 85025 COMPLETE CBC W/AUTO DIFF WBC: CPT | Performed by: EMERGENCY MEDICINE

## 2023-03-17 PROCEDURE — 82962 GLUCOSE BLOOD TEST: CPT

## 2023-03-17 PROCEDURE — 93010 ELECTROCARDIOGRAM REPORT: CPT

## 2023-03-17 PROCEDURE — 87086 URINE CULTURE/COLONY COUNT: CPT | Performed by: EMERGENCY MEDICINE

## 2023-03-17 PROCEDURE — 93005 ELECTROCARDIOGRAM TRACING: CPT

## 2023-03-17 PROCEDURE — 81001 URINALYSIS AUTO W/SCOPE: CPT | Performed by: EMERGENCY MEDICINE

## 2023-03-17 RX ORDER — AMOXICILLIN 875 MG/1
875 TABLET, COATED ORAL 2 TIMES DAILY
Qty: 28 TABLET | Refills: 0 | Status: SHIPPED | OUTPATIENT
Start: 2023-03-17

## 2023-03-17 RX ORDER — MELATONIN
Qty: 180 TABLET | Refills: 3 | Status: SHIPPED | OUTPATIENT
Start: 2023-03-17

## 2023-03-17 RX ORDER — CEPHALEXIN 500 MG/1
500 CAPSULE ORAL 2 TIMES DAILY
Qty: 14 CAPSULE | Refills: 0 | Status: SHIPPED | OUTPATIENT
Start: 2023-03-17 | End: 2023-03-24

## 2023-03-18 VITALS
TEMPERATURE: 98 F | BODY MASS INDEX: 26 KG/M2 | OXYGEN SATURATION: 98 % | SYSTOLIC BLOOD PRESSURE: 144 MMHG | RESPIRATION RATE: 20 BRPM | HEART RATE: 67 BPM | DIASTOLIC BLOOD PRESSURE: 59 MMHG | WEIGHT: 130 LBS

## 2023-03-18 LAB
ATRIAL RATE: 64 BPM
P AXIS: 54 DEGREES
P-R INTERVAL: 176 MS
Q-T INTERVAL: 404 MS
QRS DURATION: 76 MS
QTC CALCULATION (BEZET): 416 MS
R AXIS: 12 DEGREES
T AXIS: 42 DEGREES
VENTRICULAR RATE: 64 BPM

## 2023-03-18 NOTE — ED QUICK NOTES
Patient A&OX4, denies SOB/CP/Dizziness. No pain. Discharge instructions given. All questions answered. Patient ambulatory home with family.

## 2023-03-18 NOTE — ED INITIAL ASSESSMENT (HPI)
Pt came into the ED after son found her in her chair slumped over unresponsive, EMS had to sternal rub her a couple times to get a response and was able to answer questions after a couple minutes and was A&Ox3. . Pt had no complaints to EMS. Per EMS vitals WNL.

## 2023-03-20 ENCOUNTER — TELEPHONE (OUTPATIENT)
Dept: OTOLARYNGOLOGY | Facility: CLINIC | Age: 88
End: 2023-03-20

## 2023-03-20 NOTE — TELEPHONE ENCOUNTER
Per pt ears states she still hears \"scratchy noise\" in ears. per pt did start new antibiotic as advised. Per pt no pain, itching or drainage from ears. Per pt will be following up with outside audiologist, to ensure hearing aids are working. Steffan Brunner, should pt return to clinic for re evaluation.

## 2023-03-20 NOTE — TELEPHONE ENCOUNTER
Per pt continues to have hearing loss, does not feel ear drops are helping, requesting to speak to RN. Please call thank you.

## 2023-03-23 NOTE — TELEPHONE ENCOUNTER
Spoke to patient's son, Jen Matias, informed him per Dr. Africa Waters, to schedule an appointment after she has completed her antibiotics. Appointment scheduled. Son understanding.

## 2023-04-06 RX ORDER — TRIAMCINOLONE ACETONIDE 1 MG/G
1 CREAM TOPICAL 2 TIMES DAILY PRN
Qty: 15 G | Refills: 1 | Status: SHIPPED | OUTPATIENT
Start: 2023-04-06

## 2023-04-10 ENCOUNTER — OFFICE VISIT (OUTPATIENT)
Dept: OTOLARYNGOLOGY | Facility: CLINIC | Age: 88
End: 2023-04-10

## 2023-04-10 DIAGNOSIS — H92.12 CHRONIC OTORRHEA OF LEFT EAR: ICD-10-CM

## 2023-04-10 DIAGNOSIS — H72.92 PERFORATION OF LEFT TYMPANIC MEMBRANE: ICD-10-CM

## 2023-04-10 DIAGNOSIS — H90.6 MIXED CONDUCTIVE AND SENSORINEURAL HEARING LOSS OF BOTH EARS: Primary | ICD-10-CM

## 2023-04-10 DIAGNOSIS — H70.93 MASTOIDITIS OF BOTH SIDES: ICD-10-CM

## 2023-04-10 PROCEDURE — 99214 OFFICE O/P EST MOD 30 MIN: CPT | Performed by: SPECIALIST

## 2023-04-10 NOTE — PATIENT INSTRUCTIONS
Reviewed your audiogram done in February. There is no further otorrhea on the left ear. I have asked you to make an appointment with Dr. Ceferino Bey to see whether you would be a surgical candidate or not. I have texted her your clinical information. Please notify me if you cannot get an appointment.

## 2023-04-12 ENCOUNTER — TELEPHONE (OUTPATIENT)
Dept: OTOLARYNGOLOGY | Facility: CLINIC | Age: 88
End: 2023-04-12

## 2023-04-12 NOTE — TELEPHONE ENCOUNTER
I reached out to Dr. Angel Pitts. She will be taking 2 months off prior to starting her practice on July 5. As this is the case, if the patient wanted surgery sooner she would need to see one of her other associates.

## 2023-04-14 ENCOUNTER — OFFICE VISIT (OUTPATIENT)
Dept: INTERNAL MEDICINE CLINIC | Facility: CLINIC | Age: 88
End: 2023-04-14

## 2023-04-14 ENCOUNTER — TELEPHONE (OUTPATIENT)
Dept: INTERNAL MEDICINE CLINIC | Facility: CLINIC | Age: 88
End: 2023-04-14

## 2023-04-14 VITALS
SYSTOLIC BLOOD PRESSURE: 122 MMHG | HEART RATE: 71 BPM | DIASTOLIC BLOOD PRESSURE: 40 MMHG | OXYGEN SATURATION: 97 % | WEIGHT: 130 LBS | BODY MASS INDEX: 26.21 KG/M2 | HEIGHT: 59 IN | TEMPERATURE: 98 F

## 2023-04-14 DIAGNOSIS — J44.9 ASTHMA WITH COPD (CHRONIC OBSTRUCTIVE PULMONARY DISEASE) (HCC): ICD-10-CM

## 2023-04-14 DIAGNOSIS — I10 HYPERTENSION, ESSENTIAL: Primary | ICD-10-CM

## 2023-04-14 DIAGNOSIS — I48.0 PAF (PAROXYSMAL ATRIAL FIBRILLATION) (HCC): ICD-10-CM

## 2023-04-14 DIAGNOSIS — H70.93 MASTOIDITIS OF BOTH SIDES: ICD-10-CM

## 2023-04-14 DIAGNOSIS — H90.6 MIXED CONDUCTIVE AND SENSORINEURAL HEARING LOSS OF BOTH EARS: ICD-10-CM

## 2023-04-14 DIAGNOSIS — H92.12 CHRONIC OTORRHEA OF LEFT EAR: ICD-10-CM

## 2023-04-14 DIAGNOSIS — H72.92 PERFORATION OF LEFT TYMPANIC MEMBRANE: ICD-10-CM

## 2023-04-14 DIAGNOSIS — Z95.5 STENTED CORONARY ARTERY: ICD-10-CM

## 2023-04-14 DIAGNOSIS — I25.10 CORONARY ARTERY DISEASE INVOLVING NATIVE CORONARY ARTERY OF NATIVE HEART WITHOUT ANGINA PECTORIS: ICD-10-CM

## 2023-04-14 DIAGNOSIS — I47.1 SVT (SUPRAVENTRICULAR TACHYCARDIA) (HCC): ICD-10-CM

## 2023-04-14 DIAGNOSIS — J31.0 CHRONIC RHINITIS: ICD-10-CM

## 2023-04-14 DIAGNOSIS — E11.40 TYPE 2 DIABETES MELLITUS WITH DIABETIC NEUROPATHY, WITHOUT LONG-TERM CURRENT USE OF INSULIN (HCC): ICD-10-CM

## 2023-04-14 PROCEDURE — 3078F DIAST BP <80 MM HG: CPT | Performed by: INTERNAL MEDICINE

## 2023-04-14 PROCEDURE — 1126F AMNT PAIN NOTED NONE PRSNT: CPT | Performed by: INTERNAL MEDICINE

## 2023-04-14 PROCEDURE — 3008F BODY MASS INDEX DOCD: CPT | Performed by: INTERNAL MEDICINE

## 2023-04-14 PROCEDURE — 3074F SYST BP LT 130 MM HG: CPT | Performed by: INTERNAL MEDICINE

## 2023-04-14 PROCEDURE — 99215 OFFICE O/P EST HI 40 MIN: CPT | Performed by: INTERNAL MEDICINE

## 2023-04-14 RX ORDER — BIMATOPROST 0.01 %
DROPS OPHTHALMIC (EYE)
COMMUNITY
Start: 2023-04-12

## 2023-04-14 NOTE — TELEPHONE ENCOUNTER
Hi Dr. Tamir Funes. Thank you for taking care of of Eron Sebastian. I saw her today since Dr. James Kidd retired. I know we are looking for her to have surgery. Cora Flores according to her son will not be available till July and he was asking for another recommendation for an ENT physician for her ear surgery. I did take the opportunity to give them the name of Dr. Justin Millan at [facility]. He has taken care of a couple of my patients in the past.  But I thought I would check with you to see if that is appropriate or if you had any other names since Cora Sandra will not be available to July and patient is anxious to have her surgery. Thank you in advance. Sam Huynh.

## 2023-04-18 NOTE — TELEPHONE ENCOUNTER
Swatchcloud msg not read. Left message for Kt to either view msg or call back to review over the phone.

## 2023-04-21 ENCOUNTER — TELEPHONE (OUTPATIENT)
Dept: OTOLARYNGOLOGY | Facility: CLINIC | Age: 88
End: 2023-04-21

## 2023-04-21 NOTE — TELEPHONE ENCOUNTER
Patients son requesting CT and Hearing Test results to be faxed to Dr. Coppola Canal office. States he forgot them and they are in office now. Recommended to look on [employer] as well. Please advise       FAX: [phone].  Please advise

## 2023-04-25 ENCOUNTER — OFFICE VISIT (OUTPATIENT)
Dept: PHYSICAL MEDICINE AND REHAB | Facility: CLINIC | Age: 88
End: 2023-04-25
Payer: COMMERCIAL

## 2023-04-25 VITALS — OXYGEN SATURATION: 98 % | WEIGHT: 130 LBS | HEART RATE: 72 BPM | BODY MASS INDEX: 26.21 KG/M2 | HEIGHT: 59 IN

## 2023-04-25 DIAGNOSIS — G56.03 CARPAL TUNNEL SYNDROME, BILATERAL: Primary | ICD-10-CM

## 2023-04-25 PROCEDURE — 3008F BODY MASS INDEX DOCD: CPT | Performed by: PHYSICAL MEDICINE & REHABILITATION

## 2023-04-25 PROCEDURE — 1125F AMNT PAIN NOTED PAIN PRSNT: CPT | Performed by: PHYSICAL MEDICINE & REHABILITATION

## 2023-04-25 PROCEDURE — 99213 OFFICE O/P EST LOW 20 MIN: CPT | Performed by: PHYSICAL MEDICINE & REHABILITATION

## 2023-04-26 ENCOUNTER — TELEPHONE (OUTPATIENT)
Dept: PHYSICAL MEDICINE AND REHAB | Facility: CLINIC | Age: 88
End: 2023-04-26

## 2023-05-03 RX ORDER — LORATADINE 10 MG/1
10 TABLET ORAL DAILY
Qty: 30 TABLET | Refills: 3 | Status: SHIPPED | OUTPATIENT
Start: 2023-05-03

## 2023-06-05 RX ORDER — FLUTICASONE PROPIONATE 50 MCG
SPRAY, SUSPENSION (ML) NASAL
Qty: 16 G | Refills: 0 | Status: SHIPPED | OUTPATIENT
Start: 2023-06-05

## 2023-06-15 RX ORDER — BIMATOPROST 0.1 MG/ML
SOLUTION/ DROPS OPHTHALMIC
Refills: 0 | OUTPATIENT
Start: 2023-06-15

## 2023-06-19 ENCOUNTER — OFFICE VISIT (OUTPATIENT)
Dept: PHYSICAL MEDICINE AND REHAB | Facility: CLINIC | Age: 88
End: 2023-06-19
Payer: COMMERCIAL

## 2023-06-19 VITALS — WEIGHT: 130 LBS | HEART RATE: 86 BPM | BODY MASS INDEX: 26.21 KG/M2 | HEIGHT: 59 IN | OXYGEN SATURATION: 96 %

## 2023-06-19 DIAGNOSIS — G56.03 CARPAL TUNNEL SYNDROME, BILATERAL: Primary | ICD-10-CM

## 2023-06-19 PROCEDURE — 99214 OFFICE O/P EST MOD 30 MIN: CPT | Performed by: PHYSICAL MEDICINE & REHABILITATION

## 2023-06-19 PROCEDURE — 76942 ECHO GUIDE FOR BIOPSY: CPT | Performed by: PHYSICAL MEDICINE & REHABILITATION

## 2023-06-19 PROCEDURE — 1160F RVW MEDS BY RX/DR IN RCRD: CPT | Performed by: PHYSICAL MEDICINE & REHABILITATION

## 2023-06-19 PROCEDURE — 1159F MED LIST DOCD IN RCRD: CPT | Performed by: PHYSICAL MEDICINE & REHABILITATION

## 2023-06-19 PROCEDURE — 3008F BODY MASS INDEX DOCD: CPT | Performed by: PHYSICAL MEDICINE & REHABILITATION

## 2023-06-19 PROCEDURE — 20526 THER INJECTION CARP TUNNEL: CPT | Performed by: PHYSICAL MEDICINE & REHABILITATION

## 2023-06-19 RX ORDER — TRIAMCINOLONE ACETONIDE 40 MG/ML
20 INJECTION, SUSPENSION INTRA-ARTICULAR; INTRAMUSCULAR ONCE
Status: COMPLETED | OUTPATIENT
Start: 2023-06-19 | End: 2023-06-19

## 2023-06-19 RX ORDER — LIDOCAINE HYDROCHLORIDE 10 MG/ML
1.5 INJECTION, SOLUTION INFILTRATION; PERINEURAL ONCE
Status: COMPLETED | OUTPATIENT
Start: 2023-06-19 | End: 2023-06-19

## 2023-06-19 RX ADMIN — LIDOCAINE HYDROCHLORIDE 1.5 ML: 10 INJECTION, SOLUTION INFILTRATION; PERINEURAL at 16:46:00

## 2023-06-19 RX ADMIN — TRIAMCINOLONE ACETONIDE 20 MG: 40 INJECTION, SUSPENSION INTRA-ARTICULAR; INTRAMUSCULAR at 16:47:00

## 2023-06-20 ENCOUNTER — TELEPHONE (OUTPATIENT)
Dept: PHYSICAL MEDICINE AND REHAB | Facility: CLINIC | Age: 88
End: 2023-06-20

## 2023-06-20 NOTE — TELEPHONE ENCOUNTER
Initiated authorization for Ultrasound guided right carpal tunnel injection with corticosteroid CPT 49323, , 34457 with [facility]  Status: Notification or Prior Authorization is not required for the requested services valid 6/19/23-9/13/23  This [employer] members plan does not currently require a prior authorization for these services. If you have general questions about the prior authorization requirements, please call us at [phone] or visit [email or web address] > Clinician Resources > Advance and Admission Notification Requirements. The number above acknowledges your notification. Please write this number down for future reference. Notification is not a guarantee of coverage or payment.     Decision ID #:[id number]    inj done in office

## 2023-06-21 NOTE — PROCEDURES
Procedure:  Ultrasound guided RIGHT carpal tunnel injection  The risks, benefits and anticipated outcomes of the procedure, the risks and benefits of the alternatives to the procedure, and the roles and tasks of the personnel to be involved, were discussed with the patient, and the patient consents to the procedure and agrees to proceed. UNIVERSAL PROTOCOL / SAFETY CHECKLIST  Sign in Communication: Completed  Time Out:  Team Confirms the Correct Patient, Correct Procedure, Correct Site and Site Marking, Correct Position (if applicable), Prep and Dry Time (if applicable). Affirmation of Time Out: YES  Sign Out Discussion: Completed if applicable    The procedure was carried out under sterile prep  with sterile gel. The target site was anesthetized with a topical ethyl chloride spray. Then, A  25ga 1.5in needle was introduced and advanced with ultrasound guidance from radial to ulnar direction using an in plane approach with the transducer in transverse orientation. Following negative aspiration, a mixture of 1cc of 1% lidocaine and 1cc of Kenalog (40mg/ml) was injected. Permanent pictures were taken and stored in the PACS system. Ultrasound interpretation was performed prior to the procedure to identify the target and any adjacent neurovascular structures, and during real-time needle guidance confirming placement. Post-intervention interpretation was also performed confirming appropriate injectate flow and hemostasis. The patient tolerated the procedure without complication and was instructed in post-procedure precautions. Please see patient instructions.     Oleksandr Real DO, FAAPMR & CAQSM  Physical Medicine and Rehabilitation/Sports Medicine  MEDICAL CENTER [facility]

## 2023-06-26 ENCOUNTER — TELEPHONE (OUTPATIENT)
Dept: PHYSICAL MEDICINE AND REHAB | Facility: CLINIC | Age: 88
End: 2023-06-26

## 2023-06-26 DIAGNOSIS — G56.03 CARPAL TUNNEL SYNDROME, BILATERAL: Primary | ICD-10-CM

## 2023-06-26 NOTE — TELEPHONE ENCOUNTER
Pts son ed called stating pt had a recent injection in pts hands on 6/19. Pt was under the impression that she was to get a second injection in the hand sometime this week.  Please give either pt a callback at her home number [phone], or call one of her sons who are listed as emergency contacts

## 2023-06-27 ENCOUNTER — TELEPHONE (OUTPATIENT)
Dept: PHYSICAL MEDICINE AND REHAB | Facility: CLINIC | Age: 88
End: 2023-06-27

## 2023-06-29 ENCOUNTER — OFFICE VISIT (OUTPATIENT)
Dept: PHYSICAL MEDICINE AND REHAB | Facility: CLINIC | Age: 88
End: 2023-06-29
Payer: COMMERCIAL

## 2023-06-29 DIAGNOSIS — G56.03 CARPAL TUNNEL SYNDROME, BILATERAL: Primary | ICD-10-CM

## 2023-07-06 RX ORDER — TRIAMCINOLONE ACETONIDE 1 MG/G
CREAM TOPICAL
Qty: 15 G | Refills: 1 | Status: SHIPPED | OUTPATIENT
Start: 2023-07-06

## 2023-07-06 NOTE — TELEPHONE ENCOUNTER
Refill request is for a maintenance medication and has met the criteria specified in the Ambulatory Medication Refill Standing Order for eligibility, visits, laboratory, alerts and was sent to the requested pharmacy.     Requested Prescriptions     Signed Prescriptions Disp Refills    triamcinolone 0.1 % External Cream 15 g 1     Sig: apply to the affected area twice daily as needed     Authorizing Provider: Carola Collazo     Ordering User: Elvin Guzman

## 2023-07-07 ENCOUNTER — OFFICE VISIT (OUTPATIENT)
Dept: OTOLARYNGOLOGY | Facility: CLINIC | Age: 88
End: 2023-07-07

## 2023-07-07 DIAGNOSIS — H90.6 MIXED HEARING LOSS, BILATERAL: Primary | ICD-10-CM

## 2023-07-07 DIAGNOSIS — H69.93 ETD (EUSTACHIAN TUBE DYSFUNCTION), BILATERAL: ICD-10-CM

## 2023-07-07 DIAGNOSIS — H70.13 CHRONIC MASTOIDITIS, BILATERAL: ICD-10-CM

## 2023-07-07 PROBLEM — I25.10 CORONARY ARTERY DISEASE INVOLVING NATIVE CORONARY ARTERY OF NATIVE HEART WITHOUT ANGINA PECTORIS: Status: ACTIVE | Noted: 2020-06-27

## 2023-07-07 PROBLEM — J44.89 ASTHMA WITH COPD (HCC): Status: ACTIVE | Noted: 2017-05-08

## 2023-07-07 PROBLEM — E11.21 CONTROLLED TYPE 2 DIABETES MELLITUS WITH DIABETIC NEPHROPATHY, WITHOUT LONG-TERM CURRENT USE OF INSULIN (HCC): Status: ACTIVE | Noted: 2020-06-04

## 2023-07-07 PROBLEM — J44.9 ASTHMA WITH COPD (HCC): Status: ACTIVE | Noted: 2017-05-08

## 2023-07-07 PROBLEM — J44.89 ASTHMA WITH COPD: Status: ACTIVE | Noted: 2017-05-08

## 2023-07-07 PROBLEM — S12.100A CLOSED FRACTURE OF SECOND CERVICAL VERTEBRA (HCC): Status: ACTIVE | Noted: 2021-03-31

## 2023-07-07 PROBLEM — R94.39 ABNORMAL NUCLEAR STRESS TEST: Status: ACTIVE | Noted: 2020-06-17

## 2023-07-07 PROBLEM — J44.9 ASTHMA WITH COPD: Status: ACTIVE | Noted: 2017-05-08

## 2023-07-07 PROBLEM — N18.9 CKD (CHRONIC KIDNEY DISEASE): Status: ACTIVE | Noted: 2020-06-04

## 2023-07-07 RX ORDER — DILTIAZEM HYDROCHLORIDE 120 MG/1
120 CAPSULE, EXTENDED RELEASE ORAL DAILY
COMMUNITY
Start: 2023-04-19

## 2023-07-10 NOTE — PROGRESS NOTES
NEW PATIENT PROGRESS NOTE  OTOLOGY/OTOLARYNGOLOGY    REF MD:  Carleen Murcia    PCP: Oswald Medina MD    CHIEF COMPLAINT:  Patient presents with:  Ear Problem: Hx hearing loss  Ear cleaning bilateral    HISTORY OF PRESENT ILLNESS: Monserrat Tavarez is a 80year old female who presents for evaluation of her ears and hearing. Communication is through her son and via typing on a word document due to the severity of the patient's hearing loss. Patient notes her right ear is her worse hearing ear. Had hearing aids from [facility], but appropriate fit/benefit were not achieved. Now planning to use hearing benefit through insurance to reobtain hearing aids. Has a history of ear surgery bilaterally, but it is not recent and she cannot further describe it. She was last seen by Dr. Paulette Ochoa at [facility]. She notes bilateral continuous tinnitus, non pulsatile. Denies vertigo, dizziness, otorrhea, otalgia. 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 Kong Wright PCI of LAD and RCA was performed. Deaf     Diverticulitis 2010    hospitalized 3/10 and 5/10    Diverticulosis 2003    cscopy '03, '07, '10    Fracture of C2 vertebra, closed ([facility] 03/2021    ER 3/19/21--CT cervical spine-- fx C2 posterior lateral vertebral body and left C2 lamina.       GERD (gastroesophageal reflux disease)     Glaucoma     Hyperlipidemia     Hypertension, essential     Irritable bowel syndrome     Lumbar stenosis 2004    MRI lumbar 2004-djd spinal stenosis-xray lumbar 12/13-DJD scoliosis, wedge comp T8, 9, 10, L1    Lung nodule 4/06-3/03    LLL nodule resolved on serial CT scans    Osteoarthritis     Osteopenia     PAF (paroxysmal atrial fibrillation) ([address] 01/2021    Pneumonia due to COVID-19 virus 01/2021    hosp and rec'd conv plasma and remdesivir. Renal calculus 2010    on CT abd 5/10-6 mm or less in L lower kidney    Renal insufficiency     Shingles 2011    Small bowel obstruction ([facility] [address] [id number], 6/2016    multiple hospitalizations for [id number], 6/2016    SVT (supraventricular tachycardia) ([facility] [address]     event monitor 2/2016-SVT    Syncope 05/2020    48-hour Holter 5/19/20--bursts of atrial tachycardia up to 3 minutes, PVCs, rare Wenkebach. Saw Dr. Bre Weathers. Type 2 diabetes mellitus ([facility] [address] 2001    Uterine prolapse     Uterine prolapse 2006    had pessary in 2006 Dr. Up Hard sling procedure Dr Talisha Rothman in 6/2016    UTI due to Klebsiella species 12/2016    ESBL Klebsiella UTI treated in [address] [place] [address] with meropenem when in hosp for enteritis. (Dr Vanessa Rosales). PAST SURGICAL HISTORY:    Past Surgical History:   Procedure Laterality Date    ABDOMEN SURGERY PROC UNLISTED  2007    laparotomy for TERESA and internal hernia    ABDOMEN SURGERY PROC UNLISTED  2009    TERESA and small bowel resection-Dr Jaramillo    APPENDECTOMY      CHOLECYSTECTOMY      FRACTURE SURGERY Left 1/2017    L hip fx pinning 1/2017 Dr. Annel Velez Right 2008    OTHER SURGICAL HISTORY      cystocele repair    OTHER SURGICAL HISTORY Bilateral     ear surgery-Dr. Nori Seip [facility]  6/30/2016    vag sling procedure at [facility] -Dr Talisha Rothman       dilTIAZem HCl ER Beads 120 MG Oral Capsule SR 24 Hr, Take 1 capsule (120 mg total) by mouth daily. , Disp: , Rfl:   triamcinolone 0.1 % External Cream, apply to the affected area twice daily as needed, Disp: 15 g, Rfl: 1  FLUTICASONE PROPIONATE 50 MCG/ACT Nasal Suspension, SPRAY 2 SPRAYS INTO EACH NOSTRIL  IN THE MORNING AND 2 SPRAYS BEFORE BEDTIME, Disp: 16 g, Rfl: 0  loratadine 10 MG Oral Tab, Take 1 tablet (10 mg total) by mouth daily. , Disp: 30 tablet, Rfl: 3  LUMIGAN 0.01 % Ophthalmic Solution, , Disp: , Rfl:   hydrocortisone 2.5 % External Cream, APPLY RECTALLY TWICE DAILY AS NEEDED, Disp: 30 g, Rfl: 2  CHOLECALCIFEROL 25 MCG (1000 UT) Oral Tab, TAKE TWO TABLETS BY MOUTH DAILY, Disp: 180 tablet, Rfl: 3  Promethazine-Codeine 6.25-10 MG/5ML Oral Solution, Take 2.5 mL by mouth every 6 (six) hours as needed. , Disp: 180 mL, Rfl: 2  lactulose 10 GM/15ML Oral Solution, Take 30 mL (20 g total) by mouth daily as needed (constipated bowel). , Disp: 237 mL, Rfl: 3  tobramycin-dexamethasone 0.3-0.1 % Ophthalmic Suspension, Apply 5 drops twice a day to both ears for 7 days. , Disp: 10 mL, Rfl: 0  atorvastatin 10 MG Oral Tab, Take 1 tablet (10 mg total) by mouth nightly., Disp: 90 tablet, Rfl: 3  apixaban (ELIQUIS) 2.5 MG Oral Tab, Take 1 tablet (2.5 mg total) by mouth 2 (two) times daily. , Disp: 180 tablet, Rfl: 3  aspirin 81 MG Oral Tab EC, Take 1 tablet (81 mg total) by mouth daily. , Disp: 100 tablet, Rfl: 3  enalapril 5 MG Oral Tab, Take 1 tablet (5 mg total) by mouth in the morning., Disp: 90 tablet, Rfl: 3  gabapentin 100 MG Oral Cap, Take 1 capsule (100 mg total) by mouth nightly., Disp: 90 capsule, Rfl: 3  pantoprazole 40 MG Oral Tab EC, Take 1 tablet (40 mg total) by mouth before breakfast., Disp: 90 tablet, Rfl: 3  dilTIAZem  MG Oral Capsule SR 24 Hr, Take 1 capsule (120 mg total) by mouth daily. , Disp: 90 capsule, Rfl: 3  alendronate 70 MG Oral Tab, Take 1 tablet (70 mg total) by mouth once a week., Disp: 12 tablet, Rfl: 3  sennosides-docusate (SENNA PLUS) 8.6-50 MG Oral Tab, Take 2 tablets twice daily as needed for constipation, Disp: 100 tablet, Rfl: 3  Fluticasone Propionate HFA (FLOVENT HFA) 220 MCG/ACT Inhalation Aerosol, Inhale 1 puff into the lungs 2 (two) times daily. , Disp: 1 each, Rfl: 3  Ferrous Sulfate 325 (65 Fe) MG Oral Tab, Take one tab on Mon Wed Fri, Disp: 90 tablet, Rfl: 3  Wheat Dextrin (BENEFIBER) Oral Powder, Take 2 teaspoons daily. , Disp: , Rfl: 0  Cyanocobalamin (VITAMIN B12) 1000 MCG Oral Tab CR, Take 1 tablet by mouth daily. , Disp: , Rfl: 0  acetaminophen 500 MG Oral Tab, Take 1 tablet (500 mg total) by mouth in the morning and 1 tablet (500 mg total) before bedtime. , Disp: , Rfl:   melatonin 3 MG Oral Tab, Take 1 tablet (3 mg total) by mouth nightly., Disp: , Rfl:   ascorbic acid 500 MG Oral Tab, Take 1 tablet (500 mg total) by mouth daily. , Disp: 30 tablet, Rfl: 0  Polyethylene Glycol 3350 17 GM/SCOOP Oral Powder, Take by mouth nightly. take 15 milliliter (17G)  by oral route  every day powder mixed with 8 oz. water, juice, soda, coffee, or tea , Disp: , Rfl:   amoxicillin 875 MG Oral Tab, Take 1 tablet (875 mg total) by mouth 2 (two) times daily. (Patient not taking: Reported on 7/7/2023), Disp: 28 tablet, Rfl: 0    No current facility-administered medications on file prior to visit. Allergies:   Latex                   UNKNOWN, HIVES    Comment:Other reaction(s): Unknown  Mastisol Adhesive       HIVES    Comment:Other reaction(s): ADHESIVE TAPE  Adhesive Tape               Comment:Other reaction(s): ADHESIVE TAPE    SOCIAL HISTORY:  Social History    Tobacco Use      Smoking status: Never      Smokeless tobacco: Never    Alcohol use: No      FAMILY HISTORY: Denies known family history of hearing loss, tinnitus, vertigo, or migraine. Denies known family history of head and neck cancer, thyroid cancer, bleeding disorders. REVIEW OF SYSTEMS:   Positives are in bold  Neuro: Headache, facial weakness, facial numbness, neck pain, vertigo  ENT: Hearing change, tinnitus, otorrhea, otalgia, aural fullness, ear pressure, vertigo, imbalance  Sinus pressure, rhinorrhea, congestion, facial pain, jaw pain, dysphagia, odynophagia, sore throat, voice changes, shortness of breath    EXAMINATION:  I washed my hands with an alcohol-based hand gel prior to examination  Constitutional:   --Vitals: not currently breastfeeding.   --General: no apparent distress, well-developed, conversant  Psych: affect pleasant and appropriate for age, alert and oriented  Neuro: Facial movement normal bilateral  Respiratory: No stridor, stertor or increased work of breathing  ENT:  --Ear: The bilateral ears were examined under binocular microscopy    Right ear microscopic exam:  Pinna: Normal, no lesions or masses. Mastoid: Nontender on palpation. External auditory canal: minimal cerumen removed - moisture in the EAC  Tympanic membrane: poor landmarks, extensive scarring, central perforation with mucoid drainage. Left ear microscopic exam:  Pinna: Normal, no lesions or masses. Mastoid: Nontender on palpation. External auditory canal: Clear, no masses, wet  Tympanic membrane: poor landmarks, extensive scarring, central perforation with mucoid drainage and nearby tube or extruding middle ear prosthesis - fixed, would not dislodge with gentle manipulation - left in place     Latest Audiogram Result (Hz) Exam performed: 2/2/2023 3:05 PM Last edited by SHASHANK Petty on 2/2/2023 3:26 PM        125 250  1500 2000 3000 4000 6000 8000    Right air:  90 85  80  80 90 100  95N    Left air:  55 55  60  65  65 95 95N    Left mastoid bone:   30  30  55  35      Right mastoid bone (masked):   50  35  55  40      Masking left (mastoid bone):   80  80    85         Reliability:  Good    Transducer:   Inserts    Technique:  Conventional Audiometry    Comments:            Latest Speech Audiometry  Last edited by SHASHANK Petyt on 2/2/2023 3:26 PM       Ear Method SAT SRT MCL UCL Notes    right live voice  85       left live voice  55        Ear Method Test/List Score (%) Intensity Mask/Noise   right live voice Half-List 44 95 75   left live voice 10 By Difficulty 92 80                      Latest Tympanogram Result       Probe Tone (Hz): 226 Exam performed: 2/2/2023 3:09 PM Last edited by SHASHANK Petty on 2/2/2023 3:26 PM      Tympanograms  These were drawn by a user, not generated from device data      Right Ear Left Ear                     Right Ear Left Ear    Tympanogram type:      Canal volume (mL): 0.7 1.9    Peak pressure (daPa):      Peak amplitude (mL):      Tympanogram width (daPa): Comments:                 ASSESSMENT/PLAN:  Stefania Santiago is a 80year old female with   (H90.6) Mixed hearing loss, bilateral  (primary encounter diagnosis)  (H69.93) ETD (Eustachian tube dysfunction), bilateral     IMPRESSION  Bilateral mixed hearing loss  History of ear surgery unspecified, with left mastoid cavity  Bilateral mucosalization of the TM    PLAN:  -Moisture in both ears, left ear mastoid cavity;   -Recommend - Alcohol/vinegar ear flushes - patient educated on use 1:1 mixture of 70% or greater isopropyl alcohol and vinegar. Directions/instruction/schedule were given to the patient. The goal of this treatment is to help dry out the affected ear.  -Patient is going to pursue hearing aids through her insurance. She will require a custom mold on the left - it is possible she can still obtain the hearing aids through insurance and return to [place] [address] audiology for the custom mold. Patient could also return to [facility] audiology since she has been seen there before.   -Follow-up in 1 month for ear check  -Discussed she is not a candidate for cochlear implant surgery at this time due to better hearing ear. Situation reviewed with the patient in detail. Bartolo Westbrook MD  Otology/Otolaryngology  [facility]   [address] [facility]  Phone [phone]  Fax [phone]

## 2023-07-18 ENCOUNTER — TELEPHONE (OUTPATIENT)
Dept: INTERNAL MEDICINE CLINIC | Facility: CLINIC | Age: 88
End: 2023-07-18

## 2023-07-18 NOTE — PROGRESS NOTES
OCCUPATIONAL THERAPY UPPER EXTREMITY EVALUATION:   Referring Physician: Dr. Sheyla Awan  Date of onset: 2020  Diagnosis: Carpal tunnel syndrome on right (G56.01)   Date of Service: 03/16/22       PATIENT SUMMARY:   Maite Aguilar is a 80year old y/o female who presents to therapy today with complaints of tingling in fingertips of both hands. Pt describes pain level : denies pain just tingling. History of current condition: Patient reports she began experiencing tingling in fingers beginning in 2020. She was referred to physiatrist who provided two cortisone injections in right wrist and one in left. She has some relief but short lived. Current functional limitations include: open jars, open packages,  1/2 gallon of milk,  coins  Normal describes prior level of function: using both hands independently for self care and homemaking tasks  Employment: Retired  Hand Dominance: right  Living Situation: Family    Pt goals include: reduce paresthesia in hands overnight and gain strength  Past medical history was reviewed with Chel Leon. . Significant findings include: small bowel blockage, CAD,COPD,OA, Afib,DM type 2 , Rotator cuff Tear in right        ASSESSMENT:   Chidi Whitehead is a pleasant elderly woman who came to therapy complaining of constant tingling in her fingertips. She is retired, lives in a house with her son. She has assistance from family members with [address] [employer] and transportation. She uses a cane to ambulate. Chel Leon reports being independently with self care, simple meal prep and some light housekeeping tasks. She enjoys knitting and crocheting. Assessment indicates positive Tinnels test for median nerve at bilateral wrists. Sensation to be assessed with Stefano Pale method. She has limited bilateral shoulder flexion also decreased bilateral wrist flexion as well. Digit MCGOWAN is [facility] however arthritic changes to joints evident. She has significant weakness in bilateral  strength.  Given the above noted deficits in ROM,sensation and strength, patient presents with impairments in occupation- based task performance for self care skills,home making and leisure skills. Anabela Katz could benefit from continued skilled OT to address the subcomponents of ROM,  strength and motor control to allow her the ability to regain above- noted skills. Based on clinical rationale this evaluation involved Moderate complexity decision making due to 3 performance deficits, as well as no modifications of tasks or assistance. Also involves a brief review of occupational profile and medical and therapy history. Precautions: COPD, Afib, Diabetes        OBJECTIVE:   OBSERVATION: Patient seen for OT evaluation ,tendon glides and HEP instruction    ORTHOTICS: recommend Futuro overnight wrist brace      SENSORY: Parasthesias: Yes    AROM:  Shoulder  Elbow Wrist   Flexion: R 90; L 90  Extension: R 45; L 45  Abduction: R 90; L 110   Flexion: R 140; L 140  Extension: R -10; L -10  Supination: R 65, L 75  Pronation: R 85, L 85 Flexion: R 60, L 55  Extension: R 70, L 75             DIGIT ROM: Bilateral digit MCGOWAN are WFL, arthritic changes in IP joint evident      Strength (lbs) Right            Trial          1           2            3          Average Left               Trial           1           2            3          Average   : 18 15 16 17    8 8 8 8      2 pt Pinch:                 3 pt Pinch: 3       5         Lateral Pinch: 6       4             Today's Treatment: Patient education provided on Diagnosis, POC and HEP.  Pt was instructed in and issued a HEP for: tendon glides  Date 03/16/22                 Visit # 1                                    Evaluation x                Manual                                                                             Ther ex                   Digit MP flexion x10                 Digit IP flexion x10                  digit abduction/adduction stretch  x10                  wrist flexor stretches  x10                                                                             HEP instruction                                        Therapeutic Activity                                       Neuromuscular Re-education                                                             Modalities                                                                 Charges: OT Eval x1, TE      Total Timed Treatment: 14 min     Total Treatment Time: 45 min       PLAN OF CARE:   Goals:      Pt complaints of paresthesia will decrease by 50% in left hand. Pt will be independent and compliant with comprehensive HEP to maintain progress achieved in OT. Patient to complete sensation assessment with Diane stauffer by third visit. Patient will demonstrate increase in left wrist flexion to 65 degrees for ease in opening jar. Patient will demonstrate increase in left  strength to at least 20 lbs for ease in picking up bag of groceries. Frequency / Duration: Patient will be seen for 1-2 x/week or a total of 8 visits over a 60 day period. Treatment will include: Manual Therapy, Soft tissue mobilization, AROM, PROM, Strengthening, Therapeutic Activity, Moist heat, cryotherapy, Ultrasound, Whirlpool (fluidotherapy), Paraffin, Electrical Stim, manual median nerve glides,Orthosis,  Neuromuscular Re-education, Patient education, Home exercise program.    Education or treatment limitation: None  Rehab Potential:good     Patient was advised of these findings, precautions, and treatment options and has agreed to actively participate in planning and for this course of care. Thank you for your referral. Please co-sign or sign and return this letter via fax as soon as possible to [phone].  If you have any questions, please contact me at Dept: [phone]    Sincerely,  Electronically signed by therapist: LIANNA Peterson/L, EDMUNDT    [de-identified] certification required: Yes  I certify the need for these services furnished under this plan of treatment and while under my care.         X______________________________________________ Date________________  Certification From: 83/09[birth year]      To: 05/16/22 Doxycycline Counseling:  Patient counseled regarding possible photosensitivity and increased risk for sunburn.  Patient instructed to avoid sunlight, if possible.  When exposed to sunlight, patients should wear protective clothing, sunglasses, and sunscreen.  The patient was instructed to call the office immediately if the following severe adverse effects occur:  hearing changes, easy bruising/bleeding, severe headache, or vision changes.  The patient verbalized understanding of the proper use and possible adverse effects of doxycycline.  All of the patient's questions and concerns were addressed.

## 2023-07-26 ENCOUNTER — TELEPHONE (OUTPATIENT)
Dept: INTERNAL MEDICINE CLINIC | Facility: CLINIC | Age: 88
End: 2023-07-26

## 2023-07-26 NOTE — TELEPHONE ENCOUNTER
Patient was called by  to schedule a super medicare annual with Dr Bobbi Rosales. No answer. A message was left to call back. Patient's last medicare annual was in June 2022.

## 2023-08-03 NOTE — PATIENT INSTRUCTIONS
Steroid Injection Information  What to expect: The injection contains Lidocaine (which numbs the area) and Kenalog (a steroid which decreases inflammation). You may have pain relief within hours of the injection due to the Lidocaine.   The Kenalog can take Vaccine status unknown

## 2023-08-07 ENCOUNTER — OFFICE VISIT (OUTPATIENT)
Dept: OTOLARYNGOLOGY | Facility: CLINIC | Age: 88
End: 2023-08-07

## 2023-08-07 DIAGNOSIS — H90.6 MIXED HEARING LOSS, BILATERAL: Primary | ICD-10-CM

## 2023-08-07 DIAGNOSIS — H70.13 CHRONIC MASTOIDITIS, BILATERAL: ICD-10-CM

## 2023-08-07 PROCEDURE — 92504 EAR MICROSCOPY EXAMINATION: CPT | Performed by: OTOLARYNGOLOGY

## 2023-08-07 PROCEDURE — 1159F MED LIST DOCD IN RCRD: CPT | Performed by: OTOLARYNGOLOGY

## 2023-08-07 PROCEDURE — 99213 OFFICE O/P EST LOW 20 MIN: CPT | Performed by: OTOLARYNGOLOGY

## 2023-08-07 NOTE — PROGRESS NOTES
PROGRESS NOTE  OTOLOGY/OTOLARYNGOLOGY    REF MD:  Ankit Edwards    PCP: Sobeida Vargas MD    CHIEF COMPLAINT:  Patient presents with: Follow - Up: Patient here for f/up mixed bilateral hearing loss    LAST VISIT 7/7/23  IMPRESSION  Bilateral mixed hearing loss  History of ear surgery unspecified, with left mastoid cavity  Bilateral mucosalization of the TM    PLAN:  -Moisture in both ears, left ear mastoid cavity;   -Recommend - Alcohol/vinegar ear flushes - patient educated on use 1:1 mixture of 70% or greater isopropyl alcohol and vinegar. Directions/instruction/schedule were given to the patient. The goal of this treatment is to help dry out the affected ear.  -Patient is going to pursue hearing aids through her insurance. She will require a custom mold on the left - it is possible she can still obtain the hearing aids through insurance and return to [facility] audiology for the custom mold. Patient could also return to [facility] audiology since she has been seen there before.   -Follow-up in 1 month for ear check  -Discussed she is not a candidate for cochlear implant surgery at this time due to better hearing ear.   _________________________________________________________________    INTERVAL HX: Here with son. Been using alcohol and vinegar ear flushes almost daily since last visit. Got new set of hearing aids through [facility]. HISTORY OF PRESENT ILLNESS: Tamanna Argueta is a 80year old female who presents for evaluation of her ears and hearing. Communication is through her son and via typing on a word document due to the severity of the patient's hearing loss. Patient notes her right ear is her worse hearing ear. Had hearing aids from [facility], but appropriate fit/benefit were not achieved. Now planning to use hearing benefit through insurance to reobtain hearing aids. Has a history of ear surgery bilaterally, but it is not recent and she cannot further describe it.  She was last seen by Dr. Royce Mcgee at [place]. She notes bilateral continuous tinnitus, non pulsatile. Denies vertigo, dizziness, otorrhea, otalgia. 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 Lucien Redding PCI of LAD and RCA was performed. Deaf     Diverticulitis 2010    hospitalized 3/10 and 5/10    Diverticulosis 2003    cscopy '03, '07, '10    Fracture of C2 vertebra, closed ([address] 03/2021    ER 3/19/21--CT cervical spine-- fx C2 posterior lateral vertebral body and left C2 lamina. GERD (gastroesophageal reflux disease)     Glaucoma     Hyperlipidemia     Hypertension, essential     Irritable bowel syndrome     Lumbar stenosis 2004    MRI lumbar 2004-djd spinal stenosis-xray lumbar 12/13-DJD scoliosis, wedge comp T8, 9, 10, L1    Lung nodule 4/06-3/03    LLL nodule resolved on serial CT scans    Osteoarthritis     Osteopenia     PAF (paroxysmal atrial fibrillation) ([address] 01/2021    Pneumonia due to COVID-19 virus 01/2021    hosp and rec'd conv plasma and remdesivir. Renal calculus 2010    on CT abd 5/10-6 mm or less in L lower kidney    Renal insufficiency     Shingles 2011    Small bowel obstruction ([address] [id number], 6/2016    multiple hospitalizations for [id number], 6/2016    SVT (supraventricular tachycardia) ([address]     event monitor 2/2016-SVT    Syncope 05/2020    48-hour Holter 5/19/20--bursts of atrial tachycardia up to 3 minutes, PVCs, rare Wenkebach. Saw Dr. Nereyda Sharpe. Type 2 diabetes mellitus ([address] 2001    Uterine prolapse     Uterine prolapse 2006    had pessary in 2006 Dr. Benavides Slipper sling procedure Dr Gracia Mosquera in 6/2016    UTI due to Klebsiella species 12/2016    ESBL Klebsiella UTI treated in [facility] with meropenem when in hosp for enteritis. (Dr Mallika Wise).         PAST SURGICAL HISTORY:    Past Surgical History:   Procedure Laterality Date    ABDOMEN SURGERY PROC UNLISTED  2007    laparotomy for TERESA and internal hernia    ABDOMEN SURGERY PROC UNLISTED  2009    TERESA and small bowel resection-Dr Jaramillo    APPENDECTOMY      CHOLECYSTECTOMY      FRACTURE SURGERY Left 1/2017    L hip fx pinning 1/2017 Dr. Annel Velez Right 2008    OTHER SURGICAL HISTORY      cystocele repair    OTHER SURGICAL HISTORY Bilateral     ear surgery-Dr. Nori Seip [facility]  6/30/2016    vag sling procedure at [facility] -Dr Talisha Rothman       dilTIAZem HCl ER Beads 120 MG Oral Capsule SR 24 Hr, Take 1 capsule (120 mg total) by mouth daily. , Disp: , Rfl:   triamcinolone 0.1 % External Cream, apply to the affected area twice daily as needed, Disp: 15 g, Rfl: 1  FLUTICASONE PROPIONATE 50 MCG/ACT Nasal Suspension, SPRAY 2 SPRAYS INTO EACH NOSTRIL  IN THE MORNING AND 2 SPRAYS BEFORE BEDTIME, Disp: 16 g, Rfl: 0  loratadine 10 MG Oral Tab, Take 1 tablet (10 mg total) by mouth daily. , Disp: 30 tablet, Rfl: 3  LUMIGAN 0.01 % Ophthalmic Solution, , Disp: , Rfl:   hydrocortisone 2.5 % External Cream, APPLY RECTALLY TWICE DAILY AS NEEDED, Disp: 30 g, Rfl: 2  CHOLECALCIFEROL 25 MCG (1000 UT) Oral Tab, TAKE TWO TABLETS BY MOUTH DAILY, Disp: 180 tablet, Rfl: 3  Promethazine-Codeine 6.25-10 MG/5ML Oral Solution, Take 2.5 mL by mouth every 6 (six) hours as needed. , Disp: 180 mL, Rfl: 2  lactulose 10 GM/15ML Oral Solution, Take 30 mL (20 g total) by mouth daily as needed (constipated bowel). , Disp: 237 mL, Rfl: 3  tobramycin-dexamethasone 0.3-0.1 % Ophthalmic Suspension, Apply 5 drops twice a day to both ears for 7 days. , Disp: 10 mL, Rfl: 0  atorvastatin 10 MG Oral Tab, Take 1 tablet (10 mg total) by mouth nightly., Disp: 90 tablet, Rfl: 3  apixaban (ELIQUIS) 2.5 MG Oral Tab, Take 1 tablet (2.5 mg total) by mouth 2 (two) times daily. , Disp: 180 tablet, Rfl: 3  aspirin 81 MG Oral Tab EC, Take 1 tablet (81 mg total) by mouth daily. , Disp: 100 tablet, Rfl: 3  enalapril 5 MG Oral Tab, Take 1 tablet (5 mg total) by mouth in the morning., Disp: 90 tablet, Rfl: 3  gabapentin 100 MG Oral Cap, Take 1 capsule (100 mg total) by mouth nightly., Disp: 90 capsule, Rfl: 3  pantoprazole 40 MG Oral Tab EC, Take 1 tablet (40 mg total) by mouth before breakfast., Disp: 90 tablet, Rfl: 3  dilTIAZem  MG Oral Capsule SR 24 Hr, Take 1 capsule (120 mg total) by mouth daily. , Disp: 90 capsule, Rfl: 3  alendronate 70 MG Oral Tab, Take 1 tablet (70 mg total) by mouth once a week., Disp: 12 tablet, Rfl: 3  sennosides-docusate (SENNA PLUS) 8.6-50 MG Oral Tab, Take 2 tablets twice daily as needed for constipation, Disp: 100 tablet, Rfl: 3  Fluticasone Propionate HFA (FLOVENT HFA) 220 MCG/ACT Inhalation Aerosol, Inhale 1 puff into the lungs 2 (two) times daily. , Disp: 1 each, Rfl: 3  Ferrous Sulfate 325 (65 Fe) MG Oral Tab, Take one tab on Mon Wed Fri, Disp: 90 tablet, Rfl: 3  Wheat Dextrin (BENEFIBER) Oral Powder, Take 2 teaspoons daily. , Disp: , Rfl: 0  Cyanocobalamin (VITAMIN B12) 1000 MCG Oral Tab CR, Take 1 tablet by mouth daily. , Disp: , Rfl: 0  acetaminophen 500 MG Oral Tab, Take 1 tablet (500 mg total) by mouth in the morning and 1 tablet (500 mg total) before bedtime. , Disp: , Rfl:   melatonin 3 MG Oral Tab, Take 1 tablet (3 mg total) by mouth nightly., Disp: , Rfl:   ascorbic acid 500 MG Oral Tab, Take 1 tablet (500 mg total) by mouth daily. , Disp: 30 tablet, Rfl: 0  Polyethylene Glycol 3350 17 GM/SCOOP Oral Powder, Take by mouth nightly. take 15 milliliter (17G)  by oral route  every day powder mixed with 8 oz. water, juice, soda, coffee, or tea , Disp: , Rfl:   amoxicillin 875 MG Oral Tab, Take 1 tablet (875 mg total) by mouth 2 (two) times daily. (Patient not taking: Reported on 7/7/2023), Disp: 28 tablet, Rfl: 0    No current facility-administered medications on file prior to visit.       Allergies: Latex                   UNKNOWN, HIVES    Comment:Other reaction(s): Unknown  Mastisol Adhesive       HIVES    Comment:Other reaction(s): ADHESIVE TAPE  Adhesive Tape               Comment:Other reaction(s): ADHESIVE TAPE    SOCIAL HISTORY:  Social History    Tobacco Use      Smoking status: Never      Smokeless tobacco: Never    Alcohol use: No      FAMILY HISTORY: Denies known family history of hearing loss, tinnitus, vertigo, or migraine. Denies known family history of head and neck cancer, thyroid cancer, bleeding disorders. REVIEW OF SYSTEMS:   Positives are in bold  Neuro: Headache, facial weakness, facial numbness, neck pain, vertigo  ENT: Hearing change, tinnitus, otorrhea, otalgia, aural fullness, ear pressure, vertigo, imbalance  Sinus pressure, rhinorrhea, congestion, facial pain, jaw pain, dysphagia, odynophagia, sore throat, voice changes, shortness of breath    EXAMINATION:  I washed my hands with an alcohol-based hand gel prior to examination  Constitutional:   --Vitals: not currently breastfeeding. --General: no apparent distress, well-developed, conversant  Psych: affect pleasant and appropriate for age, alert and oriented  Neuro: Facial movement normal bilateral  Respiratory: No stridor, stertor or increased work of breathing  ENT:  --Ear: The bilateral ears were examined under binocular microscopy  Right ear microscopic exam:  Pinna: Normal, no lesions or masses. Mastoid: Nontender on palpation. External auditory canal: minimal cerumen removed - moisture in the EAC  Tympanic membrane: poor landmarks, extensive scarring, evidence of prior surgery including possible incus interposition graft    Left ear microscopic exam:  Pinna: Normal, no lesions or masses. Mastoid: Nontender on palpation.   External auditory canal: Clear, no masses, wet  Tympanic membrane: poor landmarks, extensive scarring, central perforation with mucoid drainage and nearby tube or extruding middle ear prosthesis - fixed, would not dislodge with gentle manipulation - left in place     Latest Audiogram Result (Hz) Exam performed: 2/2/2023 3:05 PM Last edited by SHASHANK Song on 2/2/2023 3:26 PM        125 250  1500 2000 3000 4000 6000 8000    Right air:  90 85  80  80 90 100  95N    Left air:  55 55  60  65  65 95 95N    Left mastoid bone:   30  30  55  35      Right mastoid bone (masked):   50  35  55  40      Masking left (mastoid bone):   80  80    85         Reliability:  Good    Transducer: Inserts    Technique:  Conventional Audiometry    Comments:            Latest Speech Audiometry  Last edited by SHASHANK Song on 2/2/2023 3:26 PM       Ear Method SAT SRT MCL UCL Notes    right live voice  85       left live voice  55        Ear Method Test/List Score (%) Intensity Mask/Noise   right live voice Half-List 44 95 75   left live voice 10 By Difficulty 92 80                      Latest Tympanogram Result       Probe Tone (Hz): 226 Exam performed: 2/2/2023 3:09 PM Last edited by SHASHANK Song on 2/2/2023 3:26 PM      Tympanograms  These were drawn by a user, not generated from device data      Right Ear Left Ear                     Right Ear Left Ear    Tympanogram type:      Canal volume (mL): 0.7 1.9    Peak pressure (daPa):      Peak amplitude (mL):      Tympanogram width (daPa): Comments:               CT temporal bones 3/14/23  (Images personally reviewed; likely effects of chronic infection and prior surgery)  CONCLUSION:      1. RIGHT temporal bone:  Soft tissue density opacification of the middle ear cavity/mesotympanum with associated erosion/blunting of the scutum, opacification at Prussak's space, and chronic-appearing erosion of the ossicular chain (which is partially   intact). These findings suggest cholesteatoma formation on a background of longstanding chronic otomastoiditis.   Subtotal opacification of the right mastoid cavity with slight coalescence of the mastoid septa.  Thickened and retracted tympanic membrane   with probable small central perforation. Borderline high position of the jugular bulb. 2. LEFT temporal bone:  Soft tissue density opacification of the middle ear cavity (mesotympanum and epitympanum), which surrounds the ossicular chain. Ossicular chain is not well defined and likely chronically eroded. Similarly, the scutum is not well    defined and likely chronically eroded/blunted. Near complete opacification of the left mastoid cavity with coalescence of the mastoid septa. Findings suggest cholesteatoma on a background of longstanding chronic otomastoiditis. Thickened and   retracted tympanic membrane with relatively large central perforation. Borderline high position of the jugular bulb. 3. Partially imaged findings that suggest multifocal acute on chronic sinusitis. 4. Subcentimeter polyp or focus of debris along the medial margin of the left inferior turbinate. Leftward deviation of the nasal septum. 5. Intracranial atherosclerosis. ASSESSMENT/PLAN:  Tara Jennings is a 80year old female with   (H90.6) Mixed hearing loss, bilateral  (primary encounter diagnosis)  (H70.13) Chronic mastoiditis, bilateral     IMPRESSION  Bilateral mixed hearing loss  History of ear surgery unspecified, with left mastoid cavity  Bilateral mucosalization of the TM    PLAN:  -Moisture in both ears  -Continue alcohol/vinegar ear flushes. Reaffirmed use of schedule with patient's son (every other day or 2-3x/week). The goal of this treatment is to help dry out the affected ear.  -Patient is going to pursue hearing aids through her insurance. She will require a custom mold on the left - it is possible she can still obtain the hearing aids through insurance and return to [facility] audiology for the custom mold.  Patient could also return to [facility] audiology since she has been seen there before.   -Discussed realistic expectations for hearing with hearing aids   -Follow-up in 1 month for ear check  -Discussed she is not a candidate for cochlear implant surgery   Situation reviewed with the patient in detail. Attention: This note has been scribed by Rosey Garcia under the supervision of Gloria Preciado MD.      Gloria Preciado MD  Otology/Otolaryngology  [facility]   [address] [address] [facility]  Phone [phone]  Fax [phone]     I have personally performed the services described in this documentation. All medical record entries made by the scribe were at my direction and in my presence. I have reviewed the chart and agree that the medical record reflects my personal performance and is accurate and complete.

## 2023-08-29 ENCOUNTER — LAB ENCOUNTER (OUTPATIENT)
Dept: LAB | Age: 88
End: 2023-08-29
Attending: INTERNAL MEDICINE
Payer: MEDICARE

## 2023-08-29 ENCOUNTER — OFFICE VISIT (OUTPATIENT)
Dept: INTERNAL MEDICINE CLINIC | Facility: CLINIC | Age: 88
End: 2023-08-29

## 2023-08-29 VITALS
HEIGHT: 59 IN | SYSTOLIC BLOOD PRESSURE: 122 MMHG | WEIGHT: 130 LBS | DIASTOLIC BLOOD PRESSURE: 78 MMHG | BODY MASS INDEX: 26.21 KG/M2 | OXYGEN SATURATION: 97 % | TEMPERATURE: 99 F | HEART RATE: 75 BPM

## 2023-08-29 DIAGNOSIS — I47.1 SVT (SUPRAVENTRICULAR TACHYCARDIA) (HCC): ICD-10-CM

## 2023-08-29 DIAGNOSIS — Z71.89 ADVANCE DIRECTIVE DISCUSSED WITH PATIENT: ICD-10-CM

## 2023-08-29 DIAGNOSIS — E11.40 TYPE 2 DIABETES MELLITUS WITH DIABETIC NEUROPATHY, WITHOUT LONG-TERM CURRENT USE OF INSULIN (HCC): ICD-10-CM

## 2023-08-29 DIAGNOSIS — S81.802A WOUND OF LEFT LOWER EXTREMITY, INITIAL ENCOUNTER: ICD-10-CM

## 2023-08-29 DIAGNOSIS — I48.0 PAF (PAROXYSMAL ATRIAL FIBRILLATION) (HCC): ICD-10-CM

## 2023-08-29 DIAGNOSIS — H90.6 MIXED CONDUCTIVE AND SENSORINEURAL HEARING LOSS OF BOTH EARS: ICD-10-CM

## 2023-08-29 DIAGNOSIS — Z23 NEED FOR VACCINATION AGAINST STREPTOCOCCUS PNEUMONIAE: ICD-10-CM

## 2023-08-29 DIAGNOSIS — Z00.00 MEDICARE ANNUAL WELLNESS VISIT, SUBSEQUENT: Primary | ICD-10-CM

## 2023-08-29 DIAGNOSIS — I10 HYPERTENSION, ESSENTIAL: ICD-10-CM

## 2023-08-29 DIAGNOSIS — I25.10 CORONARY ARTERY DISEASE INVOLVING NATIVE CORONARY ARTERY OF NATIVE HEART WITHOUT ANGINA PECTORIS: ICD-10-CM

## 2023-08-29 DIAGNOSIS — Z95.5 STENTED CORONARY ARTERY: ICD-10-CM

## 2023-08-29 PROBLEM — S81.801A WOUND OF RIGHT LEG: Status: RESOLVED | Noted: 2022-06-07 | Resolved: 2023-01-01

## 2023-08-29 PROBLEM — D69.6 THROMBOCYTOPENIA (HCC): Chronic | Status: ACTIVE | Noted: 2023-01-01

## 2023-08-29 PROBLEM — L97.913 NON-PRESSURE CHRONIC ULCER OF RIGHT LOWER LEG WITH NECROSIS OF MUSCLE (HCC): Status: RESOLVED | Noted: 2022-06-07 | Resolved: 2023-01-01

## 2023-08-29 PROBLEM — L97.913 NON-PRESSURE CHRONIC ULCER OF RIGHT LOWER LEG WITH NECROSIS OF MUSCLE (HCC): Status: RESOLVED | Noted: 2022-06-07 | Resolved: 2023-08-29

## 2023-08-29 PROBLEM — E11.22 CKD STAGE 3 DUE TO TYPE 2 DIABETES MELLITUS (HCC): Status: RESOLVED | Noted: 2019-04-16 | Resolved: 2023-08-29

## 2023-08-29 PROBLEM — S12.100A CLOSED FRACTURE OF SECOND CERVICAL VERTEBRA (HCC): Status: RESOLVED | Noted: 2021-03-31 | Resolved: 2023-08-29

## 2023-08-29 PROBLEM — N18.30 CKD STAGE 3 DUE TO TYPE 2 DIABETES MELLITUS (HCC): Status: RESOLVED | Noted: 2019-04-16 | Resolved: 2023-01-01

## 2023-08-29 PROBLEM — R94.39 ABNORMAL NUCLEAR STRESS TEST: Status: RESOLVED | Noted: 2020-06-17 | Resolved: 2023-01-01

## 2023-08-29 PROBLEM — S81.801A WOUND OF RIGHT LEG: Status: RESOLVED | Noted: 2022-06-07 | Resolved: 2023-08-29

## 2023-08-29 PROBLEM — D69.6 THROMBOCYTOPENIA (HCC): Chronic | Status: ACTIVE | Noted: 2023-08-29

## 2023-08-29 PROBLEM — N18.30 CKD STAGE 3 DUE TO TYPE 2 DIABETES MELLITUS (HCC): Status: RESOLVED | Noted: 2019-04-16 | Resolved: 2023-08-29

## 2023-08-29 PROBLEM — S12.100A CLOSED FRACTURE OF SECOND CERVICAL VERTEBRA (HCC): Status: RESOLVED | Noted: 2021-03-31 | Resolved: 2023-01-01

## 2023-08-29 PROBLEM — R94.39 ABNORMAL NUCLEAR STRESS TEST: Status: RESOLVED | Noted: 2020-06-17 | Resolved: 2023-08-29

## 2023-08-29 PROBLEM — E11.22 CKD STAGE 3 DUE TO TYPE 2 DIABETES MELLITUS (HCC): Status: RESOLVED | Noted: 2019-04-16 | Resolved: 2023-01-01

## 2023-08-29 LAB
ALBUMIN SERPL-MCNC: 3.4 G/DL (ref 3.4–5)
ALBUMIN/GLOB SERPL: 0.9 {RATIO} (ref 1–2)
ALP LIVER SERPL-CCNC: 99 U/L
ALT SERPL-CCNC: 15 U/L
ANION GAP SERPL CALC-SCNC: 9 MMOL/L (ref 0–18)
AST SERPL-CCNC: 18 U/L (ref 15–37)
BASOPHILS # BLD AUTO: 0.05 X10(3) UL (ref 0–0.2)
BASOPHILS NFR BLD AUTO: 1 %
BILIRUB SERPL-MCNC: 0.4 MG/DL (ref 0.1–2)
BUN BLD-MCNC: 16 MG/DL (ref 7–18)
BUN/CREAT SERPL: 18 (ref 10–20)
CALCIUM BLD-MCNC: 10 MG/DL (ref 8.5–10.1)
CHLORIDE SERPL-SCNC: 97 MMOL/L (ref 98–112)
CHOLEST SERPL-MCNC: 145 MG/DL (ref ?–200)
CO2 SERPL-SCNC: 27 MMOL/L (ref 21–32)
CREAT BLD-MCNC: 0.89 MG/DL
DEPRECATED RDW RBC AUTO: 49.7 FL (ref 35.1–46.3)
EGFRCR SERPLBLD CKD-EPI 2021: 61 ML/MIN/1.73M2 (ref 60–?)
EOSINOPHIL # BLD AUTO: 0.38 X10(3) UL (ref 0–0.7)
EOSINOPHIL NFR BLD AUTO: 7.8 %
ERYTHROCYTE [DISTWIDTH] IN BLOOD BY AUTOMATED COUNT: 14.9 % (ref 11–15)
EST. AVERAGE GLUCOSE BLD GHB EST-MCNC: 154 MG/DL (ref 68–126)
FASTING PATIENT LIPID ANSWER: NO
FASTING STATUS PATIENT QL REPORTED: NO
GLOBULIN PLAS-MCNC: 3.6 G/DL (ref 2.8–4.4)
GLUCOSE BLD-MCNC: 124 MG/DL (ref 70–99)
HBA1C MFR BLD: 7 % (ref ?–5.7)
HCT VFR BLD AUTO: 33.1 %
HDLC SERPL-MCNC: 44 MG/DL (ref 40–59)
HGB BLD-MCNC: 11 G/DL
IMM GRANULOCYTES # BLD AUTO: 0.02 X10(3) UL (ref 0–1)
IMM GRANULOCYTES NFR BLD: 0.4 %
LDLC SERPL CALC-MCNC: 85 MG/DL (ref ?–100)
LYMPHOCYTES # BLD AUTO: 1.14 X10(3) UL (ref 1–4)
LYMPHOCYTES NFR BLD AUTO: 23.4 %
MCH RBC QN AUTO: 30.1 PG (ref 26–34)
MCHC RBC AUTO-ENTMCNC: 33.2 G/DL (ref 31–37)
MCV RBC AUTO: 90.7 FL
MONOCYTES # BLD AUTO: 0.56 X10(3) UL (ref 0.1–1)
MONOCYTES NFR BLD AUTO: 11.5 %
NEUTROPHILS # BLD AUTO: 2.73 X10 (3) UL (ref 1.5–7.7)
NEUTROPHILS # BLD AUTO: 2.73 X10(3) UL (ref 1.5–7.7)
NEUTROPHILS NFR BLD AUTO: 55.9 %
NONHDLC SERPL-MCNC: 101 MG/DL (ref ?–130)
OSMOLALITY SERPL CALC.SUM OF ELEC: 279 MOSM/KG (ref 275–295)
PLATELET # BLD AUTO: 348 10(3)UL (ref 150–450)
POTASSIUM SERPL-SCNC: 4.6 MMOL/L (ref 3.5–5.1)
PROT SERPL-MCNC: 7 G/DL (ref 6.4–8.2)
RBC # BLD AUTO: 3.65 X10(6)UL
SODIUM SERPL-SCNC: 133 MMOL/L (ref 136–145)
TRIGL SERPL-MCNC: 81 MG/DL (ref 30–149)
VLDLC SERPL CALC-MCNC: 13 MG/DL (ref 0–30)
WBC # BLD AUTO: 4.9 X10(3) UL (ref 4–11)

## 2023-08-29 PROCEDURE — 3078F DIAST BP <80 MM HG: CPT | Performed by: INTERNAL MEDICINE

## 2023-08-29 PROCEDURE — 85025 COMPLETE CBC W/AUTO DIFF WBC: CPT

## 2023-08-29 PROCEDURE — G0009 ADMIN PNEUMOCOCCAL VACCINE: HCPCS | Performed by: INTERNAL MEDICINE

## 2023-08-29 PROCEDURE — 3008F BODY MASS INDEX DOCD: CPT | Performed by: INTERNAL MEDICINE

## 2023-08-29 PROCEDURE — 80053 COMPREHEN METABOLIC PANEL: CPT

## 2023-08-29 PROCEDURE — G0439 PPPS, SUBSEQ VISIT: HCPCS | Performed by: INTERNAL MEDICINE

## 2023-08-29 PROCEDURE — 36415 COLL VENOUS BLD VENIPUNCTURE: CPT

## 2023-08-29 PROCEDURE — 83036 HEMOGLOBIN GLYCOSYLATED A1C: CPT

## 2023-08-29 PROCEDURE — 1125F AMNT PAIN NOTED PAIN PRSNT: CPT | Performed by: INTERNAL MEDICINE

## 2023-08-29 PROCEDURE — 1170F FXNL STATUS ASSESSED: CPT | Performed by: INTERNAL MEDICINE

## 2023-08-29 PROCEDURE — 99214 OFFICE O/P EST MOD 30 MIN: CPT | Performed by: INTERNAL MEDICINE

## 2023-08-29 PROCEDURE — 90677 PCV20 VACCINE IM: CPT | Performed by: INTERNAL MEDICINE

## 2023-08-29 PROCEDURE — 80061 LIPID PANEL: CPT

## 2023-08-29 PROCEDURE — 3074F SYST BP LT 130 MM HG: CPT | Performed by: INTERNAL MEDICINE

## 2023-08-29 RX ORDER — FERROUS SULFATE 325(65) MG
TABLET ORAL
Qty: 90 TABLET | Refills: 3 | Status: SHIPPED | OUTPATIENT
Start: 2023-08-29

## 2023-08-29 RX ORDER — FERROUS SULFATE 325(65) MG
TABLET ORAL
Qty: 90 TABLET | Refills: 3 | OUTPATIENT
Start: 2023-08-29

## 2023-08-31 ENCOUNTER — TELEPHONE (OUTPATIENT)
Dept: INTERNAL MEDICINE CLINIC | Facility: CLINIC | Age: 88
End: 2023-08-31

## 2023-09-08 ENCOUNTER — PATIENT MESSAGE (OUTPATIENT)
Dept: OTOLARYNGOLOGY | Facility: CLINIC | Age: 88
End: 2023-09-08

## 2023-09-08 NOTE — TELEPHONE ENCOUNTER
From: Sheila Lopes  To: Oralia Mcfarland MD  Sent: 9/8/2023 1:15 PM CDT  Subject: Mum's Hearing Status    Mum has an appointment with you this Monday. I wanted to update you on the status of her hearing. She has been wearing the new hearing aids every day. However, there is no improvement to speak of. for instance, she can't even hear the television set when the hearing aids are on from 5 ft away. She hers very little conversation other than my voice. They simply aren't working for her. We have gone back to the place where we bought the hearing aids and then adjust them for her, however she really can't hear other than in the quiet room when the aids are on. We need to address if surgery is needed because we've tried everything for 5 months and she cannot hear and is getting very frustrated.

## 2023-09-11 ENCOUNTER — OFFICE VISIT (OUTPATIENT)
Dept: OTOLARYNGOLOGY | Facility: CLINIC | Age: 88
End: 2023-09-11

## 2023-09-11 DIAGNOSIS — H70.13 CHRONIC MASTOIDITIS OF BOTH SIDES: ICD-10-CM

## 2023-09-11 DIAGNOSIS — H90.6 MIXED HEARING LOSS, BILATERAL: Primary | ICD-10-CM

## 2023-09-11 PROCEDURE — 92504 EAR MICROSCOPY EXAMINATION: CPT | Performed by: OTOLARYNGOLOGY

## 2023-09-11 PROCEDURE — 99214 OFFICE O/P EST MOD 30 MIN: CPT | Performed by: OTOLARYNGOLOGY

## 2023-09-11 PROCEDURE — 1159F MED LIST DOCD IN RCRD: CPT | Performed by: OTOLARYNGOLOGY

## 2023-09-11 PROCEDURE — 1160F RVW MEDS BY RX/DR IN RCRD: CPT | Performed by: OTOLARYNGOLOGY

## 2023-09-12 RX ORDER — LORATADINE 10 MG/1
10 TABLET ORAL DAILY
Qty: 30 TABLET | Refills: 2 | Status: SHIPPED | OUTPATIENT
Start: 2023-09-12

## 2023-09-13 ENCOUNTER — TELEPHONE (OUTPATIENT)
Dept: OTOLARYNGOLOGY | Facility: CLINIC | Age: 88
End: 2023-09-13

## 2023-09-20 ENCOUNTER — OFFICE VISIT (OUTPATIENT)
Dept: WOUND CARE | Facility: HOSPITAL | Age: 88
End: 2023-09-20
Attending: NURSE PRACTITIONER
Payer: MEDICARE

## 2023-09-20 VITALS
DIASTOLIC BLOOD PRESSURE: 84 MMHG | SYSTOLIC BLOOD PRESSURE: 165 MMHG | TEMPERATURE: 98 F | OXYGEN SATURATION: 94 % | HEART RATE: 74 BPM | HEIGHT: 58 IN | RESPIRATION RATE: 18 BRPM | BODY MASS INDEX: 27.71 KG/M2 | WEIGHT: 132 LBS

## 2023-09-20 DIAGNOSIS — E08.621 DIABETIC ULCER OF TOE ASSOCIATED WITH DIABETES MELLITUS DUE TO UNDERLYING CONDITION, LIMITED TO BREAKDOWN OF SKIN, UNSPECIFIED LATERALITY (HCC): ICD-10-CM

## 2023-09-20 DIAGNOSIS — L97.229 NON-PRESSURE CHRONIC ULCER OF LEFT CALF, UNSPECIFIED ULCER STAGE (HCC): ICD-10-CM

## 2023-09-20 DIAGNOSIS — L97.501 DIABETIC ULCER OF TOE ASSOCIATED WITH DIABETES MELLITUS DUE TO UNDERLYING CONDITION, LIMITED TO BREAKDOWN OF SKIN, UNSPECIFIED LATERALITY (HCC): ICD-10-CM

## 2023-09-20 DIAGNOSIS — L97.922 NON-PRESSURE CHRONIC ULCER OF LEFT LOWER LEG WITH FAT LAYER EXPOSED (HCC): ICD-10-CM

## 2023-09-20 DIAGNOSIS — S81.802A WOUND OF LEFT LOWER EXTREMITY, INITIAL ENCOUNTER: Primary | ICD-10-CM

## 2023-09-20 DIAGNOSIS — G62.9 NEUROPATHY: ICD-10-CM

## 2023-09-20 PROCEDURE — 99214 OFFICE O/P EST MOD 30 MIN: CPT | Performed by: NURSE PRACTITIONER

## 2023-09-20 NOTE — PROGRESS NOTES
Weekly Wound Education Note    Teaching Provided To: Patient                 Notes: left leg, left 2nd toe and left posterior calf- honey gel; gauze roll; spandagrip

## 2023-09-20 NOTE — PATIENT INSTRUCTIONS
PATIENT INSTRUCTIONS      FOR BRENDA Anne 1931    DATE OF SERVICE: 2023        Apply medi honey and gauze to all wounds. Change dressing every day. Spandi- was added for compression. This can be worn every day.        Follow up with NIVIA Keys 2 WEEKS

## 2023-09-27 ENCOUNTER — TELEPHONE (OUTPATIENT)
Dept: INTERNAL MEDICINE CLINIC | Facility: CLINIC | Age: 88
End: 2023-09-27

## 2023-10-03 ENCOUNTER — OFFICE VISIT (OUTPATIENT)
Dept: WOUND CARE | Facility: HOSPITAL | Age: 88
End: 2023-10-03
Attending: NURSE PRACTITIONER
Payer: MEDICARE

## 2023-10-03 VITALS
SYSTOLIC BLOOD PRESSURE: 149 MMHG | HEART RATE: 73 BPM | RESPIRATION RATE: 18 BRPM | DIASTOLIC BLOOD PRESSURE: 68 MMHG | TEMPERATURE: 98 F | OXYGEN SATURATION: 97 %

## 2023-10-03 DIAGNOSIS — L97.501 DIABETIC ULCER OF TOE ASSOCIATED WITH DIABETES MELLITUS DUE TO UNDERLYING CONDITION, LIMITED TO BREAKDOWN OF SKIN, UNSPECIFIED LATERALITY (HCC): ICD-10-CM

## 2023-10-03 DIAGNOSIS — G62.9 NEUROPATHY: ICD-10-CM

## 2023-10-03 DIAGNOSIS — L97.922 NON-PRESSURE CHRONIC ULCER OF LEFT LOWER LEG WITH FAT LAYER EXPOSED (HCC): Primary | ICD-10-CM

## 2023-10-03 DIAGNOSIS — E11.610 CHARCOT'S JOINT OF FOOT IN TYPE 2 DIABETES MELLITUS (HCC): ICD-10-CM

## 2023-10-03 DIAGNOSIS — E08.621 DIABETIC ULCER OF TOE ASSOCIATED WITH DIABETES MELLITUS DUE TO UNDERLYING CONDITION, LIMITED TO BREAKDOWN OF SKIN, UNSPECIFIED LATERALITY (HCC): ICD-10-CM

## 2023-10-03 DIAGNOSIS — L97.229 NON-PRESSURE CHRONIC ULCER OF LEFT CALF, UNSPECIFIED ULCER STAGE (HCC): ICD-10-CM

## 2023-10-03 PROCEDURE — 11042 DBRDMT SUBQ TIS 1ST 20SQCM/<: CPT | Performed by: NURSE PRACTITIONER

## 2023-10-03 NOTE — PATIENT INSTRUCTIONS
PATIENT INSTRUCTIONS      FOR BRENDA Pretty 1931    DATE OF SERVICE: 10/3/2023      Instructions for Wound care:     Wash hands  Clean wounds with normal saline  Continue Medi honey and start using hydrofera blue foam dressing to all wounds  Change dressing every 3 days and as needed  Continue to wear the compression stocking     Keep offloading on right foot to keep pressure off of foot  See podiatrist           Follow up with Abdoulaye Cabrera in 1 WEEK-2 weeks      Call clinic with any questions [phone]

## 2023-10-04 ENCOUNTER — APPOINTMENT (OUTPATIENT)
Dept: WOUND CARE | Facility: HOSPITAL | Age: 88
End: 2023-10-04
Attending: NURSE PRACTITIONER
Payer: MEDICARE

## 2023-10-10 ENCOUNTER — OFFICE VISIT (OUTPATIENT)
Dept: WOUND CARE | Facility: HOSPITAL | Age: 88
End: 2023-10-10
Attending: NURSE PRACTITIONER
Payer: MEDICARE

## 2023-10-10 VITALS
TEMPERATURE: 96 F | OXYGEN SATURATION: 96 % | HEART RATE: 67 BPM | SYSTOLIC BLOOD PRESSURE: 146 MMHG | DIASTOLIC BLOOD PRESSURE: 66 MMHG | RESPIRATION RATE: 18 BRPM

## 2023-10-10 DIAGNOSIS — L97.229 NON-PRESSURE CHRONIC ULCER OF LEFT CALF, UNSPECIFIED ULCER STAGE (HCC): ICD-10-CM

## 2023-10-10 DIAGNOSIS — L97.922 NON-PRESSURE CHRONIC ULCER OF LEFT LOWER LEG WITH FAT LAYER EXPOSED (HCC): Primary | ICD-10-CM

## 2023-10-10 DIAGNOSIS — E08.621 DIABETIC ULCER OF TOE ASSOCIATED WITH DIABETES MELLITUS DUE TO UNDERLYING CONDITION, LIMITED TO BREAKDOWN OF SKIN, UNSPECIFIED LATERALITY (HCC): ICD-10-CM

## 2023-10-10 DIAGNOSIS — G62.9 NEUROPATHY: ICD-10-CM

## 2023-10-10 DIAGNOSIS — L97.501 DIABETIC ULCER OF TOE ASSOCIATED WITH DIABETES MELLITUS DUE TO UNDERLYING CONDITION, LIMITED TO BREAKDOWN OF SKIN, UNSPECIFIED LATERALITY (HCC): ICD-10-CM

## 2023-10-10 PROCEDURE — 99213 OFFICE O/P EST LOW 20 MIN: CPT | Performed by: NURSE PRACTITIONER

## 2023-10-11 ENCOUNTER — APPOINTMENT (OUTPATIENT)
Dept: WOUND CARE | Facility: HOSPITAL | Age: 88
End: 2023-10-11
Attending: NURSE PRACTITIONER
Payer: MEDICARE

## 2023-10-12 ENCOUNTER — APPOINTMENT (OUTPATIENT)
Dept: WOUND CARE | Facility: HOSPITAL | Age: 88
End: 2023-10-12
Payer: MEDICARE

## 2023-10-18 ENCOUNTER — OFFICE VISIT (OUTPATIENT)
Dept: WOUND CARE | Facility: HOSPITAL | Age: 88
End: 2023-10-18
Attending: NURSE PRACTITIONER
Payer: MEDICARE

## 2023-10-18 VITALS
DIASTOLIC BLOOD PRESSURE: 64 MMHG | OXYGEN SATURATION: 99 % | TEMPERATURE: 97 F | SYSTOLIC BLOOD PRESSURE: 153 MMHG | RESPIRATION RATE: 18 BRPM | HEART RATE: 63 BPM

## 2023-10-18 DIAGNOSIS — L97.501 DIABETIC ULCER OF TOE ASSOCIATED WITH DIABETES MELLITUS DUE TO UNDERLYING CONDITION, LIMITED TO BREAKDOWN OF SKIN, UNSPECIFIED LATERALITY (HCC): ICD-10-CM

## 2023-10-18 DIAGNOSIS — G62.9 NEUROPATHY: ICD-10-CM

## 2023-10-18 DIAGNOSIS — E08.621 DIABETIC ULCER OF RIGHT MIDFOOT ASSOCIATED WITH DIABETES MELLITUS DUE TO UNDERLYING CONDITION, LIMITED TO BREAKDOWN OF SKIN (HCC): ICD-10-CM

## 2023-10-18 DIAGNOSIS — L97.222 CHRONIC ULCER OF LEFT CALF WITH FAT LAYER EXPOSED (HCC): ICD-10-CM

## 2023-10-18 DIAGNOSIS — E08.621 DIABETIC ULCER OF TOE ASSOCIATED WITH DIABETES MELLITUS DUE TO UNDERLYING CONDITION, LIMITED TO BREAKDOWN OF SKIN, UNSPECIFIED LATERALITY (HCC): ICD-10-CM

## 2023-10-18 DIAGNOSIS — E11.610 CHARCOT'S JOINT OF FOOT IN TYPE 2 DIABETES MELLITUS (HCC): ICD-10-CM

## 2023-10-18 DIAGNOSIS — L97.411 DIABETIC ULCER OF RIGHT MIDFOOT ASSOCIATED WITH DIABETES MELLITUS DUE TO UNDERLYING CONDITION, LIMITED TO BREAKDOWN OF SKIN (HCC): ICD-10-CM

## 2023-10-18 DIAGNOSIS — L97.922 NON-PRESSURE CHRONIC ULCER OF LEFT LOWER LEG WITH FAT LAYER EXPOSED (HCC): Primary | ICD-10-CM

## 2023-10-18 PROCEDURE — 11042 DBRDMT SUBQ TIS 1ST 20SQCM/<: CPT | Performed by: NURSE PRACTITIONER

## 2023-10-18 NOTE — PROGRESS NOTES
Patient ID: Quinton Hernandez is a 80year old female. Debridement Venous Ulcer Left; Lower; Lateral Leg   Wound 09/20/23 1 Venous Ulcer Leg Left; Lower; Lateral    Performed by: GUI Chowdhury  Authorized by: GUI Chowdhury      Consent   Consent obtained? verbal  Consent given by: patient  Risks discussed? procedural risks discussed  Time out called at 10/18/2023 11:43 AM  Immediately prior to the procedure a time out was called and the performing provider verified the correct patient, procedure, equipment, support staff, and site/side marked as required.     Debridement Details  Performed by: NP  Debridement type: surgical  Level of debridement: subcutaneous tissue  Pain control: lidocaine 4%  Pain control administration type: topical    Pre-debridement measurements  Length (cm): 3.3  Width (cm): 2.7  Depth (cm): 0.2  Surface Area (cm^2): 8.91    Post-debridement measurements  Length (cm): 3.4  Width (cm): 2.8  Depth (cm): 0.3  Percent debrided: 100%  Surface Area (cm^2): 9.52  Area Debrided (cm^2): 9.52  Volume (cm^3): 2.86    Tissue and other material debrided: subcutaneous tissue  Devitalized tissue debrided: biofilm, fibrin and slough  Instrument(s) utilized: curette  Bleeding: medium  Hemostasis obtained with: pressure  Procedural pain (0-10): 5  Post-procedural pain: 5   Response to treatment: procedure was tolerated well

## 2023-10-18 NOTE — PROGRESS NOTES
Patient ID: Angelique Fischer is a 80year old female. Debridement Venous Ulcer Left; Lower; Posterior Leg   Wound 09/20/23 3 Venous Ulcer Leg Left; Lower; Posterior    Performed by: GUI Owusu  Authorized by: GUI Owusu      Consent   Consent obtained? verbal  Consent given by: patient  Risks discussed? procedural risks discussed  Time out called at 10/18/2023 11:39 AM  Immediately prior to the procedure a time out was called and the performing provider verified the correct patient, procedure, equipment, support staff, and site/side marked as required. Debridement Details  Performed by: NP  Debridement type: surgical  Level of debridement: subcutaneous tissue  Pain control: lidocaine 4%  Pain control administration type: topical    Pre-debridement measurements  Length (cm): 2.5  Width (cm): 2.3  Depth (cm): 0.1  Surface Area (cm^2): 5.75    Post-debridement measurements  Length (cm): 2.5  Width (cm): 2.4  Depth (cm): 0.2  Percent debrided: 100%  Surface Area (cm^2): 6  Area Debrided (cm^2):  6  Volume (cm^3): 1.2    Tissue and other material debrided: subcutaneous tissue  Devitalized tissue debrided: biofilm, fibrin and slough  Instrument(s) utilized: curette  Bleeding: medium  Hemostasis obtained with: pressure  Procedural pain (0-10): 5  Post-procedural pain: 5   Response to treatment: procedure was tolerated well

## 2023-10-19 RX ORDER — TRIAMCINOLONE ACETONIDE 1 MG/G
1 CREAM TOPICAL 2 TIMES DAILY PRN
Qty: 15 G | Refills: 1 | Status: SHIPPED | OUTPATIENT
Start: 2023-10-19

## 2023-10-19 NOTE — TELEPHONE ENCOUNTER
Refill request is for a maintenance medication and has met the criteria specified in the Ambulatory Medication Refill Standing Order for eligibility, visits, laboratory, alerts and was sent to the requested pharmacy. Requested Prescriptions     Signed Prescriptions Disp Refills    triamcinolone 0.1 % External Cream 15 g 1     Sig: Apply 1 Application topically 2 (two) times daily as needed.  APPLY TO AFFECTED AREA     Authorizing Provider: Christopher Hatch     Ordering User: Tyrone Leggett

## 2023-10-20 RX ORDER — HYDROCORTISONE 25 MG/G
CREAM TOPICAL
Qty: 30 G | Refills: 2 | Status: SHIPPED | OUTPATIENT
Start: 2023-10-20

## 2023-10-20 NOTE — TELEPHONE ENCOUNTER
Refill request is for a maintenance medication and has met the criteria specified in the Ambulatory Medication Refill Standing Order for eligibility, visits, laboratory, alerts and was sent to the requested pharmacy.     Requested Prescriptions     Signed Prescriptions Disp Refills    hydrocortisone 2.5 % External Cream 30 g 2     Sig: APPLY RECTALLY TWICE DAILY AS NEEDED     Authorizing Provider: Peewee Van     Ordering User: Gus Auguste PCP/OB/GYN

## 2023-10-25 ENCOUNTER — OFFICE VISIT (OUTPATIENT)
Dept: WOUND CARE | Facility: HOSPITAL | Age: 88
End: 2023-10-25
Attending: NURSE PRACTITIONER
Payer: MEDICARE

## 2023-10-25 VITALS
RESPIRATION RATE: 18 BRPM | SYSTOLIC BLOOD PRESSURE: 139 MMHG | TEMPERATURE: 97 F | HEART RATE: 68 BPM | OXYGEN SATURATION: 98 % | DIASTOLIC BLOOD PRESSURE: 64 MMHG

## 2023-10-25 DIAGNOSIS — L97.501 DIABETIC ULCER OF TOE ASSOCIATED WITH DIABETES MELLITUS DUE TO UNDERLYING CONDITION, LIMITED TO BREAKDOWN OF SKIN, UNSPECIFIED LATERALITY (HCC): ICD-10-CM

## 2023-10-25 DIAGNOSIS — E08.621 DIABETIC ULCER OF TOE ASSOCIATED WITH DIABETES MELLITUS DUE TO UNDERLYING CONDITION, LIMITED TO BREAKDOWN OF SKIN, UNSPECIFIED LATERALITY (HCC): ICD-10-CM

## 2023-10-25 DIAGNOSIS — G62.9 NEUROPATHY: ICD-10-CM

## 2023-10-25 DIAGNOSIS — E11.610 CHARCOT'S JOINT OF FOOT IN TYPE 2 DIABETES MELLITUS (HCC): ICD-10-CM

## 2023-10-25 DIAGNOSIS — E08.621 DIABETIC ULCER OF RIGHT MIDFOOT ASSOCIATED WITH DIABETES MELLITUS DUE TO UNDERLYING CONDITION, LIMITED TO BREAKDOWN OF SKIN (HCC): ICD-10-CM

## 2023-10-25 DIAGNOSIS — L97.922 NON-PRESSURE CHRONIC ULCER OF LEFT LOWER LEG WITH FAT LAYER EXPOSED (HCC): Primary | ICD-10-CM

## 2023-10-25 DIAGNOSIS — L97.411 DIABETIC ULCER OF RIGHT MIDFOOT ASSOCIATED WITH DIABETES MELLITUS DUE TO UNDERLYING CONDITION, LIMITED TO BREAKDOWN OF SKIN (HCC): ICD-10-CM

## 2023-10-25 DIAGNOSIS — L97.222 CHRONIC ULCER OF LEFT CALF WITH FAT LAYER EXPOSED (HCC): ICD-10-CM

## 2023-10-25 PROCEDURE — 11042 DBRDMT SUBQ TIS 1ST 20SQCM/<: CPT | Performed by: NURSE PRACTITIONER

## 2023-10-25 NOTE — PROGRESS NOTES
Patient ID: Tara Jennings is a 80year old female. Debridement Venous Ulcer Left; Lower; Lateral Leg   Wound 09/20/23 1 Venous Ulcer Leg Left; Lower; Lateral    Performed by: GUI Díaz  Authorized by: GUI Díaz      Consent   Consent obtained? verbal  Consent given by: patient  Time out called at 10/25/2023 11:01 AM  Immediately prior to the procedure a time out was called and the performing provider verified the correct patient, procedure, equipment, support staff, and site/side marked as required.     Debridement Details  Performed by: NP  Debridement type: surgical  Level of debridement: subcutaneous tissue  Pain control: lidocaine 4%  Pain control administration type: topical    Pre-debridement measurements  Length (cm): 3  Width (cm): 1.6  Depth (cm): 0.2  Surface Area (cm^2): 4.8    Post-debridement measurements  Length (cm): 3.2  Width (cm): 1.7  Depth (cm): 0.2  Percent debrided: 100%  Surface Area (cm^2): 5.44  Area Debrided (cm^2): 5.44  Volume (cm^3): 1.09    Tissue and other material debrided: subcutaneous tissue  Devitalized tissue debrided: biofilm, fibrin and slough  Instrument(s) utilized: curette  Bleeding: medium  Hemostasis obtained with: pressure  Procedural pain (0-10): 2  Post-procedural pain: 2   Response to treatment: procedure was tolerated well

## 2023-10-25 NOTE — PROGRESS NOTES
Patient ID: Joshua Hicks is a 80year old female. Debridement Venous Ulcer Left; Lower; Posterior Leg   Wound 09/20/23 3 Venous Ulcer Leg Left; Lower; Posterior    Performed by: GUI Nichole  Authorized by: GUI Nichole      Consent   Consent obtained? verbal  Consent given by: patient  Risks discussed? procedural risks discussed  Time out called at 10/25/2023 11:03 AM  Immediately prior to the procedure a time out was called and the performing provider verified the correct patient, procedure, equipment, support staff, and site/side marked as required.     Debridement Details  Performed by: NP  Debridement type: surgical  Level of debridement: subcutaneous tissue  Pain control: lidocaine 4%  Pain control administration type: topical    Pre-debridement measurements  Length (cm): 2.4  Width (cm): 2.2  Depth (cm): 0.2  Surface Area (cm^2): 5.28    Post-debridement measurements  Length (cm): 2.5  Width (cm): 2.3  Depth (cm): 0.2  Percent debrided: 100%  Surface Area (cm^2): 5.75  Area Debrided (cm^2): 5.75  Volume (cm^3): 1.15    Tissue and other material debrided: subcutaneous tissue  Devitalized tissue debrided: biofilm, fibrin and slough  Instrument(s) utilized: curette  Bleeding: medium  Hemostasis obtained with: pressure  Procedural pain (0-10): 2  Post-procedural pain: 2   Response to treatment: procedure was tolerated well

## 2023-10-26 NOTE — LETTER
2021              [address] [facility]   31        [address]:    Stop sodium chloride pills.          Kristen Cristina MD  [facility], [address] [facility], [address] [place] [address] Suturegard Body: The suture ends were repeatedly re-tightened and re-clamped to achieve the desired tissue expansion.

## 2023-11-01 ENCOUNTER — OFFICE VISIT (OUTPATIENT)
Dept: WOUND CARE | Facility: HOSPITAL | Age: 88
End: 2023-11-01
Attending: NURSE PRACTITIONER
Payer: MEDICARE

## 2023-11-01 ENCOUNTER — TELEPHONE (OUTPATIENT)
Dept: INTERNAL MEDICINE CLINIC | Facility: CLINIC | Age: 88
End: 2023-11-01

## 2023-11-01 VITALS
DIASTOLIC BLOOD PRESSURE: 71 MMHG | TEMPERATURE: 98 F | RESPIRATION RATE: 17 BRPM | HEART RATE: 68 BPM | SYSTOLIC BLOOD PRESSURE: 145 MMHG | OXYGEN SATURATION: 95 %

## 2023-11-01 DIAGNOSIS — L97.922 NON-PRESSURE CHRONIC ULCER OF LEFT LOWER LEG WITH FAT LAYER EXPOSED (HCC): Primary | ICD-10-CM

## 2023-11-01 DIAGNOSIS — L97.222 CHRONIC ULCER OF LEFT CALF WITH FAT LAYER EXPOSED (HCC): ICD-10-CM

## 2023-11-01 DIAGNOSIS — E11.610 CHARCOT'S JOINT OF FOOT IN TYPE 2 DIABETES MELLITUS (HCC): ICD-10-CM

## 2023-11-01 DIAGNOSIS — G62.9 NEUROPATHY: ICD-10-CM

## 2023-11-01 PROCEDURE — 97597 DBRDMT OPN WND 1ST 20 CM/<: CPT | Performed by: NURSE PRACTITIONER

## 2023-11-01 RX ORDER — DILTIAZEM HYDROCHLORIDE 120 MG/1
120 CAPSULE, EXTENDED RELEASE ORAL DAILY
Refills: 0 | OUTPATIENT
Start: 2023-11-01

## 2023-11-03 ENCOUNTER — LAB ENCOUNTER (OUTPATIENT)
Dept: LAB | Age: 88
End: 2023-11-03
Attending: INTERNAL MEDICINE
Payer: MEDICARE

## 2023-11-03 ENCOUNTER — OFFICE VISIT (OUTPATIENT)
Dept: INTERNAL MEDICINE CLINIC | Facility: CLINIC | Age: 88
End: 2023-11-03

## 2023-11-03 VITALS
SYSTOLIC BLOOD PRESSURE: 112 MMHG | WEIGHT: 124.63 LBS | DIASTOLIC BLOOD PRESSURE: 54 MMHG | HEIGHT: 58 IN | HEART RATE: 64 BPM | BODY MASS INDEX: 26.16 KG/M2 | OXYGEN SATURATION: 97 % | TEMPERATURE: 98 F

## 2023-11-03 DIAGNOSIS — E11.69 TYPE 2 DIABETES MELLITUS WITH OTHER SPECIFIED COMPLICATION, WITHOUT LONG-TERM CURRENT USE OF INSULIN (HCC): ICD-10-CM

## 2023-11-03 DIAGNOSIS — H91.90 HEARING LOSS, UNSPECIFIED HEARING LOSS TYPE, UNSPECIFIED LATERALITY: Primary | ICD-10-CM

## 2023-11-03 PROBLEM — D69.6 THROMBOCYTOPENIA (HCC): Chronic | Status: ACTIVE | Noted: 2023-11-03

## 2023-11-03 PROBLEM — J44.89 ASTHMA WITH COPD (CHRONIC OBSTRUCTIVE PULMONARY DISEASE): Chronic | Status: ACTIVE | Noted: 2023-11-03

## 2023-11-03 PROBLEM — J44.89 ASTHMA WITH COPD (CHRONIC OBSTRUCTIVE PULMONARY DISEASE) (HCC): Chronic | Status: ACTIVE | Noted: 2023-01-01

## 2023-11-03 PROBLEM — D69.6 THROMBOCYTOPENIA (HCC): Chronic | Status: ACTIVE | Noted: 2023-01-01

## 2023-11-03 PROBLEM — J44.89 ASTHMA WITH COPD (CHRONIC OBSTRUCTIVE PULMONARY DISEASE) (HCC): Chronic | Status: ACTIVE | Noted: 2023-11-03

## 2023-11-03 LAB
CREAT UR-SCNC: 91.4 MG/DL
EST. AVERAGE GLUCOSE BLD GHB EST-MCNC: 134 MG/DL (ref 68–126)
HBA1C MFR BLD: 6.3 % (ref ?–5.7)
MICROALBUMIN UR-MCNC: 2.29 MG/DL
MICROALBUMIN/CREAT 24H UR-RTO: 25.1 UG/MG (ref ?–30)

## 2023-11-03 PROCEDURE — 1126F AMNT PAIN NOTED NONE PRSNT: CPT | Performed by: INTERNAL MEDICINE

## 2023-11-03 PROCEDURE — 3078F DIAST BP <80 MM HG: CPT | Performed by: INTERNAL MEDICINE

## 2023-11-03 PROCEDURE — 1160F RVW MEDS BY RX/DR IN RCRD: CPT | Performed by: INTERNAL MEDICINE

## 2023-11-03 PROCEDURE — 3008F BODY MASS INDEX DOCD: CPT | Performed by: INTERNAL MEDICINE

## 2023-11-03 PROCEDURE — 1159F MED LIST DOCD IN RCRD: CPT | Performed by: INTERNAL MEDICINE

## 2023-11-03 PROCEDURE — 82043 UR ALBUMIN QUANTITATIVE: CPT

## 2023-11-03 PROCEDURE — 3074F SYST BP LT 130 MM HG: CPT | Performed by: INTERNAL MEDICINE

## 2023-11-03 PROCEDURE — 82570 ASSAY OF URINE CREATININE: CPT

## 2023-11-03 PROCEDURE — 99215 OFFICE O/P EST HI 40 MIN: CPT | Performed by: INTERNAL MEDICINE

## 2023-11-03 PROCEDURE — 83036 HEMOGLOBIN GLYCOSYLATED A1C: CPT

## 2023-11-03 PROCEDURE — 36415 COLL VENOUS BLD VENIPUNCTURE: CPT

## 2023-11-05 ENCOUNTER — TELEPHONE (OUTPATIENT)
Dept: INTERNAL MEDICINE CLINIC | Facility: CLINIC | Age: 88
End: 2023-11-05

## 2023-11-06 ENCOUNTER — TELEPHONE (OUTPATIENT)
Dept: INTERNAL MEDICINE CLINIC | Facility: CLINIC | Age: 88
End: 2023-11-06

## 2023-11-07 ENCOUNTER — OFFICE VISIT (OUTPATIENT)
Dept: PHYSICAL MEDICINE AND REHAB | Facility: CLINIC | Age: 88
End: 2023-11-07
Payer: COMMERCIAL

## 2023-11-07 VITALS — BODY MASS INDEX: 26.03 KG/M2 | WEIGHT: 124 LBS | HEIGHT: 58 IN

## 2023-11-07 DIAGNOSIS — G56.03 CARPAL TUNNEL SYNDROME, BILATERAL: Primary | ICD-10-CM

## 2023-11-07 PROCEDURE — 1160F RVW MEDS BY RX/DR IN RCRD: CPT | Performed by: PHYSICAL MEDICINE & REHABILITATION

## 2023-11-07 PROCEDURE — 1125F AMNT PAIN NOTED PAIN PRSNT: CPT | Performed by: PHYSICAL MEDICINE & REHABILITATION

## 2023-11-07 PROCEDURE — 99214 OFFICE O/P EST MOD 30 MIN: CPT | Performed by: PHYSICAL MEDICINE & REHABILITATION

## 2023-11-07 PROCEDURE — 20526 THER INJECTION CARP TUNNEL: CPT | Performed by: PHYSICAL MEDICINE & REHABILITATION

## 2023-11-07 PROCEDURE — 1159F MED LIST DOCD IN RCRD: CPT | Performed by: PHYSICAL MEDICINE & REHABILITATION

## 2023-11-07 PROCEDURE — 3008F BODY MASS INDEX DOCD: CPT | Performed by: PHYSICAL MEDICINE & REHABILITATION

## 2023-11-07 PROCEDURE — 76942 ECHO GUIDE FOR BIOPSY: CPT | Performed by: PHYSICAL MEDICINE & REHABILITATION

## 2023-11-07 RX ORDER — LIDOCAINE HYDROCHLORIDE 10 MG/ML
3 INJECTION, SOLUTION INFILTRATION; PERINEURAL ONCE
Status: COMPLETED | OUTPATIENT
Start: 2023-11-07 | End: 2023-11-07

## 2023-11-07 RX ORDER — TRIAMCINOLONE ACETONIDE 40 MG/ML
40 INJECTION, SUSPENSION INTRA-ARTICULAR; INTRAMUSCULAR ONCE
Status: COMPLETED | OUTPATIENT
Start: 2023-11-07 | End: 2023-11-07

## 2023-11-07 RX ADMIN — TRIAMCINOLONE ACETONIDE 40 MG: 40 INJECTION, SUSPENSION INTRA-ARTICULAR; INTRAMUSCULAR at 09:27:00

## 2023-11-07 RX ADMIN — LIDOCAINE HYDROCHLORIDE 3 ML: 10 INJECTION, SOLUTION INFILTRATION; PERINEURAL at 09:27:00

## 2023-11-07 NOTE — PROCEDURES
Procedure:  Ultrasound guided BILATERAL carpal tunnel injection  The risks, benefits and anticipated outcomes of the procedure, the risks and benefits of the alternatives to the procedure, and the roles and tasks of the personnel to be involved, were discussed with the patient, and the patient consents to the procedure and agrees to proceed. UNIVERSAL PROTOCOL / SAFETY CHECKLIST  Sign in Communication: Completed  Time Out:  Team Confirms the Correct Patient, Correct Procedure, Correct Site and Site Marking, Correct Position (if applicable), Prep and Dry Time (if applicable). Affirmation of Time Out: YES  Sign Out Discussion: Completed if applicable    The procedure was carried out under sterile prep  with sterile gel. The target site was anesthetized with a topical ethyl chloride spray. Then, A  25ga 1.5in needle was introduced and advanced with ultrasound guidance from radial to ulnar direction using an in plane approach with the transducer in transverse orientation. Following negative aspiration, a mixture of 1cc of 1% lidocaine and 1cc of Kenalog (40mg/ml) was injected. Permanent pictures were taken and stored in the PACS system. Ultrasound interpretation was performed prior to the procedure to identify the target and any adjacent neurovascular structures, and during real-time needle guidance confirming placement. Post-intervention interpretation was also performed confirming appropriate injectate flow and hemostasis. The patient tolerated the procedure without complication and was instructed in post-procedure precautions. Please see patient instructions.     Antionette Au DO, FAAPMR & CAQSM  Physical Medicine and Rehabilitation/Sports Medicine  MEDICAL [facility]

## 2023-11-08 ENCOUNTER — OFFICE VISIT (OUTPATIENT)
Dept: WOUND CARE | Facility: HOSPITAL | Age: 88
End: 2023-11-08
Attending: NURSE PRACTITIONER
Payer: MEDICARE

## 2023-11-08 VITALS
RESPIRATION RATE: 18 BRPM | OXYGEN SATURATION: 97 % | SYSTOLIC BLOOD PRESSURE: 131 MMHG | TEMPERATURE: 97 F | DIASTOLIC BLOOD PRESSURE: 61 MMHG | HEART RATE: 66 BPM

## 2023-11-08 DIAGNOSIS — L97.922 NON-PRESSURE CHRONIC ULCER OF LEFT LOWER LEG WITH FAT LAYER EXPOSED (HCC): Primary | ICD-10-CM

## 2023-11-08 DIAGNOSIS — L97.222 CHRONIC ULCER OF LEFT CALF WITH FAT LAYER EXPOSED (HCC): ICD-10-CM

## 2023-11-08 DIAGNOSIS — G62.9 NEUROPATHY: ICD-10-CM

## 2023-11-08 PROCEDURE — 99213 OFFICE O/P EST LOW 20 MIN: CPT | Performed by: NURSE PRACTITIONER

## 2023-11-09 ENCOUNTER — TELEPHONE (OUTPATIENT)
Dept: PHYSICAL MEDICINE AND REHAB | Facility: CLINIC | Age: 88
End: 2023-11-09

## 2023-11-09 NOTE — TELEPHONE ENCOUNTER
Initiated authorization for Ultrasound guided bilateral carpal tunnel injection with corticosteroid procedure.  Dx code G63.56  with [address] [place] [address] CPT Code 45546,    Decision ID #:[id number]  VALID 11/9/23-12/09/23  Status No Auth required    DONE 11/7/23 in office

## 2023-11-09 NOTE — TELEPHONE ENCOUNTER
Hi Dr. Jennifer Chavarria, these RXs were previously ordered by Dr. Ameya Nolasco - are you agusto to sign?

## 2023-11-10 RX ORDER — ENALAPRIL MALEATE 5 MG/1
5 TABLET ORAL DAILY
Qty: 90 TABLET | Refills: 3 | Status: SHIPPED | OUTPATIENT
Start: 2023-11-10

## 2023-11-10 RX ORDER — DILTIAZEM HYDROCHLORIDE 120 MG/1
120 CAPSULE, EXTENDED RELEASE ORAL DAILY
Qty: 90 CAPSULE | Refills: 3 | Status: SHIPPED | OUTPATIENT
Start: 2023-11-10

## 2023-11-10 RX ORDER — LACTULOSE 10 G/15ML
30 SOLUTION ORAL DAILY PRN
Qty: 237 ML | Refills: 11 | Status: CANCELLED | OUTPATIENT
Start: 2023-11-10

## 2023-11-15 ENCOUNTER — OFFICE VISIT (OUTPATIENT)
Dept: WOUND CARE | Facility: HOSPITAL | Age: 88
End: 2023-11-15
Attending: NURSE PRACTITIONER
Payer: MEDICARE

## 2023-11-15 VITALS
DIASTOLIC BLOOD PRESSURE: 79 MMHG | RESPIRATION RATE: 17 BRPM | SYSTOLIC BLOOD PRESSURE: 151 MMHG | TEMPERATURE: 98 F | OXYGEN SATURATION: 97 % | HEART RATE: 70 BPM

## 2023-11-15 DIAGNOSIS — L97.922 NON-PRESSURE CHRONIC ULCER OF LEFT LOWER LEG WITH FAT LAYER EXPOSED (HCC): Primary | ICD-10-CM

## 2023-11-15 DIAGNOSIS — G62.9 NEUROPATHY: ICD-10-CM

## 2023-11-15 PROCEDURE — 97597 DBRDMT OPN WND 1ST 20 CM/<: CPT | Performed by: NURSE PRACTITIONER

## 2023-11-15 PROCEDURE — 11042 DBRDMT SUBQ TIS 1ST 20SQCM/<: CPT | Performed by: NURSE PRACTITIONER

## 2023-11-15 NOTE — PROGRESS NOTES
Patient ID: Rob Bear is a 80year old female. Debridement Venous Ulcer Left; Lower; Posterior Leg   Wound 09/20/23 3 Venous Ulcer Leg Left; Lower; Posterior    Performed by: GUI De La Cruz  Authorized by: GUI De La Cruz      Consent   Consent obtained? verbal  Consent given by: patient  Risks discussed? procedural risks discussed  Time out called at 11/15/2023 10:28 AM  Immediately prior to the procedure a time out was called and the performing provider verified the correct patient, procedure, equipment, support staff, and site/side marked as required.     Debridement Details  Performed by: NP  Debridement type: conservative sharp  Pain control: benzocaine 20%  Pain control administration type: topical    Pre-debridement measurements  Length (cm): 1.5  Width (cm): 1.5  Depth (cm): 0.1  Surface Area (cm^2): 2.25    Post-debridement measurements  Length (cm): 1.5  Width (cm): 1.5  Depth (cm): 0.1  Percent debrided: 100%  Surface Area (cm^2): 2.25  Area Debrided (cm^2): 2.25  Volume (cm^3): 0.23    Tissue and other material debrided: dermis  Devitalized tissue debrided: fibrin and slough  Instrument(s) utilized: curette  Bleeding: none  Hemostasis obtained with: not applicable  Procedural pain (0-10): 1  Post-procedural pain: 1   Response to treatment: procedure was tolerated well

## 2023-11-15 NOTE — PROGRESS NOTES
Patient ID: Morro Hicks is a 80year old female. Debridement Venous Ulcer Left; Lower; Lateral Leg   Wound 09/20/23 1 Venous Ulcer Leg Left; Lower; Lateral    Performed by: GUI Robison  Authorized by: GUI Robison      Consent   Consent obtained? verbal  Consent given by: patient  Risks discussed? procedural risks discussed  Time out called at 11/15/2023 10:27 AM  Immediately prior to the procedure a time out was called and the performing provider verified the correct patient, procedure, equipment, support staff, and site/side marked as required.     Debridement Details  Performed by: NP  Debridement type: surgical  Level of debridement: subcutaneous tissue  Pain control: benzocaine 20%  Pain control administration type: topical    Pre-debridement measurements  Length (cm): 1  Width (cm): 1.1  Depth (cm): 0.1  Surface Area (cm^2): 1.1    Post-debridement measurements  Length (cm): 1  Width (cm): 1.1  Depth (cm): 0.2  Percent debrided: 100%  Surface Area (cm^2): 1.1  Area Debrided (cm^2): 1.1  Volume (cm^3): 0.22    Tissue and other material debrided: subcutaneous tissue  Devitalized tissue debrided: biofilm, fibrin and slough  Instrument(s) utilized: curette  Bleeding: none  Procedural pain (0-10): 1  Post-procedural pain: 1   Response to treatment: procedure was tolerated well

## 2023-11-22 ENCOUNTER — NURSE ONLY (OUTPATIENT)
Dept: WOUND CARE | Facility: HOSPITAL | Age: 88
End: 2023-11-22
Attending: NURSE PRACTITIONER
Payer: MEDICARE

## 2023-11-22 VITALS
DIASTOLIC BLOOD PRESSURE: 77 MMHG | OXYGEN SATURATION: 100 % | TEMPERATURE: 97 F | RESPIRATION RATE: 20 BRPM | SYSTOLIC BLOOD PRESSURE: 169 MMHG | HEART RATE: 77 BPM

## 2023-11-22 PROCEDURE — 97607 NEG PRS WND THR NDME<=50SQCM: CPT

## 2023-11-22 NOTE — PROGRESS NOTES
11/22/23 1018   Negative Pressure Wound Therapy Leg Left; Lower; Lateral   Placement Date: 10/10/23   Location: Leg  Wound Location Orientation: Left; Lower; Lateral   Wound photographed/measured Yes   Machine Status (On) Yes   Site Assessment Moist;Pink;Yellow   Shandra-wound Assessment Hyperpigmented;Pink   Unit Type INEZ   Dressing Type White foam   Number of Foam Pieces Used 1   Cycle Continuous   Target Pressure (mmHg) 125   Drainage Description Serosanguineous; Yellow;Brown   Dressing Status Dressing Changed   Wound 09/20/23 3 Venous Ulcer Leg Left; Lower; Posterior   Date First Assessed/Time First Assessed: 09/20/23 1407    Wound Number (Wound Clinic Only): 3  Primary Wound Type: Venous Ulcer  Location: Leg  Wound Location Orientation: Left; Lower; Posterior   Wound Image    Site Assessment Pink;Yellow   Closure Not approximated   Drainage Amount Small   Drainage Description Brown;Yellow   Treatments Wound ; Wound Vac - Neg Pressure   Dressing Wound vac sponge  (Inez NPWT)   Dressing Changed Changed   Dressing Status Dressing Changed   Wound Length (cm) 0.9 cm   Wound Width (cm) 1.2 cm   Wound Surface Area (cm^2) 1.08 cm^2   Wound Depth (cm) 0.1 cm   Wound Volume (cm^3) 0.108 cm^3   Wound Healing % 86   Margins Flat and Intact   Non-staged Wound Description Full thickness   Shandra-wound Assessment Edema; Hyperpigmented   Wound Granulation Tissue Pink   Wound Bed Granulation (%) 30 %   Wound Bed Epithelium (%) 0 %   Wound Bed Slough (%) 70 %   Wound Bed Eschar (%) 0 %   Wound Bed Fibrin (%) 0 %   State of Healing Early/partial granulation   Wound Odor None   Wound 09/20/23 1 Venous Ulcer Leg Left; Lower; Lateral   Date First Assessed/Time First Assessed: 09/20/23 1405    Wound Number (Wound Clinic Only): 1  Primary Wound Type: Venous Ulcer  Location: Leg  Wound Location Orientation: Left; Lower; Lateral   Wound Image    Site Assessment Moist;Yellow;Pink   Drainage Amount Small   Drainage Description Brown;Yellow Treatments Cleansed; Saline   Dressing Xeroform  (mepore)   Dressing Changed Changed   Dressing Status Dressing Changed   Wound Length (cm) 0.6 cm   Wound Width (cm) 0.5 cm   Wound Surface Area (cm^2) 0.3 cm^2   Wound Depth (cm) 0.1 cm   Wound Volume (cm^3) 0.03 cm^3   Wound Healing % 99   Margins Attached edges   Non-staged Wound Description Full thickness   Shandra-wound Assessment Hyperpigmented;Pink   Wound Granulation Tissue Pink   Wound Bed Granulation (%) 90 %   Wound Bed Epithelium (%) 10 %   Wound Bed Slough (%) 0 %   Wound Bed Eschar (%) 0 %   Wound Bed Fibrin (%) 0 %   State of Healing Fully granulated   Wound Odor None   Undermining? No   Compression Wrap 09/20/23 Left   Placement Date: 09/20/23   Wound Location Orientation: Left   Response to Treatment Well tolerated   Compression Layers Single   Compression Product Type Ace Wrap 4in;Stockinette 3in; Artiflex 10cm   Dressing Applied Yes   Compression Wrap Location Ankle to Knee   Compression Wrap Status Clean; Intact;Dry

## 2023-11-27 ENCOUNTER — TELEPHONE (OUTPATIENT)
Dept: INTERNAL MEDICINE CLINIC | Facility: CLINIC | Age: 88
End: 2023-11-27

## 2023-11-27 NOTE — TELEPHONE ENCOUNTER
Current refill request refused due to refill is either a duplicate request or has active refills at the pharmacy. Check previous templates.     Requested Prescriptions      No prescriptions requested or ordered in this encounter

## 2023-11-30 ENCOUNTER — OFFICE VISIT (OUTPATIENT)
Dept: WOUND CARE | Facility: HOSPITAL | Age: 88
End: 2023-11-30
Attending: NURSE PRACTITIONER
Payer: MEDICARE

## 2023-11-30 VITALS
TEMPERATURE: 98 F | HEART RATE: 80 BPM | DIASTOLIC BLOOD PRESSURE: 55 MMHG | RESPIRATION RATE: 20 BRPM | SYSTOLIC BLOOD PRESSURE: 126 MMHG | OXYGEN SATURATION: 98 %

## 2023-11-30 DIAGNOSIS — L97.922 NON-PRESSURE CHRONIC ULCER OF LEFT LOWER LEG WITH FAT LAYER EXPOSED (HCC): Primary | ICD-10-CM

## 2023-11-30 DIAGNOSIS — G62.9 NEUROPATHY: ICD-10-CM

## 2023-11-30 PROCEDURE — 99213 OFFICE O/P EST LOW 20 MIN: CPT | Performed by: NURSE PRACTITIONER

## 2023-12-01 NOTE — TELEPHONE ENCOUNTER
Per OV  IBS--constipation, chronic--possibly IBS. Has miralax, senokot. pt stopped lactulose 30 ml tid prn.

## 2023-12-06 ENCOUNTER — OFFICE VISIT (OUTPATIENT)
Dept: WOUND CARE | Facility: HOSPITAL | Age: 88
End: 2023-12-06
Attending: NURSE PRACTITIONER
Payer: MEDICARE

## 2023-12-06 VITALS
DIASTOLIC BLOOD PRESSURE: 72 MMHG | RESPIRATION RATE: 17 BRPM | HEART RATE: 64 BPM | SYSTOLIC BLOOD PRESSURE: 147 MMHG | OXYGEN SATURATION: 95 % | TEMPERATURE: 98 F

## 2023-12-06 DIAGNOSIS — G62.9 NEUROPATHY: ICD-10-CM

## 2023-12-06 DIAGNOSIS — L97.921 CHRONIC ULCER OF LEFT LEG, LIMITED TO BREAKDOWN OF SKIN (HCC): Primary | ICD-10-CM

## 2023-12-06 PROCEDURE — 99213 OFFICE O/P EST LOW 20 MIN: CPT | Performed by: NURSE PRACTITIONER

## 2023-12-13 ENCOUNTER — APPOINTMENT (OUTPATIENT)
Dept: WOUND CARE | Facility: HOSPITAL | Age: 88
End: 2023-12-13
Attending: NURSE PRACTITIONER
Payer: MEDICARE

## 2023-12-19 ENCOUNTER — APPOINTMENT (OUTPATIENT)
Dept: WOUND CARE | Facility: HOSPITAL | Age: 88
End: 2023-12-19
Attending: NURSE PRACTITIONER
Payer: MEDICARE

## 2023-12-20 ENCOUNTER — APPOINTMENT (OUTPATIENT)
Dept: WOUND CARE | Facility: HOSPITAL | Age: 88
End: 2023-12-20
Attending: NURSE PRACTITIONER
Payer: MEDICARE

## 2023-12-27 ENCOUNTER — HOSPITAL ENCOUNTER (INPATIENT)
Facility: HOSPITAL | Age: 88
LOS: 4 days | Discharge: NURSING, REHAB OR LONG-TERM CARE | End: 2023-12-31
Attending: EMERGENCY MEDICINE | Admitting: STUDENT IN AN ORGANIZED HEALTH CARE EDUCATION/TRAINING PROGRAM
Payer: MEDICARE

## 2023-12-27 ENCOUNTER — APPOINTMENT (OUTPATIENT)
Dept: CT IMAGING | Facility: HOSPITAL | Age: 88
End: 2023-12-27
Attending: EMERGENCY MEDICINE
Payer: MEDICARE

## 2023-12-27 ENCOUNTER — OFFICE VISIT (OUTPATIENT)
Dept: WOUND CARE | Facility: HOSPITAL | Age: 88
End: 2023-12-27
Attending: NURSE PRACTITIONER
Payer: MEDICARE

## 2023-12-27 ENCOUNTER — APPOINTMENT (OUTPATIENT)
Dept: GENERAL RADIOLOGY | Facility: HOSPITAL | Age: 88
End: 2023-12-27
Attending: EMERGENCY MEDICINE
Payer: MEDICARE

## 2023-12-27 VITALS
TEMPERATURE: 98 F | OXYGEN SATURATION: 92 % | SYSTOLIC BLOOD PRESSURE: 147 MMHG | DIASTOLIC BLOOD PRESSURE: 65 MMHG | HEART RATE: 74 BPM | RESPIRATION RATE: 20 BRPM

## 2023-12-27 DIAGNOSIS — M79.675 PAIN OF TOE OF LEFT FOOT: ICD-10-CM

## 2023-12-27 DIAGNOSIS — Z87.828 HEALED WOUND: ICD-10-CM

## 2023-12-27 DIAGNOSIS — R55 SYNCOPE, UNSPECIFIED SYNCOPE TYPE: ICD-10-CM

## 2023-12-27 DIAGNOSIS — L97.921 CHRONIC ULCER OF LEFT LEG, LIMITED TO BREAKDOWN OF SKIN (HCC): Primary | ICD-10-CM

## 2023-12-27 DIAGNOSIS — S22.42XA CLOSED FRACTURE OF MULTIPLE RIBS OF LEFT SIDE, INITIAL ENCOUNTER: Primary | ICD-10-CM

## 2023-12-27 LAB
ALBUMIN SERPL-MCNC: 4.3 G/DL (ref 3.2–4.8)
ALP LIVER SERPL-CCNC: 95 U/L
ALT SERPL-CCNC: 20 U/L
ANION GAP SERPL CALC-SCNC: 8 MMOL/L (ref 0–18)
AST SERPL-CCNC: 36 U/L (ref ?–34)
BASOPHILS # BLD AUTO: 0.05 X10(3) UL (ref 0–0.2)
BASOPHILS NFR BLD AUTO: 0.6 %
BILIRUB DIRECT SERPL-MCNC: 0.1 MG/DL (ref ?–0.3)
BILIRUB SERPL-MCNC: 0.3 MG/DL (ref 0.2–0.9)
BILIRUB UR QL: NEGATIVE
BUN BLD-MCNC: 17 MG/DL (ref 9–23)
BUN/CREAT SERPL: 17.9 (ref 10–20)
CALCIUM BLD-MCNC: 9.2 MG/DL (ref 8.7–10.4)
CHLORIDE SERPL-SCNC: 98 MMOL/L (ref 98–112)
CLARITY UR: CLEAR
CO2 SERPL-SCNC: 26 MMOL/L (ref 21–32)
COLOR UR: YELLOW
CREAT BLD-MCNC: 0.95 MG/DL
D DIMER PPP FEU-MCNC: 7.28 UG/ML FEU (ref ?–0.92)
DEPRECATED RDW RBC AUTO: 50.4 FL (ref 35.1–46.3)
EGFRCR SERPLBLD CKD-EPI 2021: 56 ML/MIN/1.73M2 (ref 60–?)
EOSINOPHIL # BLD AUTO: 0.14 X10(3) UL (ref 0–0.7)
EOSINOPHIL NFR BLD AUTO: 1.7 %
ERYTHROCYTE [DISTWIDTH] IN BLOOD BY AUTOMATED COUNT: 14.9 % (ref 11–15)
GLUCOSE BLD-MCNC: 167 MG/DL (ref 70–99)
GLUCOSE UR-MCNC: NEGATIVE MG/DL
HCT VFR BLD AUTO: 34.1 %
HGB BLD-MCNC: 11.2 G/DL
HGB UR QL STRIP.AUTO: NEGATIVE
IMM GRANULOCYTES # BLD AUTO: 0.16 X10(3) UL (ref 0–1)
IMM GRANULOCYTES NFR BLD: 1.9 %
LYMPHOCYTES # BLD AUTO: 2.09 X10(3) UL (ref 1–4)
LYMPHOCYTES NFR BLD AUTO: 24.9 %
MCH RBC QN AUTO: 30.4 PG (ref 26–34)
MCHC RBC AUTO-ENTMCNC: 32.8 G/DL (ref 31–37)
MCV RBC AUTO: 92.4 FL
MONOCYTES # BLD AUTO: 0.7 X10(3) UL (ref 0.1–1)
MONOCYTES NFR BLD AUTO: 8.3 %
NEUTROPHILS # BLD AUTO: 5.27 X10 (3) UL (ref 1.5–7.7)
NEUTROPHILS # BLD AUTO: 5.27 X10(3) UL (ref 1.5–7.7)
NEUTROPHILS NFR BLD AUTO: 62.6 %
NITRITE UR QL STRIP.AUTO: NEGATIVE
OSMOLALITY SERPL CALC.SUM OF ELEC: 279 MOSM/KG (ref 275–295)
PH UR: 7.5 [PH] (ref 5–8)
PLATELET # BLD AUTO: 287 10(3)UL (ref 150–450)
POTASSIUM SERPL-SCNC: 4.8 MMOL/L (ref 3.5–5.1)
PROT SERPL-MCNC: 7.1 G/DL (ref 5.7–8.2)
RBC # BLD AUTO: 3.69 X10(6)UL
SODIUM SERPL-SCNC: 132 MMOL/L (ref 136–145)
SP GR UR STRIP: 1.01 (ref 1–1.03)
TROPONIN I SERPL HS-MCNC: 8 NG/L
UROBILINOGEN UR STRIP-ACNC: 1
WBC # BLD AUTO: 8.4 X10(3) UL (ref 4–11)

## 2023-12-27 PROCEDURE — 71260 CT THORAX DX C+: CPT | Performed by: EMERGENCY MEDICINE

## 2023-12-27 PROCEDURE — 71045 X-RAY EXAM CHEST 1 VIEW: CPT | Performed by: EMERGENCY MEDICINE

## 2023-12-27 PROCEDURE — 99213 OFFICE O/P EST LOW 20 MIN: CPT | Performed by: NURSE PRACTITIONER

## 2023-12-27 PROCEDURE — 70450 CT HEAD/BRAIN W/O DYE: CPT | Performed by: EMERGENCY MEDICINE

## 2023-12-27 RX ORDER — FLUTICASONE PROPIONATE 50 MCG
2 SPRAY, SUSPENSION (ML) NASAL 2 TIMES DAILY
Status: DISCONTINUED | OUTPATIENT
Start: 2023-12-28 | End: 2023-12-31

## 2023-12-27 RX ORDER — BISACODYL 10 MG
10 SUPPOSITORY, RECTAL RECTAL
Status: DISCONTINUED | OUTPATIENT
Start: 2023-12-27 | End: 2023-12-31

## 2023-12-27 RX ORDER — MELATONIN
3 NIGHTLY
Status: DISCONTINUED | OUTPATIENT
Start: 2023-12-27 | End: 2023-12-31

## 2023-12-27 RX ORDER — ACETAMINOPHEN 500 MG
500 TABLET ORAL EVERY 4 HOURS PRN
Status: DISCONTINUED | OUTPATIENT
Start: 2023-12-27 | End: 2023-12-28

## 2023-12-27 RX ORDER — CHOLECALCIFEROL (VITAMIN D3) 125 MCG
1000 CAPSULE ORAL DAILY
Status: DISCONTINUED | OUTPATIENT
Start: 2023-12-28 | End: 2023-12-31

## 2023-12-27 RX ORDER — POLYETHYLENE GLYCOL 3350 17 G/17G
17 POWDER, FOR SOLUTION ORAL DAILY PRN
Status: DISCONTINUED | OUTPATIENT
Start: 2023-12-27 | End: 2023-12-31

## 2023-12-27 RX ORDER — ASCORBIC ACID 500 MG
500 TABLET ORAL DAILY
Status: DISCONTINUED | OUTPATIENT
Start: 2023-12-28 | End: 2023-12-31

## 2023-12-27 RX ORDER — ATORVASTATIN CALCIUM 10 MG/1
10 TABLET, FILM COATED ORAL NIGHTLY
Status: DISCONTINUED | OUTPATIENT
Start: 2023-12-28 | End: 2023-12-31

## 2023-12-27 RX ORDER — PANTOPRAZOLE SODIUM 40 MG/1
40 TABLET, DELAYED RELEASE ORAL
Status: DISCONTINUED | OUTPATIENT
Start: 2023-12-28 | End: 2023-12-31

## 2023-12-27 RX ORDER — ENALAPRIL MALEATE 5 MG/1
5 TABLET ORAL DAILY
Status: DISCONTINUED | OUTPATIENT
Start: 2023-12-28 | End: 2023-12-28

## 2023-12-27 RX ORDER — GABAPENTIN 100 MG/1
100 CAPSULE ORAL NIGHTLY
Status: DISCONTINUED | OUTPATIENT
Start: 2023-12-27 | End: 2023-12-31

## 2023-12-27 RX ORDER — ACETAMINOPHEN 325 MG/1
650 TABLET ORAL ONCE
Status: COMPLETED | OUTPATIENT
Start: 2023-12-27 | End: 2023-12-27

## 2023-12-27 RX ORDER — ASPIRIN 81 MG/1
81 TABLET ORAL DAILY
Status: DISCONTINUED | OUTPATIENT
Start: 2023-12-28 | End: 2023-12-31

## 2023-12-27 RX ORDER — MELATONIN
2000 DAILY
Status: DISCONTINUED | OUTPATIENT
Start: 2023-12-28 | End: 2023-12-31

## 2023-12-27 RX ORDER — DILTIAZEM HYDROCHLORIDE 120 MG/1
120 CAPSULE, EXTENDED RELEASE ORAL DAILY
Status: DISCONTINUED | OUTPATIENT
Start: 2023-12-28 | End: 2023-12-31

## 2023-12-27 RX ORDER — SENNOSIDES 8.6 MG
17.2 TABLET ORAL NIGHTLY PRN
Status: DISCONTINUED | OUTPATIENT
Start: 2023-12-27 | End: 2023-12-31

## 2023-12-27 RX ORDER — ENEMA 19; 7 G/133ML; G/133ML
1 ENEMA RECTAL ONCE AS NEEDED
Status: DISCONTINUED | OUTPATIENT
Start: 2023-12-27 | End: 2023-12-31

## 2023-12-27 RX ORDER — ONDANSETRON 2 MG/ML
4 INJECTION INTRAMUSCULAR; INTRAVENOUS EVERY 6 HOURS PRN
Status: DISCONTINUED | OUTPATIENT
Start: 2023-12-27 | End: 2023-12-31

## 2023-12-28 ENCOUNTER — APPOINTMENT (OUTPATIENT)
Dept: CV DIAGNOSTICS | Facility: HOSPITAL | Age: 88
End: 2023-12-28
Attending: STUDENT IN AN ORGANIZED HEALTH CARE EDUCATION/TRAINING PROGRAM
Payer: MEDICARE

## 2023-12-28 ENCOUNTER — APPOINTMENT (OUTPATIENT)
Dept: GENERAL RADIOLOGY | Facility: HOSPITAL | Age: 88
End: 2023-12-28
Attending: INTERNAL MEDICINE
Payer: MEDICARE

## 2023-12-28 LAB
ANION GAP SERPL CALC-SCNC: 2 MMOL/L (ref 0–18)
ATRIAL RATE: 77 BPM
BASOPHILS # BLD AUTO: 0.02 X10(3) UL (ref 0–0.2)
BASOPHILS NFR BLD AUTO: 0.3 %
BUN BLD-MCNC: 18 MG/DL (ref 9–23)
BUN/CREAT SERPL: 19.8 (ref 10–20)
CALCIUM BLD-MCNC: 9.2 MG/DL (ref 8.7–10.4)
CHLORIDE SERPL-SCNC: 101 MMOL/L (ref 98–112)
CO2 SERPL-SCNC: 28 MMOL/L (ref 21–32)
CREAT BLD-MCNC: 0.91 MG/DL
DEPRECATED RDW RBC AUTO: 50.7 FL (ref 35.1–46.3)
EGFRCR SERPLBLD CKD-EPI 2021: 59 ML/MIN/1.73M2 (ref 60–?)
EOSINOPHIL # BLD AUTO: 0.08 X10(3) UL (ref 0–0.7)
EOSINOPHIL NFR BLD AUTO: 1.1 %
ERYTHROCYTE [DISTWIDTH] IN BLOOD BY AUTOMATED COUNT: 14.9 % (ref 11–15)
GLUCOSE BLD-MCNC: 115 MG/DL (ref 70–99)
HCT VFR BLD AUTO: 32.3 %
HGB BLD-MCNC: 10.8 G/DL
IMM GRANULOCYTES # BLD AUTO: 0.05 X10(3) UL (ref 0–1)
IMM GRANULOCYTES NFR BLD: 0.7 %
LYMPHOCYTES # BLD AUTO: 1.09 X10(3) UL (ref 1–4)
LYMPHOCYTES NFR BLD AUTO: 14.6 %
MCH RBC QN AUTO: 31.1 PG (ref 26–34)
MCHC RBC AUTO-ENTMCNC: 33.4 G/DL (ref 31–37)
MCV RBC AUTO: 93.1 FL
MONOCYTES # BLD AUTO: 0.58 X10(3) UL (ref 0.1–1)
MONOCYTES NFR BLD AUTO: 7.7 %
NEUTROPHILS # BLD AUTO: 5.67 X10 (3) UL (ref 1.5–7.7)
NEUTROPHILS # BLD AUTO: 5.67 X10(3) UL (ref 1.5–7.7)
NEUTROPHILS NFR BLD AUTO: 75.6 %
OSMOLALITY SERPL CALC.SUM OF ELEC: 275 MOSM/KG (ref 275–295)
P AXIS: 70 DEGREES
P-R INTERVAL: 192 MS
PLATELET # BLD AUTO: 273 10(3)UL (ref 150–450)
POTASSIUM SERPL-SCNC: 4.4 MMOL/L (ref 3.5–5.1)
Q-T INTERVAL: 356 MS
QRS DURATION: 64 MS
QTC CALCULATION (BEZET): 402 MS
R AXIS: -14 DEGREES
RBC # BLD AUTO: 3.47 X10(6)UL
SODIUM SERPL-SCNC: 131 MMOL/L (ref 136–145)
T AXIS: 20 DEGREES
VENTRICULAR RATE: 77 BPM
WBC # BLD AUTO: 7.5 X10(3) UL (ref 4–11)

## 2023-12-28 PROCEDURE — 71045 X-RAY EXAM CHEST 1 VIEW: CPT | Performed by: INTERNAL MEDICINE

## 2023-12-28 PROCEDURE — 93306 TTE W/DOPPLER COMPLETE: CPT | Performed by: STUDENT IN AN ORGANIZED HEALTH CARE EDUCATION/TRAINING PROGRAM

## 2023-12-28 PROCEDURE — 99233 SBSQ HOSP IP/OBS HIGH 50: CPT | Performed by: INTERNAL MEDICINE

## 2023-12-28 RX ORDER — TRAMADOL HYDROCHLORIDE 50 MG/1
50 TABLET ORAL EVERY 6 HOURS PRN
Status: DISCONTINUED | OUTPATIENT
Start: 2023-12-28 | End: 2023-12-31

## 2023-12-28 RX ORDER — ACETAMINOPHEN 500 MG
500 TABLET ORAL EVERY 4 HOURS PRN
Status: DISCONTINUED | OUTPATIENT
Start: 2023-12-28 | End: 2023-12-31

## 2023-12-28 RX ORDER — ENALAPRIL MALEATE 5 MG/1
10 TABLET ORAL DAILY
Status: DISCONTINUED | OUTPATIENT
Start: 2023-12-29 | End: 2023-12-31

## 2023-12-28 NOTE — PLAN OF CARE
Pt A/Ox4, Max assist. Wounds changed by RN. Echo ordered. Pt is hard of hearing and prefers to communicate through writing on a white board. Problem: Patient Centered Care  Goal: Patient preferences are identified and integrated in the patient's plan of care  Description: Interventions:  - What would you like us to know as we care for you? I would like to return home. - Provide timely, complete, and accurate information to patient/family  - Incorporate patient and family knowledge, values, beliefs, and cultural backgrounds into the planning and delivery of care  - Encourage patient/family to participate in care and decision-making at the level they choose  - Honor patient and family perspectives and choices  Outcome: Progressing     Problem: Patient/Family Goals  Goal: Patient/Family Long Term Goal  Description: Patient's Long Term Goal: Get stronger. Interventions:  - Work with PT/OT  - See additional Care Plan goals for specific interventions  Outcome: Progressing  Goal: Patient/Family Short Term Goal  Description: Patient's Short Term Goal: Ambulate often. Interventions:   - Work with staff on ways to safely ambulate out of bed.    - See additional Care Plan goals for specific interventions  Outcome: Progressing

## 2023-12-28 NOTE — ED QUICK NOTES
Orders for admission, patient is aware of plan and ready to go upstairs. Any questions, please call ED RN lance at extension [phone].      Patient Covid vaccination status: Fully vaccinated     COVID Test Ordered in ED: None    COVID Suspicion at Admission: N/A    Running Infusions:  None    Mental Status/LOC at time of transport: a & o x 3    Other pertinent information:   CIWA score: N/A   NIH score:  N/A

## 2023-12-28 NOTE — PLAN OF CARE
Echo and chest xray done today. Repeat chest xray ordered for tomorrow. Orthostatics attempted but patient unable to tolerate at this time. Physical therapy and occupational therapy added today. Problem: Patient Centered Care  Goal: Patient preferences are identified and integrated in the patient's plan of care  Description: Interventions:  - What would you like us to know as we care for you? 'I am from home with son.'  - Provide timely, complete, and accurate information to patient/family  - Incorporate patient and family knowledge, values, beliefs, and cultural backgrounds into the planning and delivery of care  - Encourage patient/family to participate in care and decision-making at the level they choose  - Honor patient and family perspectives and choices  Outcome: Progressing     Problem: Patient/Family Goals  Goal: Patient/Family Long Term Goal  Description: Patient's Long Term Goal: To not have pain in abdomen.     Interventions:  - Turn and deep breathe, positioning  - See additional Care Plan goals for specific interventions  Outcome: Progressing  Goal: Patient/Family Short Term Goal  Description: Patient's Short Term Goal: ' Have a cup of coffee and watch my show.'    Interventions:   - food and nutrition, help with TV  - See additional Care Plan goals for specific interventions  Outcome: Progressing

## 2023-12-28 NOTE — ED INITIAL ASSESSMENT (HPI)
Patient arrives via EMS for possible syncopal episode. Patient was starting to faint when she was brought down to the ground by son. No trauma. Son did not feel radial pulse and started cpr.  Patient awake and alert upon EMS arrival.

## 2023-12-28 NOTE — CM/SW NOTE
Spoke w/ patient's son Asaf Matute for assessment. Patient lives with her sons Manfred Doan in a 2 level home, patient stays on the main floor with 3 TAMANNA. Patient owns a cane, walker and wheelchair. Patient is fairly independent w/ ADLs, sons help w/ IADLs. Sons work full time, patient manages well on her own. Patient does not have home services but had [facility] in the past. Patient went to [facility] in 2017. Discussed discharge plan, son hopes patient will return home once stable. He is interested in some homemaker assisted. Referral made to [facility] for evaluations, encourage Nicholas to reach out to [facility] as well regarding possible benefits ( was a vet). Requested PT/OT evaluation.     Radha Toscano, [address] [facility]

## 2023-12-29 ENCOUNTER — APPOINTMENT (OUTPATIENT)
Dept: GENERAL RADIOLOGY | Facility: HOSPITAL | Age: 88
End: 2023-12-29
Attending: INTERNAL MEDICINE
Payer: MEDICARE

## 2023-12-29 LAB
ANION GAP SERPL CALC-SCNC: 4 MMOL/L (ref 0–18)
BASOPHILS # BLD AUTO: 0.03 X10(3) UL (ref 0–0.2)
BASOPHILS NFR BLD AUTO: 0.3 %
BUN BLD-MCNC: 18 MG/DL (ref 9–23)
BUN/CREAT SERPL: 19.4 (ref 10–20)
CALCIUM BLD-MCNC: 9 MG/DL (ref 8.7–10.4)
CHLORIDE SERPL-SCNC: 97 MMOL/L (ref 98–112)
CO2 SERPL-SCNC: 27 MMOL/L (ref 21–32)
CREAT BLD-MCNC: 0.93 MG/DL
DEPRECATED RDW RBC AUTO: 49.4 FL (ref 35.1–46.3)
EGFRCR SERPLBLD CKD-EPI 2021: 58 ML/MIN/1.73M2 (ref 60–?)
EOSINOPHIL # BLD AUTO: 0.03 X10(3) UL (ref 0–0.7)
EOSINOPHIL NFR BLD AUTO: 0.3 %
ERYTHROCYTE [DISTWIDTH] IN BLOOD BY AUTOMATED COUNT: 15.1 % (ref 11–15)
GLUCOSE BLD-MCNC: 135 MG/DL (ref 70–99)
HCT VFR BLD AUTO: 30.4 %
HGB BLD-MCNC: 10.5 G/DL
IMM GRANULOCYTES # BLD AUTO: 0.04 X10(3) UL (ref 0–1)
IMM GRANULOCYTES NFR BLD: 0.4 %
LYMPHOCYTES # BLD AUTO: 0.92 X10(3) UL (ref 1–4)
LYMPHOCYTES NFR BLD AUTO: 8.7 %
MCH RBC QN AUTO: 31.2 PG (ref 26–34)
MCHC RBC AUTO-ENTMCNC: 34.5 G/DL (ref 31–37)
MCV RBC AUTO: 90.2 FL
MONOCYTES # BLD AUTO: 0.63 X10(3) UL (ref 0.1–1)
MONOCYTES NFR BLD AUTO: 6 %
NEUTROPHILS # BLD AUTO: 8.87 X10 (3) UL (ref 1.5–7.7)
NEUTROPHILS # BLD AUTO: 8.87 X10(3) UL (ref 1.5–7.7)
NEUTROPHILS NFR BLD AUTO: 84.3 %
OSMOLALITY SERPL CALC.SUM OF ELEC: 270 MOSM/KG (ref 275–295)
PLATELET # BLD AUTO: 229 10(3)UL (ref 150–450)
POTASSIUM SERPL-SCNC: 4.4 MMOL/L (ref 3.5–5.1)
RBC # BLD AUTO: 3.37 X10(6)UL
SODIUM SERPL-SCNC: 128 MMOL/L (ref 136–145)
WBC # BLD AUTO: 10.5 X10(3) UL (ref 4–11)

## 2023-12-29 PROCEDURE — 99233 SBSQ HOSP IP/OBS HIGH 50: CPT | Performed by: INTERNAL MEDICINE

## 2023-12-29 PROCEDURE — 71046 X-RAY EXAM CHEST 2 VIEWS: CPT | Performed by: INTERNAL MEDICINE

## 2023-12-29 NOTE — CM/SW NOTE
02: 45PM  Notified by PT/OT - recommend [facility] w/ 24hr care & supervision for pt at time of DC. Per PT, pt's family also inquiring about lift assistance into the home upon DC. CARSON sent [facility] referrals in Aidin. F2F entered/sent. SW to f/up w/ pt's son/s once [facility] list of quality data is generated in Aidin for their review. 03:25PM  SW spoke to pt's son Ezequiel Regalado via phone. He voiced concerns in regards to pt coming home stating they are not available 24hrs/day. SW referenced Nicholas's conversation w/ Kang Kim on 12/28 and inquired about [facility] and [place] information. Per Ezequiel Regalado, he has not received a call from anyone w/ [facility] about homemaker services. He inquired about CG\"s via pt's insurance. CARSON informed him that they will need to call phone # on back of pt's [facility] card. Per Ezequiel Regalado, they are in the process of this. Ezequiel Regalado stating they can not care for her 24/7 at this time and feels pt would benefit from a couple weeks at rehab. CARSON sent [facility] referrals in Aidin. PASRR completed/uploaded. CARSON also spoke to [employer] - she did visit pt today  and left information for [facility] as well as [place] services. Jessica Simmons will also call pt's son now about PASRR/DON screening as well as Critical access hospital services. CARSON submit ins auth request via [employer] - [facility] location TBD/pending. [employer] ID: [id number]    Need for DC:   1. [facility]/HH list & choice   2. If [facility]: Ins [facility] #: [id number])      PLAN: [facility] vs Home w/ HH & CG info - pending above & med clear       SW/CM to remain available for support and/or discharge planning.          Ava Arora, MSW, [address] [facility]

## 2023-12-29 NOTE — PLAN OF CARE
Pt A/Ox4, forgetful at times overnight. Chest xray ordered. Pain controlled with tylenol and tramadol. Dressings changed by RN. Problem: Patient Centered Care  Goal: Patient preferences are identified and integrated in the patient's plan of care  Description: Interventions:  - What would you like us to know as we care for you? I would like to return home. - Provide timely, complete, and accurate information to patient/family  - Incorporate patient and family knowledge, values, beliefs, and cultural backgrounds into the planning and delivery of care  - Encourage patient/family to participate in care and decision-making at the level they choose  - Honor patient and family perspectives and choices  Outcome: Progressing     Problem: Patient/Family Goals  Goal: Patient/Family Long Term Goal  Description: Patient's Long Term Goal: Get stronger. Interventions:  - Work with PT/OT  - See additional Care Plan goals for specific interventions  Outcome: Progressing  Goal: Patient/Family Short Term Goal  Description: Patient's Short Term Goal: Practice deep breathing exercises.      Interventions:   - Perform hourly deep breathing and coughing.   - See additional Care Plan goals for specific interventions  Outcome: Progressing

## 2023-12-29 NOTE — PHYSICAL THERAPY NOTE
PHYSICAL THERAPY EVALUATION - INPATIENT     Room Number: 318/318-A  Evaluation Date: 12/29/2023  Type of Evaluation: Initial   Physician Order: PT Eval and Treat    Presenting Problem: Closed fx multiple ribs L side  Co-Morbidities : Asthma, Charcot's, DM,  Reason for Therapy: Mobility Dysfunction and Discharge Planning    PHYSICAL THERAPY ASSESSMENT     Patient is a 80year old female admitted 12/27/2023 for Closed fx of multiple ribs L side, s/p fall at home. Patient's current functional deficits include decreased bed mobility, transfers, gait, balance, strength, stair mobility, which are below the patient's pre-admission status. Pt ed with bed mobility rolling to R side and supine to sit with mod A L flank guarding and pain with movement. Pt and family ed with optimal postures and use of pillow to hug during transfers and coughing and sneezing. Pt ed with transfers with RW with min A sit to stand and min A with amb 5' with shuffling gait to a chair with min A for stand to sit transfer. Pt ed with use of incentive spirometer re enforced for lung health. Therex in chair for ankle pumps and quad sets x 10 reps. Pt is on track to return home with family 24 hr assist for all mobility and ADL support as medical progress allows. Pt owns a W/C and RW and may also benefit from a 3 in 1 commode for greater ease of toileting. The patient's Approx Degree of Impairment: 61.29% has been calculated based on documentation in the [facility] '6 clicks' Inpatient Basic Mobility Short Form. Research supports that patients with this level of impairment may benefit from [facility] PT with home family 24 hr assist as medical progress allows, pt owns a RW and w/c. Pt will likely require lift assist in w/c up steps to reenter home for optimal safety. Patient will benefit from continued IP PT services to address these deficits in preparation for discharge.     DISCHARGE RECOMMENDATIONS  PT Discharge Recommendations: Home with home health PT;24 hour care/supervision (family assist lives with son)    PLAN  PT Treatment Plan: Bed mobility; Body mechanics; Endurance; Energy conservation;Patient education; Family education;Gait training;Strengthening;Transfer training;Balance training;Stoop training  Rehab Potential : Good  Frequency (Obs): 5x/week       PHYSICAL THERAPY MEDICAL/SOCIAL HISTORY     History related to current admission: Elvin Saini is a(n) 80year old female, who presents for evaluation of a syncopal episode. Patient with multiple chronic medical conditions including atrial fibrillation on Eliquis, COPD/asthma, obstructive CAD, hyperlipidemia. Patient is very hard of hearing, surrounded by family members. Patient was sitting on her chair at home when all of a sudden she was noticed by her son to not be responding and making gurgling noises. Her son placed her on the ground and try to feel a pulse, he did not feel a pulse and started doing chest compressions. Unknown how long the chest compression lasted. He then was able to feel a pulse and paramedics arrived shortly after. Patient reports that she does not remember the episode. Denies feeling lightheaded or dizzy prior to passing out. She currently states that she has chest pain but did not have chest pain before the CPR. Patient denies feeling lightheaded or dizzy. Denies nausea or vomiting. Denies any diarrhea or constipation. Problem List  Principal Problem:    Closed fracture of multiple ribs of left side, initial encounter  Active Problems:    Syncope, unspecified syncope type      HOME SITUATION  Home Situation  Type of Home: House  Home Layout: Able to live on main level; One level  Stairs to Enter : 2  Railing: Yes  Lives With: Son  Drives: No  Patient Owned Equipment: Rolling walker  Patient Regularly Uses: Glasses; Hearing aides     Prior Level of [place]: Lives at home with supportive son mod indep with RW for all mobility and ADL's also owns a cane and w/c.     SUBJECTIVE  I feel real sore with movement but I can get up a walk a bit with help. PHYSICAL THERAPY EXAMINATION     OBJECTIVE  Precautions: Bed/chair alarm  Fall Risk: High fall risk    WEIGHT BEARING RESTRICTION  Weight Bearing Restriction: None                PAIN ASSESSMENT  Ratin  Location: L flank rib pain with movement  Management Techniques: Activity promotion; Body mechanics;Breathing techniques;Relaxation;Repositioning    COGNITION  Overall Cognitive Status:  WFL - within functional limits A/O x 3 [place]    RANGE OF MOTION AND STRENGTH ASSESSMENT  Upper extremity ROM and strength are within functional limits L hand and wrist soreness with movement   Lower extremity ROM is within functional limits   Lower extremity strength is within functional limits 4/5    BALANCE  Static Sitting: Fair -  Dynamic Sitting: Fair -  Static Standing: Fair -  Dynamic Standing: Poor +    ACTIVITY TOLERANCE  Pulse: 70  Heart Rate Source: Monitor  Resp: 18  BP: 152/58  BP Location: Right arm  BP Method: Automatic  Patient Position: Sitting    O2 WALK  Oxygen Therapy  SPO2% Ambulation on Oxygen: 96  Ambulation oxygen flow (liters per minute): 2    AM-PAC '6-Clicks' INPATIENT SHORT FORM - BASIC MOBILITY  How much difficulty does the patient currently have. .. Patient Difficulty: Turning over in bed (including adjusting bedclothes, sheets and blankets)?: A Lot   Patient Difficulty: Sitting down on and standing up from a chair with arms (e.g., wheelchair, bedside commode, etc.): A Lot   Patient Difficulty: Moving from lying on back to sitting on the side of the bed?: A Lot   How much help from another person does the patient currently need. ..    Help from Another: Moving to and from a bed to a chair (including a wheelchair)?: A Little   Help from Another: Need to walk in hospital room?: A Little   Help from Another: Climbing 3-5 steps with a railing?: A Lot     AM-PAC Score:  Raw Score: 14   Approx Degree of Impairment: 61.29%   Standardized Score (AM-PAC Scale): 38.1   CMS Modifier (G-Code): CL    FUNCTIONAL ABILITY STATUS  Functional Mobility/Gait Assessment  Gait Assistance: Minimum assistance  Distance (ft): 5  Assistive Device: Rolling walker  Pattern: Shuffle (decreased step length gaurded gait and movement up to chair)    Bed Mobility: Mod A to EOB    Transfers: Min A with transfers with RW    Exercise/Education Provided:  Bed mobility  Body mechanics  Energy conservation  Functional activity tolerated  Gait training  Strengthening  Lower therapeutic exercise: Ankle pumps  Heel slides  Quad sets  Transfer training    Patient End of Session: Up in chair; With [facility] staff;Needs met;Call light within reach;RN aware of session/findings; All patient questions and concerns addressed; Alarm set; Family present    CURRENT GOALS    Goals to be met by: 2024  Patient Goal Patient's self-stated goal is: return home    Goal #1 Patient is able to demonstrate supine - sit EOB @ level: independent     Goal #1   Current Status    Goal #2 Patient is able to demonstrate transfers Sit to/from Stand at assistance level: modified independent with walker - rolling     Goal #2  Current Status    Goal #3 Patient is able to ambulate 50 feet with assist device: walker - rolling at assistance level: modified independent   Goal #3   Current Status    Goal #4 Patient will negotiate 2 stairs/one curb w/ assistive device and supervision   Goal #4   Current Status    Goal #5 Patient to demonstrate independence with home activity/exercise instructions provided to patient in preparation for discharge.    Goal #5   Current Status    Goal #6    Goal #6  Current Status    Patient Evaluation Complexity Level:  History Low - no personal factors and/or co-morbidities   Examination of body systems Low - addressing 1-2 elements   Clinical Presentation Low - Stable   Clinical Decision Making Low Complexity       Gait Trainin minutes

## 2023-12-30 LAB
ANION GAP SERPL CALC-SCNC: 7 MMOL/L (ref 0–18)
BASOPHILS # BLD AUTO: 0.02 X10(3) UL (ref 0–0.2)
BASOPHILS NFR BLD AUTO: 0.2 %
BUN BLD-MCNC: 20 MG/DL (ref 9–23)
BUN/CREAT SERPL: 22.5 (ref 10–20)
CALCIUM BLD-MCNC: 9 MG/DL (ref 8.7–10.4)
CHLORIDE SERPL-SCNC: 97 MMOL/L (ref 98–112)
CO2 SERPL-SCNC: 24 MMOL/L (ref 21–32)
CREAT BLD-MCNC: 0.89 MG/DL
DEPRECATED RDW RBC AUTO: 48.2 FL (ref 35.1–46.3)
EGFRCR SERPLBLD CKD-EPI 2021: 61 ML/MIN/1.73M2 (ref 60–?)
EOSINOPHIL # BLD AUTO: 0.02 X10(3) UL (ref 0–0.7)
EOSINOPHIL NFR BLD AUTO: 0.2 %
ERYTHROCYTE [DISTWIDTH] IN BLOOD BY AUTOMATED COUNT: 14.8 % (ref 11–15)
GLUCOSE BLD-MCNC: 146 MG/DL (ref 70–99)
HCT VFR BLD AUTO: 31.1 %
HGB BLD-MCNC: 10.9 G/DL
IMM GRANULOCYTES # BLD AUTO: 0.08 X10(3) UL (ref 0–1)
IMM GRANULOCYTES NFR BLD: 0.7 %
LYMPHOCYTES # BLD AUTO: 0.98 X10(3) UL (ref 1–4)
LYMPHOCYTES NFR BLD AUTO: 9.1 %
MCH RBC QN AUTO: 31.1 PG (ref 26–34)
MCHC RBC AUTO-ENTMCNC: 35 G/DL (ref 31–37)
MCV RBC AUTO: 88.9 FL
MONOCYTES # BLD AUTO: 0.62 X10(3) UL (ref 0.1–1)
MONOCYTES NFR BLD AUTO: 5.7 %
NEUTROPHILS # BLD AUTO: 9.08 X10 (3) UL (ref 1.5–7.7)
NEUTROPHILS # BLD AUTO: 9.08 X10(3) UL (ref 1.5–7.7)
NEUTROPHILS NFR BLD AUTO: 84.1 %
OSMOLALITY SERPL CALC.SUM OF ELEC: 271 MOSM/KG (ref 275–295)
PLATELET # BLD AUTO: 243 10(3)UL (ref 150–450)
POTASSIUM SERPL-SCNC: 4.5 MMOL/L (ref 3.5–5.1)
RBC # BLD AUTO: 3.5 X10(6)UL
SODIUM SERPL-SCNC: 128 MMOL/L (ref 136–145)
WBC # BLD AUTO: 10.8 X10(3) UL (ref 4–11)

## 2023-12-30 PROCEDURE — 99233 SBSQ HOSP IP/OBS HIGH 50: CPT | Performed by: INTERNAL MEDICINE

## 2023-12-30 NOTE — PLAN OF CARE
Patient pleasantly confused and very hard of hearing. Communicate with white board at bedside table. Medications given per MAR. Will continue to monitor and follow plan of care. Problem: Patient Centered Care  Goal: Patient preferences are identified and integrated in the patient's plan of care  Description: Interventions:  - What would you like us to know as we care for you?  I want to go to my bed  - Provide timely, complete, and accurate information to patient/family  - Incorporate patient and family knowledge, values, beliefs, and cultural backgrounds into the planning and delivery of care  - Encourage patient/family to participate in care and decision-making at the level they choose  - Honor patient and family perspectives and choices  Outcome: Progressing     Problem: Patient/Family Goals  Goal: Patient/Family Long Term Goal  Description: Patient's Long Term Goal: go home    Interventions:  - See additional Care Plan goals for specific interventions  Outcome: Progressing  Goal: Patient/Family Short Term Goal  Description: Patient's Short Term Goal: no falls    Interventions:     - See additional Care Plan goals for specific interventions  Outcome: Progressing

## 2023-12-30 NOTE — CM/SW NOTE
08;:50AM  Received notice from [address] [place] [address] approved pt's MINA request but is still pending MINA location choice. SW attempted pt's son Kishore Alexander via phone to discuss. There was no answer. SW left Vmail and requested a call back to discuss MINA list and choice. Farhan Gaster ID [id number], approved from 12/30 to 1/3. PAC pend     10:50AM  SW confirmed son Kishore Alexander is at bedside. Met w/ son and provided MINA list of quality data. Son reviewing - choices between [facility] and [address] [place] [address] [place]. Son aware to make decision today so that SW can inform insurance of final choice. 01:10PM  Received call from pt's son Kishore Alexander - they have chosen [facility] for MINA at [place]. [facility] reserved in [place]. SW sent message in web portal to [facility] of final MINA choice - pending response. 02:20PM  SW contacted [facility] w/ [employer] via phone #:  [phone] and spoke to [facility] D. Provided MINA choice. [facility] confirmed [facility] is updated to reflect approval at [facility]A. Received call from pt's son Kishore Alexander - requesting private room if available. CARSON explained right now [facility] has semi private but message w/ private room request will be made - pt to transfer to private room while at [place] [address] if one becomes available. Pt's son expressed understanding. PLAN: [address] - pending med clear         SW/CM to remain available for support and/or discharge planning.            Mai Kendrick, MSW, [address] [facility]

## 2023-12-30 NOTE — PLAN OF CARE
Problem: Patient Centered Care  Goal: Patient preferences are identified and integrated in the patient's plan of care  Description: Interventions:  - What would you like us to know as we care for you?   - Provide timely, complete, and accurate information to patient/family  - Incorporate patient and family knowledge, values, beliefs, and cultural backgrounds into the planning and delivery of care  - Encourage patient/family to participate in care and decision-making at the level they choose  - Honor patient and family perspectives and choices  Outcome: Progressing     Problem: Patient/Family Goals  Goal: Patient/Family Long Term Goal  Description: Patient's Long Term Goal: Discharge home safely    Interventions:  - Medications  -Labs  -Vitals  - See additional Care Plan goals for specific interventions  Outcome: Progressing  Goal: Patient/Family Short Term Goal  Description: Patient's Short Term Goal: Improved mobility    Interventions:   - Up in chair for meals  - See additional Care Plan goals for specific interventions  Outcome: Progressing     Patient is alert and orientated x 3-4. Patient is on O2 1L. Patient up in chair for meals.

## 2023-12-31 VITALS
HEART RATE: 75 BPM | SYSTOLIC BLOOD PRESSURE: 131 MMHG | TEMPERATURE: 98 F | WEIGHT: 123.19 LBS | DIASTOLIC BLOOD PRESSURE: 53 MMHG | OXYGEN SATURATION: 92 % | RESPIRATION RATE: 18 BRPM | BODY MASS INDEX: 26 KG/M2

## 2023-12-31 LAB
ANION GAP SERPL CALC-SCNC: 7 MMOL/L (ref 0–18)
BASOPHILS # BLD AUTO: 0.01 X10(3) UL (ref 0–0.2)
BASOPHILS NFR BLD AUTO: 0.1 %
BUN BLD-MCNC: 28 MG/DL (ref 9–23)
BUN/CREAT SERPL: 29.8 (ref 10–20)
CALCIUM BLD-MCNC: 9.3 MG/DL (ref 8.7–10.4)
CHLORIDE SERPL-SCNC: 97 MMOL/L (ref 98–112)
CO2 SERPL-SCNC: 24 MMOL/L (ref 21–32)
CREAT BLD-MCNC: 0.94 MG/DL
DEPRECATED RDW RBC AUTO: 49 FL (ref 35.1–46.3)
EGFRCR SERPLBLD CKD-EPI 2021: 57 ML/MIN/1.73M2 (ref 60–?)
EOSINOPHIL # BLD AUTO: 0.07 X10(3) UL (ref 0–0.7)
EOSINOPHIL NFR BLD AUTO: 0.8 %
ERYTHROCYTE [DISTWIDTH] IN BLOOD BY AUTOMATED COUNT: 14.8 % (ref 11–15)
GLUCOSE BLD-MCNC: 128 MG/DL (ref 70–99)
HCT VFR BLD AUTO: 33 %
HGB BLD-MCNC: 11.2 G/DL
IMM GRANULOCYTES # BLD AUTO: 0.04 X10(3) UL (ref 0–1)
IMM GRANULOCYTES NFR BLD: 0.4 %
LYMPHOCYTES # BLD AUTO: 1.34 X10(3) UL (ref 1–4)
LYMPHOCYTES NFR BLD AUTO: 14.9 %
MCH RBC QN AUTO: 30.5 PG (ref 26–34)
MCHC RBC AUTO-ENTMCNC: 33.9 G/DL (ref 31–37)
MCV RBC AUTO: 89.9 FL
MONOCYTES # BLD AUTO: 0.62 X10(3) UL (ref 0.1–1)
MONOCYTES NFR BLD AUTO: 6.9 %
NEUTROPHILS # BLD AUTO: 6.92 X10 (3) UL (ref 1.5–7.7)
NEUTROPHILS # BLD AUTO: 6.92 X10(3) UL (ref 1.5–7.7)
NEUTROPHILS NFR BLD AUTO: 76.9 %
OSMOLALITY SERPL CALC.SUM OF ELEC: 273 MOSM/KG (ref 275–295)
PLATELET # BLD AUTO: 287 10(3)UL (ref 150–450)
POTASSIUM SERPL-SCNC: 4.3 MMOL/L (ref 3.5–5.1)
RBC # BLD AUTO: 3.67 X10(6)UL
SODIUM SERPL-SCNC: 128 MMOL/L (ref 136–145)
WBC # BLD AUTO: 9 X10(3) UL (ref 4–11)

## 2023-12-31 PROCEDURE — 99239 HOSP IP/OBS DSCHRG MGMT >30: CPT | Performed by: INTERNAL MEDICINE

## 2023-12-31 RX ORDER — ENALAPRIL MALEATE 5 MG/1
10 TABLET ORAL DAILY
Status: SHIPPED | COMMUNITY
Start: 2023-12-31

## 2023-12-31 NOTE — PLAN OF CARE
Tramadol administered for rib pain overnight. Transfer from chair to bed and ambulating to bathroom with assist x 1 and walker. Dressing changes to bilateral LE completed this AM. Discharge to [facility] once medically cleared. Problem: Patient Centered Care  Goal: Patient preferences are identified and integrated in the patient's plan of care  Description: Interventions:  - What would you like us to know as we care for you?  Home with family.   - Provide timely, complete, and accurate information to patient/family  - Incorporate patient and family knowledge, values, beliefs, and cultural backgrounds into the planning and delivery of care  - Encourage patient/family to participate in care and decision-making at the level they choose  - Honor patient and family perspectives and choices  Outcome: Progressing     Problem: Patient/Family Goals  Goal: Patient/Family Long Term Goal  Description: Patient's Long Term Goal: \"to maintain my health so I don't have to be hospitalized\"    Interventions:  - Take medications as prescribed at discharge  - Follow up with PCP   - Maintain a heart healthy diet  - See additional Care Plan goals for specific interventions  Outcome: Progressing  Goal: Patient/Family Short Term Goal  Description: Patient's Short Term Goal: \"to control my pain to my ribs\"    Interventions:   - Activity as tolerated  - Tramadol administered for pain PRN  - See additional Care Plan goals for specific interventions  Outcome: Progressing     Problem: CARDIOVASCULAR - ADULT  Goal: Maintains optimal cardiac output and hemodynamic stability  Description: INTERVENTIONS:  - Monitor vital signs, rhythm, and trends  - Monitor for bleeding, hypotension and signs of decreased cardiac output  - Evaluate effectiveness of vasoactive medications to optimize hemodynamic stability  - Monitor arterial and/or venous puncture sites for bleeding and/or hematoma  - Assess quality of pulses, skin color and temperature  - Assess for signs of decreased coronary artery perfusion - ex.  Angina  - Evaluate fluid balance, assess for edema, trend weights  Outcome: Progressing  Goal: Absence of cardiac arrhythmias or at baseline  Description: INTERVENTIONS:  - Continuous cardiac monitoring, monitor vital signs, obtain 12 lead EKG if indicated  - Evaluate effectiveness of antiarrhythmic and heart rate control medications as ordered  - Initiate emergency measures for life threatening arrhythmias  - Monitor electrolytes and administer replacement therapy as ordered  Outcome: Progressing     Problem: RESPIRATORY - ADULT  Goal: Achieves optimal ventilation and oxygenation  Description: INTERVENTIONS:  - Assess for changes in respiratory status  - Assess for changes in mentation and behavior  - Position to facilitate oxygenation and minimize respiratory effort  - Oxygen supplementation based on oxygen saturation or ABGs  - Provide Smoking Cessation handout, if applicable  - Encourage broncho-pulmonary hygiene including cough, deep breathe, Incentive Spirometry  - Assess the need for suctioning and perform as needed  - Assess and instruct to report SOB or any respiratory difficulty  - Respiratory Therapy support as indicated  - Manage/alleviate anxiety  - Monitor for signs/symptoms of CO2 retention  Outcome: Completed

## 2023-12-31 NOTE — DISCHARGE PLANNING
Patient discharging to [facility] care via [facility]. Nurse called report to RADHA. IV and tele removed. Patient is stable for discharge at this time.

## 2023-12-31 NOTE — PLAN OF CARE
Problem: Patient Centered Care  Goal: Patient preferences are identified and integrated in the patient's plan of care  Description: Interventions:  - What would you like us to know as we care for you? Home with family  - Provide timely, complete, and accurate information to patient/family  - Incorporate patient and family knowledge, values, beliefs, and cultural backgrounds into the planning and delivery of care  - Encourage patient/family to participate in care and decision-making at the level they choose  - Honor patient and family perspectives and choices  Outcome: Progressing     Problem: Patient/Family Goals  Goal: Patient/Family Long Term Goal  Description: Patient's Long Term Goal: \"to maintain my health so I don't have to be hospitalized\"    Interventions:  - Take medications as prescribed at discharge  - Follow up with PCP   - Maintain a heart healthy diet  - See additional Care Plan goals for specific interventions  Outcome: Progressing  Goal: Patient/Family Short Term Goal  Description: Patient's Short Term Goal: \"to control my pain to my ribs\"    Interventions:   - Activity as tolerated  - Tramadol administered for pain PRN  - See additional Care Plan goals for specific interventions  Outcome: Progressing     Problem: CARDIOVASCULAR - ADULT  Goal: Maintains optimal cardiac output and hemodynamic stability  Description: INTERVENTIONS:  - Monitor vital signs, rhythm, and trends  - Monitor for bleeding, hypotension and signs of decreased cardiac output  - Evaluate effectiveness of vasoactive medications to optimize hemodynamic stability  - Monitor arterial and/or venous puncture sites for bleeding and/or hematoma  - Assess quality of pulses, skin color and temperature  - Assess for signs of decreased coronary artery perfusion - ex.  Angina  - Evaluate fluid balance, assess for edema, trend weights  Outcome: Progressing  Goal: Absence of cardiac arrhythmias or at baseline  Description: INTERVENTIONS:  - Continuous cardiac monitoring, monitor vital signs, obtain 12 lead EKG if indicated  - Evaluate effectiveness of antiarrhythmic and heart rate control medications as ordered  - Initiate emergency measures for life threatening arrhythmias  - Monitor electrolytes and administer replacement therapy as ordered  Outcome: Progressing     Patient is alert and orientated x 4. Patient is on RA. Plan for discharge today.

## 2023-12-31 NOTE — CM/SW NOTE
12/31/23 1010   Discharge disposition   Expected discharge disposition subacute   Post Acute Care Provider [facility]   Discharge transportation [facility]       Confirmed w/ MD  - pt is cleared for DC today. Sadie w/ [facility] confirmed they can accept today around 2-3pm.    RN Estrada Aldrich updated. Attempted pt's son Osmin Murcia via phone - no answer. Left Vmail w/ update and informed of likely OOP cost for [employer] to be billed at a later date.     SW spoke to Angie w/ [facility] set for [facility]. PCS completed in Epic - pt's RN to print w/ pt's AVS.    Report phone #: [phone]      [facility] [address] [place] [address], PCS completed          KIRBY Rojas, [facility]

## 2024-01-02 NOTE — PAYOR COMM NOTE
--------------  CONTINUED STAY REVIEW  12/30-12/31  Payor: [employer]  Subscriber #:  [id number]  Authorization Number: [id number]    Admit date: 12/27/23  Admit time: 10:57 PM    Admitting Physician: Sadie Rodríguez MD  Attending Physician:  No att. providers found  Primary Care Physician: Remigio Perez MD    REVIEW DOCUMENTATION:    12/30 IM     Patient reports chest pain is improving     Lab 12/28/23 0657 12/29/23 0644 12/30/23  0518   WBC 7.5 10.5 10.8   HGB 10.8* 10.5* 10.9*   MCV 93.1 90.2 88.9   .0 229.0 243.0                Recent Labs   Lab 12/27/23 2000 12/28/23 0657 12/29/23 0644 12/30/23  0518   * 115* 135* 146*   BUN 17 18 18 20   CREATSERUM 0.95 0.91 0.93 0.89   CA 9.2 9.2 9.0 9.0   ALB 4.3  --   --   --    * 131* 128* 128*   K 4.8 4.4 4.4 4.5   CL 98 101 97* 97*   CO2 26.0 28.0 27.0 24.0   ALKPHO 95  --   --   --    AST 36*  --   --   --    ALT 20  --   --   --    BILT 0.3  --   --   --    TP 7.1  --   --   --      Medications:    enalapril  10 mg Oral Daily    apixaban  2.5 mg Oral BID    ascorbic acid  500 mg Oral Daily    aspirin  81 mg Oral Daily    atorvastatin  10 mg Oral Nightly    cholecalciferol  2,000 Units Oral Daily    cyanocobalamin  1,000 mcg Oral Daily    dilTIAZem ER  120 mg Oral Daily    fluticasone propionate  2 spray Each Nare BID    fluticasone furoate  1 puff Inhalation Daily    gabapentin  100 mg Oral Nightly    melatonin  3 mg Oral Nightly    pantoprazole  40 mg Oral Before breakfast               Assessment & Plan:   Closed fracture of multiple ribs of left side, initial encounter  -Likely secondary to CPR administered outside of hospital  -Pain control as able  -Encouraging incentive spirometry  -Continue to monitor  -PT/OT     Right apical pneumothorax  -Initially noted on CT  -Repeat chest x-ray 12/29 reviewed.  Noted resolution of pneumothorax.  -Likely secondary to trauma from above  -Weaning supplemental O2  -Monitoring  respiratory status     Syncope and collapse  -Unclear etiology  -Cardiac monitoring for signs of arrhythmia  -echocardiogram reviewed.  -PT/OT     Paroxysmal atrial fibrillation on Eliquis   -remains in sinus rhythm  - rate controlled  -Continue Eliquis   -Continue diltiazem  -Continue monitoring on telemetry     Essential hypertension   -Mild elevations noted  -Continue diltiazem and enalapril  -Continue to monitor and adjust agents as needed     Peripheral neuropathy  -Gabapentin     COPD/asthma   -No current signs of acute exacerbation  -Continue home inhaler.     History of GERD  -Protonix     Hyperlipidemia  -Lipitor      Medications 12/25/23 12/26/23 12/27/23 12/28/23 12/29/23 12/30/23 12/31/23   acetaminophen (Tylenol Extra Strength) tab 500 mg  Dose: 500 mg  Freq: Every 4 hours PRN Route: OR  PRN Reason: Fever  PRN Comment: equal to or greater than 100.4  Indications of Use: FEVER,PAIN  Start: 12/28/23 1801 End: 12/31/23 1654   Admin Instructions:   do not initiate oral therapy until 6-8 hours after the last IV acetaminophen dose if IV acetaminophen was used previously       1921 EH-Given      1123 EH-Given       1654-D/C'd        polyethylene glycol (PEG 3350) (Miralax) 17 g oral packet 17 g  Dose: 17 g  Freq: DAILY PRN Route: OR  PRN Reason: constipation  PRN Comment: If no bowel movement in last 24 hours  Start: 12/27/23 2258 End: 12/31/23 1654   Admin Instructions:   Use prior to Senokot, Dulcolax, or Fleet's Enema.       1240 EH-Given       2133 KJ-Given      1018 LN-Given     1654-D/C'd      sennosides (Senokot) tab 17.2 mg  Dose: 17.2 mg  Freq: Nightly PRN Route: OR  PRN Comment: constipation, as needed if no bowel movement that day  Start: 12/27/23 2258 End: 12/31/23 1654   Admin Instructions:   Use after MiraLAX.    Use prior to Dulcolax or Fleet's Enema.       1814 EH-Given        1654-D/C'd      traMADol (Ultram) tab 50 mg  Dose: 50 mg  Freq: Every 6 hours PRN Route: OR  PRN Reason: moderate  pain  Start: 23 End: 23   Admin Instructions:   Max dose 400 mg/day        TJ-Given        LN-Given      KJ-Given      -D/C'd        Vitals (last day) before discharge       Date/Time Temp Pulse Resp BP SpO2 Weight O2 Device O2 Flow Rate (L/min) [facility]    23 1011 97.8 °F (36.6 °C) 75 18 131/53 92 % -- None (Room air) -- LN    23 0456 98.4 °F (36.9 °C) 83 17 145/53 92 % -- None (Room air) -- KJ    23 0456 -- -- -- -- -- 123 lb 3.2 oz -- -- SS    23 2121 98 °F (36.7 °C) 79 17 145/55 94 % -- Nasal cannula 1 L/min KJ    23 1900 -- 78 -- -- -- -- -- -- CY    23 1731 98.1 °F (36.7 °C) 83 18 145/59 93 % -- Nasal cannula 1 L/min LN    23 1021 98.4 °F (36.9 °C) 72 18 143/53 95 % -- Nasal cannula 1 L/min LN    23 0408 98.8 °F (37.1 °C) 80 18 131/50 95 % 122 lb 11.2 oz Nasal cannula 1 L/min RN       --------------  DISCHARGE REVIEW    Payor: [employer]  Subscriber #:  [id number]  Authorization Number: [id number]    Admit date: 23  Admit time:  10:57 PM  Discharge Date: 2023  2:54 PM     Admitting Physician: Sadie Rodríguez MD  Attending Physician:  No att. providers found  Primary Care Physician: Remigio Perez MD          Discharge Summary Notes        Discharge Summary signed by Andrés Rowland MD at 2024  8:05 AM       Author: Andrés Rowland MD Specialty: HOSPITALIST, Internal Medicine Author Type: Physician    Filed: 2024  8:05 AM Date of Service: 2023 10:17 AM Status: Signed    : Andrés Rowland MD (Physician)         [facility]    Discharge Summary    Frances Whitehead Patient Status:  Inpatient    1931 MRN [id number]   Location [facility] 3W/SW Attending Andrés Rowland MD   Hosp Day # 4 PCP Remigio Perez MD     Date of Admission: 2023     Date of Discharge: 23      Lace+ Score: 71  59-90 High Risk  29-58 Medium Risk  0-28   Low  Risk.    TCM Follow-Up Recommendation:  LACE > 58: High Risk of readmission after discharge from the hospital.    DISCHARGE DX: Principal Problem:    Closed fracture of multiple ribs of left side, initial encounter  Active Problems:    Syncope, unspecified syncope type       The patient was seen and examined on day of discharge and this discharge summary is in conjunction with any daily progress note from day of discharge.    HPI per admitting physician: \"Frances Whitehead is a(n) 89 year old female, who presents for evaluation of a syncopal episode.  Patient with multiple chronic medical conditions including atrial fibrillation on Eliquis, COPD/asthma, obstructive CAD, hyperlipidemia.  Patient is very hard of hearing, surrounded by family members.  Patient was sitting on her chair at home when all of a sudden she was noticed by her son to not be responding and making gurgling noises.  Her son placed her on the ground and try to feel a pulse, he did not feel a pulse and started doing chest compressions.  Unknown how long the chest compression lasted.  He then was able to feel a pulse and paramedics arrived shortly after.  Patient reports that she does not remember the episode.  Denies feeling lightheaded or dizzy prior to passing out. She currently states that she has chest pain but did not have chest pain before the CPR.  Patient denies feeling lightheaded or dizzy.  Denies nausea or vomiting.  Denies any diarrhea or constipation. \"    Hospital Course:     Closed fracture of multiple ribs of left side, initial encounter  -Likely secondary to CPR administered outside of hospital  -Pain control as able  -Encouraging incentive spirometry  -PT/OT     Right apical pneumothorax  -Initially noted on CT  -Repeat chest x-ray 12/29 reviewed.  Noted resolution of pneumothorax.  -Likely secondary to trauma from above  -Weaning supplemental O2     Syncope and collapse  -Unclear etiology  -Cardiac monitoring without signs of new  arrhythmia  -echocardiogram reviewed.  -PT/OT     Paroxysmal atrial fibrillation on Eliquis   -remains in sinus rhythm  - rate controlled  -Continue Eliquis   -Continue diltiazem     Essential hypertension   -Mild elevations noted  -Continue diltiazem and enalapril  -Continue to monitor and adjust agents as needed     Peripheral neuropathy  -Gabapentin     COPD/asthma   -No current signs of acute exacerbation  -Continue home inhaler.     History of GERD  -Protonix     Hyperlipidemia  -Lipitor      Physical Exam:    Vitals:    12/30/23 1900 12/30/23 2121 12/31/23 0456 12/31/23 1011   BP:  145/55 145/53 131/53   BP Location:  Left arm Right arm Right arm   Pulse: 78 79 83 75   Resp:  17 17 18   Temp:  98 °F (36.7 °C) 98.4 °F (36.9 °C) 97.8 °F (36.6 °C)   TempSrc:  Oral Oral Oral   SpO2:  94% 92% 92%   Weight:   123 lb 3.2 oz (55.9 kg)      Patient Weight for the past 72 hrs:   Weight   12/29/23 0510 122 lb (55.3 kg)   12/30/23 0408 122 lb 11.2 oz (55.7 kg)   12/31/23 0456 123 lb 3.2 oz (55.9 kg)       Intake/Output Summary (Last 24 hours) at 12/31/2023 1017  Last data filed at 12/31/2023 0958  Gross per 24 hour   Intake 250 ml   Output 200 ml   Net 50 ml         GENERAL:  Awake and alert, in no acute distress.  HEART:  S1 and S2 heard.  RRR   LUNGS:  Air entry was decreased.  No increased work of breathing or wheezes   ABDOMEN: Soft and non-tender.    PSYCHIATRIC: Normal mood    CULTURE:   No results found for this visit on 12/27/23.    IMAGING STUDIES: SOME MAY NEED FOLLOW UP WITH PCP   XR CHEST PA + LAT CHEST (CPT=71046)    Result Date: 12/29/2023  CONCLUSION: No pneumothorax    Dictated by (CST): Minh Jarquin MD on 12/29/2023 at 9:34 AM     Finalized by (CST): Minh Jarquin MD on 12/29/2023 at 9:35 AM          XR CHEST AP PORTABLE  (CPT=71045)    Result Date: 12/28/2023  CONCLUSION: Tiny right apical pneumothorax    Dictated by (CST): Minh Jarquin MD on 12/28/2023 at 1:30 PM     Finalized by (CST): Minh Jarquin,  MD on 12/28/2023 at 1:31 PM          CT CHEST PE AORTA (IV ONLY) (CPT=71260)    Result Date: 12/27/2023  CONCLUSION:  1. No evidence of acute pulmonary embolism.  2. Trace approximate 5% right apical and anterior pneumothorax.  As there are subtle acute nondisplaced left lateral 5th and 6th rib fractures, this pneumothorax is likely posttraumatic.  Other chronic bilateral rib fractures are seen.  There is also a chronic-appearing severe T11 vertebral body compression fracture with vertebra plana. 3. Dependent airspace disease in the left greater than right lower lobes with low-grade intrinsic air bronchograms.  Findings could relate to atelectasis or early pneumonia/aspiration. 4. Multi-vessel coronary artery calcification. 5. Thyroid nodules, which measure up to 1.4 cm in the left lobe. 6. Indeterminate attenuation 3.1 and 0.5 cm right upper pole renal cortical lesions.  Depending upon goals of care, consider renal ultrasound correlation to determine if these are solid or cystic. 7. Curvilinear calcification at the periphery of the spleen, which likely relates to sequelae of a chronic/remote insult. 8. Lesser incidental findings as above.   Results of this examination were discussed with the patient's physician, Dr. Quinones, by Dr. Sidhu at 2210 on 12/27/2023.   Dictated by (CST): Rubén Sidhu MD on 12/27/2023 at 10:03 PM     Finalized by (CST): Rubén Sidhu MD on 12/27/2023 at 10:14 PM          CT BRAIN OR HEAD (49369)    Result Date: 12/27/2023  CONCLUSION:  1. No acute intracranial process by noncontrast CT technique. Nonspecific white matter changes involving both cerebral hemispheres that most likely reflect sequelae of chronic microangiopathy. 2. Intracranial atherosclerosis. 3. Acute on chronic multifocal sinusitis.  Additional chronic nonspecific opacification of the mastoid and middle ear cavities bilaterally.   Dictated by (CST): Rubén Sidhu MD on 12/27/2023 at 9:02 PM     Finalized by (CST): Nahum  MD Rubén on 12/27/2023 at 9:05 PM          XR CHEST AP PORTABLE  (CPT=71045)    Result Date: 12/27/2023  CONCLUSION:   Stable streaky left greater than right basilar opacities, which likely represent atelectasis/scarring.  No other focal airspace disease or significant pleural effusion.   Dictated by (CST): Rubén Sidhu MD on 12/27/2023 at 8:32 PM     Finalized by (CST): Rubén Sidhu MD on 12/27/2023 at 8:33 PM           LABS :     Lab Results   Component Value Date    WBC 9.0 12/31/2023    HGB 11.2 (L) 12/31/2023    HCT 33.0 (L) 12/31/2023    .0 12/31/2023    CREATSERUM 0.94 12/31/2023    BUN 28 (H) 12/31/2023     (L) 12/31/2023    K 4.3 12/31/2023    CL 97 (L) 12/31/2023    CO2 24.0 12/31/2023     (H) 12/31/2023    CA 9.3 12/31/2023    ALB 4.3 12/27/2023    ALKPHO 95 12/27/2023    BILT 0.3 12/27/2023    TP 7.1 12/27/2023    AST 36 (H) 12/27/2023    ALT 20 12/27/2023    TSH 1.560 07/27/2021    DDIMER 7.28 (H) 12/27/2023    ESRML 29 04/20/2022    CRP 1.16 (H) 04/20/2022    MG 1.9 01/09/2021    PHOS 2.5 01/08/2021    TROP <0.045 01/05/2021     01/05/2021    B12 199 07/27/2021       Recent Labs   Lab 12/29/23  0644 12/30/23  0518 12/31/23  0531   RBC 3.37* 3.50* 3.67*   HGB 10.5* 10.9* 11.2*   HCT 30.4* 31.1* 33.0*   MCV 90.2 88.9 89.9   MCH 31.2 31.1 30.5   MCHC 34.5 35.0 33.9   RDW 15.1* 14.8 14.8   NEPRELIM 8.87* 9.08* 6.92   WBC 10.5 10.8 9.0   .0 243.0 287.0     Recent Labs   Lab 12/27/23 2000 12/28/23  0657 12/29/23  0644 12/30/23  0518 12/31/23  0531   *   < > 135* 146* 128*   BUN 17   < > 18 20 28*   CREATSERUM 0.95   < > 0.93 0.89 0.94   CA 9.2   < > 9.0 9.0 9.3   ALB 4.3  --   --   --   --    *   < > 128* 128* 128*   K 4.8   < > 4.4 4.5 4.3   CL 98   < > 97* 97* 97*   CO2 26.0   < > 27.0 24.0 24.0   ALKPHO 95  --   --   --   --    AST 36*  --   --   --   --    ALT 20  --   --   --   --    BILT 0.3  --   --   --   --    TP 7.1  --   --   --   --     < >  = values in this interval not displayed.     No results found for: \"PT\", \"INR\"    Disposition: Discharge to SNF    Condition at Discharge: Stable     Discharge Medications:      Discharge Medications        CHANGE how you take these medications        Instructions Prescription details   enalapril 5 MG Tabs  Commonly known as: Vasotec  What changed: how much to take      Take 2 tablets (10 mg total) by mouth daily.   Refills: 0            CONTINUE taking these medications        Instructions Prescription details   acetaminophen 500 MG Tabs  Commonly known as: Tylenol Extra Strength      Take 1 tablet (500 mg total) by mouth in the morning and 1 tablet (500 mg total) before bedtime.   Refills: 0     apixaban 2.5 MG Tabs  Commonly known as: Eliquis      Take 1 tablet (2.5 mg total) by mouth 2 (two) times daily.   Quantity: 180 tablet  Refills: 3     ascorbic acid 500 MG Tabs  Commonly known as: Vitamin C      Take 1 tablet (500 mg total) by mouth daily.   Quantity: 30 tablet  Refills: 0     aspirin 81 MG Tbec      Take 1 tablet (81 mg total) by mouth daily.   Quantity: 100 tablet  Refills: 3     atorvastatin 10 MG Tabs  Commonly known as: Lipitor      Take 1 tablet (10 mg total) by mouth nightly.   Quantity: 90 tablet  Refills: 3     Benefiber Powd      Take 2 teaspoons daily.   Refills: 0     cholecalciferol 25 MCG (1000 UT) Tabs  Commonly known as: VITAMIN D3      TAKE TWO TABLETS BY MOUTH DAILY   Quantity: 180 tablet  Refills: 3     dilTIAZem  MG Cp24  Commonly known as: Cardizem CD      Take 1 capsule (120 mg total) by mouth daily.   Quantity: 90 capsule  Refills: 3     Ferrous Sulfate 325 (65 Fe) MG Tabs      Take one tab on Mon Wed Fri   Quantity: 90 tablet  Refills: 3     Flovent  MCG/ACT Aero  Generic drug: Fluticasone Propionate HFA      Inhale 1 puff into the lungs 2 (two) times daily.   Quantity: 1 each  Refills: 3     fluticasone propionate 50 MCG/ACT Susp  Commonly known as: Flonase      SPRAY  2 SPRAYS INTO EACH NOSTRIL  IN THE MORNING AND 2 SPRAYS BEFORE BEDTIME   Quantity: 16 g  Refills: 0     gabapentin 100 MG Caps  Commonly known as: Neurontin      Take 1 capsule (100 mg total) by mouth nightly.   Quantity: 90 capsule  Refills: 3     hydrocortisone 2.5 % Crea  Commonly known as: Anusol-HC      APPLY RECTALLY TWICE DAILY AS NEEDED   Quantity: 30 g  Refills: 2     lactulose 10 GM/15ML Soln  Commonly known as: CHRONULAC      Take 30 mL (20 g total) by mouth daily as needed (constipated bowel).   Quantity: 237 mL  Refills: 3     loratadine 10 MG Tabs  Commonly known as: Claritin      TAKE ONE TABLET BY MOUTH ONE TIME DAILY   Quantity: 30 tablet  Refills: 2     Lumigan 0.01 % Soln  Generic drug: bimatoprost       Refills: 0     melatonin 3 MG Tabs      Take 1 tablet (3 mg total) by mouth nightly.   Refills: 0     pantoprazole 40 MG Tbec  Commonly known as: Protonix      Take 1 tablet (40 mg total) by mouth before breakfast.   Quantity: 90 tablet  Refills: 3     polyethylene glycol (PEG 3350) 17 GM/SCOOP Powd  Commonly known as: MiraLax      Take by mouth nightly. take 15 milliliter (17G)  by oral route  every day powder mixed with 8 oz. water, juice, soda, coffee, or tea   Refills: 0     sennosides-docusate 8.6-50 MG Tabs  Commonly known as: Senna Plus      Take 2 tablets twice daily as needed for constipation   Quantity: 100 tablet  Refills: 3     triamcinolone 0.1 % Crea  Commonly known as: Kenalog      Apply 1 Application topically 2 (two) times daily as needed. APPLY TO AFFECTED AREA   Quantity: 15 g  Refills: 1     Vitamin B12 1000 MCG Tbcr      Take 1 tablet by mouth daily.   Refills: 0            STOP taking these medications      dilTIAZem HCl ER Beads 120 MG Cp24  Commonly known as: TIAZAC                 Follow up Visits  No follow-up provider specified.  Remigio Perez MD          Other Discharge Instructions:       ----------------------------------------------------  38 MIN SPENT ON THIS DC    Andrés Rowland MD    12/31/2023      Electronically signed by Andrés Rowland MD on 1/1/2024  8:05 AM         REVIEWER COMMENTS

## 2024-01-06 PROCEDURE — 1111F DSCHRG MED/CURRENT MED MERGE: CPT | Performed by: INTERNAL MEDICINE

## 2024-01-06 PROCEDURE — 99305 1ST NF CARE MODERATE MDM 35: CPT | Performed by: INTERNAL MEDICINE

## 2024-01-08 PROCEDURE — 1111F DSCHRG MED/CURRENT MED MERGE: CPT | Performed by: INTERNAL MEDICINE

## 2024-01-08 PROCEDURE — 99308 SBSQ NF CARE LOW MDM 20: CPT | Performed by: INTERNAL MEDICINE

## 2024-01-09 ENCOUNTER — EXTERNAL FACILITY (OUTPATIENT)
Dept: INTERNAL MEDICINE CLINIC | Facility: CLINIC | Age: 89
End: 2024-01-09

## 2024-01-09 DIAGNOSIS — S22.42XS CLOSED FRACTURE OF MULTIPLE RIBS OF LEFT SIDE, SEQUELA: Primary | ICD-10-CM

## 2024-01-09 DIAGNOSIS — I48.0 PAROXYSMAL ATRIAL FIBRILLATION (HCC): ICD-10-CM

## 2024-01-09 DIAGNOSIS — I10 PRIMARY HYPERTENSION: ICD-10-CM

## 2024-01-09 NOTE — PROGRESS NOTES
seen 1/8/2024  HPI:The patient is a 89-year-old female who presented to [facility] with syncope. She was found unresponsive at home, her son performed CPR and called 911. The patient was evaluated in the ER. CT chest showed closed fracture of multiple ribs on the left side with right apical pneumothorax. Her repeat x-ray showed stability of pneumothorax. . She was cleared medically and transferred to [facility] for subacute rehab.    PMHx : COPD, anemia, asthma, chronic kidney disease, cervical fracture, coronary artery disease, deafness, diverticulosis, GERD, dyslipidemia, hypertension, osteoarthritis, osteopenia, paroxysmal atrial fibrillation, pneumonia, shingles, supraventricular tachycardia, syncope, diabetes mellitus, uterine prolapse, abdominal surgery, appendectomy, bilateral knee replacement.    Allergies: latex; Mastisol Adhesive    FHx : Reviewed, Noncontributory for liver, kidney, or lung disease    SHx : The patient does not smoke and does not drink alcohol. She lives in a house with her son with two steps to get in      Allergies   latex   Mastisol Adhesive:     ros :she is feeling better,    On exam  Constitutional: well developed well nourished, not in any distress  Neuro: Alert, awake, cooperative, follows simple commands, forgetful  HEENT: NAD  CV:S1,S2, no MGR,Regular HR  Resp:clear on auscultation bilaterally, respirations unlabored,no adventitious lung sounds  Abdomen: soft, nontender, non distended, +bowel sound all quadrants  : No bladder distension,no pelvic tenderness  MUS: moves all extremities, no edema or cyanosis  Skin: skin color, texture, turgor normal    A/P     s/p fall/ Multiple ribs fracture on the left side/ Right apical pneumothorax  pain s better , ICS bedside     A fib/ continue home meds   htn - controlled , continue meds      COPD  -on flovent  -covid positive  -     Peripheral neuropathy  -on gabapentin      GERD  -on protonix      Anemia  - monitor  cbc  ptot

## 2024-01-09 NOTE — PROGRESS NOTES
see 1/6/2024  HPI:The patient is a 89-year-old female who presented to [facility] with syncope. She was found unresponsive at home, her son performed CPR and called 911. The patient was evaluated in the ER. CT chest showed closed fracture of multiple ribs on the left side with right apical pneumothorax. Her repeat x-ray showed stability of pneumothorax. . She was cleared medically and transferred to [facility] for subacute rehab.    PMHx : COPD, anemia, asthma, chronic kidney disease, cervical fracture, coronary artery disease, deafness, diverticulosis, GERD, dyslipidemia, hypertension, osteoarthritis, osteopenia, paroxysmal atrial fibrillation, pneumonia, shingles, supraventricular tachycardia, syncope, diabetes mellitus, uterine prolapse, abdominal surgery, appendectomy, bilateral knee replacement.    Allergies: latex; Mastisol Adhesive    FHx : Reviewed, Noncontributory for liver, kidney, or lung disease    SHx : The patient does not smoke and does not drink alcohol. She lives in a house with her son with two steps to get in      Allergies   latex   Mastisol Adhesive:     ros :she is complaining of pain on her ribs         On exam  Constitutional: well developed well nourished, not in any distress  Neuro: Alert, awake, cooperative, follows simple commands, forgetful  HEENT: NAD  CV:S1,S2, no MGR,Regular HR  Resp:clear on auscultation bilaterally, respirations unlabored,no adventitious lung sounds  Abdomen: soft, nontender, non distended, +bowel sound all quadrants  : No bladder distension,no pelvic tenderness  MUS: moves all extremities, no edema or cyanosis  Skin: skin color, texture, turgor normal    A/P     s/p fall/ Multiple ribs fracture on the left side/ Right apical pneumothorax  -c/o pain on th ribs  - on scheduled tylenol, no relief  -lidocaine patch , tramadol as needed, IC bedside     A fib/  continue  home meds   htn - controlled , continue meds      COPD  -on flovent  -covid  positive  -     Peripheral neuropathy  -on gabapentin      GERD  -on protonix      Anemia  - monitor cbc  discussed with her son

## 2024-01-10 ENCOUNTER — EXTERNAL FACILITY (OUTPATIENT)
Dept: INTERNAL MEDICINE CLINIC | Facility: CLINIC | Age: 89
End: 2024-01-10

## 2024-01-10 ENCOUNTER — APPOINTMENT (OUTPATIENT)
Dept: WOUND CARE | Facility: HOSPITAL | Age: 89
End: 2024-01-10
Attending: NURSE PRACTITIONER

## 2024-01-10 DIAGNOSIS — I48.0 PAROXYSMAL ATRIAL FIBRILLATION (HCC): ICD-10-CM

## 2024-01-10 DIAGNOSIS — M79.89 LEG SWELLING: Primary | ICD-10-CM

## 2024-01-10 PROCEDURE — 99309 SBSQ NF CARE MODERATE MDM 30: CPT | Performed by: INTERNAL MEDICINE

## 2024-01-10 PROCEDURE — 1111F DSCHRG MED/CURRENT MED MERGE: CPT | Performed by: INTERNAL MEDICINE

## 2024-01-10 NOTE — PROGRESS NOTES
follow up   HPI:The patient is a 89-year-old female who presented to [facility] with syncope. She was found unresponsive at home, her son performed CPR and called 911. The patient was evaluated in the ER. CT chest showed closed fracture of multiple ribs on the left side with right apical pneumothorax. Her repeat x-ray showed stability of pneumothorax. . She was cleared medically and transferred to [facility] for subacute rehab.    PMHx : COPD, anemia, asthma, chronic kidney disease, cervical fracture, coronary artery disease, deafness, diverticulosis, GERD, dyslipidemia, hypertension, osteoarthritis, osteopenia, paroxysmal atrial fibrillation, pneumonia, shingles, supraventricular tachycardia, syncope, diabetes mellitus, uterine prolapse, abdominal surgery, appendectomy, bilateral knee replacement.    Allergies: latex; Mastisol Adhesive    FHx : Reviewed, Noncontributory for liver, kidney, or lung disease    SHx : The patient does not smoke and does not drink alcohol. She lives in a house with her son with two steps to get in      Allergies   latex   Mastisol Adhesive:     ros :she is feeling better,    On exam  Constitutional: well developed well nourished, not in any distress  Neuro: Alert, awake, cooperative, follows simple commands, forgetful  HEENT: NAD  CV:S1,S2, no MGR,Regular HR  Resp:clear on auscultation bilaterally, respirations unlabored,no adventitious lung sounds  Abdomen: soft, nontender, non distended, +bowel sound all quadrants  : No bladder distension,no pelvic tenderness  MUS: moves all extremities, 2+ edeam  Skin: skin color, texture, turgor normal    A/P  b/llg swelling - stat us venous doppler, one dose f Lasix 20 mg , elevate legs     s/p fall/ Multiple ribs fracture on the left side/ Right apical pneumothorax  pain s better , ICS bedside     A fib/ continue home meds   htn - controlled , continue meds      COPD  -on flovent  -covid positive  -     Peripheral  neuropathy  -on gabapentin      GERD  -on protonix      Anemia  - monitor cbc  ptot

## 2024-01-11 RX ORDER — PANTOPRAZOLE SODIUM 40 MG/1
40 TABLET, DELAYED RELEASE ORAL
Qty: 90 TABLET | Refills: 3 | Status: CANCELLED | OUTPATIENT
Start: 2024-01-11

## 2024-01-13 RX ORDER — BIMATOPROST 0.1 MG/ML
SOLUTION/ DROPS OPHTHALMIC
Refills: 0 | Status: CANCELLED | OUTPATIENT
Start: 2024-01-13

## 2024-01-14 ENCOUNTER — APPOINTMENT (OUTPATIENT)
Dept: GENERAL RADIOLOGY | Facility: HOSPITAL | Age: 89
End: 2024-01-14
Attending: EMERGENCY MEDICINE
Payer: MEDICARE

## 2024-01-14 ENCOUNTER — HOSPITAL ENCOUNTER (EMERGENCY)
Facility: HOSPITAL | Age: 89
Discharge: HOME | End: 2024-01-14
Attending: EMERGENCY MEDICINE
Payer: MEDICARE

## 2024-01-14 VITALS
HEART RATE: 61 BPM | TEMPERATURE: 97 F | RESPIRATION RATE: 18 BRPM | OXYGEN SATURATION: 96 % | SYSTOLIC BLOOD PRESSURE: 114 MMHG | WEIGHT: 123.25 LBS | BODY MASS INDEX: 26 KG/M2 | DIASTOLIC BLOOD PRESSURE: 46 MMHG

## 2024-01-14 DIAGNOSIS — R55 SYNCOPE AND COLLAPSE: Primary | ICD-10-CM

## 2024-01-14 LAB
ALBUMIN SERPL-MCNC: 3.6 G/DL (ref 3.2–4.8)
ALBUMIN/GLOB SERPL: 1.5 {RATIO} (ref 1–2)
ALP LIVER SERPL-CCNC: 126 U/L
ALT SERPL-CCNC: 15 U/L
ANION GAP SERPL CALC-SCNC: 5 MMOL/L (ref 0–18)
AST SERPL-CCNC: 25 U/L (ref ?–34)
ATRIAL RATE: 63 BPM
BASOPHILS # BLD AUTO: 0.04 X10(3) UL (ref 0–0.2)
BASOPHILS NFR BLD AUTO: 0.6 %
BILIRUB SERPL-MCNC: 0.3 MG/DL (ref 0.2–0.9)
BILIRUB UR QL: NEGATIVE
BUN BLD-MCNC: 12 MG/DL (ref 9–23)
BUN/CREAT SERPL: 12.8 (ref 10–20)
CALCIUM BLD-MCNC: 8.3 MG/DL (ref 8.7–10.4)
CHLORIDE SERPL-SCNC: 96 MMOL/L (ref 98–112)
CLARITY UR: CLEAR
CO2 SERPL-SCNC: 25 MMOL/L (ref 21–32)
CREAT BLD-MCNC: 0.94 MG/DL
DEPRECATED RDW RBC AUTO: 50.3 FL (ref 35.1–46.3)
EGFRCR SERPLBLD CKD-EPI 2021: 57 ML/MIN/1.73M2 (ref 60–?)
EOSINOPHIL # BLD AUTO: 0.1 X10(3) UL (ref 0–0.7)
EOSINOPHIL NFR BLD AUTO: 1.6 %
ERYTHROCYTE [DISTWIDTH] IN BLOOD BY AUTOMATED COUNT: 15.1 % (ref 11–15)
FLUAV + FLUBV RNA SPEC NAA+PROBE: NEGATIVE
FLUAV + FLUBV RNA SPEC NAA+PROBE: NEGATIVE
GLOBULIN PLAS-MCNC: 2.4 G/DL (ref 2.8–4.4)
GLUCOSE BLD-MCNC: 144 MG/DL (ref 70–99)
GLUCOSE BLDC GLUCOMTR-MCNC: 160 MG/DL (ref 70–99)
GLUCOSE UR-MCNC: NORMAL MG/DL
HCT VFR BLD AUTO: 32.3 %
HGB BLD-MCNC: 10.6 G/DL
HGB UR QL STRIP.AUTO: NEGATIVE
IMM GRANULOCYTES # BLD AUTO: 0.1 X10(3) UL (ref 0–1)
IMM GRANULOCYTES NFR BLD: 1.6 %
KETONES UR-MCNC: NEGATIVE MG/DL
LEUKOCYTE ESTERASE UR QL STRIP.AUTO: NEGATIVE
LYMPHOCYTES # BLD AUTO: 1.44 X10(3) UL (ref 1–4)
LYMPHOCYTES NFR BLD AUTO: 22.5 %
MCH RBC QN AUTO: 29.9 PG (ref 26–34)
MCHC RBC AUTO-ENTMCNC: 32.8 G/DL (ref 31–37)
MCV RBC AUTO: 91 FL
MONOCYTES # BLD AUTO: 0.54 X10(3) UL (ref 0.1–1)
MONOCYTES NFR BLD AUTO: 8.4 %
NEUTROPHILS # BLD AUTO: 4.18 X10 (3) UL (ref 1.5–7.7)
NEUTROPHILS # BLD AUTO: 4.18 X10(3) UL (ref 1.5–7.7)
NEUTROPHILS NFR BLD AUTO: 65.3 %
NITRITE UR QL STRIP.AUTO: NEGATIVE
OSMOLALITY SERPL CALC.SUM OF ELEC: 264 MOSM/KG (ref 275–295)
P AXIS: 70 DEGREES
P-R INTERVAL: 182 MS
PH UR: 6.5 [PH] (ref 5–8)
PLATELET # BLD AUTO: 503 10(3)UL (ref 150–450)
POTASSIUM SERPL-SCNC: 4.3 MMOL/L (ref 3.5–5.1)
PROT SERPL-MCNC: 6 G/DL (ref 5.7–8.2)
PROT UR-MCNC: NEGATIVE MG/DL
Q-T INTERVAL: 410 MS
QRS DURATION: 68 MS
QTC CALCULATION (BEZET): 419 MS
R AXIS: -12 DEGREES
RBC # BLD AUTO: 3.55 X10(6)UL
RSV RNA SPEC NAA+PROBE: NEGATIVE
SARS-COV-2 RNA RESP QL NAA+PROBE: NOT DETECTED
SODIUM SERPL-SCNC: 126 MMOL/L (ref 136–145)
SP GR UR STRIP: 1.01 (ref 1–1.03)
T AXIS: 21 DEGREES
TROPONIN I SERPL HS-MCNC: 9 NG/L
UROBILINOGEN UR STRIP-ACNC: NORMAL
VENTRICULAR RATE: 63 BPM
WBC # BLD AUTO: 6.4 X10(3) UL (ref 4–11)

## 2024-01-14 PROCEDURE — 84484 ASSAY OF TROPONIN QUANT: CPT | Performed by: EMERGENCY MEDICINE

## 2024-01-14 PROCEDURE — 93010 ELECTROCARDIOGRAM REPORT: CPT

## 2024-01-14 PROCEDURE — 82962 GLUCOSE BLOOD TEST: CPT

## 2024-01-14 PROCEDURE — 80053 COMPREHEN METABOLIC PANEL: CPT | Performed by: EMERGENCY MEDICINE

## 2024-01-14 PROCEDURE — 99285 EMERGENCY DEPT VISIT HI MDM: CPT

## 2024-01-14 PROCEDURE — 96360 HYDRATION IV INFUSION INIT: CPT

## 2024-01-14 PROCEDURE — 93005 ELECTROCARDIOGRAM TRACING: CPT

## 2024-01-14 PROCEDURE — 71045 X-RAY EXAM CHEST 1 VIEW: CPT | Performed by: EMERGENCY MEDICINE

## 2024-01-14 PROCEDURE — 0241U SARS-COV-2/FLU A AND B/RSV BY PCR (GENEXPERT): CPT | Performed by: EMERGENCY MEDICINE

## 2024-01-14 PROCEDURE — 81003 URINALYSIS AUTO W/O SCOPE: CPT | Performed by: EMERGENCY MEDICINE

## 2024-01-14 PROCEDURE — 51701 INSERT BLADDER CATHETER: CPT

## 2024-01-14 PROCEDURE — 85025 COMPLETE CBC W/AUTO DIFF WBC: CPT | Performed by: EMERGENCY MEDICINE

## 2024-01-14 NOTE — ED INITIAL ASSESSMENT (HPI)
Syncopal episode this morning.  Patient arrives from [facility]. Post syncopal episode patient blood pressure checked, low per nursing home.    Patient hard of hearing but answering questions.  No injuries noted.  Both legs wrapped on arrival

## 2024-01-14 NOTE — ED QUICK NOTES
Patient cleared for discharge by MD. Kendalls with patient. Patient discharge instructions reviewed with patient including when and how to follow up  with healthcare provider and when to seek medical treatment. Peripheral IV removed.

## 2024-01-14 NOTE — ED PROVIDER NOTES
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 Doss-Stent PCI of LAD and RCA was performed.    Deaf     Diverticulitis 2010    hospitalized 3/10 and 5/10    Diverticulosis 2003    cscopy '03, '07, '10    Fracture of C2 vertebra, closed ([facility]) 03/2021    ER 3/19/21--CT cervical spine-- fx C2 posterior lateral vertebral body and left C2 lamina.      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    Renal calculus 2010    on CT abd 5/10-6 mm or less in L lower kidney    Renal insufficiency     Shingles 2011    Small bowel obstruction (HCC) [id number], 6/2016    multiple hospitalizations for [id number], 6/2016    Stress incontinence, female 05/07/2015    SVT (supraventricular tachycardia)     event monitor 2/2016-SVT    Syncope 05/2020    48-hour Holter  5/19/20--bursts of atrial tachycardia up to 3 minutes, PVCs, rare Wenkebach.  Saw Dr. Doss.    Type 2 diabetes mellitus (HCC) 2001    Uterine prolapse     Uterine prolapse 2006    had pessary in 2006 Dr. SORAYA Davis -vag sling procedure Dr Baez in 6/2016    Uterovaginal prolapse, incomplete 05/07/2015    UTI due to Klebsiella species 12/2016    ESBL Klebsiella UTI treated in [facility] with meropenem when in hosp for enteritis. (Dr Carvalho).     Wound of right leg 06/07/2022              Past Surgical History:   Procedure Laterality Date    ABDOMEN SURGERY PROC UNLISTED  2007    laparotomy for TERESA and internal hernia    ABDOMEN SURGERY PROC UNLISTED  2009    TERESA and small bowel resection-Dr Jaramillo    APPENDECTOMY      CHOLECYSTECTOMY      FRACTURE SURGERY Left 1/2017    L hip fx pinning 1/2017 Dr. Hernandez    KNEE REPLACEMENT SURGERY Left 2000    KNEE REPLACEMENT SURGERY Right 2008    OTHER SURGICAL HISTORY      cystocele repair    OTHER SURGICAL HISTORY Bilateral     ear surgery-Dr. Patel-both eardrums    SLING  6/30/2016    vag sling procedure at  of  -Dr Baez                Social History     Socioeconomic History    Marital status:    Tobacco Use    Smoking status: Never    Smokeless tobacco: Never   Vaping Use    Vaping Use: Never used   Substance and Sexual Activity    Alcohol use: No    Drug use: No   Other Topics Concern    Caffeine Concern Yes     Comment: Coffee 1 cups daily    Exercise Yes     Comment: Streching/Strength.    Social History Narrative    The patient uses the following assistive device(s):  single-point cane.      The patient does live in a home with stairs.     Social Determinants of Health     Food Insecurity: No Food Insecurity (12/27/2023)    Food Insecurity     Food Insecurity: Never true   Transportation Needs: No Transportation Needs (12/27/2023)    Transportation Needs     Lack of Transportation: No   Housing Stability: Low Risk  (12/27/2023)    Housing Stability      Housing Instability: No              Review of Systems    Positive for stated complaint: syncope, low bp  Other systems are as noted in HPI.  Constitutional and vital signs reviewed.      All other systems reviewed and negative except as noted above.    Physical Exam     ED Triage Vitals [01/14/24 1115]   /44   Pulse 66   Resp 24   Temp 97 °F (36.1 °C)   Temp src Temporal   SpO2 97 %   O2 Device None (Room air)       Current:/46   Pulse 61   Temp 97 °F (36.1 °C) (Temporal)   Resp 18   Wt 55.9 kg   SpO2 96%   BMI 25.76 kg/m²         Physical Exam          ED Course     Labs Reviewed   COMP METABOLIC PANEL (14) - Abnormal; Notable for the following components:       Result Value    Glucose 144 (*)     Sodium 126 (*)     Chloride 96 (*)     Calcium, Total 8.3 (*)     Calculated Osmolality 264 (*)     eGFR-Cr 57 (*)     Globulin  2.4 (*)     All other components within normal limits   POCT GLUCOSE - Abnormal; Notable for the following components:    POC Glucose  160 (*)     All other components within normal limits   CBC W/ DIFFERENTIAL - Abnormal; Notable for the following components:    RBC 3.55 (*)     HGB 10.6 (*)     HCT 32.3 (*)     RDW-SD 50.3 (*)     RDW 15.1 (*)     .0 (*)     All other components within normal limits   TROPONIN I HIGH SENSITIVITY - Normal   SARS-COV-2/FLU A AND B/RSV BY PCR ([facility]) - Normal    Narrative:     This test is intended for the qualitative detection and differentiation of SARS-CoV-2, influenza A, influenza B, and respiratory syncytial virus (RSV) viral RNA in nasopharyngeal or nares swabs from individuals suspected of respiratory viral infection consistent with COVID-19 by their healthcare provider. Signs and symptoms of respiratory viral infection due to SARS-CoV-2, influenza, and RSV can be similar.    Test performed using the Xpert Xpress SARS-CoV-2/FLU/RSV (real time RT-PCR)  assay on the [employer] instrument, [facility], [place] [address].   This test  is being used under the Food and Drug Administration's Emergency Use Authorization.    The authorized Fact Sheet for Healthcare Providers for this assay is available upon request from the laboratory.   CBC WITH DIFFERENTIAL WITH PLATELET    Narrative:     The following orders were created for panel order CBC With Differential With Platelet.  Procedure                               Abnormality         Status                     ---------                               -----------         ------                     CBC W/ DIFFERENTIAL[[id number]]          Abnormal            Final result                 Please view results for these tests on the individual orders.   URINALYSIS, ROUTINE   RAINBOW DRAW LAVENDER   RAINBOW DRAW LIGHT GREEN   RAINBOW DRAW BLUE   RAINBOW DRAW GOLD     EKG    Rate, intervals and axes as noted on EKG Report.  Rate: 63  Rhythm: Sinus Rhythm  Reading: Normal sinus rhythm without signs of acute ischemia or abnormal intervals.                            MDM      89-year-old female who per chart review has a history of hypertension, diabetes, paroxysmal A-fib on Eliquis, COPD, SBO, and who was just admitted to the hospital after an episode of unresponsiveness for which she received CPR and suffered rib fractures and a pneumothorax presents today after syncopal episode at her nursing home and reportedly low blood pressure of 70/40.  Per EMS, blood pressure improved on their arrival but patient with some nausea and vomited x 1.  Was given Zofran en route.  The patient is very hard of hearing but able to provide history stating she felt lightheaded after using the bathroom and passed out but denied any preceding chest pain, heart palpitations, any recent illness, medication changes, and currently does not have pain from a traumatic injury.  Denies head injury or loss of consciousness.    On exam, vitals normal, nontoxic-appearing, no external stigmata of traumatic injury or infection.  Some tenderness  in the ribs which may be secondary to known fractures.  Abdomen soft and nontender.    Differential: Vasovagal syncope, dehydration, ACS, anemia, metabolic abnormality, infection    Plan to evaluate with labs, UA, chest x-ray and give gentle fluid resuscitation.    Laboratory workup was reassuring.  I independently reviewed the patient's chest x-ray. No clear evidence of consolidation or pneumothorax.    Patient remained hemodynamically normal and asymptomatic in the ER.  I had a shared decision-making conversation with the patient and her son.  They were amenable to discharge back to their nursing home with careful return precautions knowing that she is a relatively high risk patient.                           Medical Decision Making      Disposition and Plan     Clinical Impression:  1. Syncope and collapse         Disposition:  Discharge  1/14/2024 12:38 pm    Follow-up:  Thao Garduno DO  [address] [place] [id number]  [phone]    Follow up      We recommend that you schedule follow up care with a primary care provider within the next three months to obtain basic health screening including reassessment of your blood pressure.      Medications Prescribed:  Current Discharge Medication List

## 2024-01-15 NOTE — TELEPHONE ENCOUNTER
Spoke to son advised that Lumigan should be refilled by ophthalmologist. Verbalized understanding.

## 2024-01-16 ENCOUNTER — EXTERNAL FACILITY (OUTPATIENT)
Dept: INTERNAL MEDICINE CLINIC | Facility: CLINIC | Age: 89
End: 2024-01-16

## 2024-01-16 DIAGNOSIS — S22.43XS CLOSED FRACTURE OF MULTIPLE RIBS OF BOTH SIDES, SEQUELA: Primary | ICD-10-CM

## 2024-01-16 DIAGNOSIS — I48.0 PAROXYSMAL ATRIAL FIBRILLATION (HCC): ICD-10-CM

## 2024-01-16 NOTE — PROGRESS NOTES
follow up     seen 1/15/2024  HPI:The patient is a 89-year-old female who presented to [facility] with syncope. She was found unresponsive at home, her son performed CPR and called 911. The patient was evaluated in the ER. CT chest showed closed fracture of multiple ribs on the left side with right apical pneumothorax. Her repeat x-ray showed stability of pneumothorax. . She was cleared medically and transferred to [facility] for subacute rehab.    PMHx : COPD, anemia, asthma, chronic kidney disease, cervical fracture, coronary artery disease, deafness, diverticulosis, GERD, dyslipidemia, hypertension, osteoarthritis, osteopenia, paroxysmal atrial fibrillation, pneumonia, shingles, supraventricular tachycardia, syncope, diabetes mellitus, uterine prolapse, abdominal surgery, appendectomy, bilateral knee replacement.    Allergies: latex; Mastisol Adhesive    FHx : Reviewed, Noncontributory for liver, kidney, or lung disease    SHx : The patient does not smoke and does not drink alcohol. She lives in a house with her son with two steps to get in      Allergies   latex   Mastisol Adhesive:     ros :she is feeling better , her pain  is better     On exam  Constitutional: well developed well nourished, not in any distress  Neuro: Alert, awake, cooperative, follows simple commands, forgetful  HEENT: NAD  CV:S1,S2, no MGR,Regular HR  Resp:clear on auscultation bilaterally, respirations unlabored,no adventitious lung sounds  Abdomen: soft, nontender, non distended, +bowel sound all quadrants  : No bladder distension,no pelvic tenderness  MUS: moves all extremities, 2+ edeam  Skin: skin color, texture, turgor normal    A/P    b/llg swelling - improved , us venous doppler is negative     s/p fall/ Multiple ribs fracture on the left side/ Right apical pneumothorax  pain s better , ICS bedside     A fib/ continue home meds   htn - controlled , continue meds      COPD  -on flovent  -covid positive  -      Peripheral neuropathy  -on gabapentin      GERD  -on protonix      Anemia  - monitor cbc  ptot

## 2024-01-19 ENCOUNTER — EXTERNAL FACILITY (OUTPATIENT)
Dept: INTERNAL MEDICINE CLINIC | Facility: CLINIC | Age: 89
End: 2024-01-19

## 2024-01-19 DIAGNOSIS — S22.41XA CLOSED FRACTURE OF MULTIPLE RIBS OF RIGHT SIDE, INITIAL ENCOUNTER: ICD-10-CM

## 2024-01-19 DIAGNOSIS — E87.1 HYPONATREMIA: Primary | ICD-10-CM

## 2024-01-19 NOTE — PROGRESS NOTES
see 1/17/2024    HPI:The patient is a 89-year-old female who presented to [facility] with syncope. She was found unresponsive at home, her son performed CPR and called 911. The patient was evaluated in the ER. CT chest showed closed fracture of multiple ribs on the left side with right apical pneumothorax. Her repeat x-ray showed stability of pneumothorax. . She was cleared medically and transferred to [facility] for subacute rehab.    PMHx : COPD, anemia, asthma, chronic kidney disease, cervical fracture, coronary artery disease, deafness, diverticulosis, GERD, dyslipidemia, hypertension, osteoarthritis, osteopenia, paroxysmal atrial fibrillation, pneumonia, shingles, supraventricular tachycardia, syncope, diabetes mellitus, uterine prolapse, abdominal surgery, appendectomy, bilateral knee replacement.    Allergies: latex; Mastisol Adhesive    FHx : Reviewed, Noncontributory for liver, kidney, or lung disease    SHx : The patient does not smoke and does not drink alcohol. She lives in a house with her son with two steps to get in      Allergies   latex   Mastisol Adhesive:     ros :she is feeling better ,  ,leg swelling  improved     On exam  Constitutional: well developed well nourished, not in any distress  Neuro: Alert, awake, cooperative, follows simple commands, forgetful  HEENT: NAD  CV:S1,S2, no MGR,Regular HR  Resp:clear on auscultation bilaterally, respirations unlabored,no adventitious lung sounds  Abdomen: soft, nontender, non distended, +bowel sound all quadrants  : No bladder distension,no pelvic tenderness  MUS: moves all extremities, 2+ edeam  Skin: skin color, texture, turgor normal    A/P  hyponatremia- fluid restriction, nephrology cosult, monitor bmp    b/llg swelling - improved , us venous doppler is negative     s/p fall/ Multiple ribs fracture on the left side/ Right apical pneumothorax  pain s better , ICS bedside     A fib/ continue home meds   htn - controlled ,  continue meds      COPD  -on flovent  -covid positive  -     Peripheral neuropathy  -on gabapentin      GERD  -on protonix      Anemia  - monitor cbc

## 2024-01-22 ENCOUNTER — EXTERNAL FACILITY (OUTPATIENT)
Dept: INTERNAL MEDICINE CLINIC | Facility: CLINIC | Age: 89
End: 2024-01-22

## 2024-01-22 DIAGNOSIS — E87.1 HYPONATREMIA: Primary | ICD-10-CM

## 2024-01-22 DIAGNOSIS — S22.42XD CLOSED FRACTURE OF MULTIPLE RIBS OF LEFT SIDE WITH ROUTINE HEALING, SUBSEQUENT ENCOUNTER: ICD-10-CM

## 2024-01-22 DIAGNOSIS — I48.91 ATRIAL FIBRILLATION, UNSPECIFIED TYPE (HCC): ICD-10-CM

## 2024-01-22 NOTE — PROGRESS NOTES
follow up    HPI:The patient is a 89-year-old female who presented to [facility] with syncope. She was found unresponsive at home, her son performed CPR and called 911. The patient was evaluated in the ER. CT chest showed closed fracture of multiple ribs on the left side with right apical pneumothorax. Her repeat x-ray showed stability of pneumothorax. . She was cleared medically and transferred to [facility] for subacute rehab.    PMHx : COPD, anemia, asthma, chronic kidney disease, cervical fracture, coronary artery disease, deafness, diverticulosis, GERD, dyslipidemia, hypertension, osteoarthritis, osteopenia, paroxysmal atrial fibrillation, pneumonia, shingles, supraventricular tachycardia, syncope, diabetes mellitus, uterine prolapse, abdominal surgery, appendectomy, bilateral knee replacement.    Allergies: latex; Mastisol Adhesive    FHx : Reviewed, Noncontributory for liver, kidney, or lung disease    SHx : The patient does not smoke and does not drink alcohol. She lives in a house with her son with two steps to get in      Allergies   latex   Mastisol Adhesive:     ros :she is feeling better ,leg swelling improved     On exam  Constitutional: well developed well nourished, not in any distress  Neuro: Alert, awake, cooperative, follows simple commands, forgetful  HEENT: NAD  CV:S1,S2, no MGR,Regular HR  Resp:clear on auscultation bilaterally, respirations unlabored,no adventitious lung sounds  Abdomen: soft, nontender, non distended, +bowel sound all quadrants  : No bladder distension,no pelvic tenderness  MUS: moves all extremities, 2+ edeam  Skin: skin color, texture, turgor normal    A/P  hyponatremia- fluid restriction, continue to monitor ,nephrology consult    b/llg swelling - resolved     s/p fall/ Multiple ribs fracture on the left side/ Right apical pneumothorax  pain s better , ICS bedside     A fib/ continue home meds   htn - controlled , continue meds      COPD  -on  flovent  -covid positive  -     Peripheral neuropathy  -on gabapentin      GERD  -on protonix      Anemia  - monitor cbc    continue ptot

## 2024-01-24 ENCOUNTER — APPOINTMENT (OUTPATIENT)
Dept: WOUND CARE | Facility: HOSPITAL | Age: 89
End: 2024-01-24
Attending: NURSE PRACTITIONER

## 2024-01-24 ENCOUNTER — EXTERNAL FACILITY (OUTPATIENT)
Dept: INTERNAL MEDICINE CLINIC | Facility: CLINIC | Age: 89
End: 2024-01-24

## 2024-01-24 DIAGNOSIS — I48.0 PAROXYSMAL ATRIAL FIBRILLATION (HCC): ICD-10-CM

## 2024-01-24 DIAGNOSIS — E87.1 HYPONATREMIA: Primary | ICD-10-CM

## 2024-01-24 PROCEDURE — 99308 SBSQ NF CARE LOW MDM 20: CPT | Performed by: INTERNAL MEDICINE

## 2024-01-24 PROCEDURE — 1111F DSCHRG MED/CURRENT MED MERGE: CPT | Performed by: INTERNAL MEDICINE

## 2024-01-24 RX ORDER — PANTOPRAZOLE SODIUM 40 MG/1
40 TABLET, DELAYED RELEASE ORAL
Qty: 90 TABLET | Refills: 3 | Status: SHIPPED | OUTPATIENT
Start: 2024-01-24

## 2024-01-24 NOTE — PROGRESS NOTES
follow up    HPI:The patient is a 89-year-old female who presented to [facility] with syncope. She was found unresponsive at home, her son performed CPR and called 911. The patient was evaluated in the ER. CT chest showed closed fracture of multiple ribs on the left side with right apical pneumothorax. Her repeat x-ray showed stability of pneumothorax. . She was cleared medically and transferred to [facility] for subacute rehab.    PMHx : COPD, anemia, asthma, chronic kidney disease, cervical fracture, coronary artery disease, deafness, diverticulosis, GERD, dyslipidemia, hypertension, osteoarthritis, osteopenia, paroxysmal atrial fibrillation, pneumonia, shingles, supraventricular tachycardia, syncope, diabetes mellitus, uterine prolapse, abdominal surgery, appendectomy, bilateral knee replacement.    Allergies: latex; Mastisol Adhesive    FHx : Reviewed, Noncontributory for liver, kidney, or lung disease    SHx : The patient does not smoke and does not drink alcohol. She lives in a house with her son with two steps to get in      Allergies   latex   Mastisol Adhesive:     ros :she is feeling better ,    On exam  Constitutional: well developed well nourished, not in any distress  Neuro: Alert, awake, cooperative, follows simple commands, forgetful  HEENT: NAD  CV:S1,S2, no MGR,Regular HR  Resp:clear on auscultation bilaterally, respirations unlabored,no adventitious lung sounds  Abdomen: soft, nontender, non distended, +bowel sound all quadrants  : No bladder distension,no pelvic tenderness  MUS: moves all extremities, 2+ edeam  Skin: skin color, texture, turgor normal    A/P  hyponatremia- improved , continue to monitor     b/llg swelling - resolved     s/p fall/ Multiple ribs fracture on the left side/ Right apical pneumothorax  pain s better , ICS bedside     A fib/ continue home meds   htn - controlled , continue meds      COPD  -on flovent  -covid positive  -     Peripheral neuropathy  -on  gabapentin      GERD  -on protonix      Anemia  - monitor cbc    continue ptot

## 2024-01-24 NOTE — TELEPHONE ENCOUNTER
Refill request is for a maintenance medication and has met the criteria specified in the Ambulatory Medication Refill Standing Order for eligibility, visits, laboratory, alerts and was sent to the requested pharmacy.    Requested Prescriptions     Signed Prescriptions Disp Refills    pantoprazole 40 MG Oral Tab EC 90 tablet 3     Sig: Take 1 tablet (40 mg total) by mouth before breakfast.     Authorizing Provider: TOMY MATHUR     Ordering User: EDWIN FREGOSO

## 2024-01-29 ENCOUNTER — OFFICE VISIT (OUTPATIENT)
Dept: INTERNAL MEDICINE CLINIC | Facility: CLINIC | Age: 89
End: 2024-01-29

## 2024-01-29 VITALS
TEMPERATURE: 98 F | HEART RATE: 91 BPM | OXYGEN SATURATION: 99 % | BODY MASS INDEX: 25 KG/M2 | WEIGHT: 119.19 LBS | DIASTOLIC BLOOD PRESSURE: 56 MMHG | SYSTOLIC BLOOD PRESSURE: 128 MMHG

## 2024-01-29 DIAGNOSIS — G62.9 NEUROPATHY: Primary | ICD-10-CM

## 2024-01-29 DIAGNOSIS — L84 CALLUS OF FOOT: ICD-10-CM

## 2024-01-29 DIAGNOSIS — E87.1 HYPONATREMIA: ICD-10-CM

## 2024-01-29 RX ORDER — CEPHALEXIN 500 MG/1
500 CAPSULE ORAL 4 TIMES DAILY
Qty: 20 CAPSULE | Refills: 0 | Status: SHIPPED | OUTPATIENT
Start: 2024-01-29 | End: 2024-02-03

## 2024-01-29 NOTE — PROGRESS NOTES
Frances Whitehead is a 89 year old female.  Chief Complaint   Patient presents with    Follow - Up     12-29-23 hospitalized for a cardiac arrest and fx'd ribs  C/o pain/swelling to bilateral feet  Rt foot blister on bottom of foot     HPI:   Frances Whitehead is a 89 year old female who presents for post-hospitalization visit.    Patient fainted and had no pulse when checked by her son on 12/27. Her son was playing ping pong with his daughter at the time. The patient's granddaughter called 911 and the patient's son started CPR until he felt a pulse. She was admitted to [facility] from 12/27-1/1, had multiple rib fx and ptx likely from rib fx which resolved. Etiology of syncope was not discovered, TTE 12/28 unremarkable.    Was discharged to [facility].    Presented again to ED on 1/14 for low bp and syncope at rehab. She was given IVF and zofran as she vomited. She was discharged back to rehab.    She was discharged from rehab back home on 1/26/24.    Since being home, c/o R foot pain, b/l LE edema.     Wt Readings from Last 6 Encounters:   01/29/24 119 lb 3.2 oz (54.1 kg)   01/14/24 123 lb 3.8 oz (55.9 kg)   12/31/23 123 lb 3.2 oz (55.9 kg)   11/07/23 124 lb (56.2 kg)   11/03/23 124 lb 9.6 oz (56.5 kg)   09/20/23 132 lb (59.9 kg)     Body mass index is 24.91 kg/m².       Current Outpatient Medications   Medication Sig Dispense Refill    cephalexin 500 MG Oral Cap Take 1 capsule (500 mg total) by mouth 4 (four) times daily for 5 days. 20 capsule 0    pantoprazole 40 MG Oral Tab EC Take 1 tablet (40 mg total) by mouth before breakfast. 90 tablet 3    enalapril 5 MG Oral Tab Take 2 tablets (10 mg total) by mouth daily.      hydrocortisone 2.5 % External Cream APPLY RECTALLY TWICE DAILY AS NEEDED 30 g 2    triamcinolone 0.1 % External Cream Apply 1 Application topically 2 (two) times daily as needed. APPLY TO AFFECTED AREA 15 g 1    loratadine 10 MG Oral Tab TAKE ONE TABLET BY MOUTH ONE TIME DAILY 30 tablet 2     Ferrous Sulfate 325 (65 Fe) MG Oral Tab Take one tab on Mon Wed Fri 90 tablet 3    FLUTICASONE PROPIONATE 50 MCG/ACT Nasal Suspension SPRAY 2 SPRAYS INTO EACH NOSTRIL  IN THE MORNING AND 2 SPRAYS BEFORE BEDTIME 16 g 0    LUMIGAN 0.01 % Ophthalmic Solution       CHOLECALCIFEROL 25 MCG (1000 UT) Oral Tab TAKE TWO TABLETS BY MOUTH DAILY 180 tablet 3    lactulose 10 GM/15ML Oral Solution Take 30 mL (20 g total) by mouth daily as needed (constipated bowel). 237 mL 3    atorvastatin 10 MG Oral Tab Take 1 tablet (10 mg total) by mouth nightly. 90 tablet 3    apixaban (ELIQUIS) 2.5 MG Oral Tab Take 1 tablet (2.5 mg total) by mouth 2 (two) times daily. 180 tablet 3    aspirin 81 MG Oral Tab EC Take 1 tablet (81 mg total) by mouth daily. 100 tablet 3    gabapentin 100 MG Oral Cap Take 1 capsule (100 mg total) by mouth nightly. 90 capsule 3    dilTIAZem  MG Oral Capsule SR 24 Hr Take 1 capsule (120 mg total) by mouth daily. 90 capsule 3    sennosides-docusate (SENNA PLUS) 8.6-50 MG Oral Tab Take 2 tablets twice daily as needed for constipation 100 tablet 3    Fluticasone Propionate HFA (FLOVENT HFA) 220 MCG/ACT Inhalation Aerosol Inhale 1 puff into the lungs 2 (two) times daily. 1 each 3    Wheat Dextrin (BENEFIBER) Oral Powder Take 2 teaspoons daily.  0    Cyanocobalamin (VITAMIN B12) 1000 MCG Oral Tab CR Take 1 tablet by mouth daily.  0    acetaminophen 500 MG Oral Tab Take 1 tablet (500 mg total) by mouth in the morning and 1 tablet (500 mg total) before bedtime.      melatonin 3 MG Oral Tab Take 1 tablet (3 mg total) by mouth nightly.      ascorbic acid 500 MG Oral Tab Take 1 tablet (500 mg total) by mouth daily. 30 tablet 0    Polyethylene Glycol 3350 17 GM/SCOOP Oral Powder Take by mouth nightly. take 15 milliliter (17G)  by oral route  every day powder mixed with 8 oz. 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 Doss-Stent PCI of LAD and RCA was performed.    Deaf     Diverticulitis 2010    hospitalized 3/10 and 5/10    Diverticulosis 2003    cscopy '03, '07, '10    Fracture of C2 vertebra, closed ([facility]) 03/2021    ER 3/19/21--CT cervical spine-- fx C2 posterior lateral vertebral body and left C2 lamina.      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    Renal calculus 2010    on CT abd 5/10-6 mm or less in L lower kidney    Renal insufficiency     Shingles 2011    Small bowel obstruction ([facility]) [id number], 6/2016    multiple hospitalizations for [id number], 6/2016    Stress incontinence, female 05/07/2015    SVT (supraventricular tachycardia)     event monitor 2/2016-SVT    Syncope 05/2020    48-hour Holter 5/19/20--bursts of atrial tachycardia up to 3 minutes, PVCs, rare Wenkebach.  Saw Dr. Doss.    Type 2 diabetes mellitus ([facility]) 2001    Uterine prolapse     Uterine prolapse 2006    had pessary in 2006 Dr. SORAYA Davis -vag sling procedure Dr Baez in 6/2016    Uterovaginal prolapse, incomplete 05/07/2015    UTI due to  Klebsiella species 2016    ESBL Klebsiella UTI treated in [facility] with meropenem when in hosp for enteritis. (Dr Carvalho).     Wound of right leg 2022      Past Surgical History:   Procedure Laterality Date    ABDOMEN SURGERY PROC UNLISTED      laparotomy for TERESA and internal hernia    ABDOMEN SURGERY PROC UNLISTED      TERESA and small bowel resection-Dr Jaramillo    APPENDECTOMY      CHOLECYSTECTOMY      FRACTURE SURGERY Left 2017    L hip fx pinning 2017 Dr. Hernandez    KNEE REPLACEMENT SURGERY Left     KNEE REPLACEMENT SURGERY Right     OTHER SURGICAL HISTORY      cystocele repair    OTHER SURGICAL HISTORY Bilateral     ear surgery-Dr. Patel-both eardrums    SLING  2016    vag sling procedure at [facility] -Dr Baez      Family History   Problem Relation Age of Onset    Stroke Father         cause of death-age77    Diabetes Mother          age 64    Diabetes Brother     Heart Disease Brother         cause of death-age 57    Diabetes Sister     Stroke Sister         cause of death age 78    Heart Attack Brother         cause of death    Cancer Brother          of pancreas or lung cancer    Other (5 children [Other]) Other     Glaucoma Brother     Other (intestinal blockage [Other]) Brother         cause of death age 90    Stroke Sister         stroke- age 85      Social History:   Social History     Socioeconomic History    Marital status:    Tobacco Use    Smoking status: Never    Smokeless tobacco: Never   Vaping Use    Vaping Use: Never used   Substance and Sexual Activity    Alcohol use: No    Drug use: No   Other Topics Concern    Caffeine Concern Yes     Comment: Coffee 1 cups daily    Exercise Yes     Comment: Streching/Strength.    Social History Narrative    The patient uses the following assistive device(s):  single-point cane.      The patient does live in a home with stairs.     Social Determinants of Health     Food Insecurity: No Food Insecurity  (12/27/2023)    Food Insecurity     Food Insecurity: Never true   Transportation Needs: No Transportation Needs (12/27/2023)    Transportation Needs     Lack of Transportation: No   Housing Stability: Low Risk  (12/27/2023)    Housing Stability     Housing Instability: No          REVIEW OF SYSTEMS:   GENERAL: feels well otherwise  SKIN: + erythema and pain R foot  EXT: + b/l LE edema      EXAM:   /56 (BP Location: Left arm, Patient Position: Sitting, Cuff Size: adult)   Pulse 91   Temp 97.8 °F (36.6 °C) (Oral)   Wt 119 lb 3.2 oz (54.1 kg)   SpO2 99%   BMI 24.91 kg/m²     GENERAL: well developed, well nourished, in no apparent distress  EXTREMITIES: 1+ edema b/l LE, R foot with erythema and ttp on sole of foot, overlying callus  NEURO: A&O x 3, moves all 4 extremities spontaneously      ASSESSMENT AND PLAN:   Frances Whitehead is a 89 year old female who presents for post-hospitalization visit.    R foot pain  Callus of foot  - ddx cellulitis given significant erythema vs gout, rx for cephalexin empiric tx, advised call/RTC if no improvement in sx and if side effects to abx such as diarrhea develop  - recommended calling to schedule appointment with podiatry, patient's son asked for podiatrist within the system  - Podiatry Referral - In Network  - XR FOOT, COMPLETE (MIN 3 VIEWS), RIGHT (CPT=73630); Future    Hyponatremia  B/l LE edema  - likely 2/2 venous insufficiency as TTE unremarkable, discussed initiation of low dose diuretic pending results of repeat bmp  - Basic Metabolic Panel (8) [E]; Future    Asking for guard rails for hospital bed, advised patient's son to send information for order, patient's son verbalized understanding and agreed to plan.    RTC in 1-2 months or sooner PRN.    Labs ordered as above. Informed patient to expect messaging only if the labs are abnormal via patient preferred method of communication (MyChart).    For E/M code - 30 minutes spent reviewing performing chart review,  obtaining a history, performing a physical exam, reviewing the assessment/plan, placing orders, and completing documentation.     Thao Garduno DO  1/29/2024  1:53 PM

## 2024-02-15 ENCOUNTER — HOSPITAL ENCOUNTER (OUTPATIENT)
Dept: GENERAL RADIOLOGY | Facility: HOSPITAL | Age: 89
Discharge: HOME | End: 2024-02-15
Attending: INTERNAL MEDICINE
Payer: MEDICARE

## 2024-02-15 DIAGNOSIS — L84 CALLUS OF FOOT: ICD-10-CM

## 2024-02-15 PROCEDURE — 73630 X-RAY EXAM OF FOOT: CPT | Performed by: INTERNAL MEDICINE

## 2024-02-16 ENCOUNTER — TELEPHONE (OUTPATIENT)
Dept: INTERNAL MEDICINE CLINIC | Facility: CLINIC | Age: 89
End: 2024-02-16

## 2024-02-28 ENCOUNTER — TELEPHONE (OUTPATIENT)
Dept: PHYSICAL MEDICINE AND REHAB | Facility: CLINIC | Age: 89
End: 2024-02-28

## 2024-02-28 ENCOUNTER — OFFICE VISIT (OUTPATIENT)
Dept: PHYSICAL MEDICINE AND REHAB | Facility: CLINIC | Age: 89
End: 2024-02-28
Payer: COMMERCIAL

## 2024-02-28 VITALS — BODY MASS INDEX: 25 KG/M2 | WEIGHT: 119 LBS | RESPIRATION RATE: 18 BRPM | HEART RATE: 67 BPM | OXYGEN SATURATION: 99 %

## 2024-02-28 DIAGNOSIS — G56.03 BILATERAL CARPAL TUNNEL SYNDROME: Primary | ICD-10-CM

## 2024-02-28 RX ORDER — FUROSEMIDE 20 MG/1
TABLET ORAL
COMMUNITY
Start: 2024-02-08

## 2024-02-28 RX ORDER — DILTIAZEM HYDROCHLORIDE EXTENDED-RELEASE TABLETS 120 MG/1
1 TABLET, EXTENDED RELEASE ORAL EVERY MORNING
COMMUNITY
Start: 2024-01-27

## 2024-02-28 NOTE — PROGRESS NOTES
[facility]  OFFICE FOLLOW UP EVALUATION      HISTORY OF PRESENT ILLNESS:     Chief Complaint   Patient presents with    Carpal Tunnel Syndrome     Pt is coming in today for carpal tunnel pain, Pt would like to see about potentially receiving injection today, states that she is having pain in finger 2-4 in bilateral hands,        Patient is following up for bilateral hand pain.  Since last visit she started experiencing numbness tingling in the first 3 digits again.  She denies any change in strength or bowel bladder.  She is doing night splints as needed.  This does provide improvement.  She does state good improvement after the injection however the pain is started to return over the last 3 weeks.  Pain is worse in the left hand compared to the right.    PHYSICAL EXAM:   Pulse 67   Resp 18   Wt 119 lb (54 kg)   SpO2 99%   BMI 24.87 kg/m²     WRIST:  Inspection: Heberden's and Aminah's nodes in the fingertips noted  Palpation: no ttp in carpal bones or areas of pain  ROM: intact to all planes of motion  Strength: 5/5  Sensation: Intact to light touch in all dermatomes of the lower extremities  Tinel's test: Positive for reproducible symptoms    IMAGING:     None    All imaging results were reviewed and discussed with patient.      ASSESSMENT/PLAN:     1. Bilateral carpal tunnel syndrome        Frances Whitehead is a 89 year old female following up for bilateral hand pain secondary carpal tunnel syndrome.  I performed ultrasound-guided bilateral carpal tunnel injection with corticosteroid.  Please see procedure note for details.  Recommend she continue bracing at nighttime and topical treatment as tolerated and follow-up in 3 months.      The patient verbalized understanding with the plan and was in agreement. All questions/concerns were addressed and there were no barriers to learning.  Please note Dragon dictation software was used to dictate this note and may  result in inadvertent typos.    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 Doss-Stent PCI of LAD and RCA was performed.    Deaf     Diverticulitis 2010    hospitalized 3/10 and 5/10    Diverticulosis 2003    cscopy '03, '07, '10    Fracture of C2 vertebra, closed ([facility]) 03/2021    ER 3/19/21--CT cervical spine-- fx C2 posterior lateral vertebral body and left C2 lamina.      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    Renal calculus 2010    on CT abd 5/10-6 mm or less in L lower kidney    Renal insufficiency     Shingles 2011    Small bowel obstruction ([facility]) 8423-2821, 6/2016    multiple hospitalizations for [id number], 6/2016    Stress incontinence, female 05/07/2015    SVT (supraventricular tachycardia)     event monitor 2/2016-SVT    Syncope 05/2020    48-hour Holter 5/19/20--bursts of atrial tachycardia up to 3 minutes,  PVCs, rare Wenkebach.  Saw Dr. Doss.    Type 2 diabetes mellitus ([facility]) 2001    Uterine prolapse     Uterine prolapse 2006    had pessary in 2006 Dr. SORAYA Davis -vag sling procedure Dr Baez in 6/2016    Uterovaginal prolapse, incomplete 05/07/2015    UTI due to Klebsiella species 12/2016    ESBL Klebsiella UTI treated in [facility] with meropenem when in hosp for enteritis. (Dr Carvalho).     Wound of right leg 06/07/2022         PAST SURGICAL HISTORY:     Past Surgical History:   Procedure Laterality Date    ABDOMEN SURGERY PROC UNLISTED  2007    laparotomy for TERESA and internal hernia    ABDOMEN SURGERY PROC UNLISTED  2009    TERESA and small bowel resection-Dr Jaramillo    APPENDECTOMY      CHOLECYSTECTOMY      FRACTURE SURGERY Left 1/2017    L hip fx pinning 1/2017 Dr. Hernandez    KNEE REPLACEMENT SURGERY Left 2000    KNEE REPLACEMENT SURGERY Right 2008    OTHER SURGICAL HISTORY      cystocele repair    OTHER SURGICAL HISTORY Bilateral     ear surgery-Dr. Patel-both eardrums    SLING  6/30/2016    vag sling procedure at [facility] -Dr Baez         CURRENT MEDICATIONS:     Current Outpatient Medications   Medication Sig Dispense Refill    furosemide 20 MG Oral Tab furosemide 20 mg tablet, [RxNorm: 959562]      dilTIAZem HCl  MG Oral Tablet 24 Hr Take 1 tablet by mouth every morning.      pantoprazole 40 MG Oral Tab EC Take 1 tablet (40 mg total) by mouth before breakfast. 90 tablet 3    enalapril 5 MG Oral Tab Take 2 tablets (10 mg total) by mouth daily.      hydrocortisone 2.5 % External Cream APPLY RECTALLY TWICE DAILY AS NEEDED 30 g 2    triamcinolone 0.1 % External Cream Apply 1 Application topically 2 (two) times daily as needed. APPLY TO AFFECTED AREA 15 g 1    loratadine 10 MG Oral Tab TAKE ONE TABLET BY MOUTH ONE TIME DAILY 30 tablet 2    Ferrous Sulfate 325 (65 Fe) MG Oral Tab Take one tab on Mon Wed Fri 90 tablet 3    FLUTICASONE PROPIONATE 50 MCG/ACT Nasal Suspension SPRAY 2 SPRAYS INTO EACH NOSTRIL   IN THE MORNING AND 2 SPRAYS BEFORE BEDTIME 16 g 0    LUMIGAN 0.01 % Ophthalmic Solution       CHOLECALCIFEROL 25 MCG (1000 UT) Oral Tab TAKE TWO TABLETS BY MOUTH DAILY 180 tablet 3    lactulose 10 GM/15ML Oral Solution Take 30 mL (20 g total) by mouth daily as needed (constipated bowel). 237 mL 3    atorvastatin 10 MG Oral Tab Take 1 tablet (10 mg total) by mouth nightly. 90 tablet 3    apixaban (ELIQUIS) 2.5 MG Oral Tab Take 1 tablet (2.5 mg total) by mouth 2 (two) times daily. 180 tablet 3    aspirin 81 MG Oral Tab EC Take 1 tablet (81 mg total) by mouth daily. 100 tablet 3    gabapentin 100 MG Oral Cap Take 1 capsule (100 mg total) by mouth nightly. 90 capsule 3    dilTIAZem  MG Oral Capsule SR 24 Hr Take 1 capsule (120 mg total) by mouth daily. 90 capsule 3    sennosides-docusate (SENNA PLUS) 8.6-50 MG Oral Tab Take 2 tablets twice daily as needed for constipation 100 tablet 3    Fluticasone Propionate HFA (FLOVENT HFA) 220 MCG/ACT Inhalation Aerosol Inhale 1 puff into the lungs 2 (two) times daily. 1 each 3    Wheat Dextrin (BENEFIBER) Oral Powder Take 2 teaspoons daily.  0    Cyanocobalamin (VITAMIN B12) 1000 MCG Oral Tab CR Take 1 tablet by mouth daily.  0    acetaminophen 500 MG Oral Tab Take 1 tablet (500 mg total) by mouth in the morning and 1 tablet (500 mg total) before bedtime.      melatonin 3 MG Oral Tab Take 1 tablet (3 mg total) by mouth nightly.      ascorbic acid 500 MG Oral Tab Take 1 tablet (500 mg total) by mouth daily. 30 tablet 0    Polyethylene Glycol 3350 17 GM/SCOOP Oral Powder Take by mouth nightly. take 15 milliliter (17G)  by oral route  every day powder mixed with 8 oz. water, juice, soda, coffee, or tea            ALLERGIES:     Allergies   Allergen Reactions    Latex UNKNOWN and HIVES     Other reaction(s): Unknown    Mastisol Adhesive HIVES     Other reaction(s): ADHESIVE TAPE    Adhesive Tape      Other reaction(s): ADHESIVE TAPE         FAMILY HISTORY:     Family History    Problem Relation Age of Onset    Stroke Father         cause of death-age75    Diabetes Mother          age 64    Diabetes Brother     Heart Disease Brother         cause of death-age 57    Diabetes Sister     Stroke Sister         cause of death age 78    Heart Attack Brother         cause of death    Cancer Brother          of pancreas or lung cancer    Other (5 children [Other]) Other     Glaucoma Brother     Other (intestinal blockage [Other]) Brother         cause of death age 90    Stroke Sister         stroke- age 85          SOCIAL HISTORY:     Social History     Socioeconomic History    Marital status:    Tobacco Use    Smoking status: Never    Smokeless tobacco: Never   Vaping Use    Vaping Use: Never used   Substance and Sexual Activity    Alcohol use: No    Drug use: No   Other Topics Concern    Caffeine Concern Yes     Comment: Coffee 1 cups daily    Exercise Yes     Comment: Streching/Strength.    Social History Narrative    The patient uses the following assistive device(s):  single-point cane.      The patient does live in a home with stairs.     Social Determinants of Health     Food Insecurity: No Food Insecurity (2023)    Food Insecurity     Food Insecurity: Never true   Transportation Needs: No Transportation Needs (2023)    Transportation Needs     Lack of Transportation: No   Housing Stability: Low Risk  (2023)    Housing Stability     Housing Instability: No          REVIEW OF SYSTEMS:   A comprehensive 10 point review of systems was completed.  Pertinent positives and negatives noted in the the HPI.      LABS:     Lab Results   Component Value Date     (H) 2023    A1C 6.3 (H) 2023     Lab Results   Component Value Date    WBC 6.4 2024    RBC 3.55 (L) 2024    HGB 10.6 (L) 2024    HCT 32.3 (L) 2024    MCV 91.0 2024    MCH 29.9 2024    MCHC 32.8 2024    RDW 15.1 (H) 2024    .0 (H)  01/14/2024    MPV 8.3 10/11/2018     Lab Results   Component Value Date     (H) 01/14/2024    BUN 12 01/14/2024    BUNCREA 12.8 01/14/2024    CREATSERUM 0.94 01/14/2024    ANIONGAP 5 01/14/2024    GFRNAA 45 (L) 04/20/2022    GFRAA 52 (L) 04/20/2022    CA 8.3 (L) 01/14/2024    OSMOCALC 264 (L) 01/14/2024    ALKPHO 126 01/14/2024    AST 25 01/14/2024    ALT 15 01/14/2024    ALKPHOS 83 07/06/2015    BILT 0.3 01/14/2024    TP 6.0 01/14/2024    ALB 3.6 01/14/2024    GLOBULIN 2.4 (L) 01/14/2024    AGRATIO 1.3 07/06/2015     (L) 01/14/2024    K 4.3 01/14/2024    CL 96 (L) 01/14/2024    CO2 25.0 01/14/2024     No results found for: \"PTP\", \"PT\", \"INR\"  Lab Results   Component Value Date    VITD 47.0 10/25/2021

## 2024-02-28 NOTE — PROCEDURES
Procedure:  Ultrasound guided BILATERAL carpal tunnel injection  The risks, benefits and anticipated outcomes of the procedure, the risks and benefits of the alternatives to the procedure, and the roles and tasks of the personnel to be involved, were discussed with the patient, and the patient consents to the procedure and agrees to proceed.  UNIVERSAL PROTOCOL / SAFETY CHECKLIST  Sign in Communication: Completed  Time Out:  Team Confirms the Correct Patient, Correct Procedure, Correct Site and Site Marking, Correct Position (if applicable), Prep and Dry Time (if applicable).    Affirmation of Time Out: YES  Sign Out Discussion: Completed if applicable    The procedure was carried out under sterile prep  with sterile gel.  The target site was anesthetized with a topical ethyl chloride spray.  Then, A  25ga 1.5in needle was introduced and advanced with ultrasound guidance from radial to ulnar direction using an in plane approach with the transducer in transverse orientation.  Following negative aspiration, a mixture of 1cc of 1% lidocaine and 1cc of Kenalog (40mg/ml) was injected.  Permanent pictures were taken and stored in the PACS system.      Ultrasound interpretation was performed prior to the procedure to identify the target and any adjacent neurovascular structures, and during real-time needle guidance confirming placement.  Post-intervention interpretation was also performed confirming appropriate injectate flow and hemostasis. The patient tolerated the procedure without complication and was instructed in post-procedure precautions.  Please see patient instructions.    Wojciech Wall DO, FAAPMR & CAQSM  Physical Medicine and Rehabilitation/Sports Medicine  [facility][1]

## 2024-02-28 NOTE — TELEPHONE ENCOUNTER
Initiated authorization for Ultrasound guided bilateral carpal tunnel injection with corticosteroid CPT/[facility] 84608-26, , [id number] with [employer]  Status: Approved w/ authorization #[id number] valid 2/28/24-5/27/24

## 2024-02-28 NOTE — PATIENT INSTRUCTIONS
Steroid Injection Information  What to expect: The injection contains Lidocaine (which numbs the area) and Kenalog (a steroid which decreases inflammation).  You may have pain relief within hours of the injection due to the Lidocaine.  The Kenalog can take a couple days, up to a couple weeks, to reach the full effect.  It is also possible to have a slight increase in symptoms over the first few days, but that should resolve fairly quickly.    How long will the injection last?: The length of response to an injection is variable.  Literally a couple weeks to a couple years.  The injection will decrease the inflammation and the pain will return if/when the inflammation returns.    Activity Recommendations: For the first 24 hours after injection, keep the area clean and dry.  It is ok to shower but don't soak in a tub during that time.  No vigorous activity such as running or heavy lifting for the first week but other than that you can gradually resume your normal activities immediately.  If you have a significant decrease in pain, be careful not to do too much too soon.  Again, the key is GRADUAL resumption of activites.    Things to look out for: Common injection side effects include soreness at the injection site, bruising, flushing of the face or skin, and a temporary increase in your blood sugars and/or blood pressure.  Infection is very rare but please notify my office ([facility] [phone]) if you develop any fevers, drainage from the injection site, or severe increase in pain.  If it is the weekend, go to an Emergency Room.      3 months in office. Thanks

## 2024-03-11 ENCOUNTER — LAB ENCOUNTER (OUTPATIENT)
Dept: LAB | Age: 89
End: 2024-03-11
Attending: INTERNAL MEDICINE
Payer: MEDICARE

## 2024-03-11 ENCOUNTER — OFFICE VISIT (OUTPATIENT)
Dept: INTERNAL MEDICINE CLINIC | Facility: CLINIC | Age: 89
End: 2024-03-11

## 2024-03-11 VITALS
SYSTOLIC BLOOD PRESSURE: 118 MMHG | OXYGEN SATURATION: 97 % | WEIGHT: 121 LBS | DIASTOLIC BLOOD PRESSURE: 42 MMHG | TEMPERATURE: 99 F | BODY MASS INDEX: 25.4 KG/M2 | HEIGHT: 58 IN | HEART RATE: 60 BPM

## 2024-03-11 DIAGNOSIS — H91.93 BILATERAL HEARING LOSS, UNSPECIFIED HEARING LOSS TYPE: Primary | ICD-10-CM

## 2024-03-11 DIAGNOSIS — E87.1 HYPONATREMIA: ICD-10-CM

## 2024-03-11 LAB
ANION GAP SERPL CALC-SCNC: 6 MMOL/L (ref 0–18)
BUN BLD-MCNC: 23 MG/DL (ref 9–23)
BUN/CREAT SERPL: 22.3 (ref 10–20)
CALCIUM BLD-MCNC: 9.3 MG/DL (ref 8.7–10.4)
CHLORIDE SERPL-SCNC: 103 MMOL/L (ref 98–112)
CO2 SERPL-SCNC: 26 MMOL/L (ref 21–32)
CREAT BLD-MCNC: 1.03 MG/DL
EGFRCR SERPLBLD CKD-EPI 2021: 51 ML/MIN/1.73M2 (ref 60–?)
FASTING STATUS PATIENT QL REPORTED: YES
GLUCOSE BLD-MCNC: 136 MG/DL (ref 70–99)
OSMOLALITY SERPL CALC.SUM OF ELEC: 286 MOSM/KG (ref 275–295)
POTASSIUM SERPL-SCNC: 5.5 MMOL/L (ref 3.5–5.1)
SODIUM SERPL-SCNC: 135 MMOL/L (ref 136–145)

## 2024-03-11 PROCEDURE — 3074F SYST BP LT 130 MM HG: CPT | Performed by: INTERNAL MEDICINE

## 2024-03-11 PROCEDURE — 3078F DIAST BP <80 MM HG: CPT | Performed by: INTERNAL MEDICINE

## 2024-03-11 PROCEDURE — 1159F MED LIST DOCD IN RCRD: CPT | Performed by: INTERNAL MEDICINE

## 2024-03-11 PROCEDURE — 99214 OFFICE O/P EST MOD 30 MIN: CPT | Performed by: INTERNAL MEDICINE

## 2024-03-11 PROCEDURE — 1126F AMNT PAIN NOTED NONE PRSNT: CPT | Performed by: INTERNAL MEDICINE

## 2024-03-11 PROCEDURE — 36415 COLL VENOUS BLD VENIPUNCTURE: CPT

## 2024-03-11 PROCEDURE — 1160F RVW MEDS BY RX/DR IN RCRD: CPT | Performed by: INTERNAL MEDICINE

## 2024-03-11 PROCEDURE — 80048 BASIC METABOLIC PNL TOTAL CA: CPT

## 2024-03-11 PROCEDURE — 3008F BODY MASS INDEX DOCD: CPT | Performed by: INTERNAL MEDICINE

## 2024-03-11 RX ORDER — LACTULOSE 10 G/15ML
30 SOLUTION ORAL DAILY PRN
Qty: 237 ML | Refills: 3 | Status: SHIPPED | OUTPATIENT
Start: 2024-03-11

## 2024-03-11 RX ORDER — TRIAMCINOLONE ACETONIDE 1 MG/G
1 CREAM TOPICAL 2 TIMES DAILY PRN
Qty: 15 G | Refills: 1 | Status: SHIPPED | OUTPATIENT
Start: 2024-03-11

## 2024-03-11 NOTE — PROGRESS NOTES
Frances Whitehead is a 89 year old female.  Chief Complaint   Patient presents with    Follow - Up     6 weeks       HPI:   Frances Whitehead is a 89 year old female who presents for a checkup.    LOV 1/29/24.    Accompanied by her son who uses a white board to write to help facilitate communication with his mom.    No recurrent episodes of syncope.    C/o hearing loss. Has had multiple previous evaluations by ENT, deemed not a surgical candidate. Recommended to follow up with audiology. Has appointment for 4/25/24.    Has seen podiatry. Notes reviewed.    Has seen cardiology, given 14 day cardiac monitor, started on lasix for b/l LE edema, decreased enalapril to 2.5 mg daily. Have not yet received results of heart monitor. Was advised to follow up with cardiology in March.    Seen by physiatry for b/l carpal tunnel symptoms, s/p CSI injections b/l.    Wt Readings from Last 6 Encounters:   03/11/24 121 lb (54.9 kg)   02/28/24 119 lb (54 kg)   01/29/24 119 lb 3.2 oz (54.1 kg)   01/14/24 123 lb 3.8 oz (55.9 kg)   12/31/23 123 lb 3.2 oz (55.9 kg)   11/07/23 124 lb (56.2 kg)     Body mass index is 25.29 kg/m².     Current Outpatient Medications   Medication Sig Dispense Refill    furosemide 20 MG Oral Tab furosemide 20 mg tablet, [RxNorm: 463125]      dilTIAZem HCl  MG Oral Tablet 24 Hr Take 1 tablet by mouth every morning.      pantoprazole 40 MG Oral Tab EC Take 1 tablet (40 mg total) by mouth before breakfast. 90 tablet 3    enalapril 5 MG Oral Tab Take 2 tablets (10 mg total) by mouth daily.      hydrocortisone 2.5 % External Cream APPLY RECTALLY TWICE DAILY AS NEEDED 30 g 2    triamcinolone 0.1 % External Cream Apply 1 Application topically 2 (two) times daily as needed. APPLY TO AFFECTED AREA 15 g 1    Ferrous Sulfate 325 (65 Fe) MG Oral Tab Take one tab on Mon Wed Fri 90 tablet 3    FLUTICASONE PROPIONATE 50 MCG/ACT Nasal Suspension SPRAY 2 SPRAYS INTO EACH NOSTRIL  IN THE MORNING AND 2 SPRAYS BEFORE BEDTIME 16  g 0    LUMIGAN 0.01 % Ophthalmic Solution       CHOLECALCIFEROL 25 MCG (1000 UT) Oral Tab TAKE TWO TABLETS BY MOUTH DAILY 180 tablet 3    lactulose 10 GM/15ML Oral Solution Take 30 mL (20 g total) by mouth daily as needed (constipated bowel). 237 mL 3    atorvastatin 10 MG Oral Tab Take 1 tablet (10 mg total) by mouth nightly. 90 tablet 3    apixaban (ELIQUIS) 2.5 MG Oral Tab Take 1 tablet (2.5 mg total) by mouth 2 (two) times daily. 180 tablet 3    aspirin 81 MG Oral Tab EC Take 1 tablet (81 mg total) by mouth daily. 100 tablet 3    gabapentin 100 MG Oral Cap Take 1 capsule (100 mg total) by mouth nightly. 90 capsule 3    dilTIAZem  MG Oral Capsule SR 24 Hr Take 1 capsule (120 mg total) by mouth daily. 90 capsule 3    sennosides-docusate (SENNA PLUS) 8.6-50 MG Oral Tab Take 2 tablets twice daily as needed for constipation 100 tablet 3    Fluticasone Propionate HFA (FLOVENT HFA) 220 MCG/ACT Inhalation Aerosol Inhale 1 puff into the lungs 2 (two) times daily. 1 each 3    Wheat Dextrin (BENEFIBER) Oral Powder Take 2 teaspoons daily.  0    Cyanocobalamin (VITAMIN B12) 1000 MCG Oral Tab CR Take 1 tablet by mouth daily.  0    acetaminophen 500 MG Oral Tab Take 1 tablet (500 mg total) by mouth in the morning and 1 tablet (500 mg total) before bedtime.      melatonin 3 MG Oral Tab Take 1 tablet (3 mg total) by mouth nightly.      ascorbic acid 500 MG Oral Tab Take 1 tablet (500 mg total) by mouth daily. 30 tablet 0    Polyethylene Glycol 3350 17 GM/SCOOP Oral Powder Take by mouth nightly. take 15 milliliter (17G)  by oral route  every day powder mixed with 8 oz. 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 Doss-Stent PCI of LAD and RCA was performed.    Deaf     Diverticulitis 2010    hospitalized 3/10 and 5/10    Diverticulosis 2003    cscopy '03, '07, '10    Fracture of C2 vertebra, closed ([facility]) 03/2021    ER 3/19/21--CT cervical spine-- fx C2 posterior lateral vertebral body and left C2 lamina.      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    Renal calculus 2010    on CT abd 5/10-6 mm or less in L lower kidney    Renal insufficiency     Shingles 2011    Small bowel obstruction ([facility]) [id number], 6/2016    multiple hospitalizations for [id number], 6/2016    Stress incontinence, female 05/07/2015    SVT (supraventricular tachycardia)     event monitor 2/2016-SVT    Syncope 05/2020    48-hour Holter 5/19/20--bursts of atrial tachycardia up to 3 minutes, PVCs, rare Wenkebach.  Saw Dr. Doss.    Type 2 diabetes mellitus ([facility]) 2001    Uterine prolapse     Uterine prolapse 2006    had pessary in 2006 Dr. SORAYA Davis -vag sling procedure Dr Baez in 6/2016    Uterovaginal prolapse, incomplete 05/07/2015    UTI due to Klebsiella species 12/2016    ESBL Klebsiella UTI treated in [facility] with meropenem when in hosp for enteritis. (Dr Carvalho).      Wound of right leg 2022      Past Surgical History:   Procedure Laterality Date    ABDOMEN SURGERY PROC UNLISTED      laparotomy for TERESA and internal hernia    ABDOMEN SURGERY PROC UNLISTED      TERESA and small bowel resection-Dr Jaramillo    APPENDECTOMY      CHOLECYSTECTOMY      FRACTURE SURGERY Left 2017    L hip fx pinning 2017 Dr. Hernandez    KNEE REPLACEMENT SURGERY Left     KNEE REPLACEMENT SURGERY Right 2008    OTHER SURGICAL HISTORY      cystocele repair    OTHER SURGICAL HISTORY Bilateral     ear surgery-Dr. Patel-both eardrums    SLING  2016    vag sling procedure at [facility] -Dr Baez      Family History   Problem Relation Age of Onset    Stroke Father         cause of death-age77    Diabetes Mother          age 64    Diabetes Brother     Heart Disease Brother         cause of death-age 57    Diabetes Sister     Stroke Sister         cause of death age 78    Heart Attack Brother         cause of death    Cancer Brother          of pancreas or lung cancer    Other (5 children [Other]) Other     Glaucoma Brother     Other (intestinal blockage [Other]) Brother         cause of death age 90    Stroke Sister         stroke- age 85      Social History:   Social History     Socioeconomic History    Marital status:    Tobacco Use    Smoking status: Never    Smokeless tobacco: Never   Vaping Use    Vaping Use: Never used   Substance and Sexual Activity    Alcohol use: No    Drug use: No   Other Topics Concern    Caffeine Concern Yes     Comment: Coffee 1 cups daily    Exercise Yes     Comment: Streching/Strength.    Social History Narrative    The patient uses the following assistive device(s):  single-point cane.      The patient does live in a home with stairs.     Social Determinants of Health     Food Insecurity: No Food Insecurity (2023)    Food Insecurity     Food Insecurity: Never true   Transportation Needs: No Transportation Needs (2023)     Transportation Needs     Lack of Transportation: No   Housing Stability: Low Risk  (12/27/2023)    Housing Stability     Housing Instability: No          REVIEW OF SYSTEMS:   GENERAL: feels well otherwise  HEENT: + b/l hearing loss    EXAM:   /42 (BP Location: Right arm, Patient Position: Sitting, Cuff Size: adult)   Pulse 60   Temp 98.5 °F (36.9 °C) (Oral)   Ht 4' 10\" (1.473 m)   Wt 121 lb (54.9 kg)   LMP  (LMP Unknown)   SpO2 97%   Breastfeeding No   BMI 25.29 kg/m²     GENERAL: well developed, well nourished, in NAD  NEURO: A&O x 3, moves all 4 extremities spontaneously      ASSESSMENT AND PLAN:   Frances Whitehead is a 89 year old female who presents for a checkup.    Type 2 diabetes mellitus with other specified complication, without long-term current use of insulin (HCC)  - Diet controlled since 2001  - Most recent hemoglobin A1c: 6.3% on 11/3/23  - Current regimen:              - none  - Complications:              - CV: yes, on atorvastatin 10 mg at bedtime               - Nephropathy: microalb/cr wnl 11/3/23              - Neuropathy: yes              - Retinopathy: not known, to discuss at next visit     R foot pain  - sees podiatrist     Asthma with COPD  - well controlled   - does not use Flovent inhaler 220 mcg 1 p bid  - Past Combivent Respimat     Chronic rhinitis  - Saw Dr. Bereket Thakur 12/6/22   - takes flonase/claritin     Hypertension  - controlled on current regimen:  - enalapril 2.5 mg daily  - diltiazem 120 mg daily     Hypercholesterolemia  - lipitor 10 mg at bedtime   - LDL 85 on 8/29/23     Hemorrhoids   - has HC cream PRN     Chronic constipation  - possibly 2/2 IBS  - Has miralax daily, senokot, lactulose 30 ml TID PRN     Vit D deficiency  - vitamin D 2000 units daily  - repeat labs at [facility]     Hx cervical fracture of C2  - ER 3/19/21 CT cervical spine: fx C2 posterior lateral vertebral body and left C2 lamina.    - Saw Dr. Quevedo and 10/2021, removed brace 6/2021.   - Last  imaging CT cervical 6/17/21, progressive healing and callus C2 fx.     Primary osteoarthritis of right shoulder  - X-ray R shoulder 7/29/21: advanced glenohumeral osteoarthritis  - Dr. Wojciech Wall gave injections     Venous insufficiency b/l LE  - swelling controlled  - Neg b/l dopplers 1/2021  - Has lasix 20 mg daily PRN      Gastroesophageal reflux disease  - taking protonix 40 mg daily  - EGD 2007- duodenal AVM     PAF (paroxysmal atrial fibrillation) ([facility])  - Cardiologist: Dr. Doss  - AC: eliquis  - Rate control: dilt 120 mg daily  - Rhythm control: none      Carpal tunnel b/l  - Wrist splints b/l at night  - EMG ord at 9/4/20 visit: didn't do emg.  - Dr Wojciech Wall inj R wrist 12/2020 (improved) and in 2/2021: he ord OT  - s/p b/l CSI injections 2/28/24     Chronic anemia  - Hgb 8.6 on 6/9/21 ER: was 9.9 on 3/19/21  - EGD 10/14/20 w/Dr. López: mild schatzki's ring or peptic esophageal stricture GE junction left alone, mild-mod atrophic gastritis, bx deferred.   - Dr López rec forego cscopy due to pt risk  - On iron MWF, B12 supplementation      CAD s/p PCI  - cardiac cath 6/23/20 Dr. Doss s/p PCI to LAD and RCA  - eliquis/ASA, atorvastatin 10 mg at bedtime     SVT (supraventricular tachycardia) ([facility])  - hx syncope  - cardizem 120 mg daily  - Hx 30 day event monitor 2/2016: SVT.   - 48-hr Holter 5/19/20: bursts atrial tachy up to 3 min, PVCs, rare Wenkebach  - Dr. Doss 6/8/20 cut enalapril 5 mg daily.  - [facility] 6/12/20: LVEF 55-60%, mild MR. LA mild to mod dilated, moderate TR.  - repeat 14 day monitor w/cardiology per [facility]     Hx compression fracture T11  - MRI lumbar 11/29/19: acute/recent fx T11 ~50%, DJD.  - Dr. Morris/DIEGO Morris APN 12/9/19 rx brace, Dr Silva 12/20/19, does not need kyphoplasty.      Osteoporosis  - Calcium in diet (pills might constipate more)  - Vit D 1000 u/d added 12/2019  - Started fosamax 70 mg weekly 12/10/19 - 8/2023 (stopped by Dr. Perez)      Neuropathy  -  feet. gabapentin 100 mg HS, previously norco 1/2 tab at HS  - declined LE EMG  - seen by Dr. Pedro     CKD 3  - stable, repeat labs with [facility]     Eczema  - TAC cream PRN      B/l hearing loss  - Dr. Salcedo and Carrie Melgar Au.D on 6/11/18 recom hearing aides  - hx eardrum surgery and possibly has a tube L eardrum  - Seen by Dr. Francois Webb ENT [facility], poor operative candidate   - recommended working with outside audiologist for fit and bluetooth connectivity  - has appointment with audiologist 4/25/24     Osteoarthritis multiple sites, primary  - history taking 1/2 norco daily     hx recurrent partial SBOs   - MRI enterography 6/2016: no high grade obstruction-but poss anastomotic stricture  - Dr. López 3/20/17 recom avoid raw veg and if sx ER and do CT scan    Healthcare Maintenance  Cancer Screenings:  - Breast: last mammogram 2021  - Cervical: age >65  - Colon: age >80  - Lung: age >80    Vaccines:  - Shingles: shingrix recommended   - Pneumonia: prevnar 20 8/29/23, prevnar 13 11/11/14  - Flu: 1/29/24  - COVID-19: UTD    Misc:  - DEXA: 2010 w/osteopenia  - Advanced directives: has ACP documents, juana Suarez is HCPOA    RTC in 2-3 months for [facility] with labs prior or sooner PRN.    For E/M code - 30 minutes spent reviewing performing chart review, obtaining a history, performing a physical exam, reviewing the assessment/plan, placing orders, and completing documentation.     Thao Garduno DO  3/11/2024  1:12 PM

## 2024-03-12 ENCOUNTER — TELEPHONE (OUTPATIENT)
Dept: INTERNAL MEDICINE CLINIC | Facility: CLINIC | Age: 89
End: 2024-03-12

## 2024-03-12 DIAGNOSIS — E87.5 HYPERKALEMIA: Primary | ICD-10-CM

## 2024-03-13 NOTE — TELEPHONE ENCOUNTER
To nursing staff, please relay the following to Frances Whitehead:    Your sodium level improved but your kidney function was slightly worse and your potassium level was high.    Recommend adequate hydration and low potassium diet, and confirm that the dose of enalapril is 2.5 mg daily.    Recheck bmp in 1 week. Order placed, doesn't have to be fasting.    Thank you!

## 2024-03-13 NOTE — TELEPHONE ENCOUNTER
Called son Nicholas, and relayed MD's message. Nicholas verbalized understanding. Nicholas confirmed Enalapril dose is 2.5mg daily. Med rec updated (was at 10mg daily).     FYI to Dr. Garduno--

## 2024-03-29 ENCOUNTER — OFFICE VISIT (OUTPATIENT)
Dept: AUDIOLOGY | Facility: CLINIC | Age: 89
End: 2024-03-29

## 2024-03-29 ENCOUNTER — OFFICE VISIT (OUTPATIENT)
Dept: OTOLARYNGOLOGY | Facility: CLINIC | Age: 89
End: 2024-03-29
Payer: MEDICARE

## 2024-03-29 DIAGNOSIS — H90.6 MIXED HEARING LOSS, BILATERAL: ICD-10-CM

## 2024-03-29 DIAGNOSIS — H70.13 CHRONIC MASTOIDITIS OF BOTH SIDES: ICD-10-CM

## 2024-03-29 DIAGNOSIS — H90.6 MIXED CONDUCTIVE AND SENSORINEURAL HEARING LOSS OF BOTH EARS: Primary | ICD-10-CM

## 2024-03-29 DIAGNOSIS — T16.1XXA EAR FOREIGN BODY, RIGHT, INITIAL ENCOUNTER: Primary | ICD-10-CM

## 2024-03-29 PROCEDURE — 92557 COMPREHENSIVE HEARING TEST: CPT | Performed by: AUDIOLOGIST

## 2024-03-29 PROCEDURE — 1160F RVW MEDS BY RX/DR IN RCRD: CPT | Performed by: OTOLARYNGOLOGY

## 2024-03-29 PROCEDURE — 1159F MED LIST DOCD IN RCRD: CPT | Performed by: OTOLARYNGOLOGY

## 2024-03-29 PROCEDURE — 1159F MED LIST DOCD IN RCRD: CPT | Performed by: AUDIOLOGIST

## 2024-03-29 PROCEDURE — 92567 TYMPANOMETRY: CPT | Performed by: AUDIOLOGIST

## 2024-03-29 PROCEDURE — 69200 CLEAR OUTER EAR CANAL: CPT | Performed by: OTOLARYNGOLOGY

## 2024-03-29 PROCEDURE — 99213 OFFICE O/P EST LOW 20 MIN: CPT | Performed by: OTOLARYNGOLOGY

## 2024-03-29 NOTE — PROGRESS NOTES
PROGRESS NOTE  OTOLOGY/OTOLARYNGOLOGY    PCP: Thao Garduno DO    CHIEF COMPLAINT:    Chief Complaint   Patient presents with    Ear Wax     Ear cleaing    Hearing Loss     Patient is here due to hearing loss       LAST VISIT 8/7/23  IMPRESSION  Bilateral mixed hearing loss  History of ear surgery unspecified, with left mastoid cavity  Bilateral mucosalization of the TM  Bilateral chronic otomastoiditis     PLAN:  -Continue alcohol/vinegar ear flushes in left ear.   -Audiogram reviewed with patient and her son  -Patient obtained hearing aids through insurance, but family and patient are frustrated with continued hearing difficulty  -Discussed clarity in right ear is low, so hearing aid will only partially help in left ear  -Discussed that surgery is not a good option in my opinion due to patient's age, medical history and history of surgery on left ear.   -Discussed she is likely not a candidate for cochlear implant surgery, but if patient continues to fail hearing aids, can consider cochlear implant evaluation.   -Recommend custom mold for left hearing aid.    -Patient has previously seen Dr. Webb and audiology at [place]. Patient will plan to follow-up with Dr. Webb for another opinion.   -Follow-up as needed   _________________________________________________________________  INTERVAL HX: Patient has continued significant hearing loss. Patient got a second opinion, and was told that she was not a good surgical candidate. Family is helping her pursue hearing aids further. Here for ear cleaning and evaluation    HISTORY OF PRESENT ILLNESS: Frances Whitehead is a 89 year old female who presents for evaluation of her ears and hearing.     Communication is through her son and via typing on a word document due to the severity of the patient's hearing loss.     Patient notes her right ear is her worse hearing ear. Had hearing aids from [facility], but appropriate fit/benefit were not achieved. Now planning to  use hearing benefit through insurance to reobtain hearing aids. Has a history of ear surgery bilaterally, but it is not recent and she cannot further describe it. She was last seen by Dr. Webb at [place]. She notes bilateral continuous tinnitus, non pulsatile.     Denies vertigo, dizziness, otorrhea, otalgia.    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 Doss-Stent PCI of LAD and RCA was performed.    Deaf     Diverticulitis 2010    hospitalized 3/10 and 5/10    Diverticulosis 2003    cscopy '03, '07, '10    Fracture of C2 vertebra, closed ([facility]) 03/2021    ER 3/19/21--CT cervical spine-- fx C2 posterior lateral vertebral body and left C2 lamina.      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    Renal calculus 2010    on CT abd 5/10-6 mm or less in L lower kidney    Renal insufficiency     Shingles 2011    Small bowel obstruction (HCC) [id number], 6/2016    multiple hospitalizations for [id number], 6/2016    Stress  incontinence, female 05/07/2015    SVT (supraventricular tachycardia) ([facility])     event monitor 2/2016-SVT    Syncope 05/2020    48-hour Holter 5/19/20--bursts of atrial tachycardia up to 3 minutes, PVCs, rare Wenkebach.  Saw Dr. Doss.    Type 2 diabetes mellitus ([facility]) 2001    Uterine prolapse     Uterine prolapse 2006    had pessary in 2006 Dr. SORAYA Davis -vag sling procedure Dr Baez in 6/2016    Uterovaginal prolapse, incomplete 05/07/2015    UTI due to Klebsiella species 12/2016    ESBL Klebsiella UTI treated in [facility] with meropenem when in hosp for enteritis. (Dr Carvalho).     Wound of right leg 06/07/2022       PAST SURGICAL HISTORY:    Past Surgical History:   Procedure Laterality Date    ABDOMEN SURGERY PROC UNLISTED  2007    laparotomy for TERESA and internal hernia    ABDOMEN SURGERY PROC UNLISTED  2009    TERESA and small bowel resection-Dr Jaramillo    APPENDECTOMY      CHOLECYSTECTOMY      FRACTURE SURGERY Left 1/2017    L hip fx pinning 1/2017 Dr. Hernandez    KNEE REPLACEMENT SURGERY Left 2000    KNEE REPLACEMENT SURGERY Right 2008    OTHER SURGICAL HISTORY      cystocele repair    OTHER SURGICAL HISTORY Bilateral     ear surgery-Dr. Patel-both eardrums    SLING  6/30/2016    vag sling procedure at [facility] -Dr Baez       Current Outpatient Medications on File Prior to Visit   Medication Sig Dispense Refill    lactulose 10 GM/15ML Oral Solution Take 30 mL (20 g total) by mouth daily as needed (constipated bowel). 237 mL 3    triamcinolone 0.1 % External Cream Apply 1 Application topically 2 (two) times daily as needed. APPLY TO AFFECTED AREA 15 g 1    furosemide 20 MG Oral Tab furosemide 20 mg tablet, [RxNorm: 023626]      dilTIAZem HCl  MG Oral Tablet 24 Hr Take 1 tablet by mouth every morning.      pantoprazole 40 MG Oral Tab EC Take 1 tablet (40 mg total) by mouth before breakfast. 90 tablet 3    enalapril 5 MG Oral Tab Take 0.5 tablets (2.5 mg total) by mouth daily.      hydrocortisone 2.5 %  External Cream APPLY RECTALLY TWICE DAILY AS NEEDED 30 g 2    loratadine 10 MG Oral Tab TAKE ONE TABLET BY MOUTH ONE TIME DAILY 30 tablet 2    Ferrous Sulfate 325 (65 Fe) MG Oral Tab Take one tab on Mon Wed Fri 90 tablet 3    FLUTICASONE PROPIONATE 50 MCG/ACT Nasal Suspension SPRAY 2 SPRAYS INTO EACH NOSTRIL  IN THE MORNING AND 2 SPRAYS BEFORE BEDTIME 16 g 0    LUMIGAN 0.01 % Ophthalmic Solution       CHOLECALCIFEROL 25 MCG (1000 UT) Oral Tab TAKE TWO TABLETS BY MOUTH DAILY 180 tablet 3    atorvastatin 10 MG Oral Tab Take 1 tablet (10 mg total) by mouth nightly. 90 tablet 3    apixaban (ELIQUIS) 2.5 MG Oral Tab Take 1 tablet (2.5 mg total) by mouth 2 (two) times daily. 180 tablet 3    aspirin 81 MG Oral Tab EC Take 1 tablet (81 mg total) by mouth daily. 100 tablet 3    gabapentin 100 MG Oral Cap Take 1 capsule (100 mg total) by mouth nightly. 90 capsule 3    dilTIAZem  MG Oral Capsule SR 24 Hr Take 1 capsule (120 mg total) by mouth daily. 90 capsule 3    sennosides-docusate (SENNA PLUS) 8.6-50 MG Oral Tab Take 2 tablets twice daily as needed for constipation 100 tablet 3    Fluticasone Propionate HFA (FLOVENT HFA) 220 MCG/ACT Inhalation Aerosol Inhale 1 puff into the lungs 2 (two) times daily. 1 each 3    Wheat Dextrin (BENEFIBER) Oral Powder Take 2 teaspoons daily.  0    Cyanocobalamin (VITAMIN B12) 1000 MCG Oral Tab CR Take 1 tablet by mouth daily.  0    acetaminophen 500 MG Oral Tab Take 1 tablet (500 mg total) by mouth in the morning and 1 tablet (500 mg total) before bedtime.      melatonin 3 MG Oral Tab Take 1 tablet (3 mg total) by mouth nightly.      ascorbic acid 500 MG Oral Tab Take 1 tablet (500 mg total) by mouth daily. 30 tablet 0    Polyethylene Glycol 3350 17 GM/SCOOP Oral Powder Take by mouth nightly. take 15 milliliter (17G)  by oral route  every day powder mixed with 8 oz. water, juice, soda, coffee, or tea        No current facility-administered medications on file prior to visit.        Allergies:   Allergies   Allergen Reactions    Latex UNKNOWN and HIVES     Other reaction(s): Unknown    Mastisol Adhesive HIVES     Other reaction(s): ADHESIVE TAPE    Adhesive Tape      Other reaction(s): ADHESIVE TAPE       SOCIAL HISTORY:    Social History     Tobacco Use    Smoking status: Never    Smokeless tobacco: Never   Substance Use Topics    Alcohol use: No       FAMILY HISTORY: Denies known family history of hearing loss, tinnitus, vertigo, or migraine.  Denies known family history of head and neck cancer, thyroid cancer, bleeding disorders.     REVIEW OF SYSTEMS:   Positives are in bold  Neuro: Headache, facial weakness, facial numbness, neck pain, vertigo  ENT: Hearing change, tinnitus, otorrhea, otalgia, aural fullness, ear pressure, vertigo, imbalance  Sinus pressure, rhinorrhea, congestion, facial pain, jaw pain, dysphagia, odynophagia, sore throat, voice changes, shortness of breath    EXAMINATION:  I washed my hands with an alcohol-based hand gel prior to examination  Constitutional:   --Vitals: not currently breastfeeding.  --General: no apparent distress, well-developed, conversant  Psych: affect pleasant and appropriate for age, alert and oriented  Neuro: Facial movement normal bilateral  Respiratory: No stridor, stertor or increased work of breathing  ENT:  --Ear: The bilateral ears were examined under binocular microscopy  Right ear microscopic exam:  Pinna: Normal, no lesions or masses.  Mastoid: Nontender on palpation.  External auditory canal: Foreign body, appears to be cotton ball - removed, no masses or lesions.   Tympanic membrane: poor landmarks, extensive scarring, evidence of prior surgery including possible incus interposition graft    Left ear microscopic exam:  Pinna: Normal, no lesions or masses.  Mastoid: Nontender on palpation.  External auditory canal:Cerumen impaction - removed.   Tympanic membrane: poor landmarks, extensive scarring, central perforation with mucoid  drainage and nearby tube or extruding middle ear prosthesis - fixed, would not dislodge with gentle manipulation - left in place       Latest Audiogram Result (Hz) Exam performed: 2/2/2023 3:05 PM Last edited by Amber Preciado AUD on 2/2/2023 3:26 PM        125 250  1500 2000 3000 4000 6000 8000    Right air:  90 85  80  80 90 100  95N    Left air:  55 55  60  65  65 95 95N    Left mastoid bone:   30  30  55  35      Right mastoid bone (masked):   50  35  55  40      Masking left (mastoid bone):   80  80    85         Reliability:  Good    Transducer:  Inserts    Technique:  Conventional Audiometry    Comments:            Latest Speech Audiometry  Last edited by Amber Preciado AUD on 2/2/2023 3:26 PM       Ear Method SAT SRT MCL UCL Notes    right live voice  85       left live voice  55        Ear Method Test/List Score (%) Intensity Mask/Noise   right live voice Half-List 44 95 75   left live voice 10 By Difficulty 92 80                      Latest Tympanogram Result       Probe Tone (Hz): 226 Exam performed: 2/2/2023 3:09 PM Last edited by Amber Preciado AUD on 2/2/2023 3:26 PM      Tympanograms  These were drawn by a user, not generated from device data      Right Ear Left Ear                     Right Ear Left Ear    Tympanogram type:      Canal volume (mL): 0.7 1.9    Peak pressure (daPa):      Peak amplitude (mL):      Tympanogram width (daPa):        Comments:               CT temporal bones 3/14/23  (Images personally reviewed; likely effects of chronic infection and prior surgery)  CONCLUSION:      1. RIGHT temporal bone:  Soft tissue density opacification of the middle ear cavity/mesotympanum with associated erosion/blunting of the scutum, opacification at Prussak's space, and chronic-appearing erosion of the ossicular chain (which is partially   intact).  These findings suggest cholesteatoma formation on a background of longstanding chronic otomastoiditis.  Subtotal opacification of the  right mastoid cavity with slight coalescence of the mastoid septa.  Thickened and retracted tympanic membrane   with probable small central perforation.  Borderline high position of the jugular bulb.      2. LEFT temporal bone:  Soft tissue density opacification of the middle ear cavity (mesotympanum and epitympanum), which surrounds the ossicular chain.  Ossicular chain is not well defined and likely chronically eroded.  Similarly, the scutum is not well    defined and likely chronically eroded/blunted.  Near complete opacification of the left mastoid cavity with coalescence of the mastoid septa.  Findings suggest cholesteatoma on a background of longstanding chronic otomastoiditis.  Thickened and   retracted tympanic membrane with relatively large central perforation.  Borderline high position of the jugular bulb.      3. Partially imaged findings that suggest multifocal acute on chronic sinusitis.      4. Subcentimeter polyp or focus of debris along the medial margin of the left inferior turbinate.  Leftward deviation of the nasal septum.      5. Intracranial atherosclerosis.      Foreign body removal: (03/29/24)  Under binocular microscopy foreign body appearing to be cotton ball was removed from the right external auditory canal using alligator forceps. Patient noted subjective improvement in hearing.     ASSESSMENT/PLAN:  Frances Whitehead is a 89 year old female with     ICD-10-CM   1. Ear foreign body, right, initial encounter  T16.1XXA   2. Mixed hearing loss, bilateral  H90.6   3. Chronic mastoiditis of both sides  H70.13        IMPRESSION  Right ear foreign body - removed  Bilateral mixed hearing loss  History of ear surgery unspecified, with left mastoid cavity  Bilateral mucosalization of the TM  Bilateral chronic otomastoiditis   Audiogram reviewed - significant worsening of left ear, right ear is stable    PLAN:  -Continue alcohol/vinegar ear flushes in left ear.   -Audiogram reviewed with patient and  her son  -Discussed clarity in right ear is low, so hearing aid will only partially help in left ear  -Discussed that surgery is not a good option in my opinion due to patient's age, medical history and history of surgery on left ear.   -Follow-up in 3 months for routine cerumen removal  -Follow-up as needed     Situation reviewed with the patient in detail.    Attention: This note has been scribed by Adele Ferrer under the supervision of José Morgan MD.     José Morgan MD  Otology/Otolaryngology  [address] [place] [address]  Phone [phone]  Fax [phone]     I have personally performed the services described in this documentation. All medical record entries made by the scribe were at my direction and in my presence. I have reviewed the chart and agree that the medical record reflects my personal performance and is accurate and complete.

## 2024-04-09 ENCOUNTER — TELEPHONE (OUTPATIENT)
Dept: INTERNAL MEDICINE CLINIC | Facility: CLINIC | Age: 89
End: 2024-04-09

## 2024-04-09 ENCOUNTER — HOSPITAL ENCOUNTER (OUTPATIENT)
Dept: GENERAL RADIOLOGY | Facility: HOSPITAL | Age: 89
Discharge: HOME | End: 2024-04-09
Attending: INTERNAL MEDICINE
Payer: MEDICARE

## 2024-04-09 DIAGNOSIS — S39.92XA INJURY OF BACK, INITIAL ENCOUNTER: Primary | ICD-10-CM

## 2024-04-09 DIAGNOSIS — S39.92XA INJURY OF BACK, INITIAL ENCOUNTER: ICD-10-CM

## 2024-04-09 PROCEDURE — 72072 X-RAY EXAM THORAC SPINE 3VWS: CPT | Performed by: INTERNAL MEDICINE

## 2024-04-09 PROCEDURE — 72110 X-RAY EXAM L-2 SPINE 4/>VWS: CPT | Performed by: INTERNAL MEDICINE

## 2024-04-09 NOTE — TELEPHONE ENCOUNTER
Patient's son Vidal is calling they are at the hospital now  Patient is not going to the ER he only wants patient to have an X-Ray of the Back    Will Dr Garduno enter an order?    Phone [phone]

## 2024-04-09 NOTE — TELEPHONE ENCOUNTER
Pt's son calling from the [facility]  Asking if Dr Garduno will order Xray

## 2024-04-09 NOTE — TELEPHONE ENCOUNTER
Patient called c/o severe back pain, since she almost fell about a week ago. She was falling but she grabbed onto a door knob and braced herself, but didn't fall.  It is difficult for her to move her arms and difficult to lay down and sleep.  Patients son is going to take her to the ER.

## 2024-04-09 NOTE — TELEPHONE ENCOUNTER
I didn't evaluate the patient I don't know where in the back she injured herself to order an x-ray so both thoracic and lumbar x-rays ordered.

## 2024-04-10 NOTE — TELEPHONE ENCOUNTER
Left detailed message with patient, relaying MD message below. (OK per [facility] Verbal Release / Privacy). Please schedule follow-up appt with Dr. Garduno to review result in more detail.      1.88

## 2024-04-10 NOTE — TELEPHONE ENCOUNTER
To nursing staff, please relay the following to Frances Whitehead:    Recommend patient come in to be evaluated as there was evidence of a new compression fracture in her back.    Thank you!

## 2024-04-16 RX ORDER — DILTIAZEM HYDROCHLORIDE EXTENDED-RELEASE TABLETS 120 MG/1
1 TABLET, EXTENDED RELEASE ORAL EVERY MORNING
Qty: 90 TABLET | Refills: 3 | Status: SHIPPED | OUTPATIENT
Start: 2024-04-16

## 2024-04-16 NOTE — TELEPHONE ENCOUNTER
Refill request is for a maintenance medication and has met the criteria specified in the Ambulatory Medication Refill Standing Order for eligibility, visits, laboratory, alerts and was sent to the requested pharmacy.    Requested Prescriptions     Signed Prescriptions Disp Refills    dilTIAZem HCl  MG Oral Tablet 24 Hr 90 tablet 3     Sig: Take 1 tablet by mouth every morning.     Authorizing Provider: TOMY MATHUR     Ordering User: EDWIN FREGOSO

## 2024-04-22 ENCOUNTER — LAB ENCOUNTER (OUTPATIENT)
Dept: LAB | Age: 89
End: 2024-04-22
Attending: INTERNAL MEDICINE
Payer: MEDICARE

## 2024-04-22 ENCOUNTER — OFFICE VISIT (OUTPATIENT)
Dept: INTERNAL MEDICINE CLINIC | Facility: CLINIC | Age: 89
End: 2024-04-22

## 2024-04-22 VITALS
TEMPERATURE: 98 F | BODY MASS INDEX: 23.93 KG/M2 | SYSTOLIC BLOOD PRESSURE: 122 MMHG | DIASTOLIC BLOOD PRESSURE: 68 MMHG | HEART RATE: 82 BPM | OXYGEN SATURATION: 98 % | HEIGHT: 58 IN | WEIGHT: 114 LBS

## 2024-04-22 DIAGNOSIS — Z00.00 MEDICARE ANNUAL WELLNESS VISIT, SUBSEQUENT: ICD-10-CM

## 2024-04-22 DIAGNOSIS — E11.69 TYPE 2 DIABETES MELLITUS WITH OTHER SPECIFIED COMPLICATION, WITHOUT LONG-TERM CURRENT USE OF INSULIN (HCC): ICD-10-CM

## 2024-04-22 DIAGNOSIS — E55.9 VITAMIN D DEFICIENCY: ICD-10-CM

## 2024-04-22 DIAGNOSIS — Z00.00 MEDICARE ANNUAL WELLNESS VISIT, SUBSEQUENT: Primary | ICD-10-CM

## 2024-04-22 DIAGNOSIS — S22.000A COMPRESSION FRACTURE OF BODY OF THORACIC VERTEBRA (HCC): ICD-10-CM

## 2024-04-22 LAB
ALBUMIN SERPL-MCNC: 4.4 G/DL (ref 3.2–4.8)
ALBUMIN/GLOB SERPL: 1.6 {RATIO} (ref 1–2)
ALP LIVER SERPL-CCNC: 118 U/L
ALT SERPL-CCNC: 12 U/L
ANION GAP SERPL CALC-SCNC: 6 MMOL/L (ref 0–18)
AST SERPL-CCNC: 22 U/L (ref ?–34)
BASOPHILS # BLD AUTO: 0.03 X10(3) UL (ref 0–0.2)
BASOPHILS NFR BLD AUTO: 0.6 %
BILIRUB SERPL-MCNC: 0.4 MG/DL (ref 0.2–0.9)
BUN BLD-MCNC: 16 MG/DL (ref 9–23)
BUN/CREAT SERPL: 16.7 (ref 10–20)
CALCIUM BLD-MCNC: 9.8 MG/DL (ref 8.7–10.4)
CHLORIDE SERPL-SCNC: 98 MMOL/L (ref 98–112)
CHOLEST SERPL-MCNC: 145 MG/DL (ref ?–200)
CO2 SERPL-SCNC: 26 MMOL/L (ref 21–32)
CREAT BLD-MCNC: 0.96 MG/DL
DEPRECATED RDW RBC AUTO: 48.8 FL (ref 35.1–46.3)
EGFRCR SERPLBLD CKD-EPI 2021: 56 ML/MIN/1.73M2 (ref 60–?)
EOSINOPHIL # BLD AUTO: 0.11 X10(3) UL (ref 0–0.7)
EOSINOPHIL NFR BLD AUTO: 2.2 %
ERYTHROCYTE [DISTWIDTH] IN BLOOD BY AUTOMATED COUNT: 14.5 % (ref 11–15)
EST. AVERAGE GLUCOSE BLD GHB EST-MCNC: 143 MG/DL (ref 68–126)
FASTING PATIENT LIPID ANSWER: NO
FASTING STATUS PATIENT QL REPORTED: NO
GLOBULIN PLAS-MCNC: 2.7 G/DL (ref 2.8–4.4)
GLUCOSE BLD-MCNC: 125 MG/DL (ref 70–99)
HBA1C MFR BLD: 6.6 % (ref ?–5.7)
HCT VFR BLD AUTO: 32.6 %
HDLC SERPL-MCNC: 45 MG/DL (ref 40–59)
HGB BLD-MCNC: 11.2 G/DL
IMM GRANULOCYTES # BLD AUTO: 0.03 X10(3) UL (ref 0–1)
IMM GRANULOCYTES NFR BLD: 0.6 %
LDLC SERPL CALC-MCNC: 83 MG/DL (ref ?–100)
LYMPHOCYTES # BLD AUTO: 1.24 X10(3) UL (ref 1–4)
LYMPHOCYTES NFR BLD AUTO: 24.7 %
MCH RBC QN AUTO: 31.7 PG (ref 26–34)
MCHC RBC AUTO-ENTMCNC: 34.4 G/DL (ref 31–37)
MCV RBC AUTO: 92.4 FL
MONOCYTES # BLD AUTO: 0.43 X10(3) UL (ref 0.1–1)
MONOCYTES NFR BLD AUTO: 8.5 %
NEUTROPHILS # BLD AUTO: 3.19 X10 (3) UL (ref 1.5–7.7)
NEUTROPHILS # BLD AUTO: 3.19 X10(3) UL (ref 1.5–7.7)
NEUTROPHILS NFR BLD AUTO: 63.4 %
NONHDLC SERPL-MCNC: 100 MG/DL (ref ?–130)
OSMOLALITY SERPL CALC.SUM OF ELEC: 273 MOSM/KG (ref 275–295)
PLATELET # BLD AUTO: 347 10(3)UL (ref 150–450)
POTASSIUM SERPL-SCNC: 5.1 MMOL/L (ref 3.5–5.1)
PROT SERPL-MCNC: 7.1 G/DL (ref 5.7–8.2)
RBC # BLD AUTO: 3.53 X10(6)UL
SODIUM SERPL-SCNC: 130 MMOL/L (ref 136–145)
TRIGL SERPL-MCNC: 92 MG/DL (ref 30–149)
TSI SER-ACNC: 1.71 MIU/ML (ref 0.55–4.78)
VIT D+METAB SERPL-MCNC: 69.5 NG/ML (ref 30–100)
VLDLC SERPL CALC-MCNC: 15 MG/DL (ref 0–30)
WBC # BLD AUTO: 5 X10(3) UL (ref 4–11)

## 2024-04-22 PROCEDURE — 80061 LIPID PANEL: CPT

## 2024-04-22 PROCEDURE — 36415 COLL VENOUS BLD VENIPUNCTURE: CPT

## 2024-04-22 PROCEDURE — 85025 COMPLETE CBC W/AUTO DIFF WBC: CPT

## 2024-04-22 PROCEDURE — 84443 ASSAY THYROID STIM HORMONE: CPT

## 2024-04-22 PROCEDURE — 82306 VITAMIN D 25 HYDROXY: CPT

## 2024-04-22 PROCEDURE — 83036 HEMOGLOBIN GLYCOSYLATED A1C: CPT

## 2024-04-22 PROCEDURE — 80053 COMPREHEN METABOLIC PANEL: CPT

## 2024-04-22 RX ORDER — METHYLPREDNISOLONE 4 MG/1
TABLET ORAL
Qty: 21 EACH | Refills: 0 | Status: SHIPPED | OUTPATIENT
Start: 2024-04-22

## 2024-04-22 NOTE — PROGRESS NOTES
Subjective:   Frances Whitehead is a 89 year old female who presents for a Medicare Subsequent Annual Wellness visit (Pt already had Initial Annual Wellness) and scheduled follow up of multiple significant but stable problems.     LOV 3/11/24.     Since, fell a few weeks ago, no LOC or head trauma, tried to pull herself up with her L arm.    Had x-rays with compression fx T9 in addition to chronic fx T11. Denies pain along middle of her spine.    Admits to L flank itchiness and pain. Has used lotion without relief.    History/Other:   Fall Risk Assessment:   She has been screened for Falls and is High Risk. Fall Prevention information provided to patient in After Visit Summary.    Do you feel unsteady when standing or walking?: Yes  Do you worry about falling?: Yes  Have you fallen in the past year?: No     Cognitive Assessment:   Abnormal  What day of the week is this?: Correct  What month is it?: Correct  What year is it?: Correct  Recall \"Ball\": Incorrect  Recall \"Flag\": Correct  Recall \"Tree\": Correct    Functional Ability/Status:   Frances Whitehead has some abnormal functions as listed below:  She has Dressing and/or Bathing issues based on screening of functional status.  Difficulty dressing or bathing?: Yes  Bathing or Showering: Cannot do without help  Dressing: Need some help  She has Toileting difficulties based on screening of functional status.She has Eating difficulties based on screening of functional status.She has Driving difficulties based on screening of functional status. She has Meal Preparation difficulties based on screening of functional status.She has difficulties Managing Money/Bills based on screening of functional status.She has difficulties Shopping for Groceries based on screening of functional status. She has difficulties Taking Meds as Rx'd based on screening of functional status. She has Hearing problems based on screening of functional status.She has Vision problems based on screening  of functional status. She has Walking problems based on screening of functional status. She has problems with Daily Activities based on screening of functional status. She has problems with Memory based on screening of functional status.     Depression Screening (PHQ-2/PHQ-9): PHQ-2 SCORE: 0  , done 4/22/2024   Last [place] Suicide Screening on 4/22/2024 was No Risk.     5 minutes spent screening and counseling for depression    Advanced Directives:   She does have a Living Will but we do NOT have it on file in Epic.    She has a Power of  for Health Care on file in [facility].  Patient has Advance Care Planning documents present in EMR. Reviewed documents with patient (and family/surrogate if present).      Patient Active Problem List   Diagnosis    Hypercholesterolemia    Hypertension    Neuropathy    Asthma with COPD ([facility])    SVT (supraventricular tachycardia) ([facility])    Bilateral hearing loss    Coronary artery disease involving native coronary artery of native heart without angina pectoris    Stented coronary artery    CKD (chronic kidney disease)    Controlled type 2 diabetes mellitus with diabetic nephropathy, without long-term current use of insulin ([facility])    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 2 diabetes mellitus ([facility])    Thrombocytopenia ([facility])    Asthma with COPD (chronic obstructive pulmonary disease) ([facility])    Closed fracture of multiple ribs of left side, initial encounter    Syncope, unspecified syncope type     Allergies:  She is allergic to latex, mastisol adhesive, and adhesive tape.    Current Medications:  Outpatient Medications Marked as Taking for the 4/22/24 encounter (Office Visit) with Thao Garduno, DO   Medication Sig    methylPREDNISolone (MEDROL) 4 MG Oral Tablet Therapy Pack As directed.    dilTIAZem HCl  MG Oral Tablet 24 Hr Take 1 tablet by mouth every morning.    lactulose 10 GM/15ML Oral Solution Take 30 mL (20 g total) by mouth daily as needed (constipated bowel).     triamcinolone 0.1 % External Cream Apply 1 Application topically 2 (two) times daily as needed. APPLY TO AFFECTED AREA    enalapril 5 MG Oral Tab Take 0.5 tablets (2.5 mg total) by mouth daily.    hydrocortisone 2.5 % External Cream APPLY RECTALLY TWICE DAILY AS NEEDED    loratadine 10 MG Oral Tab TAKE ONE TABLET BY MOUTH ONE TIME DAILY    Ferrous Sulfate 325 (65 Fe) MG Oral Tab Take one tab on Mon Wed Fri    FLUTICASONE PROPIONATE 50 MCG/ACT Nasal Suspension SPRAY 2 SPRAYS INTO EACH NOSTRIL  IN THE MORNING AND 2 SPRAYS BEFORE BEDTIME (Patient taking differently: 2 sprays by Nasal route as needed.)    LUMIGAN 0.01 % Ophthalmic Solution Place 1 drop into both eyes every evening.    CHOLECALCIFEROL 25 MCG (1000 UT) Oral Tab TAKE TWO TABLETS BY MOUTH DAILY    atorvastatin 10 MG Oral Tab Take 1 tablet (10 mg total) by mouth nightly.    apixaban (ELIQUIS) 2.5 MG Oral Tab Take 1 tablet (2.5 mg total) by mouth 2 (two) times daily.    gabapentin 100 MG Oral Cap Take 1 capsule (100 mg total) by mouth nightly.    sennosides-docusate (SENNA PLUS) 8.6-50 MG Oral Tab Take 2 tablets twice daily as needed for constipation    Fluticasone Propionate HFA (FLOVENT HFA) 220 MCG/ACT Inhalation Aerosol Inhale 1 puff into the lungs 2 (two) times daily. (Patient taking differently: Inhale 1 puff into the lungs as needed.)    Wheat Dextrin (BENEFIBER) Oral Powder Take 2 teaspoons daily.    Cyanocobalamin (VITAMIN B12) 1000 MCG Oral Tab CR Take 1 tablet by mouth daily.    acetaminophen 500 MG Oral Tab Take 1 tablet (500 mg total) by mouth in the morning and 1 tablet (500 mg total) before bedtime.    melatonin 3 MG Oral Tab Take 1 tablet (3 mg total) by mouth nightly.    ascorbic acid 500 MG Oral Tab Take 1 tablet (500 mg total) by mouth daily.    Polyethylene Glycol 3350 17 GM/SCOOP Oral Powder Take 17 g by mouth daily. take 15 milliliter (17G)  by oral route  every day powder mixed with 8 oz. water, juice, soda, coffee, or tea       Medical  History:  She  has a past medical history of Abnormal nuclear stress test (06/17/2020), Anemia (2007), Asthma ([facility]), Carpal tunnel syndrome (12/14/2011), CKD stage 3 due to type 2 diabetes mellitus ([facility]) (04/16/2019), Closed fracture of second cervical vertebra ([facility]) (03/31/2021), COPD (chronic obstructive pulmonary disease) ([facility]), Coronary artery disease (06/2020), Deaf, Diverticulitis (2010), Diverticulosis (2003), Fracture of C2 vertebra, closed ([facility]) (03/2021), GERD (gastroesophageal reflux disease), Glaucoma, Hyperlipidemia, Hypertension, essential, Irritable bowel syndrome, Lumbar stenosis (2004), Lung nodule (4/06-3/03), 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 ([facility]) (06/07/2022), Osteoarthritis, Osteoarthritis (03/06/2013), Osteopenia, PAF (paroxysmal atrial fibrillation) ([facility]) (01/2021), Pneumonia due to COVID-19 virus (01/2021), Renal calculus (2010), Renal insufficiency, Shingles (2011), Small bowel obstruction ([facility]) (0307-1579, 6/2016), Stress incontinence, female (05/07/2015), SVT (supraventricular tachycardia) ([facility]), Syncope (05/2020), Type 2 diabetes mellitus ([facility]) (2001), Uterine prolapse, Uterine prolapse (2006), Uterovaginal prolapse, incomplete (05/07/2015), UTI due to Klebsiella species (12/2016), and Wound of right leg (06/07/2022).  Surgical History:  She  has a past surgical history that includes appendectomy; cholecystectomy; other surgical history; other surgical history (Bilateral); knee replacement surgery (Left, 2000); knee replacement surgery (Right, 2008); abdomen surgery proc unlisted (2007); abdomen surgery proc unlisted (2009); sling (6/30/2016); and Fracture surgery (Left, 1/2017).   Family History:  Her family history includes 5 children in an other family member; Cancer in her brother; Diabetes in her brother, mother, and sister; Glaucoma in her brother; Heart Attack in her brother; Heart Disease in her brother; Stroke in her father, sister, and  sister; intestinal blockage in her brother.  Social History:  She  reports that she has never smoked. She has never used smokeless tobacco. She reports that she does not drink alcohol and does not use drugs.    Tobacco:  She has never smoked tobacco.    CAGE Alcohol Screen:   CAGE screening score of 0 on 4/22/2024, showing low risk of alcohol abuse.      Patient Care Team:  Thao Garduno DO as PCP - General (Internal Medicine)  Fadia Davis MD (OBSTETRICS & GYNECOLOGY)    Review of Systems  GENERAL: feels well otherwise  SKIN: + itchiness  MUSCULOSKELETAL: denies back pain, + L arm pain (soreness)  PSYCHE: denies depression     Objective:   Physical Exam  Declined full exam at today's visit.  Skin: + erythematous patch L flank  MSK: no ttp along midline spine      /68   Pulse 82   Temp 97.6 °F (36.4 °C)   Ht 4' 10\" (1.473 m)   Wt 114 lb (51.7 kg)   LMP  (LMP Unknown)   SpO2 98%   BMI 23.83 kg/m²  Estimated body mass index is 23.83 kg/m² as calculated from the following:    Height as of this encounter: 4' 10\" (1.473 m).    Weight as of this encounter: 114 lb (51.7 kg).    Medicare Hearing Assessment:   Hearing Screening    Time taken: 4/22/2024 12:42 PM  Screening Method: Whisper Test, Questionnaire  I have a problem hearing over the telephone: Yes I have trouble following the conversations when two or more people are talking at the same time: Yes   I have trouble understanding things on the TV: Yes I have to strain to understand conversations: Yes   I have to worry about missing the telephone ring or doorbell: Yes I have trouble hearing conversations in a noisy background such as a crowded room or restaurant: Yes   I get confused about where sounds come from: Yes I misunderstand some words in a sentence and need to ask people to repeat themselves: Yes   I especially have trouble understanding the speech of women and children: Yes I have trouble understanding the speaker in a large room such as at  a meeting or place of [employer]: Yes   Many people I talk to seem to mumble (or don't speak clearly): Yes People get annoyed because I misunderstand what they say: Yes   I misunderstand what others are saying and make inappropriate responses: Yes I avoid social activities because I cannot hear well and fear I will reply improperly: Yes   Family members and friends have told me they think I may have hearing loss: Yes             Visual Acuity:   Right Eye Visual Acuity: Uncorrected Right Eye Chart Acuity: 20/20   Left Eye Visual Acuity: Uncorrected Left Eye Chart Acuity: 20/25   Both Eyes Visual Acuity: Uncorrected Both Eyes Chart Acuity: 20/20   Able To Tolerate Visual Acuity: Yes        Assessment & Plan:   Frances Whitehead is a 89 year old female who presents for a Medicare Assessment.     Medicare annual wellness visit, subsequent (Primary)  -     CBC With Differential With Platelet; Future; Expected date: 04/22/2024  -     Comp Metabolic Panel (14); Future; Expected date: 04/22/2024  -     TSH W Reflex To Free T4; Future; Expected date: 04/22/2024  -     Lipid Panel; Future; Expected date: 04/22/2024  -     Urinalysis with Culture Reflex; Future; Expected date: 04/22/2024  -     Vitamin D; Future; Expected date: 04/22/2024  -     Hemoglobin A1C; Future; Expected date: 04/22/2024  -     Microalb/Creat Ratio, Random Urine; Future; Expected date: 04/22/2024    Compression fracture of body of thoracic vertebra (HCC)  -     XR DEXA BONE DENSITOMETRY (CPT=77080); Future; Expected date: 04/22/2024    Other orders  -     methylPREDNISolone; As directed.  Dispense: 21 each; Refill: 0    L arm pain  - likely MSK strain iso attempt to lift herself up off of the ground  - advised conservative measures, steroid dose pack as below    Pruritus  - labs as above, recommend lotion to keep skin hydrated, patient already taking claritin to 10 mg once daily, can increase to bid PRN for itching, rx for prednisone as above, side effects  of steroids discussed, advised close monitoring of patient to prevent falls    Not discussed at this visit:  Type 2 diabetes mellitus with other specified complication, without long-term current use of insulin ([facility])  - Diet controlled since 2001  - Most recent hemoglobin A1c: 6.3% on 11/3/23  - Current regimen:              - none  - Complications:              - CV: yes, on atorvastatin 10 mg at bedtime                       - Nephropathy: microalb/cr wnl 11/3/23              - Neuropathy: yes              - Retinopathy: advise yearly appointment  - Foot exam: sees podiatry     Asthma with COPD  - well controlled   - does not use Flovent inhaler 220 mcg 1 p bid  - Past Combivent Respimat     Chronic rhinitis  - Saw Dr. Bereket Thakur 12/6/22   - takes flonase/claritin     Hypertension  - controlled on current regimen:  - enalapril 2.5 mg daily  - diltiazem 120 mg daily     Hypercholesterolemia  - lipitor 10 mg at bedtime   - LDL 85 on 8/29/23     Hemorrhoids   - has HC cream PRN     Chronic constipation  - possibly 2/2 IBS  - Has miralax daily, senokot, lactulose 30 ml TID PRN     Vit D deficiency  - vitamin D 2000 units daily  - repeat labs as above     Hx cervical fracture of C2  - ER 3/19/21 CT cervical spine: fx C2 posterior lateral vertebral body and left C2 lamina.    - Saw Dr. Quevedo and 10/2021, removed brace 6/2021.   - Last imaging CT cervical 6/17/21, progressive healing and callus C2 fx.     Primary osteoarthritis of right shoulder  - X-ray R shoulder 7/29/21: advanced glenohumeral osteoarthritis  - Dr. Wojciech Wall gave injections     Venous insufficiency b/l LE  - swelling controlled  - Neg b/l dopplers 1/2021  - Has lasix 20 mg daily PRN      Gastroesophageal reflux disease  - taking protonix 40 mg daily  - EGD 2007- duodenal AVM     PAF (paroxysmal atrial fibrillation) ([facility])  - Cardiologist: Dr. Doss  - AC: eliquis  - Rate control: dilt 120 mg daily  - Rhythm control: none      Carpal tunnel b/l  -  Wrist splints b/l at night  - EMG ord at 9/4/20 visit: didn't do emg.  - Dr Wojciech Wall inj R wrist 12/2020 (improved) and in 2/2021: he ord OT  - s/p b/l CSI injections 2/28/24     Chronic anemia  - Hgb 8.6 on 6/9/21 ER: was 9.9 on 3/19/21  - EGD 10/14/20 w/Dr. López: mild schatzki's ring or peptic esophageal stricture GE junction left alone, mild-mod atrophic gastritis, bx deferred.   - Dr López rec forego cscopy due to pt risk  - On iron MWF, B12 supplementation      CAD s/p PCI  - cardiac cath 6/23/20 Dr. Doss s/p PCI to LAD and RCA  - eliquis/ASA, atorvastatin 10 mg at bedtime     SVT (supraventricular tachycardia) (HCC)  - hx syncope  - cardizem 120 mg daily  - Hx 30 day event monitor 2/2016: SVT.   - 48-hr Holter 5/19/20: bursts atrial tachy up to 3 min, PVCs, rare Wenkebach  - Dr. Doss 6/8/20 cut enalapril 5 mg daily.  - [facility] 6/12/20: LVEF 55-60%, mild MR. LA mild to mod dilated, moderate TR.  - repeat 14 day monitor w/cardiology per [facility]     Hx compression fracture T11  - MRI lumbar 11/29/19: acute/recent fx T11 ~50%, DJD.  - Dr. Morris/DIEGO Morris APN 12/9/19 rx brace, Dr Silva 12/20/19, does not need kyphoplasty.      Osteoporosis  - Calcium in diet (pills might constipate more)  - Vit D 1000 u/d added 12/2019  - Started fosamax 70 mg weekly 12/10/19 - 8/2023 (stopped by Dr. Perez)      Neuropathy  - feet. gabapentin 100 mg HS, previously norco 1/2 tab at HS  - declined LE EMG  - seen by Dr. Pedro     CKD 3  - stable, repeat labs with [facility]     Eczema  - TAC cream PRN      B/l hearing loss  - Dr. Salcedo and Carrie Melgar Au[facility]ARAH on 6/11/18 recom hearing aides  - hx eardrum surgery and possibly has a tube L eardrum  - Seen by Dr. Francois Webb ENT [facility], poor operative candidate               - recommended working with outside audiologist for fit and bluetooth connectivity  - has appointment with audiologist 4/25/24     Osteoarthritis multiple sites, primary  - history taking 1/2 norco  daily     hx recurrent partial SBOs   - MRI enterography 6/2016: no high grade obstruction-but poss anastomotic stricture  - Dr. López 3/20/17 recom avoid raw veg and if sx ER and do CT scan     Healthcare Maintenance  Cancer Screenings:  - Breast: last mammogram 2021  - Cervical: age >65  - Colon: age >80  - Lung: age >80     Vaccines:  - Shingles: shingrix recommended   - Pneumonia: prevnar 20 8/29/23, prevnar 13 11/11/14  - Flu: 1/29/24  - COVID-19: UTD     Misc:  - DEXA: 2010 w/osteopenia  - Advanced directives: has ACP documents, juana Suarez is HCPOA     RTC in 1-2 months or sooner PRN.    Overall 60 minutes was spent on this encounter. 45 minutes spent reviewing, providing care coordination, and documentation of the acute/chronic conditions as listed above. 15 minutes was spent reviewing elements of a AWV and providing age appropriate screenings.     The patient indicates understanding of these issues and agrees to the plan.    Reinforced healthy diet, lifestyle, and exercise.      No follow-ups on file.     Thao Garduno DO, 4/22/2024     Supplementary Documentation:   General Health:  In the past six months, have you lost more than 10 pounds without trying?: 2 - No  Has your appetite been poor?: No  Type of Diet: Other (ground up soft food)  How does the patient maintain a good energy level?: Stretching  How would you describe your daily physical activity?: Light  How would you describe your current health state?: Poor  How do you maintain positive mental well-being?: Social Interaction  On a scale of 0 to 10, with 0 being no pain and 10 being severe pain, what is your pain level?: 8 - (Severe)  In the past six months, have you experienced urine leakage?: 1-Yes  At any time do you feel concerned for the safety/well-being of yourself and/or your children, in your home or elsewhere?: No  Have you had any immunizations at another office such as Influenza, Hepatitis B, Tetanus, or Pneumococcal?: No        Frances Whitehead's SCREENING SCHEDULE   Tests on this list are recommended by your physician but may not be covered, or covered at this frequency, by your insurer.   Please check with your insurance carrier before scheduling to verify coverage.   PREVENTATIVE SERVICES FREQUENCY &  COVERAGE DETAILS LAST COMPLETION DATE   Diabetes Screening    Fasting Blood Sugar /  Glucose    One screening every 12 months if never tested or if previously tested but not diagnosed with pre-diabetes   One screening every 6 months if diagnosed with pre-diabetes Lab Results   Component Value Date     (H) 04/22/2024        Cardiovascular Disease Screening    Lipid Panel  Cholesterol  Lipoprotein (HDL)  Triglycerides Covered every 5 years for all Medicare beneficiaries without apparent signs or symptoms of cardiovascular disease Lab Results   Component Value Date    CHOLEST 145 04/22/2024    HDL 45 04/22/2024    LDL 83 04/22/2024    TRIG 92 04/22/2024         Electrocardiogram (EKG)   Covered if needed at Welcome to Medicare, and non-screening if indicated for medical reasons 01/14/2024      Ultrasound Screening for Abdominal Aortic Aneurysm (AAA) Covered once in a lifetime for one of the following risk factors    Men who are 65-75 years old and have ever smoked    Anyone with a family history -     Colorectal Cancer Screening  Covered for ages 50-85; only need ONE of the following:    Colonoscopy   Covered every 10 years    Covered every 2 years if patient is at high risk or previous colonoscopy was abnormal -    No recommendations at this time    Flexible Sigmoidoscopy   Covered every 4 years -    Fecal Occult Blood Test Covered annually -   Bone Density Screening    Bone density screening    Covered every 2 years after age 65 if diagnosed with risk of osteoporosis or estrogen deficiency.    Covered yearly for long-term glucocorticoid medication use (Steroids) No results found for this or any previous visit.      No  recommendations at this time   Pap and Pelvic    Pap   Covered every 2 years for women at normal risk; Annually if at high risk -  No recommendations at this time    Chlamydia Annually if high risk -  No recommendations at this time   Screening Mammogram    Mammogram     Recommend annually for all female patients aged 40 and older    One baseline mammogram covered for patients aged 35-39 10/01/2021    No recommendations at this time    Immunizations    Influenza Covered once per flu season  Please get every year 01/29/2024  No recommendations at this time    Pneumococcal Each vaccine ([id number] & [id number]) covered once after 65 Prevnar 13: 11/11/2014    [id number]: 01/12/1998     No recommendations at this time    Hepatitis B One screening covered for patients with certain risk factors   -  No recommendations at this time    Tetanus Toxoid Not covered by Medicare Part B unless medically necessary (cut with metal); may be covered with your pharmacy prescription benefits -    Tetanus, Diptheria and Pertusis TD and TDaP Not covered by Medicare Part B -  No recommendations at this time    Zoster Not covered by Medicare Part B; may be covered with your pharmacy  prescription benefits -  No recommendations at this time     Diabetes      Hemoglobin A1C Annually; if last result is elevated, may be asked to retest more frequently.    Medicare covers every 3 months Lab Results   Component Value Date     (H) 04/22/2024    A1C 6.6 (H) 04/22/2024       No recommendations at this time    Creat/alb ratio Annually Lab Results   Component Value Date    MICROALBCREA 25.1 11/03/2023       LDL Annually Lab Results   Component Value Date    LDL 83 04/22/2024       Dilated Eye Exam Annually Last Diabetic Eye Exam:  No data recorded  No data recorded       Annual Monitoring of Persistent Medications (ACE/ARB, digoxin diuretics, anticonvulsants)    Potassium Annually Lab Results   Component Value Date    K 5.1 04/22/2024          Creatinine   Annually Lab Results   Component Value Date    CREATSERUM 0.96 04/22/2024         BUN Annually Lab Results   Component Value Date    BUN 16 04/22/2024       Drug Serum Conc Annually No results found for: \"DIGOXIN\", \"DIG\", \"VALP\"

## 2024-04-23 ENCOUNTER — TELEPHONE (OUTPATIENT)
Dept: INTERNAL MEDICINE CLINIC | Facility: CLINIC | Age: 89
End: 2024-04-23

## 2024-04-25 ENCOUNTER — OFFICE VISIT (OUTPATIENT)
Dept: AUDIOLOGY | Facility: CLINIC | Age: 89
End: 2024-04-25
Payer: MEDICARE

## 2024-04-25 DIAGNOSIS — H90.6 MIXED CONDUCTIVE AND SENSORINEURAL HEARING LOSS OF BOTH EARS: Primary | ICD-10-CM

## 2024-04-25 PROCEDURE — 92593 HEARING AID CHECK, BOTH EARS: CPT | Performed by: AUDIOLOGIST

## 2024-04-25 PROCEDURE — 1159F MED LIST DOCD IN RCRD: CPT | Performed by: AUDIOLOGIST

## 2024-04-25 NOTE — TELEPHONE ENCOUNTER
"Subjective:       Patient ID: Alli Lezama is a 43 y.o. male.    Chief Complaint: Annual Exam      Alli Lezama is a 43 y.o. male who presents today for an annual     Diet/Exercise: eats a lot of stews, beans and rice. Only exercise is at work.      HTN: on amlodipine 10 mg. No chest pain, no SOB. No light headed, no dizziness.   Elevated uric acid: he is only taking allopurinol "randomly." Uric acid slightly high. No gout flares. Hasn't taken it in a month.   Microcytic anemia: mild, has been intermittently present for >5 years. Repeat in 6-12 months.     He had a dilated coronary sinus, for which he saw cardiology 12/29/202 and was cleared.     Has been sick for a month. Exposure to a lot of dust/fumes at work. He has tried zyrtec and benadryl and none of this is helping.      Labs: ordered and reviewed.      C-scope: at 45     PMHx: reviewed in EMR and updated  Meds: reviewed in EMR and updated  Shx: reviewed in EMR and updated  FMHx: reviewed in EMR and updated. Dad has prostate cancer, not colon cancer as previously believed. Will check PSA annually.   Social: he works for [employer]. He lives with his wife (Kavitha) and two kids; daughter who was born in early 2022 and son born 2/2024. No pets at home. No smokers at home.         Review of Systems   Constitutional:  Negative for chills and fever.   HENT:  Positive for congestion.    Respiratory:  Negative for cough, chest tightness and shortness of breath.    Cardiovascular:  Negative for chest pain.   Gastrointestinal:  Negative for nausea and vomiting.   Genitourinary:  Negative for difficulty urinating and dysuria.   Musculoskeletal:  Negative for arthralgias, joint swelling and myalgias.   Neurological:  Negative for dizziness, light-headedness and headaches.         Health Maintenance Due   Topic Date Due    COVID-19 Vaccine (4 - 2023-24 season) 09/01/2023     Immunization History   Administered Date(s) Administered    COVID-19, MRNA, LN-S, " Pt is using [facility] pharmacy     Pt is asking for a refill for     Triamcinolone acetonide cream PF (Pfizer) (Purple Cap) 04/20/2021, 05/11/2021, 12/15/2021    Tdap 12/30/2008, 12/10/2020       Results for orders placed or performed in visit on 04/20/24   CBC Auto Differential   Result Value Ref Range    WBC 3.26 (L) 3.90 - 12.70 K/uL    RBC 6.03 4.60 - 6.20 M/uL    Hemoglobin 13.0 (L) 14.0 - 18.0 g/dL    Hematocrit 43.7 40.0 - 54.0 %    MCV 73 (L) 82 - 98 fL    MCH 21.6 (L) 27.0 - 31.0 pg    MCHC 29.7 (L) 32.0 - 36.0 g/dL    RDW 15.1 (H) 11.5 - 14.5 %    Platelets 373 150 - 450 K/uL    MPV 12.4 9.2 - 12.9 fL    Immature Granulocytes 0.3 0.0 - 0.5 %    Gran # (ANC) 1.6 (L) 1.8 - 7.7 K/uL    Immature Grans (Abs) 0.01 0.00 - 0.04 K/uL    Lymph # 1.0 1.0 - 4.8 K/uL    Mono # 0.4 0.3 - 1.0 K/uL    Eos # 0.3 0.0 - 0.5 K/uL    Baso # 0.04 0.00 - 0.20 K/uL    nRBC 0 0 /100 WBC    Gran % 48.2 38.0 - 73.0 %    Lymph % 31.3 18.0 - 48.0 %    Mono % 10.7 4.0 - 15.0 %    Eosinophil % 8.3 (H) 0.0 - 8.0 %    Basophil % 1.2 0.0 - 1.9 %    Differential Method Automated    Comprehensive Metabolic Panel   Result Value Ref Range    Sodium 136 136 - 145 mmol/L    Potassium 3.7 3.5 - 5.1 mmol/L    Chloride 103 95 - 110 mmol/L    CO2 24 23 - 29 mmol/L    Glucose 99 70 - 110 mg/dL    BUN 12 6 - 20 mg/dL    Creatinine 1.3 0.5 - 1.4 mg/dL    Calcium 9.5 8.7 - 10.5 mg/dL    Total Protein 7.8 6.0 - 8.4 g/dL    Albumin 4.0 3.5 - 5.2 g/dL    Total Bilirubin 0.4 0.1 - 1.0 mg/dL    Alkaline Phosphatase 47 (L) 55 - 135 U/L    AST 17 10 - 40 U/L    ALT 12 10 - 44 U/L    eGFR >60.0 >60 mL/min/1.73 m^2    Anion Gap 9 8 - 16 mmol/L   Hemoglobin A1C   Result Value Ref Range    Hemoglobin A1C 5.3 4.0 - 5.6 %    Estimated Avg Glucose 105 68 - 131 mg/dL   Lipid Panel   Result Value Ref Range    Cholesterol 151 120 - 199 mg/dL    Triglycerides 60 30 - 150 mg/dL    HDL 47 40 - 75 mg/dL    LDL Cholesterol 92.0 63.0 - 159.0 mg/dL    HDL/Cholesterol Ratio 31.1 20.0 - 50.0 %    Total Cholesterol/HDL Ratio 3.2 2.0 - 5.0    Non-HDL Cholesterol 104 mg/dL   TSH  "  Result Value Ref Range    TSH 1.508 0.400 - 4.000 uIU/mL   PSA, Screening   Result Value Ref Range    PSA, Screen 1.2 0.00 - 4.00 ng/mL   URIC ACID   Result Value Ref Range    Uric Acid 7.6 (H) 3.4 - 7.0 mg/dL   Vitamin D   Result Value Ref Range    Vit D, 25-Hydroxy 35 30 - 96 ng/mL       Objective:     Vitals:    04/25/24 1432   BP: 120/86   Pulse: 100   Temp: 98.3 °F (36.8 °C)   TempSrc: Oral   SpO2: 99%   Weight: 92.9 kg (204 lb 12.9 oz)   Height: 5' 10" (1.778 m)        Physical Exam  Vitals and nursing note reviewed.   Constitutional:       General: He is not in acute distress.     Appearance: He is not ill-appearing, toxic-appearing or diaphoretic.   HENT:      Nose: Congestion present. No rhinorrhea.      Mouth/Throat:      Pharynx: No oropharyngeal exudate or posterior oropharyngeal erythema.   Cardiovascular:      Rate and Rhythm: Normal rate and regular rhythm.   Pulmonary:      Effort: Pulmonary effort is normal.      Breath sounds: Normal breath sounds.   Abdominal:      Palpations: Abdomen is soft.      Tenderness: There is no abdominal tenderness.   Musculoskeletal:         General: No swelling.   Neurological:      General: No focal deficit present.      Mental Status: He is alert.   Psychiatric:         Mood and Affect: Mood normal.         Behavior: Behavior normal.         Thought Content: Thought content normal.         Judgment: Judgment normal.         Assessment:       1. Visit for well man health check    2. Essential hypertension    3. History of gout    4. Acute bacterial sinusitis    5. BMI 29.0-29.9,adult        Plan:         Continue amlodipine for BP  Continue not drinking alcohol  Continue otc daily Vitamin D    Already not taking allopurinol. Can try to just stop  Goal <7.0, above that slightly  Low purine diet  If gout flares occur again, may need to restart, contact me first on directions, will need to restart with colchicine for 90 days.     Monitor anemia annually or so, likely " thalassemia    Check nasal sprays at home  DO NOT TAKE AFRIN FOR LONGER THEN 3 DAYS  Flonase BID  Abx  If congestion persisting, consider steroid taper or ENT    F/u 6 months.     Visit for well man health check    Essential hypertension  -     amLODIPine (NORVASC) 10 MG tablet; Take 1 tablet (10 mg total) by mouth once daily.  Dispense: 90 tablet; Refill: 2    History of gout    Acute bacterial sinusitis  -     amoxicillin-clavulanate 875-125mg (AUGMENTIN) 875-125 mg per tablet; Take 1 tablet by mouth every 12 (twelve) hours. for 7 days  Dispense: 14 tablet; Refill: 0  -     fluticasone propionate (FLONASE) 50 mcg/actuation nasal spray; 1 spray (50 mcg total) by Each Nostril route 2 (two) times daily.  Dispense: 16 g; Refill: 0    BMI 29.0-29.9,adult

## 2024-04-26 NOTE — PROGRESS NOTES
HEARING AID FOLLOW-UP    Frances Whitehead  12/11/1931  [id number]    Patient accompanied today by her son. She has [employer] hearing aids purchased at an outside facility. Both have power domes. She was seen by Dr. Leroy and had hearing testing completed here on 3/29/2024.      She is not hearing well from the hearing aids. They are having trouble getting in touch with the office in which she purchased them.    She most likely needs custom earmolds to coupled to the hearing aids.       In office the following actions were taken:  Receivers completely filled with wax.  Changed both in office today under warranty. Changed to 5.0 receivers.   Cleaned aid  Placed aid in /dehumidifier  Listening check  Changed domes    Earmold impressions taken today without incident.   Order sent to [employer] for custom C-Shells with #2 Power receivers. Cost $250  Order also placed for TV streamer per pt request. Cost $300.     Provided 2 packages of Cerustop wax filters.     Patient is to follow up in  Will call pt when new C-shells arrive in office to schedule appt to .   Will verify program settings in software. May connect phone to hearing aids if desired.     Charges to be entered at time of .  $550.       4/26/2024  SHASHANK Clemons

## 2024-05-07 ENCOUNTER — TELEPHONE (OUTPATIENT)
Dept: AUDIOLOGY | Facility: CLINIC | Age: 89
End: 2024-05-07

## 2024-05-07 NOTE — TELEPHONE ENCOUNTER
Patients son is returning the doctors call and he will accept the appointment for this Friday 5/10/2024 regarding the patients hearing aides.

## 2024-05-08 NOTE — TELEPHONE ENCOUNTER
Per son patient received a message to come in this Friday 5/10 at 1:30 PM for a hearing aid fitting, the appointment is not open. Please advise

## 2024-05-10 ENCOUNTER — OFFICE VISIT (OUTPATIENT)
Dept: AUDIOLOGY | Facility: CLINIC | Age: 89
End: 2024-05-10

## 2024-05-10 DIAGNOSIS — H90.6 MIXED CONDUCTIVE AND SENSORINEURAL HEARING LOSS OF BOTH EARS: Primary | ICD-10-CM

## 2024-05-10 NOTE — PROGRESS NOTES
HEARING AID FOLLOW-UP    Frances Whitehead  12/11/1931  [id number]    Patient is here to  new C-Shells for her [employer]onak L50-R hearing aids (purchased elsewhere).    Good fit and comfort. Son will be able to assist getting aids in and out of ears and also with wax filter changing.     Aids connected to software and latest audiogram entered. Aids programmed to appropriate settings.   Frances reported too much gain overall, so gain was reduced to 80%.     Patient also purchased [employer] TV connector to use at home.     Patient is to follow up in 1 year or sooner if needed.      Charges billed today: $250 for C-Shells                                       $300 for TV Connector    5/10/2024  Amber Preciado, AUD

## 2024-05-13 ENCOUNTER — OFFICE VISIT (OUTPATIENT)
Dept: PHYSICAL MEDICINE AND REHAB | Facility: CLINIC | Age: 89
End: 2024-05-13

## 2024-05-13 VITALS — BODY MASS INDEX: 23.93 KG/M2 | HEIGHT: 58 IN | RESPIRATION RATE: 18 BRPM | WEIGHT: 114 LBS

## 2024-05-13 DIAGNOSIS — G56.03 BILATERAL CARPAL TUNNEL SYNDROME: Primary | ICD-10-CM

## 2024-05-13 PROCEDURE — 99214 OFFICE O/P EST MOD 30 MIN: CPT | Performed by: PHYSICAL MEDICINE & REHABILITATION

## 2024-05-13 RX ORDER — LORATADINE 10 MG/1
10 TABLET ORAL DAILY
Qty: 30 TABLET | Refills: 0 | OUTPATIENT
Start: 2024-05-13

## 2024-05-13 RX ORDER — LIDOCAINE HYDROCHLORIDE 20 MG/ML
1 SOLUTION ORAL; TOPICAL DAILY
Qty: 30 TABLET | Refills: 0 | OUTPATIENT
Start: 2024-05-13

## 2024-05-13 RX ORDER — ATORVASTATIN CALCIUM 10 MG/1
10 TABLET, FILM COATED ORAL NIGHTLY
Qty: 30 TABLET | Refills: 0 | OUTPATIENT
Start: 2024-05-13

## 2024-05-14 ENCOUNTER — PATIENT MESSAGE (OUTPATIENT)
Dept: PHYSICAL MEDICINE AND REHAB | Facility: CLINIC | Age: 89
End: 2024-05-14

## 2024-05-14 ENCOUNTER — TELEPHONE (OUTPATIENT)
Dept: INTERNAL MEDICINE CLINIC | Facility: CLINIC | Age: 89
End: 2024-05-14

## 2024-05-14 ENCOUNTER — PATIENT MESSAGE (OUTPATIENT)
Dept: INTERNAL MEDICINE CLINIC | Facility: CLINIC | Age: 89
End: 2024-05-14

## 2024-05-14 RX ORDER — LORATADINE 10 MG/1
10 TABLET ORAL DAILY
Qty: 90 TABLET | Refills: 3 | Status: SHIPPED | OUTPATIENT
Start: 2024-05-14

## 2024-05-14 RX ORDER — FERROUS SULFATE 325(65) MG
1 TABLET ORAL DAILY
Qty: 90 TABLET | Refills: 3 | Status: SHIPPED | OUTPATIENT
Start: 2024-05-14

## 2024-05-14 RX ORDER — ATORVASTATIN CALCIUM 10 MG/1
10 TABLET, FILM COATED ORAL NIGHTLY
Qty: 90 TABLET | Refills: 3 | Status: SHIPPED | OUTPATIENT
Start: 2024-05-14

## 2024-05-14 RX ORDER — FUROSEMIDE 20 MG/1
20 TABLET ORAL DAILY PRN
Qty: 30 TABLET | Refills: 3 | Status: SHIPPED | OUTPATIENT
Start: 2024-05-14 | End: 2024-09-11

## 2024-05-14 NOTE — TELEPHONE ENCOUNTER
Take lasix 20 mg daily for next 3 days then call with update on Friday.    Rx sent in month supply with 3 refills.    Thank you!

## 2024-05-14 NOTE — TELEPHONE ENCOUNTER
Copied Mychart into TE for Nursing to Triage. Patient does have future appointment on the books for 1 month follow up. Per LOV Lasix 20mg as needed; Nursing please contact patient to triage and inquire about current Lasix use per Dr. Garduno. Thank You

## 2024-05-14 NOTE — TELEPHONE ENCOUNTER
From: Frances Whitehead  To: Wojciech Wall  Sent: 5/14/2024 9:33 AM CDT  Subject: Scheduling     Hi Dr. Wall. We called right away yesterday to schedule a consultation for Dr Wall in [place] but they couldn't see mum till mid August. Then it would probably be another month before surgery.    I told them she was 89 and really couldn't wait that long. They could not put her on a priority or cancellation list. Is there another doctor or doctors you could recommend? Thank You!

## 2024-05-14 NOTE — TELEPHONE ENCOUNTER
Please advise - called son per HIPAA who states ankles are swollen ,non pitting -patient has NOT been taking any furosemide- does not even know if she has prescription at home - to

## 2024-05-14 NOTE — TELEPHONE ENCOUNTER
----- Message from [email or web address]  sent at 5/14/2024  9:32 AM CDT -----  Regarding: Legs  Contact: [phone]    Hi Doctor, Moms legs swollen and in a lot of pain when sitting. Not as much when walking.   You can the  see from the picture. We wrapped both of them. I think she is holding water.   We are seeing you next week, I believe.

## 2024-05-18 NOTE — PROGRESS NOTES
[facility]  OFFICE FOLLOW UP EVALUATION      HISTORY OF PRESENT ILLNESS:     Chief Complaint   Patient presents with    Follow - Up     Returning patient here for follow up. LOV 2/28/24 where a Ultrasound guided BILATERAL carpal tunnel injection was performed. Reports good relief until 2 weeks ago. Reports certain activities triggers her pain. Continues her brace and exercises. + N/T to finger tips. CPL5/10. Continues gabapentin.       Patient is following up for bilateral hand numbness tingling sensation.  Since last visit her symptoms have worsened.  Is noticing more numbness tingling sensation in both hands.  She continues to wear braces during the day.  Rates her pain to be 5 out of 10 currently.  She continues gabapentin which she is tolerating well.    PHYSICAL EXAM:   Resp 18   Ht 58\"   Wt 114 lb (51.7 kg)   LMP  (LMP Unknown)   BMI 23.83 kg/m²     WRIST:  Inspection: Heberden's and Aminah's nodes in the fingertips noted  Palpation: no ttp in carpal bones or areas of pain  ROM: intact to all planes of motion  Strength: 5/5  Sensation: Intact to light touch in all dermatomes of the lower extremities  Tinel's test: Positive for reproducible symptoms    IMAGING:     No new imaging    All imaging results were reviewed and discussed with patient.      ASSESSMENT/PLAN:     1. Bilateral carpal tunnel syndrome        Frances Whitehead is a 89 year old female following up for bilateral hand carpal tunnel syndromes.  I recommended consideration for orthopedic consultation for surgical intervention.  This was provided to her.  Recommend she follow-up as needed in the office.  She will continue bracing and gabapentin topical treatment as tolerated.  If there is no plans for surgical intervention we could consider repeating injection in the future.      The patient verbalized understanding with the plan and was in agreement. All questions/concerns were addressed and  there were no barriers to learning.  Please note Dragon dictation software was used to dictate this note and may result in inadvertent typos.    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 Doss-Stent PCI of LAD and RCA was performed.    Deaf    Diverticulitis    hospitalized 3/10 and 5/10    Diverticulosis    cscopy '03, '07, '10    Fracture of C2 vertebra, closed ([facility])    ER 3/19/21--CT cervical spine-- fx C2 posterior lateral vertebral body and left C2 lamina.      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    Renal calculus    on CT abd 5/10-6 mm or less in L lower kidney    Renal insufficiency    Shingles    Small bowel obstruction ([facility])    multiple hospitalizations for [id number], 6/2016    Stress incontinence, female    SVT (supraventricular tachycardia) ([facility])    event monitor 2/2016-SVT    Syncope    48-hour Holter 5/19/20--bursts of atrial tachycardia up to 3 minutes, PVCs, rare Wenkebach.  Saw Dr. Doss.    Type 2 diabetes mellitus ([facility])    Uterine  prolapse    Uterine prolapse    had pessary in 2006 Dr. SORAYA Davis -vag sling procedure Dr Baez in 6/2016    Uterovaginal prolapse, incomplete    UTI due to Klebsiella species    ESBL Klebsiella UTI treated in [facility] with meropenem when in hosp for enteritis. (Dr Carvalho).     Wound of right leg         PAST SURGICAL HISTORY:     Past Surgical History:   Procedure Laterality Date    Abdomen surgery proc unlisted  2007    laparotomy for TERESA and internal hernia    Abdomen surgery proc unlisted  2009    TERESA and small bowel resection-Dr Jaramillo    Appendectomy      Cholecystectomy      Fracture surgery Left 1/2017    L hip fx pinning 1/2017 Dr. Hernandez    Knee replacement surgery Left 2000    Knee replacement surgery Right 2008    Other surgical history      cystocele repair    Other surgical history Bilateral     ear surgery-Dr. Patel-both eardrums    Sling  6/30/2016    vag sling procedure at [facility] -Dr Baez         CURRENT MEDICATIONS:     Current Outpatient Medications   Medication Sig Dispense Refill    atorvastatin 10 MG Oral Tab Take 1 tablet (10 mg total) by mouth nightly. 90 tablet 3    loratadine 10 MG Oral Tab Take 1 tablet (10 mg total) by mouth daily. 90 tablet 3    Ferrous Sulfate (FEROSUL) 325 (65 Fe) MG Oral Tab Take 1 tablet (325 mg total) by mouth daily. 90 tablet 3    dilTIAZem HCl  MG Oral Tablet 24 Hr Take 1 tablet by mouth every morning. 90 tablet 3    lactulose 10 GM/15ML Oral Solution Take 30 mL (20 g total) by mouth daily as needed (constipated bowel). 237 mL 3    triamcinolone 0.1 % External Cream Apply 1 Application topically 2 (two) times daily as needed. APPLY TO AFFECTED AREA 15 g 1    enalapril 5 MG Oral Tab Take 0.5 tablets (2.5 mg total) by mouth daily.      hydrocortisone 2.5 % External Cream APPLY RECTALLY TWICE DAILY AS NEEDED 30 g 2    FLUTICASONE PROPIONATE 50 MCG/ACT Nasal Suspension SPRAY 2 SPRAYS INTO EACH NOSTRIL  IN THE MORNING AND 2 SPRAYS BEFORE BEDTIME  (Patient taking differently: 2 sprays by Nasal route as needed.) 16 g 0    LUMIGAN 0.01 % Ophthalmic Solution Place 1 drop into both eyes every evening.      CHOLECALCIFEROL 25 MCG (1000 UT) Oral Tab TAKE TWO TABLETS BY MOUTH DAILY 180 tablet 3    apixaban (ELIQUIS) 2.5 MG Oral Tab Take 1 tablet (2.5 mg total) by mouth 2 (two) times daily. 180 tablet 3    gabapentin 100 MG Oral Cap Take 1 capsule (100 mg total) by mouth nightly. 90 capsule 3    sennosides-docusate (SENNA PLUS) 8.6-50 MG Oral Tab Take 2 tablets twice daily as needed for constipation 100 tablet 3    Fluticasone Propionate HFA (FLOVENT HFA) 220 MCG/ACT Inhalation Aerosol Inhale 1 puff into the lungs 2 (two) times daily. (Patient taking differently: Inhale 1 puff into the lungs as needed.) 1 each 3    Wheat Dextrin (BENEFIBER) Oral Powder Take 2 teaspoons daily.  0    Cyanocobalamin (VITAMIN B12) 1000 MCG Oral Tab CR Take 1 tablet by mouth daily.  0    acetaminophen 500 MG Oral Tab Take 1 tablet (500 mg total) by mouth in the morning and 1 tablet (500 mg total) before bedtime.      melatonin 3 MG Oral Tab Take 1 tablet (3 mg total) by mouth nightly.      ascorbic acid 500 MG Oral Tab Take 1 tablet (500 mg total) by mouth daily. 30 tablet 0    Polyethylene Glycol 3350 17 GM/SCOOP Oral Powder Take 17 g by mouth daily. take 15 milliliter (17G)  by oral route  every day powder mixed with 8 oz. water, juice, soda, coffee, or tea      furosemide 20 MG Oral Tab Take 1 tablet (20 mg total) by mouth daily as needed. 30 tablet 3    furosemide 20 MG Oral Tab furosemide 20 mg tablet, [RxNorm: 612495] (Patient not taking: Reported on 4/22/2024)           ALLERGIES:     Allergies   Allergen Reactions    Latex UNKNOWN and HIVES     Other reaction(s): Unknown    Mastisol Adhesive HIVES     Other reaction(s): ADHESIVE TAPE    Adhesive Tape      Other reaction(s): ADHESIVE TAPE         FAMILY HISTORY:     Family History   Problem Relation Age of Onset    Stroke Father          cause of death-age77    Diabetes Mother          age 64    Diabetes Brother     Heart Disease Brother         cause of death-age 57    Diabetes Sister     Stroke Sister         cause of death age 78    Heart Attack Brother         cause of death    Cancer Brother          of pancreas or lung cancer    Other (5 children [Other]) Other     Glaucoma Brother     Other (intestinal blockage [Other]) Brother         cause of death age 90    Stroke Sister         stroke- age 85          SOCIAL HISTORY:     Social History     Socioeconomic History    Marital status:    Tobacco Use    Smoking status: Never    Smokeless tobacco: Never   Vaping Use    Vaping status: Never Used   Substance and Sexual Activity    Alcohol use: No    Drug use: No   Other Topics Concern    Caffeine Concern Yes     Comment: Coffee 1 cups daily    Exercise Yes     Comment: Streching/Strength.    Social History Narrative    The patient uses the following assistive device(s):  single-point cane.      The patient does live in a home with stairs.     Social Determinants of Health     Food Insecurity: No Food Insecurity (2023)    Food Insecurity     Food Insecurity: Never true   Transportation Needs: No Transportation Needs (2023)    Transportation Needs     Lack of Transportation: No   Housing Stability: Low Risk  (2023)    Housing Stability     Housing Instability: No          REVIEW OF SYSTEMS:   A comprehensive 10 point review of systems was completed.  Pertinent positives and negatives noted in the the HPI.      LABS:     Lab Results   Component Value Date     (H) 2024    A1C 6.6 (H) 2024     Lab Results   Component Value Date    WBC 5.0 2024    RBC 3.53 (L) 2024    HGB 11.2 (L) 2024    HCT 32.6 (L) 2024    MCV 92.4 2024    MCH 31.7 2024    MCHC 34.4 2024    RDW 14.5 2024    .0 2024    MPV 8.3 10/11/2018     Lab Results    Component Value Date     (H) 04/22/2024    BUN 16 04/22/2024    BUNCREA 16.7 04/22/2024    CREATSERUM 0.96 04/22/2024    ANIONGAP 6 04/22/2024    GFRNAA 45 (L) 04/20/2022    GFRAA 52 (L) 04/20/2022    CA 9.8 04/22/2024    OSMOCALC 273 (L) 04/22/2024    ALKPHO 118 04/22/2024    AST 22 04/22/2024    ALT 12 04/22/2024    ALKPHOS 83 07/06/2015    BILT 0.4 04/22/2024    TP 7.1 04/22/2024    ALB 4.4 04/22/2024    GLOBULIN 2.7 (L) 04/22/2024    AGRATIO 1.3 07/06/2015     (L) 04/22/2024    K 5.1 04/22/2024    CL 98 04/22/2024    CO2 26.0 04/22/2024     No results found for: \"PTP\", \"PT\", \"INR\"  Lab Results   Component Value Date    VITD 69.5 04/22/2024

## 2024-05-20 NOTE — TELEPHONE ENCOUNTER
Frances BAKER [facility] Clinical Staff (supporting Thao Garduno DO)2 days ago     NM  Could Everette chung be referred to the wound clinic for her foot?   Please see attached.    Attachments   [id number]      To Dr. Mcghee--Appointment scheduled for 5/22/24.

## 2024-05-20 NOTE — TELEPHONE ENCOUNTER
Called pt son/ Joycelyn ( HIPAA verified) and left VM asking for a callback to inform office if  pt is taking Lasix and relayed  S message.

## 2024-05-20 NOTE — TELEPHONE ENCOUNTER
No wound see but will do full exam at upcoming appointment, legs look swollen has she been taking lasix?    Thank you!

## 2024-05-22 ENCOUNTER — TELEPHONE (OUTPATIENT)
Dept: INTERNAL MEDICINE CLINIC | Facility: CLINIC | Age: 89
End: 2024-05-22

## 2024-05-22 ENCOUNTER — OFFICE VISIT (OUTPATIENT)
Dept: INTERNAL MEDICINE CLINIC | Facility: CLINIC | Age: 89
End: 2024-05-22

## 2024-05-22 VITALS
BODY MASS INDEX: 24.4 KG/M2 | HEIGHT: 58 IN | WEIGHT: 116.25 LBS | HEART RATE: 80 BPM | OXYGEN SATURATION: 96 % | DIASTOLIC BLOOD PRESSURE: 66 MMHG | SYSTOLIC BLOOD PRESSURE: 112 MMHG

## 2024-05-22 DIAGNOSIS — T14.8XXA OPEN WOUND: ICD-10-CM

## 2024-05-22 DIAGNOSIS — S22.000A COMPRESSION FRACTURE OF BODY OF THORACIC VERTEBRA (HCC): ICD-10-CM

## 2024-05-22 DIAGNOSIS — H91.93 BILATERAL HEARING LOSS, UNSPECIFIED HEARING LOSS TYPE: ICD-10-CM

## 2024-05-22 DIAGNOSIS — E11.69 TYPE 2 DIABETES MELLITUS WITH OTHER SPECIFIED COMPLICATION, WITHOUT LONG-TERM CURRENT USE OF INSULIN (HCC): Primary | ICD-10-CM

## 2024-05-22 DIAGNOSIS — J44.89 ASTHMA WITH COPD (HCC): ICD-10-CM

## 2024-05-22 DIAGNOSIS — G62.9 NEUROPATHY: ICD-10-CM

## 2024-05-22 PROCEDURE — 99214 OFFICE O/P EST MOD 30 MIN: CPT | Performed by: INTERNAL MEDICINE

## 2024-05-22 PROCEDURE — 3008F BODY MASS INDEX DOCD: CPT | Performed by: INTERNAL MEDICINE

## 2024-05-22 PROCEDURE — 3078F DIAST BP <80 MM HG: CPT | Performed by: INTERNAL MEDICINE

## 2024-05-22 PROCEDURE — 1170F FXNL STATUS ASSESSED: CPT | Performed by: INTERNAL MEDICINE

## 2024-05-22 PROCEDURE — 1159F MED LIST DOCD IN RCRD: CPT | Performed by: INTERNAL MEDICINE

## 2024-05-22 PROCEDURE — 1160F RVW MEDS BY RX/DR IN RCRD: CPT | Performed by: INTERNAL MEDICINE

## 2024-05-22 PROCEDURE — 3074F SYST BP LT 130 MM HG: CPT | Performed by: INTERNAL MEDICINE

## 2024-05-22 RX ORDER — BENZONATATE 200 MG/1
200 CAPSULE ORAL DAILY PRN
Qty: 30 CAPSULE | Refills: 0 | Status: SHIPPED | OUTPATIENT
Start: 2024-05-22 | End: 2024-06-21

## 2024-05-22 NOTE — PROGRESS NOTES
Frances Whitehead is a 89 year old female.  Chief Complaint   Patient presents with    Checkup     1 month      HPI:   Frances Whitehead is a 89 year old female who presents for a follow up.    Accompanied by her son.    LOV 4/22/24. Since, L sided back pain and pruritus resolved with steroids.     C/o wound to posterior R leg, dressing at home with bandage.    C/o b/l LE edema c/b weeping.    C/o R foot pain.    C/o R sided back pain.    Asking for refill of promethazine with codeine for cough she takes before [employer].    Son is concerned his mom is declining in terms of physical mobility. She ambulates with the walker short distances in the house daily but is limited by feet pain from neuropathy. Otherwise she is in the wheelchair during the day and now sleeping in it at night. It is becoming more difficult to get her out to appointments.    Wt Readings from Last 6 Encounters:   05/22/24 116 lb 4 oz (52.7 kg)   05/13/24 114 lb (51.7 kg)   04/22/24 114 lb (51.7 kg)   03/11/24 121 lb (54.9 kg)   02/28/24 119 lb (54 kg)   01/29/24 119 lb 3.2 oz (54.1 kg)     Body mass index is 24.3 kg/m².     Current Outpatient Medications   Medication Sig Dispense Refill    benzonatate 200 MG Oral Cap Take 1 capsule (200 mg total) by mouth daily as needed for cough. 30 capsule 0    furosemide 20 MG Oral Tab Take 1 tablet (20 mg total) by mouth daily for 5 days. 5 tablet 0    atorvastatin 10 MG Oral Tab Take 1 tablet (10 mg total) by mouth nightly. 90 tablet 3    loratadine 10 MG Oral Tab Take 1 tablet (10 mg total) by mouth daily. 90 tablet 3    Ferrous Sulfate (FEROSUL) 325 (65 Fe) MG Oral Tab Take 1 tablet (325 mg total) by mouth daily. 90 tablet 3    dilTIAZem HCl  MG Oral Tablet 24 Hr Take 1 tablet by mouth every morning. 90 tablet 3    lactulose 10 GM/15ML Oral Solution Take 30 mL (20 g total) by mouth daily as needed (constipated bowel). 237 mL 3    triamcinolone 0.1 % External Cream Apply 1 Application topically 2 (two)  times daily as needed. APPLY TO AFFECTED AREA 15 g 1    enalapril 5 MG Oral Tab Take 0.5 tablets (2.5 mg total) by mouth daily.      hydrocortisone 2.5 % External Cream APPLY RECTALLY TWICE DAILY AS NEEDED 30 g 2    FLUTICASONE PROPIONATE 50 MCG/ACT Nasal Suspension SPRAY 2 SPRAYS INTO EACH NOSTRIL  IN THE MORNING AND 2 SPRAYS BEFORE BEDTIME (Patient taking differently: 2 sprays by Nasal route as needed.) 16 g 0    LUMIGAN 0.01 % Ophthalmic Solution Place 1 drop into both eyes every evening.      CHOLECALCIFEROL 25 MCG (1000 UT) Oral Tab TAKE TWO TABLETS BY MOUTH DAILY 180 tablet 3    apixaban (ELIQUIS) 2.5 MG Oral Tab Take 1 tablet (2.5 mg total) by mouth 2 (two) times daily. 180 tablet 3    gabapentin 100 MG Oral Cap Take 1 capsule (100 mg total) by mouth nightly. 90 capsule 3    sennosides-docusate (SENNA PLUS) 8.6-50 MG Oral Tab Take 2 tablets twice daily as needed for constipation 100 tablet 3    Fluticasone Propionate HFA (FLOVENT HFA) 220 MCG/ACT Inhalation Aerosol Inhale 1 puff into the lungs 2 (two) times daily. (Patient taking differently: Inhale 1 puff into the lungs as needed.) 1 each 3    Wheat Dextrin (BENEFIBER) Oral Powder Take 2 teaspoons daily.  0    Cyanocobalamin (VITAMIN B12) 1000 MCG Oral Tab CR Take 1 tablet by mouth daily.  0    acetaminophen 500 MG Oral Tab Take 1 tablet (500 mg total) by mouth in the morning and 1 tablet (500 mg total) before bedtime.      melatonin 3 MG Oral Tab Take 1 tablet (3 mg total) by mouth nightly.      ascorbic acid 500 MG Oral Tab Take 1 tablet (500 mg total) by mouth daily. 30 tablet 0    Polyethylene Glycol 3350 17 GM/SCOOP Oral Powder Take 17 g by mouth daily. take 15 milliliter (17G)  by oral route  every day powder mixed with 8 oz. 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 Doss-Stent PCI of LAD and RCA was performed.    Deaf    Diverticulitis    hospitalized 3/10 and 5/10    Diverticulosis    cscopy '03, '07, '10    Fracture of C2 vertebra, closed ([facility])    ER 3/19/21--CT cervical spine-- fx C2 posterior lateral vertebral body and left C2 lamina.      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    Renal calculus    on CT abd 5/10-6 mm or less in L lower kidney    Renal insufficiency    Shingles    Small bowel obstruction ([facility])    multiple hospitalizations for [id number], 6/2016    Stress incontinence, female    SVT (supraventricular tachycardia) ([facility])    event monitor 2/2016-SVT    Syncope    48-hour Holter 5/19/20--bursts of atrial tachycardia up to 3 minutes, PVCs, rare Wenkebach.  Saw Dr. Doss.    Type 2 diabetes mellitus ([facility])    Uterine prolapse    Uterine prolapse    had pessary in 2006 Dr. SORAYA Davis -vag sling procedure Dr Baez in 6/2016    Uterovaginal prolapse, incomplete    UTI due to Klebsiella species    ESBL Klebsiella UTI treated in [facility] with meropenem when in hosp for enteritis. (Dr Carvalho).     Wound of right leg      Past Surgical History:   Procedure Laterality Date    Abdomen surgery proc unlisted  2007    laparotomy for TERESA and internal hernia    Abdomen surgery  proc unlisted  2009    TERESA and small bowel resection-Dr Jaramillo    Appendectomy      Cholecystectomy      Fracture surgery Left 2017    L hip fx pinning 2017 Dr. Hernandez    Knee replacement surgery Left     Knee replacement surgery Right     Other surgical history      cystocele repair    Other surgical history Bilateral     ear surgery-Dr. Patel-both eardrums    Sling  2016    vag sling procedure at [facility]  -Dr Baez      Family History   Problem Relation Age of Onset    Stroke Father         cause of death-age77    Diabetes Mother          age 64    Diabetes Brother     Heart Disease Brother         cause of death-age 57    Diabetes Sister     Stroke Sister         cause of death age 78    Heart Attack Brother         cause of death    Cancer Brother          of pancreas or lung cancer    Other (5 children [Other]) Other     Glaucoma Brother     Other (intestinal blockage [Other]) Brother         cause of death age 90    Stroke Sister         stroke- age 85      Social History:   Social History     Socioeconomic History    Marital status:    Tobacco Use    Smoking status: Never    Smokeless tobacco: Never   Vaping Use    Vaping status: Never Used   Substance and Sexual Activity    Alcohol use: No    Drug use: No   Other Topics Concern    Caffeine Concern Yes     Comment: Coffee 1 cups daily    Exercise Yes     Comment: Streching/Strength.    Social History Narrative    The patient uses the following assistive device(s):  single-point cane.      The patient does live in a home with stairs.     Social Determinants of Health     Food Insecurity: No Food Insecurity (2023)    Food Insecurity     Food Insecurity: Never true   Transportation Needs: No Transportation Needs (2023)    Transportation Needs     Lack of Transportation: No   Housing Stability: Low Risk  (2023)    Housing Stability     Housing Instability: No          REVIEW OF SYSTEMS:   GENERAL:  feels well otherwise  LUNGS: denies shortness of breath with exertion, cough or wheezing  CARDIOVASCULAR: denies chest pain, pressure, or palpitations  SKIN: + RLE wound  EXT: + b/l LE edema  MSK: + R sided back pain    EXAM:   /66   Pulse 80   Ht 4' 10\" (1.473 m)   Wt 116 lb 4 oz (52.7 kg)   LMP  (LMP Unknown)   SpO2 96%   BMI 24.30 kg/m²     GENERAL: well developed, well nourished, in no apparent distress  LUNGS: clear to auscultation b/l, no w/r/r  CARDIO: RRR, normal S1S2, no m/r/g  SKIN: + posterior RLE open wound without surrounding erythema or drainage  EXTREMITIES: 1-2+ b/l LE edema  NEURO: A&O x 3, moves all 4 extremities spontaneously  MSK: + ttp R thoracic paraspinals, no ttp along spine      ASSESSMENT AND PLAN:   Frances Whitehead is a 89 year old female who presents for a follow up.    RLE wound  - home wound RN to evaluate  - [facility]    R back pain  - home PT, likely MSK strain given patient spending more time in wheelchair, R sided, advised heating pad, tylenol PRN  - [facility]    B/l LE edema  Venous insufficiency  - likely worse given more time spent in wheelchair in dependent positioning  - elevating legs, compression stockings, rx 5 day course of lasix 20 mg daily, hold if dizzy  - advised to call if no improvement or sx recur for daily PRN rx    R foot pain  - f/u podiatry    Chronic cough  - benzonatate PRN before [employer] instead of promethazine with codeine    RTC in 1-3 months or sooner PRN.     For E/M code - 30 minutes spent reviewing performing chart review, obtaining a history, performing a physical exam, reviewing the assessment/plan, placing orders, and completing documentation.     Thao Garduno DO  5/22/2024  1:18 PM

## 2024-05-22 NOTE — TELEPHONE ENCOUNTER
Alcira from Dr Luevano's Office is calling requesting the patient office notes from her 5/22/24 office visit    Please fax to [phone]

## 2024-05-28 ENCOUNTER — PATIENT MESSAGE (OUTPATIENT)
Dept: PHYSICAL MEDICINE AND REHAB | Facility: CLINIC | Age: 89
End: 2024-05-28

## 2024-05-28 ENCOUNTER — TELEPHONE (OUTPATIENT)
Dept: INTERNAL MEDICINE CLINIC | Facility: CLINIC | Age: 89
End: 2024-05-28

## 2024-05-28 DIAGNOSIS — G56.03 BILATERAL CARPAL TUNNEL SYNDROME: Primary | ICD-10-CM

## 2024-05-28 NOTE — TELEPHONE ENCOUNTER
Patient's son Nicholas is calling patient was seen by Dr Garduno on 5/22.  Patient was told she will need wound care with a home health agency and they would be contacted them, they have not received a call yet.    Patient has not had any wound care.    Please call Nicholas [phone]

## 2024-05-29 ENCOUNTER — TELEPHONE (OUTPATIENT)
Dept: SURGERY | Facility: CLINIC | Age: 89
End: 2024-05-29

## 2024-05-29 ENCOUNTER — TELEPHONE (OUTPATIENT)
Dept: INTERNAL MEDICINE CLINIC | Facility: CLINIC | Age: 89
End: 2024-05-29

## 2024-05-29 NOTE — TELEPHONE ENCOUNTER
Left detailed message on secure [facility] RN Intake VM; Seeking call back regarding status of referral that was sent over for [facility]. Do they need us to re-fax? Any other clinical information needed via fax? Please advise upon callback. Clinical to follow up again later this week if we do not hear back.

## 2024-05-29 NOTE — TELEPHONE ENCOUNTER
Zaira from [facility] called    Patient was opened for Home Health today     She will have a Nurse and Physical therapy

## 2024-05-29 NOTE — TELEPHONE ENCOUNTER
Received EC Link Message in [facility] Inbox (see below)    home health/ Frances Castillo  Received: Today  Prince Elijah Funk Sarah, [facility]  Phone Number: [phone]     Winston Shanks,    I received your voice message, we did received the referral and processed it, were planning to see patient if not today, tomorrow.    If any question please shoot me a message or give me a call, thank you    Elijah, / [facility]

## 2024-05-29 NOTE — TELEPHONE ENCOUNTER
LVM for pt's son, Vidal to call office.  Pt needs EMG nerve test completed before we can see pt on 6/3/24 or appt needs to be rescheduled.  Please call physiatry to schedule EMG nerve test, [phone].

## 2024-05-29 NOTE — TELEPHONE ENCOUNTER
Spoke w/ pt's son, Nicholas.  Appt scheduled for Mon 6/3/24 at 3:15 PM w/ Dr Hernandez.  Pt's son verbalized an understanding.

## 2024-05-29 NOTE — TELEPHONE ENCOUNTER
Called Nicholas to relay message from  .     He stated  RN was able to come out for initial eval on Frances today. Provided update; It was determined she will receive Wound Care, OT and Physical Therapy services. She also has an appointment with her Cardiologist on Monday.    FYI to Dr. Garduno.

## 2024-05-29 NOTE — TELEPHONE ENCOUNTER
From: Frances Whitehead  To: Wojciech Wall  Sent: 5/28/2024 6:29 AM CDT  Subject: Moms hand Surgery    Immediately after Mom's last visit, I called Dr. Choudhary office in [place] to set up an appt consultation for the procedure on the hand.   You didn't think it would take long to schedule when I asked.  As it turned out, it would take to August to get just an initial appointment, I explained that she was 89 and in urgent need and asked to be on a waiting list. The  was unable to help.  I called your office, explained the situation to some and ASKED IF YOU COULD RECOMMENT SOMEON ELSE. The said they would contact you and get back to us.   No one from your office has returned my call or addressed my request.

## 2024-05-30 ENCOUNTER — TELEPHONE (OUTPATIENT)
Dept: SURGERY | Facility: CLINIC | Age: 89
End: 2024-05-30

## 2024-05-30 NOTE — TELEPHONE ENCOUNTER
Spoke w/ pt's son, Nicholas.  Per Dr Hernandez, pt will need EMG nerve test completed prior to being seen in our office.  EMG order in [facility] (Dr Wall)  Pt wished to cancel 6/3 appt w/ our office until they complete EMG.  Will call to reschedule appt.  Trasfered pt's son to physiatry, [phone].

## 2024-05-30 NOTE — TELEPHONE ENCOUNTER
Patient's son called back stating that he spoke with Physiatry and they did not see an order for EMG. Spoke with Olivia, EMG order is from 2020. Informed Nicholas to talk with Dr Wall and get a new order for an EMG. Nicholas understood and had no further questions at this time.

## 2024-06-05 ENCOUNTER — OFFICE VISIT (OUTPATIENT)
Dept: OTOLARYNGOLOGY | Facility: CLINIC | Age: 89
End: 2024-06-05
Payer: MEDICARE

## 2024-06-05 ENCOUNTER — TELEPHONE (OUTPATIENT)
Dept: INTERNAL MEDICINE CLINIC | Facility: CLINIC | Age: 89
End: 2024-06-05

## 2024-06-05 VITALS — HEIGHT: 58 IN | WEIGHT: 116 LBS | BODY MASS INDEX: 24.35 KG/M2

## 2024-06-05 DIAGNOSIS — H90.6 MIXED HEARING LOSS, BILATERAL: Primary | ICD-10-CM

## 2024-06-05 DIAGNOSIS — H61.21 IMPACTED CERUMEN OF RIGHT EAR: ICD-10-CM

## 2024-06-05 DIAGNOSIS — H60.11 CELLULITIS OF RIGHT EAR CANAL: ICD-10-CM

## 2024-06-05 DIAGNOSIS — H70.13 CHRONIC MASTOIDITIS, BILATERAL: ICD-10-CM

## 2024-06-05 DIAGNOSIS — S81.809A WOUND OF LOWER EXTREMITY, UNSPECIFIED LATERALITY, INITIAL ENCOUNTER: Primary | ICD-10-CM

## 2024-06-05 PROCEDURE — 69210 REMOVE IMPACTED EAR WAX UNI: CPT | Performed by: OTOLARYNGOLOGY

## 2024-06-05 PROCEDURE — 1159F MED LIST DOCD IN RCRD: CPT | Performed by: OTOLARYNGOLOGY

## 2024-06-05 PROCEDURE — 3008F BODY MASS INDEX DOCD: CPT | Performed by: OTOLARYNGOLOGY

## 2024-06-05 PROCEDURE — 1160F RVW MEDS BY RX/DR IN RCRD: CPT | Performed by: OTOLARYNGOLOGY

## 2024-06-05 PROCEDURE — 99213 OFFICE O/P EST LOW 20 MIN: CPT | Performed by: OTOLARYNGOLOGY

## 2024-06-05 RX ORDER — CEFADROXIL 500 MG/1
500 CAPSULE ORAL 2 TIMES DAILY
Qty: 14 CAPSULE | Refills: 0 | Status: SHIPPED | OUTPATIENT
Start: 2024-06-05 | End: 2024-06-12

## 2024-06-05 NOTE — PROGRESS NOTES
PROGRESS NOTE  OTOLOGY/OTOLARYNGOLOGY    PCP: Thao Garduno DO    CHIEF COMPLAINT:    No chief complaint on file.    LAST VISIT 3/29/2024  IMPRESSION  Right ear foreign body - removed  Bilateral mixed hearing loss  History of ear surgery unspecified, with left mastoid cavity  Bilateral mucosalization of the TM  Bilateral chronic otomastoiditis   Audiogram reviewed - significant worsening of left ear, right ear is stable    PLAN:  -Continue alcohol/vinegar ear flushes in left ear.   -Audiogram reviewed with patient and her son  -Discussed clarity in right ear is low, so hearing aid will only partially help in left ear  -Discussed that surgery is not a good option in my opinion due to patient's age, medical history and history of surgery on left ear.   -Follow-up in 3 months for routine cerumen removal  -Follow-up as needed   _________________________________________________________________  INTERVAL HX: Patient presents today with right ear sensitivity. She states that it hurts to put her hearing aid into this ear.    HISTORY OF PRESENT ILLNESS: Frances Whitehead is a 89 year old female who presents for evaluation of her ears and hearing.     Communication is through her son and via typing on a word document due to the severity of the patient's hearing loss.     Patient notes her right ear is her worse hearing ear. Had hearing aids from [facility], but appropriate fit/benefit were not achieved. Now planning to use hearing benefit through insurance to reobtain hearing aids. Has a history of ear surgery bilaterally, but it is not recent and she cannot further describe it. She was last seen by Dr. Webb at [facility]. She notes bilateral continuous tinnitus, non pulsatile.     Denies vertigo, dizziness, otorrhea, otalgia.    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 Doss-Stent PCI of LAD and RCA was performed.    Deaf    Diverticulitis    hospitalized 3/10 and 5/10    Diverticulosis    cscopy '03, '07, '10    Fracture of C2 vertebra, closed ([facility])    ER 3/19/21--CT cervical spine-- fx C2 posterior lateral vertebral body and left C2 lamina.      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    Renal calculus    on CT abd 5/10-6 mm or less in L lower kidney    Renal insufficiency    Shingles    Small bowel obstruction ([facility])    multiple hospitalizations for [id number], 6/2016    Stress incontinence, female    SVT (supraventricular tachycardia) ([facility])    event monitor 2/2016-SVT    Syncope    48-hour Holter 5/19/20--bursts of atrial tachycardia up to 3 minutes, PVCs, rare Wenkebach.  Saw Dr. Doss.    Type 2 diabetes mellitus ([facility])    Uterine prolapse    Uterine prolapse    had pessary in 2006 Dr. SORAYA Davis -vag sling procedure Dr Baez in 6/2016    Uterovaginal prolapse, incomplete    UTI due to Klebsiella species    ESBL Klebsiella UTI treated in [facility] with meropenem when in hosp for enteritis. (Dr Carvalho).     Wound of right leg       PAST SURGICAL HISTORY:    Past Surgical History:   Procedure Laterality Date    Abdomen surgery proc unlisted  2007    laparotomy for TERESA  and internal hernia    Abdomen surgery proc unlisted  2009    TERESA and small bowel resection-Dr Jaramillo    Appendectomy      Cholecystectomy      Fracture surgery Left 1/2017    L hip fx pinning 1/2017 Dr. Hernandez    Knee replacement surgery Left 2000    Knee replacement surgery Right 2008    Other surgical history      cystocele repair    Other surgical history Bilateral     ear surgery-Dr. Patel-both eardrums    Sling  6/30/2016    vag sling procedure at [facility]  -Dr Baez       Current Outpatient Medications on File Prior to Visit   Medication Sig Dispense Refill    benzonatate 200 MG Oral Cap Take 1 capsule (200 mg total) by mouth daily as needed for cough. 30 capsule 0    furosemide 20 MG Oral Tab Take 1 tablet (20 mg total) by mouth daily for 5 days. 5 tablet 0    atorvastatin 10 MG Oral Tab Take 1 tablet (10 mg total) by mouth nightly. 90 tablet 3    loratadine 10 MG Oral Tab Take 1 tablet (10 mg total) by mouth daily. 90 tablet 3    Ferrous Sulfate (FEROSUL) 325 (65 Fe) MG Oral Tab Take 1 tablet (325 mg total) by mouth daily. 90 tablet 3    dilTIAZem HCl  MG Oral Tablet 24 Hr Take 1 tablet by mouth every morning. 90 tablet 3    lactulose 10 GM/15ML Oral Solution Take 30 mL (20 g total) by mouth daily as needed (constipated bowel). 237 mL 3    triamcinolone 0.1 % External Cream Apply 1 Application topically 2 (two) times daily as needed. APPLY TO AFFECTED AREA 15 g 1    enalapril 5 MG Oral Tab Take 0.5 tablets (2.5 mg total) by mouth daily.      hydrocortisone 2.5 % External Cream APPLY RECTALLY TWICE DAILY AS NEEDED 30 g 2    FLUTICASONE PROPIONATE 50 MCG/ACT Nasal Suspension SPRAY 2 SPRAYS INTO EACH NOSTRIL  IN THE MORNING AND 2 SPRAYS BEFORE BEDTIME (Patient taking differently: 2 sprays by Nasal route as needed.) 16 g 0    LUMIGAN 0.01 % Ophthalmic Solution Place 1 drop into both eyes every evening.      CHOLECALCIFEROL 25 MCG (1000 UT) Oral Tab TAKE TWO TABLETS BY MOUTH DAILY 180 tablet 3     apixaban (ELIQUIS) 2.5 MG Oral Tab Take 1 tablet (2.5 mg total) by mouth 2 (two) times daily. 180 tablet 3    gabapentin 100 MG Oral Cap Take 1 capsule (100 mg total) by mouth nightly. 90 capsule 3    sennosides-docusate (SENNA PLUS) 8.6-50 MG Oral Tab Take 2 tablets twice daily as needed for constipation 100 tablet 3    Fluticasone Propionate HFA (FLOVENT HFA) 220 MCG/ACT Inhalation Aerosol Inhale 1 puff into the lungs 2 (two) times daily. (Patient taking differently: Inhale 1 puff into the lungs as needed.) 1 each 3    Wheat Dextrin (BENEFIBER) Oral Powder Take 2 teaspoons daily.  0    Cyanocobalamin (VITAMIN B12) 1000 MCG Oral Tab CR Take 1 tablet by mouth daily.  0    acetaminophen 500 MG Oral Tab Take 1 tablet (500 mg total) by mouth in the morning and 1 tablet (500 mg total) before bedtime.      melatonin 3 MG Oral Tab Take 1 tablet (3 mg total) by mouth nightly.      ascorbic acid 500 MG Oral Tab Take 1 tablet (500 mg total) by mouth daily. 30 tablet 0    Polyethylene Glycol 3350 17 GM/SCOOP Oral Powder Take 17 g by mouth daily. take 15 milliliter (17G)  by oral route  every day powder mixed with 8 oz. water, juice, soda, coffee, or tea       No current facility-administered medications on file prior to visit.       Allergies:   Allergies   Allergen Reactions    Latex UNKNOWN and HIVES     Other reaction(s): Unknown    Mastisol Adhesive HIVES     Other reaction(s): ADHESIVE TAPE    Adhesive Tape      Other reaction(s): ADHESIVE TAPE       SOCIAL HISTORY:    Social History     Tobacco Use    Smoking status: Never    Smokeless tobacco: Never   Substance Use Topics    Alcohol use: No       FAMILY HISTORY: Denies known family history of hearing loss, tinnitus, vertigo, or migraine.  Denies known family history of head and neck cancer, thyroid cancer, bleeding disorders.     REVIEW OF SYSTEMS:   Positives are in bold  Neuro: Headache, facial weakness, facial numbness, neck pain, vertigo  ENT: Hearing change,  tinnitus, otorrhea, otalgia, aural fullness, ear pressure, vertigo, imbalance  Sinus pressure, rhinorrhea, congestion, facial pain, jaw pain, dysphagia, odynophagia, sore throat, voice changes, shortness of breath    EXAMINATION:  I washed my hands with an alcohol-based hand gel prior to examination  Constitutional:   --Vitals: not currently breastfeeding.  --General: no apparent distress, well-developed, conversant  Psych: affect pleasant and appropriate for age, alert and oriented  Neuro: Facial movement normal bilateral  Respiratory: No stridor, stertor or increased work of breathing  ENT:  --Ear: The bilateral ears were examined under binocular microscopy  Right ear microscopic exam:  Pinna: Normal, no lesions or masses.  Mastoid: Nontender on palpation.  External auditory canal: Cerumen impaction - removed. Clear, no masses or lesions. In lateral ear canal, appears to have folliculitis and erythema.   Tympanic membrane: poor landmarks, extensive scarring, evidence of prior surgery including possible incus interposition graft    Left ear microscopic exam:  Pinna: Normal, no lesions or masses.  Mastoid: Nontender on palpation.  External auditory canal:Cerumen impaction - removed.   Tympanic membrane: poor landmarks, extensive scarring, central perforation with mucoid drainage and nearby tube or extruding middle ear prosthesis - fixed, would not dislodge with gentle manipulation - left in place       Latest Audiogram Result (Hz) Exam performed: 2/2/2023 3:05 PM Last edited by Amber Preciado AUD on 2/2/2023 3:26 PM        125 250  1500 2000 3000 4000 6000 8000    Right air:  90 85  80  80 90 100  95N    Left air:  55 55  60  65  65 95 95N    Left mastoid bone:   30  30  55  35      Right mastoid bone (masked):   50  35  55  40      Masking left (mastoid bone):   80  80    85         Reliability:  Good    Transducer:  Inserts    Technique:  Conventional Audiometry    Comments:            Latest Speech  Audiometry  Last edited by Amber Preciado AUD on 2/2/2023 3:26 PM       Ear Method SAT SRT MCL UCL Notes    right live voice  85       left live voice  55        Ear Method Test/List Score (%) Intensity Mask/Noise   right live voice Half-List 44 95 75   left live voice 10 By Difficulty 92 80                      Latest Tympanogram Result       Probe Tone (Hz): 226 Exam performed: 2/2/2023 3:09 PM Last edited by Amber Preciado AUD on 2/2/2023 3:26 PM      Tympanograms  These were drawn by a user, not generated from device data      Right Ear Left Ear                     Right Ear Left Ear    Tympanogram type:      Canal volume (mL): 0.7 1.9    Peak pressure (daPa):      Peak amplitude (mL):      Tympanogram width (daPa):        Comments:               CT temporal bones 3/14/23  (Images personally reviewed; likely effects of chronic infection and prior surgery)  CONCLUSION:      1. RIGHT temporal bone:  Soft tissue density opacification of the middle ear cavity/mesotympanum with associated erosion/blunting of the scutum, opacification at Prussak's space, and chronic-appearing erosion of the ossicular chain (which is partially   intact).  These findings suggest cholesteatoma formation on a background of longstanding chronic otomastoiditis.  Subtotal opacification of the right mastoid cavity with slight coalescence of the mastoid septa.  Thickened and retracted tympanic membrane   with probable small central perforation.  Borderline high position of the jugular bulb.      2. LEFT temporal bone:  Soft tissue density opacification of the middle ear cavity (mesotympanum and epitympanum), which surrounds the ossicular chain.  Ossicular chain is not well defined and likely chronically eroded.  Similarly, the scutum is not well    defined and likely chronically eroded/blunted.  Near complete opacification of the left mastoid cavity with coalescence of the mastoid septa.  Findings suggest cholesteatoma on a background of  longstanding chronic otomastoiditis.  Thickened and   retracted tympanic membrane with relatively large central perforation.  Borderline high position of the jugular bulb.      3. Partially imaged findings that suggest multifocal acute on chronic sinusitis.      4. Subcentimeter polyp or focus of debris along the medial margin of the left inferior turbinate.  Leftward deviation of the nasal septum.      5. Intracranial atherosclerosis.      Cerumen removal: (06/05/24)  Under binocular microscopy cerumen was removed from the right external auditory canal using a wax loop curette and suction. Patient noted subjective improvement in hearing.     ASSESSMENT/PLAN:  Frances Whitehead is a 89 year old female with   No diagnosis found.       IMPRESSION  Bilateral mixed hearing loss  History of ear surgery unspecified, with left mastoid cavity  Bilateral mucosalization of the TM  Bilateral chronic otomastoiditis   Audiogram reviewed - significant worsening of left ear, right ear is stable  Right lateral canal cellulitis/folliculitis   Right cerumen - removed    PLAN:  -Start cefadroxil 500 MG BID for 7 days    -Continue alcohol/vinegar ear flushes in left ear.   -Discussed clarity in right ear is low, so hearing aid will only partially help in left ear  -Discussed that surgery is not a good option in my opinion due to patient's age, medical history and history of surgery on left ear.   -Follow-up in 1 week     Situation reviewed with the patient in detail.    Attention: This note has been scribed by Adele Ferrer under the supervision of José Morgan MD.     José Morgan MD  Otology/Otolaryngology  [facility] [place] [id number]  Phone [phone]  Fax [phone]     I have personally performed the services described in this documentation. All medical record entries made by the scribe were at my direction and in my presence. I have reviewed the chart  and agree that the medical record reflects my personal performance and is accurate and complete.

## 2024-06-05 NOTE — TELEPHONE ENCOUNTER
Rimma from [facility] called. She needs  Wound care recommendations so orders can be written.

## 2024-06-05 NOTE — TELEPHONE ENCOUNTER
Called Rimma from [facility] who saw patient for wound care = patient has 3 venous ulcers lower right leg and 1 on medial left lowe leg - weeping- using Alginate and ABD  ,also compression  for edema - verbal order given per protocol

## 2024-06-06 ENCOUNTER — TELEPHONE (OUTPATIENT)
Dept: AUDIOLOGY | Facility: CLINIC | Age: 89
End: 2024-06-06

## 2024-06-10 ENCOUNTER — TELEPHONE (OUTPATIENT)
Dept: INTERNAL MEDICINE CLINIC | Facility: CLINIC | Age: 89
End: 2024-06-10

## 2024-06-10 RX ORDER — MELATONIN
2000 DAILY
Qty: 180 TABLET | Refills: 3 | Status: SHIPPED | OUTPATIENT
Start: 2024-06-10

## 2024-06-10 NOTE — TELEPHONE ENCOUNTER
Please call patient son, Nicholas   They are now requesting order for wound care at [facility] rather than wound care with Home Health  Please call Nicholsa with any questions   Tasked to nursing

## 2024-06-10 NOTE — TELEPHONE ENCOUNTER
To  - called son who states patient has Physical Therapy HH but wants patient to be seen at  the wound clinic    See message from HH RN below   pending

## 2024-06-13 ENCOUNTER — OFFICE VISIT (OUTPATIENT)
Dept: AUDIOLOGY | Facility: CLINIC | Age: 89
End: 2024-06-13
Payer: MEDICARE

## 2024-06-13 ENCOUNTER — OFFICE VISIT (OUTPATIENT)
Dept: WOUND CARE | Facility: HOSPITAL | Age: 89
End: 2024-06-13
Attending: NURSE PRACTITIONER
Payer: MEDICARE

## 2024-06-13 ENCOUNTER — LAB ENCOUNTER (OUTPATIENT)
Dept: LAB | Facility: HOSPITAL | Age: 89
End: 2024-06-13
Attending: INTERNAL MEDICINE
Payer: MEDICARE

## 2024-06-13 VITALS
TEMPERATURE: 97 F | SYSTOLIC BLOOD PRESSURE: 143 MMHG | OXYGEN SATURATION: 94 % | HEART RATE: 85 BPM | WEIGHT: 112 LBS | BODY MASS INDEX: 23.51 KG/M2 | HEIGHT: 58 IN | RESPIRATION RATE: 18 BRPM | DIASTOLIC BLOOD PRESSURE: 75 MMHG

## 2024-06-13 DIAGNOSIS — I10 ESSENTIAL HYPERTENSION, MALIGNANT: ICD-10-CM

## 2024-06-13 DIAGNOSIS — N18.9 CHRONIC KIDNEY DISEASE, UNSPECIFIED: ICD-10-CM

## 2024-06-13 DIAGNOSIS — S81.801A OPEN WOUND OF RIGHT LOWER LEG, INITIAL ENCOUNTER: Primary | ICD-10-CM

## 2024-06-13 DIAGNOSIS — H90.6 MIXED CONDUCTIVE AND SENSORINEURAL HEARING LOSS OF BOTH EARS: Primary | ICD-10-CM

## 2024-06-13 DIAGNOSIS — I48.0 PAROXYSMAL ATRIAL FIBRILLATION (HCC): Primary | ICD-10-CM

## 2024-06-13 DIAGNOSIS — Z00.00 MEDICARE ANNUAL WELLNESS VISIT, SUBSEQUENT: ICD-10-CM

## 2024-06-13 DIAGNOSIS — L03.115 CELLULITIS OF RIGHT LOWER LEG: ICD-10-CM

## 2024-06-13 DIAGNOSIS — S80.822A BLISTER OF LEFT LOWER LEG, INITIAL ENCOUNTER: ICD-10-CM

## 2024-06-13 DIAGNOSIS — E11.69 TYPE 2 DIABETES MELLITUS WITH OTHER SPECIFIED COMPLICATION, WITHOUT LONG-TERM CURRENT USE OF INSULIN (HCC): ICD-10-CM

## 2024-06-13 LAB
ANION GAP SERPL CALC-SCNC: 5 MMOL/L (ref 0–18)
BILIRUB UR QL: NEGATIVE
BUN BLD-MCNC: 18 MG/DL (ref 9–23)
BUN/CREAT SERPL: 25.7 (ref 10–20)
CALCIUM BLD-MCNC: 9.7 MG/DL (ref 8.7–10.4)
CHLORIDE SERPL-SCNC: 101 MMOL/L (ref 98–112)
CLARITY UR: CLEAR
CO2 SERPL-SCNC: 27 MMOL/L (ref 21–32)
COLOR UR: YELLOW
CREAT BLD-MCNC: 0.7 MG/DL
CREAT UR-SCNC: 67.1 MG/DL
EGFRCR SERPLBLD CKD-EPI 2021: 81 ML/MIN/1.73M2 (ref 60–?)
FASTING STATUS PATIENT QL REPORTED: YES
GLUCOSE BLD-MCNC: 105 MG/DL (ref 70–99)
GLUCOSE UR-MCNC: NEGATIVE MG/DL
HGB UR QL STRIP.AUTO: NEGATIVE
KETONES UR-MCNC: NEGATIVE MG/DL
MICROALBUMIN UR-MCNC: 2.1 MG/DL
MICROALBUMIN/CREAT 24H UR-RTO: 31.3 UG/MG (ref ?–30)
NITRITE UR QL STRIP.AUTO: NEGATIVE
OSMOLALITY SERPL CALC.SUM OF ELEC: 278 MOSM/KG (ref 275–295)
PH UR: 6.5 [PH] (ref 5–8)
POTASSIUM SERPL-SCNC: 4.8 MMOL/L (ref 3.5–5.1)
PROT UR-MCNC: NEGATIVE MG/DL
SODIUM SERPL-SCNC: 133 MMOL/L (ref 136–145)
SP GR UR STRIP: 1.02 (ref 1–1.03)
UROBILINOGEN UR STRIP-ACNC: 0.2

## 2024-06-13 PROCEDURE — 36415 COLL VENOUS BLD VENIPUNCTURE: CPT

## 2024-06-13 PROCEDURE — 82043 UR ALBUMIN QUANTITATIVE: CPT

## 2024-06-13 PROCEDURE — 81015 MICROSCOPIC EXAM OF URINE: CPT

## 2024-06-13 PROCEDURE — 1159F MED LIST DOCD IN RCRD: CPT | Performed by: AUDIOLOGIST

## 2024-06-13 PROCEDURE — 80048 BASIC METABOLIC PNL TOTAL CA: CPT

## 2024-06-13 PROCEDURE — 99215 OFFICE O/P EST HI 40 MIN: CPT | Performed by: NURSE PRACTITIONER

## 2024-06-13 PROCEDURE — 87086 URINE CULTURE/COLONY COUNT: CPT

## 2024-06-13 PROCEDURE — 81001 URINALYSIS AUTO W/SCOPE: CPT

## 2024-06-13 PROCEDURE — 82570 ASSAY OF URINE CREATININE: CPT

## 2024-06-13 PROCEDURE — 92593 HEARING AID CHECK, BOTH EARS: CPT | Performed by: AUDIOLOGIST

## 2024-06-13 RX ORDER — SULFAMETHOXAZOLE AND TRIMETHOPRIM 800; 160 MG/1; MG/1
1 TABLET ORAL 2 TIMES DAILY
Qty: 14 TABLET | Refills: 0 | Status: SHIPPED | OUTPATIENT
Start: 2024-06-13 | End: 2024-06-20

## 2024-06-13 NOTE — PROGRESS NOTES
HEARING AID FOLLOW-UP    Frances Whitehead  12/11/1931  [id number]    Patient is here for a routine.    [employer] L50-R bilateral hearing aids purchased at an outside facility.     The patient has the following concerns:   Patient has had a right ear infection. Has been on antibiotics and has kept the right aid out of ear.   Feels the right shell is not fitting well and irritating ear.     In office the following actions were taken:  Modified right C-shell in office today. Reduced bulk all around.   Fit is much better. Advised to use one or two drops of otoease on shell before inserting.     Increased gain from 80 to 95% for better audibility.     Real Ear Measurements (REM) were taken today and aids were found to be meeting NAL NL2 targets for some frequencies of soft and average speech inputs.  Aids do not exceed MPO.        Patient is to follow up in 6 months  or sooner if needed.     6/13/2024  SHASHANK Clemons

## 2024-06-13 NOTE — PROGRESS NOTES
Chief Complaint   Patient presents with    Wound Recheck     Rt leg and foot.     HPI:   6/13/24: 89 yr old female here today for eval of wounds on left lower extremity. Accompanied by her son. PMHx includes HTN, SVT, CAD, T2DM, CKD, asthma w/COPD, neuropathy, b/l hearing loss (using white board to communicate). Her son reports wounds developed on her right leg a few weeks ago, maybe longer, but have recently worsened. She completed a course of Cefadroxil yesterday as prescribed by he PCP. Right lower leg remains tender to touch. Swelling worsened in both legs recently, but has improved since the onset. She has the same chronic wound on the arch of her foot as she did at her last visit. Her son would like the wound to be treated if possible. She continues to see a podiatrist for nail care.     Lab Results   Component Value Date    BUN 18 06/13/2024    CREATSERUM 0.70 06/13/2024    ALB 4.4 04/22/2024    TP 7.1 04/22/2024    A1C 6.6 (H) 04/22/2024       Current Outpatient Medications:     cholecalciferol 25 MCG (1000 UT) Oral Tab, Take 2 tablets (2,000 Units total) by mouth daily., Disp: 180 tablet, Rfl: 3    benzonatate 200 MG Oral Cap, Take 1 capsule (200 mg total) by mouth daily as needed for cough., Disp: 30 capsule, Rfl: 0    furosemide 20 MG Oral Tab, Take 1 tablet (20 mg total) by mouth daily for 5 days., Disp: 5 tablet, Rfl: 0    atorvastatin 10 MG Oral Tab, Take 1 tablet (10 mg total) by mouth nightly., Disp: 90 tablet, Rfl: 3    loratadine 10 MG Oral Tab, Take 1 tablet (10 mg total) by mouth daily., Disp: 90 tablet, Rfl: 3    Ferrous Sulfate (FEROSUL) 325 (65 Fe) MG Oral Tab, Take 1 tablet (325 mg total) by mouth daily., Disp: 90 tablet, Rfl: 3    dilTIAZem HCl  MG Oral Tablet 24 Hr, Take 1 tablet by mouth every morning., Disp: 90 tablet, Rfl: 3    lactulose 10 GM/15ML Oral Solution, Take 30 mL (20 g total) by mouth daily as needed (constipated bowel)., Disp: 237 mL, Rfl: 3    triamcinolone 0.1 %  External Cream, Apply 1 Application topically 2 (two) times daily as needed. APPLY TO AFFECTED AREA, Disp: 15 g, Rfl: 1    enalapril 5 MG Oral Tab, Take 0.5 tablets (2.5 mg total) by mouth daily., Disp: , Rfl:     hydrocortisone 2.5 % External Cream, APPLY RECTALLY TWICE DAILY AS NEEDED, Disp: 30 g, Rfl: 2    FLUTICASONE PROPIONATE 50 MCG/ACT Nasal Suspension, SPRAY 2 SPRAYS INTO EACH NOSTRIL  IN THE MORNING AND 2 SPRAYS BEFORE BEDTIME (Patient taking differently: 2 sprays by Nasal route as needed.), Disp: 16 g, Rfl: 0    LUMIGAN 0.01 % Ophthalmic Solution, Place 1 drop into both eyes every evening., Disp: , Rfl:     apixaban (ELIQUIS) 2.5 MG Oral Tab, Take 1 tablet (2.5 mg total) by mouth 2 (two) times daily., Disp: 180 tablet, Rfl: 3    gabapentin 100 MG Oral Cap, Take 1 capsule (100 mg total) by mouth nightly., Disp: 90 capsule, Rfl: 3    sennosides-docusate (SENNA PLUS) 8.6-50 MG Oral Tab, Take 2 tablets twice daily as needed for constipation, Disp: 100 tablet, Rfl: 3    Fluticasone Propionate HFA (FLOVENT HFA) 220 MCG/ACT Inhalation Aerosol, Inhale 1 puff into the lungs 2 (two) times daily. (Patient taking differently: Inhale 1 puff into the lungs as needed.), Disp: 1 each, Rfl: 3    Wheat Dextrin (BENEFIBER) Oral Powder, Take 2 teaspoons daily., Disp: , Rfl: 0    Cyanocobalamin (VITAMIN B12) 1000 MCG Oral Tab CR, Take 1 tablet by mouth daily., Disp: , Rfl: 0    acetaminophen 500 MG Oral Tab, Take 1 tablet (500 mg total) by mouth in the morning and 1 tablet (500 mg total) before bedtime., Disp: , Rfl:     melatonin 3 MG Oral Tab, Take 1 tablet (3 mg total) by mouth nightly., Disp: , Rfl:     ascorbic acid 500 MG Oral Tab, Take 1 tablet (500 mg total) by mouth daily., Disp: 30 tablet, Rfl: 0    Polyethylene Glycol 3350 17 GM/SCOOP Oral Powder, Take 17 g by mouth daily. take 15 milliliter (17G)  by oral route  every day powder mixed with 8 oz. water, juice, soda, coffee, or tea, Disp: , Rfl:     Allergies    Allergen Reactions    Latex HIVES and UNKNOWN     Other reaction(s): Unknown    Mastisol Adhesive HIVES     Other reaction(s): ADHESIVE TAPE    Adhesive Tape OTHER (SEE COMMENTS)     Other reaction(s): ADHESIVE TAPE      REVIEW OF SYSTEMS:   Review of Systems   Constitutional:  Negative for appetite change, chills and fever.   HENT:  Positive for hearing loss.    Respiratory:  Negative for shortness of breath.    Cardiovascular:  Negative for chest pain.   Gastrointestinal:  Negative for abdominal pain, diarrhea and vomiting.   Musculoskeletal:  Positive for arthralgias and gait problem.   Skin:  Positive for wound. Negative for rash.      PHYSICAL EXAM:   /75   Pulse 85   Temp 96.5 °F (35.8 °C)   Resp 18   Ht 58\"   Wt 112 lb   LMP  (LMP Unknown)   SpO2 94%   BMI 23.41 kg/m²    Estimated body mass index is 23.41 kg/m² as calculated from the following:    Height as of this encounter: 58\".    Weight as of this encounter: 112 lb.   Vital signs reviewed.Appears stated age, well groomed.    Physical Exam  Constitutional:       Appearance: Normal appearance.   HENT:      Right Ear: Decreased hearing noted.      Left Ear: Decreased hearing noted.   Cardiovascular:      Pulses:           Dorsalis pedis pulses are 2+ on the left side.        Posterior tibial pulses are 2+ on the left side.      Comments: B/l PT/DP pulses with biphasic wave sounds  Pulmonary:      Effort: Pulmonary effort is normal.   Musculoskeletal:      Right lower leg: Edema present.      Left lower leg: Edema present.   Skin:     General: Skin is warm and dry.      Capillary Refill: Capillary refill takes 2 to 3 seconds.      Findings: Wound (b/l Lower extremities, see wound care flowsheet) present. No rash.   Neurological:      General: No focal deficit present.      Mental Status: She is alert and oriented to person, place, and time.   Psychiatric:         Mood and Affect: Mood normal.         Behavior: Behavior normal.         Compression Wrap 09/20/23 Leg Left;Lower (Active)   Placement Date: 09/20/23   Location: Leg  Wound Location Orientation: Left;Lower      Assessments 9/20/2023  2:13 PM 6/13/2024  1:23 PM   Response to Treatment Well tolerated Well tolerated   Compression Layers Single Single   Compression Product Type Tubigrip/Spanda Tubigrip/Spanda   Dressing Applied Yes Yes   Compression Wrap Location Toes to Knee Toes to Knee   Compression Wrap Status Clean;Intact;Dry Clean;Dry;Intact       No associated orders.       Wound 06/13/24 1 Leg Right;Lateral;Posterior (Active)   Date First Assessed/Time First Assessed: 06/13/24 1319    Wound Number (Wound Clinic Only): 1  Location: Leg  Wound Location Orientation: Right;Lateral;Posterior      Assessments 6/13/2024  1:23 PM   Wound Image     Site Assessment Moist;Red;Yellow;White;Purple   Closure Not approximated   Drainage Amount Large   Drainage Description Clear   Treatments Cleansed;Saline;Compression;Topical (Barrier/Moisturizer/Ointment)   Dressing Hydrofiber with silver;Kerlix roll;Tape   Dressing Changed New   Dressing Status Clean;Dry;Intact   Wound Length (cm) 7.4 cm   Wound Width (cm) 4 cm   Wound Surface Area (cm^2) 29.6 cm^2   Wound Depth (cm) 0.1 cm   Wound Volume (cm^3) 2.96 cm^3   Margins Well-defined edges   Non-staged Wound Description Full thickness   Shandra-wound Assessment Fragile;Edema;Intact   Wound Granulation Tissue Red   Wound Bed Granulation (%) 10 %   Wound Bed Epithelium (%) 0 %   Wound Bed Slough (%) 90 %   Wound Bed Eschar (%) 0 %   Wound Bed Fibrin (%) 0 %   State of Healing Non-healing   Wound Odor None       Active Orders   Date Order Priority Status Authorizing Provider   06/13/24 1620 OP Wound Dressing Routine Active Tasha Goins APRN     - Cleansing:    Cleanse with normal saline or wound cleanser     - Dressing:    Kerramax/Super absorbent     - Dressing:    Kerlix     - Additional Wound Dressing Information:    Hydrofiber with silver     -  Frequency:    Change dressing two times per week       Wound 06/13/24 2 Leg Right;Lateral (Active)   Date First Assessed/Time First Assessed: 06/13/24 1319    Wound Number (Wound Clinic Only): 2  Location: Leg  Wound Location Orientation: Right;Lateral      Assessments 6/13/2024  1:23 PM   Wound Image     Site Assessment Dry;Yellow   Closure Not approximated   Drainage Amount Scant   Drainage Description Yellow   Treatments Cleansed;Saline;Compression   Dressing Xeroform   Dressing Changed New   Dressing Status Clean;Dry;Intact   Wound Length (cm) 0.7 cm   Wound Width (cm) 1.5 cm   Wound Surface Area (cm^2) 1.05 cm^2   Wound Depth (cm) 0 cm   Wound Volume (cm^3) 0 cm^3   Margins Flat and Intact   Non-staged Wound Description Full thickness   Shandra-wound Assessment Fragile;Edema;Intact   Wound Bed Granulation (%) 0 %   Wound Bed Epithelium (%) 0 %   Wound Bed Slough (%) 100 %   Wound Bed Eschar (%) 0 %   Wound Bed Fibrin (%) 0 %   State of Healing Non-healing   Wound Odor None       Active Orders   Date Order Priority Status Authorizing Provider   06/13/24 1620 OP Wound Dressing Routine Active Tasha Goins APRN     - Cleansing:    Cleanse with normal saline or wound cleanser     - Dressing:    xeroform gauze     - Dressing:    Bordered gauze     - Frequency:    Change dressing two times per week       Wound 06/13/24 3 Leg Right;Medial (Active)   Date First Assessed/Time First Assessed: 06/13/24 1320    Wound Number (Wound Clinic Only): 3  Location: Leg  Wound Location Orientation: Right;Medial      Assessments 6/13/2024  1:23 PM   Wound Image     Site Assessment Moist;Yellow;Pink   Closure Not approximated   Drainage Amount Large   Drainage Description Clear   Treatments Cleansed;Saline;Compression;Compression Sleeve;Topical (Barrier/Moisturizer/Ointment)   Dressing Hydrofiber with silver;Kerlix roll;Tape   Dressing Changed New   Dressing Status Clean;Dry;Intact   Wound Length (cm) 3.7 cm   Wound Width (cm) 3 cm    Wound Surface Area (cm^2) 11.1 cm^2   Wound Depth (cm) 0.2 cm   Wound Volume (cm^3) 2.22 cm^3   Margins Well-defined edges   Non-staged Wound Description Full thickness   Shandra-wound Assessment Edema;Fragile;Pink   Wound Granulation Tissue Pink   Wound Bed Granulation (%) 10 %   Wound Bed Epithelium (%) 0 %   Wound Bed Slough (%) 90 %   Wound Bed Eschar (%) 0 %   Wound Bed Fibrin (%) 0 %   State of Healing Non-healing   Wound Odor None       Active Orders   Date Order Priority Status Authorizing Provider   06/13/24 1620 OP Wound Dressing Routine Active Tasha Goins APRN     - Cleansing:    Cleanse with normal saline or wound cleanser     - Dressing:    Kerramax/Super absorbent     - Dressing:    Kerlix     - Additional Wound Dressing Information:    Hydrofiber with silver     - Frequency:    Change dressing two times per week       Wound 06/13/24 4 Leg Right;Medial;Posterior (Active)   Date First Assessed/Time First Assessed: 06/13/24 1320    Wound Number (Wound Clinic Only): 4  Location: Leg  Wound Location Orientation: Right;Medial;Posterior      Assessments 6/13/2024  1:23 PM   Wound Image     Site Assessment Moist;Yellow   Closure Not approximated   Drainage Amount Large   Drainage Description Clear   Treatments Cleansed;Saline;Compression   Dressing Hydrofiber with silver;Kerlix roll;Tape   Dressing Changed New   Dressing Status Clean;Dry;Intact   Wound Length (cm) 2 cm   Wound Width (cm) 1.7 cm   Wound Surface Area (cm^2) 3.4 cm^2   Wound Depth (cm) 0.1 cm   Wound Volume (cm^3) 0.34 cm^3   Margins Attached edges   Non-staged Wound Description Full thickness   Shandra-wound Assessment Edema;Fragile   Wound Bed Granulation (%) 0 %   Wound Bed Epithelium (%) 0 %   Wound Bed Slough (%) 100 %   Wound Bed Eschar (%) 0 %   Wound Bed Fibrin (%) 0 %   State of Healing Non-healing   Wound Odor None       Active Orders   Date Order Priority Status Authorizing Provider   06/13/24 1620 OP Wound Dressing Routine Active  Tasha Goins APRN     - Cleansing:    Cleanse with normal saline or wound cleanser     - Dressing:    Kerramax/Super absorbent     - Dressing:    Kerlix     - Additional Wound Dressing Information:    Hydrofiber with silver     - Frequency:    Change dressing two times per week       Wound 06/13/24 5 Foot Right;Plantar (Active)   Date First Assessed/Time First Assessed: 06/13/24 1320    Wound Number (Wound Clinic Only): 5  Location: Foot  Wound Location Orientation: Right;Plantar      Assessments 6/13/2024  1:23 PM   Wound Image     Site Assessment Dry;Pink;Yellow   Closure Not approximated   Drainage Amount Scant   Drainage Description Yellow   Treatments Cleansed;Saline;Compression   Dressing Aquacel Foam   Dressing Changed New   Dressing Status Clean;Dry;Intact   Wound Length (cm) 0.2 cm   Wound Width (cm) 0.1 cm   Wound Surface Area (cm^2) 0.02 cm^2   Wound Depth (cm) 0.1 cm   Wound Volume (cm^3) 0.002 cm^3   Margins Not attached   Shandra-wound Assessment Dry;Intact;Clean;Edema   Wound Granulation Tissue Pink   Wound Bed Granulation (%) 90 %   Wound Bed Epithelium (%) 0 %   Wound Bed Slough (%) 10 %   Wound Bed Eschar (%) 0 %   Wound Bed Fibrin (%) 0 %   State of Healing Non-healing   Wound Odor None   Undermining? Yes   Number of Undermines 1   Undermine 1 Start Position 12   Undermine 1 End Position 12   Pressure Injury Stage Stage 3       Active Orders   Date Order Priority Status Authorizing Provider   06/13/24 1620 OP Wound Dressing Routine Active Tasha Goins APRN     - Cleansing:    Cleanse with normal saline or wound cleanser     - Dressing:    Prism collagen     - Dressing:    Dry gauze     - Frequency:    Change dressing two times per week       Wound 06/13/24 6 Leg Left (Active)   Date First Assessed/Time First Assessed: 06/13/24 1326    Wound Number (Wound Clinic Only): 6  Location: Leg  Wound Location Orientation: Left      Assessments 6/13/2024  1:23 PM   Wound Image     Site Assessment  Edema;Dry;Intact;Clean   Drainage Amount None   Dressing Open to air   Wound Length (cm) 18 cm   Wound Width (cm) 17 cm   Wound Surface Area (cm^2) 306 cm^2   Margins Flat and Intact   Non-staged Wound Description Partial thickness   Shandra-wound Assessment Edema;Fragile;Intact   Wound Bed Epithelium (%) 100 %   State of Healing Epithelialized   Wound Odor None       No associated orders.       Compression Wrap 06/13/24 Leg Lower;Right (Active)   Placement Date: 06/13/24   Location: Leg  Wound Location Orientation: Lower;Right      Assessments 6/13/2024  1:23 PM   Response to Treatment Well tolerated   Compression Layers Single   Compression Product Type Tubigrip/Spanda   Dressing Applied Yes   Compression Wrap Location Toes to Knee   Compression Wrap Status Clean;Dry;Intact       No associated orders.      Lower Extremity Measurements   Calf Ankle Foot Heel to Knee Knee to Thigh   Right Right Calf from:: Heel  Right Calf cm:: 32 Right Ankle from:: Heel  Right Ankle cm:: 22.6    Right Foot from:: Great toe  Right Foot cm:: 21   Right Heel to Knee: 37          Left Left Calf from:: Heel  Calf Left cm:: 34 Left Ankle from:: Heel  Left Ankle cm:: 23    Left Foot from:: Great toe  Left Foot cm:: 20.7 Left Heel to Knee: 37          ASSESSMENT AND PLAN:      1. Open wound of right lower leg, initial encounter  - Aerobic Bacterial Culture  - OP Wound Dressing; Standing  - OP Wound Dressing; Standing  - OP Wound Dressing; Standing    2. Blister of left lower leg, initial encounter    Here today with her son for evaluation of new wounds on her lower legs. Wounds developed within the past month or so and recently worsened. Completed a course of Cefadroxil yesterday as treatment for an ear infection--no noticeable improvement of the wound. Right lower leg pain is at it's worst today, wounds on right lower leg with large amount of clear drainage. Wound culture was taken from the right lateral posterior wound.   There are multiple  open wounds on her right lower leg.   Lateral Posterior (1): Slough present in wound bed, periwound erythema and exquisitely tender to touch. Concerning for cellulitis. Will empirically treat with Bactrim  mg BID x 7 days while wound culture pending. Dressing: zinc paste to periwound, Aquacel Ag, kerramax, kerlix secured with tape. Change 3x/week and PRN  Lateral (2): Wound is dry with scabbing. Periwound clean, dry and intact. Dressing: Xeroform covered with bordered gauze, change daily and PRN  Medial (3) and Posterior (4): White slough in wound bed, large amount of drainage, no odor. Periwound edema. Dressing: zinc paste to periwounds, Aquacel Ag, kerramax, kerlix secured with tape. Change 3x/week and PRN  Right foot- plantar (5): Chronic wound. Son prefers wound treatment. Dressing: Marguerite collagen with bordered foam. Change 1x/week and PRN.  Left Leg: Large blister on left lower leg. Advised to closely monitor. Encouraged leg elevation.   Edema Management: Medigrip tubular compression  Discussed signs and symptoms of infection, encouraged patient and son to assess skin daily.  Discussed moisturizing skin, refrain from itching or scratching the skin.  Discussed nutrition for wound healing, encouraged reduced carbohydrate intake, increased protein intake, and healthy diet. Recommended increased vitamin intake (either with foods or vitamin supplements), need vitamin A, Bs, C, D and zinc for wound healing.   Risks, benefits, and alternatives of current treatment plan discussed in detail.  Questions and concerns addressed. Red flags to RTC or ED reviewed.  Patient agrees to plan.      Return in about 1 week (around 6/20/2024) for 30 min APN visit x 1.    I spent 45 minutes with the patient. This time included:  preparing to see the patient (eg, review notes and recent diagnostics), seeing the patient, performing a medically appropriate examination and/or evaluation, counseling and educating the patient, and  documenting in the record.    NOTE TO PATIENT: The 21st Century Cures Act makes clinical notes like these available to patients in the interest of transparency. Clinical notes are medical documents used by physicians and care providers to communicate with each other. These documents include medical language and terminology, abbreviations, and treatment information that may sound technical and at times possibly unfamiliar. In addition, at times, the verbiage may appear blunt or direct. These documents are one tool providers use to communicate relevant information and clinical opinions of the care providers in a way that allows common understanding of the clinical context.

## 2024-06-14 ENCOUNTER — TELEPHONE (OUTPATIENT)
Dept: INTERNAL MEDICINE CLINIC | Facility: CLINIC | Age: 89
End: 2024-06-14

## 2024-06-15 NOTE — TELEPHONE ENCOUNTER
To nursing staff, please relay the following to Frances Whitehead:    Urine negative for infection, has some amount of protein leakage, stable. Continue enalapril.    Thank you!

## 2024-06-17 ENCOUNTER — TELEPHONE (OUTPATIENT)
Dept: INTERNAL MEDICINE CLINIC | Facility: CLINIC | Age: 89
End: 2024-06-17

## 2024-06-17 NOTE — TELEPHONE ENCOUNTER
Suzanne from [facility] called to inform Dr. Garduno that she is with the patient now for her regular physical therapy visit.  Pt. Told her that she fell through the night.  Had to call her sons to help her up and back into her chair.  Suzanne stated pt's right hand was bleeding from the fall and it was bandaged by her son.  Suzanne said it looks like  nursing or wound care may possibly see the patient this Wednesday.  Please call Suzanne if needed at [phone].

## 2024-06-17 NOTE — TELEPHONE ENCOUNTER
As FYI to  - called Suzanne from [facility] - patient did not hit her head , she hurt her right hand - they will F/U on wednesday

## 2024-06-18 DIAGNOSIS — T14.8XXA WOUND INFECTION: Primary | ICD-10-CM

## 2024-06-18 DIAGNOSIS — L08.9 WOUND INFECTION: Primary | ICD-10-CM

## 2024-06-18 RX ORDER — NYSTATIN 100000 [USP'U]/G
POWDER TOPICAL
Qty: 30 G | Refills: 1 | Status: SHIPPED | OUTPATIENT
Start: 2024-06-18

## 2024-06-18 RX ORDER — FLUCONAZOLE 100 MG/1
100 TABLET ORAL DAILY
Qty: 7 TABLET | Refills: 0 | Status: SHIPPED | OUTPATIENT
Start: 2024-06-18 | End: 2024-06-25

## 2024-06-19 ENCOUNTER — OFFICE VISIT (OUTPATIENT)
Dept: OTOLARYNGOLOGY | Facility: CLINIC | Age: 89
End: 2024-06-19

## 2024-06-19 ENCOUNTER — TELEPHONE (OUTPATIENT)
Dept: WOUND CARE | Facility: HOSPITAL | Age: 89
End: 2024-06-19

## 2024-06-19 DIAGNOSIS — H60.11 CELLULITIS OF RIGHT EAR CANAL: ICD-10-CM

## 2024-06-19 DIAGNOSIS — H90.6 MIXED HEARING LOSS, BILATERAL: Primary | ICD-10-CM

## 2024-06-19 DIAGNOSIS — H70.13 CHRONIC MASTOIDITIS, BILATERAL: ICD-10-CM

## 2024-06-19 PROCEDURE — 99213 OFFICE O/P EST LOW 20 MIN: CPT | Performed by: OTOLARYNGOLOGY

## 2024-06-19 PROCEDURE — 1159F MED LIST DOCD IN RCRD: CPT | Performed by: OTOLARYNGOLOGY

## 2024-06-19 PROCEDURE — 1160F RVW MEDS BY RX/DR IN RCRD: CPT | Performed by: OTOLARYNGOLOGY

## 2024-06-19 NOTE — PROGRESS NOTES
PROGRESS NOTE  OTOLOGY/OTOLARYNGOLOGY    PCP: Thao Garduno DO    CHIEF COMPLAINT:    Chief Complaint   Patient presents with    Follow - Up     Reevaluation on her hearing, reports to have no improvements      LAST VISIT 6/05/2024  IMPRESSION  Bilateral mixed hearing loss  History of ear surgery unspecified, with left mastoid cavity  Bilateral mucosalization of the TM  Bilateral chronic otomastoiditis   Audiogram reviewed - significant worsening of left ear, right ear is stable  Right lateral canal cellulitis/folliculitis   Right cerumen - removed    PLAN:  -Start cefadroxil 500 MG BID for 7 days    -Continue alcohol/vinegar ear flushes in left ear.   -Discussed clarity in right ear is low, so hearing aid will only partially help in left ear  -Discussed that surgery is not a good option in my opinion due to patient's age, medical history and history of surgery on left ear.   -Follow-up in 1 week   _________________________________________________________________  INTERVAL HX: Completed oral antibiotic. Right ear is still sore.    HISTORY OF PRESENT ILLNESS: Frances Whitehead is a 89 year old female who presents for evaluation of her ears and hearing.     Communication is through her son and via typing on a word document due to the severity of the patient's hearing loss.     Patient notes her right ear is her worse hearing ear. Had hearing aids from [facility], but appropriate fit/benefit were not achieved. Now planning to use hearing benefit through insurance to reobtain hearing aids. Has a history of ear surgery bilaterally, but it is not recent and she cannot further describe it. She was last seen by Dr. Webb at [place]. She notes bilateral continuous tinnitus, non pulsatile.     Denies vertigo, dizziness, otorrhea, otalgia.    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 Doss-Stent PCI of LAD and RCA was performed.    Deaf    Diverticulitis    hospitalized 3/10 and 5/10    Diverticulosis    cscopy '03, '07, '10    Fracture of C2 vertebra, closed ([facility])    ER 3/19/21--CT cervical spine-- fx C2 posterior lateral vertebral body and left C2 lamina.      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    Renal calculus    on CT abd 5/10-6 mm or less in L lower kidney    Renal insufficiency    Shingles    Small bowel obstruction ([facility])    multiple hospitalizations for [id number], 6/2016    Stress incontinence, female    SVT (supraventricular tachycardia) ([facility])    event monitor 2/2016-SVT    Syncope    48-hour Holter 5/19/20--bursts of atrial tachycardia up to 3 minutes, PVCs, rare Wenkebach.  Saw Dr. Doss.    Type 2 diabetes mellitus ([facility])    Uterine prolapse    Uterine prolapse    had pessary in 2006 Dr. SORAYA Davis -vag sling procedure Dr Baez in 6/2016    Uterovaginal prolapse, incomplete    UTI due to Klebsiella species    ESBL Klebsiella UTI treated in [facility] with meropenem when in hosp for enteritis. (Dr Carvalho).     Wound of right leg       PAST SURGICAL HISTORY:    Past Surgical History:   Procedure Laterality Date    Abdomen surgery proc unlisted  2007     laparotomy for TERESA and internal hernia    Abdomen surgery proc unlisted  2009    TERESA and small bowel resection-Dr Jaramillo    Appendectomy      Cholecystectomy      Fracture surgery Left 1/2017    L hip fx pinning 1/2017 Dr. Hernandez    Knee replacement surgery Left 2000    Knee replacement surgery Right 2008    Other surgical history      cystocele repair    Other surgical history Bilateral     ear surgery-Dr. Patel-both eardrums    Sling  6/30/2016    vag sling procedure at [facility]  -Dr Baez       Current Outpatient Medications on File Prior to Visit   Medication Sig Dispense Refill    fluconazole 100 MG Oral Tab Take 1 tablet (100 mg total) by mouth daily for 7 days. 7 tablet 0    Nystatin 742630 UNIT/GM External Powder Apply powder to right lower leg posterior calf wound with dressing changes 30 g 1    sulfamethoxazole-trimethoprim -160 MG Oral Tab per tablet Take 1 tablet by mouth 2 (two) times daily for 7 days. 14 tablet 0    cholecalciferol 25 MCG (1000 UT) Oral Tab Take 2 tablets (2,000 Units total) by mouth daily. 180 tablet 3    benzonatate 200 MG Oral Cap Take 1 capsule (200 mg total) by mouth daily as needed for cough. 30 capsule 0    atorvastatin 10 MG Oral Tab Take 1 tablet (10 mg total) by mouth nightly. 90 tablet 3    loratadine 10 MG Oral Tab Take 1 tablet (10 mg total) by mouth daily. 90 tablet 3    Ferrous Sulfate (FEROSUL) 325 (65 Fe) MG Oral Tab Take 1 tablet (325 mg total) by mouth daily. 90 tablet 3    dilTIAZem HCl  MG Oral Tablet 24 Hr Take 1 tablet by mouth every morning. 90 tablet 3    lactulose 10 GM/15ML Oral Solution Take 30 mL (20 g total) by mouth daily as needed (constipated bowel). 237 mL 3    triamcinolone 0.1 % External Cream Apply 1 Application topically 2 (two) times daily as needed. APPLY TO AFFECTED AREA 15 g 1    enalapril 5 MG Oral Tab Take 0.5 tablets (2.5 mg total) by mouth daily.      hydrocortisone 2.5 % External Cream APPLY RECTALLY TWICE DAILY AS  NEEDED 30 g 2    FLUTICASONE PROPIONATE 50 MCG/ACT Nasal Suspension SPRAY 2 SPRAYS INTO EACH NOSTRIL  IN THE MORNING AND 2 SPRAYS BEFORE BEDTIME (Patient taking differently: 2 sprays by Nasal route as needed.) 16 g 0    LUMIGAN 0.01 % Ophthalmic Solution Place 1 drop into both eyes every evening.      apixaban (ELIQUIS) 2.5 MG Oral Tab Take 1 tablet (2.5 mg total) by mouth 2 (two) times daily. 180 tablet 3    gabapentin 100 MG Oral Cap Take 1 capsule (100 mg total) by mouth nightly. 90 capsule 3    sennosides-docusate (SENNA PLUS) 8.6-50 MG Oral Tab Take 2 tablets twice daily as needed for constipation 100 tablet 3    Fluticasone Propionate HFA (FLOVENT HFA) 220 MCG/ACT Inhalation Aerosol Inhale 1 puff into the lungs 2 (two) times daily. (Patient taking differently: Inhale 1 puff into the lungs as needed.) 1 each 3    Wheat Dextrin (BENEFIBER) Oral Powder Take 2 teaspoons daily.  0    Cyanocobalamin (VITAMIN B12) 1000 MCG Oral Tab CR Take 1 tablet by mouth daily.  0    acetaminophen 500 MG Oral Tab Take 1 tablet (500 mg total) by mouth in the morning and 1 tablet (500 mg total) before bedtime.      melatonin 3 MG Oral Tab Take 1 tablet (3 mg total) by mouth nightly.      ascorbic acid 500 MG Oral Tab Take 1 tablet (500 mg total) by mouth daily. 30 tablet 0    Polyethylene Glycol 3350 17 GM/SCOOP Oral Powder Take 17 g by mouth daily. take 15 milliliter (17G)  by oral route  every day powder mixed with 8 oz. water, juice, soda, coffee, or tea      furosemide 20 MG Oral Tab Take 1 tablet (20 mg total) by mouth daily for 5 days. 5 tablet 0     No current facility-administered medications on file prior to visit.       Allergies:   Allergies   Allergen Reactions    Latex HIVES and UNKNOWN     Other reaction(s): Unknown    Mastisol Adhesive HIVES     Other reaction(s): ADHESIVE TAPE    Adhesive Tape OTHER (SEE COMMENTS)     Other reaction(s): ADHESIVE TAPE       SOCIAL HISTORY:    Social History     Tobacco Use    Smoking  status: Never    Smokeless tobacco: Never   Substance Use Topics    Alcohol use: No       FAMILY HISTORY: Denies known family history of hearing loss, tinnitus, vertigo, or migraine.  Denies known family history of head and neck cancer, thyroid cancer, bleeding disorders.     REVIEW OF SYSTEMS:   Positives are in bold  Neuro: Headache, facial weakness, facial numbness, neck pain, vertigo  ENT: Hearing change, tinnitus, otorrhea, otalgia, aural fullness, ear pressure, vertigo, imbalance  Sinus pressure, rhinorrhea, congestion, facial pain, jaw pain, dysphagia, odynophagia, sore throat, voice changes, shortness of breath    EXAMINATION:  I washed my hands with an alcohol-based hand gel prior to examination  Constitutional:   --Vitals: not currently breastfeeding.  --General: no apparent distress, well-developed, conversant  Psych: affect pleasant and appropriate for age, alert and oriented  Neuro: Facial movement normal bilateral  Respiratory: No stridor, stertor or increased work of breathing  ENT:  --Ear: The bilateral ears were examined under binocular microscopy  Right ear microscopic exam:  Pinna: Normal, no lesions or masses.  Mastoid: Nontender on palpation.  External auditory canal: Cerumen impaction - removed. Clear, no masses or lesions. In lateral ear canal, appears to have folliculitis and erythema.   Tympanic membrane: poor landmarks, extensive scarring, evidence of prior surgery including possible incus interposition graft    Left ear microscopic exam:  Pinna: Normal, no lesions or masses.  Mastoid: Nontender on palpation.  External auditory canal:Cerumen impaction - removed.   Tympanic membrane: poor landmarks, extensive scarring, central perforation with mucoid drainage and nearby tube or extruding middle ear prosthesis - fixed, would not dislodge with gentle manipulation - left in place       Latest Audiogram Result (Hz) Exam performed: 2/2/2023 3:05 PM Last edited by Amber Preciado, SHASHANK on 2/2/2023  3:26 PM        125 250  1500 2000 3000 4000 6000 8000    Right air:  90 85  80  80 90 100  95N    Left air:  55 55  60  65  65 95 95N    Left mastoid bone:   30  30  55  35      Right mastoid bone (masked):   50  35  55  40      Masking left (mastoid bone):   80  80    85         Reliability:  Good    Transducer:  Inserts    Technique:  Conventional Audiometry    Comments:            Latest Speech Audiometry  Last edited by Amber Preciado AUD on 2/2/2023 3:26 PM       Ear Method SAT SRT MCL UCL Notes    right live voice  85       left live voice  55        Ear Method Test/List Score (%) Intensity Mask/Noise   right live voice Half-List 44 95 75   left live voice 10 By Difficulty 92 80                      Latest Tympanogram Result       Probe Tone (Hz): 226 Exam performed: 2/2/2023 3:09 PM Last edited by Amber Preciado AUD on 2/2/2023 3:26 PM      Tympanograms  These were drawn by a user, not generated from device data      Right Ear Left Ear                     Right Ear Left Ear    Tympanogram type:      Canal volume (mL): 0.7 1.9    Peak pressure (daPa):      Peak amplitude (mL):      Tympanogram width (daPa):        Comments:               CT temporal bones 3/14/23  (Images personally reviewed; likely effects of chronic infection and prior surgery)  CONCLUSION:      1. RIGHT temporal bone:  Soft tissue density opacification of the middle ear cavity/mesotympanum with associated erosion/blunting of the scutum, opacification at Prussak's space, and chronic-appearing erosion of the ossicular chain (which is partially   intact).  These findings suggest cholesteatoma formation on a background of longstanding chronic otomastoiditis.  Subtotal opacification of the right mastoid cavity with slight coalescence of the mastoid septa.  Thickened and retracted tympanic membrane   with probable small central perforation.  Borderline high position of the jugular bulb.      2. LEFT temporal bone:  Soft tissue  density opacification of the middle ear cavity (mesotympanum and epitympanum), which surrounds the ossicular chain.  Ossicular chain is not well defined and likely chronically eroded.  Similarly, the scutum is not well    defined and likely chronically eroded/blunted.  Near complete opacification of the left mastoid cavity with coalescence of the mastoid septa.  Findings suggest cholesteatoma on a background of longstanding chronic otomastoiditis.  Thickened and   retracted tympanic membrane with relatively large central perforation.  Borderline high position of the jugular bulb.      3. Partially imaged findings that suggest multifocal acute on chronic sinusitis.      4. Subcentimeter polyp or focus of debris along the medial margin of the left inferior turbinate.  Leftward deviation of the nasal septum.      5. Intracranial atherosclerosis.      Cerumen removal: (06/05/24)  Under binocular microscopy cerumen was removed from the right external auditory canal using a wax loop curette and suction. Patient noted subjective improvement in hearing.     ASSESSMENT/PLAN:  rFances Whitehead is a 89 year old female with     ICD-10-CM   1. Mixed hearing loss, bilateral  H90.6   2. Cellulitis of right ear canal  H60.11   3. Chronic mastoiditis, bilateral  H70.13        IMPRESSION  Bilateral mixed hearing loss  History of ear surgery unspecified, with left mastoid cavity  Bilateral mucosalization of the TM  Bilateral chronic otomastoiditis   Right lateral canal cellulitis/folliculitis     PLAN:  -Mupirocin ointment to right lateral ear canal BID for 7 days  -No hearing aid for right ear until infection better   -Continue alcohol/vinegar ear flushes in left ear.   -Discussed clarity in right ear is low, so hearing aid will only partially help in left ear  -Discussed that surgery is not a good option in my opinion due to patient's age, medical history and history of surgery on left ear.   -Follow-up in 2 weeks     Situation  reviewed with the patient in detail.    Attention: This note has been scribed by Adele Ferrer under the supervision of José Morgan MD.     José Morgan MD  Otology/Otolaryngology  [address] [place] [id number]  Phone [phone]  Fax [phone]     I have personally performed the services described in this documentation. All medical record entries made by the scribe were at my direction and in my presence. I have reviewed the chart and agree that the medical record reflects my personal performance and is accurate and complete.

## 2024-06-20 ENCOUNTER — OFFICE VISIT (OUTPATIENT)
Dept: WOUND CARE | Facility: HOSPITAL | Age: 89
End: 2024-06-20
Attending: NURSE PRACTITIONER
Payer: MEDICARE

## 2024-06-20 VITALS
RESPIRATION RATE: 18 BRPM | TEMPERATURE: 98 F | SYSTOLIC BLOOD PRESSURE: 146 MMHG | HEART RATE: 84 BPM | OXYGEN SATURATION: 96 % | DIASTOLIC BLOOD PRESSURE: 67 MMHG

## 2024-06-20 DIAGNOSIS — E08.621 DIABETIC ULCER OF RIGHT MIDFOOT ASSOCIATED WITH DIABETES MELLITUS DUE TO UNDERLYING CONDITION, LIMITED TO BREAKDOWN OF SKIN (HCC): ICD-10-CM

## 2024-06-20 DIAGNOSIS — S61.412A SKIN TEAR OF LEFT HAND WITHOUT COMPLICATION, INITIAL ENCOUNTER: ICD-10-CM

## 2024-06-20 DIAGNOSIS — E11.610 CHARCOT'S JOINT OF FOOT IN TYPE 2 DIABETES MELLITUS (HCC): ICD-10-CM

## 2024-06-20 DIAGNOSIS — E08.621 DIABETIC ULCER OF TOE ASSOCIATED WITH DIABETES MELLITUS DUE TO UNDERLYING CONDITION, LIMITED TO BREAKDOWN OF SKIN, UNSPECIFIED LATERALITY (HCC): ICD-10-CM

## 2024-06-20 DIAGNOSIS — L97.501 DIABETIC ULCER OF TOE ASSOCIATED WITH DIABETES MELLITUS DUE TO UNDERLYING CONDITION, LIMITED TO BREAKDOWN OF SKIN, UNSPECIFIED LATERALITY (HCC): ICD-10-CM

## 2024-06-20 DIAGNOSIS — L97.411 DIABETIC ULCER OF RIGHT MIDFOOT ASSOCIATED WITH DIABETES MELLITUS DUE TO UNDERLYING CONDITION, LIMITED TO BREAKDOWN OF SKIN (HCC): ICD-10-CM

## 2024-06-20 DIAGNOSIS — S80.822D BLISTER OF LEFT LOWER LEG, SUBSEQUENT ENCOUNTER: ICD-10-CM

## 2024-06-20 DIAGNOSIS — S81.801D OPEN WOUND OF RIGHT LOWER LEG, SUBSEQUENT ENCOUNTER: Primary | ICD-10-CM

## 2024-06-20 DIAGNOSIS — L03.115 CELLULITIS OF RIGHT LOWER LEG: ICD-10-CM

## 2024-06-20 PROCEDURE — 99214 OFFICE O/P EST MOD 30 MIN: CPT | Performed by: NURSE PRACTITIONER

## 2024-06-20 NOTE — PROGRESS NOTES
Chief Complaint   Patient presents with    Wound Recheck     Right leg, foot, and hand.      HPI:   6/23/24: F/u visit for multiple wounds. Has new wound on her right hand, happened in the middle of the night, son isn't sure what happened. She reports her legs are in pain from the compression wrap that was placed by . She denies fever, other than pain is feeling well.     6/13/24: 89 yr old female here today for eval of wounds on left lower extremity. Accompanied by her son. PMHx includes HTN, SVT, CAD, T2DM, CKD, asthma w/COPD, neuropathy, b/l hearing loss (using white board to communicate). Her son reports wounds developed on her right leg a few weeks ago, maybe longer, but have recently worsened. She completed a course of Cefadroxil yesterday as prescribed by he PCP. Right lower leg remains tender to touch. Swelling worsened in both legs recently, but has improved since the onset. She has the same chronic wound on the arch of her foot as she did at her last visit. Her son would like the wound to be treated if possible. She continues to see a podiatrist for nail care.     Lab Results   Component Value Date    BUN 18 06/13/2024    CREATSERUM 0.70 06/13/2024    ALB 4.4 04/22/2024    TP 7.1 04/22/2024    A1C 6.6 (H) 04/22/2024       Current Outpatient Medications:     mupirocin 2 % External Ointment, Apply 1 Application topically 2 (two) times daily for 7 days. To the opening of the right ear canal, Disp: 1 each, Rfl: 0    fluconazole 100 MG Oral Tab, Take 1 tablet (100 mg total) by mouth daily for 7 days., Disp: 7 tablet, Rfl: 0    Nystatin 101780 UNIT/GM External Powder, Apply powder to right lower leg posterior calf wound with dressing changes, Disp: 30 g, Rfl: 1    cholecalciferol 25 MCG (1000 UT) Oral Tab, Take 2 tablets (2,000 Units total) by mouth daily., Disp: 180 tablet, Rfl: 3    furosemide 20 MG Oral Tab, Take 1 tablet (20 mg total) by mouth daily for 5 days., Disp: 5 tablet, Rfl: 0    atorvastatin 10 MG  Oral Tab, Take 1 tablet (10 mg total) by mouth nightly., Disp: 90 tablet, Rfl: 3    loratadine 10 MG Oral Tab, Take 1 tablet (10 mg total) by mouth daily., Disp: 90 tablet, Rfl: 3    Ferrous Sulfate (FEROSUL) 325 (65 Fe) MG Oral Tab, Take 1 tablet (325 mg total) by mouth daily., Disp: 90 tablet, Rfl: 3    dilTIAZem HCl  MG Oral Tablet 24 Hr, Take 1 tablet by mouth every morning., Disp: 90 tablet, Rfl: 3    lactulose 10 GM/15ML Oral Solution, Take 30 mL (20 g total) by mouth daily as needed (constipated bowel)., Disp: 237 mL, Rfl: 3    triamcinolone 0.1 % External Cream, Apply 1 Application topically 2 (two) times daily as needed. APPLY TO AFFECTED AREA, Disp: 15 g, Rfl: 1    enalapril 5 MG Oral Tab, Take 0.5 tablets (2.5 mg total) by mouth daily., Disp: , Rfl:     hydrocortisone 2.5 % External Cream, APPLY RECTALLY TWICE DAILY AS NEEDED, Disp: 30 g, Rfl: 2    FLUTICASONE PROPIONATE 50 MCG/ACT Nasal Suspension, SPRAY 2 SPRAYS INTO EACH NOSTRIL  IN THE MORNING AND 2 SPRAYS BEFORE BEDTIME (Patient taking differently: 2 sprays by Nasal route as needed.), Disp: 16 g, Rfl: 0    LUMIGAN 0.01 % Ophthalmic Solution, Place 1 drop into both eyes every evening., Disp: , Rfl:     apixaban (ELIQUIS) 2.5 MG Oral Tab, Take 1 tablet (2.5 mg total) by mouth 2 (two) times daily., Disp: 180 tablet, Rfl: 3    gabapentin 100 MG Oral Cap, Take 1 capsule (100 mg total) by mouth nightly., Disp: 90 capsule, Rfl: 3    sennosides-docusate (SENNA PLUS) 8.6-50 MG Oral Tab, Take 2 tablets twice daily as needed for constipation, Disp: 100 tablet, Rfl: 3    Fluticasone Propionate HFA (FLOVENT HFA) 220 MCG/ACT Inhalation Aerosol, Inhale 1 puff into the lungs 2 (two) times daily. (Patient taking differently: Inhale 1 puff into the lungs as needed.), Disp: 1 each, Rfl: 3    Wheat Dextrin (BENEFIBER) Oral Powder, Take 2 teaspoons daily., Disp: , Rfl: 0    Cyanocobalamin (VITAMIN B12) 1000 MCG Oral Tab CR, Take 1 tablet by mouth daily., Disp: ,  Rfl: 0    acetaminophen 500 MG Oral Tab, Take 1 tablet (500 mg total) by mouth in the morning and 1 tablet (500 mg total) before bedtime., Disp: , Rfl:     melatonin 3 MG Oral Tab, Take 1 tablet (3 mg total) by mouth nightly., Disp: , Rfl:     ascorbic acid 500 MG Oral Tab, Take 1 tablet (500 mg total) by mouth daily., Disp: 30 tablet, Rfl: 0    Polyethylene Glycol 3350 17 GM/SCOOP Oral Powder, Take 17 g by mouth daily. take 15 milliliter (17G)  by oral route  every day powder mixed with 8 oz. water, juice, soda, coffee, or tea, Disp: , Rfl:     Allergies   Allergen Reactions    Latex HIVES and UNKNOWN     Other reaction(s): Unknown    Mastisol Adhesive HIVES     Other reaction(s): ADHESIVE TAPE    Adhesive Tape OTHER (SEE COMMENTS)     Other reaction(s): ADHESIVE TAPE      REVIEW OF SYSTEMS:   Review of Systems   Constitutional:  Negative for appetite change, chills and fever.   HENT:  Positive for hearing loss.    Respiratory:  Negative for shortness of breath.    Cardiovascular:  Negative for chest pain.   Gastrointestinal:  Negative for abdominal pain, diarrhea and vomiting.   Musculoskeletal:  Positive for arthralgias and gait problem.   Skin:  Positive for wound. Negative for rash.      PHYSICAL EXAM:   /67 (BP Location: Right arm, Patient Position: Sitting, Cuff Size: adult)   Pulse 84   Temp 97.6 °F (36.4 °C) (Oral)   Resp 18   LMP  (LMP Unknown)   SpO2 96%    Estimated body mass index is 23.41 kg/m² as calculated from the following:    Height as of 6/13/24: 58\".    Weight as of 6/13/24: 112 lb.   Vital signs reviewed.Appears stated age, well groomed.    Physical Exam  Constitutional:       Appearance: Normal appearance.   HENT:      Right Ear: Decreased hearing noted.      Left Ear: Decreased hearing noted.   Cardiovascular:      Pulses:           Dorsalis pedis pulses are 2+ on the left side.        Posterior tibial pulses are 2+ on the left side.      Comments: B/l PT/DP pulses with biphasic  wave sounds  Pulmonary:      Effort: Pulmonary effort is normal.   Musculoskeletal:      Right lower leg: Edema present.      Left lower leg: Edema present.   Skin:     General: Skin is warm and dry.      Capillary Refill: Capillary refill takes 2 to 3 seconds.      Findings: Wound (b/l Lower extremities, see wound care flowsheet) present. No rash.   Neurological:      General: No focal deficit present.      Mental Status: She is alert and oriented to person, place, and time.   Psychiatric:         Mood and Affect: Mood normal.         Behavior: Behavior normal.        Compression Wrap 09/20/23 Leg Left;Lower (Active)   Placement Date: 09/20/23   Location: Leg  Wound Location Orientation: Left;Lower      Assessments 9/20/2023  2:13 PM 6/20/2024  3:54 PM   Response to Treatment Well tolerated Well tolerated   Compression Layers Single Single   Compression Product Type Tubigrip/Spanda Tubigrip/Spanda   Dressing Applied Yes Yes   Compression Wrap Location Toes to Knee Toes to Knee   Compression Wrap Status Clean;Intact;Dry Clean;Dry;Intact       No associated orders.       Wound 06/13/24 1 Leg Right;Lateral;Posterior (Active)   Date First Assessed/Time First Assessed: 06/13/24 1319    Wound Number (Wound Clinic Only): 1  Location: Leg  Wound Location Orientation: Right;Lateral;Posterior      Assessments 6/13/2024  1:23 PM 6/20/2024  3:54 PM   Wound Image       Site Assessment Moist;Red;Yellow;White;Purple Moist;Red;Yellow;White;Purple   Closure Not approximated Not approximated   Drainage Amount Large Large   Drainage Description Clear Clear   Treatments Cleansed;Saline;Compression;Topical (Barrier/Moisturizer/Ointment) Cleansed;Saline;Topical (Barrier/Moisturizer/Ointment);Compression   Dressing Hydrofiber with silver;Kerlix roll;Tape Kerlix roll;Tape   Dressing Changed New Changed   Dressing Status Clean;Dry;Intact Clean;Dry;Intact   Wound Length (cm) 7.4 cm 8 cm   Wound Width (cm) 4 cm 5 cm   Wound Surface Area  (cm^2) 29.6 cm^2 40 cm^2   Wound Depth (cm) 0.1 cm 0.1 cm   Wound Volume (cm^3) 2.96 cm^3 4 cm^3   Wound Healing % -- -35   Margins Well-defined edges Well-defined edges   Non-staged Wound Description Full thickness Full thickness   Shandra-wound Assessment Fragile;Edema;Intact Fragile;Edema;Intact   Wound Granulation Tissue Red Red   Wound Bed Granulation (%) 10 % 20 %   Wound Bed Epithelium (%) 0 % 0 %   Wound Bed Slough (%) 90 % 80 %   Wound Bed Eschar (%) 0 % 0 %   Wound Bed Fibrin (%) 0 % 0 %   State of Healing Non-healing Non-healing   Wound Odor None None       Active Orders   Date Order Priority Status Authorizing Provider   06/13/24 1620 OP Wound Dressing Routine Active Tasha Goins APRN     - Cleansing:    Cleanse with normal saline or wound cleanser     - Dressing:    Kerramax/Super absorbent     - Dressing:    Kerlix     - Additional Wound Dressing Information:    Hydrofiber with silver     - Frequency:    Change dressing two times per week       Wound 06/13/24 2 Leg Right;Lateral (Active)   Date First Assessed/Time First Assessed: 06/13/24 1319    Wound Number (Wound Clinic Only): 2  Location: Leg  Wound Location Orientation: Right;Lateral      Assessments 6/13/2024  1:23 PM 6/20/2024  3:54 PM   Wound Image       Site Assessment Dry;Yellow Dry;Yellow   Closure Not approximated Not approximated   Drainage Amount Scant None   Drainage Description Yellow Scabbed   Treatments Cleansed;Saline;Compression Cleansed;Saline   Dressing Xeroform Kerlix roll;Tape   Dressing Changed New Changed   Dressing Status Clean;Dry;Intact Clean;Dry;Intact   Wound Length (cm) 0.7 cm 0.7 cm   Wound Width (cm) 1.5 cm 2 cm   Wound Surface Area (cm^2) 1.05 cm^2 1.4 cm^2   Wound Depth (cm) 0 cm 0 cm   Wound Volume (cm^3) 0 cm^3 0 cm^3   Margins Flat and Intact Flat and Intact   Non-staged Wound Description Full thickness Full thickness   Shandra-wound Assessment Fragile;Edema;Intact Fragile;Edema;Intact   Wound Bed Granulation (%) 0  % 0 %   Wound Bed Epithelium (%) 0 % 0 %   Wound Bed Slough (%) 100 % 100 %   Wound Bed Eschar (%) 0 % 0 %   Wound Bed Fibrin (%) 0 % 0 %   State of Healing Non-healing Non-healing   Wound Odor None None       Active Orders   Date Order Priority Status Authorizing Provider   06/13/24 1620 OP Wound Dressing Routine Active Tasha Goins APRN     - Cleansing:    Cleanse with normal saline or wound cleanser     - Dressing:    xeroform gauze     - Dressing:    Bordered gauze     - Frequency:    Change dressing two times per week       Wound 06/13/24 3 Leg Right;Medial (Active)   Date First Assessed/Time First Assessed: 06/13/24 1320    Wound Number (Wound Clinic Only): 3  Location: Leg  Wound Location Orientation: Right;Medial      Assessments 6/13/2024  1:23 PM 6/20/2024  3:54 PM   Wound Image       Site Assessment Moist;Yellow;Pink Moist;Yellow   Closure Not approximated Not approximated   Drainage Amount Large Large   Drainage Description Clear Clear   Treatments Cleansed;Saline;Compression;Compression Sleeve;Topical (Barrier/Moisturizer/Ointment) Cleansed;Saline;Compression;Topical (Barrier/Moisturizer/Ointment)   Dressing Hydrofiber with silver;Kerlix roll;Tape Kerlix roll;Tape   Dressing Changed New Changed   Dressing Status Clean;Dry;Intact Clean;Dry;Intact   Wound Length (cm) 3.7 cm 3.7 cm   Wound Width (cm) 3 cm 3 cm   Wound Surface Area (cm^2) 11.1 cm^2 11.1 cm^2   Wound Depth (cm) 0.2 cm 0.2 cm   Wound Volume (cm^3) 2.22 cm^3 2.22 cm^3   Wound Healing % -- 0   Margins Well-defined edges Poorly defined   Non-staged Wound Description Full thickness Full thickness   Shandra-wound Assessment Edema;Fragile;Pink Edema;Fragile;Pink   Wound Granulation Tissue Pink --   Wound Bed Granulation (%) 10 % 0 %   Wound Bed Epithelium (%) 0 % 0 %   Wound Bed Slough (%) 90 % 100 %   Wound Bed Eschar (%) 0 % 0 %   Wound Bed Fibrin (%) 0 % 0 %   State of Healing Non-healing Non-healing   Wound Odor None None       Active Orders    Date Order Priority Status Authorizing Provider   06/13/24 1620 OP Wound Dressing Routine Active Tasha Goins APRN     - Cleansing:    Cleanse with normal saline or wound cleanser     - Dressing:    Kerramax/Super absorbent     - Dressing:    Kerlix     - Additional Wound Dressing Information:    Hydrofiber with silver     - Frequency:    Change dressing two times per week       Wound 06/13/24 4 Leg Right;Medial;Posterior (Active)   Date First Assessed/Time First Assessed: 06/13/24 1320    Wound Number (Wound Clinic Only): 4  Location: Leg  Wound Location Orientation: Right;Medial;Posterior      Assessments 6/13/2024  1:23 PM 6/20/2024  3:54 PM   Wound Image       Site Assessment Moist;Yellow Moist;Yellow   Closure Not approximated Not approximated   Drainage Amount Large Large   Drainage Description Clear Clear   Treatments Cleansed;Saline;Compression Cleansed;Saline;Compression   Dressing Hydrofiber with silver;Kerlix roll;Tape Kerlix roll;Tape   Dressing Changed New Changed   Dressing Status Clean;Dry;Intact Clean;Dry;Intact   Wound Length (cm) 2 cm 2.1 cm   Wound Width (cm) 1.7 cm 1.8 cm   Wound Surface Area (cm^2) 3.4 cm^2 3.78 cm^2   Wound Depth (cm) 0.1 cm 0.1 cm   Wound Volume (cm^3) 0.34 cm^3 0.378 cm^3   Wound Healing % -- -11   Margins Attached edges Attached edges   Non-staged Wound Description Full thickness Full thickness   Shandra-wound Assessment Edema;Fragile Edema;Fragile   Wound Bed Granulation (%) 0 % 0 %   Wound Bed Epithelium (%) 0 % 0 %   Wound Bed Slough (%) 100 % 100 %   Wound Bed Eschar (%) 0 % 0 %   Wound Bed Fibrin (%) 0 % 0 %   State of Healing Non-healing Non-healing   Wound Odor None None       Active Orders   Date Order Priority Status Authorizing Provider   06/13/24 1620 OP Wound Dressing Routine Active Tasha Goins APRN     - Cleansing:    Cleanse with normal saline or wound cleanser     - Dressing:    Kerramax/Super absorbent     - Dressing:    Kerlix     - Additional  Wound Dressing Information:    Hydrofiber with silver     - Frequency:    Change dressing two times per week       Wound 06/13/24 5 Foot Right;Plantar;Medial (Active)   Date First Assessed/Time First Assessed: 06/13/24 1320    Wound Number (Wound Clinic Only): 5  Location: Foot  Wound Location Orientation: Right;Plantar;Medial      Assessments 6/13/2024  1:23 PM 6/20/2024  3:54 PM   Wound Image       Site Assessment Dry;Pink;Yellow Dry;Pink   Closure Not approximated Not approximated   Drainage Amount Scant None   Drainage Description Yellow --   Treatments Cleansed;Saline;Compression Cleansed;Saline   Dressing Aquacel Foam Aquacel Foam   Dressing Changed New Changed   Dressing Status Clean;Dry;Intact Clean;Dry;Intact   Wound Length (cm) 0.2 cm 0.1 cm   Wound Width (cm) 0.1 cm 0.1 cm   Wound Surface Area (cm^2) 0.02 cm^2 0.01 cm^2   Wound Depth (cm) 0.1 cm 0.1 cm   Wound Volume (cm^3) 0.002 cm^3 0.001 cm^3   Wound Healing % -- 50   Margins Not attached Not attached   Shandra-wound Assessment Dry;Intact;Clean;Edema Dry;Intact;Clean;Edema   Wound Granulation Tissue Pink --   Wound Bed Granulation (%) 90 % 100 %   Wound Bed Epithelium (%) 0 % 0 %   Wound Bed Slough (%) 10 % 0 %   Wound Bed Eschar (%) 0 % 0 %   Wound Bed Fibrin (%) 0 % 0 %   State of Healing Non-healing Non-healing   Wound Odor None None   Undermining? Yes Yes   Number of Undermines 1 1   Undermine 1 Start Position 12 12   Undermine 1 End Position 12 12   Pressure Injury Stage Stage 3 Stage 3       Active Orders   Date Order Priority Status Authorizing Provider   06/13/24 1620 OP Wound Dressing Routine Active Tasha Goins APRN     - Cleansing:    Cleanse with normal saline or wound cleanser     - Dressing:    Prism collagen     - Dressing:    Dry gauze     - Frequency:    Change dressing two times per week       Wound 06/13/24 6 Leg Left (Active)   Date First Assessed/Time First Assessed: 06/13/24 1326    Wound Number (Wound Clinic Only): 6  Location:  Leg  Wound Location Orientation: Left      Assessments 6/13/2024  1:23 PM 6/20/2024  3:54 PM   Wound Image       Site Assessment Edema;Dry;Intact;Clean Edema;Dry;Intact;Clean   Drainage Amount None None   Dressing Open to air Open to air   Wound Length (cm) 18 cm 24 cm   Wound Width (cm) 17 cm 35 cm   Wound Surface Area (cm^2) 306 cm^2 840 cm^2   Margins Flat and Intact Flat and Intact   Non-staged Wound Description Partial thickness Partial thickness   Shandra-wound Assessment Edema;Fragile;Intact Edema;Fragile;Intact   Wound Bed Epithelium (%) 100 % 100 %   State of Healing Epithelialized Epithelialized   Wound Odor None None       No associated orders.       Compression Wrap 06/13/24 Leg Lower;Right (Active)   Placement Date: 06/13/24   Location: Leg  Wound Location Orientation: Lower;Right      Assessments 6/13/2024  1:23 PM 6/20/2024  3:54 PM   Response to Treatment Well tolerated Well tolerated   Compression Layers Single Single   Compression Product Type Tubigrip/Spanda Tubigrip/Spanda   Dressing Applied Yes Yes   Compression Wrap Location Toes to Knee Toes to Knee   Compression Wrap Status Clean;Dry;Intact Clean;Dry;Intact       No associated orders.       Wound 06/20/24 7 Hand Right;Posterior (Active)   Date First Assessed: 06/20/24    Wound Number (Wound Clinic Only): 7  Primary Wound Type: Skin Tear  Location: Hand  Wound Location Orientation: Right;Posterior      Assessments 6/20/2024  3:54 PM   Wound Image     Site Assessment Dry;Yellow;Red;Purple;Pink   Closure Not approximated   Drainage Amount Small   Drainage Description Serosanguineous   Treatments Cleansed;Saline   Dressing Xeroform;[place];Tape   Dressing Changed New   Dressing Status Clean;Dry;Intact   Wound Length (cm) 7 cm   Wound Width (cm) 6.5 cm   Wound Surface Area (cm^2) 45.5 cm^2   Wound Depth (cm) 0.1 cm   Wound Volume (cm^3) 4.55 cm^3   Margins Poorly defined   Non-staged Wound Description Full thickness   Shandra-wound Assessment  Fragile;Blanchable erythema   Wound Granulation Tissue Pink;Red   Wound Bed Granulation (%) 5 %   State of Healing Non-healing   Wound Odor None       No associated orders.       Wound 06/20/24 8 Right;Plantar;Lateral (Active)   Date First Assessed: 06/20/24    Wound Number (Wound Clinic Only): 8  Primary Wound Type: Pressure Injury  Wound Location Orientation: Right;Plantar;Lateral      Assessments 6/20/2024  3:54 PM   Wound Image     Site Assessment Dry;Pink   Closure Not approximated   Drainage Amount None   Treatments Cleansed;Saline   Dressing Aquacel Foam   Dressing Changed New   Dressing Status Clean;Dry;Intact   Wound Length (cm) 0.3 cm   Wound Width (cm) 0.2 cm   Wound Surface Area (cm^2) 0.06 cm^2   Wound Depth (cm) 0.1 cm   Wound Volume (cm^3) 0.006 cm^3   Margins Attached edges;Well-defined edges   Shandra-wound Assessment Clean;Dry;Intact   Wound Granulation Tissue Pink   Wound Bed Granulation (%) 100 %   Wound Bed Epithelium (%) 0 %   Wound Bed Slough (%) 0 %   Wound Bed Eschar (%) 0 %   Wound Bed Fibrin (%) 0 %   State of Healing Non-healing   Wound Odor None   Pressure Injury Stage Stage 3       No associated orders.      ASSESSMENT AND PLAN:      1. Open wound of right lower leg, subsequent encounter    2. Cellulitis of right lower leg    3. 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 2 diabetes mellitus (HCC)    4. Skin tear of left hand without complication, initial encounter    5. Blister of left lower leg, subsequent encounter    6. Diabetic ulcer of toe associated with diabetes mellitus due to underlying condition, limited to breakdown of skin, unspecified laterality ([facility])    7. Diabetic ulcer of right midfoot associated with diabetes mellitus due to underlying condition, limited to breakdown of skin ([facility])    Here today with her son for follow up on multiple wounds on her lower legs, new wound on her right hand and right plantar, lateral foot.  Completed a course of Cefadroxil last week without improvement of  the wound. Culture from last week +Serratia liquefaciens group and 4+ Candida parapsilosis. Son says she started Bactrim DS and diflucan yesterday. There is new redness on the 3rd middle toe with tenderness, callus present at tip of toe. Advised to closely monitor and take her to ED if redness worsens in any way.  Lateral Posterior (1): Wound measurements slightly increased, less slough compared to last week. Periwound erythema and tenderness improved. Very small amount of slough removed with forceps and scissors, procedure was discontinued due to patient pain level. Started Bactrim  mg BID + diflucan 100 mg daily yesterday. Dressing: zinc paste to periwound, Aquacel Ag, kerramax, kerlix secured with tape. Change 3x/week and PRN  Lateral (2): Wound is dry with scabbing. Periwound clean, dry and intact. Dressing: Xeroform covered with bordered gauze, change daily and PRN  Medial (3) and Posterior (4): White slough in wound bed, large amount of drainage, no odor. Periwound edema. Dressing: zinc paste to periwounds, Aquacel Ag, kerramax, kerlix secured with tape. Change 3x/week and PRN  Right foot- medial and lateral plantar (5): Chronic wound. Son prefers wound treatment. Dressing: Marguerite collagen with bordered foam. Change 1x/week and PRN.  Left Leg: Large blister on left lower leg. Advised to closely monitor. Encouraged leg elevation and taking diuretics as prescribed.  Right Hand: Skin tear. No surround erythema. Xeroform to cover wound, change daily.   Edema Management: Medigrip tubular compression  Discussed signs and symptoms of infection, encouraged patient and son to assess skin daily.  Discussed moisturizing skin, refrain from itching or scratching the skin.  Discussed nutrition for wound healing, encouraged reduced carbohydrate intake, increased protein intake, and healthy diet. Recommended increased vitamin intake (either with foods or vitamin supplements), need vitamin A, Bs, C, D and zinc for wound  healing.   Risks, benefits, and alternatives of current treatment plan discussed in detail.  Questions and concerns addressed. Red flags to RTC or ED reviewed.  Patient agrees to plan.      Return in about 1 week (around 6/27/2024) for 30 min APN visit x 1.    I spent 40 minutes with the patient. This time included:  preparing to see the patient (eg, review notes and recent diagnostics), seeing the patient, performing a medically appropriate examination and/or evaluation, counseling and educating the patient, and documenting in the record.    NOTE TO PATIENT: The 21st Century Cures Act makes clinical notes like these available to patients in the interest of transparency. Clinical notes are medical documents used by physicians and care providers to communicate with each other. These documents include medical language and terminology, abbreviations, and treatment information that may sound technical and at times possibly unfamiliar. In addition, at times, the verbiage may appear blunt or direct. These documents are one tool providers use to communicate relevant information and clinical opinions of the care providers in a way that allows common understanding of the clinical context.

## 2024-06-26 ENCOUNTER — TELEPHONE (OUTPATIENT)
Dept: INTERNAL MEDICINE CLINIC | Facility: CLINIC | Age: 89
End: 2024-06-26

## 2024-06-26 DIAGNOSIS — M79.606 PAIN OF LOWER EXTREMITY, UNSPECIFIED LATERALITY: Primary | ICD-10-CM

## 2024-06-26 RX ORDER — TRAMADOL HYDROCHLORIDE 50 MG/1
50 TABLET ORAL EVERY 8 HOURS PRN
Qty: 20 TABLET | Refills: 0 | Status: SHIPPED | OUTPATIENT
Start: 2024-06-26

## 2024-06-26 NOTE — TELEPHONE ENCOUNTER
Please advise - called son per HIPAA who states Tylenol not working for wounds. Patient seen by REMBERTO , also goes to wound clinic - he woul hu stronger medication   Will be using [facility] on [address]- to

## 2024-06-26 NOTE — TELEPHONE ENCOUNTER
Patients son Nicholas called, patient is complaining of severe right leg pain from the wounds.  Patient is taking Tylenol only with no relief    Requesting a prescription for a stronger pain medication, please send to [facility]

## 2024-06-27 ENCOUNTER — APPOINTMENT (OUTPATIENT)
Dept: WOUND CARE | Facility: HOSPITAL | Age: 89
End: 2024-06-27
Attending: NURSE PRACTITIONER
Payer: MEDICARE

## 2024-07-01 NOTE — TELEPHONE ENCOUNTER
Called son per HIPAA and relayed DR. Garduno message - verbalized understanding and will relay message to patient

## 2024-07-02 ENCOUNTER — OFFICE VISIT (OUTPATIENT)
Dept: WOUND CARE | Facility: HOSPITAL | Age: 89
End: 2024-07-02
Attending: NURSE PRACTITIONER
Payer: MEDICARE

## 2024-07-02 ENCOUNTER — APPOINTMENT (OUTPATIENT)
Dept: ULTRASOUND IMAGING | Facility: HOSPITAL | Age: 89
End: 2024-07-02
Attending: STUDENT IN AN ORGANIZED HEALTH CARE EDUCATION/TRAINING PROGRAM
Payer: MEDICARE

## 2024-07-02 ENCOUNTER — HOSPITAL ENCOUNTER (INPATIENT)
Facility: HOSPITAL | Age: 89
LOS: 10 days | Discharge: NURSING, REHAB OR LONG-TERM CARE | End: 2024-07-12
Attending: STUDENT IN AN ORGANIZED HEALTH CARE EDUCATION/TRAINING PROGRAM | Admitting: HOSPITALIST
Payer: MEDICARE

## 2024-07-02 VITALS
SYSTOLIC BLOOD PRESSURE: 142 MMHG | HEART RATE: 81 BPM | DIASTOLIC BLOOD PRESSURE: 61 MMHG | OXYGEN SATURATION: 98 % | TEMPERATURE: 98 F | RESPIRATION RATE: 17 BRPM

## 2024-07-02 DIAGNOSIS — R60.0 EDEMA OF BOTH LOWER LEGS: ICD-10-CM

## 2024-07-02 DIAGNOSIS — S81.801D MULTIPLE OPEN WOUNDS OF RIGHT LOWER EXTREMITY, SUBSEQUENT ENCOUNTER: Primary | ICD-10-CM

## 2024-07-02 DIAGNOSIS — S81.802S MULTIPLE OPEN WOUNDS OF LOWER LEG, LEFT, SEQUELA: ICD-10-CM

## 2024-07-02 DIAGNOSIS — L03.115 CELLULITIS OF RIGHT LOWER EXTREMITY: Primary | ICD-10-CM

## 2024-07-02 PROBLEM — L03.90 CELLULITIS: Status: ACTIVE | Noted: 2024-07-02

## 2024-07-02 PROBLEM — L03.90 CELLULITIS: Status: ACTIVE | Noted: 2024-01-01

## 2024-07-02 LAB
ANION GAP SERPL CALC-SCNC: 7 MMOL/L (ref 0–18)
BASOPHILS # BLD AUTO: 0.03 X10(3) UL (ref 0–0.2)
BASOPHILS NFR BLD AUTO: 0.6 %
BNP SERPL-MCNC: 81 PG/ML
BUN BLD-MCNC: 23 MG/DL (ref 9–23)
BUN/CREAT SERPL: 18 (ref 10–20)
CALCIUM BLD-MCNC: 8.8 MG/DL (ref 8.7–10.4)
CHLORIDE SERPL-SCNC: 96 MMOL/L (ref 98–112)
CO2 SERPL-SCNC: 22 MMOL/L (ref 21–32)
CREAT BLD-MCNC: 1.28 MG/DL
DEPRECATED RDW RBC AUTO: 49.7 FL (ref 35.1–46.3)
EGFRCR SERPLBLD CKD-EPI 2021: 39 ML/MIN/1.73M2 (ref 60–?)
EOSINOPHIL # BLD AUTO: 0.17 X10(3) UL (ref 0–0.7)
EOSINOPHIL NFR BLD AUTO: 3.1 %
ERYTHROCYTE [DISTWIDTH] IN BLOOD BY AUTOMATED COUNT: 15.1 % (ref 11–15)
GLUCOSE BLD-MCNC: 107 MG/DL (ref 70–99)
HCT VFR BLD AUTO: 26.7 %
HGB BLD-MCNC: 9.1 G/DL
IMM GRANULOCYTES # BLD AUTO: 0.03 X10(3) UL (ref 0–1)
IMM GRANULOCYTES NFR BLD: 0.6 %
LYMPHOCYTES # BLD AUTO: 0.72 X10(3) UL (ref 1–4)
LYMPHOCYTES NFR BLD AUTO: 13.3 %
MCH RBC QN AUTO: 30.3 PG (ref 26–34)
MCHC RBC AUTO-ENTMCNC: 34.1 G/DL (ref 31–37)
MCV RBC AUTO: 89 FL
MONOCYTES # BLD AUTO: 0.41 X10(3) UL (ref 0.1–1)
MONOCYTES NFR BLD AUTO: 7.6 %
NEUTROPHILS # BLD AUTO: 4.04 X10 (3) UL (ref 1.5–7.7)
NEUTROPHILS # BLD AUTO: 4.04 X10(3) UL (ref 1.5–7.7)
NEUTROPHILS NFR BLD AUTO: 74.8 %
OSMOLALITY SERPL CALC.SUM OF ELEC: 264 MOSM/KG (ref 275–295)
PLATELET # BLD AUTO: 339 10(3)UL (ref 150–450)
PLATELET MORPHOLOGY: NORMAL
POTASSIUM SERPL-SCNC: 5.2 MMOL/L (ref 3.5–5.1)
RBC # BLD AUTO: 3 X10(6)UL
SODIUM SERPL-SCNC: 125 MMOL/L (ref 136–145)
WBC # BLD AUTO: 5.4 X10(3) UL (ref 4–11)

## 2024-07-02 PROCEDURE — 93971 EXTREMITY STUDY: CPT | Performed by: STUDENT IN AN ORGANIZED HEALTH CARE EDUCATION/TRAINING PROGRAM

## 2024-07-02 PROCEDURE — 99223 1ST HOSP IP/OBS HIGH 75: CPT | Performed by: HOSPITALIST

## 2024-07-02 PROCEDURE — 99213 OFFICE O/P EST LOW 20 MIN: CPT | Performed by: NURSE PRACTITIONER

## 2024-07-02 RX ORDER — ATORVASTATIN CALCIUM 10 MG/1
10 TABLET, FILM COATED ORAL NIGHTLY
Status: DISCONTINUED | OUTPATIENT
Start: 2024-07-02 | End: 2024-07-12

## 2024-07-02 RX ORDER — ACETAMINOPHEN 500 MG
500 TABLET ORAL EVERY 4 HOURS PRN
Status: DISCONTINUED | OUTPATIENT
Start: 2024-07-02 | End: 2024-07-12

## 2024-07-02 RX ORDER — HYDROCODONE BITARTRATE AND ACETAMINOPHEN 5; 325 MG/1; MG/1
1 TABLET ORAL ONCE
Status: COMPLETED | OUTPATIENT
Start: 2024-07-02 | End: 2024-07-02

## 2024-07-02 RX ORDER — HYDROCODONE BITARTRATE AND ACETAMINOPHEN 5; 325 MG/1; MG/1
1 TABLET ORAL EVERY 6 HOURS PRN
Status: DISCONTINUED | OUTPATIENT
Start: 2024-07-02 | End: 2024-07-10

## 2024-07-02 RX ORDER — MORPHINE SULFATE 2 MG/ML
2 INJECTION, SOLUTION INTRAMUSCULAR; INTRAVENOUS ONCE
Status: COMPLETED | OUTPATIENT
Start: 2024-07-02 | End: 2024-07-02

## 2024-07-02 RX ORDER — HEPARIN SODIUM 5000 [USP'U]/ML
5000 INJECTION, SOLUTION INTRAVENOUS; SUBCUTANEOUS EVERY 12 HOURS SCHEDULED
Status: DISCONTINUED | OUTPATIENT
Start: 2024-07-02 | End: 2024-07-09

## 2024-07-02 RX ORDER — GABAPENTIN 100 MG/1
100 CAPSULE ORAL NIGHTLY
Status: DISCONTINUED | OUTPATIENT
Start: 2024-07-02 | End: 2024-07-12

## 2024-07-02 RX ORDER — MELATONIN
3 NIGHTLY
Status: DISCONTINUED | OUTPATIENT
Start: 2024-07-02 | End: 2024-07-12

## 2024-07-02 RX ORDER — METOCLOPRAMIDE HYDROCHLORIDE 5 MG/ML
10 INJECTION INTRAMUSCULAR; INTRAVENOUS EVERY 8 HOURS PRN
Status: DISCONTINUED | OUTPATIENT
Start: 2024-07-02 | End: 2024-07-12

## 2024-07-02 RX ORDER — SODIUM CHLORIDE 9 MG/ML
INJECTION, SOLUTION INTRAVENOUS ONCE
Status: COMPLETED | OUTPATIENT
Start: 2024-07-02 | End: 2024-07-03

## 2024-07-02 RX ORDER — ONDANSETRON 2 MG/ML
4 INJECTION INTRAMUSCULAR; INTRAVENOUS EVERY 6 HOURS PRN
Status: DISCONTINUED | OUTPATIENT
Start: 2024-07-02 | End: 2024-07-12

## 2024-07-02 NOTE — ED INITIAL ASSESSMENT (HPI)
Pt arrives through triage with son      complaints of worsening bilateral lower leg wounds. Per son, they were at the wound clinic and were advised to come to the ed for possible abx. Son denies fevers    Pt [place]

## 2024-07-02 NOTE — PROGRESS NOTES
Chief Complaint   Patient presents with    Wound Care     HPI:   7/2/24: F/u multiple wounds. Wasn't feeling well last week, rescheduled to today. Continues to have pain on right posterior calf wound. Says she has been taking stronger pain medicine. HH RN changed dressing 4 days ago. Has a new bruise on her left 3rd toe and hematoma on left posterior calf, patient is not sure what happened.     6/23/24: F/u visit for multiple wounds. Has new wound on her right hand, happened in the middle of the night, son isn't sure what happened. She reports her legs are in pain from the compression wrap that was placed by HH. She denies fever, other than pain is feeling well.     6/13/24: 89 yr old female here today for eval of wounds on left lower extremity. Accompanied by her son. PMHx includes HTN, SVT, CAD, T2DM, CKD, asthma w/COPD, neuropathy, b/l hearing loss (using white board to communicate). Her son reports wounds developed on her right leg a few weeks ago, maybe longer, but have recently worsened. She completed a course of Cefadroxil yesterday as prescribed by he PCP. Right lower leg remains tender to touch. Swelling worsened in both legs recently, but has improved since the onset. She has the same chronic wound on the arch of her foot as she did at her last visit. Her son would like the wound to be treated if possible. She continues to see a podiatrist for nail care.     Lab Results   Component Value Date    BUN 18 06/13/2024    CREATSERUM 0.70 06/13/2024    ALB 4.4 04/22/2024    TP 7.1 04/22/2024    A1C 6.6 (H) 04/22/2024       Current Outpatient Medications:     traMADol 50 MG Oral Tab, Take 1 tablet (50 mg total) by mouth every 8 (eight) hours as needed for Pain., Disp: 20 tablet, Rfl: 0    Nystatin 447387 UNIT/GM External Powder, Apply powder to right lower leg posterior calf wound with dressing changes, Disp: 30 g, Rfl: 1    cholecalciferol 25 MCG (1000 UT) Oral Tab, Take 2 tablets (2,000 Units total) by mouth  daily., Disp: 180 tablet, Rfl: 3    furosemide 20 MG Oral Tab, Take 1 tablet (20 mg total) by mouth daily for 5 days., Disp: 5 tablet, Rfl: 0    atorvastatin 10 MG Oral Tab, Take 1 tablet (10 mg total) by mouth nightly., Disp: 90 tablet, Rfl: 3    loratadine 10 MG Oral Tab, Take 1 tablet (10 mg total) by mouth daily., Disp: 90 tablet, Rfl: 3    Ferrous Sulfate (FEROSUL) 325 (65 Fe) MG Oral Tab, Take 1 tablet (325 mg total) by mouth daily., Disp: 90 tablet, Rfl: 3    dilTIAZem HCl  MG Oral Tablet 24 Hr, Take 1 tablet by mouth every morning., Disp: 90 tablet, Rfl: 3    lactulose 10 GM/15ML Oral Solution, Take 30 mL (20 g total) by mouth daily as needed (constipated bowel)., Disp: 237 mL, Rfl: 3    triamcinolone 0.1 % External Cream, Apply 1 Application topically 2 (two) times daily as needed. APPLY TO AFFECTED AREA, Disp: 15 g, Rfl: 1    enalapril 5 MG Oral Tab, Take 0.5 tablets (2.5 mg total) by mouth daily., Disp: , Rfl:     hydrocortisone 2.5 % External Cream, APPLY RECTALLY TWICE DAILY AS NEEDED, Disp: 30 g, Rfl: 2    FLUTICASONE PROPIONATE 50 MCG/ACT Nasal Suspension, SPRAY 2 SPRAYS INTO EACH NOSTRIL  IN THE MORNING AND 2 SPRAYS BEFORE BEDTIME (Patient taking differently: 2 sprays by Nasal route as needed.), Disp: 16 g, Rfl: 0    LUMIGAN 0.01 % Ophthalmic Solution, Place 1 drop into both eyes every evening., Disp: , Rfl:     apixaban (ELIQUIS) 2.5 MG Oral Tab, Take 1 tablet (2.5 mg total) by mouth 2 (two) times daily., Disp: 180 tablet, Rfl: 3    gabapentin 100 MG Oral Cap, Take 1 capsule (100 mg total) by mouth nightly., Disp: 90 capsule, Rfl: 3    sennosides-docusate (SENNA PLUS) 8.6-50 MG Oral Tab, Take 2 tablets twice daily as needed for constipation, Disp: 100 tablet, Rfl: 3    Fluticasone Propionate HFA (FLOVENT HFA) 220 MCG/ACT Inhalation Aerosol, Inhale 1 puff into the lungs 2 (two) times daily. (Patient taking differently: Inhale 1 puff into the lungs as needed.), Disp: 1 each, Rfl: 3    Wheat  Dextrin (BENEFIBER) Oral Powder, Take 2 teaspoons daily., Disp: , Rfl: 0    Cyanocobalamin (VITAMIN B12) 1000 MCG Oral Tab CR, Take 1 tablet by mouth daily., Disp: , Rfl: 0    acetaminophen 500 MG Oral Tab, Take 1 tablet (500 mg total) by mouth in the morning and 1 tablet (500 mg total) before bedtime., Disp: , Rfl:     melatonin 3 MG Oral Tab, Take 1 tablet (3 mg total) by mouth nightly., Disp: , Rfl:     ascorbic acid 500 MG Oral Tab, Take 1 tablet (500 mg total) by mouth daily., Disp: 30 tablet, Rfl: 0    Polyethylene Glycol 3350 17 GM/SCOOP Oral Powder, Take 17 g by mouth daily. take 15 milliliter (17G)  by oral route  every day powder mixed with 8 oz. water, juice, soda, coffee, or tea, Disp: , Rfl:     Allergies   Allergen Reactions    Latex HIVES and UNKNOWN     Other reaction(s): Unknown    Mastisol Adhesive HIVES     Other reaction(s): ADHESIVE TAPE    Adhesive Tape OTHER (SEE COMMENTS)     Other reaction(s): ADHESIVE TAPE      REVIEW OF SYSTEMS:   Review of Systems   Constitutional:  Negative for appetite change, chills and fever.   HENT:  Positive for hearing loss.    Respiratory:  Negative for shortness of breath.    Cardiovascular:  Negative for chest pain.   Gastrointestinal:  Negative for abdominal pain, diarrhea and vomiting.   Musculoskeletal:  Positive for arthralgias and gait problem.   Skin:  Positive for wound. Negative for rash.      PHYSICAL EXAM:   /61 (BP Location: Right arm, Patient Position: Sitting, Cuff Size: adult)   Pulse 81   Temp 97.7 °F (36.5 °C) (Oral)   Resp 17   LMP  (LMP Unknown)   SpO2 98%    Estimated body mass index is 22.62 kg/m² as calculated from the following:    Height as of an earlier encounter on 7/2/24: 59\".    Weight as of an earlier encounter on 7/2/24: 112 lb.   Vital signs reviewed.Appears stated age, well groomed.    Physical Exam  Constitutional:       Appearance: Normal appearance.   HENT:      Right Ear: Decreased hearing noted.      Left Ear:  Decreased hearing noted.   Cardiovascular:      Pulses:           Dorsalis pedis pulses are 2+ on the left side.        Posterior tibial pulses are 2+ on the left side.      Comments: B/l PT/DP pulses with biphasic wave sounds  Pulmonary:      Effort: Pulmonary effort is normal.   Musculoskeletal:      Right lower leg: Edema present.      Left lower leg: Edema present.   Skin:     General: Skin is warm and dry.      Capillary Refill: Capillary refill takes 2 to 3 seconds.      Findings: Wound (b/l Lower extremities, see wound care flowsheet) present. No rash.   Neurological:      General: No focal deficit present.      Mental Status: She is alert and oriented to person, place, and time.   Psychiatric:         Mood and Affect: Mood normal.         Behavior: Behavior normal.        Wound 06/13/24 1 Leg Right;Lateral;Posterior (Active)   Date First Assessed/Time First Assessed: 06/13/24 1319    Wound Number (Wound Clinic Only): 1  Location: Leg  Wound Location Orientation: Right;Lateral;Posterior      Assessments 6/13/2024  1:23 PM 7/2/2024  3:40 PM   Wound Image       Site Assessment Moist;Red;Yellow;White;Purple Moist;Yellow;White;Purple;Pink;Pale   Closure Not approximated Not approximated   Drainage Amount Large Large   Drainage Description Clear Clear   Treatments Cleansed;Saline;Compression;Topical (Barrier/Moisturizer/Ointment) Cleansed;Saline;Vashe   Dressing Hydrofiber with silver;Kerlix roll;Tape 4x4s;Gauze;Kerlix roll;Tape   Dressing Changed New Changed   Dressing Status Clean;Dry;Intact Clean;Dry;Intact   Wound Length (cm) 7.4 cm 8 cm   Wound Width (cm) 4 cm 4.6 cm   Wound Surface Area (cm^2) 29.6 cm^2 36.8 cm^2   Wound Depth (cm) 0.1 cm 0.1 cm   Wound Volume (cm^3) 2.96 cm^3 3.68 cm^3   Wound Healing % -- -24   Margins Well-defined edges Well-defined edges   Non-staged Wound Description Full thickness Full thickness   Shandra-wound Assessment Fragile;Edema;Intact Fragile;Edema;Intact   Wound Granulation  Tissue Red Pink   Wound Bed Granulation (%) 10 % 10 %   Wound Bed Epithelium (%) 0 % 0 %   Wound Bed Slough (%) 90 % 90 %   Wound Bed Eschar (%) 0 % 0 %   Wound Bed Fibrin (%) 0 % 0 %   State of Healing Non-healing Non-healing   Wound Odor None None       Active Orders   Date Order Priority Status Authorizing Provider   06/13/24 1620 OP Wound Dressing Routine Active Tasha Goins APRN     - Cleansing:    Cleanse with normal saline or wound cleanser     - Dressing:    Kerramax/Super absorbent     - Dressing:    Kerlix     - Additional Wound Dressing Information:    Hydrofiber with silver     - Frequency:    Change dressing two times per week       Wound 06/13/24 2 Leg Right;Lateral (Active)   Date First Assessed/Time First Assessed: 06/13/24 1319    Wound Number (Wound Clinic Only): 2  Location: Leg  Wound Location Orientation: Right;Lateral      Assessments 6/13/2024  1:23 PM 7/2/2024  3:40 PM   Wound Image       Site Assessment Dry;Yellow Dry;Yellow   Closure Not approximated Not approximated   Drainage Amount Scant None   Drainage Description Yellow Scabbed   Treatments Cleansed;Saline;Compression Cleansed;Saline;Vashe   Dressing Xeroform 4x4s;Gauze;Kerlix roll;Tape   Dressing Changed New Changed   Dressing Status Clean;Dry;Intact Clean;Dry;Intact   Wound Length (cm) 0.7 cm 1.4 cm   Wound Width (cm) 1.5 cm 0.9 cm   Wound Surface Area (cm^2) 1.05 cm^2 1.26 cm^2   Wound Depth (cm) 0 cm 0 cm   Wound Volume (cm^3) 0 cm^3 0 cm^3   Margins Flat and Intact Flat and Intact   Non-staged Wound Description Full thickness Full thickness   Shandra-wound Assessment Fragile;Edema;Intact Fragile;Edema;Intact   Wound Bed Granulation (%) 0 % 0 %   Wound Bed Epithelium (%) 0 % 0 %   Wound Bed Slough (%) 100 % 100 %   Wound Bed Eschar (%) 0 % 0 %   Wound Bed Fibrin (%) 0 % 0 %   State of Healing Non-healing Non-healing   Wound Odor None None       Active Orders   Date Order Priority Status Authorizing Provider   06/13/24 1620 OP  Wound Dressing Routine Active Tasha Goins APRN     - Cleansing:    Cleanse with normal saline or wound cleanser     - Dressing:    xeroform gauze     - Dressing:    Bordered gauze     - Frequency:    Change dressing two times per week       Wound 06/13/24 3 Leg Right;Medial (Active)   Date First Assessed/Time First Assessed: 06/13/24 1320    Wound Number (Wound Clinic Only): 3  Location: Leg  Wound Location Orientation: Right;Medial      Assessments 6/13/2024  1:23 PM 7/2/2024  3:40 PM   Wound Image       Site Assessment Moist;Yellow;Pink Moist;Yellow   Closure Not approximated Not approximated   Drainage Amount Large Large   Drainage Description Clear Clear   Treatments Cleansed;Saline;Compression;Compression Sleeve;Topical (Barrier/Moisturizer/Ointment) Cleansed;Saline;Vashe   Dressing Hydrofiber with silver;Kerlix roll;Tape 4x4s;Gauze;Kerlix roll;Tape   Dressing Changed New Changed   Dressing Status Clean;Dry;Intact Clean;Dry;Intact   Wound Length (cm) 3.7 cm 4.2 cm   Wound Width (cm) 3 cm 6.6 cm   Wound Surface Area (cm^2) 11.1 cm^2 27.72 cm^2   Wound Depth (cm) 0.2 cm 0.2 cm   Wound Volume (cm^3) 2.22 cm^3 5.544 cm^3   Wound Healing % -- -150   Margins Well-defined edges Poorly defined   Non-staged Wound Description Full thickness Full thickness   Shandra-wound Assessment Edema;Fragile;Pink Edema;Fragile;Pink   Wound Granulation Tissue Pink --   Wound Bed Granulation (%) 10 % 0 %   Wound Bed Epithelium (%) 0 % 0 %   Wound Bed Slough (%) 90 % 100 %   Wound Bed Eschar (%) 0 % 0 %   Wound Bed Fibrin (%) 0 % 0 %   State of Healing Non-healing Non-healing   Wound Odor None None       Active Orders   Date Order Priority Status Authorizing Provider   06/13/24 1620 OP Wound Dressing Routine Active Tasha Goins APRN     - Cleansing:    Cleanse with normal saline or wound cleanser     - Dressing:    Kerramax/Super absorbent     - Dressing:    Kerlix     - Additional Wound Dressing Information:    Hydrofiber with  silver     - Frequency:    Change dressing two times per week       Wound 06/13/24 5 Foot Right;Plantar;Medial (Active)   Date First Assessed/Time First Assessed: 06/13/24 1320    Wound Number (Wound Clinic Only): 5  Location: Foot  Wound Location Orientation: Right;Plantar;Medial      Assessments 6/13/2024  1:23 PM 7/2/2024  3:40 PM   Wound Image       Site Assessment Dry;Pink;Yellow Dry;Pink   Closure Not approximated Not approximated   Drainage Amount Scant None   Drainage Description Yellow --   Treatments Cleansed;Saline;Compression Cleansed;Saline   Dressing Aquacel Foam 4x4s;Gauze;Kerlix roll;Tape   Dressing Changed New Changed   Dressing Status Clean;Dry;Intact Clean;Dry;Intact   Wound Length (cm) 0.2 cm 0.1 cm   Wound Width (cm) 0.1 cm 0.1 cm   Wound Surface Area (cm^2) 0.02 cm^2 0.01 cm^2   Wound Depth (cm) 0.1 cm 0.2 cm   Wound Volume (cm^3) 0.002 cm^3 0.002 cm^3   Wound Healing % -- 0   Margins Not attached --   Shandra-wound Assessment Dry;Intact;Clean;Edema Dry;Intact;Clean;Edema   Wound Granulation Tissue Pink --   Wound Bed Granulation (%) 90 % 0 %   Wound Bed Epithelium (%) 0 % 100 %   Wound Bed Slough (%) 10 % 0 %   Wound Bed Eschar (%) 0 % 0 %   Wound Bed Fibrin (%) 0 % 0 %   State of Healing Non-healing Epithelialized   Wound Odor None None   Undermining? Yes --   Number of Undermines 1 --   Undermine 1 Start Position 12 --   Undermine 1 End Position 12 --   Pressure Injury Stage Stage 3 --       Active Orders   Date Order Priority Status Authorizing Provider   06/13/24 1620 OP Wound Dressing Routine Active Tasha Goins APRN     - Cleansing:    Cleanse with normal saline or wound cleanser     - Dressing:    Prism collagen     - Dressing:    Dry gauze     - Frequency:    Change dressing two times per week       Wound 06/13/24 6 Leg Left;Anterior;Lower (Active)   Date First Assessed/Time First Assessed: 06/13/24 1326    Wound Number (Wound Clinic Only): 6  Location: Leg  Wound Location  Orientation: Left;Anterior;Lower      Assessments 6/13/2024  1:23 PM 7/2/2024  3:40 PM   Wound Image       Site Assessment Edema;Dry;Intact;Clean Edema;Dry;Intact;Clean   Drainage Amount None None   Dressing Open to air Open to air   Wound Length (cm) 18 cm --   Wound Width (cm) 17 cm --   Wound Surface Area (cm^2) 306 cm^2 --   Margins Flat and Intact Flat and Intact   Non-staged Wound Description Partial thickness --   Shandra-wound Assessment Edema;Fragile;Intact Edema;Fragile;Intact   Wound Bed Epithelium (%) 100 % 100 %   State of Healing Epithelialized Epithelialized   Wound Odor None None       No associated orders.       Wound 06/20/24 7 Hand Right;Posterior (Active)   Date First Assessed: 06/20/24    Wound Number (Wound Clinic Only): 7  Primary Wound Type: Skin Tear  Location: Hand  Wound Location Orientation: Right;Posterior      Assessments 6/20/2024  3:54 PM 7/2/2024  3:40 PM   Wound Image       Site Assessment Dry;Yellow;Red;Purple;Pink Dry;Yellow;Red;Purple;Pink   Closure Not approximated Not approximated   Drainage Amount Small Scant   Drainage Description Serosanguineous Serosanguineous   Treatments Cleansed;Saline Cleansed;Saline   Dressing Xeroform;[place];Tape Xeroform;[place];Tape   Dressing Changed New Changed   Dressing Status Clean;Dry;Intact Clean;Dry;Intact   Wound Length (cm) 7 cm 4.2 cm   Wound Width (cm) 6.5 cm 3.6 cm   Wound Surface Area (cm^2) 45.5 cm^2 15.12 cm^2   Wound Depth (cm) 0.1 cm 0.2 cm   Wound Volume (cm^3) 4.55 cm^3 3.024 cm^3   Wound Healing % -- 34   Margins Poorly defined Poorly defined   Non-staged Wound Description Full thickness Full thickness   Shandra-wound Assessment Fragile;Blanchable erythema Fragile;Blanchable erythema   Wound Granulation Tissue Pink;Red Pink;Red   Wound Bed Granulation (%) 5 % 80 %   Wound Bed Epithelium (%) -- 0 %   Wound Bed Slough (%) -- 20 %   Wound Bed Eschar (%) -- 0 %   Wound Bed Fibrin (%) -- 0 %   State of Healing Non-healing Non-healing   Wound  Odor None None       No associated orders.       Wound 06/20/24 8 Right;Plantar;Lateral (Active)   Date First Assessed: 06/20/24    Wound Number (Wound Clinic Only): 8  Primary Wound Type: Pressure Injury  Wound Location Orientation: Right;Plantar;Lateral      Assessments 6/20/2024  3:54 PM 7/2/2024  3:40 PM   Wound Image       Site Assessment Dry;Pink Dry;Pink   Closure Not approximated Approximated   Drainage Amount None None   Treatments Cleansed;Saline Cleansed;Saline   Dressing Aquacel Foam 4x4s;Gauze;Kerlix roll;Tape   Dressing Changed New Changed   Dressing Status Clean;Dry;Intact Clean;Dry;Intact   Wound Length (cm) 0.3 cm 1.2 cm   Wound Width (cm) 0.2 cm 0.6 cm   Wound Surface Area (cm^2) 0.06 cm^2 0.72 cm^2   Wound Depth (cm) 0.1 cm 0 cm   Wound Volume (cm^3) 0.006 cm^3 0 cm^3   Wound Healing % -- 100   Margins Attached edges;Well-defined edges Flat and Intact   Shandra-wound Assessment Clean;Dry;Intact Clean;Dry;Intact   Wound Granulation Tissue Pink --   Wound Bed Granulation (%) 100 % 0 %   Wound Bed Epithelium (%) 0 % 100 %   Wound Bed Slough (%) 0 % 0 %   Wound Bed Eschar (%) 0 % 0 %   Wound Bed Fibrin (%) 0 % 0 %   State of Healing Non-healing Epithelialized   Wound Odor None None   Pressure Injury Stage Stage 3 --       No associated orders.       Wound 07/02/24 9 Toe (Comment which one) (Active)   Date First Assessed: 07/02/24    Wound Number (Wound Clinic Only): 9  Location: Toe (Comment which one)  Wound Description (Comments): 2nd and 3rd toes      Assessments 7/2/2024  3:40 PM   Wound Image      Site Assessment Purple;Dry;Intact   Closure Approximated   Drainage Amount None   Dressing Open to air   Margins Flat and Intact   Non-staged Wound Description Not applicable   Shandra-wound Assessment Ecchymosis;Purple;Swelling   Pressure Injury Stage Deep Tissue       No associated orders.       Wound 07/02/24 10 Leg Left;Medial;Lower (Active)   Date First Assessed: 07/02/24    Wound Number (Wound Clinic  Only): 10  Primary Wound Type: Venous Ulcer  Location: Leg  Wound Location Orientation: Left;Medial;Lower      Assessments 7/2/2024  3:40 PM   Wound Image     Site Assessment Edema;Moist;White   Closure Not approximated   Drainage Amount Copious   Drainage Description Serous   Treatments Cleansed;Saline;Vashe   Dressing 4x4s;Gauze;Kerlix roll;Tape   Dressing Changed New   Dressing Status Clean;Dry;Intact   Wound Length (cm) 0.6 cm   Wound Width (cm) 0.5 cm   Wound Surface Area (cm^2) 0.3 cm^2   Wound Depth (cm) 0.2 cm   Wound Volume (cm^3) 0.06 cm^3   Margins Well-defined edges   Non-staged Wound Description Full thickness   Shandra-wound Assessment Edema;Moist   State of Healing Non-healing   Wound Odor None       No associated orders.       Wound 07/02/24 11 Leg Left;Lower;Posterior (Active)   Date First Assessed: 07/02/24    Wound Number (Wound Clinic Only): 11  Primary Wound Type: Venous Ulcer  Location: Leg  Wound Location Orientation: Left;Lower;Posterior      Assessments 7/2/2024  3:40 PM   Wound Image     Site Assessment Moist;Red;Purple;Pink;Edema   Closure Not approximated   Drainage Amount Moderate   Drainage Description Serosanguineous   Treatments Cleansed;Saline;Vashe   Dressing 4x4s;Gauze;Kerlix roll;Tape;Xeroform;ABD Pad   Dressing Changed Changed   Dressing Status Clean;Dry;Intact   Wound Length (cm) 5 cm   Wound Width (cm) 3.5 cm   Wound Surface Area (cm^2) 17.5 cm^2   Wound Depth (cm) 0.1 cm   Wound Volume (cm^3) 1.75 cm^3   Margins Attached edges   Non-staged Wound Description Full thickness   Shandra-wound Assessment Fragile;Ecchymosis;Swelling   Wound Granulation Tissue Red   Wound Bed Granulation (%) 100 %   Wound Bed Epithelium (%) 0 %   Wound Bed Slough (%) 0 %   Wound Bed Eschar (%) 0 %   Wound Bed Fibrin (%) 0 %   State of Healing Non-healing   Wound Odor None       No associated orders.      ASSESSMENT AND PLAN:      1. Multiple open wounds of right lower extremity, subsequent encounter    2.  Multiple open wounds of lower leg, left, sequela    3. Edema of both lower legs    Here today with her son for follow up on multiple wounds on her lower legs. Concern for worsening right posterior calf, medial and medial posterior wounds along with new wounds on the left posterior calf and medial lower leg with increased drainage.  She has now completed a course of Cefadroxil and was treated with Bactrim DS and diflucan based on culture from 6/16/24 (+Serratia liquefaciens group and 4+ Candida parapsilosis). Referred patient to ED for further evaluation and management due to worsening wounds and uncontrolled lower leg edema. Pt's son declined EMS for transportation and will transport her to ED via wheelchair from clinic. Wounds were dressed with moist Vashe gauze except for right hand and left posterior leg where a xeroform were placed.   I spent 30 minutes with the patient. This time included:  preparing to see the patient (eg, review notes and recent diagnostics), seeing the patient, performing a medically appropriate examination and/or evaluation, counseling and educating the patient, and documenting in the record.    NOTE TO PATIENT: The 21st Century Cures Act makes clinical notes like these available to patients in the interest of transparency. Clinical notes are medical documents used by physicians and care providers to communicate with each other. These documents include medical language and terminology, abbreviations, and treatment information that may sound technical and at times possibly unfamiliar. In addition, at times, the verbiage may appear blunt or direct. These documents are one tool providers use to communicate relevant information and clinical opinions of the care providers in a way that allows common understanding of the clinical context.

## 2024-07-02 NOTE — ED PROVIDER NOTES
Patient Seen in: [facility] Emergency Department      History     Chief Complaint   Patient presents with    Wound Care     Stated Complaint: Wound Care    Subjective:   HPI    89-year-old female with history of hypertension, CAD, T2DM, CKD, COPD, presenting for evaluation of worsening leg wounds.  She was sent in from home by home health nurse practitioner who saw patient for wound care today.  Patient complains of generalized weakness malaise and worsening wounds.  No fevers chills or night sweats.  She recently completed a course of cefadroxil, Bactrim DS and Diflucan based on culture from 6/1624 with Serratia, Candida.     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 Doss-Stent PCI of LAD and RCA was performed.    Deaf    Diverticulitis    hospitalized 3/10 and 5/10    Diverticulosis    cscopy '03, '07, '10    Fracture of C2 vertebra, closed ([facility])    ER 3/19/21--CT cervical spine-- fx C2 posterior lateral vertebral body and left C2 lamina.      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    Renal calculus    on CT abd 5/10-6 mm or less in L lower kidney     Renal insufficiency    Shingles    Small bowel obstruction (HCC)    multiple hospitalizations for [id number], 6/2016    Stress incontinence, female    SVT (supraventricular tachycardia) ([facility])    event monitor 2/2016-SVT    Syncope    48-hour Holter 5/19/20--bursts of atrial tachycardia up to 3 minutes, PVCs, rare Wenkebach.  Saw Dr. Doss.    Type 2 diabetes mellitus ([facility])    Uterine prolapse    Uterine prolapse    had pessary in 2006 Dr. SORAYA Davis -vag sling procedure Dr Baez in 6/2016    Uterovaginal prolapse, incomplete    UTI due to Klebsiella species    ESBL Klebsiella UTI treated in [facility] with meropenem when in hosp for enteritis. (Dr Carvalho).     Wound of right leg              Past Surgical History:   Procedure Laterality Date    Abdomen surgery proc unlisted  2007    laparotomy for TERESA and internal hernia    Abdomen surgery proc unlisted  2009    TERESA and small bowel resection-Dr Jaramillo    Appendectomy      Cholecystectomy      Fracture surgery Left 1/2017    L hip fx pinning 1/2017 Dr. Hernandez    Knee replacement surgery Left 2000    Knee replacement surgery Right 2008    Other surgical history      cystocele repair    Other surgical history Bilateral     ear surgery-Dr. Patel-both eardrums    Sling  6/30/2016    vag sling procedure at [facility]  -Dr Baez                Social History     Socioeconomic History    Marital status:    Tobacco Use    Smoking status: Never    Smokeless tobacco: Never   Vaping Use    Vaping status: Never Used   Substance and Sexual Activity    Alcohol use: No    Drug use: No   Other Topics Concern    Caffeine Concern Yes     Comment: Coffee 1 cups daily    Exercise Yes     Comment: Streching/Strength.    Social History Narrative    The patient uses the following assistive device(s):  single-point cane.      The patient does live in a home with stairs.     Social Determinants of Health     Food Insecurity: No Food Insecurity (7/2/2024)    Food Insecurity      Food Insecurity: Never true   Transportation Needs: No Transportation Needs (7/2/2024)    Transportation Needs     Lack of Transportation: No   Housing Stability: Low Risk  (7/2/2024)    Housing Stability     Housing Instability: No              Review of Systems    Positive for stated Chief Complaint: Wound Care    Other systems are as noted in HPI.  Constitutional and vital signs reviewed.      All other systems reviewed and negative except as noted above.    Physical Exam     ED Triage Vitals   BP 07/02/24 1634 136/66   Pulse 07/02/24 1634 77   Resp 07/02/24 1634 18   Temp 07/02/24 1634 97.8 °F (36.6 °C)   Temp src 07/02/24 2125 Oral   SpO2 07/02/24 1634 96 %   O2 Device 07/02/24 1634 None (Room air)       Current Vitals:   Vital Signs  BP: 130/53  Pulse: 81  Resp: 16  Temp: 97.6 °F (36.4 °C)  Temp src: Oral  MAP (mmHg): 71    Oxygen Therapy  SpO2: 99 %  O2 Device: None (Room air)            Physical Exam    Constitutional: awake, alert, no sig distress  HENT: mmm, no lesions,  Neck: normal range of motion, no tenderness, supple.  Eyes: PERRL, EOMI, conjunctiva normal, no discharge. Sclera anicteric.  Cardiovascular: rr no murmur  Respiratory: Normal breath sounds, no respiratory distress, no wheezing, no chest tenderness.  GI: Bowel sounds normal, Soft, no tenderness, no masses, no pulsatile masses.  : No CVA tenderness.  Left lower extremity  Skin:                 Musculoskeletal: Intact distal pulses, LLE pitting Edema 2+  Neurologic: Alert & oriented x 3, normal motor function, normal sensory function, no focal deficits noted.  Psych: Calm, cooperative, nl affect        ED Course     Labs Reviewed   BASIC METABOLIC PANEL (8) - Abnormal; Notable for the following components:       Result Value    Glucose 107 (*)     Sodium 125 (*)     Potassium 5.2 (*)     Chloride 96 (*)     Creatinine 1.28 (*)     Calculated Osmolality 264 (*)     eGFR-Cr 39 (*)     All other components within normal limits   RBC  MORPHOLOGY SCAN - Abnormal; Notable for the following components:    RBC Morphology See morphology below (*)     All other components within normal limits   CBC W/ DIFFERENTIAL - Abnormal; Notable for the following components:    RBC 3.00 (*)     HGB 9.1 (*)     HCT 26.7 (*)     RDW-SD 49.7 (*)     RDW 15.1 (*)     Lymphocyte Absolute 0.72 (*)     All other components within normal limits   BNP (B TYPE NATRIURETIC PEPTIDE) - Normal   CBC WITH DIFFERENTIAL WITH PLATELET    Narrative:     The following orders were created for panel order CBC With Differential With Platelet.  Procedure                               Abnormality         Status                     ---------                               -----------         ------                     CBC W/ DIFFERENTIAL[[id number]]                              Final result                 Please view results for these tests on the individual orders.   SCAN SLIDE   CBC WITH DIFFERENTIAL WITH PLATELET    Narrative:     The following orders were created for panel order CBC With Differential With Platelet.  Procedure                               Abnormality         Status                     ---------                               -----------         ------                     CBC W/ DIFFERENTIAL[[id number]]          Abnormal            Final result                 Please view results for these tests on the individual orders.   BASIC METABOLIC PANEL (8)   CBC WITH DIFFERENTIAL WITH PLATELET   AEROBIC BACTERIAL CULTURE   CBC W/ DIFFERENTIAL          ED Course as of 07/03/24 0133  ------------------------------------------------------------  Time: 07/02 1925  Value: Sodium(!): 125  Comment: Has been on lasix, may be over-diuresed?               MDM      89-year-old female with history as documented above presenting for evaluation of worsening wounds.  Patient with worsening right lower extremity wound and new left lower extremity/left calf wound.  On arrival vitals are stable and  reassuring.  Ddx: cellulitis, edema, DVT  Admission disposition: 7/2/2024  7:43 PM         Will admit.  Discussed with hospitalist infectious disease.  Labs remarkable for mild acute hyponatremia given IV fluids.  Started on Rocephin and vancomycin after discussion with infectious disease.                               Medical Decision Making      Disposition and Plan     Clinical Impression:  1. Cellulitis of right lower extremity         Disposition:  Admit  7/2/2024  7:43 pm    Follow-up:  No follow-up provider specified.  We recommend that you schedule follow up care with a primary care provider within the next three months to obtain basic health screening including reassessment of your blood pressure.      Medications Prescribed:  Current Discharge Medication List                            Hospital Problems       Present on Admission  Date Reviewed: 7/2/2024            ICD-10-CM Noted POA    * (Principal) Cellulitis of right lower extremity L03.115 7/2/2024 Unknown    Cellulitis L03.90 7/2/2024 Unknown

## 2024-07-03 LAB
ANION GAP SERPL CALC-SCNC: 5 MMOL/L (ref 0–18)
BASOPHILS # BLD AUTO: 0.04 X10(3) UL (ref 0–0.2)
BASOPHILS NFR BLD AUTO: 0.8 %
BUN BLD-MCNC: 21 MG/DL (ref 9–23)
BUN/CREAT SERPL: 18.4 (ref 10–20)
CALCIUM BLD-MCNC: 8.8 MG/DL (ref 8.7–10.4)
CHLORIDE SERPL-SCNC: 100 MMOL/L (ref 98–112)
CO2 SERPL-SCNC: 24 MMOL/L (ref 21–32)
CREAT BLD-MCNC: 1.14 MG/DL
DEPRECATED RDW RBC AUTO: 49 FL (ref 35.1–46.3)
EGFRCR SERPLBLD CKD-EPI 2021: 45 ML/MIN/1.73M2 (ref 60–?)
EOSINOPHIL # BLD AUTO: 0.19 X10(3) UL (ref 0–0.7)
EOSINOPHIL NFR BLD AUTO: 3.9 %
ERYTHROCYTE [DISTWIDTH] IN BLOOD BY AUTOMATED COUNT: 15 % (ref 11–15)
GLUCOSE BLD-MCNC: 106 MG/DL (ref 70–99)
HCT VFR BLD AUTO: 29.9 %
HGB BLD-MCNC: 10.3 G/DL
IMM GRANULOCYTES # BLD AUTO: 0.02 X10(3) UL (ref 0–1)
IMM GRANULOCYTES NFR BLD: 0.4 %
LYMPHOCYTES # BLD AUTO: 0.75 X10(3) UL (ref 1–4)
LYMPHOCYTES NFR BLD AUTO: 15.5 %
MCH RBC QN AUTO: 31 PG (ref 26–34)
MCHC RBC AUTO-ENTMCNC: 34.4 G/DL (ref 31–37)
MCV RBC AUTO: 90.1 FL
MONOCYTES # BLD AUTO: 0.45 X10(3) UL (ref 0.1–1)
MONOCYTES NFR BLD AUTO: 9.3 %
NEUTROPHILS # BLD AUTO: 3.39 X10 (3) UL (ref 1.5–7.7)
NEUTROPHILS # BLD AUTO: 3.39 X10(3) UL (ref 1.5–7.7)
NEUTROPHILS NFR BLD AUTO: 70.1 %
OSMOLALITY SERPL CALC.SUM OF ELEC: 271 MOSM/KG (ref 275–295)
PLATELET # BLD AUTO: 347 10(3)UL (ref 150–450)
POTASSIUM SERPL-SCNC: 5.2 MMOL/L (ref 3.5–5.1)
RBC # BLD AUTO: 3.32 X10(6)UL
SODIUM SERPL-SCNC: 129 MMOL/L (ref 136–145)
WBC # BLD AUTO: 4.8 X10(3) UL (ref 4–11)

## 2024-07-03 PROCEDURE — 99233 SBSQ HOSP IP/OBS HIGH 50: CPT | Performed by: HOSPITALIST

## 2024-07-03 NOTE — OCCUPATIONAL THERAPY NOTE
OCCUPATIONAL THERAPY EVALUATION - INPATIENT     Room Number: 561/561-A  Evaluation Date: 7/3/2024  Type of Evaluation: Initial       Physician Order: IP Consult to Occupational Therapy  Reason for Therapy: ADL/IADL Dysfunction and Discharge Planning    OCCUPATIONAL THERAPY ASSESSMENT     Patient is a 89 year old female admitted 7/2/2024 for RLE cellulitis.  Prior to admission, pt was requiring assist for ADLs and functional transfers from son at RW level  Patient is currently requiring up to mod A for ADLs overall along with mod A for sit to supine, mod A for functional transfers. Pt limited by RLE pain and weakness.    RN cleared pt for participation in OT session, which was completed in collaboration with PT. Upon arrival, pt was seated in bedside chair  and agreeable to activity. No visitors present during session. Gait belt used. Pt was left in bed and alarm was activated. Call light and all needs left in reach. Handoff given to RN.    Education provided  Educated pt about role of OT and hospital therapy process as well as proper safety techniques. Pt verbalized/demonstrated fair carryover.    Patient will benefit from continued skilled OT Services For duration of hospitalization, however, given the patient is functioning near baseline level do not anticipate skilled therapy needs at discharge     PLAN  OT Treatment Plan: Balance activities;Energy conservation/work simplification techniques;Continued evaluation;Compensatory technique education;Fine motor coordination activities;Neuromuscluar reeducation;Equipment eval/education;Patient/Family training;Patient/Family education;Cognitive reorientation;Endurance training;UE strengthening/ROM;Functional transfer training;Visual perceptual training;IADL training;ADL training      OCCUPATIONAL THERAPY MEDICAL/SOCIAL HISTORY     Problem List  Principal Problem:    Cellulitis of right lower extremity  Active Problems:    Cellulitis      Past Medical History  Past  Medical History:    Abnormal nuclear stress test    Anemia    may need lifelong iron 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 Doss-Stent PCI of LAD and RCA was performed.    Deaf    Diverticulitis    hospitalized 3/10 and 5/10    Diverticulosis    cscopy '03, '07, '10    Fracture of C2 vertebra, closed ([facility])    ER 3/19/21--CT cervical spine-- fx C2 posterior lateral vertebral body and left C2 lamina.      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    Renal calculus    on CT abd 5/10-6 mm or less in L lower kidney    Renal insufficiency    Shingles    Small bowel obstruction ([facility])    multiple hospitalizations for [id number], 6/2016    Stress incontinence, female    SVT (supraventricular tachycardia) ([facility])    event monitor 2/2016-SVT    Syncope    48-hour Holter 5/19/20--bursts of atrial tachycardia up to 3 minutes, PVCs, rare Wenkebach.  Saw Dr. Doss.    Type 2 diabetes mellitus ([facility])    Uterine prolapse    Uterine prolapse    had pessary in 2006 Dr. SORAYA Davis -vag sling procedure Dr Baez in 6/2016    Uterovaginal prolapse, incomplete    UTI due to Klebsiella species    ESBL Klebsiella UTI treated in [facility] with meropenem when in hosp for enteritis. (Dr Carvalho).      Wound of right leg       Past Surgical History  Past Surgical History:   Procedure Laterality Date    Abdomen surgery proc unlisted  2007    laparotomy for TERESA and internal hernia    Abdomen surgery proc unlisted  2009    TERESA and small bowel resection-Dr Jaramillo    Appendectomy      Cholecystectomy      Fracture surgery Left 1/2017    L hip fx pinning 1/2017 Dr. Hernandez    Knee replacement surgery Left 2000    Knee replacement surgery Right 2008    Other surgical history      cystocele repair    Other surgical history Bilateral     ear surgery-Dr. Patel-both eardrums    Sling  6/30/2016    vag sling procedure at [facility]  -Dr Baez       HOME SITUATION  Type of Home: House  Home Layout: One level  Lives With: Son                      Drives: No       SUBJECTIVE  \"How long should I stay like this?\"    OCCUPATIONAL THERAPY EXAMINATION      OBJECTIVE  Precautions: Hard of hearing;Bed/chair alarm  Fall Risk: High fall risk    PAIN ASSESSMENT  Rating: Unable to rate  Location: RLE          STRENGTH ASSESSMENT  Upper extremity strength is within functional limits     COORDINATION  Gross Motor: WFL   Fine Motor: WFL     ACTIVITIES OF DAILY LIVING ASSESSMENT  AM-PAC ‘6-Clicks’ Inpatient Daily Activity Short Form  How much help from another person does the patient currently need…  -   Putting on and taking off regular lower body clothing?: A Lot  -   Bathing (including washing, rinsing, drying)?: A Lot  -   Toileting, which includes using toilet, bedpan or urinal? : A Little  -   Putting on and taking off regular upper body clothing?: A Little  -   Taking care of personal grooming such as brushing teeth?: A Little  -   Eating meals?: None    AM-PAC Score:  Score: 17  Approx Degree of Impairment: 50.11%  Standardized Score (AM-PAC Scale): 37.26  CMS Modifier (G-Code): CK      BED MOBILITY  Sit to Supine (OT): Moderate Assist      FUNCTIONAL TRANSFER ASSESSMENT        Sit to stand: mod A  Toilet Transfer: mod  A  Chair Transfer: mod A    FUNCTIONAL ADL ASSESSMENT  Overall mod A    The patient's Approx Degree of Impairment: 50.11% has been calculated based on documentation in the [facility] '6 clicks' Inpatient Daily Activity Short Form.  Research supports that patients with this level of impairment may benefit from home health. Final disposition will be made by interdisciplinary medical team.    OT Goals  Patients self stated goal is: to go home    Pt will tolerate standing for 3 minutes with mod A for ADLs.    Pt will complete toilet transfer with mod A 3/3 trials at  level.                   Goals  on: 8/3  Frequency: 3-5x/wk    Patient Evaluation Complexity Level:   Occupational Profile/Medical History MODERATE - Expanded review of history including review of medical or therapy record   Specific performance deficits impacting engagement in ADL/IADL MODERATE   Client Assessment/Performance Deficits MODERATE - Comorbidities and min to mod modifications of tasks    Clinical Decision Making MODERATE - Analysis of occupational profile, detailed assessments, several treatment options    Overall Complexity MODERATE     OT Session Time: 20 minutes  Self-Care Home Management: 10 minutes           Vida Yoo OT  [facility]  Inpatient Rehabilitation  Occupational Therapy  [phone]

## 2024-07-03 NOTE — ED QUICK NOTES
Patient unable to tolerate US due to pain. Pain meds were administered but by then US tech stated it would have to be done at a different time. ER MD made aware.

## 2024-07-03 NOTE — PROGRESS NOTES
07/03/24 0809   Wound 07/02/24 7 Hand Right;Posterior   Date First Assessed: 07/02/24   Present on Original Admission: Yes   Wound Number (Wound Clinic Only): 7  Primary Wound Type: Skin Tear  Location: Hand  Wound Location Orientation: Right;Posterior   Wound Image    Site Assessment Clean;Fragile;Moist;Painful;Red;Pink   Closure Not approximated   Drainage Amount Scant   Drainage Description Serosanguineous   Treatments Cleansed;Saline   Dressing Aquacel Foam;Xeroform   Dressing Changed Changed   Dressing Status Dressing Changed;Removed;Old drainage   Non-staged Wound Description Partial thickness   Shandra-wound Assessment Clean;Dry;Intact;Fragile   State of Healing Fully granulated   Wound Odor None   Wound 07/02/24 1 Leg Right;Posterior;Lower   Date First Assessed/Time First Assessed: 07/02/24 1319   Present on Original Admission: Yes   Wound Number (Wound Clinic Only): 1  Primary Wound Type: Venous Ulcer  Location: Leg  Wound Location Orientation: Right;Posterior;Lower   Wound Image    Site Assessment Edema;Fragile;Moist;Painful;Tan;White   Closure Not approximated   Drainage Amount Moderate   Drainage Description Serous   Treatments Cleansed;Saline;Pharmaceutical agent  (Tridad)   Dressing 2x2s;Aquacel Foam;Hydrofiber with silver;Kerlix roll   Dressing Changed Changed   Dressing Status Dressing Changed;Removed;Old drainage   Non-staged Wound Description Full thickness   Shandra-wound Assessment Clean;Edema;Fragile;Moist;Pink   Wound Bed Slough (%) 100 %   State of Healing Non-healing  (slough)   Wound Odor None   Wound 07/02/24 2 Leg Right;Lateral;Lower   Date First Assessed/Time First Assessed: 07/02/24 1319   Present on Original Admission: Yes   Wound Number (Wound Clinic Only): 2  Primary Wound Type: Venous Ulcer  Location: Leg  Wound Location Orientation: Right;Lateral;Lower   Wound Image     Site Assessment Edema;Fragile;Moist;Painful;Yellow;Tan;Brown   Closure Not approximated   Drainage Amount Large    Drainage Description Serous;Tan   Treatments Cleansed;Saline;Pharmaceutical agent  (Tridad)   Dressing 2x2s;Aquacel Foam;Hydrofiber with silver;Kerlix roll   Dressing Changed Changed   Dressing Status Dressing Changed;Removed;Old drainage   Non-staged Wound Description Full thickness   Shandra-wound Assessment Clean;Edema;Fragile;Moist;Pink   Wound Bed Slough (%) 100 %   State of Healing Non-healing  (slough)   Wound Odor None   Wound 07/02/24 3 Leg Right;Medial;Lower   Date First Assessed/Time First Assessed: 07/02/24 1320   Present on Original Admission: Yes   Wound Number (Wound Clinic Only): 3  Primary Wound Type: Venous Ulcer  Location: Leg  Wound Location Orientation: Right;Medial;Lower   Wound Image    Site Assessment Fragile;Edema;Moist;Painful;White   Closure Not approximated   Drainage Amount Large   Drainage Description Serous   Treatments Cleansed;Santyl;Pharmaceutical agent  (Tridad)   Dressing 2x2s;Aquacel Foam;Kerlix roll;Hydrofiber with silver   Dressing Changed Changed   Dressing Status Dressing Changed;Removed;Old drainage   Non-staged Wound Description Full thickness   Shandra-wound Assessment Clean;Fragile;Edema;Moist;Pink   Wound Bed Slough (%) 100 %   State of Healing Non-healing  (slough)   Wound Odor None   [REMOVED] Wound 07/02/24 5 Foot Right;Plantar   Final Assessment Date: 07/03/24  Date First Assessed/Time First Assessed: 07/02/24 1320   Present on Original Admission: Yes   Wound Number (Wound Clinic Only): 5  Primary Wound Type: Traumatic  Location: Foot  Wound Location Orientation: Right;Planta...   Wound Image    Site Assessment Clean;Dry;Intact;Purple  (bruised)   Closure Approximated   Drainage Amount None   Non-staged Wound Description Not applicable   Shandra-wound Assessment Clean;Dry;Intact   Wound Bed Epithelium (%) 100 %   State of Healing Epithelialized   Wound 07/02/24 11 Leg Left;Lower;Posterior   Date First Assessed: 07/02/24   Present on Original Admission: Yes   Wound Number  (Wound Clinic Only): 11  Primary Wound Type: Venous Ulcer  Location: Leg  Wound Location Orientation: Left;Lower;Posterior   Wound Image    Site Assessment Edema;Fragile;Moist;Painful;Pink;Purple;Red;Tan   Closure Not approximated   Drainage Amount Moderate   Drainage Description Serous;Serosanguineous   Treatments Cleansed;Saline;Pharmaceutical agent  (Trida)   Dressing 2x2s;Aquacel Foam;Hydrofiber with silver;Kerlix roll   Dressing Changed Changed   Dressing Status Dressing Changed;Removed;Old drainage   Non-staged Wound Description Full thickness   Shandra-wound Assessment Clean;Edema;Fragile;Moist;Pink   Wound Bed Granulation (%) 50 %   Wound Bed Slough (%) 50 %   State of Healing Non-healing  (slough)   Wound Odor None   Wound 07/02/24 10 Leg Left;Medial;Lower   Date First Assessed: 07/02/24    Wound Number (Wound Clinic Only): 10  Primary Wound Type: Venous Ulcer  Location: Leg  Wound Location Orientation: Left;Medial;Lower   Wound Image    Site Assessment Fragile;Edema;Moist;White   Closure Not approximated   Drainage Amount Scant   Drainage Description Serous   Treatments Cleansed;Saline;Pharmaceutical agent  (triad)   Dressing 2x2s;Kerlix roll;Aquacel Foam;Hydrofiber with silver   Dressing Changed Changed   Dressing Status Dressing Changed;Removed;Old drainage   Non-staged Wound Description Full thickness   Shandra-wound Assessment Clean;Fragile;Edema;Moist;Pink   Wound Bed Slough (%) 100 %   State of Healing Non-healing  (slough)   Wound Odor None   [REMOVED] Wound 07/02/24 9 Toe (Comment which one)   Final Assessment Date: 07/03/24  Date First Assessed: 07/02/24   Present on Original Admission: Yes   Wound Number (Wound Clinic Only): 9  Primary Wound Type: Traumatic  Location: Toe (Comment which one)  Wound Description (Comments): 2nd and 3rd toes...   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 Doss-Stent PCI of LAD and RCA was performed.    Deaf    Diverticulitis    hospitalized 3/10 and 5/10    Diverticulosis    cscopy '03, '07, '10    Fracture of C2 vertebra, closed ([facility])    ER 3/19/21--CT cervical spine-- fx C2 posterior lateral vertebral body and left C2 lamina.      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    Renal calculus    on CT abd 5/10-6 mm or less in L lower kidney    Renal insufficiency    Shingles    Small bowel obstruction ([facility])    multiple hospitalizations for [id number], 6/2016    Stress incontinence, female    SVT (supraventricular tachycardia) ([facility])    event monitor 2/2016-SVT    Syncope    48-hour Holter 5/19/20--bursts of atrial tachycardia up to 3 minutes, PVCs, rare Wenkebach.  Saw Dr. Doss.    Type 2 diabetes mellitus ([facility])    Uterine prolapse    Uterine prolapse    had pessary in 2006 Dr. LIRA  Susan -vag sling procedure Dr Baez in 6/2016    Uterovaginal prolapse, incomplete    UTI due to Klebsiella species    ESBL Klebsiella UTI treated in [facility] with meropenem when in hosp for enteritis. (Dr Carvalho).     Wound of right leg     Past Surgical History:   Procedure Laterality Date    Abdomen surgery proc unlisted  2007    laparotomy for TERESA and internal hernia    Abdomen surgery proc unlisted  2009    TERESA and small bowel resection-Dr Jaramillo    Appendectomy      Cholecystectomy      Fracture surgery Left 1/2017    L hip fx pinning 1/2017 Dr. Hernandez    Knee replacement surgery Left 2000    Knee replacement surgery Right 2008    Other surgical history      cystocele repair    Other surgical history Bilateral     ear surgery-Dr. Patel-both eardrums    Sling  6/30/2016    vag sling procedure at [facility] -Dr Baez      Social History     Socioeconomic History    Marital status:    Tobacco Use    Smoking status: Never    Smokeless tobacco: Never   Vaping Use    Vaping status: Never Used   Substance and Sexual Activity    Alcohol use: No    Drug use: No   Other Topics Concern    Caffeine Concern Yes     Comment: Coffee 1 cups daily    Exercise Yes     Comment: Streching/Strength.    Social History Narrative    The patient uses the following assistive device(s):  single-point cane.      The patient does live in a home with stairs.     Social Determinants of Health     Food Insecurity: No Food Insecurity (7/2/2024)    Food Insecurity     Food Insecurity: Never true   Transportation Needs: No Transportation Needs (7/2/2024)    Transportation Needs     Lack of Transportation: No   Housing Stability: Low Risk  (7/2/2024)    Housing Stability     Housing Instability: No     PLAN   Recommendations:  Dr. KITCHEN currently on consult  Dietary consult for recommendations for nutrition to optimize wound healing  Turn schedules  Heels elevated using pillows, heel wedge or heel boots to offload heels  Prompt  incontinence care  Moisture barrier for incontinence. May consider silicone    Wound(s)  Location: bilateral lower leg ulcers  Cleansing  Saline   Topical triad ointment to dry gauze  Dressings silver alginate and 6x6 foams  Secure with kerlix rolls  Frequency daily and prn   Location: right hand  Cleansing  Saline   Dressings xeroform  Secure with bordered foam  Frequency daily and prn     OBJECTIVE   MD Consult new  Reason for Consult multiple wounds to legs      ASSESSMENT   Mansoor Score:  Mansoor Scale Score: 18    Chart Reviewed: yes    Wound(s):  Pt seen sitting up in the chair, feet dependent on floor, BLE weeping. Pt follows the outpatient wound clinic. See above for BLE venous stasis ulcers w slough. Recommend application of Triad for management of non viable tissues. Recommend use of silver alginate and non bordered foams for absorptive properties. Pt measured for size E Spandagrip compression which was applied to BLE from toes to knees. The right dorsal hand has a skin tear, recommend xeroform daily. Pt reminded to keep legs elevated while in the chair.        Allergies: Latex, Mastisol adhesive, and Adhesive tape    Labs:   Lab Results   Component Value Date    WBC 4.8 07/03/2024    HGB 10.3 (L) 07/03/2024    HCT 29.9 (L) 07/03/2024    .0 07/03/2024    CREATSERUM 1.14 (H) 07/03/2024    BUN 21 07/03/2024     (L) 07/03/2024    K 5.2 (H) 07/03/2024     07/03/2024    CO2 24.0 07/03/2024     (H) 07/03/2024    CA 8.8 07/03/2024    ALB 4.4 04/22/2024    ALKPHO 118 04/22/2024    BILT 0.4 04/22/2024    TP 7.1 04/22/2024    AST 22 04/22/2024    ALT 12 04/22/2024    MG 1.9 01/09/2021    PHOS 2.5 01/08/2021     No results found for: \"PREALBUMIN\"      Time Spent 30 Minutes.    Linette Corbin, RN, BSN, [facility]  Merged with [facility]  [phone]

## 2024-07-03 NOTE — ED QUICK NOTES
Orders for admission, patient is aware of plan and ready to go upstairs. Any questions, please call ED RN MANAS at extension [phone].     Patient Covid vaccination status: Fully vaccinated     COVID Test Ordered in ED: None    COVID Suspicion at Admission: N/A    Running Infusions:  None    Mental Status/LOC at time of transport: AAOX3; EXTREMELY [place].    Other pertinent information: USE WHITE BOARD/ PAPER FOR COMMUNICATION... SEVERAL WOUND TO BLE  CIWA score: N/A   NIH score:  N/A

## 2024-07-03 NOTE — PLAN OF CARE
Problem: Patient Centered Care  Goal: Patient preferences are identified and integrated in the patient's plan of care  Description: Interventions:  - What would you like us to know as we care for you?   - Provide timely, complete, and accurate information to patient/family  - Incorporate patient and family knowledge, values, beliefs, and cultural backgrounds into the planning and delivery of care  - Encourage patient/family to participate in care and decision-making at the level they choose  - Honor patient and family perspectives and choices  Outcome: Progressing     Problem: Patient/Family Goals  Goal: Patient/Family Long Term Goal  Description: Patient's Long Term Goal:     Interventions:  - See additional Care Plan goals for specific interventions  Outcome: Progressing  Goal: Patient/Family Short Term Goal  Description: Patient's Short Term Goal:     Interventions:   - See additional Care Plan goals for specific interventions  Outcome: Progressing     Problem: PAIN - ADULT  Goal: Verbalizes/displays adequate comfort level or patient's stated pain goal  Description: INTERVENTIONS:  - Encourage pt to monitor pain and request assistance  - Assess pain using appropriate pain scale  - Administer analgesics based on type and severity of pain and evaluate response  - Implement non-pharmacological measures as appropriate and evaluate response  - Consider cultural and social influences on pain and pain management  - Manage/alleviate anxiety  - Utilize distraction and/or relaxation techniques  - Monitor for opioid side effects  - Notify MD/LIP if interventions unsuccessful or patient reports new pain  - Anticipate increased pain with activity and pre-medicate as appropriate  Outcome: Progressing     Problem: RISK FOR INFECTION - ADULT  Goal: Absence of fever/infection during anticipated neutropenic period  Description: INTERVENTIONS  - Monitor WBC  - Administer growth factors as ordered  - Implement neutropenic  guidelines  Outcome: Progressing     Problem: SAFETY ADULT - FALL  Goal: Free from fall injury  Description: INTERVENTIONS:  - Assess pt frequently for physical needs  - Identify cognitive and physical deficits and behaviors that affect risk of falls.  - Bell fall precautions as indicated by assessment.  - Educate pt/family on patient safety including physical limitations  - Instruct pt to call for assistance with activity based on assessment  - Modify environment to reduce risk of injury  - Provide assistive devices as appropriate  - Consider OT/PT consult to assist with strengthening/mobility  - Encourage toileting schedule  Outcome: Progressing     Problem: DISCHARGE PLANNING  Goal: Discharge to home or other facility with appropriate resources  Description: INTERVENTIONS:  - Identify barriers to discharge w/pt and caregiver  - Include patient/family/discharge partner in discharge planning  - Arrange for needed discharge resources and transportation as appropriate  - Identify discharge learning needs (meds, wound care, etc)  - Arrange for interpreters to assist at discharge as needed  - Consider post-discharge preferences of patient/family/discharge partner  - Complete POLST form as appropriate  - Assess patient's ability to be responsible for managing their own health  - Refer to Case Management Department for coordinating discharge planning if the patient needs post-hospital services based on physician/LIP order or complex needs related to functional status, cognitive ability or social support system  Outcome: Progressing   Frances rested well, she preferred to sleep in the recliner during the night. Gave norco for pain. She is on antibiotics, ID  is on consult.  PT, OT ordered for patient. Frances lives at home with her son Nicholas. When medically stable will discharge to home.

## 2024-07-03 NOTE — PHYSICAL THERAPY NOTE
PHYSICAL THERAPY EVALUATION - INPATIENT     Room Number: 561/561-A  Evaluation Date: 7/3/2024  Type of Evaluation: Initial   Physician Order: PT Eval and Treat    Presenting Problem: RLE cellulitis  Co-Morbidities : COPD, CAD, DM II, Afib on Eliquid, OA, hearing loss, osteporosis, CKD III, peripheral neuropathy, HTN, HLD, asthma, SVT, bilateral TKA, L femur ORIF  Reason for Therapy: Mobility Dysfunction and Discharge Planning    PHYSICAL THERAPY ASSESSMENT   Patient is a 89 year old female admitted 7/2/2024 for RLE cellulitis.  Prior to admission, patient's baseline is min-modA for transfers, ambulation, and ADLs.  Patient is currently functioning near baseline with bed mobility, transfers, and gait.  Patient is requiring minimal assist and moderate assist using a rolling walker as a result of the following impairments: decreased endurance/aerobic capacity, pain, impaired standing balance, decreased muscular endurance, and medical status.  Physical Therapy will continue to follow for duration of hospitalization.    Patient will benefit from continued skilled PT Services at discharge to promote functional independence and safety with additional support and return home with home health PT. Pt reports requiring assist at baseline, has caregiver support and was getting home therapy, has support from 2 sons. Should pt's social support be unable to physically assist pt may benefit from a gradual rehabilitative inpatient facility.    PLAN  PT Treatment Plan: Bed mobility;Body mechanics;Endurance;Energy conservation;Patient education;Family education;Gait training;Range of motion;Strengthening;Transfer training;Balance training;Neuromuscular re-educate  Rehab Potential : Fair  Frequency (Obs): 5x/week    PHYSICAL THERAPY MEDICAL/SOCIAL HISTORY     Problem List  Principal Problem:    Cellulitis of right lower extremity  Active Problems:    Cellulitis      HOME SITUATION  Home Situation  Type of Home: House  Home Layout: One  level  Stairs to Enter : 0  Lives With: Son  Drives: No  Patient Owned Equipment: Rollator     Prior Level of [place]: min-modA using walker, sits on rollator for longer distances    SUBJECTIVE  \"I feel bad I can't hear you.\"    PHYSICAL THERAPY EXAMINATION   OBJECTIVE  Precautions: Hard of hearing;Bed/chair alarm  Fall Risk: High fall risk    WEIGHT BEARING RESTRICTION  Weight Bearing Restriction: None                PAIN ASSESSMENT  Rating: Unable to rate  Location: R knee  Management Techniques: Body mechanics;Repositioning    COGNITION  Overall Cognitive Status:  WFL - within functional limits    RANGE OF MOTION AND STRENGTH ASSESSMENT  Upper extremity ROM and strength are within functional limits  BUEs  Lower extremity ROM is within functional limits  BLEs  Lower extremity strength is limited RLE 2/2 pain, decreased hip extensor strength requiring assist for sit-to-stand transfers    BALANCE  Static Sitting: Good  Dynamic Sitting: Fair +  Static Standing: Fair  Dynamic Standing: Fair -    ACTIVITY TOLERANCE  Pulse: 76                      AM-PAC '6-Clicks' INPATIENT SHORT FORM - BASIC MOBILITY  How much difficulty does the patient currently have...  Patient Difficulty: Turning over in bed (including adjusting bedclothes, sheets and blankets)?: A Little   Patient Difficulty: Sitting down on and standing up from a chair with arms (e.g., wheelchair, bedside commode, etc.): A Lot   Patient Difficulty: Moving from lying on back to sitting on the side of the bed?: A Lot   How much help from another person does the patient currently need...   Help from Another: Moving to and from a bed to a chair (including a wheelchair)?: A Lot   Help from Another: Need to walk in hospital room?: A Lot   Help from Another: Climbing 3-5 steps with a railing?: Total     AM-PAC Score:  Raw Score: 12   Approx Degree of Impairment: 68.66%   Standardized Score (AM-PAC Scale): 35.33   CMS Modifier (G-Code): CL    FUNCTIONAL ABILITY  STATUS  Functional Mobility/Gait Assessment  Gait Assistance: Moderate assistance  Distance (ft): 5  Assistive Device: Rolling walker  Pattern: R Decreased stance time;Shuffle  Sit to Supine: moderate assist  Sit to Stand: moderate assist - from bedside chair    ADDITIONAL INFORMATION    Pt hard of hearing requiring communication via white board. Pt reports requiring assist at baseline, likely near baseline, reports confidence in ability to return home with current level of social support.     Patient seen for evaluation in coordination with occupational therapy to maximize patient function and enhance communication with patient given hard of hearing. Patient received seated in bedside chair, agreeable to physical therapy evaluation. Vital signs monitored as noted above, no adverse symptoms and patient stable during session. Education with patient provided while practicing during session on physical therapy plan of care and physiological benefits of out of bed mobility. Next session anticipate to progress transfers and gait.    Patient history and/or personal factors that may impact the plan of care include limited functional status at baseline and co-morbidities (COPD, CAD, DM II, Afib on Eliquid, OA, hearing loss, osteporosis, CKD III, peripheral neuropathy, HTN, HLD, asthma, SVT, bilateral TKA, L femur ORIF) affecting medical status, functional status, endurance . Based on the physical therapy examination of the noted systems and functional activity/participation limitations, the patient presentation is evolving given the patient demonstrates worsening of previously stable condition and demonstrates worsening of co-morbidities.      Exercise/Education Provided:  Bed mobility  Body mechanics  Energy conservation  Functional activity tolerated  Gait training  Posture  ROM  Transfer training    The patient's Approx Degree of Impairment: 68.66% has been calculated based on documentation in the [facility] '6 clicks'  Inpatient Basic Mobility Short Form.  Research supports that patients with this level of impairment may benefit from a rehab facility.  Final disposition will be made by interdisciplinary medical team.    Patient End of Session: In bed;Needs met;Call light within reach;All patient questions and concerns addressed;Alarm set    CURRENT GOALS  Goals to be met by: 7/23/24  Patient Goal Patient's self-stated goal is: decrease pain   Goal #1 Patient is able to demonstrate supine - sit EOB @ level: minimum assistance     Goal #1   Current Status    Goal #2 Patient is able to demonstrate [place] to[place] [facility] [place]: contact guard assistance with walker - rolling     Goal #2  Current Status    Goal #3 Patient is able to ambulate 20 feet with assist device: walker - rolling at assistance level: minimum assistance   Goal #3   Current Status    Goal #4 Patient will negotiate 1 stairs/one curb w/ assistive device and minimum assistance   Goal #4   Current Status    Goal #5 Patient to demonstrate independence with home activity/exercise instructions provided to patient in preparation for discharge.   Goal #5   Current Status    Goal #6    Goal #6  Current Status      Patient Evaluation Complexity Level:  History High - 3 or more personal factors and/or co-morbidities   Examination of body systems Moderate - addressing a total of 3 or more elements   Clinical Presentation  Moderate - Evolving   Clinical Decision Making  Moderate Complexity     Therapeutic Activity:  27 minutes      Yina Frias, PT, DPT  [facility]  Rehab Services - Physical Therapy  [id number]

## 2024-07-03 NOTE — ED QUICK NOTES
ALERT AND ORIENTED X3. SHOWING NO SIGNS OR SYMPTOMS OF ANY RESPIRATORY DISTRESS. ABLE TO AMBULATE WITH MAX ASSISTANCE. BEDPAN UTILIZED. ATTEMPTED TO KEEP COMFORTABLE AT ALL TIMES, REPOSITIONED, LINEN CHANGED, MEDICATED ACCORDINGLY. SON REMAINED AT BEDSIDE THROUGHOUT ER STAY. PATIENT TRANSPORTED TO ASSIGNED ROOM BY [facility].

## 2024-07-03 NOTE — H&P
[facility]    PATIENT'S NAME: CHIQUIS PORRAS   ATTENDING PHYSICIAN: Andrés Fleming MD   PATIENT ACCOUNT#:   [id number]    LOCATION:  [address]  MEDICAL RECORD #:   [id number]       YOB: 1931  ADMISSION DATE:       07/02/2024    HISTORY AND PHYSICAL EXAMINATION    CHIEF COMPLAINT:  Right leg persistent superficial wound with cellulitis and developing cellulitis left lower extremity.    HISTORY OF PRESENT ILLNESS:  The patient is a 89-year-old  female with chronic ulceration on the lateral aspect of her right leg.  Had cultures done recently on June 13 which grew +3 Serratia liquefaciens and +4 gram-negative rods plus Candida parapsilosis.  She was a given a course of Bactrim without significant improvement.  Today, she was evaluated at the wound center and because of persistent ulcer with necrotic base and developing erythema in her left leg, she was sent to the emergency department for evaluation.  CBC and chemistry were still pending.  Wound cultures from her right leg are still pending.    PAST MEDICAL HISTORY:  Per the record, patient had a history of paroxysmal atrial fibrillation, anticoagulated with Eliquis; generalized osteoarthritis; osteoporosis; chronic kidney disease stage 3; peripheral neuropathy; hypertension; hyperlipidemia; gastroesophageal reflux disease; asthma; diverticulosis; supraventricular tachycardia; and glaucoma; chronic hearing impairment.    PAST SURGICAL HISTORY:  Appendectomy, cholecystectomy, laparoscopic lysis of adhesions, and partial small-bowel resection for small-bowel obstruction presentation.  She had bilateral total knee arthroplasties, left femur open reduction and internal fixation, cystocele repair and bladder sling.    MEDICATIONS:  Please see medication reconciliation list.    ALLERGIES:  Adhesive tape and latex.    FAMILY HISTORY:  Mother had diabetes mellitus type 2.  Father had cerebrovascular accident and  hypertension.    SOCIAL HISTORY:  No tobacco, alcohol, or drug use.  Uses a walker.  Independent in her basic activities of daily living, but still require some assistance.    REVIEW OF SYSTEMS:  Per the son, patient uses a walker and she stumbles and hits her legs against the walker bars and pieces of furniture, and then she develops wounds and skin ulcers that hard to heal.  Recently, she started developing more necrotic changes at the base of her right lateral leg ulcer and increased erythema in her left leg with ecchymoses and mild hematomas.  Patient also had developed recently right hand dorsal skin tear.      PHYSICAL EXAMINATION:    GENERAL:  Alert, hard of hearing, mild distress.  VITAL SIGNS:  Temperature 97.8, pulse 77, respiratory rate 18, blood pressure 136/66, pulse ox 96% on room air.    HEENT:  Atraumatic.  Oropharynx clear.  Dry mucous membranes.  Normal hard and soft palate.  Eyes:  Anicteric sclerae.  NECK:  Supple.  No lymphadenopathy.  Trachea midline.  Full range of motion.  LUNGS:  Clear to auscultation bilaterally.  Normal respiratory effort.  HEART:  Regular rate, rhythm.  S1 and S2 auscultated.  No murmur.  ABDOMEN:  Soft, nondistended.  No tenderness.  Positive bowel sounds.   EXTREMITIES:  Right lateral leg with necrotic base ulcer superficial in nature with mild erythematous changes around the ulceration.  There are also multiple ecchymoses noted on the left leg with skin exposed at the distal posterior left leg, Charcot foot deformity bilaterally noted.    ASSESSMENT:    1.   Bilateral lower extremity cellulitis and right leg ulcer.  2.   Essential hypertension.  3.   Chronic kidney disease stage 3.  4.   History of paroxysmal atrial fibrillation.    PLAN:  Patient will be admitted to general medical floor.  Wound service consult.  Repeat wound culture.  IV Rocephin and vancomycin.  Infectious disease consult.  Further recommendations to follow.     Dictated By Camilo Walker  MD  d: 07/02/2024 18:44:22  t: 07/02/2024 19:15:20  [facility] [id number]  FB/    cc: Andrés Fleming MD

## 2024-07-03 NOTE — PLAN OF CARE
Problem: Patient Centered Care  Goal: Patient preferences are identified and integrated in the patient's plan of care  Description: Interventions:  - What would you like us to know as we care for you?   - Provide timely, complete, and accurate information to patient/family  - Incorporate patient and family knowledge, values, beliefs, and cultural backgrounds into the planning and delivery of care  - Encourage patient/family to participate in care and decision-making at the level they choose  - Honor patient and family perspectives and choices  Outcome: Progressing     Problem: Patient/Family Goals  Goal: Patient/Family Long Term Goal  Description: Patient's Long Term Goal:     Interventions:  -   - See additional Care Plan goals for specific interventions  Outcome: Progressing  Goal: Patient/Family Short Term Goal  Description: Patient's Short Term Goal:     Interventions:   -   - See additional Care Plan goals for specific interventions  Outcome: Progressing     Problem: PAIN - ADULT  Goal: Verbalizes/displays adequate comfort level or patient's stated pain goal  Description: INTERVENTIONS:  - Encourage pt to monitor pain and request assistance  - Assess pain using appropriate pain scale  - Administer analgesics based on type and severity of pain and evaluate response  - Implement non-pharmacological measures as appropriate and evaluate response  - Consider cultural and social influences on pain and pain management  - Manage/alleviate anxiety  - Utilize distraction and/or relaxation techniques  - Monitor for opioid side effects  - Notify MD/LIP if interventions unsuccessful or patient reports new pain  - Anticipate increased pain with activity and pre-medicate as appropriate  Outcome: Progressing     Problem: RISK FOR INFECTION - ADULT  Goal: Absence of fever/infection during anticipated neutropenic period  Description: INTERVENTIONS  - Monitor WBC  - Administer growth factors as ordered  - Implement neutropenic  guidelines  Outcome: Progressing     Problem: SAFETY ADULT - FALL  Goal: Free from fall injury  Description: INTERVENTIONS:  - Assess pt frequently for physical needs  - Identify cognitive and physical deficits and behaviors that affect risk of falls.  - [place] fall precautions as indicated by assessment.  - Educate pt/family on patient safety including physical limitations  - Instruct pt to call for assistance with activity based on assessment  - Modify environment to reduce risk of injury  - Provide assistive devices as appropriate  - Consider OT/PT consult to assist with strengthening/mobility  - Encourage toileting schedule  Outcome: Progressing     Problem: DISCHARGE PLANNING  Goal: Discharge to home or other facility with appropriate resources  Description: INTERVENTIONS:  - Identify barriers to discharge w/pt and caregiver  - Include patient/family/discharge partner in discharge planning  - Arrange for needed discharge resources and transportation as appropriate  - Identify discharge learning needs (meds, wound care, etc)  - Arrange for interpreters to assist at discharge as needed  - Consider post-discharge preferences of patient/family/discharge partner  - Complete POLST form as appropriate  - Assess patient's ability to be responsible for managing their own health  - Refer to Case Management Department for coordinating discharge planning if the patient needs post-hospital services based on physician/LIP order or complex needs related to functional status, cognitive ability or social support system  Outcome: Progressing   Safety precautions on place. Hearing aids on but they doesn't work properly, white board used to communicate with the patient. Call light within reach.

## 2024-07-03 NOTE — PAYOR COMM NOTE
--------------  ADMISSION REVIEW     Payor: [facility] MEDICARE ADV PPO  Subscriber #:  [id number]  Authorization Number: [id number]    Admit date: 7/2/24  Admit time:  9:16 PM       REVIEW DOCUMENTATION:    Patient Seen in: [facility]      History     Chief Complaint   Patient presents with    Wound Care     Stated Complaint: Wound Care    Subjective:   HPI    89-year-old female with history of hypertension, CAD, T2DM, CKD, COPD, presenting for evaluation of worsening leg wounds.  She was sent in from home by home health nurse practitioner who saw patient for wound care today.  Patient complains of generalized weakness malaise and worsening wounds.  No fevers chills or night sweats.  She recently completed a course of cefadroxil, Bactrim DS and Diflucan based on culture from 6/1624 with Serratia, Candida.     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)    Carpal tunnel syndrome    CKD stage 3 due to type 2 diabetes mellitus ([facility])    Closed fracture of second cervical vertebra ([facility])    COPD (chronic obstructive pulmonary disease) ([facility])    Coronary artery disease    Cardiac cath 6/23/20 Dr. Doss-Stent PCI of LAD and RCA was performed.    Deaf    Diverticulitis    hospitalized 3/10 and 5/10    Diverticulosis    cscopy '03, '07, '10    Fracture of C2 vertebra, closed ([facility])    ER 3/19/21--CT cervical spine-- fx C2 posterior lateral vertebral body and left C2 lamina.      GERD (gastroesophageal reflux disease)    Glaucoma    Hyperlipidemia    Hypertension, essential    Irritable bowel syndrome    Lumbar stenosis    MRI lumbar 2004-djd spinal stenosis-xray lumbar 12/13-DJD scoliosis, wedge comp T8, 9, 10, L1    Lung nodule    LLL nodule resolved on serial CT scans    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 ([facility])    Osteoarthritis    Osteoarthritis    Osteopenia     PAF (paroxysmal atrial fibrillation) ([facility])    Pneumonia due to COVID-19 virus    hosp and rec'd conv plasma and remdesivir.    Renal calculus    on CT abd 5/10-6 mm or less in L lower kidney    Renal insufficiency    Shingles    Small bowel obstruction ([facility])    multiple hospitalizations for [id number], 6/2016    Stress incontinence, female    SVT (supraventricular tachycardia) ([facility])    event monitor 2/2016-SVT    Syncope    48-hour Holter 5/19/20--bursts of atrial tachycardia up to 3 minutes, PVCs, rare Wenkebach.  Saw Dr. Doss.    Type 2 diabetes mellitus ([facility])    Uterine prolapse    Uterine prolapse    had pessary in 2006 Dr. SORAYA Davis -vag sling procedure Dr Baez in 6/2016    Uterovaginal prolapse, incomplete    UTI due to Klebsiella species    ESBL Klebsiella UTI treated in [facility] with meropenem when in hosp for enteritis. (Dr Carvalho).     Wound of right leg       Past Surgical History:   Procedure Laterality Date    Abdomen surgery proc unlisted  2007    laparotomy for TERESA and internal hernia    Abdomen surgery proc unlisted  2009    TERESA and small bowel resection-Dr Jaramillo    Appendectomy      Cholecystectomy      Fracture surgery Left 1/2017    L hip fx pinning 1/2017 Dr. Hernandez    Knee replacement surgery Left 2000    Knee replacement surgery Right 2008    Other surgical history      cystocele repair    Other surgical history Bilateral     ear surgery-Dr. Patel-both eardrums    Sling  6/30/2016    vag sling procedure at [facility] -Dr Baez                Social History     Socioeconomic History    Marital status:    Tobacco Use    Smoking status: Never    Smokeless tobacco: Never   Vaping Use    Vaping status: Never Used   Substance and Sexual Activity    Alcohol use: No    Drug use: No   Other Topics Concern    Caffeine Concern Yes     Comment: Coffee 1 cups daily    Exercise Yes     Comment: Streching/Strength.    Social History Narrative    The patient uses the following assistive  device(s):  single-point cane.      The patient does live in a home with stairs.     Social Determinants of Health     Food Insecurity: No Food Insecurity (7/2/2024)    Food Insecurity     Food Insecurity: Never true   Transportation Needs: No Transportation Needs (7/2/2024)    Transportation Needs     Lack of Transportation: No   Housing Stability: Low Risk  (7/2/2024)    Housing Stability     Housing Instability: No       Physical Exam     ED Triage Vitals   BP 07/02/24 1634 136/66   Pulse 07/02/24 1634 77   Resp 07/02/24 1634 18   Temp 07/02/24 1634 97.8 °F (36.6 °C)   Temp src 07/02/24 2125 Oral   SpO2 07/02/24 1634 96 %   O2 Device 07/02/24 1634 None (Room air)       Current Vitals:   Vital Signs  BP: 130/53  Pulse: 81  Resp: 16  Temp: 97.6 °F (36.4 °C)  Temp src: Oral  MAP (mmHg): 71    Oxygen Therapy  SpO2: 99 %  O2 Device: None (Room air)    Physical Exam    Constitutional: awake, alert, no sig distress  HENT: mmm, no lesions,  Neck: normal range of motion, no tenderness, supple.  Eyes: PERRL, EOMI, conjunctiva normal, no discharge. Sclera anicteric.  Cardiovascular: rr no murmur  Respiratory: Normal breath sounds, no respiratory distress, no wheezing, no chest tenderness.  GI: Bowel sounds normal, Soft, no tenderness, no masses, no pulsatile masses.  : No CVA tenderness.  Left lower extremity  Skin:     Musculoskeletal: Intact distal pulses, LLE pitting Edema 2+  Neurologic: Alert & oriented x 3, normal motor function, normal sensory function, no focal deficits noted.  Psych: Calm, cooperative, nl affect        ED Course     Labs Reviewed   BASIC METABOLIC PANEL (8) - Abnormal; Notable for the following components:       Result Value    Glucose 107 (*)     Sodium 125 (*)     Potassium 5.2 (*)     Chloride 96 (*)     Creatinine 1.28 (*)     Calculated Osmolality 264 (*)     eGFR-Cr 39 (*)     All other components within normal limits   CBC W/ DIFFERENTIAL - Abnormal; Notable for the following components:     RBC 3.00 (*)     HGB 9.1 (*)     HCT 26.7 (*)     RDW-SD 49.7 (*)     RDW 15.1 (*)     Lymphocyte Absolute 0.72 (*)     All other components within normal limits   BNP (B TYPE NATRIURETIC PEPTIDE) - Normal     Disposition and Plan     Clinical Impression:  1. Cellulitis of right lower extremity         Disposition:  Admit  7/2/2024  7:43 pm      Signed by Andrés Fleming MD on 7/3/2024  1:33 AM         HISTORY AND PHYSICAL      HISTORY OF PRESENT ILLNESS:  The patient is a 89-year-old  female with chronic ulceration on the lateral aspect of her right leg.  Had cultures done recently on June 13 which grew +3 Serratia liquefaciens and +4 gram-negative rods plus Candida parapsilosis.  She was a given a course of Bactrim without significant improvement.  Today, she was evaluated at the wound center and because of persistent ulcer with necrotic base and developing erythema in her left leg, she was sent to the emergency department for evaluation.  CBC and chemistry were still pending.  Wound cultures from her right leg are still pending.     PAST MEDICAL HISTORY:  Per the record, patient had a history of paroxysmal atrial fibrillation, anticoagulated with Eliquis; generalized osteoarthritis; osteoporosis; chronic kidney disease stage 3; peripheral neuropathy; hypertension; hyperlipidemia; gastroesophageal reflux disease; asthma; diverticulosis; supraventricular tachycardia; and glaucoma; chronic hearing impairment.     PAST SURGICAL HISTORY:  Appendectomy, cholecystectomy, laparoscopic lysis of adhesions, and partial small-bowel resection for small-bowel obstruction presentation.  She had bilateral total knee arthroplasties, left femur open reduction and internal fixation, cystocele repair and bladder sling.    Per the son, patient uses a walker and she stumbles and hits her legs against the walker bars and pieces of furniture, and then she develops wounds and skin ulcers that hard to heal. Recently,  she started developing more necrotic changes at the base of her right lateral leg ulcer and increased erythema in her left leg with ecchymoses and mild hematomas. Patient also had developed recently right hand dorsal skin tear.     EXTREMITIES:  Right lateral leg with necrotic base ulcer superficial in nature with mild erythematous changes around the ulceration.  There are also multiple ecchymoses noted on the left leg with skin exposed at the distal posterior left leg, Charcot foot deformity bilaterally noted.     ASSESSMENT:    1.       Bilateral lower extremity cellulitis and right leg ulcer.  2.       Essential hypertension.  3.       Chronic kidney disease stage 3.  4.       History of paroxysmal atrial fibrillation.     PLAN:  Patient will be admitted to general medical floor.  Wound service consult.  Repeat wound culture.  IV Rocephin and vancomycin.  Infectious disease consult.      MEDICATIONS ADMINISTERED IN LAST 1 DAY:  atorvastatin (Lipitor) tab 10 mg       Date Action Dose Route User    7/2/2024 2355 Given 10 mg Oral Dutch Caban RN          cefTRIAXone (Rocephin) 1 g in sodium chloride 0.9% 100 mL IVPB-ADDV       Date Action Dose Route User    7/2/2024 1930 New Bag 1 g Intravenous Raven Chandler RN          gabapentin (Neurontin) cap 100 mg       Date Action Dose Route User    7/2/2024 2355 Given 100 mg Oral Dutch Caban RN          heparin (Porcine) 5000 UNIT/ML injection 5,000 Units       Date Action Dose Route User    7/2/2024 2352 Given 5,000 Units Subcutaneous (Right Upper Arm) Dutch Caban RN          HYDROcodone-acetaminophen (Norco) 5-325 MG per tab 1 tablet       Date Action Dose Route User    7/2/2024 1911 Given 1 tablet Oral Raven Chandler RN          HYDROcodone-acetaminophen (Norco) 5-325 MG per tab 1 tablet       Date Action Dose Route User    7/2/2024 2352 Given 1 tablet Oral Dutch Caban RN          melatonin tab 3 mg       Date Action Dose Route User     7/2/2024 2355 Given 3 mg Oral Dutch Caban RN          morphINE PF 2 MG/ML injection 2 mg       Date Action Dose Route User    7/2/2024 2048 Given 2 mg Intravenous Raven Chandler RN          sodium chloride 0.9% infusion  75 ML HR THEN DC        Date Action Dose Route User    7/3/2024 0047 New Bag (none) Intravenous Dutch Caban RN          vancomycin (Vancocin) 750 mg in sodium chloride 0.9% 250 mL IVPB-ADDV       Date Action Dose Route User    7/2/2024 2021 New Bag 750 mg Intravenous Raven Chandler RN            Vitals (last day)       Date/Time Temp Pulse Resp BP SpO2 Weight O2 Device O2 Flow Rate (L/min) [facility]    07/03/24 0658 -- 82 16 118/98 98 % -- -- -- TT    07/03/24 0005 97.6 °F (36.4 °C) 81 16 130/53 99 % -- None (Room air) -- TT    07/02/24 2126 -- -- -- -- -- 122 lb 12.8 oz (55.7 kg) -- -- KG    07/02/24 2125 98 °F (36.7 °C) 72 18 134/48 97 % -- None (Room air) -- KG    07/02/24 2100 -- 76 -- 131/62 97 % -- None (Room air) --     07/02/24 2006 -- 75 -- -- -- -- -- -- GT    07/02/24 1930 -- 77 -- -- 96 % -- None (Room air) --     07/02/24 1634 97.8 °F (36.6 °C) 77 18 136/66 96 % 112 lb (50.8 kg) None (Room air) -- [place]

## 2024-07-03 NOTE — CM/SW NOTE
Pt discussed in RN DC Rounds. SW received MDO for HH. Per chart review, patient has hx with [facility] recently. SW reached out to [facility] liaison to confirm if patient is current. [facility] liaison confirmed that patient is current with [facility] for RN/PT. CARSON entered JAYLON for RN/PT. [facility] liaison aware.     SW/CM to remain available for support and/or discharge planning.     Valorie Escalante, MSW, LSW   x [phone]

## 2024-07-03 NOTE — HOME CARE LIAISON
This pt is current with [facility] for RN&PT services. Pt will need a JAYLON entered and Quality data delivered by [facility]. Notified CARSON Eugene.

## 2024-07-03 NOTE — CONSULTS
[facility]  part of [facility] ID CONSULT NOTE    Frances Whitehead Patient Status:  Inpatient    1931 MRN [id number]   Location [facility] 5SW/SE Attending Meg Carreno MD   Hosp Day # 1 PCP Thao Garduno DO       Reason for Consultation:  RLE cellulitis    ASSESSMENT:    Antibiotics: Vancomycin, cefepime    # RLE cellulitis with wound   -Previous cx with Serratia liquefaciens, GNR, C. parapsilosis  # BLE wounds  # Atrial fibrillation  # CKD    PLAN:  -  Continue on vancomycin. Stop ceftriaxone and start cefepime.  -  FU cx.  -  Follow fever curve, wbc.  -  Reviewed labs, micro, imaging reports, available old records.  -  Case d/w patient, RN.    History of Present Illness:  Frances Whitehead is a 89 year old female with a history of atrial fibrillation, CKD, who follows at [facility] wound clinic, who had recent wound cultures showing serratia and candida  s/p course of bactrim and fluconazole, who presented to [facility] ED on  after being seen at wound clinic with concerns for worsening right leg wound. Patient denies any fevers or chills at home. On arrival, afebrile, wbc 5.4, LLE venous doppler without DVT, started on vancomycin and ceftriaxone. ID consulted.    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 Doss-Stent PCI of LAD and RCA was performed.    Deaf    Diverticulitis    hospitalized 3/10 and 5/10    Diverticulosis    cscopy ', ', '10    Fracture of C2 vertebra, closed ([facility])    ER 3/19/21--CT cervical spine-- fx C2 posterior lateral vertebral body and left C2 lamina.      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    Renal calculus    on CT abd 5/10-6 mm or less in L lower kidney    Renal insufficiency    Shingles    Small bowel obstruction (HCC)    multiple hospitalizations for [id number], 6/2016    Stress incontinence, female    SVT (supraventricular tachycardia) ([facility])    event monitor 2/2016-SVT    Syncope    48-hour Holter 5/19/20--bursts of atrial tachycardia up to 3 minutes, PVCs, rare Wenkebach.  Saw Dr. Doss.    Type 2 diabetes mellitus ([facility])    Uterine prolapse    Uterine prolapse    had pessary in 2006 Dr. SORAYA Davis -vag sling procedure Dr Baez in 6/2016    Uterovaginal prolapse, incomplete    UTI due to Klebsiella species    ESBL Klebsiella UTI treated in [facility] with meropenem when in hosp for enteritis. (Dr Carvalho).     Wound of right leg     Past Surgical History:   Procedure Laterality Date    Abdomen surgery proc unlisted  2007    laparotomy for TERESA and internal hernia    Abdomen surgery proc unlisted  2009    TERESA and small bowel resection-Dr Jaramillo    Appendectomy      Cholecystectomy      Fracture surgery Left 1/2017    L hip fx pinning 1/2017 Dr. Hernandez    Knee replacement surgery Left 2000    Knee replacement surgery Right 2008    Other surgical history      cystocele repair    Other surgical history Bilateral     ear surgery-Dr. Patel-both eardrums    Sling  6/30/2016    vag sling procedure at [facility]  -Dr Baez     Family History   Problem Relation Age of Onset    Stroke  Father         cause of death-age75    Diabetes Mother          age 64    Diabetes Brother     Heart Disease Brother         cause of death-age 57    Diabetes Sister     Stroke Sister         cause of death age 78    Heart Attack Brother         cause of death    Cancer Brother          of pancreas or lung cancer    Other (5 children [Other]) Other     Glaucoma Brother     Other (intestinal blockage [Other]) Brother         cause of death age 90    Stroke Sister         stroke- age 85      reports that she has never smoked. She has never used smokeless tobacco. She reports that she does not drink alcohol and does not use drugs.    Allergies:  Allergies   Allergen Reactions    Latex HIVES and UNKNOWN     Other reaction(s): Unknown    Mastisol Adhesive HIVES     Other reaction(s): ADHESIVE TAPE    Adhesive Tape OTHER (SEE COMMENTS)     Other reaction(s): ADHESIVE TAPE       Medications:    Current Facility-Administered Medications:     heparin (Porcine) 5000 UNIT/ML injection 5,000 Units, 5,000 Units, Subcutaneous, 2 times per day    acetaminophen (Tylenol Extra Strength) tab 500 mg, 500 mg, Oral, Q4H PRN    ondansetron (Zofran) 4 MG/2ML injection 4 mg, 4 mg, Intravenous, Q6H PRN    metoclopramide (Reglan) 5 mg/mL injection 10 mg, 10 mg, Intravenous, Q8H PRN    cefTRIAXone (Rocephin) 1 g in sodium chloride 0.9% 100 mL IVPB-ADDV, 1 g, Intravenous, Q24H    vancomycin (Vancocin) 750 mg in sodium chloride 0.9% 250 mL IVPB-ADDV, 15 mg/kg, Intravenous, Q24H    HYDROcodone-acetaminophen (Norco) 5-325 MG per tab 1 tablet, 1 tablet, Oral, Q6H PRN    atorvastatin (Lipitor) tab 10 mg, 10 mg, Oral, Nightly    gabapentin (Neurontin) cap 100 mg, 100 mg, Oral, Nightly    melatonin tab 3 mg, 3 mg, Oral, Nightly    Review of Systems:  CONSTITUTIONAL:  No weight loss, +fatigue  HEENT:  Eyes:  No visual loss, blurred vision, double vision or yellow sclerae. Ears, Nose, Throat:  No hearing loss, sneezing, congestion, runny  nose or sore throat.  SKIN:  No rash or itching.  CARDIOVASCULAR:  No chest pain, chest pressure or chest discomfort  RESPIRATORY:  No shortness of breath, cough or sputum.  GASTROINTESTINAL:  No anorexia, nausea, vomiting or diarrhea. No abdominal pain or blood.  GENITOURINARY:  No Burning on urination.   NEUROLOGICAL:  No headache, dizziness, syncope, paralysis, ataxia, numbness or tingling in the extremities.  MUSCULOSKELETAL:  +BLE pain  HEMATOLOGIC:  No anemia, bleeding or bruising.  ALLERGIES:  No history of asthma, hives, eczema or rhinitis.    Physical Exam:  Vital signs: Blood pressure 124/51, pulse 76, temperature 97.6 °F (36.4 °C), temperature source Oral, resp. rate 18, height 4' 11\" (1.499 m), weight 122 lb 12.8 oz (55.7 kg), SpO2 98%, not currently breastfeeding.    General: Awake, in bed   HEENT: Moist mucous membranes. EOMI  Neck: No lymphadenopathy.  Supple.  Cardiovascular: RRR  Respiratory: CTAB  Abdomen: Soft, no TTP  Musculoskeletal: No edema noted  Integument: R hand with ecchymosis, R leg pictures reviewed with open wound with necrosis at wound edges, L second toe with area of ecchymosis on plantar aspect  Lines: PIV+  : Purewick+    Laboratory Data:  Recent Labs   Lab 07/03/24  0526   RBC 3.32*   HGB 10.3*   HCT 29.9*   MCV 90.1   MCH 31.0   MCHC 34.4   RDW 15.0   NEPRELIM 3.39   WBC 4.8   .0     Recent Labs   Lab 07/02/24  1845 07/03/24  0526   * 106*   BUN 23 21   CREATSERUM 1.28* 1.14*   CA 8.8 8.8   * 129*   K 5.2* 5.2*   CL 96* 100   CO2 22.0 24.0       Microbiology: Reviewed in EMR    Radiology: Reviewed    Thank you for allowing us to participate in the care of this patient. Please do not hesitate to call if you have any questions.  We will continue to follow with you and will make further recommendations based on his progress.    [facility]  [phone]  7/3/2024

## 2024-07-04 LAB
ANION GAP SERPL CALC-SCNC: 8 MMOL/L (ref 0–18)
BASOPHILS # BLD AUTO: 0.03 X10(3) UL (ref 0–0.2)
BASOPHILS NFR BLD AUTO: 0.8 %
BUN BLD-MCNC: 17 MG/DL (ref 9–23)
BUN/CREAT SERPL: 21.5 (ref 10–20)
CALCIUM BLD-MCNC: 8.6 MG/DL (ref 8.7–10.4)
CHLORIDE SERPL-SCNC: 101 MMOL/L (ref 98–112)
CO2 SERPL-SCNC: 21 MMOL/L (ref 21–32)
CREAT BLD-MCNC: 0.79 MG/DL
DEPRECATED RDW RBC AUTO: 51.1 FL (ref 35.1–46.3)
EGFRCR SERPLBLD CKD-EPI 2021: 70 ML/MIN/1.73M2 (ref 60–?)
EOSINOPHIL # BLD AUTO: 0.17 X10(3) UL (ref 0–0.7)
EOSINOPHIL NFR BLD AUTO: 4.5 %
ERYTHROCYTE [DISTWIDTH] IN BLOOD BY AUTOMATED COUNT: 15.3 % (ref 11–15)
GLUCOSE BLD-MCNC: 100 MG/DL (ref 70–99)
HCT VFR BLD AUTO: 28 %
HGB BLD-MCNC: 9.5 G/DL
IMM GRANULOCYTES # BLD AUTO: 0.03 X10(3) UL (ref 0–1)
IMM GRANULOCYTES NFR BLD: 0.8 %
LYMPHOCYTES # BLD AUTO: 1.18 X10(3) UL (ref 1–4)
LYMPHOCYTES NFR BLD AUTO: 31.4 %
MAGNESIUM SERPL-MCNC: 1.4 MG/DL (ref 1.6–2.6)
MAGNESIUM SERPL-MCNC: 3.1 MG/DL (ref 1.6–2.6)
MCH RBC QN AUTO: 31.1 PG (ref 26–34)
MCHC RBC AUTO-ENTMCNC: 33.9 G/DL (ref 31–37)
MCV RBC AUTO: 91.8 FL
MONOCYTES # BLD AUTO: 0.42 X10(3) UL (ref 0.1–1)
MONOCYTES NFR BLD AUTO: 11.2 %
NEUTROPHILS # BLD AUTO: 1.93 X10 (3) UL (ref 1.5–7.7)
NEUTROPHILS # BLD AUTO: 1.93 X10(3) UL (ref 1.5–7.7)
NEUTROPHILS NFR BLD AUTO: 51.3 %
OSMOLALITY SERPL CALC.SUM OF ELEC: 272 MOSM/KG (ref 275–295)
PLATELET # BLD AUTO: 336 10(3)UL (ref 150–450)
POTASSIUM SERPL-SCNC: 4.8 MMOL/L (ref 3.5–5.1)
POTASSIUM SERPL-SCNC: 4.9 MMOL/L (ref 3.5–5.1)
RBC # BLD AUTO: 3.05 X10(6)UL
SODIUM SERPL-SCNC: 130 MMOL/L (ref 136–145)
TROPONIN I SERPL HS-MCNC: 10 NG/L
WBC # BLD AUTO: 3.8 X10(3) UL (ref 4–11)

## 2024-07-04 PROCEDURE — 99233 SBSQ HOSP IP/OBS HIGH 50: CPT | Performed by: HOSPITALIST

## 2024-07-04 NOTE — PROGRESS NOTES
[facility]  part of [facility]    Progress Note    Frances Whitehead Patient Status:  Inpatient    1931 MRN [id number]   Location [facility] 5SW/SE Attending Meg Carreno MD   Hosp Day # 1 PCP Thao Garduno DO     Chief Complaint: RLE cellulitis    SUBJECTIVE:    No acute events overnight.  Patient denies chest pain, sob.  No fevers/chills.  No n/v/abd pain.  Feels her legs are a little better.     10 ROS completed and otherwise negative.    OBJECTIVE:  Vital signs in last 24 hours:  /54 (BP Location: Right arm)   Pulse 82   Temp 98.1 °F (36.7 °C) (Oral)   Resp 18   Ht 4' 11\" (1.499 m)   Wt 122 lb 12.8 oz (55.7 kg)   LMP  (LMP Unknown)   SpO2 96%   BMI 24.80 kg/m²     Intake/Output:    Intake/Output Summary (Last 24 hours) at 7/3/2024 2109  Last data filed at 7/3/2024 1838  Gross per 24 hour   Intake --   Output 950 ml   Net -950 ml       Wt Readings from Last 3 Encounters:   24 122 lb 12.8 oz (55.7 kg)   24 112 lb (50.8 kg)   24 116 lb (52.6 kg)       Exam     Gen: No acute distress  Pulm: Lungs clear, normal respiratory effort  CV: Heart with regular rate and rhythm  Abd: Abdomen soft, nontender, bowel sounds present  Neuro: No acute focal deficits  MSK: moves extremities  Skin: Warm and dry  Psych: Normal affect  Ext: bl le edema and erythema    Data Review:     Labs:   Lab Results   Component Value Date    WBC 4.8 2024    HGB 10.3 2024    HCT 29.9 2024    .0 2024    CREATSERUM 1.14 2024    BUN 21 2024     2024    K 5.2 2024     2024    CO2 24.0 2024     2024    CA 8.8 2024       Imaging:   No results found.    Meds:   Current Facility-Administered Medications   Medication Dose Route Frequency    ceFEPIme (Maxipime) 1 g in sodium chloride 0.9% 100 mL IVPB-MBP  1 g Intravenous Q24H    heparin (Porcine) 5000 UNIT/ML injection 5,000 Units   5,000 Units Subcutaneous 2 times per day    acetaminophen (Tylenol Extra Strength) tab 500 mg  500 mg Oral Q4H PRN    ondansetron (Zofran) 4 MG/2ML injection 4 mg  4 mg Intravenous Q6H PRN    metoclopramide (Reglan) 5 mg/mL injection 10 mg  10 mg Intravenous Q8H PRN    vancomycin (Vancocin) 750 mg in sodium chloride 0.9% 250 mL IVPB-ADDV  15 mg/kg Intravenous Q24H    HYDROcodone-acetaminophen (Norco) 5-325 MG per tab 1 tablet  1 tablet Oral Q6H PRN    atorvastatin (Lipitor) tab 10 mg  10 mg Oral Nightly    gabapentin (Neurontin) cap 100 mg  100 mg Oral Nightly    melatonin tab 3 mg  3 mg Oral Nightly         Assessment  Patient Active Problem List   Diagnosis    Hypercholesterolemia    Hypertension    Neuropathy    Asthma with COPD ([facility])    SVT (supraventricular tachycardia) ([facility])    Bilateral hearing loss    Coronary artery disease involving native coronary artery of native heart without angina pectoris    Stented coronary artery    CKD (chronic kidney disease)    Controlled type 2 diabetes mellitus with diabetic nephropathy, without long-term current use of insulin ([facility])    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 2 diabetes mellitus ([facility])    Thrombocytopenia ([facility])    Asthma with COPD (chronic obstructive pulmonary disease) ([facility])    Closed fracture of multiple ribs of left side, initial encounter    Syncope, unspecified syncope type    Cellulitis of right lower extremity    Cellulitis       Plan:     BL le cellulitis, Rt leg ulcer  - started IV abx  - ID consult  - elevate legs  - seen by wound care    HTN  - monitor vitals and adjust meds prn    CKD stage 3  - monitor bmp    Paroxysmal Afib  - rate controlled  - eliquis for anticoagulation     Prophylaxis:   DVT with eliquis    Dispo: pending clinical course    Global A/P  - reviewed labs and vitals from today  - reviewed notes of the day  - cbc, bmp, mag ordered for tomorrow  - discussed need to stay in the hospital today due to le cellulitis  - cont IV abx  - discussed  with patient/RN and care team    MDM: high level, severe exacerbation of chronic illness posing a threat to life, IV medication requiring close monitoring in hospital.     Meg Carreno MD  7/3/2024  9:09 PM    **Certification      PHYSICIAN Certification of Need for Inpatient Hospitalization - Initial Certification    Patient will require inpatient services that will reasonably be expected to span two midnight's based on the clinical documentation in H+P.   Based on patients current state of illness, I anticipate that, after discharge, patient will require TBD.

## 2024-07-04 NOTE — PROGRESS NOTES
[facility]  part of [facility]    Progress Note    Frances Whitehead Patient Status:  Inpatient    1931 MRN [id number]   Location [facility] 5SW/SE Attending Meg Carreno MD   Hosp Day # 2 PCP Thao Garduno DO     Chief Complaint: RLE cellulitis    SUBJECTIVE:    No acute events overnight.  Patient denies chest pain, sob.  No fevers/chills.  No n/v/abd pain.  Feels her legs are about the same today.    10 ROS completed and otherwise negative.    OBJECTIVE:  Vital signs in last 24 hours:  /51 (BP Location: Right arm)   Pulse 77   Temp 97.5 °F (36.4 °C) (Oral)   Resp 18   Ht 4' 11\" (1.499 m)   Wt 122 lb 12.8 oz (55.7 kg)   LMP  (LMP Unknown)   SpO2 98%   BMI 24.80 kg/m²     Intake/Output:    Intake/Output Summary (Last 24 hours) at 2024 1425  Last data filed at 2024 0910  Gross per 24 hour   Intake 720 ml   Output 1300 ml   Net -580 ml       Wt Readings from Last 3 Encounters:   24 122 lb 12.8 oz (55.7 kg)   24 112 lb (50.8 kg)   24 116 lb (52.6 kg)       Exam     Gen: No acute distress  Pulm: Lungs clear, normal respiratory effort  CV: Heart with regular rate and rhythm  Abd: Abdomen soft, nontender, bowel sounds present  Neuro: No acute focal deficits  MSK: moves extremities  Skin: Warm and dry  Psych: Normal affect  Ext: bl le edema and erythema - improving slowly    Data Review:     Labs:   Lab Results   Component Value Date    WBC 3.8 2024    HGB 9.5 2024    HCT 28.0 2024    .0 2024    CREATSERUM 0.79 2024    BUN 17 2024     2024    K 4.8 2024     2024    CO2 21.0 2024     2024    CA 8.6 2024    MG 3.1 2024       Imaging:   No results found.    Meds:   Current Facility-Administered Medications   Medication Dose Route Frequency    [START ON 2024] ceFEPIme (Maxipime) 1 g in sodium chloride 0.9% 100 mL IVPB-MBP  1 g  Intravenous q12h    heparin (Porcine) 5000 UNIT/ML injection 5,000 Units  5,000 Units Subcutaneous 2 times per day    acetaminophen (Tylenol Extra Strength) tab 500 mg  500 mg Oral Q4H PRN    ondansetron (Zofran) 4 MG/2ML injection 4 mg  4 mg Intravenous Q6H PRN    metoclopramide (Reglan) 5 mg/mL injection 10 mg  10 mg Intravenous Q8H PRN    vancomycin (Vancocin) 750 mg in sodium chloride 0.9% 250 mL IVPB-ADDV  15 mg/kg Intravenous Q24H    HYDROcodone-acetaminophen (Norco) 5-325 MG per tab 1 tablet  1 tablet Oral Q6H PRN    atorvastatin (Lipitor) tab 10 mg  10 mg Oral Nightly    gabapentin (Neurontin) cap 100 mg  100 mg Oral Nightly    melatonin tab 3 mg  3 mg Oral Nightly         Assessment  Patient Active Problem List   Diagnosis    Hypercholesterolemia    Hypertension    Neuropathy    Asthma with COPD ([facility])    SVT (supraventricular tachycardia) ([facility])    Bilateral hearing loss    Coronary artery disease involving native coronary artery of native heart without angina pectoris    Stented coronary artery    CKD (chronic kidney disease)    Controlled type 2 diabetes mellitus with diabetic nephropathy, without long-term current use of insulin ([facility])    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 2 diabetes mellitus ([facility])    Thrombocytopenia (HCC)    Asthma with COPD (chronic obstructive pulmonary disease) ([facility])    Closed fracture of multiple ribs of left side, initial encounter    Syncope, unspecified syncope type    Cellulitis of right lower extremity    Cellulitis       Plan:     BL le cellulitis, Rt leg ulcer  - started IV abx - cont vanco, stopped ceftriaxone and started cefepime  - ID consult  - elevate legs  - seen by wound care    HTN  - monitor vitals and adjust meds prn    CKD stage 3  - monitor bmp    Paroxysmal Afib  - rate controlled  - eliquis for anticoagulation     Prophylaxis:   DVT with eliquis    Dispo: pending clinical course    Global A/P  - reviewed labs and vitals from today  - reviewed notes of the day  -  cbc, bmp, mag ordered for tomorrow  - discussed need to stay in the hospital today due to le cellulitis  - cont IV abx  - discussed with patient/RN and care team    MDM: high level, severe exacerbation of chronic illness posing a threat to life, IV medication requiring close monitoring in hospital.

## 2024-07-04 NOTE — PLAN OF CARE
Problem: Patient Centered Care  Goal: Patient preferences are identified and integrated in the patient's plan of care  Description: Interventions:  - What would you like us to know as we care for you? From home with son, Nicholas  - Provide timely, complete, and accurate information to patient/family  - Incorporate patient and family knowledge, values, beliefs, and cultural backgrounds into the planning and delivery of care  - Encourage patient/family to participate in care and decision-making at the level they choose  - Honor patient and family perspectives and choices  Outcome: Progressing     Problem: Patient/Family Goals  Goal: Patient/Family Long Term Goal  Description: Patient's Long Term Goal: Discharge from the hospital    Interventions:  - Monitor vital signs  - Monitor appropriate labs  - Wound care  - Pain management  - Administer medications per order  - Follow MD orders  - Diagnostics per order  - Update / inform patient and family on plan of care  - Discharge planning  - See additional Care Plan goals for specific interventions  Outcome: Progressing  Goal: Patient/Family Short Term Goal  Description: Patient's Short Term Goal: Improve pain to bilateral lower extremities      Interventions:   - Monitor vital signs  - Monitor appropriate labs  - Wound care  - Pain management  - Administer medications per order  - Follow MD orders  - Diagnostics per order  - Update / inform patient and family on plan of care  - See additional Care Plan goals for specific interventions  Outcome: Progressing     Problem: PAIN - ADULT  Goal: Verbalizes/displays adequate comfort level or patient's stated pain goal  Description: INTERVENTIONS:  - Encourage pt to monitor pain and request assistance  - Assess pain using appropriate pain scale  - Administer analgesics based on type and severity of pain and evaluate response  - Implement non-pharmacological measures as appropriate and evaluate response  - Consider cultural and social  influences on pain and pain management  - Manage/alleviate anxiety  - Utilize distraction and/or relaxation techniques  - Monitor for opioid side effects  - Notify MD/LIP if interventions unsuccessful or patient reports new pain  - Anticipate increased pain with activity and pre-medicate as appropriate  Outcome: Progressing     Problem: RISK FOR INFECTION - ADULT  Goal: Absence of fever/infection during anticipated neutropenic period  Description: INTERVENTIONS  - Monitor WBC  - Administer growth factors as ordered  - Implement neutropenic guidelines  Outcome: Progressing     Problem: SAFETY ADULT - FALL  Goal: Free from fall injury  Description: INTERVENTIONS:  - Assess pt frequently for physical needs  - Identify cognitive and physical deficits and behaviors that affect risk of falls.  - [facility] fall precautions as indicated by assessment.  - Educate pt/family on patient safety including physical limitations  - Instruct pt to call for assistance with activity based on assessment  - Modify environment to reduce risk of injury  - Provide assistive devices as appropriate  - Consider OT/PT consult to assist with strengthening/mobility  - Encourage toileting schedule  Outcome: Progressing     Problem: DISCHARGE PLANNING  Goal: Discharge to home or other facility with appropriate resources  Description: INTERVENTIONS:  - Identify barriers to discharge w/pt and caregiver  - Include patient/family/discharge partner in discharge planning  - Arrange for needed discharge resources and transportation as appropriate  - Identify discharge learning needs (meds, wound care, etc)  - Arrange for interpreters to assist at discharge as needed  - Consider post-discharge preferences of patient/family/discharge partner  - Complete POLST form as appropriate  - Assess patient's ability to be responsible for managing their own health  - Refer to Case Management Department for coordinating discharge planning if the patient needs  post-hospital services based on physician/LIP order or complex needs related to functional status, cognitive ability or social support system  Outcome: Progressing     Problem: SKIN/TISSUE INTEGRITY - ADULT  Goal: Incision(s), wounds(s) or drain site(s) healing without S/S of infection  Description: INTERVENTIONS:  - Assess and document risk factors for pressure ulcer development  - Assess and document skin integrity  - Assess and document dressing/incision, wound bed, drain sites and surrounding tissue  - Implement wound care per orders  - Initiate isolation precautions as appropriate  - Initiate Pressure Ulcer prevention bundle as indicated  Outcome: Progressing     Problem: MUSCULOSKELETAL - ADULT  Goal: Return mobility to safest level of function  Description: INTERVENTIONS:  - Assess patient stability and activity tolerance for standing, transferring and ambulating w/ or w/o assistive devices  - Assist with transfers and ambulation using safe patient handling equipment as needed  - Ensure adequate protection for wounds/incisions during mobilization  - Obtain PT/OT consults as needed  - Advance activity as appropriate  - Communicate ordered activity level and limitations with patient/family  Outcome: Progressing     Problem: Impaired Functional Mobility  Goal: Achieve highest/safest level of mobility/gait  Description: Interventions:  - Assess patient's functional ability and stability  - Promote increasing activity/tolerance for mobility and gait  - Educate and engage patient/family in tolerated activity level and precautions  - Recommend use of  RW for transfers and ambulation  - Recommend patient transfer to bedside chair toward strongest side  - When transferring patient, block weaker knee for safety  - Recommend use of chair position in bed 3 times per day  Outcome: Progressing     Problem: Impaired Activities of Daily Living  Goal: Achieve highest/safest level of independence in self care  Description:  Interventions:  - Assess ability and encourage patient to participate in ADLs to maximize function  - Promote sitting position while performing ADLs such as feeding, grooming, and bathing  - Educate and encourage patient/family in tolerated functional activity level and precautions during self-care  Outcome: Progressing   Safety precautions on place. Wounds cleansed and dressings changed. Call light within reach.

## 2024-07-04 NOTE — PLAN OF CARE
Problem: Patient Centered Care  Goal: Patient preferences are identified and integrated in the patient's plan of care  Description: Interventions:  - What would you like us to know as we care for you? From home with son, Nicholas  - Provide timely, complete, and accurate information to patient/family  - Incorporate patient and family knowledge, values, beliefs, and cultural backgrounds into the planning and delivery of care  - Encourage patient/family to participate in care and decision-making at the level they choose  - Honor patient and family perspectives and choices  Outcome: Progressing     Problem: Patient/Family Goals  Goal: Patient/Family Long Term Goal  Description: Patient's Long Term Goal: Discharge from the hospital    Interventions:  - Monitor vital signs  - Monitor appropriate labs  - Wound care  - Pain management  - Administer medications per order  - Follow MD orders  - Diagnostics per order  - Update / inform patient and family on plan of care  - Discharge planning  - See additional Care Plan goals for specific interventions  Outcome: Progressing  Goal: Patient/Family Short Term Goal  Description: Patient's Short Term Goal: Improve pain to bilateral lower extremities      Interventions:   - Monitor vital signs  - Monitor appropriate labs  - Wound care  - Pain management  - Administer medications per order  - Follow MD orders  - Diagnostics per order  - Update / inform patient and family on plan of care  - See additional Care Plan goals for specific interventions  Outcome: Progressing     Problem: PAIN - ADULT  Goal: Verbalizes/displays adequate comfort level or patient's stated pain goal  Description: INTERVENTIONS:  - Encourage pt to monitor pain and request assistance  - Assess pain using appropriate pain scale  - Administer analgesics based on type and severity of pain and evaluate response  - Implement non-pharmacological measures as appropriate and evaluate response  - Consider cultural and social  influences on pain and pain management  - Manage/alleviate anxiety  - Utilize distraction and/or relaxation techniques  - Monitor for opioid side effects  - Notify MD/LIP if interventions unsuccessful or patient reports new pain  - Anticipate increased pain with activity and pre-medicate as appropriate  Outcome: Progressing     Problem: RISK FOR INFECTION - ADULT  Goal: Absence of fever/infection during anticipated neutropenic period  Description: INTERVENTIONS  - Monitor WBC  - Administer growth factors as ordered  - Implement neutropenic guidelines  Outcome: Progressing     Problem: SAFETY ADULT - FALL  Goal: Free from fall injury  Description: INTERVENTIONS:  - Assess pt frequently for physical needs  - Identify cognitive and physical deficits and behaviors that affect risk of falls.  - [place] fall precautions as indicated by assessment.  - Educate pt/family on patient safety including physical limitations  - Instruct pt to call for assistance with activity based on assessment  - Modify environment to reduce risk of injury  - Provide assistive devices as appropriate  - Consider OT/PT consult to assist with strengthening/mobility  - Encourage toileting schedule  Outcome: Progressing     Problem: DISCHARGE PLANNING  Goal: Discharge to home or other facility with appropriate resources  Description: INTERVENTIONS:  - Identify barriers to discharge w/pt and caregiver  - Include patient/family/discharge partner in discharge planning  - Arrange for needed discharge resources and transportation as appropriate  - Identify discharge learning needs (meds, wound care, etc)  - Arrange for interpreters to assist at discharge as needed  - Consider post-discharge preferences of patient/family/discharge partner  - Complete POLST form as appropriate  - Assess patient's ability to be responsible for managing their own health  - Refer to Case Management Department for coordinating discharge planning if the patient needs  post-hospital services based on physician/LIP order or complex needs related to functional status, cognitive ability or social support system  Outcome: Progressing     Problem: SKIN/TISSUE INTEGRITY - ADULT  Goal: Incision(s), wounds(s) or drain site(s) healing without S/S of infection  Description: INTERVENTIONS:  - Assess and document risk factors for pressure ulcer development  - Assess and document skin integrity  - Assess and document dressing/incision, wound bed, drain sites and surrounding tissue  - Implement wound care per orders  - Initiate isolation precautions as appropriate  - Initiate Pressure Ulcer prevention bundle as indicated  Outcome: Progressing     Problem: MUSCULOSKELETAL - ADULT  Goal: Return mobility to safest level of function  Description: INTERVENTIONS:  - Assess patient stability and activity tolerance for standing, transferring and ambulating w/ or w/o assistive devices  - Assist with transfers and ambulation using safe patient handling equipment as needed  - Ensure adequate protection for wounds/incisions during mobilization  - Obtain PT/OT consults as needed  - Advance activity as appropriate  - Communicate ordered activity level and limitations with patient/family  Outcome: Progressing     Problem: Impaired Functional Mobility  Goal: Achieve highest/safest level of mobility/gait  Description: Interventions:  - Assess patient's functional ability and stability  - Promote increasing activity/tolerance for mobility and gait  - Educate and engage patient/family in tolerated activity level and precautions  - Recommend use of  RW for transfers and ambulation  - Recommend patient transfer to bedside chair toward strongest side  - When transferring patient, block weaker knee for safety  - Recommend use of chair position in bed 3 times per day  Outcome: Progressing     Problem: Impaired Activities of Daily Living  Goal: Achieve highest/safest level of independence in self care  Description:  Interventions:  - Assess ability and encourage patient to participate in ADLs to maximize function  - Promote sitting position while performing ADLs such as feeding, grooming, and bathing  - Educate and encourage patient/family in tolerated functional activity level and precautions during self-care  Outcome: Progressing      Monitoring vital signs- stable at this time. Pain medication provided as needed. Wound care provided. No acute changes noted at this moment. Safety and fall precautions maintained- bed alarm on, bed locked in lowest position, call light within reach. Frequent rounding by nursing staff.

## 2024-07-05 LAB
ANION GAP SERPL CALC-SCNC: 5 MMOL/L (ref 0–18)
ATRIAL RATE: 68 BPM
BASOPHILS # BLD AUTO: 0.03 X10(3) UL (ref 0–0.2)
BASOPHILS NFR BLD AUTO: 0.6 %
BUN BLD-MCNC: 15 MG/DL (ref 9–23)
BUN/CREAT SERPL: 18.3 (ref 10–20)
CALCIUM BLD-MCNC: 9.1 MG/DL (ref 8.7–10.4)
CHLORIDE SERPL-SCNC: 102 MMOL/L (ref 98–112)
CO2 SERPL-SCNC: 24 MMOL/L (ref 21–32)
CREAT BLD-MCNC: 0.82 MG/DL
DEPRECATED RDW RBC AUTO: 49.7 FL (ref 35.1–46.3)
EGFRCR SERPLBLD CKD-EPI 2021: 67 ML/MIN/1.73M2 (ref 60–?)
EOSINOPHIL # BLD AUTO: 0.21 X10(3) UL (ref 0–0.7)
EOSINOPHIL NFR BLD AUTO: 4.1 %
ERYTHROCYTE [DISTWIDTH] IN BLOOD BY AUTOMATED COUNT: 15.1 % (ref 11–15)
GLUCOSE BLD-MCNC: 102 MG/DL (ref 70–99)
HCT VFR BLD AUTO: 27 %
HGB BLD-MCNC: 9.3 G/DL
IMM GRANULOCYTES # BLD AUTO: 0.02 X10(3) UL (ref 0–1)
IMM GRANULOCYTES NFR BLD: 0.4 %
LYMPHOCYTES # BLD AUTO: 1.41 X10(3) UL (ref 1–4)
LYMPHOCYTES NFR BLD AUTO: 27.3 %
MAGNESIUM SERPL-MCNC: 1.8 MG/DL (ref 1.6–2.6)
MCH RBC QN AUTO: 31.1 PG (ref 26–34)
MCHC RBC AUTO-ENTMCNC: 34.4 G/DL (ref 31–37)
MCV RBC AUTO: 90.3 FL
MONOCYTES # BLD AUTO: 0.62 X10(3) UL (ref 0.1–1)
MONOCYTES NFR BLD AUTO: 12 %
NEUTROPHILS # BLD AUTO: 2.88 X10 (3) UL (ref 1.5–7.7)
NEUTROPHILS # BLD AUTO: 2.88 X10(3) UL (ref 1.5–7.7)
NEUTROPHILS NFR BLD AUTO: 55.6 %
OSMOLALITY SERPL CALC.SUM OF ELEC: 273 MOSM/KG (ref 275–295)
P AXIS: 78 DEGREES
P-R INTERVAL: 166 MS
PLATELET # BLD AUTO: 347 10(3)UL (ref 150–450)
POTASSIUM SERPL-SCNC: 5.1 MMOL/L (ref 3.5–5.1)
Q-T INTERVAL: 374 MS
QRS DURATION: 84 MS
QTC CALCULATION (BEZET): 397 MS
R AXIS: -18 DEGREES
RBC # BLD AUTO: 2.99 X10(6)UL
SODIUM SERPL-SCNC: 131 MMOL/L (ref 136–145)
T AXIS: 49 DEGREES
VENTRICULAR RATE: 68 BPM
WBC # BLD AUTO: 5.2 X10(3) UL (ref 4–11)

## 2024-07-05 PROCEDURE — 99233 SBSQ HOSP IP/OBS HIGH 50: CPT | Performed by: INTERNAL MEDICINE

## 2024-07-05 RX ORDER — MAGNESIUM OXIDE 400 MG/1
400 TABLET ORAL ONCE
Status: COMPLETED | OUTPATIENT
Start: 2024-07-05 | End: 2024-07-05

## 2024-07-05 NOTE — PROGRESS NOTES
Progress Note     Frances Whitehead Patient Status:  Inpatient    1931 MRN [id number]   Location [facility] 5SW/SE Attending Jeremy Pena MD   Hosp Day # 3 PCP Thao Garduno DO     Chief Complaint:   Chief Complaint   Patient presents with    Wound Care         Subjective:   S: Patient seen and examined, chart reviewed.   Low back pain on right, muscular.  Walking with nurses.  Wants to go home.  She is appreciative of care plan for 3-5 days IV abx and plan for 10 days total but await ID recs.       Review of Systems:   10 point ROS completed and was negative, except for pertinent positive and negatives stated in subjective.                Objective:   Vital signs:  Temp:  [97.3 °F (36.3 °C)-97.8 °F (36.6 °C)] 97.3 °F (36.3 °C)  Pulse:  [79-84] 84  Resp:  [16-18] 18  BP: (142-151)/(55-66) 142/55  SpO2:  [95 %-97 %] 97 %    Wt Readings from Last 6 Encounters:   24 122 lb 12.8 oz (55.7 kg)   24 112 lb (50.8 kg)   24 116 lb (52.6 kg)   24 116 lb 4 oz (52.7 kg)   24 114 lb (51.7 kg)   24 114 lb (51.7 kg)       Physical Exam:    General: No acute distress.   Respiratory: Clear to auscultation bilaterally. No wheezes. No rhonchi.  Cardiovascular: S1, S2. Regular rate and rhythm. No murmurs, rubs or gallops.   Abdomen: Soft, nontender, nondistended.  Positive bowel sounds. No rebound or guarding.  Neurologic: No focal neurological deficits. [place]  Musculoskeletal: Moves all extremities.  Extremities: wounds bandaged and not viewed.  Asked nurse to show ID when they round.     Results:   Diagnostic Data:      Labs:    Labs Last 24 Hours:   BMP     CBC    Other     Na 131 Cl 102 BUN 15 Glu 102   Hb 9.3   PTT - [place]cal -   K 5.1 CO2 24.0 Cr 0.82   WBC 5.2 >< .0  INR - CRP -   Renal Lytes Endo    Hct 27.0   Trop - D dim -   eGFR - Ca 9.1 POC Gluc  -    LFT   pBNP - Lactic -   eGFR AA - PO4 - A1c -   AST - APk - Prot -  LDL -      Recent Labs   Lab  07/03/24 0526 07/04/24 0537 07/05/24  0609   RBC 3.32* 3.05* 2.99*   HGB 10.3* 9.5* 9.3*   HCT 29.9* 28.0* 27.0*   MCV 90.1 91.8 90.3   MCH 31.0 31.1 31.1   MCHC 34.4 33.9 34.4   RDW 15.0 15.3* 15.1*   NEPRELIM 3.39 1.93 2.88   WBC 4.8 3.8* 5.2   .0 336.0 347.0       No results found for: \"PT\", \"INR\"    Recent Labs   Lab 07/03/24 0526 07/04/24 0225 07/04/24 0537 07/05/24 0609   *  --  100* 102*   BUN 21  --  17 15   CREATSERUM 1.14*  --  0.79 0.82   CA 8.8  --  8.6* 9.1   *  --  130* 131*   K 5.2* 4.9 4.8 5.1     --  101 102   CO2 24.0  --  21.0 24.0       No results for input(s): \"PJ\", \"LIP\" in the last 168 hours.    Recent Labs   Lab 07/04/24 0225 07/04/24 0537 07/05/24 0609   MG 1.4* 3.1* 1.8       No results for input(s): \"URINE\", \"CULTI\", \"BLDSMR\" in the last 168 hours.      No results found.        Pro-Calcitonin  No results for input(s): \"PCT\" in the last 168 hours.    Cardiac  No results for input(s): \"TROP\", \"PBNP\" in the last 168 hours.    Imaging: Imaging data reviewed in Epic.    No results found.       Medications:    cefepime  1 g Intravenous q12h    heparin  5,000 Units Subcutaneous 2 times per day    vancomycin  15 mg/kg Intravenous Q24H    atorvastatin  10 mg Oral Nightly    gabapentin  100 mg Oral Nightly    melatonin  3 mg Oral Nightly       Assessment & Plan:   ASSESSMENT / PLAN:     BL le cellulitis, Rt leg ulcer  - started IV abx, would expect 3-5 day IV  - ID consult  - elevate legs  - seen by wound care  - asked nurse to change bandages with ID present to observe wounds and see progression of treatment.   - Suspect 10 days abx total  - Pseudomonas in wound culture sensitive to cefepime. Suspect we can stop Vanco now.      HTN  - monitor vitals and adjust meds prn     CKD stage 3  - monitor bmp     Paroxysmal Afib  - rate controlled  - eliquis for anticoagulation               Prophylaxis:   DVT with eliquis     Dispo: pending clinical course     dvt  prophylaxis: eliquis  code status: DNR  dispo: home upon medical clearance    I personally reviewed the available laboratories, imaging including labs, cultures. I discussed/will discuss the case with nurse and patient. I ordered laboratories, studies including am labs. I adjusted medications including N/A. Medical decision making high, risk is high.    >55min spent, >50% spent counseling and coordinating care in the form of educating pt/family and d/w consultants and staff. Most of the time spent discussing the above plan.     Estimated date of discharge: 1-3 days  Discharge is dependent on: ID clearance  At this point Ms. Whitehead is expected to be discharge to: Home    Plan of care discussed with nurse and patient    Jeremy Pena MD, FAAP, FACP  [facility] Hospitalist  I respond to [employer] and [employer]

## 2024-07-05 NOTE — PLAN OF CARE
No acute changes. PRN norco for bilateral leg pain. Safety precautions in place.     Problem: Patient Centered Care  Goal: Patient preferences are identified and integrated in the patient's plan of care  Description: Interventions:  - What would you like us to know as we care for you? From home with son, Nicholas  - Provide timely, complete, and accurate information to patient/family  - Incorporate patient and family knowledge, values, beliefs, and cultural backgrounds into the planning and delivery of care  - Encourage patient/family to participate in care and decision-making at the level they choose  - Honor patient and family perspectives and choices  Outcome: Progressing     Problem: Patient/Family Goals  Goal: Patient/Family Long Term Goal  Description: Patient's Long Term Goal: Discharge from the hospital    Interventions:  - Monitor vital signs  - Monitor appropriate labs  - Wound care  - Pain management  - Administer medications per order  - Follow MD orders  - Diagnostics per order  - Update / inform patient and family on plan of care  - Discharge planning  - See additional Care Plan goals for specific interventions  Outcome: Progressing  Goal: Patient/Family Short Term Goal  Description: Patient's Short Term Goal: Improve pain to bilateral lower extremities      Interventions:   - Monitor vital signs  - Monitor appropriate labs  - Wound care  - Pain management  - Administer medications per order  - Follow MD orders  - Diagnostics per order  - Update / inform patient and family on plan of care  - See additional Care Plan goals for specific interventions  Outcome: Progressing     Problem: PAIN - ADULT  Goal: Verbalizes/displays adequate comfort level or patient's stated pain goal  Description: INTERVENTIONS:  - Encourage pt to monitor pain and request assistance  - Assess pain using appropriate pain scale  - Administer analgesics based on type and severity of pain and evaluate response  - Implement  non-pharmacological measures as appropriate and evaluate response  - Consider cultural and social influences on pain and pain management  - Manage/alleviate anxiety  - Utilize distraction and/or relaxation techniques  - Monitor for opioid side effects  - Notify MD/LIP if interventions unsuccessful or patient reports new pain  - Anticipate increased pain with activity and pre-medicate as appropriate  Outcome: Progressing     Problem: RISK FOR INFECTION - ADULT  Goal: Absence of fever/infection during anticipated neutropenic period  Description: INTERVENTIONS  - Monitor WBC  - Administer growth factors as ordered  - Implement neutropenic guidelines  Outcome: Progressing     Problem: SAFETY ADULT - FALL  Goal: Free from fall injury  Description: INTERVENTIONS:  - Assess pt frequently for physical needs  - Identify cognitive and physical deficits and behaviors that affect risk of falls.  - [place] fall precautions as indicated by assessment.  - Educate pt/family on patient safety including physical limitations  - Instruct pt to call for assistance with activity based on assessment  - Modify environment to reduce risk of injury  - Provide assistive devices as appropriate  - Consider OT/PT consult to assist with strengthening/mobility  - Encourage toileting schedule  Outcome: Progressing     Problem: DISCHARGE PLANNING  Goal: Discharge to home or other facility with appropriate resources  Description: INTERVENTIONS:  - Identify barriers to discharge w/pt and caregiver  - Include patient/family/discharge partner in discharge planning  - Arrange for needed discharge resources and transportation as appropriate  - Identify discharge learning needs (meds, wound care, etc)  - Arrange for interpreters to assist at discharge as needed  - Consider post-discharge preferences of patient/family/discharge partner  - Complete POLST form as appropriate  - Assess patient's ability to be responsible for managing their own health  -  Refer to Case Management Department for coordinating discharge planning if the patient needs post-hospital services based on physician/LIP order or complex needs related to functional status, cognitive ability or social support system  Outcome: Progressing     Problem: SKIN/TISSUE INTEGRITY - ADULT  Goal: Incision(s), wounds(s) or drain site(s) healing without S/S of infection  Description: INTERVENTIONS:  - Assess and document risk factors for pressure ulcer development  - Assess and document skin integrity  - Assess and document dressing/incision, wound bed, drain sites and surrounding tissue  - Implement wound care per orders  - Initiate isolation precautions as appropriate  - Initiate Pressure Ulcer prevention bundle as indicated  Outcome: Progressing     Problem: MUSCULOSKELETAL - ADULT  Goal: Return mobility to safest level of function  Description: INTERVENTIONS:  - Assess patient stability and activity tolerance for standing, transferring and ambulating w/ or w/o assistive devices  - Assist with transfers and ambulation using safe patient handling equipment as needed  - Ensure adequate protection for wounds/incisions during mobilization  - Obtain PT/OT consults as needed  - Advance activity as appropriate  - Communicate ordered activity level and limitations with patient/family  Outcome: Progressing     Problem: Impaired Functional Mobility  Goal: Achieve highest/safest level of mobility/gait  Description: Interventions:  - Assess patient's functional ability and stability  - Promote increasing activity/tolerance for mobility and gait  - Educate and engage patient/family in tolerated activity level and precautions  - Recommend use of  RW for transfers and ambulation  - Recommend patient transfer to bedside chair toward strongest side  - When transferring patient, block weaker knee for safety  - Recommend use of chair position in bed 3 times per day  Outcome: Progressing     Problem: Impaired Activities of  Daily Living  Goal: Achieve highest/safest level of independence in self care  Description: Interventions:  - Assess ability and encourage patient to participate in ADLs to maximize function  - Promote sitting position while performing ADLs such as feeding, grooming, and bathing  - Educate and encourage patient/family in tolerated functional activity level and precautions during self-care  Outcome: Progressing

## 2024-07-05 NOTE — PROGRESS NOTES
INFECTIOUS DISEASE PROGRESS NOTE    Frances Whitehead Patient Status:  Inpatient    1931 MRN [id number]   Location [facility] [place] 5SW/SE Attending Jeremy Pena MD   Hosp Day # 3 PCP Thao Garduno, DO     SUBJECTIVE  ROS done. Sitting up in chair.     ASSESSMENT/PLAN:    Antibiotics: cefepime     # RLE cellulitis with wound   - wound cx with PSAR               -Previous cx with Serratia liquefaciens, GNR, C. parapsilosis  # BLE wounds  # Atrial fibrillation  # CKD     PLAN:  -  Continue cefepime for now. Can transition to PO cipro 500 BID x 7 days on discharge.   -  local wound care  -  Follow fever curve, wbc.  -  Reviewed labs, micro, imaging reports, available old records.       History of Present Illness:  Frances Whitehead is a 89 year old female with a history of atrial fibrillation, CKD, who follows at [place] wound [facility], who had recent wound cultures showing serratia and candida  s/p course of bactrim and fluconazole, who presented to [place] ED on  after being seen at wound clinic with concerns for worsening right leg wound. Patient denies any fevers or chills at home. On arrival, afebrile, wbc 5.4, LLE venous doppler without DVT, started on vancomycin and ceftriaxone. ID consulted.    OBJECTIVE  /55 (BP Location: Right arm)   Pulse 84   Temp 96.5 °F (35.8 °C) (Axillary)   Resp 18   Ht 4' 11\" (1.499 m)   Wt 122 lb 12.8 oz (55.7 kg)   LMP  (LMP Unknown)   SpO2 97%   BMI 24.80 kg/m²     General: No acute distress. Alert.  HEENT: EOMI   Abdomen: Soft, nontender, nondistended.   Musculoskeletal: No edema  Integument: R hand with ecchymosis, R leg pictures reviewed with open wound with necrosis at wound edges, L second toe with area of ecchymosis on plantar aspect     Labs:     Recent Labs   Lab 24  2136 24  0526 24  0537 24  0609   WBC 5.4 4.8 3.8* 5.2   HGB 9.1* 10.3* 9.5* 9.3*   .0 347.0 336.0 347.0       Recent Labs   Lab 24  2725  07/03/24  0526 07/04/24  0225 07/04/24  0537 07/05/24  0609   * 129*  --  130* 131*   K 5.2* 5.2* 4.9 4.8 5.1   CL 96* 100  --  101 102   CO2 22.0 24.0  --  21.0 24.0   BUN 23 21  --  17 15   CREATSERUM 1.28* 1.14*  --  0.79 0.82       No results for input(s): \"ALT\", \"AST\" in the last 168 hours.    Invalid input(s): \"ALPHOS\", \"TBIL\"    Microbiology: Reviewed    Imaging: Reviewed       Lawrence Franco MD  [facility]  [phone]

## 2024-07-05 NOTE — PLAN OF CARE
Problem: Patient Centered Care  Goal: Patient preferences are identified and integrated in the patient's plan of care  Description: Interventions:  - What would you like us to know as we care for you? From home with son, Nicholas  - Provide timely, complete, and accurate information to patient/family  - Incorporate patient and family knowledge, values, beliefs, and cultural backgrounds into the planning and delivery of care  - Encourage patient/family to participate in care and decision-making at the level they choose  - Honor patient and family perspectives and choices  Outcome: Progressing     Problem: Patient/Family Goals  Goal: Patient/Family Long Term Goal  Description: Patient's Long Term Goal: Discharge from the hospital    Interventions:  - Monitor vital signs  - Monitor appropriate labs  - Wound care  - Pain management  - Administer medications per order  - Follow MD orders  - Diagnostics per order  - Update / inform patient and family on plan of care  - Discharge planning  - See additional Care Plan goals for specific interventions  Outcome: Progressing  Goal: Patient/Family Short Term Goal  Description: Patient's Short Term Goal: Improve pain to bilateral lower extremities      Interventions:   - Monitor vital signs  - Monitor appropriate labs  - Wound care  - Pain management  - Administer medications per order  - Follow MD orders  - Diagnostics per order  - Update / inform patient and family on plan of care  - See additional Care Plan goals for specific interventions  Outcome: Progressing     Problem: PAIN - ADULT  Goal: Verbalizes/displays adequate comfort level or patient's stated pain goal  Description: INTERVENTIONS:  - Encourage pt to monitor pain and request assistance  - Assess pain using appropriate pain scale  - Administer analgesics based on type and severity of pain and evaluate response  - Implement non-pharmacological measures as appropriate and evaluate response  - Consider cultural and social  influences on pain and pain management  - Manage/alleviate anxiety  - Utilize distraction and/or relaxation techniques  - Monitor for opioid side effects  - Notify MD/LIP if interventions unsuccessful or patient reports new pain  - Anticipate increased pain with activity and pre-medicate as appropriate  Outcome: Progressing     Problem: RISK FOR INFECTION - ADULT  Goal: Absence of fever/infection during anticipated neutropenic period  Description: INTERVENTIONS  - Monitor WBC  - Administer growth factors as ordered  - Implement neutropenic guidelines  Outcome: Progressing     Problem: SAFETY ADULT - FALL  Goal: Free from fall injury  Description: INTERVENTIONS:  - Assess pt frequently for physical needs  - Identify cognitive and physical deficits and behaviors that affect risk of falls.  - [place] fall precautions as indicated by assessment.  - Educate pt/family on patient safety including physical limitations  - Instruct pt to call for assistance with activity based on assessment  - Modify environment to reduce risk of injury  - Provide assistive devices as appropriate  - Consider OT/PT consult to assist with strengthening/mobility  - Encourage toileting schedule  Outcome: Progressing     Problem: DISCHARGE PLANNING  Goal: Discharge to home or other facility with appropriate resources  Description: INTERVENTIONS:  - Identify barriers to discharge w/pt and caregiver  - Include patient/family/discharge partner in discharge planning  - Arrange for needed discharge resources and transportation as appropriate  - Identify discharge learning needs (meds, wound care, etc)  - Arrange for interpreters to assist at discharge as needed  - Consider post-discharge preferences of patient/family/discharge partner  - Complete POLST form as appropriate  - Assess patient's ability to be responsible for managing their own health  - Refer to Case Management Department for coordinating discharge planning if the patient needs  post-hospital services based on physician/LIP order or complex needs related to functional status, cognitive ability or social support system  Outcome: Progressing     Problem: SKIN/TISSUE INTEGRITY - ADULT  Goal: Incision(s), wounds(s) or drain site(s) healing without S/S of infection  Description: INTERVENTIONS:  - Assess and document risk factors for pressure ulcer development  - Assess and document skin integrity  - Assess and document dressing/incision, wound bed, drain sites and surrounding tissue  - Implement wound care per orders  - Initiate isolation precautions as appropriate  - Initiate Pressure Ulcer prevention bundle as indicated  Outcome: Progressing     Problem: MUSCULOSKELETAL - ADULT  Goal: Return mobility to safest level of function  Description: INTERVENTIONS:  - Assess patient stability and activity tolerance for standing, transferring and ambulating w/ or w/o assistive devices  - Assist with transfers and ambulation using safe patient handling equipment as needed  - Ensure adequate protection for wounds/incisions during mobilization  - Obtain PT/OT consults as needed  - Advance activity as appropriate  - Communicate ordered activity level and limitations with patient/family  Outcome: Progressing     Problem: Impaired Functional Mobility  Goal: Achieve highest/safest level of mobility/gait  Description: Interventions:  - Assess patient's functional ability and stability  - Promote increasing activity/tolerance for mobility and gait  - Educate and engage patient/family in tolerated activity level and precautions  - Recommend use of  RW for transfers and ambulation  - Recommend patient transfer to bedside chair toward strongest side  - When transferring patient, block weaker knee for safety  - Recommend use of chair position in bed 3 times per day  Outcome: Progressing     Problem: Impaired Activities of Daily Living  Goal: Achieve highest/safest level of independence in self care  Description:  Interventions:  - Assess ability and encourage patient to participate in ADLs to maximize function  - Promote sitting position while performing ADLs such as feeding, grooming, and bathing  - Educate and encourage patient/family in tolerated functional activity level and precautions during self-care  Outcome: Progressing      Monitoring vital signs- stable at this time. Pain medication provided as needed. No acute changes noted at this moment. Safety and fall precautions maintained- bed alarm on, bed locked in lowest position, call light within reach. Frequent rounding by nursing staff.

## 2024-07-06 LAB — MAGNESIUM SERPL-MCNC: 1.8 MG/DL (ref 1.6–2.6)

## 2024-07-06 PROCEDURE — 99233 SBSQ HOSP IP/OBS HIGH 50: CPT | Performed by: INTERNAL MEDICINE

## 2024-07-06 RX ORDER — MAGNESIUM OXIDE 400 MG/1
400 TABLET ORAL ONCE
Status: COMPLETED | OUTPATIENT
Start: 2024-07-06 | End: 2024-07-06

## 2024-07-06 NOTE — PLAN OF CARE
Patient is Aox3-4, forgetful at times. Vitals stable on room air. Hard of hearing. Patient ambulatory with assistance. Frequent rounding done on pt and needs attended to.   Problem: Patient Centered Care  Goal: Patient preferences are identified and integrated in the patient's plan of care  Description: Interventions:  - What would you like us to know as we care for you? From home with son, Nicholas  - Provide timely, complete, and accurate information to patient/family  - Incorporate patient and family knowledge, values, beliefs, and cultural backgrounds into the planning and delivery of care  - Encourage patient/family to participate in care and decision-making at the level they choose  - Honor patient and family perspectives and choices  Outcome: Progressing     Problem: Patient/Family Goals  Goal: Patient/Family Long Term Goal  Description: Patient's Long Term Goal: Discharge from the hospital    Interventions:  - Monitor vital signs  - Monitor appropriate labs  - Wound care  - Pain management  - Administer medications per order  - Follow MD orders  - Diagnostics per order  - Update / inform patient and family on plan of care  - Discharge planning  - See additional Care Plan goals for specific interventions  Outcome: Progressing  Goal: Patient/Family Short Term Goal  Description: Patient's Short Term Goal: Improve pain to bilateral lower extremities      Interventions:   - Monitor vital signs  - Monitor appropriate labs  - Wound care  - Pain management  - Administer medications per order  - Follow MD orders  - Diagnostics per order  - Update / inform patient and family on plan of care  - See additional Care Plan goals for specific interventions  Outcome: Progressing     Problem: PAIN - ADULT  Goal: Verbalizes/displays adequate comfort level or patient's stated pain goal  Description: INTERVENTIONS:  - Encourage pt to monitor pain and request assistance  - Assess pain using appropriate pain scale  - Administer  analgesics based on type and severity of pain and evaluate response  - Implement non-pharmacological measures as appropriate and evaluate response  - Consider cultural and social influences on pain and pain management  - Manage/alleviate anxiety  - Utilize distraction and/or relaxation techniques  - Monitor for opioid side effects  - Notify MD/LIP if interventions unsuccessful or patient reports new pain  - Anticipate increased pain with activity and pre-medicate as appropriate  Outcome: Progressing     Problem: RISK FOR INFECTION - ADULT  Goal: Absence of fever/infection during anticipated neutropenic period  Description: INTERVENTIONS  - Monitor WBC  - Administer growth factors as ordered  - Implement neutropenic guidelines  Outcome: Progressing     Problem: SAFETY ADULT - FALL  Goal: Free from fall injury  Description: INTERVENTIONS:  - Assess pt frequently for physical needs  - Identify cognitive and physical deficits and behaviors that affect risk of falls.  - [place] fall precautions as indicated by assessment.  - Educate pt/family on patient safety including physical limitations  - Instruct pt to call for assistance with activity based on assessment  - Modify environment to reduce risk of injury  - Provide assistive devices as appropriate  - Consider OT/PT consult to assist with strengthening/mobility  - Encourage toileting schedule  Outcome: Progressing     Problem: DISCHARGE PLANNING  Goal: Discharge to home or other facility with appropriate resources  Description: INTERVENTIONS:  - Identify barriers to discharge w/pt and caregiver  - Include patient/family/discharge partner in discharge planning  - Arrange for needed discharge resources and transportation as appropriate  - Identify discharge learning needs (meds, wound care, etc)  - Arrange for interpreters to assist at discharge as needed  - Consider post-discharge preferences of patient/family/discharge partner  - Complete POLST form as appropriate  -  Assess patient's ability to be responsible for managing their own health  - Refer to Case Management Department for coordinating discharge planning if the patient needs post-hospital services based on physician/LIP order or complex needs related to functional status, cognitive ability or social support system  Outcome: Progressing     Problem: SKIN/TISSUE INTEGRITY - ADULT  Goal: Incision(s), wounds(s) or drain site(s) healing without S/S of infection  Description: INTERVENTIONS:  - Assess and document risk factors for pressure ulcer development  - Assess and document skin integrity  - Assess and document dressing/incision, wound bed, drain sites and surrounding tissue  - Implement wound care per orders  - Initiate isolation precautions as appropriate  - Initiate Pressure Ulcer prevention bundle as indicated  Outcome: Progressing     Problem: MUSCULOSKELETAL - ADULT  Goal: Return mobility to safest level of function  Description: INTERVENTIONS:  - Assess patient stability and activity tolerance for standing, transferring and ambulating w/ or w/o assistive devices  - Assist with transfers and ambulation using safe patient handling equipment as needed  - Ensure adequate protection for wounds/incisions during mobilization  - Obtain PT/OT consults as needed  - Advance activity as appropriate  - Communicate ordered activity level and limitations with patient/family  Outcome: Progressing     Problem: Impaired Functional Mobility  Goal: Achieve highest/safest level of mobility/gait  Description: Interventions:  - Assess patient's functional ability and stability  - Promote increasing activity/tolerance for mobility and gait  - Educate and engage patient/family in tolerated activity level and precautions  - Recommend use of  RW for transfers and ambulation  - Recommend patient transfer to bedside chair toward strongest side  - When transferring patient, block weaker knee for safety  - Recommend use of chair position in bed  3 times per day  Outcome: Progressing     Problem: Impaired Activities of Daily Living  Goal: Achieve highest/safest level of independence in self care  Description: Interventions:  - Assess ability and encourage patient to participate in ADLs to maximize function  - Promote sitting position while performing ADLs such as feeding, grooming, and bathing  - Educate and encourage patient/family in tolerated functional activity level and precautions during self-care  Outcome: Progressing

## 2024-07-06 NOTE — PLAN OF CARE
Problem: Patient Centered Care  Goal: Patient preferences are identified and integrated in the patient's plan of care  Description: Interventions:  - What would you like us to know as we care for you? From home with son, Nicholas  - Provide timely, complete, and accurate information to patient/family  - Incorporate patient and family knowledge, values, beliefs, and cultural backgrounds into the planning and delivery of care  - Encourage patient/family to participate in care and decision-making at the level they choose  - Honor patient and family perspectives and choices  Outcome: Progressing     Problem: PAIN - ADULT  Goal: Verbalizes/displays adequate comfort level or patient's stated pain goal  Description: INTERVENTIONS:  - Encourage pt to monitor pain and request assistance  - Assess pain using appropriate pain scale  - Administer analgesics based on type and severity of pain and evaluate response  - Implement non-pharmacological measures as appropriate and evaluate response  - Consider cultural and social influences on pain and pain management  - Manage/alleviate anxiety  - Utilize distraction and/or relaxation techniques  - Monitor for opioid side effects  - Notify MD/LIP if interventions unsuccessful or patient reports new pain  - Anticipate increased pain with activity and pre-medicate as appropriate  Outcome: Progressing     Problem: RISK FOR INFECTION - ADULT  Goal: Absence of fever/infection during anticipated neutropenic period  Description: INTERVENTIONS  - Monitor WBC  - Administer growth factors as ordered  - Implement neutropenic guidelines  Outcome: Progressing     Problem: SAFETY ADULT - FALL  Goal: Free from fall injury  Description: INTERVENTIONS:  - Assess pt frequently for physical needs  - Identify cognitive and physical deficits and behaviors that affect risk of falls.  - [place] fall precautions as indicated by assessment.  - Educate pt/family on patient safety including physical  limitations  - Instruct pt to call for assistance with activity based on assessment  - Modify environment to reduce risk of injury  - Provide assistive devices as appropriate  - Consider OT/PT consult to assist with strengthening/mobility  - Encourage toileting schedule  Outcome: Progressing     Problem: DISCHARGE PLANNING  Goal: Discharge to home or other facility with appropriate resources  Description: INTERVENTIONS:  - Identify barriers to discharge w/pt and caregiver  - Include patient/family/discharge partner in discharge planning  - Arrange for needed discharge resources and transportation as appropriate  - Identify discharge learning needs (meds, wound care, etc)  - Arrange for interpreters to assist at discharge as needed  - Consider post-discharge preferences of patient/family/discharge partner  - Complete POLST form as appropriate  - Assess patient's ability to be responsible for managing their own health  - Refer to Case Management Department for coordinating discharge planning if the patient needs post-hospital services based on physician/LIP order or complex needs related to functional status, cognitive ability or social support system  Outcome: Progressing     Problem: SKIN/TISSUE INTEGRITY - ADULT  Goal: Incision(s), wounds(s) or drain site(s) healing without S/S of infection  Description: INTERVENTIONS:  - Assess and document risk factors for pressure ulcer development  - Assess and document skin integrity  - Assess and document dressing/incision, wound bed, drain sites and surrounding tissue  - Implement wound care per orders  - Initiate isolation precautions as appropriate  - Initiate Pressure Ulcer prevention bundle as indicated  Outcome: Progressing     Problem: MUSCULOSKELETAL - ADULT  Goal: Return mobility to safest level of function  Description: INTERVENTIONS:  - Assess patient stability and activity tolerance for standing, transferring and ambulating w/ or w/o assistive devices  - Assist  with transfers and ambulation using safe patient handling equipment as needed  - Ensure adequate protection for wounds/incisions during mobilization  - Obtain PT/OT consults as needed  - Advance activity as appropriate  - Communicate ordered activity level and limitations with patient/family  Outcome: Progressing

## 2024-07-06 NOTE — PROGRESS NOTES
Progress Note     Frances Whitehead Patient Status:  Inpatient    1931 MRN [id number]   Location [facility] 5SW/SE Attending Jeremy Pena MD   Hosp Day # 4 PCP Thao Garduno DO     Chief Complaint:   Chief Complaint   Patient presents with    Wound Care         Subjective:   S: Patient seen and examined, chart reviewed.   No new issues      Review of Systems:   10 point ROS completed and was negative, except for pertinent positive and negatives stated in subjective.                Objective:   Vital signs:  Temp:  [97.7 °F (36.5 °C)-98.2 °F (36.8 °C)] 98 °F (36.7 °C)  Pulse:  [82-89] 82  Resp:  [18-20] 18  BP: (143-150)/(56-65) 143/56  SpO2:  [93 %-99 %] 98 %    Wt Readings from Last 6 Encounters:   24 122 lb 12.8 oz (55.7 kg)   24 112 lb (50.8 kg)   24 116 lb (52.6 kg)   24 116 lb 4 oz (52.7 kg)   24 114 lb (51.7 kg)   24 114 lb (51.7 kg)       Physical Exam:    General: No acute distress.   Respiratory: Clear to auscultation bilaterally. No wheezes. No rhonchi.  Cardiovascular: S1, S2. Regular rate and rhythm. No murmurs, rubs or gallops.   Abdomen: Soft, nontender, nondistended.  Positive bowel sounds. No rebound or guarding.  Neurologic: No focal neurological deficits.   Musculoskeletal: cellulitis is covered with dry bandage    Results:   Diagnostic Data:      Labs:    Labs Last 24 Hours:   BMP     CBC    Other     Na - Cl - BUN - Glu -   Hb -   PTT - Procal -   K - CO2 - Cr -   WBC - >< PLT -  INR - CRP -   Renal Lytes Endo    Hct -   Trop - D dim -   eGFR - Ca - POC Gluc  -    LFT   pBNP - Lactic -   eGFR AA - PO4 - A1c -   AST - APk - Prot -  LDL -      Recent Labs   Lab 24  0526 24  0537 24  0609   RBC 3.32* 3.05* 2.99*   HGB 10.3* 9.5* 9.3*   HCT 29.9* 28.0* 27.0*   MCV 90.1 91.8 90.3   MCH 31.0 31.1 31.1   MCHC 34.4 33.9 34.4   RDW 15.0 15.3* 15.1*   NEPRELIM 3.39 1.93 2.88   WBC 4.8 3.8* 5.2   .0 336.0 347.0        No results found for: \"PT\", \"INR\"    Recent Labs   Lab 07/03/24  0526 07/04/24  0225 07/04/24  0537 07/05/24  0609   *  --  100* 102*   BUN 21  --  17 15   CREATSERUM 1.14*  --  0.79 0.82   CA 8.8  --  8.6* 9.1   *  --  130* 131*   K 5.2* 4.9 4.8 5.1     --  101 102   CO2 24.0  --  21.0 24.0       No results for input(s): \"PJ\", \"LIP\" in the last 168 hours.    Recent Labs   Lab 07/04/24  0537 07/05/24  0609 07/06/24  0605   MG 3.1* 1.8 1.8       No results for input(s): \"URINE\", \"CULTI\", \"BLDSMR\" in the last 168 hours.      No results found.        Pro-Calcitonin  No results for input(s): \"PCT\" in the last 168 hours.    Cardiac  No results for input(s): \"TROP\", \"PBNP\" in the last 168 hours.    Imaging: Imaging data reviewed in Epic.    No results found.       Medications:    cefepime  1 g Intravenous q12h    heparin  5,000 Units Subcutaneous 2 times per day    atorvastatin  10 mg Oral Nightly    gabapentin  100 mg Oral Nightly    melatonin  3 mg Oral Nightly       Assessment & Plan:   ASSESSMENT / PLAN:     BL le cellulitis, Rt leg ulcer  - started IV abx, would expect 3-5 day IV. Discharge on PO cipro 500 mg BID for 7 more days as per ID  - seen by wound care  - asked nurse to change bandages with ID present to observe wounds and see progression of treatment.     - Pseudomonas in wound culture sensitive to cefepime. Suspect we can stop Vanco now.      HTN  - monitor vitals and adjust meds prn     CKD stage 3  - monitor bmp     Paroxysmal Afib  - rate controlled  - eliquis for anticoagulation               Prophylaxis:   DVT with eliquis     Dispo: pending clinical course     dvt prophylaxis: eliquis  code status: DNR  dispo: home upon medical clearance    >55min spent, >50% spent counseling and coordinating care in the form of educating pt/family and d/w consultants and staff. Most of the time spent discussing the above plan.     Estimated date of discharge: tomorrow or monday  Discharge is  dependent on: cleared by ID  At this point Ms. Whitehead is expected to be discharge to: home with [facility]    Plan of care discussed with patient    Jeremy Pena MD, FAAP, FACP  Critical access hospital Hospitalist  I respond to Epic Chat and [facility]MS Connect

## 2024-07-07 LAB
ANION GAP SERPL CALC-SCNC: 6 MMOL/L (ref 0–18)
BUN BLD-MCNC: 15 MG/DL (ref 9–23)
BUN/CREAT SERPL: 20.8 (ref 10–20)
CALCIUM BLD-MCNC: 9.1 MG/DL (ref 8.7–10.4)
CHLORIDE SERPL-SCNC: 99 MMOL/L (ref 98–112)
CO2 SERPL-SCNC: 24 MMOL/L (ref 21–32)
CREAT BLD-MCNC: 0.72 MG/DL
DEPRECATED RDW RBC AUTO: 50.2 FL (ref 35.1–46.3)
EGFRCR SERPLBLD CKD-EPI 2021: 78 ML/MIN/1.73M2 (ref 60–?)
ERYTHROCYTE [DISTWIDTH] IN BLOOD BY AUTOMATED COUNT: 15.2 % (ref 11–15)
GLUCOSE BLD-MCNC: 169 MG/DL (ref 70–99)
HCT VFR BLD AUTO: 27.4 %
HGB BLD-MCNC: 9.2 G/DL
MAGNESIUM SERPL-MCNC: 1.8 MG/DL (ref 1.6–2.6)
MAGNESIUM SERPL-MCNC: 1.8 MG/DL (ref 1.6–2.6)
MCH RBC QN AUTO: 30.5 PG (ref 26–34)
MCHC RBC AUTO-ENTMCNC: 33.6 G/DL (ref 31–37)
MCV RBC AUTO: 90.7 FL
OSMOLALITY SERPL CALC.SUM OF ELEC: 273 MOSM/KG (ref 275–295)
PLATELET # BLD AUTO: 375 10(3)UL (ref 150–450)
POTASSIUM SERPL-SCNC: 4.8 MMOL/L (ref 3.5–5.1)
RBC # BLD AUTO: 3.02 X10(6)UL
SODIUM SERPL-SCNC: 129 MMOL/L (ref 136–145)
WBC # BLD AUTO: 4.3 X10(3) UL (ref 4–11)

## 2024-07-07 PROCEDURE — 99233 SBSQ HOSP IP/OBS HIGH 50: CPT | Performed by: HOSPITALIST

## 2024-07-07 RX ORDER — MAGNESIUM OXIDE 400 MG/1
400 TABLET ORAL ONCE
Status: COMPLETED | OUTPATIENT
Start: 2024-07-07 | End: 2024-07-07

## 2024-07-07 NOTE — PROGRESS NOTES
Progress Note     Frances Whitehead Patient Status:  Inpatient    1931 MRN [id number]   Location [facility] 5SW/SE Attending Jeremy Pena MD   Hosp Day # 5 PCP Thao Garduno DO     Chief Complaint:   Chief Complaint   Patient presents with    Wound Care         Subjective:   S: Patient seen and examined, chart reviewed.   Awaiting pt/ot.  Lives with patients son.  Discussed with him at bedside.  She is not as independent as she used to be.  Would be agreeable to MINA if able to be arranged.     Review of Systems:   10 point ROS completed and was negative, except for pertinent positive and negatives stated in subjective.                Objective:   Vital signs:  Temp:  [98 °F (36.7 °C)-98.4 °F (36.9 °C)] 98 °F (36.7 °C)  Pulse:  [87-89] 87  Resp:  [16-18] 18  BP: (120-165)/(55-66) 120/60  SpO2:  [97 %-98 %] 98 %    Wt Readings from Last 6 Encounters:   24 122 lb 12.8 oz (55.7 kg)   24 112 lb (50.8 kg)   24 116 lb (52.6 kg)   24 116 lb 4 oz (52.7 kg)   24 114 lb (51.7 kg)   24 114 lb (51.7 kg)       Physical Exam:      Gen: No acute distress  Pulm: Lungs clear, normal respiratory effort  CV: Heart with regular rate and rhythm  Abd: Abdomen soft, nontender, nondistended, bowel sounds present  Neuro: No acute focal deficits  MSK: moves extremities  Skin: Warm and dry  Psych: Normal affect  Ext: bl le edema, improving slowly      Results:   Diagnostic Data:      Labs:    Labs Last 24 Hours:   BMP     CBC    Other     Na 129 Cl 99 BUN 15 Glu 169   Hb 9.2   PTT - Procal -   K 4.8 CO2 24.0 Cr 0.72   WBC 4.3 >< .0  INR - CRP -   Renal Lytes Endo    Hct 27.4   Trop - D dim -   eGFR - Ca 9.1 POC Gluc  -    LFT   pBNP - Lactic -   eGFR AA - PO4 - A1c -   AST - APk - Prot -  LDL -      Recent Labs   Lab 24  0526 24  0537 24  0609 24  0901   RBC 3.32* 3.05* 2.99* 3.02*   HGB 10.3* 9.5* 9.3* 9.2*   HCT 29.9* 28.0* 27.0* 27.4*   MCV 90.1  91.8 90.3 90.7   MCH 31.0 31.1 31.1 30.5   MCHC 34.4 33.9 34.4 33.6   RDW 15.0 15.3* 15.1* 15.2*   NEPRELIM 3.39 1.93 2.88  --    WBC 4.8 3.8* 5.2 4.3   .0 336.0 347.0 375.0       No results found for: \"PT\", \"INR\"    Recent Labs   Lab 07/04/24  0537 07/05/24  0609 07/07/24  0901   * 102* 169*   BUN 17 15 15   CREATSERUM 0.79 0.82 0.72   CA 8.6* 9.1 9.1   * 131* 129*   K 4.8 5.1 4.8    102 99   CO2 21.0 24.0 24.0       No results for input(s): \"PJ\", \"LIP\" in the last 168 hours.    Recent Labs   Lab 07/05/24  0609 07/06/24  0605 07/07/24  0648   MG 1.8 1.8 1.8       No results for input(s): \"URINE\", \"CULTI\", \"BLDSMR\" in the last 168 hours.      No results found.        Pro-Calcitonin  No results for input(s): \"PCT\" in the last 168 hours.    Cardiac  No results for input(s): \"TROP\", \"PBNP\" in the last 168 hours.    Imaging: Imaging data reviewed in Epic.    No results found.       Medications:    cefepime  1 g Intravenous q12h    heparin  5,000 Units Subcutaneous 2 times per day    atorvastatin  10 mg Oral Nightly    gabapentin  100 mg Oral Nightly    melatonin  3 mg Oral Nightly       Assessment & Plan:   ASSESSMENT / PLAN:     BL le cellulitis, Rt leg ulcer  - started IV abx, would expect 3-5 day IV. Discharge on PO cipro 500 mg BID for 7 more days as per ID  - seen by wound care  - asked nurse to change bandages with ID present to observe wounds and see progression of treatment.   - Pseudomonas in wound culture sensitive to cefepime.      Hyponatremia  - chronic  - will monitor    HTN  - monitor vitals and adjust meds prn     CKD stage 3  - monitor bmp     Paroxysmal Afib  - rate controlled  - eliquis for anticoagulation               Prophylaxis:   DVT with eliquis     Dispo: pending clinical course     dvt prophylaxis: eliquis  code status: DNR  dispo: home upon medical clearance    LOUIS: high

## 2024-07-07 NOTE — PLAN OF CARE
Patient is Aox4, vitals stable on room air and meds given as ordered. Patient ambulatory well in room. Frequent rounding done on pt and needs attended to.   Problem: Patient Centered Care  Goal: Patient preferences are identified and integrated in the patient's plan of care  Description: Interventions:  - What would you like us to know as we care for you? From home with son, Nicholas  - Provide timely, complete, and accurate information to patient/family  - Incorporate patient and family knowledge, values, beliefs, and cultural backgrounds into the planning and delivery of care  - Encourage patient/family to participate in care and decision-making at the level they choose  - Honor patient and family perspectives and choices  Outcome: Progressing     Problem: Patient/Family Goals  Goal: Patient/Family Long Term Goal  Description: Patient's Long Term Goal: Discharge from the hospital    Interventions:  - Monitor vital signs  - Monitor appropriate labs  - Wound care  - Pain management  - Administer medications per order  - Follow MD orders  - Diagnostics per order  - Update / inform patient and family on plan of care  - Discharge planning  - See additional Care Plan goals for specific interventions  Outcome: Progressing  Goal: Patient/Family Short Term Goal  Description: Patient's Short Term Goal: Improve pain to bilateral lower extremities      Interventions:   - Monitor vital signs  - Monitor appropriate labs  - Wound care  - Pain management  - Administer medications per order  - Follow MD orders  - Diagnostics per order  - Update / inform patient and family on plan of care  - See additional Care Plan goals for specific interventions  Outcome: Progressing     Problem: PAIN - ADULT  Goal: Verbalizes/displays adequate comfort level or patient's stated pain goal  Description: INTERVENTIONS:  - Encourage pt to monitor pain and request assistance  - Assess pain using appropriate pain scale  - Administer analgesics based on  type and severity of pain and evaluate response  - Implement non-pharmacological measures as appropriate and evaluate response  - Consider cultural and social influences on pain and pain management  - Manage/alleviate anxiety  - Utilize distraction and/or relaxation techniques  - Monitor for opioid side effects  - Notify MD/LIP if interventions unsuccessful or patient reports new pain  - Anticipate increased pain with activity and pre-medicate as appropriate  Outcome: Progressing     Problem: RISK FOR INFECTION - ADULT  Goal: Absence of fever/infection during anticipated neutropenic period  Description: INTERVENTIONS  - Monitor WBC  - Administer growth factors as ordered  - Implement neutropenic guidelines  Outcome: Progressing     Problem: SAFETY ADULT - FALL  Goal: Free from fall injury  Description: INTERVENTIONS:  - Assess pt frequently for physical needs  - Identify cognitive and physical deficits and behaviors that affect risk of falls.  - [place] fall precautions as indicated by assessment.  - Educate pt/family on patient safety including physical limitations  - Instruct pt to call for assistance with activity based on assessment  - Modify environment to reduce risk of injury  - Provide assistive devices as appropriate  - Consider OT/PT consult to assist with strengthening/mobility  - Encourage toileting schedule  Outcome: Progressing     Problem: DISCHARGE PLANNING  Goal: Discharge to home or other facility with appropriate resources  Description: INTERVENTIONS:  - Identify barriers to discharge w/pt and caregiver  - Include patient/family/discharge partner in discharge planning  - Arrange for needed discharge resources and transportation as appropriate  - Identify discharge learning needs (meds, wound care, etc)  - Arrange for interpreters to assist at discharge as needed  - Consider post-discharge preferences of patient/family/discharge partner  - Complete POLST form as appropriate  - Assess patient's  ability to be responsible for managing their own health  - Refer to Case Management Department for coordinating discharge planning if the patient needs post-hospital services based on physician/LIP order or complex needs related to functional status, cognitive ability or social support system  Outcome: Progressing     Problem: SKIN/TISSUE INTEGRITY - ADULT  Goal: Incision(s), wounds(s) or drain site(s) healing without S/S of infection  Description: INTERVENTIONS:  - Assess and document risk factors for pressure ulcer development  - Assess and document skin integrity  - Assess and document dressing/incision, wound bed, drain sites and surrounding tissue  - Implement wound care per orders  - Initiate isolation precautions as appropriate  - Initiate Pressure Ulcer prevention bundle as indicated  Outcome: Progressing     Problem: MUSCULOSKELETAL - ADULT  Goal: Return mobility to safest level of function  Description: INTERVENTIONS:  - Assess patient stability and activity tolerance for standing, transferring and ambulating w/ or w/o assistive devices  - Assist with transfers and ambulation using safe patient handling equipment as needed  - Ensure adequate protection for wounds/incisions during mobilization  - Obtain PT/OT consults as needed  - Advance activity as appropriate  - Communicate ordered activity level and limitations with patient/family  Outcome: Progressing     Problem: Impaired Activities of Daily Living  Goal: Achieve highest/safest level of independence in self care  Description: Interventions:  - Assess ability and encourage patient to participate in ADLs to maximize function  - Promote sitting position while performing ADLs such as feeding, grooming, and bathing  - Educate and encourage patient/family in tolerated functional activity level and precautions during self-care  Outcome: Progressing     Problem: Impaired Functional Mobility  Goal: Achieve highest/safest level of mobility/gait  Description:  Interventions:  - Assess patient's functional ability and stability  - Promote increasing activity/tolerance for mobility and gait  - Educate and engage patient/family in tolerated activity level and precautions  - Recommend use of  RW for transfers and ambulation  - Recommend patient transfer to bedside chair toward strongest side  - When transferring patient, block weaker knee for safety  - Recommend use of chair position in bed 3 times per day  Outcome: Progressing

## 2024-07-07 NOTE — PLAN OF CARE
No acute changes overnight, pt did not want dressing changes done at night. Dayshift RN notified. Safety precautions in place, call light within reach  Problem: Patient Centered Care  Goal: Patient preferences are identified and integrated in the patient's plan of care  Description: Interventions:  - What would you like us to know as we care for you? From home with son, Nicholas  - Provide timely, complete, and accurate information to patient/family  - Incorporate patient and family knowledge, values, beliefs, and cultural backgrounds into the planning and delivery of care  - Encourage patient/family to participate in care and decision-making at the level they choose  - Honor patient and family perspectives and choices  Outcome: Progressing     Problem: Patient/Family Goals  Goal: Patient/Family Long Term Goal  Description: Patient's Long Term Goal: Discharge from the hospital    Interventions:  - Monitor vital signs  - Monitor appropriate labs  - Wound care  - Pain management  - Administer medications per order  - Follow MD orders  - Diagnostics per order  - Update / inform patient and family on plan of care  - Discharge planning  - See additional Care Plan goals for specific interventions  Outcome: Progressing  Goal: Patient/Family Short Term Goal  Description: Patient's Short Term Goal: Improve pain to bilateral lower extremities      Interventions:   - Monitor vital signs  - Monitor appropriate labs  - Wound care  - Pain management  - Administer medications per order  - Follow MD orders  - Diagnostics per order  - Update / inform patient and family on plan of care  - See additional Care Plan goals for specific interventions  Outcome: Progressing     Problem: PAIN - ADULT  Goal: Verbalizes/displays adequate comfort level or patient's stated pain goal  Description: INTERVENTIONS:  - Encourage pt to monitor pain and request assistance  - Assess pain using appropriate pain scale  - Administer analgesics based on type and  severity of pain and evaluate response  - Implement non-pharmacological measures as appropriate and evaluate response  - Consider cultural and social influences on pain and pain management  - Manage/alleviate anxiety  - Utilize distraction and/or relaxation techniques  - Monitor for opioid side effects  - Notify MD/LIP if interventions unsuccessful or patient reports new pain  - Anticipate increased pain with activity and pre-medicate as appropriate  Outcome: Progressing     Problem: RISK FOR INFECTION - ADULT  Goal: Absence of fever/infection during anticipated neutropenic period  Description: INTERVENTIONS  - Monitor WBC  - Administer growth factors as ordered  - Implement neutropenic guidelines  Outcome: Progressing     Problem: SAFETY ADULT - FALL  Goal: Free from fall injury  Description: INTERVENTIONS:  - Assess pt frequently for physical needs  - Identify cognitive and physical deficits and behaviors that affect risk of falls.  - [place] fall precautions as indicated by assessment.  - Educate pt/family on patient safety including physical limitations  - Instruct pt to call for assistance with activity based on assessment  - Modify environment to reduce risk of injury  - Provide assistive devices as appropriate  - Consider OT/PT consult to assist with strengthening/mobility  - Encourage toileting schedule  Outcome: Progressing     Problem: DISCHARGE PLANNING  Goal: Discharge to home or other facility with appropriate resources  Description: INTERVENTIONS:  - Identify barriers to discharge w/pt and caregiver  - Include patient/family/discharge partner in discharge planning  - Arrange for needed discharge resources and transportation as appropriate  - Identify discharge learning needs (meds, wound care, etc)  - Arrange for interpreters to assist at discharge as needed  - Consider post-discharge preferences of patient/family/discharge partner  - Complete POLST form as appropriate  - Assess patient's ability to  be responsible for managing their own health  - Refer to Case Management Department for coordinating discharge planning if the patient needs post-hospital services based on physician/LIP order or complex needs related to functional status, cognitive ability or social support system  Outcome: Progressing     Problem: SKIN/TISSUE INTEGRITY - ADULT  Goal: Incision(s), wounds(s) or drain site(s) healing without S/S of infection  Description: INTERVENTIONS:  - Assess and document risk factors for pressure ulcer development  - Assess and document skin integrity  - Assess and document dressing/incision, wound bed, drain sites and surrounding tissue  - Implement wound care per orders  - Initiate isolation precautions as appropriate  - Initiate Pressure Ulcer prevention bundle as indicated  Outcome: Progressing     Problem: MUSCULOSKELETAL - ADULT  Goal: Return mobility to safest level of function  Description: INTERVENTIONS:  - Assess patient stability and activity tolerance for standing, transferring and ambulating w/ or w/o assistive devices  - Assist with transfers and ambulation using safe patient handling equipment as needed  - Ensure adequate protection for wounds/incisions during mobilization  - Obtain PT/OT consults as needed  - Advance activity as appropriate  - Communicate ordered activity level and limitations with patient/family  Outcome: Progressing     Problem: Impaired Functional Mobility  Goal: Achieve highest/safest level of mobility/gait  Description: Interventions:  - Assess patient's functional ability and stability  - Promote increasing activity/tolerance for mobility and gait  - Educate and engage patient/family in tolerated activity level and precautions  - Recommend use of  RW for transfers and ambulation  - Recommend patient transfer to bedside chair toward strongest side  - When transferring patient, block weaker knee for safety  - Recommend use of chair position in bed 3 times per day  Outcome:  Progressing     Problem: Impaired Activities of Daily Living  Goal: Achieve highest/safest level of independence in self care  Description: Interventions:  - Assess ability and encourage patient to participate in ADLs to maximize function  - Promote sitting position while performing ADLs such as feeding, grooming, and bathing  - Educate and encourage patient/family in tolerated functional activity level and precautions during self-care  Outcome: Progressing

## 2024-07-08 LAB
ANION GAP SERPL CALC-SCNC: 5 MMOL/L (ref 0–18)
BUN BLD-MCNC: 13 MG/DL (ref 9–23)
BUN/CREAT SERPL: 18.3 (ref 10–20)
CALCIUM BLD-MCNC: 9.3 MG/DL (ref 8.7–10.4)
CHLORIDE SERPL-SCNC: 98 MMOL/L (ref 98–112)
CO2 SERPL-SCNC: 25 MMOL/L (ref 21–32)
CREAT BLD-MCNC: 0.71 MG/DL
DEPRECATED RDW RBC AUTO: 49.5 FL (ref 35.1–46.3)
EGFRCR SERPLBLD CKD-EPI 2021: 80 ML/MIN/1.73M2 (ref 60–?)
ERYTHROCYTE [DISTWIDTH] IN BLOOD BY AUTOMATED COUNT: 15.1 % (ref 11–15)
GLUCOSE BLD-MCNC: 110 MG/DL (ref 70–99)
HCT VFR BLD AUTO: 29.2 %
HGB BLD-MCNC: 10.1 G/DL
MAGNESIUM SERPL-MCNC: 1.9 MG/DL (ref 1.6–2.6)
MCH RBC QN AUTO: 31.3 PG (ref 26–34)
MCHC RBC AUTO-ENTMCNC: 34.6 G/DL (ref 31–37)
MCV RBC AUTO: 90.4 FL
OSMOLALITY SERPL CALC.SUM OF ELEC: 267 MOSM/KG (ref 275–295)
PLATELET # BLD AUTO: 369 10(3)UL (ref 150–450)
POTASSIUM SERPL-SCNC: 5.1 MMOL/L (ref 3.5–5.1)
RBC # BLD AUTO: 3.23 X10(6)UL
SODIUM SERPL-SCNC: 128 MMOL/L (ref 136–145)
SODIUM SERPL-SCNC: 61 MMOL/L
WBC # BLD AUTO: 6 X10(3) UL (ref 4–11)

## 2024-07-08 PROCEDURE — 99233 SBSQ HOSP IP/OBS HIGH 50: CPT | Performed by: HOSPITALIST

## 2024-07-08 RX ORDER — CIPROFLOXACIN 500 MG/1
500 TABLET, FILM COATED ORAL 2 TIMES DAILY
Qty: 14 TABLET | Refills: 0 | Status: SHIPPED | OUTPATIENT
Start: 2024-07-08 | End: 2024-07-12

## 2024-07-08 NOTE — OCCUPATIONAL THERAPY NOTE
OCCUPATIONAL THERAPY TREATMENT NOTE - INPATIENT        Room Number: 561/561-A          Problem List  Principal Problem:    Cellulitis of right lower extremity  Active Problems:    Cellulitis      OCCUPATIONAL THERAPY ASSESSMENT   Patient demonstrates fair progress this session, goals remain in progress.    Patient continues to function below baseline with ADLs and functional transfers.   Contributing factors to remaining limitations include decreased functional strength, decreased functional reach, decreased endurance, impaired standing balance, and cognitive deficits (decreased comprehension, problem-solving).  Next session anticipate patient to progress ADLs.  Occupational Therapy will continue to follow patient for duration of hospitalization.    Patient continues to benefit from continued skilled OT services: to promote return to prior level of function and safety with continuous assistance and gradual rehabilitative therapy .     PLAN OT Treatment Plan: Balance activities;Energy conservation/work simplification techniques;Continued evaluation;Compensatory technique education;Fine motor coordination activities;Neuromuscluar reeducation;Equipment eval/education;Patient/Family training;Patient/Family education;Cognitive reorientation;Endurance training;UE strengthening/ROM;Functional transfer training;Visual perceptual training;IADL training;ADL training          SUBJECTIVE  \"Would I stay here for rehab? Isn't this rehab?\"    OBJECTIVE  Precautions: Hard of hearing;Bed/chair alarm    WEIGHT BEARING RESTRICTION     PAIN ASSESSMENT  Ratin  Location: RLE    ACTIVITY TOLERANCE  Pulse: 90          ACTIVITIES OF DAILY LIVING ASSESSMENT  AM-PAC ‘6-Clicks’ Inpatient Daily Activity Short Form  How much help from another person does the patient currently need…  -   Putting on and taking off regular lower body clothing?: A Lot  -   Bathing (including washing, rinsing, drying)?: A Lot  -   Toileting, which includes using  toilet, bedpan or urinal? : A Lot  -   Putting on and taking off regular upper body clothing?: A Little  -   Taking care of personal grooming such as brushing teeth?: A Little  -   Eating meals?: None    AM-PAC Score:  Score: 16  Approx Degree of Impairment: 53.32%  Standardized Score (AM-PAC Scale): 35.96  CMS Modifier (G-Code): CK    FUNCTIONAL TRANSFER ASSESSMENT  Sit to Stand: Chair  Chair: Minimal Assist  Toilet Transfer: Minimal Assist          FUNCTIONAL ADL ASSESSMENT   Overall max A           Skilled Therapy Provided: Pt unable to state how much her son assists at her home; she just says he helps her. Per RN conversation with son, son unable to assist her. Since pt is at a max A for ADLs and min A for functional transfers, recommend MINA. Increased time required to communicate with pt with use of white board. Discussed updated DC recommendations with SW and RN.    EDUCATION PROVIDED     The patient's Approx Degree of Impairment: 53.32% has been calculated based on documentation in the [facility] '6 clicks' Inpatient Daily Activity Short Form.  Research supports that patients with this level of impairment may benefit from MINA.  Final disposition will be made by interdisciplinary medical team.          OT Goals:  Patients self stated goal is: to go home    Pt will tolerate standing for 3 minutes with mod A for ADLs.  ongoing   Pt will complete toilet transfer with mod A 3/3 trials at  level. progressing                  Goals  on: 8/3  Frequency: 3-5x/wk    OT Session Time: 23 minutes  Self-Care Home Management: 23 minutes      Vida Yoo OT  [facility]  Inpatient Rehabilitation  Occupational Therapy  [phone]

## 2024-07-08 NOTE — PROGRESS NOTES
INFECTIOUS DISEASE PROGRESS NOTE    Frances Whitehead Patient Status:  Inpatient    1931 MRN [id number]   Location [facility] 5SW/SE Attending Jeremy Pena MD   Hosp Day # 6 PCP Thao Garduno, DO     SUBJECTIVE  ROS done. Sitting up in chair.     ASSESSMENT/PLAN:    Antibiotics: Cefepime     # RLE cellulitis with wound   - wound cx with PSAR               -Previous cx with Serratia liquefaciens, GNR, C. parapsilosis  # BLE wounds  # Atrial fibrillation  # CKD     PLAN:  -  Currently on cefepime. Will transition to PO cipro 500 BID x 7 days on discharge.   -  Local wound care.  -  Follow fever curve, wbc.  -  Reviewed labs, micro, imaging reports, available old records.  -  Case d/w patient, RN.     History of Present Illness:  Frances Whitehead is a 89 year old female with a history of atrial fibrillation, CKD, who follows at [facility] wound clinic, who had recent wound cultures showing serratia and candida  s/p course of bactrim and fluconazole, who presented to [facility] ED on  after being seen at wound clinic with concerns for worsening right leg wound. Patient denies any fevers or chills at home. On arrival, afebrile, wbc 5.4, LLE venous doppler without DVT, started on vancomycin and ceftriaxone. ID consulted.    OBJECTIVE  /75 (BP Location: Right arm)   Pulse 90   Temp 97.5 °F (36.4 °C) (Oral)   Resp 16   Ht 4' 11\" (1.499 m)   Wt 122 lb 12.8 oz (55.7 kg)   LMP  (LMP Unknown)   SpO2 97%   BMI 24.80 kg/m²     General: Awake, in bed  HEENT: EOMI   Abdomen: Soft, nontender, nondistended.   Musculoskeletal: No edema  Integument: B legs wrapped     Labs:     Recent Labs   Lab 24  0526 24  0537 24  0609 24  0901 24  0741   WBC 4.8 3.8* 5.2 4.3 6.0   HGB 10.3* 9.5* 9.3* 9.2* 10.1*   .0 336.0 347.0 375.0 369.0       Recent Labs   Lab 24  0526 24  0225 24  0537 24  0609 24  0901 24  0741   *  --  130* 131*  129* 128*   K 5.2* 4.9 4.8 5.1 4.8 5.1     --  101 102 99 98   CO2 24.0  --  21.0 24.0 24.0 25.0   BUN 21  --  17 15 15 13   CREATSERUM 1.14*  --  0.79 0.82 0.72 0.71       No results for input(s): \"ALT\", \"AST\" in the last 168 hours.    Invalid input(s): \"ALPHOS\", \"TBIL\"    Microbiology: Reviewed    Imaging: Reviewed       [facility]  [phone]

## 2024-07-08 NOTE — PHYSICAL THERAPY NOTE
PHYSICAL THERAPY TREATMENT NOTE - INPATIENT     Room Number: 561/561-A       Presenting Problem: RLE cellulitis  Co-Morbidities : COPD, CAD, DM II, Afib on [place], OA, hearing loss, osteporosis, CKD III, peripheral neuropathy, HTN, HLD, asthma, SVT, bilateral TKA, L femur ORIF    Problem List  Principal Problem:    Cellulitis of right lower extremity  Active Problems:    Cellulitis      PHYSICAL THERAPY ASSESSMENT   Patient demonstrates fair progress this session, goals  remain in progress.    Patient continues to function below baseline with bed mobility, transfers, and gait.  Contributing factors to remaining limitations include decreased functional strength, pain, and decreased muscular endurance.  Next session anticipate patient to progress bed mobility, transfers, and gait.  Physical Therapy will continue to follow patient for duration of hospitalization.    Patient continues to benefit from continued skilled PT services: to promote return to prior level of function and safety with continuous assistance and gradual rehabilitative therapy .    PLAN  PT Treatment Plan: Bed mobility;Body mechanics;Endurance;Energy conservation;Patient education;Family education;Gait training;Strengthening;Transfer training;Balance training  Frequency (Obs): 3-5x/week    SUBJECTIVE  My toes hurt.    OBJECTIVE  Precautions: Hard of hearing;Bed/chair alarm    WEIGHT BEARING RESTRICTION                PAIN ASSESSMENT   Rating:  (did not rate \"soreness\")  Location: left lower back, toes  Management Techniques: Activity promotion;Repositioning    BALANCE  Static Sitting: Fair +  Dynamic Sitting: Fair  Static Standing: Fair -  Dynamic Standing: Fair -    ACTIVITY TOLERANCE  Pulse: 90 (102 with amb)  Heart Rate Source: Monitor                    O2 WALK       AM-PAC '6-Clicks' INPATIENT SHORT FORM - BASIC MOBILITY  How much difficulty does the patient currently have...  Patient Difficulty: Turning over in bed (including adjusting  bedclothes, sheets and blankets)?: A Little   Patient Difficulty: Sitting down on and standing up from a chair with arms (e.g., wheelchair, bedside commode, etc.): A Little   Patient Difficulty: Moving from lying on back to sitting on the side of the bed?: A Lot   How much help from another person does the patient currently need...   Help from Another: Moving to and from a bed to a chair (including a wheelchair)?: A Little   Help from Another: Need to walk in hospital room?: A Little   Help from Another: Climbing 3-5 steps with a railing?: A Lot     AM-PAC Score:  Raw Score: 16   Approx Degree of Impairment: 54.16%   Standardized Score (AM-PAC Scale): 40.78   CMS Modifier (G-Code): CK    FUNCTIONAL ABILITY STATUS  Functional Mobility/Gait Assessment  Gait Assistance: Contact guard assist  Distance (ft): 25  Assistive Device: Rolling walker  Pattern: Shuffle  Rolling:  NT  Supine to Sit:  NT  Sit to Supine:  NT  Sit to Stand: minimal assist    Additional information: Pt ok to be seen per ARELIS Randle. Pt [place], has board for communication in room for clarification. Pt willing to participate, willing to amb. Pt Radha for sit to stand with cueing, and chair follow. Pt amb with altered gait though no LOB with RW. Pt's distance amb limited. Pt with inc time for mobility. RN spoke with pt's son, and he cannot meet all her needs at home at this time.    The patient's Approx Degree of Impairment: 54.16% has been calculated based on documentation in the [facility] '6 clicks' Inpatient Daily Activity Short Form.  Research supports that patients with this level of impairment may benefit from gradual rehab therapy as she is below baseline with mobility and son cannot meet needs at home.  Final disposition will be made by interdisciplinary medical team.        Patient End of Session: Up in chair;Call light within reach;Needs met;RN aware of session/findings;All patient questions and concerns addressed    CURRENT GOALS   Goals to be met  by: 7/23/24  Patient Goal Patient's self-stated goal is: decrease pain   Goal #1 Patient is able to demonstrate supine - sit EOB @ level: minimum assistance      Goal #1   Current Status  NT, pt received up in chair   Goal #2 Patient is able to demonstrate transfers EOB to/from BSC at assistance level: contact guard assistance with walker - rolling      Goal #2  Current Status  Radha with RW   Goal #3 Patient is able to ambulate 20 feet with assist device: walker - rolling at assistance level: minimum assistance   Goal #3   Current Status  x25 feet with CGA to Radha with chair follow   Goal #4 Patient will negotiate 1 stairs/one curb w/ assistive device and minimum assistance   Goal #4   Current Status  NT   Goal #5 Patient to demonstrate independence with home activity/exercise instructions provided to patient in preparation for discharge.   Goal #5   Current Status  in progress   Goal #6     Goal #6  Current Status      Gait Training: 15 minutes

## 2024-07-08 NOTE — PLAN OF CARE
Problem: Patient Centered Care  Goal: Patient preferences are identified and integrated in the patient's plan of care  Description: Interventions:  - What would you like us to know as we care for you? From home with son, Nicholas  - Provide timely, complete, and accurate information to patient/family  - Incorporate patient and family knowledge, values, beliefs, and cultural backgrounds into the planning and delivery of care  - Encourage patient/family to participate in care and decision-making at the level they choose  - Honor patient and family perspectives and choices  Outcome: Progressing     Problem: Patient/Family Goals  Goal: Patient/Family Long Term Goal  Description: Patient's Long Term Goal: Discharge from the hospital    Interventions:  - Monitor vital signs  - Monitor appropriate labs  - Wound care  - Pain management  - Administer medications per order  - Follow MD orders  - Diagnostics per order  - Update / inform patient and family on plan of care  - Discharge planning  - See additional Care Plan goals for specific interventions  Outcome: Progressing  Goal: Patient/Family Short Term Goal  Description: Patient's Short Term Goal: Improve pain to bilateral lower extremities      Interventions:   - Monitor vital signs  - Monitor appropriate labs  - Wound care  - Pain management  - Administer medications per order  - Follow MD orders  - Diagnostics per order  - Update / inform patient and family on plan of care  - See additional Care Plan goals for specific interventions  Outcome: Progressing     Problem: PAIN - ADULT  Goal: Verbalizes/displays adequate comfort level or patient's stated pain goal  Description: INTERVENTIONS:  - Encourage pt to monitor pain and request assistance  - Assess pain using appropriate pain scale  - Administer analgesics based on type and severity of pain and evaluate response  - Implement non-pharmacological measures as appropriate and evaluate response  - Consider cultural and social  influences on pain and pain management  - Manage/alleviate anxiety  - Utilize distraction and/or relaxation techniques  - Monitor for opioid side effects  - Notify MD/LIP if interventions unsuccessful or patient reports new pain  - Anticipate increased pain with activity and pre-medicate as appropriate  Outcome: Progressing     Problem: RISK FOR INFECTION - ADULT  Goal: Absence of fever/infection during anticipated neutropenic period  Description: INTERVENTIONS  - Monitor WBC  - Administer growth factors as ordered  - Implement neutropenic guidelines  Outcome: Progressing     Problem: SAFETY ADULT - FALL  Goal: Free from fall injury  Description: INTERVENTIONS:  - Assess pt frequently for physical needs  - Identify cognitive and physical deficits and behaviors that affect risk of falls.  - [place] fall precautions as indicated by assessment.  - Educate pt/family on patient safety including physical limitations  - Instruct pt to call for assistance with activity based on assessment  - Modify environment to reduce risk of injury  - Provide assistive devices as appropriate  - Consider OT/PT consult to assist with strengthening/mobility  - Encourage toileting schedule  Outcome: Progressing     Problem: DISCHARGE PLANNING  Goal: Discharge to home or other facility with appropriate resources  Description: INTERVENTIONS:  - Identify barriers to discharge w/pt and caregiver  - Include patient/family/discharge partner in discharge planning  - Arrange for needed discharge resources and transportation as appropriate  - Identify discharge learning needs (meds, wound care, etc)  - Arrange for interpreters to assist at discharge as needed  - Consider post-discharge preferences of patient/family/discharge partner  - Complete POLST form as appropriate  - Assess patient's ability to be responsible for managing their own health  - Refer to Case Management Department for coordinating discharge planning if the patient needs  post-hospital services based on physician/LIP order or complex needs related to functional status, cognitive ability or social support system  Outcome: Progressing     Problem: SKIN/TISSUE INTEGRITY - ADULT  Goal: Incision(s), wounds(s) or drain site(s) healing without S/S of infection  Description: INTERVENTIONS:  - Assess and document risk factors for pressure ulcer development  - Assess and document skin integrity  - Assess and document dressing/incision, wound bed, drain sites and surrounding tissue  - Implement wound care per orders  - Initiate isolation precautions as appropriate  - Initiate Pressure Ulcer prevention bundle as indicated  Outcome: Progressing     Problem: MUSCULOSKELETAL - ADULT  Goal: Return mobility to safest level of function  Description: INTERVENTIONS:  - Assess patient stability and activity tolerance for standing, transferring and ambulating w/ or w/o assistive devices  - Assist with transfers and ambulation using safe patient handling equipment as needed  - Ensure adequate protection for wounds/incisions during mobilization  - Obtain PT/OT consults as needed  - Advance activity as appropriate  - Communicate ordered activity level and limitations with patient/family  Outcome: Progressing     Problem: Impaired Functional Mobility  Goal: Achieve highest/safest level of mobility/gait  Description: Interventions:  - Assess patient's functional ability and stability  - Promote increasing activity/tolerance for mobility and gait  - Educate and engage patient/family in tolerated activity level and precautions  - Recommend use of  RW for transfers and ambulation  - Recommend patient transfer to bedside chair toward strongest side  - When transferring patient, block weaker knee for safety  - Recommend use of chair position in bed 3 times per day  Outcome: Progressing     Problem: Impaired Activities of Daily Living  Goal: Achieve highest/safest level of independence in self care  Description:  Interventions:  - Assess ability and encourage patient to participate in ADLs to maximize function  - Promote sitting position while performing ADLs such as feeding, grooming, and bathing  - Educate and encourage patient/family in tolerated functional activity level and precautions during self-care  Outcome: Progressing

## 2024-07-08 NOTE — PLAN OF CARE
Patient is alert and oriented x4 and on RA. PRN pain medication given with relief.  Frequent rounding done throughout the shift. Safety precautions in place. Plan: home with HH vs [facility].     Problem: Patient Centered Care  Goal: Patient preferences are identified and integrated in the patient's plan of care  Description: Interventions:  - What would you like us to know as we care for you? From home with son, Nicholas  - Provide timely, complete, and accurate information to patient/family  - Incorporate patient and family knowledge, values, beliefs, and cultural backgrounds into the planning and delivery of care  - Encourage patient/family to participate in care and decision-making at the level they choose  - Honor patient and family perspectives and choices  Outcome: Progressing     Problem: Patient/Family Goals  Goal: Patient/Family Long Term Goal  Description: Patient's Long Term Goal: Discharge from the hospital    Interventions:  - Monitor vital signs  - Monitor appropriate labs  - Wound care  - Pain management  - Administer medications per order  - Follow MD orders  - Diagnostics per order  - Update / inform patient and family on plan of care  - Discharge planning  - See additional Care Plan goals for specific interventions  Outcome: Progressing  Goal: Patient/Family Short Term Goal  Description: Patient's Short Term Goal: Improve pain to bilateral lower extremities      Interventions:   - Monitor vital signs  - Monitor appropriate labs  - Wound care  - Pain management  - Administer medications per order  - Follow MD orders  - Diagnostics per order  - Update / inform patient and family on plan of care  - See additional Care Plan goals for specific interventions  Outcome: Progressing     Problem: PAIN - ADULT  Goal: Verbalizes/displays adequate comfort level or patient's stated pain goal  Description: INTERVENTIONS:  - Encourage pt to monitor pain and request assistance  - Assess pain using appropriate pain scale  -  Administer analgesics based on type and severity of pain and evaluate response  - Implement non-pharmacological measures as appropriate and evaluate response  - Consider cultural and social influences on pain and pain management  - Manage/alleviate anxiety  - Utilize distraction and/or relaxation techniques  - Monitor for opioid side effects  - Notify MD/LIP if interventions unsuccessful or patient reports new pain  - Anticipate increased pain with activity and pre-medicate as appropriate  Outcome: Progressing     Problem: RISK FOR INFECTION - ADULT  Goal: Absence of fever/infection during anticipated neutropenic period  Description: INTERVENTIONS  - Monitor WBC  - Administer growth factors as ordered  - Implement neutropenic guidelines  Outcome: Progressing     Problem: SAFETY ADULT - FALL  Goal: Free from fall injury  Description: INTERVENTIONS:  - Assess pt frequently for physical needs  - Identify cognitive and physical deficits and behaviors that affect risk of falls.  - [place] fall precautions as indicated by assessment.  - Educate pt/family on patient safety including physical limitations  - Instruct pt to call for assistance with activity based on assessment  - Modify environment to reduce risk of injury  - Provide assistive devices as appropriate  - Consider OT/PT consult to assist with strengthening/mobility  - Encourage toileting schedule  Outcome: Progressing     Problem: DISCHARGE PLANNING  Goal: Discharge to home or other facility with appropriate resources  Description: INTERVENTIONS:  - Identify barriers to discharge w/pt and caregiver  - Include patient/family/discharge partner in discharge planning  - Arrange for needed discharge resources and transportation as appropriate  - Identify discharge learning needs (meds, wound care, etc)  - Arrange for interpreters to assist at discharge as needed  - Consider post-discharge preferences of patient/family/discharge partner  - Complete POLST form as  appropriate  - Assess patient's ability to be responsible for managing their own health  - Refer to Case Management Department for coordinating discharge planning if the patient needs post-hospital services based on physician/LIP order or complex needs related to functional status, cognitive ability or social support system  Outcome: Progressing     Problem: SKIN/TISSUE INTEGRITY - ADULT  Goal: Incision(s), wounds(s) or drain site(s) healing without S/S of infection  Description: INTERVENTIONS:  - Assess and document risk factors for pressure ulcer development  - Assess and document skin integrity  - Assess and document dressing/incision, wound bed, drain sites and surrounding tissue  - Implement wound care per orders  - Initiate isolation precautions as appropriate  - Initiate Pressure Ulcer prevention bundle as indicated  Outcome: Progressing     Problem: MUSCULOSKELETAL - ADULT  Goal: Return mobility to safest level of function  Description: INTERVENTIONS:  - Assess patient stability and activity tolerance for standing, transferring and ambulating w/ or w/o assistive devices  - Assist with transfers and ambulation using safe patient handling equipment as needed  - Ensure adequate protection for wounds/incisions during mobilization  - Obtain PT/OT consults as needed  - Advance activity as appropriate  - Communicate ordered activity level and limitations with patient/family  Outcome: Progressing     Problem: Impaired Functional Mobility  Goal: Achieve highest/safest level of mobility/gait  Description: Interventions:  - Assess patient's functional ability and stability  - Promote increasing activity/tolerance for mobility and gait  - Educate and engage patient/family in tolerated activity level and precautions  - Recommend use of  RW for transfers and ambulation  - Recommend patient transfer to bedside chair toward strongest side  - When transferring patient, block weaker knee for safety  - Recommend use of chair  position in bed 3 times per day  Outcome: Progressing     Problem: Impaired Activities of Daily Living  Goal: Achieve highest/safest level of independence in self care  Description: Interventions:  - Assess ability and encourage patient to participate in ADLs to maximize function  - Promote sitting position while performing ADLs such as feeding, grooming, and bathing  - Educate and encourage patient/family in tolerated functional activity level and precautions during self-care  Outcome: Progressing

## 2024-07-08 NOTE — PROGRESS NOTES
Progress Note     Frances Whitehead Patient Status:  Inpatient    1931 MRN [id number]   Location [facility] 5SW/SE Attending Jeremy Pena MD   Hosp Day # 6 PCP Thao Garduno DO     Chief Complaint:   Chief Complaint   Patient presents with    Wound Care         Subjective:   S: Patient seen and examined, chart reviewed.     Lives with patients son.  Discussed with him at bedside.  She is not as independent as she used to be.  Would be agreeable to [facility] if able to be arranged.  Awaiting PT today.  Had increased rt leg pain.     Review of Systems:   10 point ROS completed and was negative, except for pertinent positive and negatives stated in subjective.                Objective:   Vital signs:  Temp:  [97.5 °F (36.4 °C)-98.2 °F (36.8 °C)] 97.5 °F (36.4 °C)  Pulse:  [85-93] 85  Resp:  [16-18] 16  BP: (120-161)/(56-80) 136/75  SpO2:  [94 %-98 %] 97 %    Wt Readings from Last 6 Encounters:   24 122 lb 12.8 oz (55.7 kg)   24 112 lb (50.8 kg)   24 116 lb (52.6 kg)   24 116 lb 4 oz (52.7 kg)   24 114 lb (51.7 kg)   24 114 lb (51.7 kg)       Physical Exam:      Gen: No acute distress  Pulm: Lungs clear, normal respiratory effort  CV: Heart with regular rate and rhythm  Abd: Abdomen soft, nontender, nondistended, bowel sounds present  Neuro: No acute focal deficits  MSK: moves extremities  Skin: Warm and dry  Psych: Normal affect  Ext: bl le edema, improving slowly      Results:   Diagnostic Data:      Labs:    Labs Last 24 Hours:   BMP     CBC    Other     Na 128 Cl 98 BUN 13 Glu 110   Hb 10.1   PTT - Procal -   K 5.1 CO2 25.0 Cr 0.71   WBC 6.0 >< .0  INR - CRP -   Renal Lytes Endo    Hct 29.2   Trop - D dim -   eGFR - Ca 9.3 POC Gluc  -    LFT   pBNP - Lactic -   eGFR AA - PO4 - A1c -   AST - APk - Prot -  LDL -      Recent Labs   Lab 24  0526 24  0537 24  0609 24  0901 24  0741   RBC 3.32* 3.05* 2.99* 3.02* 3.23*   HGB  10.3* 9.5* 9.3* 9.2* 10.1*   HCT 29.9* 28.0* 27.0* 27.4* 29.2*   MCV 90.1 91.8 90.3 90.7 90.4   MCH 31.0 31.1 31.1 30.5 31.3   MCHC 34.4 33.9 34.4 33.6 34.6   RDW 15.0 15.3* 15.1* 15.2* 15.1*   NEPRELIM 3.39 1.93 2.88  --   --    WBC 4.8 3.8* 5.2 4.3 6.0   .0 336.0 347.0 375.0 369.0       No results found for: \"PT\", \"INR\"    Recent Labs   Lab 07/05/24  0609 07/07/24  0901 07/08/24  0741   * 169* 110*   BUN 15 15 13   CREATSERUM 0.82 0.72 0.71   CA 9.1 9.1 9.3   * 129* 128*   K 5.1 4.8 5.1    99 98   CO2 24.0 24.0 25.0       No results for input(s): \"PJ\", \"LIP\" in the last 168 hours.    Recent Labs   Lab 07/07/24  0648 07/07/24 2002 07/08/24  0741   MG 1.8 1.8 1.9       No results for input(s): \"URINE\", \"CULTI\", \"BLDSMR\" in the last 168 hours.      No results found.        Pro-Calcitonin  No results for input(s): \"PCT\" in the last 168 hours.    Cardiac  No results for input(s): \"TROP\", \"PBNP\" in the last 168 hours.    Imaging: Imaging data reviewed in Epic.    No results found.       Medications:    cefepime  1 g Intravenous q12h    heparin  5,000 Units Subcutaneous 2 times per day    atorvastatin  10 mg Oral Nightly    gabapentin  100 mg Oral Nightly    melatonin  3 mg Oral Nightly       Assessment & Plan:   ASSESSMENT / PLAN:     BL le cellulitis, Rt leg ulcer  - started IV abx, would expect 3-5 day IV. Discharge on PO cipro 500 mg BID for 7 more days as per ID  - seen by wound care  - asked nurse to change bandages with ID present to observe wounds and see progression of treatment.   - Pseudomonas in wound culture sensitive to cefepime.      Hyponatremia  - acute on chronic  - check urine sodium  - fluid restrict  - will monitor    HTN  - monitor vitals and adjust meds prn     CKD stage 3  - monitor bmp     Paroxysmal Afib  - rate controlled  - eliquis for anticoagulation               Prophylaxis:   DVT with eliquis     Dispo: pending clinical course     dvt prophylaxis: eliquis  code  status: DNR  dispo: home upon medical clearance vs [facility]    MDM: high      Greater than 55 minutes spent, Greater than 50% spent counseling and in coordination of care, reviewing labs, discussing results with patient, coordinating care with care team and ordering labs for tomorrow and adjusting medications as needed

## 2024-07-09 PROCEDURE — 99233 SBSQ HOSP IP/OBS HIGH 50: CPT | Performed by: HOSPITALIST

## 2024-07-09 RX ORDER — LATANOPROST 50 UG/ML
1 SOLUTION/ DROPS OPHTHALMIC NIGHTLY
Status: DISCONTINUED | OUTPATIENT
Start: 2024-07-09 | End: 2024-07-12

## 2024-07-09 RX ORDER — POLYETHYLENE GLYCOL 3350 17 G/17G
17 POWDER, FOR SOLUTION ORAL DAILY
Status: DISCONTINUED | OUTPATIENT
Start: 2024-07-09 | End: 2024-07-09 | Stop reason: SDUPTHER

## 2024-07-09 RX ORDER — POLYETHYLENE GLYCOL 3350 17 G/17G
17 POWDER, FOR SOLUTION ORAL DAILY
Status: DISCONTINUED | OUTPATIENT
Start: 2024-07-09 | End: 2024-07-12

## 2024-07-09 RX ORDER — SENNA AND DOCUSATE SODIUM 50; 8.6 MG/1; MG/1
2 TABLET, FILM COATED ORAL 2 TIMES DAILY PRN
Status: DISCONTINUED | OUTPATIENT
Start: 2024-07-09 | End: 2024-07-12

## 2024-07-09 RX ORDER — DILTIAZEM HYDROCHLORIDE 120 MG/1
120 CAPSULE, EXTENDED RELEASE ORAL DAILY
Status: DISCONTINUED | OUTPATIENT
Start: 2024-07-09 | End: 2024-07-12

## 2024-07-09 RX ORDER — DILTIAZEM HYDROCHLORIDE EXTENDED-RELEASE TABLETS 120 MG/1
1 TABLET, EXTENDED RELEASE ORAL EVERY MORNING
Status: DISCONTINUED | OUTPATIENT
Start: 2024-07-09 | End: 2024-07-09 | Stop reason: SDUPTHER

## 2024-07-09 NOTE — CM/SW NOTE
07/09/24 1200   CM/SW Referral Data   Referral Source Social Work (self-referral)   Reason for Referral Discharge planning   Informant Son   Patient Info   Patient's Current Mental Status at Time of Assessment Confused or unable to complete assessment   Patient's Home Environment House   Patient Status Prior to Admission   Independent with ADLs and Mobility Yes   Services in place prior to admission DME/Supplies at home;Home Health Care   Home Health Provider Info resid[facility]   Type of DME/Supplies Wheeled Walker   Discharge Needs   Anticipated D/C needs Home health care;Subacute rehab   Services Requested   Submitted to [facility] Yes   PASRR Level 1 Submitted Yes     Pt discussed in RN DC Round. SW was informed that patient's son is requesting [facility]. Anticipated therapy need: Gradual Rehabilitative Therapy. SW called son Vidal, who provided above information. Patient lives at address on file. Patient is usually independent with walker at home. Patient is current with [facility] for HH. SW informed Vidal that SW can send out [facility] referrals however it is up to her insurance to approve for [facility]. Pt has hx of [facility] and HH. PASRR level 1 screen submitted and completed and uploaded to [facility] referral. [facility] Son wishes [facility] or [facility]. SW will provide list when avail and confirm patient/son choice.     Plan: Pending medical clearance, DC to [facility], PASRR, *[facility], *list/choice/auth, vs Home w [facility].     SW/CM to remain available for support and/or discharge planning.     Valorie Escalante, MSW, LSW   x [phone]

## 2024-07-09 NOTE — PLAN OF CARE
Problem: Patient Centered Care  Goal: Patient preferences are identified and integrated in the patient's plan of care  Description: Interventions:  - What would you like us to know as we care for you? From home with son, Nicholas  - Provide timely, complete, and accurate information to patient/family  - Incorporate patient and family knowledge, values, beliefs, and cultural backgrounds into the planning and delivery of care  - Encourage patient/family to participate in care and decision-making at the level they choose  - Honor patient and family perspectives and choices  Outcome: Progressing     Problem: Patient/Family Goals  Goal: Patient/Family Long Term Goal  Description: Patient's Long Term Goal: Discharge from the hospital    Interventions:  - Monitor vital signs  - Monitor appropriate labs  - Wound care  - Pain management  - Administer medications per order  - Follow MD orders  - Diagnostics per order  - Update / inform patient and family on plan of care  - Discharge planning  - See additional Care Plan goals for specific interventions  Outcome: Progressing  Goal: Patient/Family Short Term Goal  Description: Patient's Short Term Goal: Improve pain to bilateral lower extremities      Interventions:   - Monitor vital signs  - Monitor appropriate labs  - Wound care  - Pain management  - Administer medications per order  - Follow MD orders  - Diagnostics per order  - Update / inform patient and family on plan of care  - See additional Care Plan goals for specific interventions  Outcome: Progressing     Problem: PAIN - ADULT  Goal: Verbalizes/displays adequate comfort level or patient's stated pain goal  Description: INTERVENTIONS:  - Encourage pt to monitor pain and request assistance  - Assess pain using appropriate pain scale  - Administer analgesics based on type and severity of pain and evaluate response  - Implement non-pharmacological measures as appropriate and evaluate response  - Consider cultural and social  influences on pain and pain management  - Manage/alleviate anxiety  - Utilize distraction and/or relaxation techniques  - Monitor for opioid side effects  - Notify MD/LIP if interventions unsuccessful or patient reports new pain  - Anticipate increased pain with activity and pre-medicate as appropriate  Outcome: Progressing     Problem: RISK FOR INFECTION - ADULT  Goal: Absence of fever/infection during anticipated neutropenic period  Description: INTERVENTIONS  - Monitor WBC  - Administer growth factors as ordered  - Implement neutropenic guidelines  Outcome: Progressing     Problem: SAFETY ADULT - FALL  Goal: Free from fall injury  Description: INTERVENTIONS:  - Assess pt frequently for physical needs  - Identify cognitive and physical deficits and behaviors that affect risk of falls.  - [place] fall precautions as indicated by assessment.  - Educate pt/family on patient safety including physical limitations  - Instruct pt to call for assistance with activity based on assessment  - Modify environment to reduce risk of injury  - Provide assistive devices as appropriate  - Consider OT/PT consult to assist with strengthening/mobility  - Encourage toileting schedule  Outcome: Progressing     Problem: DISCHARGE PLANNING  Goal: Discharge to home or other facility with appropriate resources  Description: INTERVENTIONS:  - Identify barriers to discharge w/pt and caregiver  - Include patient/family/discharge partner in discharge planning  - Arrange for needed discharge resources and transportation as appropriate  - Identify discharge learning needs (meds, wound care, etc)  - Arrange for interpreters to assist at discharge as needed  - Consider post-discharge preferences of patient/family/discharge partner  - Complete POLST form as appropriate  - Assess patient's ability to be responsible for managing their own health  - Refer to Case Management Department for coordinating discharge planning if the patient needs  post-hospital services based on physician/LIP order or complex needs related to functional status, cognitive ability or social support system  Outcome: Progressing     Problem: SKIN/TISSUE INTEGRITY - ADULT  Goal: Incision(s), wounds(s) or drain site(s) healing without S/S of infection  Description: INTERVENTIONS:  - Assess and document risk factors for pressure ulcer development  - Assess and document skin integrity  - Assess and document dressing/incision, wound bed, drain sites and surrounding tissue  - Implement wound care per orders  - Initiate isolation precautions as appropriate  - Initiate Pressure Ulcer prevention bundle as indicated  Outcome: Progressing     Problem: MUSCULOSKELETAL - ADULT  Goal: Return mobility to safest level of function  Description: INTERVENTIONS:  - Assess patient stability and activity tolerance for standing, transferring and ambulating w/ or w/o assistive devices  - Assist with transfers and ambulation using safe patient handling equipment as needed  - Ensure adequate protection for wounds/incisions during mobilization  - Obtain PT/OT consults as needed  - Advance activity as appropriate  - Communicate ordered activity level and limitations with patient/family  Outcome: Progressing     Problem: Impaired Functional Mobility  Goal: Achieve highest/safest level of mobility/gait  Description: Interventions:  - Assess patient's functional ability and stability  - Promote increasing activity/tolerance for mobility and gait  - Educate and engage patient/family in tolerated activity level and precautions  - Recommend use of  RW for transfers and ambulation  - Recommend patient transfer to bedside chair toward strongest side  - When transferring patient, block weaker knee for safety  - Recommend use of chair position in bed 3 times per day  Outcome: Progressing     Problem: Impaired Activities of Daily Living  Goal: Achieve highest/safest level of independence in self care  Description:  Interventions:  - Assess ability and encourage patient to participate in ADLs to maximize function  - Promote sitting position while performing ADLs such as feeding, grooming, and bathing  - Educate and encourage patient/family in tolerated functional activity level and precautions during self-care  Outcome: Progressing   No acute changes, patient verbalizes pain is adequately controlled, no shortness of breath, unlabored respirations. Safety precautions in place

## 2024-07-09 NOTE — SPIRITUAL CARE NOTE
Spiritual Care Visit Note    Patient Name: Frances Whitehead Date of Spiritual Care Visit: 24   : 1931 Primary Dx: Cellulitis of right lower extremity       Referred By: Referral From:     Spiritual Care Taxonomy:    Intended Effects: Demonstrate caring and concern    Methods: Collaborate with care team member;Offer support    Interventions: Silent prayer    Visit Type/Summary:     - Spiritual Care: Consulted with RN prior to visit. Attempted visit. Patient is unavailable. Left a Spiritual Care contact information card.      LEAH Vidal CAMII   [id number]     Spiritual Care support can be requested via an [employer] [facility] consult. For urgent/immediate needs, please contact the On Call  at: [place]: ext [phone]

## 2024-07-09 NOTE — PROGRESS NOTES
INFECTIOUS DISEASE PROGRESS NOTE    Frances Whitehead Patient Status:  Inpatient    1931 MRN [id number]   Location [facility] 5SW/SE Attending Jeremy Pena MD   Hosp Day # 7 PCP Thao Garduno, DO     SUBJECTIVE  ROS done. Son at bedside now interested in rehab.    ASSESSMENT/PLAN:    Antibiotics: Cefepime     # RLE cellulitis with wound   - wound cx with [facility]               -Previous cx with Serratia liquefaciens, GNR, C. parapsilosis  # BLE wounds  # Atrial fibrillation  # CKD     PLAN:  -  Currently on cefepime. Will transition to PO cipro 500 BID x 7 days on discharge.   -  Local wound care.  -  Follow fever curve, wbc.  -  Reviewed labs, micro, imaging reports, available old records.  -  Case d/w patient, son, RN.     History of Present Illness:  Frances Whitehead is a 89 year old female with a history of atrial fibrillation, CKD, who follows at [facility] wound clinic, who had recent wound cultures showing serratia and candida  s/p course of bactrim and fluconazole, who presented to [facility] ED on  after being seen at wound clinic with concerns for worsening right leg wound. Patient denies any fevers or chills at home. On arrival, afebrile, wbc 5.4, LLE venous doppler without DVT, started on vancomycin and ceftriaxone. ID consulted.    OBJECTIVE  /51 (BP Location: Left arm)   Pulse 87   Temp 97.3 °F (36.3 °C) (Oral)   Resp 18   Ht 4' 11\" (1.499 m)   Wt 122 lb 12.8 oz (55.7 kg)   LMP  (LMP Unknown)   SpO2 96%   BMI 24.80 kg/m²     General: Awake, in bed  HEENT: EOMI   Abdomen: Soft, nontender, nondistended.   Musculoskeletal: No edema  Integument: B legs wrapped     Labs:     Recent Labs   Lab 24  0526 24  0537 24  0609 24  0901 24  0741   WBC 4.8 3.8* 5.2 4.3 6.0   HGB 10.3* 9.5* 9.3* 9.2* 10.1*   .0 336.0 347.0 375.0 369.0       Recent Labs   Lab 24  0526 24  0225 24  0537 24  0609 24  0901 24  0741   NA  129*  --  130* 131* 129* 128*   K 5.2* 4.9 4.8 5.1 4.8 5.1     --  101 102 99 98   CO2 24.0  --  21.0 24.0 24.0 25.0   BUN 21  --  17 15 15 13   CREATSERUM 1.14*  --  0.79 0.82 0.72 0.71       No results for input(s): \"ALT\", \"AST\" in the last 168 hours.    Invalid input(s): \"ALPHOS\", \"TBIL\"    Microbiology: Reviewed    Imaging: Reviewed       [facility]  [phone]

## 2024-07-09 NOTE — PROGRESS NOTES
Progress Note     Frances Whitehead Patient Status:  Inpatient    1931 MRN [id number]   Location [facility] 5SW/SE Attending Jeremy Pena MD   Hosp Day # 7 PCP Thao Garduno DO     Chief Complaint:   Chief Complaint   Patient presents with    Wound Care         Subjective:   S: Patient seen and examined, chart reviewed.     Lives with patients son.  Discussed with him at bedside.  She is not as independent as she used to be.  Still with pain in the sacral region that shoots down her back.      Review of Systems:   10 point ROS completed and was negative, except for pertinent positive and negatives stated in subjective.                Objective:   Vital signs:  Temp:  [97.3 °F (36.3 °C)-98 °F (36.7 °C)] 97.3 °F (36.3 °C)  Pulse:  [87-98] 87  Resp:  [16-18] 18  BP: (126-155)/(51-66) 126/51  SpO2:  [94 %-97 %] 96 %    Wt Readings from Last 6 Encounters:   24 122 lb 12.8 oz (55.7 kg)   24 112 lb (50.8 kg)   24 116 lb (52.6 kg)   24 116 lb 4 oz (52.7 kg)   24 114 lb (51.7 kg)   24 114 lb (51.7 kg)       Physical Exam:      Gen: No acute distress  Pulm: Lungs clear, normal respiratory effort  CV: Heart with regular rate and rhythm  Abd: Abdomen soft, nontender, nondistended, bowel sounds present  Neuro: No acute focal deficits  MSK: moves extremities  Skin: Warm and dry  Psych: Normal affect  Ext: bl le edema, improving slowly      Results:   Diagnostic Data:      Labs:    Labs Last 24 Hours:   BMP     CBC    Other     Na - Cl - BUN - Glu -   Hb -   PTT - Procal -   K - CO2 - Cr -   WBC - >< PLT -  INR - CRP -   Renal Lytes Endo    Hct -   Trop - D dim -   eGFR - Ca - POC Gluc  -    LFT   pBNP - Lactic -   eGFR AA - PO4 - A1c -   AST - APk - Prot -  LDL -      Recent Labs   Lab 24  0526 24  0537 24  0609 24  0901 24  0741   RBC 3.32* 3.05* 2.99* 3.02* 3.23*   HGB 10.3* 9.5* 9.3* 9.2* 10.1*   HCT 29.9* 28.0* 27.0* 27.4* 29.2*    MCV 90.1 91.8 90.3 90.7 90.4   MCH 31.0 31.1 31.1 30.5 31.3   MCHC 34.4 33.9 34.4 33.6 34.6   RDW 15.0 15.3* 15.1* 15.2* 15.1*   NEPRELIM 3.39 1.93 2.88  --   --    WBC 4.8 3.8* 5.2 4.3 6.0   .0 336.0 347.0 375.0 369.0       No results found for: \"PT\", \"INR\"    Recent Labs   Lab 07/05/24  0609 07/07/24  0901 07/08/24  0741   * 169* 110*   BUN 15 15 13   CREATSERUM 0.82 0.72 0.71   CA 9.1 9.1 9.3   * 129* 128*   K 5.1 4.8 5.1    99 98   CO2 24.0 24.0 25.0       No results for input(s): \"PJ\", \"LIP\" in the last 168 hours.    Recent Labs   Lab 07/07/24  0648 07/07/24 2002 07/08/24  0741   MG 1.8 1.8 1.9       No results for input(s): \"URINE\", \"CULTI\", \"BLDSMR\" in the last 168 hours.      No results found.        Pro-Calcitonin  No results for input(s): \"PCT\" in the last 168 hours.    Cardiac  No results for input(s): \"TROP\", \"PBNP\" in the last 168 hours.    Imaging: Imaging data reviewed in Epic.    No results found.       Medications:    apixaban  2.5 mg Oral BID    dilTIAZem HCl ER  1 tablet Oral QAM    latanoprost  1 drop Both Eyes Nightly    polyethylene glycol (PEG 3350)  17 g Oral Daily    cefepime  1 g Intravenous q12h    atorvastatin  10 mg Oral Nightly    gabapentin  100 mg Oral Nightly    melatonin  3 mg Oral Nightly       Assessment & Plan:   ASSESSMENT / PLAN:     BL le cellulitis, Rt leg ulcer  - started IV abx, would expect 3-5 day IV. Discharge on PO cipro 500 mg BID for 7 more days as per ID  - seen by wound care  - asked nurse to change bandages with ID present to observe wounds and see progression of treatment.   - Pseudomonas in wound culture sensitive to cefepime.     Sciatica and chronic pain  - using norco prn     Hyponatremia  - acute on chronic  - check urine sodium  - fluid restrict  - will monitor    HTN  - monitor vitals and adjust meds prn     CKD stage 3  - monitor bmp     Paroxysmal Afib  - rate controlled  - eliquis for anticoagulation                Prophylaxis:   DVT with eliquis     Dispo: pending clinical course     dvt prophylaxis: eliquis  code status: DNR  dispo: home upon medical clearance vs MINA    MDM: high      Greater than 55 minutes spent, Greater than 50% spent counseling and in coordination of care, reviewing labs, discussing results with patient, coordinating care with care team and ordering labs for tomorrow and adjusting medications as needed

## 2024-07-10 PROCEDURE — 99233 SBSQ HOSP IP/OBS HIGH 50: CPT | Performed by: HOSPITALIST

## 2024-07-10 RX ORDER — TRAMADOL HYDROCHLORIDE 50 MG/1
50 TABLET ORAL EVERY 6 HOURS PRN
Status: DISCONTINUED | OUTPATIENT
Start: 2024-07-10 | End: 2024-07-12

## 2024-07-10 RX ORDER — TRAMADOL HYDROCHLORIDE 50 MG/1
50 TABLET ORAL EVERY 6 HOURS PRN
Qty: 20 TABLET | Refills: 0 | Status: SHIPPED | OUTPATIENT
Start: 2024-07-10

## 2024-07-10 NOTE — PLAN OF CARE
No acute changes overnight.    Problem: Patient Centered Care  Goal: Patient preferences are identified and integrated in the patient's plan of care  Description: Interventions:  - What would you like us to know as we care for you? From home with son, Nicholas  - Provide timely, complete, and accurate information to patient/family  - Incorporate patient and family knowledge, values, beliefs, and cultural backgrounds into the planning and delivery of care  - Encourage patient/family to participate in care and decision-making at the level they choose  - Honor patient and family perspectives and choices  Outcome: Progressing     Problem: Patient/Family Goals  Goal: Patient/Family Long Term Goal  Description: Patient's Long Term Goal: Discharge from the hospital    Interventions:  - Monitor vital signs  - Monitor appropriate labs  - Wound care  - Pain management  - Administer medications per order  - Follow MD orders  - Diagnostics per order  - Update / inform patient and family on plan of care  - Discharge planning  - See additional Care Plan goals for specific interventions  Outcome: Progressing  Goal: Patient/Family Short Term Goal  Description: Patient's Short Term Goal: Improve pain to bilateral lower extremities      Interventions:   - Monitor vital signs  - Monitor appropriate labs  - Wound care  - Pain management  - Administer medications per order  - Follow MD orders  - Diagnostics per order  - Update / inform patient and family on plan of care  - See additional Care Plan goals for specific interventions  Outcome: Progressing     Problem: PAIN - ADULT  Goal: Verbalizes/displays adequate comfort level or patient's stated pain goal  Description: INTERVENTIONS:  - Encourage pt to monitor pain and request assistance  - Assess pain using appropriate pain scale  - Administer analgesics based on type and severity of pain and evaluate response  - Implement non-pharmacological measures as appropriate and evaluate response  -  Consider cultural and social influences on pain and pain management  - Manage/alleviate anxiety  - Utilize distraction and/or relaxation techniques  - Monitor for opioid side effects  - Notify MD/LIP if interventions unsuccessful or patient reports new pain  - Anticipate increased pain with activity and pre-medicate as appropriate  Outcome: Progressing     Problem: RISK FOR INFECTION - ADULT  Goal: Absence of fever/infection during anticipated neutropenic period  Description: INTERVENTIONS  - Monitor WBC  - Administer growth factors as ordered  - Implement neutropenic guidelines  Outcome: Progressing     Problem: SAFETY ADULT - FALL  Goal: Free from fall injury  Description: INTERVENTIONS:  - Assess pt frequently for physical needs  - Identify cognitive and physical deficits and behaviors that affect risk of falls.  - [place] fall precautions as indicated by assessment.  - Educate pt/family on patient safety including physical limitations  - Instruct pt to call for assistance with activity based on assessment  - Modify environment to reduce risk of injury  - Provide assistive devices as appropriate  - Consider OT/PT consult to assist with strengthening/mobility  - Encourage toileting schedule  Outcome: Progressing     Problem: DISCHARGE PLANNING  Goal: Discharge to home or other facility with appropriate resources  Description: INTERVENTIONS:  - Identify barriers to discharge w/pt and caregiver  - Include patient/family/discharge partner in discharge planning  - Arrange for needed discharge resources and transportation as appropriate  - Identify discharge learning needs (meds, wound care, etc)  - Arrange for interpreters to assist at discharge as needed  - Consider post-discharge preferences of patient/family/discharge partner  - Complete POLST form as appropriate  - Assess patient's ability to be responsible for managing their own health  - Refer to Case Management Department for coordinating discharge planning  if the patient needs post-hospital services based on physician/LIP order or complex needs related to functional status, cognitive ability or social support system  Outcome: Progressing     Problem: SKIN/TISSUE INTEGRITY - ADULT  Goal: Incision(s), wounds(s) or drain site(s) healing without S/S of infection  Description: INTERVENTIONS:  - Assess and document risk factors for pressure ulcer development  - Assess and document skin integrity  - Assess and document dressing/incision, wound bed, drain sites and surrounding tissue  - Implement wound care per orders  - Initiate isolation precautions as appropriate  - Initiate Pressure Ulcer prevention bundle as indicated  Outcome: Progressing     Problem: MUSCULOSKELETAL - ADULT  Goal: Return mobility to safest level of function  Description: INTERVENTIONS:  - Assess patient stability and activity tolerance for standing, transferring and ambulating w/ or w/o assistive devices  - Assist with transfers and ambulation using safe patient handling equipment as needed  - Ensure adequate protection for wounds/incisions during mobilization  - Obtain PT/OT consults as needed  - Advance activity as appropriate  - Communicate ordered activity level and limitations with patient/family  Outcome: Progressing     Problem: Impaired Functional Mobility  Goal: Achieve highest/safest level of mobility/gait  Description: Interventions:  - Assess patient's functional ability and stability  - Promote increasing activity/tolerance for mobility and gait  - Educate and engage patient/family in tolerated activity level and precautions  - Recommend use of  RW for transfers and ambulation  - Recommend patient transfer to bedside chair toward strongest side  - When transferring patient, block weaker knee for safety  - Recommend use of chair position in bed 3 times per day  Outcome: Progressing     Problem: Impaired Activities of Daily Living  Goal: Achieve highest/safest level of independence in self  care  Description: Interventions:  - Assess ability and encourage patient to participate in ADLs to maximize function  - Promote sitting position while performing ADLs such as feeding, grooming, and bathing  - Educate and encourage patient/family in tolerated functional activity level and precautions during self-care  Outcome: Progressing

## 2024-07-10 NOTE — PLAN OF CARE
Problem: Patient Centered Care  Goal: Patient preferences are identified and integrated in the patient's plan of care  Description: Interventions:  - What would you like us to know as we care for you? From home with son, Nicholas  - Provide timely, complete, and accurate information to patient/family  - Incorporate patient and family knowledge, values, beliefs, and cultural backgrounds into the planning and delivery of care  - Encourage patient/family to participate in care and decision-making at the level they choose  - Honor patient and family perspectives and choices  Outcome: Progressing     Problem: Patient/Family Goals  Goal: Patient/Family Long Term Goal  Description: Patient's Long Term Goal: Discharge from the hospital    Interventions:  - Monitor vital signs  - Monitor appropriate labs  - Wound care  - Pain management  - Administer medications per order  - Follow MD orders  - Diagnostics per order  - Update / inform patient and family on plan of care  - Discharge planning  - See additional Care Plan goals for specific interventions  Outcome: Progressing  Goal: Patient/Family Short Term Goal  Description: Patient's Short Term Goal: Improve pain to bilateral lower extremities      Interventions:   - Monitor vital signs  - Monitor appropriate labs  - Wound care  - Pain management  - Administer medications per order  - Follow MD orders  - Diagnostics per order  - Update / inform patient and family on plan of care  - See additional Care Plan goals for specific interventions  Outcome: Progressing     Problem: PAIN - ADULT  Goal: Verbalizes/displays adequate comfort level or patient's stated pain goal  Description: INTERVENTIONS:  - Encourage pt to monitor pain and request assistance  - Assess pain using appropriate pain scale  - Administer analgesics based on type and severity of pain and evaluate response  - Implement non-pharmacological measures as appropriate and evaluate response  - Consider cultural and social  influences on pain and pain management  - Manage/alleviate anxiety  - Utilize distraction and/or relaxation techniques  - Monitor for opioid side effects  - Notify MD/LIP if interventions unsuccessful or patient reports new pain  - Anticipate increased pain with activity and pre-medicate as appropriate  Outcome: Progressing     Problem: RISK FOR INFECTION - ADULT  Goal: Absence of fever/infection during anticipated neutropenic period  Description: INTERVENTIONS  - Monitor WBC  - Administer growth factors as ordered  - Implement neutropenic guidelines  Outcome: Progressing     Problem: SAFETY ADULT - FALL  Goal: Free from fall injury  Description: INTERVENTIONS:  - Assess pt frequently for physical needs  - Identify cognitive and physical deficits and behaviors that affect risk of falls.  - [place] fall precautions as indicated by assessment.  - Educate pt/family on patient safety including physical limitations  - Instruct pt to call for assistance with activity based on assessment  - Modify environment to reduce risk of injury  - Provide assistive devices as appropriate  - Consider OT/PT consult to assist with strengthening/mobility  - Encourage toileting schedule  Outcome: Progressing     Problem: DISCHARGE PLANNING  Goal: Discharge to home or other facility with appropriate resources  Description: INTERVENTIONS:  - Identify barriers to discharge w/pt and caregiver  - Include patient/family/discharge partner in discharge planning  - Arrange for needed discharge resources and transportation as appropriate  - Identify discharge learning needs (meds, wound care, etc)  - Arrange for interpreters to assist at discharge as needed  - Consider post-discharge preferences of patient/family/discharge partner  - Complete POLST form as appropriate  - Assess patient's ability to be responsible for managing their own health  - Refer to Case Management Department for coordinating discharge planning if the patient needs  post-hospital services based on physician/LIP order or complex needs related to functional status, cognitive ability or social support system  Outcome: Progressing     Problem: SKIN/TISSUE INTEGRITY - ADULT  Goal: Incision(s), wounds(s) or drain site(s) healing without S/S of infection  Description: INTERVENTIONS:  - Assess and document risk factors for pressure ulcer development  - Assess and document skin integrity  - Assess and document dressing/incision, wound bed, drain sites and surrounding tissue  - Implement wound care per orders  - Initiate isolation precautions as appropriate  - Initiate Pressure Ulcer prevention bundle as indicated  Outcome: Progressing     Problem: MUSCULOSKELETAL - ADULT  Goal: Return mobility to safest level of function  Description: INTERVENTIONS:  - Assess patient stability and activity tolerance for standing, transferring and ambulating w/ or w/o assistive devices  - Assist with transfers and ambulation using safe patient handling equipment as needed  - Ensure adequate protection for wounds/incisions during mobilization  - Obtain PT/OT consults as needed  - Advance activity as appropriate  - Communicate ordered activity level and limitations with patient/family  Outcome: Progressing     Problem: Impaired Functional Mobility  Goal: Achieve highest/safest level of mobility/gait  Description: Interventions:  - Assess patient's functional ability and stability  - Promote increasing activity/tolerance for mobility and gait  - Educate and engage patient/family in tolerated activity level and precautions  - Recommend use of  RW for transfers and ambulation  - Recommend patient transfer to bedside chair toward strongest side  - When transferring patient, block weaker knee for safety  - Recommend use of chair position in bed 3 times per day  Outcome: Progressing     Problem: Impaired Activities of Daily Living  Goal: Achieve highest/safest level of independence in self care  Description:  Interventions:  - Assess ability and encourage patient to participate in ADLs to maximize function  - Promote sitting position while performing ADLs such as feeding, grooming, and bathing  - Educate and encourage patient/family in tolerated functional activity level and precautions during self-care  Outcome: Progressing

## 2024-07-10 NOTE — PROGRESS NOTES
Progress Note     Frances Whitehead Patient Status:  Inpatient    1931 MRN [id number]   Location [facility] 5SW/SE Attending Jeremy Pena MD   Hosp Day # 8 PCP Thao Garduno DO     Chief Complaint:   Chief Complaint   Patient presents with    Wound Care         Subjective:   S: Patient seen and examined, chart reviewed.     Feels okay.  No chest pain or sob.  No n/v.     Review of Systems:   10 point ROS completed and was negative, except for pertinent positive and negatives stated in subjective.                Objective:   Vital signs:  Temp:  [97.1 °F (36.2 °C)-98.5 °F (36.9 °C)] 97.1 °F (36.2 °C)  Pulse:  [75-87] 75  Resp:  [16-18] 18  BP: (119-136)/(56-69) 133/69  SpO2:  [94 %-98 %] 97 %    Wt Readings from Last 6 Encounters:   24 122 lb 12.8 oz (55.7 kg)   24 112 lb (50.8 kg)   24 116 lb (52.6 kg)   24 116 lb 4 oz (52.7 kg)   24 114 lb (51.7 kg)   24 114 lb (51.7 kg)       Physical Exam:      Gen: No acute distress  Pulm: Lungs clear, normal respiratory effort  CV: Heart with regular rate and rhythm  Abd: Abdomen soft, nontender, nondistended, bowel sounds present  Neuro: No acute focal deficits  MSK: moves extremities  Skin: Warm and dry  Psych: Normal affect  Ext: bl le edema, improving slowly      Results:   Diagnostic Data:      Labs:    Labs Last 24 Hours:   BMP     CBC    Other     Na - Cl - BUN - Glu -   Hb -   PTT - Procal -   K - CO2 - Cr -   WBC - >< PLT -  INR - CRP -   Renal Lytes Endo    Hct -   Trop - D dim -   eGFR - Ca - POC Gluc  -    LFT   pBNP - Lactic -   eGFR AA - PO4 - A1c -   AST - APk - Prot -  LDL -      Recent Labs   Lab 24  0537 24  0609 24  0901 24  0741   RBC 3.05* 2.99* 3.02* 3.23*   HGB 9.5* 9.3* 9.2* 10.1*   HCT 28.0* 27.0* 27.4* 29.2*   MCV 91.8 90.3 90.7 90.4   MCH 31.1 31.1 30.5 31.3   MCHC 33.9 34.4 33.6 34.6   RDW 15.3* 15.1* 15.2* 15.1*   NEPRELIM 1.93 2.88  --   --    WBC 3.8* 5.2 4.3  Chronic Afib.  Paced rhythm   6.0   .0 347.0 375.0 369.0       No results found for: \"PT\", \"INR\"    Recent Labs   Lab 07/05/24  0609 07/07/24  0901 07/08/24  0741   * 169* 110*   BUN 15 15 13   CREATSERUM 0.82 0.72 0.71   CA 9.1 9.1 9.3   * 129* 128*   K 5.1 4.8 5.1    99 98   CO2 24.0 24.0 25.0       No results for input(s): \"PJ\", \"LIP\" in the last 168 hours.    Recent Labs   Lab 07/07/24  0648 07/07/24 2002 07/08/24  0741   MG 1.8 1.8 1.9       No results for input(s): \"URINE\", \"CULTI\", \"BLDSMR\" in the last 168 hours.      No results found.        Pro-Calcitonin  No results for input(s): \"PCT\" in the last 168 hours.    Cardiac  No results for input(s): \"TROP\", \"PBNP\" in the last 168 hours.    Imaging: Imaging data reviewed in Epic.    No results found.       Medications:    apixaban  2.5 mg Oral BID    latanoprost  1 drop Both Eyes Nightly    dilTIAZem ER  120 mg Oral Daily    polyethylene glycol (PEG 3350)  17 g Oral Daily    cefepime  1 g Intravenous q12h    atorvastatin  10 mg Oral Nightly    gabapentin  100 mg Oral Nightly    melatonin  3 mg Oral Nightly       Assessment & Plan:   ASSESSMENT / PLAN:     BL le cellulitis, Rt leg ulcer  - started IV abx, would expect 3-5 day IV. Discharge on PO cipro 500 mg BID for 7 more days as per ID  - seen by wound care  - asked nurse to change bandages with ID present to observe wounds and see progression of treatment.   - Pseudomonas in wound culture sensitive to cefepime.     Sciatica and chronic pain  - using norco prn     Hyponatremia  - acute on chronic  - check urine sodium - reviewed  - fluid restrict will be stopped as it is not helping   - will monitor    HTN  - monitor vitals and adjust meds prn     CKD stage 3  - monitor bmp     Paroxysmal Afib  - rate controlled  - eliquis for anticoagulation               Prophylaxis:   DVT with eliquis     Dispo: pending clinical course     dvt prophylaxis: eliquis  code status: DNR  dispo: home upon medical clearance vs  [facility]    MDM: high      Greater than 55 minutes spent, Greater than 50% spent counseling and in coordination of care, reviewing labs, discussing results with patient, coordinating care with care team and ordering labs for tomorrow and adjusting medications as needed

## 2024-07-10 NOTE — CM/SW NOTE
Pt discussed in RN DC rounds. SW followed up with son in regards to choice to confirm if he wishes to proceed with [facility]. Sw left message for son to call back.     1250pm- SW received a call back from son, Son was verbally given the list for [facility]. Son requested list to be printed out for him to review as well. SW provided list and left at bedside.     309pm- CARSON followed with son, son wishes for [facility] if they have a private room. CARSON reached out to [facility] to see if they have a private room avail for patient.     314pm- CARSON was informed that [facility] has a private room. CARSON updated son, Haleigh Drake wishes for patient to go to [facility].  [facility] Liaison will start auth.     Plan: Pending choice and insurance auth, DC to OB, *auth pending    SW/CM to remain available for support and/or discharge planning.     Valorie Escalante, MSW, LSW   x [phone]

## 2024-07-10 NOTE — OCCUPATIONAL THERAPY NOTE
OCCUPATIONAL THERAPY TREATMENT NOTE - INPATIENT        Room Number: 561/561-A     Problem List  Principal Problem:    Cellulitis of right lower extremity  Active Problems:    Cellulitis        OCCUPATIONAL THERAPY ASSESSMENT   Patient demonstrates fair progress this session, goals remain in progress.     Patient continues to function below baseline with ADLs and functional transfers.   Contributing factors to remaining limitations include decreased functional strength, decreased functional reach, decreased endurance, impaired standing balance, and cognitive deficits (decreased comprehension, problem-solving).  Next session anticipate patient to progress ADLs.  Occupational Therapy will continue to follow patient for duration of hospitalization.     Patient continues to benefit from continued skilled OT services: to promote return to prior level of function and safety with continuous assistance and gradual rehabilitative therapy .      PLAN OT Treatment Plan: Balance activities;Energy conservation/work simplification techniques;Continued evaluation;Compensatory technique education;Fine motor coordination activities;Neuromuscluar reeducation;Equipment eval/education;Patient/Family training;Patient/Family education;Cognitive reorientation;Endurance training;UE strengthening/ROM;Functional transfer training;Visual perceptual training;IADL training;ADL training     SUBJECTIVE  \"I have a shoehorn at home to help.\"     OBJECTIVE  Precautions: Hard of hearing;Bed/chair alarm     WEIGHT BEARING RESTRICTION  PAIN ASSESSMENT  Ratin              ACTIVITIES OF DAILY LIVING ASSESSMENT  AM-PAC ‘6-Clicks’ Inpatient Daily Activity Short Form  How much help from another person does the patient currently need…  -   Putting on and taking off regular lower body clothing?: A Lot  -   Bathing (including washing, rinsing, drying)?: A Lot  -   Toileting, which includes using toilet, bedpan or urinal? : A Lot  -   Putting on and taking  off regular upper body clothing?: A Little  -   Taking care of personal grooming such as brushing teeth?: A Little  -   Eating meals?: None     AM-PAC Score:  Score: 16  Approx Degree of Impairment: 53.32%  Standardized Score (AM-PAC Scale): 35.96  CMS Modifier (G-Code): CK     FUNCTIONAL TRANSFER ASSESSMENT  Sit to Stand: Chair  Chair: Minimal Assist  Toilet Transfer: Minimal Assist           FUNCTIONAL ADL ASSESSMENT   Overall max A        Skilled Therapy Provided: Pt able to walk further than session two days prior. Pt needs assist for toileting and donning/doffing shoes and socks; however, able to cross to assist minimally. Min A for STS.     EDUCATION PROVIDED  The patient's Approx Degree of Impairment: 53.32% has been calculated based on documentation in the [facility] '6 clicks' Inpatient Daily Activity Short Form.  Research supports that patients with this level of impairment may benefit from [facility].  Final disposition will be made by interdisciplinary medical team.         OT Goals:  Patients self stated goal is: to go home     Pt will tolerate standing for 3 minutes with mod A for ADLs.  ongoing   Pt will complete toilet transfer with mod A 3/3 trials at RW level. 2/3 min A                        Goals  on: 8/3  Frequency: 3-5x/wk     OT Session Time: 23 minutes  Self-Care Home Management: 23 minutes        Vida Yoo OT  [facility]  Inpatient Rehabilitation  Occupational Therapy  [phone]

## 2024-07-10 NOTE — PROGRESS NOTES
07/10/24 1129   [REMOVED] Wound 07/02/24 1 Leg Right;Posterior;Lower   Final Assessment Date: 07/10/24  Date First Assessed/Time First Assessed: 07/02/24 1319   Present on Original Admission: Yes   Wound Number (Wound Clinic Only): 1  Primary Wound Type: Venous Ulcer  Location: Leg  Wound Location Orientation: Right;Post...   Wound Image    Site Assessment Clean;Dry;Intact;Edema;Epithelialization   Closure Approximated   Drainage Amount None   Non-staged Wound Description Not applicable   Shandra-wound Assessment Clean;Dry;Intact;Edema   Wound Bed Epithelium (%) 100 %   Wound 07/02/24 2 Leg Right;Lateral;Lower   Date First Assessed/Time First Assessed: 07/02/24 1319   Present on Original Admission: Yes   Wound Number (Wound Clinic Only): 2  Primary Wound Type: Venous Ulcer  Location: Leg  Wound Location Orientation: Right;Lateral;Lower   Wound Image    Site Assessment Fragile;Moist;Painful;Tan   Closure Not approximated   Drainage Amount Moderate   Drainage Description Serous   Treatments Cleansed;Pharmaceutical agent;Saline   Dressing 4x4s;Aquacel Foam;Hydrofiber with silver;Kerlix roll   Dressing Changed Changed   Dressing Status Dressing Changed;Removed;Old drainage   Wound Length (cm) 8.5 cm   Non-staged Wound Description Full thickness   Shandra-wound Assessment Clean;Dry;Fragile;Edema   Wound Granulation Tissue Pink   Wound Bed Granulation (%) 10 %   Wound Bed Slough (%) 90 %   State of Healing Non-healing   Wound Odor None   Wound 07/02/24 3 Leg Right;Medial;Lower   Date First Assessed/Time First Assessed: 07/02/24 1320   Present on Original Admission: Yes   Wound Number (Wound Clinic Only): 3  Primary Wound Type: Venous Ulcer  Location: Leg  Wound Location Orientation: Right;Medial;Lower   Wound Image    Site Assessment Edema;Fragile;Moist;Painful;Tan;Yellow;Pink   Closure Not approximated   Drainage Amount Large   Drainage Description Serous   Treatments Cleansed;Pharmaceutical agent;Saline   Dressing  4x4s;Aquacel Foam;Hydrofiber with silver;Kerlix roll   Dressing Changed Changed   Dressing Status Dressing Changed;Removed;Old drainage   Wound Length (cm) 4.8 cm   Wound Width (cm) 5.5 cm   Wound Surface Area (cm^2) 26.4 cm^2   Wound Depth (cm) 0.2 cm   Wound Volume (cm^3) 5.28 cm^3   Wound Healing % -138   Non-staged Wound Description Full thickness   Shandra-wound Assessment Clean;Intact;Edema;Fragile;Painful   Wound Granulation Tissue Pink   Wound Bed Granulation (%) 10 %   Wound Bed Slough (%) 90 %   State of Healing Non-healing   Wound Odor None   Wound 07/02/24 11 Leg Left;Lower;Posterior   Date First Assessed: 07/02/24   Present on Original Admission: Yes   Wound Number (Wound Clinic Only): 11  Primary Wound Type: Venous Ulcer  Location: Leg  Wound Location Orientation: Left;Lower;Posterior   Wound Image    Site Assessment Clean;Fragile;Granulation tissue;Moist;Painful;Red   Closure Not approximated   Drainage Amount Scant   Drainage Description Serosanguineous   Treatments Cleansed;Saline;Pharmaceutical agent   Dressing 2x2s;Hydrofiber with silver;Kerlix roll;Aquacel Foam   Dressing Changed Changed   Dressing Status Dressing Changed;Removed;Old drainage   Wound Length (cm) 2.4 cm   Wound Width (cm) 1.8 cm   Wound Surface Area (cm^2) 4.32 cm^2   Wound Depth (cm) 0.2 cm   Wound Volume (cm^3) 0.864 cm^3   Wound Healing % 51   Non-staged Wound Description Full thickness   Shandra-wound Assessment Clean;Intact;Edema;Fragile   Wound Granulation Tissue Red   Wound Bed Granulation (%) 80 %   Wound Bed Slough (%) 20 %   State of Healing Early/partial granulation   Wound Odor None   Wound Follow Up   Follow up needed Yes   Pt seen for above chronic wounds w slough. Pt continues to keep her feet dependent on the floor, did not have her spandagrip on. I again talked to her about the importance of elevation and or compression to heal her wounds, assist w drainage and edema. Pt is forgetful, gave excuses and began a different  subject entirely. See above for wounds. Recommend to continue triad applied to dry gauze, then cover w alginate and foams for absorbency. Spandagrip E applied to bilateral lower leg wounds, legs kept elevated in the chair. Bedside RN updated to wound care.

## 2024-07-10 NOTE — PHYSICAL THERAPY NOTE
PHYSICAL THERAPY TREATMENT NOTE - INPATIENT     Room Number: 561/561-A       Presenting Problem: RLE cellulitis  Co-Morbidities : COPD, CAD, DM II, Afib on Eliquid, OA, hearing loss, osteporosis, CKD III, peripheral neuropathy, HTN, HLD, asthma, SVT, bilateral TKA, L femur ORIF    Problem List  Principal Problem:    Cellulitis of right lower extremity  Active Problems:    Cellulitis      PHYSICAL THERAPY ASSESSMENT   Patient demonstrates fair progress this session, goals  remain in progress.    Patient continues to function below baseline with bed mobility, transfers, gait, maintaining seated position, and standing prolonged periods.  Contributing factors to remaining limitations include decreased functional strength, decreased endurance/aerobic capacity, pain, impaired sitting and standing balance, and decreased muscular endurance.  Next session anticipate patient to progress bed mobility, transfers, gait, maintaining seated position, and standing prolonged periods.  Physical Therapy will continue to follow patient for duration of hospitalization.    Patient continues to benefit from continued skilled PT services: to promote return to prior level of function and safety with continuous assistance and gradual rehabilitative therapy .    PLAN  PT Treatment Plan: Bed mobility;Body mechanics;Endurance;Energy conservation;Patient education;Family education;Gait training;Strengthening;Transfer training;Balance training  Frequency (Obs): 3-5x/week    SUBJECTIVE  \"Frances Valentin is squeezing her shoulder blades\"     OBJECTIVE  Precautions: Hard of hearing;Bed/chair alarm    PAIN ASSESSMENT   Rating: Unable to rate  Location: R thigh  Management Techniques: Activity promotion;Body mechanics;Repositioning    BALANCE  Static Sitting: Fair +  Dynamic Sitting: Fair  Static Standing: Fair -  Dynamic Standing: Poor    ACTIVITY TOLERANCE  Pulse: 87  Heart Rate Source: Monitor         AM-PAC '6-Clicks' INPATIENT SHORT FORM - BASIC  MOBILITY  How much difficulty does the patient currently have...  Patient Difficulty: Turning over in bed (including adjusting bedclothes, sheets and blankets)?: A Little   Patient Difficulty: Sitting down on and standing up from a chair with arms (e.g., wheelchair, bedside commode, etc.): A Lot   Patient Difficulty: Moving from lying on back to sitting on the side of the bed?: A Lot   How much help from another person does the patient currently need...   Help from Another: Moving to and from a bed to a chair (including a wheelchair)?: A Little   Help from Another: Need to walk in hospital room?: A Little   Help from Another: Climbing 3-5 steps with a railing?: A Lot     AM-PAC Score:  Raw Score: 15   Approx Degree of Impairment: 57.7%   Standardized Score (AM-PAC Scale): 39.45   CMS Modifier (G-Code): CK    FUNCTIONAL ABILITY STATUS  Functional Mobility/Gait Assessment  Gait Assistance: Minimum assistance;Contact guard assist  Distance (ft): 40ft  Assistive Device: Rolling walker  Pattern: Shuffle (decreased lebron speed, flexed, kyphotic posture, step to gait pattern. Cues for pt to maximize available postural extension. Instruction on safe management of RW w turns.) Min A initially improving to CGA as distance increased.   Sit to Stand: moderate assist. Cues for appropriate UE positioning with transitional movements. Hand over hand placement required. In standing, cues for upright posture. Required CGA to maintain static standing with bilateral UE support on RW.     Additional information: Patient received sitting in bedside chair. RN approved activity. Coordinated session with OT. Therapist educated pt on POC and physiological benefits of mobilization. Patient agreeable to participate. Primary complaint is R thigh pain which she does not quantify.     The patient's Approx Degree of Impairment: 57.7% has been calculated based on documentation in the [facility] '6 clicks' Inpatient Daily Activity Short Form.  Research  supports that patients with this level of impairment may benefit from rehab.  Final disposition will be made by interdisciplinary medical team.    THERAPEUTIC EXERCISES  Lower Extremity LAQ  UE: Rows w focus on scapular retraction   Position Sitting       Patient End of Session: Up in chair;Needs met;Call light within reach;RN aware of session/findings;Alarm set    CURRENT GOALS   Goals to be met by: 7/23/24  Patient Goal Patient's self-stated goal is: decrease pain   Goal #1 Patient is able to demonstrate supine - sit EOB @ level: minimum assistance      Goal #1   Current Status  NT, pt received up in chair   Goal #2 Patient is able to demonstrate transfers EOB to/from [facility] at assistance level: contact guard assistance with walker - rolling      Goal #2  Current Status Mod A w RW   Goal #3 Patient is able to ambulate 20 feet with assist device: walker - rolling at assistance level: minimum assistance   Goal #3   Current Status  40ft Min A improving to CGA with RW   Goal #4 Patient will negotiate 1 stairs/one curb w/ assistive device and minimum assistance   Goal #4   Current Status  NT   Goal #5 Patient to demonstrate independence with home activity/exercise instructions provided to patient in preparation for discharge.   Goal #5   Current Status  in progress     Gait Training: 15 minutes  Therapeutic Activity: 10 minutes

## 2024-07-10 NOTE — CONGREGATE LIVING REVIEW
[facility] Living Authorization    The [facility] Review Committee has reviewed this case and the patient IS APPROVED for discharge to a facility for Short Term Skilled once the following procedure is followed:     - The physician discharge instructions (contained within the MOIZ note for SNF) must inlcude the below appropriate and approved COVID instructions to the facility    For questions regarding CLRC approval process, please contact the CM assigned to the case.  For questions regarding RN discharge workflow, please contact the unit Clinical Leader.

## 2024-07-11 PROBLEM — E87.1 HYPONATREMIA: Status: ACTIVE | Noted: 2024-01-01

## 2024-07-11 PROBLEM — E87.1 HYPONATREMIA: Status: ACTIVE | Noted: 2024-07-11

## 2024-07-11 LAB
ALBUMIN SERPL-MCNC: 4 G/DL (ref 3.2–4.8)
ALP LIVER SERPL-CCNC: 100 U/L
ALT SERPL-CCNC: 27 U/L
ANION GAP SERPL CALC-SCNC: 5 MMOL/L (ref 0–18)
AST SERPL-CCNC: 30 U/L (ref ?–34)
BILIRUB DIRECT SERPL-MCNC: 0.2 MG/DL (ref ?–0.3)
BILIRUB SERPL-MCNC: 0.5 MG/DL (ref 0.2–0.9)
BUN BLD-MCNC: 12 MG/DL (ref 9–23)
BUN/CREAT SERPL: 16.7 (ref 10–20)
CALCIUM BLD-MCNC: 9.1 MG/DL (ref 8.7–10.4)
CHLORIDE SERPL-SCNC: 96 MMOL/L (ref 98–112)
CO2 SERPL-SCNC: 26 MMOL/L (ref 21–32)
CREAT BLD-MCNC: 0.72 MG/DL
EGFRCR SERPLBLD CKD-EPI 2021: 78 ML/MIN/1.73M2 (ref 60–?)
GLUCOSE BLD-MCNC: 108 MG/DL (ref 70–99)
MAGNESIUM SERPL-MCNC: 1.7 MG/DL (ref 1.6–2.6)
OSMOLALITY SERPL CALC.SUM OF ELEC: 264 MOSM/KG (ref 275–295)
OSMOLALITY UR: 562 MOSM/KG (ref 300–1100)
POTASSIUM SERPL-SCNC: 4.7 MMOL/L (ref 3.5–5.1)
PROT SERPL-MCNC: 6.5 G/DL (ref 5.7–8.2)
SODIUM SERPL-SCNC: 127 MMOL/L (ref 136–145)
SODIUM SERPL-SCNC: 128 MMOL/L (ref 136–145)
SODIUM SERPL-SCNC: 73 MMOL/L
TSI SER-ACNC: 1.1 MIU/ML (ref 0.55–4.78)

## 2024-07-11 PROCEDURE — 99233 SBSQ HOSP IP/OBS HIGH 50: CPT | Performed by: HOSPITALIST

## 2024-07-11 PROCEDURE — 99223 1ST HOSP IP/OBS HIGH 75: CPT | Performed by: INTERNAL MEDICINE

## 2024-07-11 RX ORDER — MAGNESIUM OXIDE 400 MG/1
400 TABLET ORAL ONCE
Status: COMPLETED | OUTPATIENT
Start: 2024-07-11 | End: 2024-07-11

## 2024-07-11 RX ORDER — CETIRIZINE HYDROCHLORIDE 5 MG/1
5 TABLET ORAL DAILY
Status: DISCONTINUED | OUTPATIENT
Start: 2024-07-11 | End: 2024-07-12

## 2024-07-11 RX ORDER — TOLVAPTAN 15 MG/1
7.5 TABLET ORAL ONCE
Status: COMPLETED | OUTPATIENT
Start: 2024-07-11 | End: 2024-07-11

## 2024-07-11 RX ORDER — FLUTICASONE PROPIONATE 50 MCG
1 SPRAY, SUSPENSION (ML) NASAL DAILY
Status: DISCONTINUED | OUTPATIENT
Start: 2024-07-11 | End: 2024-07-12

## 2024-07-11 NOTE — PROGRESS NOTES
[facility]  part of [facility]    Progress Note    Frances Whitehead Patient Status:  Inpatient    1931 MRN [id number]   Location [facility] 5SW/SE Attending Cathy Haynes MD   Hosp Day # 9 PCP Thao Garduno DO     Chief Complaint:   Chief Complaint   Patient presents with    Wound Care   RLE superficial wound and cellulitis     Subjective:   Frances Whitehead is doing well. Still has chronic pain in R leg no change. Feels her L leg is more swollen today. SHe is coughing and bringing up some phlegm. No F/C denies SOB.       Objective:   Objective:    Blood pressure 125/77, pulse 78, temperature 97.6 °F (36.4 °C), temperature source Oral, resp. rate 18, height 4' 11\" (1.499 m), weight 122 lb 12.8 oz (55.7 kg), SpO2 95%, not currently breastfeeding.    Physical Exam:    General: No acute distress. Hunched over in chair.   Respiratory: Clear to auscultation bilaterally. No wheezes. No rhonchi.  Cardiovascular: S1, S2. Regular rate and rhythm. No murmurs, rubs or gallops.   Abdomen: Soft, nontender, nondistended.  Positive bowel sounds. No rebound or guarding.  Neurologic: No focal neurological deficits.   Musculoskeletal: Moves all extremities.  Extremities: 1+pitting edema b/l LE's legs wrapped.       Results:   Results:    Labs:  Recent Labs   Lab 24  0609 24  0901 24  0741   WBC 5.2 4.3 6.0   HGB 9.3* 9.2* 10.1*   MCV 90.3 90.7 90.4   .0 375.0 369.0       Recent Labs   Lab 24  0901 24  0741 24  0544   * 110* 108*   BUN 15 13 12   CREATSERUM 0.72 0.71 0.72   CA 9.1 9.3 9.1   * 128* 127*   K 4.8 5.1 4.7   CL 99 98 96*   CO2 24.0 25.0 26.0       Estimated Creatinine Clearance: 43.8 mL/min (based on SCr of 0.72 mg/dL).    No results for input(s): \"PTP\", \"INR\" in the last 168 hours.         Culture:  Hospital Encounter on 24   1. Aerobic Bacterial Culture     Status: Abnormal    Collection Time: 24  7:25 PM     Specimen: Wound; Other   Result Value Ref Range    Aerobic Culture Result  N/A     Mixture of organisms suggestive of normal skin migel    Aerobic Culture Result 4+ growth Pseudomonas aeruginosa (A) N/A    Aerobic Culture Result 4+ growth Gram Negative Bennie (A) N/A    Aerobic Smear No WBCs seen N/A    Aerobic Smear 2+ Gram Positive Cocci N/A    Aerobic Smear 2+ Gram Negative Rods N/A       Susceptibility    Pseudomonas aeruginosa -  (no method available)     Cefepime 4 Sensitive      Ceftazidime 16 Intermediate      Ciprofloxacin <=1 Sensitive      Meropenem 2 Sensitive      Levofloxacin 1 Sensitive      Piperacillin + Tazobactam <=4 Sensitive      Tobramycin 2 Sensitive        Cardiac  No results for input(s): \"TROP\", \"PBNP\" in the last 168 hours.      Imaging: Imaging data reviewed in Epic.  No results found.    Medications:    apixaban  2.5 mg Oral BID    latanoprost  1 drop Both Eyes Nightly    dilTIAZem ER  120 mg Oral Daily    polyethylene glycol (PEG 3350)  17 g Oral Daily    cefepime  1 g Intravenous q12h    atorvastatin  10 mg Oral Nightly    gabapentin  100 mg Oral Nightly    melatonin  3 mg Oral Nightly         Assessment and Plan:   Assessment & Plan:        RLE cellulitis with RLE ulcer  ID on consult.   IV cefepime. Plans for PO ciprofloxacin on discharge x 7 days   Wound care   Superficial cx with PSAR.   Improving.   Lasix 20mg IV x 1 today   Compression wraps   Acute on chronic hyponatremia   Baseline sodium 132 per EMR.   Check urine osm. Usodium 61. Likely SIADH  Fluid restriction   Nephro consult. May benefit from tolvaptan.   Essential HTN  Paroxysmal a.fib   Rate controlled.   Eliquis   Diltiazem   Other medical problems  CKD III  Sciatica and chronic pain syndrome.       >55min spent, >50% spent counseling and coordinating care in the form of educating pt/family and d/w consultants and staff. Most of the time spent discussing the above plan.        Plan of care discussed with patient or family  at bedside.    Cathy Haynes MD  Hospitalist          Supplementary Documentation:     Quality:  DVT Prophylaxis: eliquis   CODE status: DNR [facility]  Dispo: per clinical course           Estimated date of discharge: TBD  Discharge is dependent on: clinical stability  At this point Ms. Whitehead is expected to be discharge to: [facility]

## 2024-07-11 NOTE — PLAN OF CARE
Problem: Patient Centered Care  Goal: Patient preferences are identified and integrated in the patient's plan of care  Description: Interventions:  - What would you like us to know as we care for you? From home with son, Nicholas  - Provide timely, complete, and accurate information to patient/family  - Incorporate patient and family knowledge, values, beliefs, and cultural backgrounds into the planning and delivery of care  - Encourage patient/family to participate in care and decision-making at the level they choose  - Honor patient and family perspectives and choices  Outcome: Progressing     Problem: Patient/Family Goals  Goal: Patient/Family Long Term Goal  Description: Patient's Long Term Goal: Discharge from the hospital    Interventions:  - Monitor vital signs  - Monitor appropriate labs  - Wound care  - Pain management  - Administer medications per order  - Follow MD orders  - Diagnostics per order  - Update / inform patient and family on plan of care  - Discharge planning  - See additional Care Plan goals for specific interventions  Outcome: Progressing  Goal: Patient/Family Short Term Goal  Description: Patient's Short Term Goal: Improve pain to bilateral lower extremities      Interventions:   - Monitor vital signs  - Monitor appropriate labs  - Wound care  - Pain management  - Administer medications per order  - Follow MD orders  - Diagnostics per order  - Update / inform patient and family on plan of care  - See additional Care Plan goals for specific interventions  Outcome: Progressing     Problem: PAIN - ADULT  Goal: Verbalizes/displays adequate comfort level or patient's stated pain goal  Description: INTERVENTIONS:  - Encourage pt to monitor pain and request assistance  - Assess pain using appropriate pain scale  - Administer analgesics based on type and severity of pain and evaluate response  - Implement non-pharmacological measures as appropriate and evaluate response  - Consider cultural and social  influences on pain and pain management  - Manage/alleviate anxiety  - Utilize distraction and/or relaxation techniques  - Monitor for opioid side effects  - Notify MD/LIP if interventions unsuccessful or patient reports new pain  - Anticipate increased pain with activity and pre-medicate as appropriate  Outcome: Progressing     Problem: RISK FOR INFECTION - ADULT  Goal: Absence of fever/infection during anticipated neutropenic period  Description: INTERVENTIONS  - Monitor WBC  - Administer growth factors as ordered  - Implement neutropenic guidelines  Outcome: Progressing     Problem: SAFETY ADULT - FALL  Goal: Free from fall injury  Description: INTERVENTIONS:  - Assess pt frequently for physical needs  - Identify cognitive and physical deficits and behaviors that affect risk of falls.  - [place] fall precautions as indicated by assessment.  - Educate pt/family on patient safety including physical limitations  - Instruct pt to call for assistance with activity based on assessment  - Modify environment to reduce risk of injury  - Provide assistive devices as appropriate  - Consider OT/PT consult to assist with strengthening/mobility  - Encourage toileting schedule  Outcome: Progressing     Problem: DISCHARGE PLANNING  Goal: Discharge to home or other facility with appropriate resources  Description: INTERVENTIONS:  - Identify barriers to discharge w/pt and caregiver  - Include patient/family/discharge partner in discharge planning  - Arrange for needed discharge resources and transportation as appropriate  - Identify discharge learning needs (meds, wound care, etc)  - Arrange for interpreters to assist at discharge as needed  - Consider post-discharge preferences of patient/family/discharge partner  - Complete POLST form as appropriate  - Assess patient's ability to be responsible for managing their own health  - Refer to Case Management Department for coordinating discharge planning if the patient needs  post-hospital services based on physician/LIP order or complex needs related to functional status, cognitive ability or social support system  Outcome: Progressing     Problem: SKIN/TISSUE INTEGRITY - ADULT  Goal: Incision(s), wounds(s) or drain site(s) healing without S/S of infection  Description: INTERVENTIONS:  - Assess and document risk factors for pressure ulcer development  - Assess and document skin integrity  - Assess and document dressing/incision, wound bed, drain sites and surrounding tissue  - Implement wound care per orders  - Initiate isolation precautions as appropriate  - Initiate Pressure Ulcer prevention bundle as indicated  Outcome: Progressing     Problem: MUSCULOSKELETAL - ADULT  Goal: Return mobility to safest level of function  Description: INTERVENTIONS:  - Assess patient stability and activity tolerance for standing, transferring and ambulating w/ or w/o assistive devices  - Assist with transfers and ambulation using safe patient handling equipment as needed  - Ensure adequate protection for wounds/incisions during mobilization  - Obtain PT/OT consults as needed  - Advance activity as appropriate  - Communicate ordered activity level and limitations with patient/family  Outcome: Progressing     Problem: Impaired Functional Mobility  Goal: Achieve highest/safest level of mobility/gait  Description: Interventions:  - Assess patient's functional ability and stability  - Promote increasing activity/tolerance for mobility and gait  - Educate and engage patient/family in tolerated activity level and precautions  - Recommend use of  RW for transfers and ambulation  - Recommend patient transfer to bedside chair toward strongest side  - When transferring patient, block weaker knee for safety  - Recommend use of chair position in bed 3 times per day  Outcome: Progressing     Problem: Impaired Activities of Daily Living  Goal: Achieve highest/safest level of independence in self care  Description:  Interventions:  - Assess ability and encourage patient to participate in ADLs to maximize function  - Promote sitting position while performing ADLs such as feeding, grooming, and bathing  - Educate and encourage patient/family in tolerated functional activity level and precautions during self-care  Outcome: Progressing    Pt refused spandagrip.educated on importance of wearing it. Pt encouraged to keep her legs elevated. Safety precautions in place, call light within reach.

## 2024-07-11 NOTE — CONSULTS
[facility]    PATIENT'S NAME: CHIQUIS PORRAS   ATTENDING PHYSICIAN: Cathy Haynes MD   CONSULTING PHYSICIAN: David Becker MD   PATIENT ACCOUNT#:   [id number]    LOCATION:  [address] [place] [address]  MEDICAL RECORD #:   [id number]       YOB: 1931  ADMISSION DATE:       07/02/2024      CONSULT DATE:  07/11/2024    REPORT OF CONSULTATION      HISTORY OF PRESENT ILLNESS:  The patient is a 89-year-old female with a history of chronic kidney disease stage 2 to 3, history of DJD, chronic hearing loss, paroxysmal atrial fibrillation, who was admitted for bilateral lower extremity cellulitis and leg ulcers.  The patient has been noted to have chronic hyponatremia.  Reviewing sodiums dating back to December 2023, her creatinine has ranged from 125 to 131.  Today, her sodium is 127.  Renal consultation has now been called.    PAST MEDICAL HISTORY:  As stated above.  Her leg wounds appear to be improving, and she is tentatively getting set up to be transferred to a subacute rehab facility.      MEDICATIONS:  Currently include Eliquis, Lipitor, cefepime, Zyrtec, diltiazem, Flonase, gabapentin, magnesium oxide, melatonin, Tylenol, tramadol, and for a while was using codeine medications.      ALLERGIES:  She is allergic to adhesive tape and latex.      SOCIAL HISTORY:  Nonsmoker.    FAMILY HISTORY:  Negative for renal pathology.    REVIEW OF SYSTEMS:  Patient otherwise states she is doing well without any chest pain, shortness of breath, GI or urinary tract symptoms.  She seems to be eating and drinking okay.  She does complain though of bilateral calf pain.      PHYSICAL EXAMINATION:    GENERAL:  The patient is awake, alert, and sitting up in a chair.  She is somewhat hard of hearing.  VITAL SIGNS:  Blood pressure of 130/54 with a pulse of 75, respirations 18, temperature 97.3, O2 saturation was 100% on room air.  Weight listed as 122 pounds.    HEENT:  Mucous membranes are moist.  Skin turgor was  adequate.  NECK:  Supple without JVD or lymphadenopathy.  LUNGS:  Clear to auscultation and percussion.  HEART:  Regular rate and rhythm with an S4, but no other gallops, murmurs, or rubs.  ABDOMEN:  Soft, flat, nontender without organomegaly, masses, or bruits.  EXTREMITIES:  Trace edema bilaterally.  Bilateral legs are wrapped.  Unable to view underlying skin or ulcerations.    LABORATORY DATA:  Today show a sodium of 127, BUN and creatinine of 12 and 0.72, respectively, with an estimated GFR of 78 mL/minute.  Albumin 4.0.  Random urine osmolality was 562 with a random urine sodium of 73.    ASSESSMENT AND PLAN:  The patient is a 89-year-old female now with chronic hyponatremia.  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  Apparently, she was on a fluid restriction earlier during this admission, and the sodium really did not improve.  We will therefore give 1 dose of Samsca 7.5 mg and observe for response.  We will then place patient back on her fluid restriction.  We will follow with I's and O's, daily weights, and serial chemistries.  Check TSH and a.m. cortisol level for completion's sake.  Discussed with Nursing and Dr. Haynes.     Dictated By David Becker MD  d: 07/11/2024 15:59:25  t: 07/11/2024 18:18:39  Job [id number]  [facility]/

## 2024-07-11 NOTE — CM/SW NOTE
Redd followed up with auth status with [facility] via aidin. Insurance auth is pending at this time.     1041am- REDD was informed that insurance auth is approved for patient to go to [facility]- 7/11-7/12    1145am- SW updated son and MD of insurance auth approved     Plan: Pending medical clearance. DC to [facility],, auth approved 7/11-7/12    REDD/KOBI to remain available for support and/or discharge planning.     Valorie Escalante, MSW, LSW   x [phone]

## 2024-07-11 NOTE — SLP NOTE
ADULT SWALLOWING EVALUATION    ASSESSMENT    ASSESSMENT/OVERALL IMPRESSION:  PPE REQUIRED. THIS THERAPIST WORE GLOVES, DROPLET MASK, AND GOGGLES FOR DURATION OF EVALUATION. HANDS WASHED UPON ENTRANCE/EXIT.    SLP BSSE orders received and acknowledged. A swallow evaluation warranted as pt coughing. Pt afebrile with clear vocal quality, on room air, with oxygen saturation at 96. Pt with no prior hx of dysphagia at [facility]. Pt positioned upright in bedside chair with son at bedside. Pt chooses softer solids at home. Pt with no complaints of pain, RN aware. Pt with adequate oral acceptance and bilabial seal across all trials. Pt with intact bite, mildly prolonged mastication of solids, and increased MAGNOLIA. Pt's swallow response appears timely with reduced hyolaryngeal elevation/excursion. No clinical signs of aspiration (e.g., immediate/delayed throat clear, immediate/delayed cough, wet vocal quality, increased O2 effort) observed across all trials. Oxygen status remained >95 t/o the entire evaluation.     At this time, pt presents with mild oral dysphagia and probable pharyngeal dysfunction. Recommend a regular diet (choose soft) and thin liquids with strict adherence to safe swallowing compensatory strategies. Results and recommendations reviewed with RN, pt, and family. Pt/pt's family v/u to all results/recommendations. Recommendations remain written on whiteboard. SLP collaborated with RN for MD diet orders.     PLAN: SLP to f/u x1 meal assessment, monitor imaging, and VFSS if clinically warranted.     RECOMMENDATIONS   Diet Recommendations - Solids: Regular (choose soft)  Diet Recommendations - Liquids: Thin Liquids      Compensatory Strategies Recommended: Slow rate;Alternate consistencies;Small bites and sips  Aspiration Precautions: Upright position;Slow rate;Small bites and sips  Medication Administration Recommendations: One pill at a time  Treatment Plan/Recommendations: Aspiration precautions    HISTORY   MEDICAL  HISTORY  Reason for Referral: R/O aspiration    Problem List  Principal Problem:    Cellulitis of right lower extremity  Active Problems:    Cellulitis      Past Medical History  Past Medical History:    Abnormal nuclear stress test    Anemia    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 Doss-Stent PCI of LAD and RCA was performed.    Deaf    Diverticulitis    hospitalized 3/10 and 5/10    Diverticulosis    cscopy '03, '07, '10    Fracture of C2 vertebra, closed (formerly [facility])    ER 3/19/21--CT cervical spine-- fx C2 posterior lateral vertebral body and left C2 lamina.      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    Renal calculus    on CT abd 5/10-6 mm or less in L lower kidney    Renal insufficiency    Shingles    Small bowel obstruction (formerly [facility])    multiple hospitalizations for [id number], 6/2016    Stress incontinence, female    SVT (supraventricular tachycardia) (formerly [facility])    event monitor 2/2016-SVT    Syncope    48-hour Holter 5/19/20--bursts of atrial tachycardia up to 3 minutes, PVCs, rare Wenkebach.  Saw Dr. Doss.    Type 2 diabetes mellitus (formerly [facility])    per pt, no longer diabetic    Uterine prolapse    Uterine prolapse    had pessary in 2006 Dr. LIRA  Davis -vag sling procedure Dr Baez in 6/2016    Uterovaginal prolapse, incomplete    UTI due to Klebsiella species    ESBL Klebsiella UTI treated in [facility] with meropenem when in hosp for enteritis. (Dr Carvalho).     Wound of right leg       Prior Living Situation: Home with support  Diet Prior to Admission: Regular;Thin liquids (chooses soft)  Precautions: Aspiration;Hard of hearing    Patient/Family Goals: No difficulties swallowing per pt    SWALLOWING HISTORY  Current Diet Consistency: Regular;Thin liquids (chooses soft)  Dysphagia History: None at [facility]    Imaging Results: None    OBJECTIVE   ORAL MOTOR EXAMINATION  Dentition: Natural  Symmetry: Within Functional Limits  Strength:  (reduced)     Range of Motion: Within Functional Limits  Rate of Motion: Reduced    Voice Quality: Clear  Respiratory Status: Unlabored  Consistencies Trialed: Thin liquids;Puree;Soft solid;Hard solid  Method of Presentation: Self presentation;Spoon;Cup;Straw;Single sips  Patient Positioning: Upright;Midline    Oral Phase of Swallow: Impaired  Bolus Retrieval: Intact  Bilabial Seal: Intact  Bolus Formation: Intact  Bolus Propulsion: Impaired  Mastication: Impaired       Pharyngeal Phase of Swallow: Impaired  Laryngeal Elevation Timing: Appears intact  Laryngeal Elevation Strength: Appears impaired     (Please note: Silent aspiration cannot be evaluated clinically. Videofluoroscopic Swallow Study is required to rule-out silent aspiration.)    Esophageal Phase of Swallow: No complaints consistent with possible esophageal involvement      GOALS  Goal #1 The patient will tolerate regular consistency (choose soft) and thin liquids without overt signs or symptoms of aspiration with 100 % accuracy over 1 session(s).  In Progress   Goal #2 The patient/family/caregiver will demonstrate understanding and implementation of aspiration precautions and swallow strategies independently over 1 session(s).    In Progress   Goal #3 The patient will  utilize compensatory strategies as outlined by  BSSE (clinical evaluation) including Slow rate, Small bites, Small sips, Alternate liquids/solids, Upright 90 degrees with minimal assistance 100 % of the time across 1 sessions.  In Progress       FOLLOW UP  Treatment Plan/Recommendations: Aspiration precautions  Number of Visits to Meet Established Goals: 1  Follow Up Needed (Documentation Required): Yes  SLP Follow-up Date: 07/12/24    Thank you for your referral.   If you have any questions, please contact TEJA Higgins M.S. CCC-SLP  Speech Language Pathologist  Phone Number [phone]

## 2024-07-12 VITALS
RESPIRATION RATE: 18 BRPM | BODY MASS INDEX: 24.76 KG/M2 | WEIGHT: 122.81 LBS | TEMPERATURE: 97 F | HEIGHT: 59 IN | OXYGEN SATURATION: 100 % | SYSTOLIC BLOOD PRESSURE: 109 MMHG | HEART RATE: 83 BPM | DIASTOLIC BLOOD PRESSURE: 75 MMHG

## 2024-07-12 LAB
ALBUMIN SERPL-MCNC: 4.1 G/DL (ref 3.2–4.8)
ANION GAP SERPL CALC-SCNC: 7 MMOL/L (ref 0–18)
BUN BLD-MCNC: 13 MG/DL (ref 9–23)
BUN/CREAT SERPL: 17.1 (ref 10–20)
CALCIUM BLD-MCNC: 9.2 MG/DL (ref 8.7–10.4)
CHLORIDE SERPL-SCNC: 98 MMOL/L (ref 98–112)
CO2 SERPL-SCNC: 24 MMOL/L (ref 21–32)
CORTIS SERPL-MCNC: 23.9 UG/DL
CREAT BLD-MCNC: 0.76 MG/DL
DEPRECATED RDW RBC AUTO: 52.6 FL (ref 35.1–46.3)
EGFRCR SERPLBLD CKD-EPI 2021: 73 ML/MIN/1.73M2 (ref 60–?)
ERYTHROCYTE [DISTWIDTH] IN BLOOD BY AUTOMATED COUNT: 15.8 % (ref 11–15)
GLUCOSE BLD-MCNC: 110 MG/DL (ref 70–99)
HCT VFR BLD AUTO: 27.7 %
HGB BLD-MCNC: 9.3 G/DL
MAGNESIUM SERPL-MCNC: 1.8 MG/DL (ref 1.6–2.6)
MCH RBC QN AUTO: 30.7 PG (ref 26–34)
MCHC RBC AUTO-ENTMCNC: 33.6 G/DL (ref 31–37)
MCV RBC AUTO: 91.4 FL
OSMOLALITY SERPL CALC.SUM OF ELEC: 269 MOSM/KG (ref 275–295)
PHOSPHATE SERPL-MCNC: 3.1 MG/DL (ref 2.4–5.1)
PLATELET # BLD AUTO: 359 10(3)UL (ref 150–450)
POTASSIUM SERPL-SCNC: 4.2 MMOL/L (ref 3.5–5.1)
RBC # BLD AUTO: 3.03 X10(6)UL
SODIUM SERPL-SCNC: 129 MMOL/L (ref 136–145)
WBC # BLD AUTO: 6.8 X10(3) UL (ref 4–11)

## 2024-07-12 PROCEDURE — 99239 HOSP IP/OBS DSCHRG MGMT >30: CPT | Performed by: HOSPITALIST

## 2024-07-12 RX ORDER — TOLVAPTAN 15 MG/1
7.5 TABLET ORAL ONCE
Status: COMPLETED | OUTPATIENT
Start: 2024-07-12 | End: 2024-07-12

## 2024-07-12 RX ORDER — MAGNESIUM OXIDE 400 MG/1
400 TABLET ORAL ONCE
Status: COMPLETED | OUTPATIENT
Start: 2024-07-12 | End: 2024-07-12

## 2024-07-12 NOTE — PROGRESS NOTES
[facility]  Nephrology Daily Progress Note    Frances Whitehead  [id number]  89 year old      HPI:   Frances Whitehead is a 89 year old female.  Up in chair.  Feeling well.  Eating OK.        ROS:     Constitutional:  Negative for decreased activity, fever, irritability and lethargy  Endocrine:  Negative for abnormal sleep patterns, increased activity, polydipsia and polyphagia  Cardiovascular:  Negative for cool extremity and irregular heartbeat/palpitations  Gastrointestinal:  Negative for abdominal pain, constipation, decreased appetite, diarrhea and vomiting  Genitourinary:  Negative for dysuria and hematuria  Hema/Lymph:  Negative for easy bleeding and easy bruising  Integumentary:  Negative for pruritus and rash  Musculoskeletal:  Negative for bone/joint symptoms  Neurological:  Negative for gait disturbance  Psychiatric:  Negative for inappropriate interaction and psychiatric symptoms  Respiratory:  Negative for cough, dyspnea and wheezing      PHYSICAL EXAM:   Temp:  [97.4 °F (36.3 °C)-97.8 °F (36.6 °C)] 97.4 °F (36.3 °C)  Pulse:  [74-83] 83  Resp:  [16-18] 18  BP: (109-147)/(54-75) 109/75  SpO2:  [95 %-100 %] 100 %  No data found.    Constitutional: appears well hydrated alert and responsive no acute distress noted  Neck/Thyroid: neck is supple without adenopathy  Lymphatic: no abnormal cervical, supraclavicular or axillary adenopathy is noted  Respiratory: normal to inspection lungs are clear to auscultation bilaterally normal respiratory effort  Cardiovascular: regular rate and rhythm no murmurs, gallups, or rubs  Abdomen: soft non-tender non-distended no organomegaly noted no masses  Musculoskeletal: full ROM all extremities good strength  no deformities  Extremities: trace edema.   Neurological: exam appropriate for age reflexes and motor skills appropriate for age    Labs:  Lab Results   Component Value Date    WBC 6.8 07/12/2024    HGB 9.3 07/12/2024    HCT 27.7 07/12/2024    .0  07/12/2024    CREATSERUM 0.76 07/12/2024    BUN 13 07/12/2024     07/12/2024    K 4.2 07/12/2024    CL 98 07/12/2024    CO2 24.0 07/12/2024     07/12/2024    CA 9.2 07/12/2024    ALB 4.1 07/12/2024    MG 1.8 07/12/2024    PHOS 3.1 07/12/2024     Recent Labs   Lab 07/07/24  0901 07/08/24  0741 07/12/24  0602   WBC 4.3 6.0 6.8   HGB 9.2* 10.1* 9.3*   HCT 27.4* 29.2* 27.7*   .0 369.0 359.0     Recent Labs   Lab 07/08/24  0741 07/11/24  0544 07/11/24  1841 07/12/24  0603   * 108*  --  110*   BUN 13 12  --  13   CREATSERUM 0.71 0.72  --  0.76   CA 9.3 9.1  --  9.2   ALB  --  4.0  --  4.1   * 127* 128* 129*   K 5.1 4.7  --  4.2   CL 98 96*  --  98   CO2 25.0 26.0  --  24.0   ALKPHO  --  100  --   --    AST  --  30  --   --    ALT  --  27  --   --    BILT  --  0.5  --   --    TP  --  6.5  --   --    PHOS  --   --   --  3.1       Imaging  No results found.      Medications:    Current Facility-Administered Medications:     cetirizine (ZyrTEC) tab 5 mg, 5 mg, Oral, Daily    fluticasone propionate (Flonase) 50 MCG/ACT nasal suspension 1 spray, 1 spray, Each Nare, Daily    traMADol (Ultram) tab 50 mg, 50 mg, Oral, Q6H PRN    apixaban (Eliquis) tab 2.5 mg, 2.5 mg, Oral, BID    latanoprost (Xalatan) 0.005 % ophthalmic solution 1 drop, 1 drop, Both Eyes, Nightly    senna-docusate (Senokot-S) 8.6-50 MG per tab 2 tablet, 2 tablet, Oral, BID PRN    dilTIAZem ER (Dilacor XR) 24 hr cap 120 mg, 120 mg, Oral, Daily    polyethylene glycol (PEG 3350) (Miralax) 17 g oral packet 17 g, 17 g, Oral, Daily    magnesium hydroxide (Milk of Magnesia) 400 MG/5ML oral suspension 30 mL, 30 mL, Oral, Daily PRN    acetaminophen (Tylenol Extra Strength) tab 500 mg, 500 mg, Oral, Q4H PRN    ondansetron (Zofran) 4 MG/2ML injection 4 mg, 4 mg, Intravenous, Q6H PRN    metoclopramide (Reglan) 5 mg/mL injection 10 mg, 10 mg, Intravenous, Q8H PRN    atorvastatin (Lipitor) tab 10 mg, 10 mg, Oral, Nightly    gabapentin  (Neurontin) cap 100 mg, 100 mg, Oral, Nightly    melatonin tab 3 mg, 3 mg, Oral, Nightly    Allergies:  Allergies   Allergen Reactions    Latex HIVES and UNKNOWN     Other reaction(s): Unknown    Mastisol Adhesive HIVES     Other reaction(s): ADHESIVE TAPE    Adhesive Tape OTHER (SEE COMMENTS)     Other reaction(s): ADHESIVE TAPE       Input/Output:    Intake/Output Summary (Last 24 hours) at 7/12/2024 1315  Last data filed at 7/12/2024 0900  Gross per 24 hour   Intake 220 ml   Output 400 ml   Net -180 ml          ASSESSMENT/PLAN:   Assessment   Patient Active Problem List   Diagnosis    Hypercholesterolemia    Hypertension    Neuropathy    Asthma with COPD ([facility])    SVT (supraventricular tachycardia) ([facility])    Bilateral hearing loss    Coronary artery disease involving native coronary artery of native heart without angina pectoris    Stented coronary artery    CKD (chronic kidney disease)    Controlled type 2 diabetes mellitus with diabetic nephropathy, without long-term current use of insulin ([facility])    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 2 diabetes mellitus ([facility])    Thrombocytopenia ([facility])    Asthma with COPD (chronic obstructive pulmonary disease) ([facility])    Closed fracture of multiple ribs of left side, initial encounter    Syncope, unspecified syncope type    Cellulitis of right lower extremity    Cellulitis    Hyponatremia       Sodium better this 129 after 1 dose of Samsca.   Repeat 7.5 mg given this am.  OK for transfer to [facility] later today but should continue 1500 ml/d fluid restriction and check sodium in a few days.  Discussed with RN.              7/12/2024  David Becker MD

## 2024-07-12 NOTE — PLAN OF CARE
Problem: Patient Centered Care  Goal: Patient preferences are identified and integrated in the patient's plan of care  Description: Interventions:  - What would you like us to know as we care for you? From home with son, Nicholas  - Provide timely, complete, and accurate information to patient/family  - Incorporate patient and family knowledge, values, beliefs, and cultural backgrounds into the planning and delivery of care  - Encourage patient/family to participate in care and decision-making at the level they choose  - Honor patient and family perspectives and choices  Outcome: Progressing     Problem: Patient/Family Goals  Goal: Patient/Family Long Term Goal  Description: Patient's Long Term Goal: Discharge from the hospital    Interventions:  - Monitor vital signs  - Monitor appropriate labs  - Wound care  - Pain management  - Administer medications per order  - Follow MD orders  - Diagnostics per order  - Update / inform patient and family on plan of care  - Discharge planning  - See additional Care Plan goals for specific interventions  Outcome: Progressing  Goal: Patient/Family Short Term Goal  Description: Patient's Short Term Goal: Improve pain to bilateral lower extremities      Interventions:   - Monitor vital signs  - Monitor appropriate labs  - Wound care  - Pain management  - Administer medications per order  - Follow MD orders  - Diagnostics per order  - Update / inform patient and family on plan of care  - See additional Care Plan goals for specific interventions  Outcome: Progressing     Problem: PAIN - ADULT  Goal: Verbalizes/displays adequate comfort level or patient's stated pain goal  Description: INTERVENTIONS:  - Encourage pt to monitor pain and request assistance  - Assess pain using appropriate pain scale  - Administer analgesics based on type and severity of pain and evaluate response  - Implement non-pharmacological measures as appropriate and evaluate response  - Consider cultural and social  influences on pain and pain management  - Manage/alleviate anxiety  - Utilize distraction and/or relaxation techniques  - Monitor for opioid side effects  - Notify MD/LIP if interventions unsuccessful or patient reports new pain  - Anticipate increased pain with activity and pre-medicate as appropriate  Outcome: Progressing     Problem: RISK FOR INFECTION - ADULT  Goal: Absence of fever/infection during anticipated neutropenic period  Description: INTERVENTIONS  - Monitor WBC  - Administer growth factors as ordered  - Implement neutropenic guidelines  Outcome: Progressing     Problem: SAFETY ADULT - FALL  Goal: Free from fall injury  Description: INTERVENTIONS:  - Assess pt frequently for physical needs  - Identify cognitive and physical deficits and behaviors that affect risk of falls.  - Ira fall precautions as indicated by assessment.  - Educate pt/family on patient safety including physical limitations  - Instruct pt to call for assistance with activity based on assessment  - Modify environment to reduce risk of injury  - Provide assistive devices as appropriate  - Consider OT/PT consult to assist with strengthening/mobility  - Encourage toileting schedule  Outcome: Progressing     Problem: DISCHARGE PLANNING  Goal: Discharge to home or other facility with appropriate resources  Description: INTERVENTIONS:  - Identify barriers to discharge w/pt and caregiver  - Include patient/family/discharge partner in discharge planning  - Arrange for needed discharge resources and transportation as appropriate  - Identify discharge learning needs (meds, wound care, etc)  - Arrange for interpreters to assist at discharge as needed  - Consider post-discharge preferences of patient/family/discharge partner  - Complete POLST form as appropriate  - Assess patient's ability to be responsible for managing their own health  - Refer to Case Management Department for coordinating discharge planning if the patient needs  post-hospital services based on physician/LIP order or complex needs related to functional status, cognitive ability or social support system  Outcome: Progressing     Problem: SKIN/TISSUE INTEGRITY - ADULT  Goal: Incision(s), wounds(s) or drain site(s) healing without S/S of infection  Description: INTERVENTIONS:  - Assess and document risk factors for pressure ulcer development  - Assess and document skin integrity  - Assess and document dressing/incision, wound bed, drain sites and surrounding tissue  - Implement wound care per orders  - Initiate isolation precautions as appropriate  - Initiate Pressure Ulcer prevention bundle as indicated  Outcome: Progressing     Problem: MUSCULOSKELETAL - ADULT  Goal: Return mobility to safest level of function  Description: INTERVENTIONS:  - Assess patient stability and activity tolerance for standing, transferring and ambulating w/ or w/o assistive devices  - Assist with transfers and ambulation using safe patient handling equipment as needed  - Ensure adequate protection for wounds/incisions during mobilization  - Obtain PT/OT consults as needed  - Advance activity as appropriate  - Communicate ordered activity level and limitations with patient/family  Outcome: Progressing     Problem: Impaired Functional Mobility  Goal: Achieve highest/safest level of mobility/gait  Description: Interventions:  - Assess patient's functional ability and stability  - Promote increasing activity/tolerance for mobility and gait  - Educate and engage patient/family in tolerated activity level and precautions  - Recommend use of  RW for transfers and ambulation  - Recommend patient transfer to bedside chair toward strongest side  - When transferring patient, block weaker knee for safety  - Recommend use of chair position in bed 3 times per day  Outcome: Progressing     Problem: Impaired Activities of Daily Living  Goal: Achieve highest/safest level of independence in self care  Description:  Interventions:  - Assess ability and encourage patient to participate in ADLs to maximize function  - Promote sitting position while performing ADLs such as feeding, grooming, and bathing  - Educate and encourage patient/family in tolerated functional activity level and precautions during self-care  Outcome: Progressing

## 2024-07-12 NOTE — DISCHARGE SUMMARY
[facility] Hospitalist Discharge Summary   Patient ID:  Frances Whitehead  [id number]  89 year old  12/11/1931    Admit date: 7/2/2024  Discharge date: 7/12/2024  Primary Care Physician: Thao Garduno DO   Attending Physician: Cathy Haynes MD   Consults:   Consultants         Provider   Role Specialty     David Becker MD      Consulting Physician NEPHROLOGY     Christopher Peter MD      Consulting Physician NEPHROLOGY     Lawrence Franco MD      Consulting Physician INFECTIOUS DISEASES     Sidney Watson MD      Consulting Physician INFECTIOUS DISEASES            Discharge Diagnoses:   Cellulitis of right lower extremity    Reason for admission  Copied from admission H&P: ***    Hospital Course:  ***    EXAM:   GENERAL: no apparent distress, comfortable  NEURO: A/A Ox3, no focal deficits  RESP: non labored, CTAB/L  CARDIO: Regular, no murmur  ABD: soft, NT, ND  EXTREMITIES: no edema, no calf tenderness    Operative Procedures:     Discharge Instructions     Medication List        START taking these medications      ciprofloxacin 500 MG Tabs  Commonly known as: Cipro  Take 1 tablet (500 mg total) by mouth 2 (two) times daily for 7 days.            CHANGE how you take these medications      fluticasone propionate 50 MCG/ACT Susp  Commonly known as: Flonase  SPRAY 2 SPRAYS INTO EACH NOSTRIL  IN THE MORNING AND 2 SPRAYS BEFORE BEDTIME  What changed: See the new instructions.     traMADol 50 MG Tabs  Commonly known as: Ultram  Take 1 tablet (50 mg total) by mouth every 6 (six) hours as needed.  What changed:   when to take this  reasons to take this            CONTINUE taking these medications      acetaminophen 500 MG Tabs  Commonly known as: Tylenol Extra Strength     apixaban 2.5 MG Tabs  Commonly known as: Eliquis  Take 1 tablet (2.5 mg total) by mouth 2 (two) times daily.     ascorbic acid 500 MG Tabs  Commonly known as: Vitamin C  Take 1 tablet (500 mg total) by mouth daily.     atorvastatin 10 MG  Tabs  Commonly known as: Lipitor  Take 1 tablet (10 mg total) by mouth nightly.     Benefiber Powd     cholecalciferol 25 MCG (1000 UT) Tabs  Commonly known as: Vitamin D3  Take 2 tablets (2,000 Units total) by mouth daily.     dilTIAZem HCl  MG Tb24  Take 1 tablet by mouth every morning.     enalapril 5 MG Tabs  Commonly known as: Vasotec     Ferrous Sulfate 325 (65 Fe) MG Tabs  Commonly known as: FeroSul  Take 1 tablet (325 mg total) by mouth daily.     Flovent  MCG/ACT Aero  Generic drug: Fluticasone Propionate HFA  Inhale 1 puff into the lungs 2 (two) times daily.     gabapentin 100 MG Caps  Commonly known as: Neurontin  Take 1 capsule (100 mg total) by mouth nightly.     hydrocortisone 2.5 % Crea  Commonly known as: Anusol-HC  APPLY RECTALLY TWICE DAILY AS NEEDED     lactulose 10 GM/15ML Soln  Commonly known as: CHRONULAC  Take 30 mL (20 g total) by mouth daily as needed (constipated bowel).     loratadine 10 MG Tabs  Commonly known as: Claritin  Take 1 tablet (10 mg total) by mouth daily.     Lumigan 0.01 % Soln  Generic drug: bimatoprost     melatonin 3 MG Tabs     Nystatin 669649 UNIT/GM Powd  Apply powder to right lower leg posterior calf wound with dressing changes     polyethylene glycol (PEG 3350) 17 GM/SCOOP Powd  Commonly known as: MiraLax     sennosides-docusate 8.6-50 MG Tabs  Commonly known as: Senna Plus  Take 2 tablets twice daily as needed for constipation     triamcinolone 0.1 % Crea  Commonly known as: Kenalog  Apply 1 Application topically 2 (two) times daily as needed. APPLY TO AFFECTED AREA     Vitamin B12 1000 MCG Tbcr            STOP taking these medications      benzonatate 200 MG Caps  Commonly known as: Tessalon     cefadroxil 500 MG Caps  Commonly known as: DURICEF     fluconazole 100 MG Tabs  Commonly known as: Diflucan     furosemide 20 MG Tabs  Commonly known as: Lasix     mupirocin 2 % Oint  Commonly known as: Bactroban     sulfamethoxazole-trimethoprim -160 MG  Tabs per tablet  Commonly known as: Bactrim DS               Where to Get Your Medications        You can get these medications from any pharmacy    Bring a paper prescription for each of these medications  ciprofloxacin 500 MG Tabs  traMADol 50 MG Tabs         Activity: {discharge activity:55160}  Diet: {diet:17005}  Wound Care: NA  Code Status: DNAR/Selective Treatment        Discharge Instructions         Fluid restriction 1.5L per day           Important follow up:      -PCP in [] within 7 days [] within 14 days [] other     Disposition: {disposition:64488}  Discharged Condition: {condition:97137}    Hospital Discharge Diagnoses: {Enter hospital discharge diagnoses here (please select the wildcards and then replace with free text; this allows us to save this data discretely for reporting purposes:5961::\"***\"}    Lace+ Score: 81  59-90 High Risk  29-58 Medium Risk  0-28   Low Risk.    TCM Follow-Up Recommendation:  {Care Managers will evaluate the need for follow-up for all patients ages 50+, and high/moderate risk patients ages 25-49. Low risk patients (LACE < 29) will only be evaluated if the \"Still recommend for TCM follow-up\" option is selected from this list.:8296}            Total Time Coordinating Care: Greater than 30 minutes    Patient had opportunity to ask questions, state understanding, and agree with therapeutic plan as outlined    Cathy Haynes MD  Hospitalist  7/12/2024

## 2024-07-12 NOTE — PROGRESS NOTES
INFECTIOUS DISEASE PROGRESS NOTE    Frances Whitehead Patient Status:  Inpatient    1931 MRN [id number]   Location [facility] 5SW/SE Attending Jeremy Pena MD   Hosp Day # 10 PCP DO GRISELDA Joya reviewed. Plans for rehab.    ASSESSMENT/PLAN:    Antibiotics: Cefepime     # RLE cellulitis with wound   - wound cx with PSAR               -Previous cx with Serratia liquefaciens, GNR, C. parapsilosis  # BLE wounds  # Atrial fibrillation  # CKD     PLAN:  -  Currently on cefepime but will stop abx at this time as has completed a ten day course and monitor off.   -  Local wound care.  -  Follow fever curve, wbc.  -  Reviewed labs, micro, imaging reports, available old records.  -  Case d/w patient, RN.     History of Present Illness:  Frances Whitehead is a 89 year old female with a history of atrial fibrillation, CKD, who follows at [facility] wound clinic, who had recent wound cultures showing serratia and candida  s/p course of bactrim and fluconazole, who presented to [facility] ED on  after being seen at wound clinic with concerns for worsening right leg wound. Patient denies any fevers or chills at home. On arrival, afebrile, wbc 5.4, LLE venous doppler without DVT, started on vancomycin and ceftriaxone. ID consulted.    OBJECTIVE  /75 (BP Location: Right arm)   Pulse 83   Temp 97.4 °F (36.3 °C) (Axillary)   Resp 18   Ht 4' 11\" (1.499 m)   Wt 122 lb 12.8 oz (55.7 kg)   LMP  (LMP Unknown)   SpO2 100%   BMI 24.80 kg/m²     General: Awake, in bed  HEENT: EOMI   CV/lungs: RRR, lungs clear in all fields  Abdomen: Soft, nontender, nondistended.   Integument: B legs wrapped with improved edema    Labs:     Recent Labs   Lab 24  0901 24  0741 24  0602   WBC 4.3 6.0 6.8   HGB 9.2* 10.1* 9.3*   .0 369.0 359.0       Recent Labs   Lab 24  0901 24  0741 24  0544 24  1841 24  0603   * 128* 127* 128* 129*   K 4.8 5.1  4.7  --  4.2   CL 99 98 96*  --  98   CO2 24.0 25.0 26.0  --  24.0   BUN 15 13 12  --  13   CREATSERUM 0.72 0.71 0.72  --  0.76       Recent Labs   Lab 07/11/24  0544   ALT 27   AST 30       Microbiology: Reviewed    Imaging: Reviewed       [facility]  [phone]

## 2024-07-12 NOTE — CM/SW NOTE
Pt discussed in RN DC rounds. Pt received MDO for discharge, pending neph clearance. Pt requires an ambulance according to the RN due to being a max assist. CARSON called and ordered an Ambulance from [facility] to transport pt to [facility]  at WILL CALL 7/12 only.  PCS flow sheet completed. RN to attached to AVS and print out.        07/12/24 0848   Discharge disposition   Expected discharge disposition subacute   Post Acute Care Provider [facility]   Discharge transportation [facility]     CARSON arranged ambulance for 5pm  time. Son was called and aware, left VM. Shamone from [place] aware of time. RN to call report to [facility]  [phone].  RN is aware  of DC time    Plan: Pending medical clearance, DC to [facility],    CARSON/KOBI to remain available for support and/or discharge planning.     Valorie Escalante, MSW, LSW   x [phone]

## 2024-07-12 NOTE — PLAN OF CARE
Patient discharge to [facility]. This RN gave report to to RN Rose. Patient transported via [facility]. Discharge paperwork given to paramedics. PIV removed. Bedside belongings packed up and taken with.     Problem: Patient Centered Care  Goal: Patient preferences are identified and integrated in the patient's plan of care  Description: Interventions:  - What would you like us to know as we care for you? From home with son, Nicholas  - Provide timely, complete, and accurate information to patient/family  - Incorporate patient and family knowledge, values, beliefs, and cultural backgrounds into the planning and delivery of care  - Encourage patient/family to participate in care and decision-making at the level they choose  - Honor patient and family perspectives and choices  Outcome: Adequate for Discharge     Problem: Patient/Family Goals  Goal: Patient/Family Long Term Goal  Description: Patient's Long Term Goal: Discharge from the hospital    Interventions:  - Monitor vital signs  - Monitor appropriate labs  - Wound care  - Pain management  - Administer medications per order  - Follow MD orders  - Diagnostics per order  - Update / inform patient and family on plan of care  - Discharge planning  - See additional Care Plan goals for specific interventions  Outcome: Adequate for Discharge  Goal: Patient/Family Short Term Goal  Description: Patient's Short Term Goal: Improve pain to bilateral lower extremities      Interventions:   - Monitor vital signs  - Monitor appropriate labs  - Wound care  - Pain management  - Administer medications per order  - Follow MD orders  - Diagnostics per order  - Update / inform patient and family on plan of care  - See additional Care Plan goals for specific interventions  Outcome: Adequate for Discharge     Problem: PAIN - ADULT  Goal: Verbalizes/displays adequate comfort level or patient's stated pain goal  Description: INTERVENTIONS:  - Encourage pt to monitor pain and request  assistance  - Assess pain using appropriate pain scale  - Administer analgesics based on type and severity of pain and evaluate response  - Implement non-pharmacological measures as appropriate and evaluate response  - Consider cultural and social influences on pain and pain management  - Manage/alleviate anxiety  - Utilize distraction and/or relaxation techniques  - Monitor for opioid side effects  - Notify MD/LIP if interventions unsuccessful or patient reports new pain  - Anticipate increased pain with activity and pre-medicate as appropriate  Outcome: Adequate for Discharge     Problem: RISK FOR INFECTION - ADULT  Goal: Absence of fever/infection during anticipated neutropenic period  Description: INTERVENTIONS  - Monitor WBC  - Administer growth factors as ordered  - Implement neutropenic guidelines  Outcome: Adequate for Discharge     Problem: SAFETY ADULT - FALL  Goal: Free from fall injury  Description: INTERVENTIONS:  - Assess pt frequently for physical needs  - Identify cognitive and physical deficits and behaviors that affect risk of falls.  - [place] fall precautions as indicated by assessment.  - Educate pt/family on patient safety including physical limitations  - Instruct pt to call for assistance with activity based on assessment  - Modify environment to reduce risk of injury  - Provide assistive devices as appropriate  - Consider OT/PT consult to assist with strengthening/mobility  - Encourage toileting schedule  Outcome: Adequate for Discharge     Problem: DISCHARGE PLANNING  Goal: Discharge to home or other facility with appropriate resources  Description: INTERVENTIONS:  - Identify barriers to discharge w/pt and caregiver  - Include patient/family/discharge partner in discharge planning  - Arrange for needed discharge resources and transportation as appropriate  - Identify discharge learning needs (meds, wound care, etc)  - Arrange for interpreters to assist at discharge as needed  - Consider  post-discharge preferences of patient/family/discharge partner  - Complete POLST form as appropriate  - Assess patient's ability to be responsible for managing their own health  - Refer to Case Management Department for coordinating discharge planning if the patient needs post-hospital services based on physician/LIP order or complex needs related to functional status, cognitive ability or social support system  Outcome: Adequate for Discharge     Problem: SKIN/TISSUE INTEGRITY - ADULT  Goal: Incision(s), wounds(s) or drain site(s) healing without S/S of infection  Description: INTERVENTIONS:  - Assess and document risk factors for pressure ulcer development  - Assess and document skin integrity  - Assess and document dressing/incision, wound bed, drain sites and surrounding tissue  - Implement wound care per orders  - Initiate isolation precautions as appropriate  - Initiate Pressure Ulcer prevention bundle as indicated  Outcome: Adequate for Discharge     Problem: MUSCULOSKELETAL - ADULT  Goal: Return mobility to safest level of function  Description: INTERVENTIONS:  - Assess patient stability and activity tolerance for standing, transferring and ambulating w/ or w/o assistive devices  - Assist with transfers and ambulation using safe patient handling equipment as needed  - Ensure adequate protection for wounds/incisions during mobilization  - Obtain PT/OT consults as needed  - Advance activity as appropriate  - Communicate ordered activity level and limitations with patient/family  Outcome: Adequate for Discharge     Problem: Impaired Functional Mobility  Goal: Achieve highest/safest level of mobility/gait  Description: Interventions:  - Assess patient's functional ability and stability  - Promote increasing activity/tolerance for mobility and gait  - Educate and engage patient/family in tolerated activity level and precautions  - Recommend use of  RW for transfers and ambulation  - Recommend patient transfer to  bedside chair toward strongest side  - When transferring patient, block weaker knee for safety  - Recommend use of chair position in bed 3 times per day  Outcome: Adequate for Discharge     Problem: Impaired Activities of Daily Living  Goal: Achieve highest/safest level of independence in self care  Description: Interventions:  - Assess ability and encourage patient to participate in ADLs to maximize function  - Promote sitting position while performing ADLs such as feeding, grooming, and bathing  - Educate and encourage patient/family in tolerated functional activity level and precautions during self-care  Outcome: Adequate for Discharge

## 2024-07-12 NOTE — PLAN OF CARE
Problem: Patient Centered Care  Goal: Patient preferences are identified and integrated in the patient's plan of care  Description: Interventions:  - What would you like us to know as we care for you? From home with son, Nicholas  - Provide timely, complete, and accurate information to patient/family  - Incorporate patient and family knowledge, values, beliefs, and cultural backgrounds into the planning and delivery of care  - Encourage patient/family to participate in care and decision-making at the level they choose  - Honor patient and family perspectives and choices  Outcome: Progressing     Problem: Patient/Family Goals  Goal: Patient/Family Long Term Goal  Description: Patient's Long Term Goal: Discharge from the hospital    Interventions:  - Monitor vital signs  - Monitor appropriate labs  - Wound care  - Pain management  - Administer medications per order  - Follow MD orders  - Diagnostics per order  - Update / inform patient and family on plan of care  - Discharge planning  - See additional Care Plan goals for specific interventions  Outcome: Progressing  Goal: Patient/Family Short Term Goal  Description: Patient's Short Term Goal: Improve pain to bilateral lower extremities      Interventions:   - Monitor vital signs  - Monitor appropriate labs  - Wound care  - Pain management  - Administer medications per order  - Follow MD orders  - Diagnostics per order  - Update / inform patient and family on plan of care  - See additional Care Plan goals for specific interventions  Outcome: Progressing     Problem: PAIN - ADULT  Goal: Verbalizes/displays adequate comfort level or patient's stated pain goal  Description: INTERVENTIONS:  - Encourage pt to monitor pain and request assistance  - Assess pain using appropriate pain scale  - Administer analgesics based on type and severity of pain and evaluate response  - Implement non-pharmacological measures as appropriate and evaluate response  - Consider cultural and social  influences on pain and pain management  - Manage/alleviate anxiety  - Utilize distraction and/or relaxation techniques  - Monitor for opioid side effects  - Notify MD/LIP if interventions unsuccessful or patient reports new pain  - Anticipate increased pain with activity and pre-medicate as appropriate  Outcome: Progressing     Problem: RISK FOR INFECTION - ADULT  Goal: Absence of fever/infection during anticipated neutropenic period  Description: INTERVENTIONS  - Monitor WBC  - Administer growth factors as ordered  - Implement neutropenic guidelines  Outcome: Progressing     Problem: SAFETY ADULT - FALL  Goal: Free from fall injury  Description: INTERVENTIONS:  - Assess pt frequently for physical needs  - Identify cognitive and physical deficits and behaviors that affect risk of falls.  - [place] fall precautions as indicated by assessment.  - Educate pt/family on patient safety including physical limitations  - Instruct pt to call for assistance with activity based on assessment  - Modify environment to reduce risk of injury  - Provide assistive devices as appropriate  - Consider OT/PT consult to assist with strengthening/mobility  - Encourage toileting schedule  Outcome: Progressing     Problem: DISCHARGE PLANNING  Goal: Discharge to home or other facility with appropriate resources  Description: INTERVENTIONS:  - Identify barriers to discharge w/pt and caregiver  - Include patient/family/discharge partner in discharge planning  - Arrange for needed discharge resources and transportation as appropriate  - Identify discharge learning needs (meds, wound care, etc)  - Arrange for interpreters to assist at discharge as needed  - Consider post-discharge preferences of patient/family/discharge partner  - Complete POLST form as appropriate  - Assess patient's ability to be responsible for managing their own health  - Refer to Case Management Department for coordinating discharge planning if the patient needs  post-hospital services based on physician/LIP order or complex needs related to functional status, cognitive ability or social support system  Outcome: Progressing     Problem: SKIN/TISSUE INTEGRITY - ADULT  Goal: Incision(s), wounds(s) or drain site(s) healing without S/S of infection  Description: INTERVENTIONS:  - Assess and document risk factors for pressure ulcer development  - Assess and document skin integrity  - Assess and document dressing/incision, wound bed, drain sites and surrounding tissue  - Implement wound care per orders  - Initiate isolation precautions as appropriate  - Initiate Pressure Ulcer prevention bundle as indicated  Outcome: Progressing     Problem: MUSCULOSKELETAL - ADULT  Goal: Return mobility to safest level of function  Description: INTERVENTIONS:  - Assess patient stability and activity tolerance for standing, transferring and ambulating w/ or w/o assistive devices  - Assist with transfers and ambulation using safe patient handling equipment as needed  - Ensure adequate protection for wounds/incisions during mobilization  - Obtain PT/OT consults as needed  - Advance activity as appropriate  - Communicate ordered activity level and limitations with patient/family  Outcome: Progressing     Problem: Impaired Functional Mobility  Goal: Achieve highest/safest level of mobility/gait  Description: Interventions:  - Assess patient's functional ability and stability  - Promote increasing activity/tolerance for mobility and gait  - Educate and engage patient/family in tolerated activity level and precautions  - Recommend use of  RW for transfers and ambulation  - Recommend patient transfer to bedside chair toward strongest side  - When transferring patient, block weaker knee for safety  - Recommend use of chair position in bed 3 times per day  Outcome: Progressing     Problem: Impaired Activities of Daily Living  Goal: Achieve highest/safest level of independence in self care  Description:  Interventions:  - Assess ability and encourage patient to participate in ADLs to maximize function  - Promote sitting position while performing ADLs such as feeding, grooming, and bathing  - Educate and encourage patient/family in tolerated functional activity level and precautions during self-care  Outcome: Progressing     Patient sitting up in chair. Alert x 3. Vital signs taken and stable. Bed and chair alarm remains in use at all times. Fall risk precautions are followed at all times. Patient receives intravenous antibiotics per order. Daily weights are monitored. Intake and output is monitored. Nephrologist was on consult to see patient. Possible discharge tomorrow to [facility] subacute rehab facility pending medically clearance from hospitalist and nephrologist. Purewick in place to suction. Dressing changes are done to bilateral lower extremities per doctor orders. Call light within reach at all times.

## 2024-07-15 PROCEDURE — 1111F DSCHRG MED/CURRENT MED MERGE: CPT | Performed by: INTERNAL MEDICINE

## 2024-07-15 PROCEDURE — 99305 1ST NF CARE MODERATE MDM 35: CPT | Performed by: INTERNAL MEDICINE

## 2024-07-16 ENCOUNTER — EXTERNAL FACILITY (OUTPATIENT)
Dept: INTERNAL MEDICINE CLINIC | Facility: CLINIC | Age: 89
End: 2024-07-16

## 2024-07-16 DIAGNOSIS — I48.0 PAROXYSMAL ATRIAL FIBRILLATION (HCC): ICD-10-CM

## 2024-07-16 DIAGNOSIS — L03.116 BILATERAL LOWER LEG CELLULITIS: Primary | ICD-10-CM

## 2024-07-16 DIAGNOSIS — L03.115 BILATERAL LOWER LEG CELLULITIS: Primary | ICD-10-CM

## 2024-07-16 NOTE — PROGRESS NOTES
Seen 7/15/2024    HISTORY OF PRESENT ILLNESS:  The patient is a 89-year-old  female with chronic ulceration on the lateral aspect of her right leg.  Had cultures done recently on June 13 which grew +3 Serratia liquefaciens and +4 gram-negative rods plus Candida parapsilosis.  She was a given a course of Bactrim without significant improvement.  Was admitted for further admitted for further management      PAST MEDICAL HISTORY:  Per the record, patient had a history of paroxysmal atrial fibrillation, anticoagulated with Eliquis; generalized osteoarthritis; osteoporosis; chronic kidney disease stage 3; peripheral neuropathy; hypertension; hyperlipidemia; gastroesophageal reflux disease; asthma; diverticulosis; supraventricular tachycardia; and glaucoma; chronic hearing impairment.     PAST SURGICAL HISTORY:  Appendectomy, cholecystectomy, laparoscopic lysis of adhesions, and partial small-bowel resection for small-bowel obstruction presentation.  She had bilateral total knee arthroplasties, left femur open reduction and internal fixation, cystocele repair and bladder sling.     MEDICATIONS: medication reviewed      ALLERGIES:  Adhesive tape and latex.     FAMILY HISTORY:  Mother had diabetes mellitus type 2.  Father had cerebrovascular accident and hypertension.     SOCIAL HISTORY:  No tobacco, alcohol, or drug use.  Uses a walker.  Independent in her basic activities of daily living, but still require some assistance.     REVIEW OF SYSTEMS: she is doing ok      PHYSICAL EXAMINATION:    GENERAL:  Alert, hard of hearing, mild distress.    HEENT:  Atraumatic.  Oropharynx clear.  Dry mucous membranes.  Normal hard and soft palate.  Eyes:  Anicteric sclerae.  NECK:  Supple.  No lymphadenopathy.  Trachea midline.  Full range of motion.  LUNGS:  Clear to auscultation bilaterally.  Normal respiratory effort.  HEART:  Regular rate, rhythm.  S1 and S2 auscultated.  No murmur.  ABDOMEN:  Soft, nondistended.  No  tenderness.  Positive bowel sounds.   EXTREMITIES: b/l leg dressing , per wound nurse note   a/p  RLE cellulitis with RLE ulcer  ID on consult.   S/p IV cefepime  completed   Wound care   Superficial cx with PSAR.   Woundcare   Acute on chronic hyponatremia   Baseline sodium 132 per EMR.   Usodium 61. Likely SIADH  Fluid restriction   S/p one dose of tolvaptan monitor   Essential HTN monitor bp  Paroxysmal a.fib   Rate controlled.   Eliquis   Diltiazem     CKD III-  monitor bmp   Sciatica and chronic pain syndrome.      Ptot ,  wound care

## 2024-07-18 ENCOUNTER — EXTERNAL FACILITY (OUTPATIENT)
Dept: INTERNAL MEDICINE CLINIC | Facility: CLINIC | Age: 89
End: 2024-07-18

## 2024-07-18 DIAGNOSIS — L03.115 CELLULITIS OF RIGHT LEG: Primary | ICD-10-CM

## 2024-07-18 DIAGNOSIS — E87.1 HYPONATREMIA: ICD-10-CM

## 2024-07-18 NOTE — PROGRESS NOTES
follow up       PAST MEDICAL HISTORY: Per the record, patient had a history of paroxysmal atrial fibrillation, anticoagulated with Eliquis; generalized osteoarthritis; osteoporosis; chronic kidney disease stage 3; peripheral neuropathy; hypertension; hyperlipidemia; gastroesophageal reflux disease; asthma; diverticulosis; supraventricular tachycardia; and glaucoma; chronic hearing impairment.     PAST SURGICAL HISTORY: Appendectomy, cholecystectomy, laparoscopic lysis of adhesions, and partial small-bowel resection for small-bowel obstruction presentation. She had bilateral total knee arthroplasties, left femur open reduction and internal fixation, cystocele repair and bladder sling.     MEDICATIONS: medication reviewed     ALLERGIES: Adhesive tape and latex.     FAMILY HISTORY: Mother had diabetes mellitus type 2. Father had cerebrovascular accident and hypertension.     SOCIAL HISTORY: No tobacco, alcohol, or drug use. Uses a walker. Independent in her basic activities of daily living, but still require some assistance.     REVIEW OF SYSTEMS: she is doing ok, tired, sitting in the recliner      PHYSICAL EXAMINATION:  GENERAL: Alert, hard of hearing.    HEENT: Atraumatic. Oropharynx clear. Dry mucous membranes. Normal hard and soft palate. Eyes: Anicteric sclerae.  NECK: Supple. No lymphadenopathy. Trachea midline. Full range of motion.  LUNGS: Clear to auscultation bilaterally. Normal respiratory effort.  HEART: Regular rate, rhythm. S1 and S2 auscultated. No murmur.  ABDOMEN: Soft, nondistended. No tenderness. Positive bowel sounds.  EXTREMITIES: b/l leg dressing , per wound nurse note   a/p    PLAN r leg cellulitis - resolved,  b/l leg wounds- wound care is following , trace edema- elevate legs , pending bmp , continue ptot      RLE cellulitis with RLE ulcer   o ID on consult.   o S/p IV cefepime completed   o Wound care   o Superficial cx with PSAR.   o Woundcare  Acute on chronic hyponatremia   o Baseline  sodium 132 per EMR.   o Usodium 61. Likely SIADH   o Fluid restriction   o S/p one dose of tolvaptan monitor  Essential HTN monitor bp  Paroxysmal a.fib   o Rate controlled.   o Eliquis   o Diltiazem    CKD III- monitor bmp  Sciatica and chronic pain syndrome.     Ptot , wound care

## 2024-07-23 ENCOUNTER — EXTERNAL FACILITY (OUTPATIENT)
Dept: INTERNAL MEDICINE CLINIC | Facility: CLINIC | Age: 89
End: 2024-07-23

## 2024-07-23 DIAGNOSIS — L03.115 BILATERAL LOWER LEG CELLULITIS: Primary | ICD-10-CM

## 2024-07-23 DIAGNOSIS — N18.30 STAGE 3 CHRONIC KIDNEY DISEASE, UNSPECIFIED WHETHER STAGE 3A OR 3B CKD (HCC): ICD-10-CM

## 2024-07-23 DIAGNOSIS — L03.116 BILATERAL LOWER LEG CELLULITIS: Primary | ICD-10-CM

## 2024-07-23 PROCEDURE — 1111F DSCHRG MED/CURRENT MED MERGE: CPT | Performed by: INTERNAL MEDICINE

## 2024-07-23 PROCEDURE — 99308 SBSQ NF CARE LOW MDM 20: CPT | Performed by: INTERNAL MEDICINE

## 2024-07-23 NOTE — PROGRESS NOTES
follow up  seen 7/22/2024    PAST MEDICAL HISTORY: Per the record, patient had a history of paroxysmal atrial fibrillation, anticoagulated with Eliquis; generalized osteoarthritis; osteoporosis; chronic kidney disease stage 3; peripheral neuropathy; hypertension; hyperlipidemia; gastroesophageal reflux disease; asthma; diverticulosis; supraventricular tachycardia; and glaucoma; chronic hearing impairment.     PAST SURGICAL HISTORY: Appendectomy, cholecystectomy, laparoscopic lysis of adhesions, and partial small-bowel resection for small-bowel obstruction presentation. She had bilateral total knee arthroplasties, left femur open reduction and internal fixation, cystocele repair and bladder sling.     MEDICATIONS: medication reviewed     ALLERGIES: Adhesive tape and latex.     FAMILY HISTORY: Mother had diabetes mellitus type 2. Father had cerebrovascular accident and hypertension.     SOCIAL HISTORY: No tobacco, alcohol, or drug use. Uses a walker. Independent in her basic activities of daily living, but still require some assistance.     REVIEW OF SYSTEMS: she is doing better , has no complains   PHYSICAL EXAMINATION:  GENERAL: Alert, hard of hearing.    HEENT: Atraumatic. Oropharynx clear. Dry mucous membranes. Normal hard and soft palate. Eyes: Anicteric sclerae.  NECK: Supple. No lymphadenopathy. Trachea midline. Full range of motion.  LUNGS: Clear to auscultation bilaterally. Normal respiratory effort.  HEART: Regular rate, rhythm. S1 and S2 auscultated. No murmur.  ABDOMEN: Soft, nondistended. No tenderness. Positive bowel sounds.  EXTREMITIES: b/l leg dressing , per wound nurse note   a/p    PLAN ; leg cellulitis resolved , continue with wound care, generalized weakness, continue with ptot , anemia- monitor hb, labs reviewed    RLE cellulitis with RLE ulcer   o ID on consult.   o S/p IV cefepime completed   o Wound care   o Superficial cx with PSAR.   o Woundcare  Acute on chronic hyponatremia   o Baseline  sodium 132 per EMR.   o Usodium 61. Likely SIADH   o Fluid restriction   o S/p one dose of tolvaptan monitor  Essential HTN monitor bp  Paroxysmal a.fib   o Rate controlled.   o Eliquis   o Diltiazem    CKD III- monitor bmp  Sciatica and chronic pain syndrome.     Ptot , wound care

## 2024-07-24 ENCOUNTER — HOSPITAL ENCOUNTER (EMERGENCY)
Facility: HOSPITAL | Age: 89
Discharge: NURSING, REHAB OR LONG-TERM CARE | End: 2024-07-24
Attending: EMERGENCY MEDICINE
Payer: MEDICARE

## 2024-07-24 ENCOUNTER — APPOINTMENT (OUTPATIENT)
Dept: CT IMAGING | Facility: HOSPITAL | Age: 89
End: 2024-07-24
Attending: EMERGENCY MEDICINE
Payer: MEDICARE

## 2024-07-24 ENCOUNTER — EXTERNAL FACILITY (OUTPATIENT)
Dept: INTERNAL MEDICINE CLINIC | Facility: CLINIC | Age: 89
End: 2024-07-24

## 2024-07-24 VITALS
BODY MASS INDEX: 19.63 KG/M2 | OXYGEN SATURATION: 93 % | HEART RATE: 78 BPM | HEIGHT: 60 IN | WEIGHT: 100 LBS | SYSTOLIC BLOOD PRESSURE: 141 MMHG | TEMPERATURE: 98 F | DIASTOLIC BLOOD PRESSURE: 65 MMHG | RESPIRATION RATE: 16 BRPM

## 2024-07-24 DIAGNOSIS — S81.801D OPEN LEG WOUND, RIGHT, SUBSEQUENT ENCOUNTER: ICD-10-CM
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
## 2024-07-24 DIAGNOSIS — N30.00 ACUTE CYSTITIS WITHOUT HEMATURIA: ICD-10-CM

## 2024-07-24 DIAGNOSIS — S32.10XA CLOSED FRACTURE OF SACRUM, UNSPECIFIED PORTION OF SACRUM, INITIAL ENCOUNTER (HCC): Primary | ICD-10-CM

## 2024-07-24 DIAGNOSIS — I10 PRIMARY HYPERTENSION: ICD-10-CM

## 2024-07-24 LAB
ALBUMIN SERPL-MCNC: 4 G/DL (ref 3.2–4.8)
ALBUMIN/GLOB SERPL: 1.5 {RATIO} (ref 1–2)
ALP LIVER SERPL-CCNC: 96 U/L
ALT SERPL-CCNC: 19 U/L
ANION GAP SERPL CALC-SCNC: 6 MMOL/L (ref 0–18)
AST SERPL-CCNC: 28 U/L (ref ?–34)
BASOPHILS # BLD AUTO: 0.02 X10(3) UL (ref 0–0.2)
BASOPHILS NFR BLD AUTO: 0.3 %
BILIRUB SERPL-MCNC: 0.5 MG/DL (ref 0.2–0.9)
BILIRUB UR QL: NEGATIVE
BUN BLD-MCNC: 18 MG/DL (ref 9–23)
BUN/CREAT SERPL: 22.8 (ref 10–20)
CALCIUM BLD-MCNC: 9.1 MG/DL (ref 8.7–10.4)
CHLORIDE SERPL-SCNC: 99 MMOL/L (ref 98–112)
CK SERPL-CCNC: 76 U/L
CLARITY UR: CLEAR
CO2 SERPL-SCNC: 24 MMOL/L (ref 21–32)
COLOR UR: YELLOW
CREAT BLD-MCNC: 0.79 MG/DL
DEPRECATED RDW RBC AUTO: 52.9 FL (ref 35.1–46.3)
EGFRCR SERPLBLD CKD-EPI 2021: 70 ML/MIN/1.73M2 (ref 60–?)
EOSINOPHIL # BLD AUTO: 0.01 X10(3) UL (ref 0–0.7)
EOSINOPHIL NFR BLD AUTO: 0.2 %
ERYTHROCYTE [DISTWIDTH] IN BLOOD BY AUTOMATED COUNT: 15.7 % (ref 11–15)
GLOBULIN PLAS-MCNC: 2.7 G/DL (ref 2–3.5)
GLUCOSE BLD-MCNC: 158 MG/DL (ref 70–99)
GLUCOSE UR-MCNC: NORMAL MG/DL
HCT VFR BLD AUTO: 29 %
HGB BLD-MCNC: 9.4 G/DL
HGB UR QL STRIP.AUTO: NEGATIVE
HYALINE CASTS #/AREA URNS AUTO: PRESENT /LPF
IMM GRANULOCYTES # BLD AUTO: 0.04 X10(3) UL (ref 0–1)
IMM GRANULOCYTES NFR BLD: 0.6 %
KETONES UR-MCNC: NEGATIVE MG/DL
LEUKOCYTE ESTERASE UR QL STRIP.AUTO: 250
LYMPHOCYTES # BLD AUTO: 0.79 X10(3) UL (ref 1–4)
LYMPHOCYTES NFR BLD AUTO: 12.3 %
MCH RBC QN AUTO: 29.8 PG (ref 26–34)
MCHC RBC AUTO-ENTMCNC: 32.4 G/DL (ref 31–37)
MCV RBC AUTO: 92.1 FL
MONOCYTES # BLD AUTO: 0.41 X10(3) UL (ref 0.1–1)
MONOCYTES NFR BLD AUTO: 6.4 %
NEUTROPHILS # BLD AUTO: 5.17 X10 (3) UL (ref 1.5–7.7)
NEUTROPHILS # BLD AUTO: 5.17 X10(3) UL (ref 1.5–7.7)
NEUTROPHILS NFR BLD AUTO: 80.2 %
NITRITE UR QL STRIP.AUTO: NEGATIVE
OSMOLALITY SERPL CALC.SUM OF ELEC: 273 MOSM/KG (ref 275–295)
PH UR: 5.5 [PH] (ref 5–8)
PLATELET # BLD AUTO: 461 10(3)UL (ref 150–450)
POTASSIUM SERPL-SCNC: 4.8 MMOL/L (ref 3.5–5.1)
PROT SERPL-MCNC: 6.7 G/DL (ref 5.7–8.2)
PROT UR-MCNC: 30 MG/DL
RBC # BLD AUTO: 3.15 X10(6)UL
SODIUM SERPL-SCNC: 129 MMOL/L (ref 136–145)
SP GR UR STRIP: 1.02 (ref 1–1.03)
UROBILINOGEN UR STRIP-ACNC: 2
WBC # BLD AUTO: 6.4 X10(3) UL (ref 4–11)

## 2024-07-24 PROCEDURE — 80053 COMPREHEN METABOLIC PANEL: CPT | Performed by: EMERGENCY MEDICINE

## 2024-07-24 PROCEDURE — 72192 CT PELVIS W/O DYE: CPT | Performed by: EMERGENCY MEDICINE

## 2024-07-24 PROCEDURE — 99284 EMERGENCY DEPT VISIT MOD MDM: CPT

## 2024-07-24 PROCEDURE — 81001 URINALYSIS AUTO W/SCOPE: CPT | Performed by: EMERGENCY MEDICINE

## 2024-07-24 PROCEDURE — 85025 COMPLETE CBC W/AUTO DIFF WBC: CPT | Performed by: EMERGENCY MEDICINE

## 2024-07-24 PROCEDURE — 70450 CT HEAD/BRAIN W/O DYE: CPT | Performed by: EMERGENCY MEDICINE

## 2024-07-24 PROCEDURE — 36415 COLL VENOUS BLD VENIPUNCTURE: CPT

## 2024-07-24 PROCEDURE — 99285 EMERGENCY DEPT VISIT HI MDM: CPT

## 2024-07-24 PROCEDURE — 1111F DSCHRG MED/CURRENT MED MERGE: CPT | Performed by: INTERNAL MEDICINE

## 2024-07-24 PROCEDURE — 82550 ASSAY OF CK (CPK): CPT | Performed by: EMERGENCY MEDICINE

## 2024-07-24 PROCEDURE — 87086 URINE CULTURE/COLONY COUNT: CPT | Performed by: EMERGENCY MEDICINE

## 2024-07-24 PROCEDURE — 99309 SBSQ NF CARE MODERATE MDM 30: CPT | Performed by: INTERNAL MEDICINE

## 2024-07-24 RX ORDER — CEPHALEXIN 500 MG/1
500 CAPSULE ORAL 3 TIMES DAILY
Qty: 21 CAPSULE | Refills: 0 | Status: SHIPPED | OUTPATIENT
Start: 2024-07-24 | End: 2024-07-31

## 2024-07-24 NOTE — PROGRESS NOTES
follow up      PAST MEDICAL HISTORY: Per the record, patient had a history of paroxysmal atrial fibrillation, anticoagulated with Eliquis; generalized osteoarthritis; osteoporosis; chronic kidney disease stage 3; peripheral neuropathy; hypertension; hyperlipidemia; gastroesophageal reflux disease; asthma; diverticulosis; supraventricular tachycardia; and glaucoma; chronic hearing impairment.     PAST SURGICAL HISTORY: Appendectomy, cholecystectomy, laparoscopic lysis of adhesions, and partial small-bowel resection for small-bowel obstruction presentation. She had bilateral total knee arthroplasties, left femur open reduction and internal fixation, cystocele repair and bladder sling.     MEDICATIONS: medication reviewed     ALLERGIES: Adhesive tape and latex.     FAMILY HISTORY: Mother had diabetes mellitus type 2. Father had cerebrovascular accident and hypertension.     SOCIAL HISTORY: No tobacco, alcohol, or drug use. Uses a walker. Independent in her basic activities of daily living, but still require some assistance.     REVIEW OF SYSTEMS: she is sitting in the chair , has no complains , per nurse she had a fall last night - refused to go to er     PHYSICAL EXAMINATION:  GENERAL: Alert, hard of hearing.    HEENT: Atraumatic. Oropharynx clear. Dry mucous membranes. Normal hard and soft palate. Eyes: Anicteric sclerae.  NECK: Supple. No lymphadenopathy. Trachea midline. Full range of motion.  LUNGS: Clear to auscultation bilaterally. Normal respiratory effort.  HEART: Regular rate, rhythm. S1 and S2 auscultated. No murmur.  ABDOMEN: Soft, nondistended. No tenderness. Positive bowel sounds.  EXTREMITIES: b/l leg dressing , per wound nurse note   a/p    PLAN ; s/p fall  last night - she needs to go to er , she is on eliquis , b/l leg wounds - disussed with wound md and nurse 0 she keeps refusing dressing changes - and her son is aware per wound nurse     RLE cellulitis with RLE ulcer   o ID on consult.   o S/p IV  cefepime completed   o Wound care   o Superficial cx with PSAR.   o Woundcare  Acute on chronic hyponatremia   o Baseline sodium 132 per EMR.   o Usodium 61. Likely SIADH   o Fluid restriction   o S/p one dose of tolvaptan monitor  Essential HTN monitor bp  Paroxysmal a.fib   o Rate controlled.   o Eliquis   o Diltiazem    CKD III- monitor bmp  Sciatica and chronic pain syndrome.     Ptot , wound care

## 2024-07-24 NOTE — ED INITIAL ASSESSMENT (HPI)
Via private ambulance from [facility] at [place] for unwitnessed fall around 0000. Unsure of LOC, unsure how long patient was down for. C/o to buttocks. Denies head/neck pain.

## 2024-07-24 NOTE — ED PROVIDER NOTES
Patient Seen in: [facility] Emergency Department      History     Chief Complaint   Patient presents with    Fall     Stated Complaint:     Subjective:   HPI    89-year-old female at a nursing home but no longer ambulatory due to chronic lower extremity edema and wounds here with her son from the nursing after they reportedly found her on the floor last night.  She is complaining of some sacral pain.  Son states this is somewhat typical.  She denies neck or back pain.  No headache.  Is on anticoagulation.  No reported fever.  No reported pain in extremities.              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 Doss-Stent PCI of LAD and RCA was performed.    Deaf    Diverticulitis    hospitalized 3/10 and 5/10    Diverticulosis    cscopy '03, '07, '10    Fracture of C2 vertebra, closed ([facility])    ER 3/19/21--CT cervical spine-- fx C2 posterior lateral vertebral body and left C2 lamina.      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    Renal calculus    on CT abd 5/10-6 mm or less in L lower kidney    Renal insufficiency    Shingles    Small bowel obstruction  (Formerly [facility])    multiple hospitalizations for [id number], 6/2016    Stress incontinence, female    SVT (supraventricular tachycardia) ([facility])    event monitor 2/2016-SVT    Syncope    48-hour Holter 5/19/20--bursts of atrial tachycardia up to 3 minutes, PVCs, rare Wenkebach.  Saw Dr. Doss.    Type 2 diabetes mellitus ([facility])    per pt, no longer diabetic    Uterine prolapse    Uterine prolapse    had pessary in 2006 Dr. SORAYA Davis -vag sling procedure Dr Baez in 6/2016    Uterovaginal prolapse, incomplete    UTI due to Klebsiella species    ESBL Klebsiella UTI treated in [facility] with meropenem when in hosp for enteritis. (Dr Carvalho).     Wound of right leg              Past Surgical History:   Procedure Laterality Date    Abdomen surgery proc unlisted  2007    laparotomy for TERESA and internal hernia    Abdomen surgery proc unlisted  2009    TERESA and small bowel resection-Dr Jaramillo    Appendectomy      Cholecystectomy      Fracture surgery Left 1/2017    L hip fx pinning 1/2017 Dr. Hernandez    Knee replacement surgery Left 2000    Knee replacement surgery Right 2008    Other surgical history      cystocele repair    Other surgical history Bilateral     ear surgery-Dr. Patel-both eardrums    Sling  6/30/2016    vag sling procedure at [facility]  -Dr Baez                Social History     Socioeconomic History    Marital status:    Tobacco Use    Smoking status: Never    Smokeless tobacco: Never   Vaping Use    Vaping status: Never Used   Substance and Sexual Activity    Alcohol use: No    Drug use: No   Other Topics Concern    Caffeine Concern Yes     Comment: Coffee 1 cups daily    Exercise Yes     Comment: Streching/Strength.    Social History Narrative    The patient uses the following assistive device(s):  single-point cane.      The patient does live in a home with stairs.     Social Determinants of Health     Food Insecurity: No Food Insecurity (7/2/2024)    Food Insecurity     Food Insecurity: Never true    Transportation Needs: No Transportation Needs (7/2/2024)    Transportation Needs     Lack of Transportation: No   Housing Stability: Low Risk  (7/2/2024)    Housing Stability     Housing Instability: No              Review of Systems    Positive for stated Chief Complaint: Fall    Other systems are as noted in HPI.  Constitutional and vital signs reviewed.      All other systems reviewed and negative except as noted above.    Physical Exam     ED Triage Vitals [07/24/24 1026]   /48   Pulse 80   Resp 16   Temp 97.6 °F (36.4 °C)   Temp src Temporal   SpO2 96 %   O2 Device None (Room air)       Current Vitals:   Vital Signs  BP: 141/65  Pulse: 78  Resp: 16  Temp: 97.6 °F (36.4 °C)  Temp src: Temporal  MAP (mmHg): 85    Oxygen Therapy  SpO2: 93 %  O2 Device: None (Room air)            Physical Exam    Constitutional: Oriented to person, place, and time.  Appears well-developed. No distress.  Very hard of hearing.  Head: Normocephalic and atraumatic.   Eyes: Conjunctivae are normal. Pupils are equal, round, and reactive to light.   Neck: Normal range of motion. Neck supple.  No reported pain in the posterior midline  Cardiovascular: Normal rate, regular rhythm and intact and equal radial distal pulses.  Doppler pulses in both feet noted.    Pulmonary/Chest: Effort normal. No respiratory distress.   Abdominal: Soft. There is no tenderness. There is no guarding.   Musculoskeletal: Chronic appearing edema and wounds without overt signs of infection In both lower extremities.  Dressings in place.  Pain with palpation in the lower sacral region.  No significant noted hip pain.  Neurological: Alert and oriented to person, place, and time.  No gross focal deficit  Skin: Skin is warm and dry.   Psychiatric: Normal mood and affect.  Behavior is normal.   Nursing note and vitals reviewed.    Differential diagnosis includes acute on chronic generalized deconditioning fatigue and muscle weakness, sacral or other pelvic  fracture, occult head injury, chronic hyponatremia, dehydration, anemia,.        ED Course     Labs Reviewed   COMP METABOLIC PANEL (14) - Abnormal; Notable for the following components:       Result Value    Glucose 158 (*)     Sodium 129 (*)     BUN/CREA Ratio 22.8 (*)     Calculated Osmolality 273 (*)     All other components within normal limits   URINALYSIS WITH CULTURE REFLEX - Abnormal; Notable for the following components:    Protein Urine 30 (*)     Urobilinogen Urine 2 (*)     Leukocyte Esterase Urine 250 (*)     WBC Urine 21-50 (*)     RBC Urine 3-5 (*)     Squamous Epi. Cells Few (*)     Hyaline Casts Present (*)     All other components within normal limits   CBC W/ DIFFERENTIAL - Abnormal; Notable for the following components:    RBC 3.15 (*)     HGB 9.4 (*)     HCT 29.0 (*)     RDW-SD 52.9 (*)     RDW 15.7 (*)     .0 (*)     Lymphocyte Absolute 0.79 (*)     All other components within normal limits   CK CREATINE KINASE (NOT CREATININE) - Normal   CBC WITH DIFFERENTIAL WITH PLATELET    Narrative:     The following orders were created for panel order CBC With Differential With Platelet.  Procedure                               Abnormality         Status                     ---------                               -----------         ------                     CBC W/ DIFFERENTIAL[[id number]]          Abnormal            Final result                 Please view results for these tests on the individual orders.   URINE CULTURE, ROUTINE             CT PELVIS (CPT=72192)    Result Date: 7/24/2024  PROCEDURE: CT PELVIS (CPT=72192)  COMPARISON: [facility], CT ABDOMEN+PELVIS (CPT=74176), 1/04/2021, 9:55 PM.  [facility], CT ABDOMEN + PELVIS (CPT=74176), 11/04/2019, 11:52 PM.  [facility], CT ABDOMEN PELVIS IV CONTRAST NO ORAL (ER), 12/26/2016, 8:42 PM.  INDICATIONS: Pelvic pain s/p fall.  TECHNIQUE:   CT images were obtained without intravenous contrast.   Automated exposure control for dose reduction was used. Adjustment of the mA and/or kV was done based on the patient's size. Use of iterative reconstruction technique for dose reduction was used.  Dose information is transmitted to the ACR (American College of Radiology) NRDR (National Radiology Data Registry) which includes the Dose Index Registry.   FINDINGS:     BONES: Degenerative disc and facet disease is partially visualized in the lower lumbar spine. Degenerative changes are seen within the sacroiliac joints and pubic symphysis. Degenerative changes are seen in the bilateral hips.  There is osteopenia.  Nondisplaced fracture involving the anterior cortex of the S3 vertebral body with buckling of the anterior cortex.  There is sclerosis seen throughout the S3 vertebral body. Postoperative changes of intramedullary nail with locking screw in the left femoral neck. SOFT TISSUES: There is atherosclerotic calcification of the abdominal aorta extending into bilateral iliac arteries. No mass or loculated collection. The muscles have a normal signal attenuation and bulk. OTHER: Calcification of the left iliopsoas consistent with chronic tendinosis. There is diverticulosis involving the distal descending colon and sigmoid colon without evidence of diverticulitis.         CONCLUSION:   Nondisplaced fracture of the S3 vertebral body  Diverticulosis without diverticulitis.    Dictated by (CST): Juan Manuel Garcia MD on 7/24/2024 at 12:22 PM     Finalized by (CST): Juan Manuel Garcia MD on 7/24/2024 at 12:25 PM          CT BRAIN OR HEAD (CPT=70450)    Result Date: 7/24/2024  PROCEDURE: CT BRAIN OR HEAD (CPT=70450)  COMPARISON: [facility], CT BRAIN HEAD WO CONTRAST, 12/12/2015, 3:16 PM.  [facility], CT BRAIN OR HEAD (CPT=70450), 12/27/2023, 8:45 PM.  [facility], CT BRAIN OR HEAD (CPT=70450), 3/17/2023, 9:49 PM.  [facility], CT BRAIN OR HEAD (CPT=70450), 3/19/2021, 7:54  PM.  CT BRAIN OR HEAD (CPT=70450), 5/13/2020, 4:14 PM.  CT BRAIN OR HEAD (CPT=70450), 12/26/2016, 10:12 PM.  INDICATIONS: Fall with traumatic head injuries.  TECHNIQUE: CT images were obtained without contrast material. Automated exposure control for dose reduction was used. Dose information was transmitted to the ACR (American College of Radiology) NRDR (National Radiology Data Registry), which includes the Dose Index Registry.   FINDINGS:  CSF SPACES: The ventricles, cisterns, and sulci are dilated, but remain commensurate in caliber, consistent with parenchymal volume loss of a degree that is appropriate for age. No hydrocephalus, subarachnoid hemorrhage, or effacement of the basal cisterns is appreciated. There is no extra-axial fluid collection. CEREBRUM: No acute intraparenchymal hemorrhage, edema, or cortical sulcal effacement is apparent. There is no space-occupying lesion, mass effect, or shift of midline structures. The gray-white matter junction is preserved and bilaterally symmetric in appearance.  Partially confluent periventricular and subcortical white matter hypodensities are present, consistent with chronic microvascular ischemic disease. CEREBELLUM: No edema, hemorrhage, mass, or acute infarction is seen. There is cerebellar folial expansion consistent with atrophy.  BRAINSTEM: Patchy areas of low attenuation likely reflect sequela of chronic small vessel ischemic disease. No edema, hemorrhage, mass, or acute infarction is seen. There is commensurate atrophy.  CALVARIUM: Morphologic changes of hyperostosis frontalis interna are suggested. There is no apparent depressed fracture, mass, or other significant visible lesion.  SINUSES: Limited views demonstrate layering fluid and aerated secretions throughout the right maxillary sinus. The left maxillary sinus is largely opacified. Extensive sclerosis and wall thickening of the maxillary sinuses is appreciated bilaterally, suggesting chronic  mucoperiosteal reaction. The sphenoid sinuses demonstrate mucous retention cyst formation.  Scattered mucosal thickening aerated secretions of the ethmoid air cells are appreciated. ORBITS: Limited views are notable for postoperative changes of the lenses bilaterally.  OTHER: Atherosclerotic vascular calcifications are perceived in the cavernous carotid and distal vertebral arteries. The left mastoid complex is hypoplastic and incompletely pneumatized. Fluid is seen in the bilateral external auditory canals. There may be postprocedural changes of prior left canal wall up mastoidectomy. Chronic ossicular chain erosion is suggested bilaterally. A partially calcified nodule in the soft tissues in the right scalp is unchanged relative to 2015, likely reflecting a sebaceous cyst. Extensive dental amalgam is seen on the  view.           CONCLUSION:  1. Negative for depressed calvarial fracture, coup/contrecoup intraparenchymal contusion, intracranial hemorrhage, or further evidence of acute intracranial process by noncontrast CT technique.  2. Senescent changes of parenchymal volume loss with sequela of chronic microvascular ischemic disease.  3. There is large vessel atherosclerosis involving the anterior and posterior circulations.  4. Possible acute on chronic sinusitis.  5. Chronic mastoid effusions and middle ear fluid with suspected ossicular chain erosion. There may be postprocedural changes of prior left-sided mastoidectomy.  6. Lesser incidental findings as above.      Dictated by (CST): William Paul MD on 7/24/2024 at 11:55 AM     Finalized by (CST): William Paul MD on 7/24/2024 at 12:00 PM          US VENOUS DOPPLER LEG LEFT - DIAG IMG (CPT=93971)    Result Date: 7/2/2024  PROCEDURE: US VENOUS DOPPLER LEG LEFT-DIAG IMG (CPT=93971)  COMPARISON: [facility], US VENOUS DOPPLER LEG BILAT-DIAG IMG (CPT=93970), 1/05/2021, 1:38 PM.  INDICATIONS: Eval for DVT pain and swelling   TECHNIQUE: Color duplex Doppler venous ultrasound of the left lower extremity was performed in the usual manner.  FINDINGS: The femoral and popliteal veins appear normal.  Normal flow was demonstrated with color and pulsed Doppler.  Visualized portions of the great and small saphenous, posterior tibial, and peroneal veins appear normal.   THROMBI: None visible. COMPRESSIBILITY: Normal compressibility in the thigh.  Patient refuse compression of the calf veins. OTHER: Subcutaneous edema in the calf.  CONCLUSION: No evidence of DVT Finalized by (CST): Minh Jarquin MD on 7/02/2024 at 7:33 PM                  [facility]                                         Medical Decision Making  CTs as noted above.  Vitals and labs stable.  Will go on Keflex for UTI.  Urine culture.  Son comfortable plan.  Does not ambulate currently.  Can go back to her facility with usual physical therapy Tylenol for pain continue her regular prescribed indications and come back with worsening or change.  No indication for admission at this time    Problems Addressed:  Acute cystitis without hematuria: acute illness or injury with systemic symptoms  Closed fracture of sacrum, unspecified portion of sacrum, initial encounter (HCC): acute illness or injury    Amount and/or Complexity of Data Reviewed  External Data Reviewed: labs.     Details: 7/8/24 Na+ 128  Labs: ordered. Decision-making details documented in ED Course.  Radiology: ordered and independent interpretation performed. Decision-making details documented in ED Course.     Details: By my review of the noncontrast head CT, there is no obvious evidence of intracranial bleeding, intracranial mass or midline shift.    Risk  OTC drugs.  Prescription drug management.  Decision regarding hospitalization.        Disposition and Plan     Clinical Impression:  1. Closed fracture of sacrum, unspecified portion of sacrum, initial encounter ([facility])    2. Acute cystitis without hematuria          Disposition:  Discharge  7/24/2024  1:06 pm    Follow-up:  Thao Garduno DO  [address] [place] [id number]  [phone]    Call      We recommend that you schedule follow up care with a primary care provider within the next three months to obtain basic health screening including reassessment of your blood pressure.      Medications Prescribed:  Current Discharge Medication List        START taking these medications    Details   cephalexin 500 MG Oral Cap Take 1 capsule (500 mg total) by mouth 3 (three) times daily for 7 days.  Qty: 21 capsule, Refills: 0

## 2024-07-26 ENCOUNTER — EXTERNAL FACILITY (OUTPATIENT)
Dept: INTERNAL MEDICINE CLINIC | Facility: CLINIC | Age: 89
End: 2024-07-26

## 2024-07-26 DIAGNOSIS — S32.10XA CLOSED FRACTURE OF SACRUM, UNSPECIFIED PORTION OF SACRUM, INITIAL ENCOUNTER (HCC): Primary | ICD-10-CM

## 2024-07-26 DIAGNOSIS — I48.0 PAROXYSMAL ATRIAL FIBRILLATION (HCC): ICD-10-CM

## 2024-07-26 PROCEDURE — 1111F DSCHRG MED/CURRENT MED MERGE: CPT | Performed by: INTERNAL MEDICINE

## 2024-07-26 PROCEDURE — 99309 SBSQ NF CARE MODERATE MDM 30: CPT | Performed by: INTERNAL MEDICINE

## 2024-07-26 NOTE — PROGRESS NOTES
follow up    PAST MEDICAL HISTORY: Per the record, patient had a history of paroxysmal atrial fibrillation, anticoagulated with Eliquis; generalized osteoarthritis; osteoporosis; chronic kidney disease stage 3; peripheral neuropathy; hypertension; hyperlipidemia; gastroesophageal reflux disease; asthma; diverticulosis; supraventricular tachycardia; and glaucoma; chronic hearing impairment.     PAST SURGICAL HISTORY: Appendectomy, cholecystectomy, laparoscopic lysis of adhesions, and partial small-bowel resection for small-bowel obstruction presentation. She had bilateral total knee arthroplasties, left femur open reduction and internal fixation, cystocele repair and bladder sling.     MEDICATIONS: medication reviewed     ALLERGIES: Adhesive tape and latex.     FAMILY HISTORY: Mother had diabetes mellitus type 2. Father had cerebrovascular accident and hypertension.     SOCIAL HISTORY: No tobacco, alcohol, or drug use. Uses a walker. Independent in her basic activities of daily living, but still require some assistance.     REVIEW OF SYSTEMS: she is sitting in the chair , she has slight pain on her sacrum    PHYSICAL EXAMINATION:  GENERAL: Alert, hard of hearing.    HEENT: Atraumatic. Oropharynx clear. Dry mucous membranes. Normal hard and soft palate. Eyes: Anicteric sclerae.  NECK: Supple. No lymphadenopathy. Trachea midline. Full range of motion.  LUNGS: Clear to auscultation bilaterally. Normal respiratory effort.  HEART: Regular rate, rhythm. S1 and S2 auscultated. No murmur.  ABDOMEN: Soft, nondistended. No tenderness. Positive bowel sounds.  EXTREMITIES: b/l leg dressing , per wound nurse note   a/p    PLAN ; s/p fall- er note seen, s3 fracture , pain meds as needed , ptot , b/l leg wounds, wound care following , fall precautions         RLE cellulitis with RLE ulcer   o ID on consult.   o S/p IV cefepime completed   o Wound care   o Superficial cx with PSAR.   o Woundcare  Acute on chronic hyponatremia   o  Baseline sodium 132 per EMR.   o Usodium 61. Likely SIADH   o Fluid restriction   o S/p one dose of tolvaptan monitor  Essential HTN monitor bp  Paroxysmal a.fib   o Rate controlled.   o Eliquis   o Diltiazem    CKD III- monitor bmp  Sciatica and chronic pain syndrome.     Ptot , wound care

## 2024-08-06 ENCOUNTER — EXTERNAL FACILITY (OUTPATIENT)
Dept: INTERNAL MEDICINE CLINIC | Facility: CLINIC | Age: 89
End: 2024-08-06

## 2024-08-06 DIAGNOSIS — I10 PRIMARY HYPERTENSION: Primary | ICD-10-CM

## 2024-08-06 DIAGNOSIS — S32.10XD CLOSED FRACTURE OF SACRUM WITH ROUTINE HEALING, UNSPECIFIED FRACTURE MORPHOLOGY, SUBSEQUENT ENCOUNTER: ICD-10-CM

## 2024-08-06 PROCEDURE — 99308 SBSQ NF CARE LOW MDM 20: CPT | Performed by: INTERNAL MEDICINE

## 2024-08-06 PROCEDURE — 1111F DSCHRG MED/CURRENT MED MERGE: CPT | Performed by: INTERNAL MEDICINE

## 2024-08-06 NOTE — PROGRESS NOTES
follow up  seen 8/5/2024  PAST MEDICAL HISTORY: Per the record, patient had a history of paroxysmal atrial fibrillation, anticoagulated with Eliquis; generalized osteoarthritis; osteoporosis; chronic kidney disease stage 3; peripheral neuropathy; hypertension; hyperlipidemia; gastroesophageal reflux disease; asthma; diverticulosis; supraventricular tachycardia; and glaucoma; chronic hearing impairment.     PAST SURGICAL HISTORY: Appendectomy, cholecystectomy, laparoscopic lysis of adhesions, and partial small-bowel resection for small-bowel obstruction presentation. She had bilateral total knee arthroplasties, left femur open reduction and internal fixation, cystocele repair and bladder sling.     MEDICATIONS: medication reviewed     ALLERGIES: Adhesive tape and latex.     FAMILY HISTORY: Mother had diabetes mellitus type 2. Father had cerebrovascular accident and hypertension.     SOCIAL HISTORY: No tobacco, alcohol, or drug use. Uses a walker. Independent in her basic activities of daily living, but still require some assistance.     REVIEW OF SYSTEMS: she  is feeling better , sacral pain improved     PHYSICAL EXAMINATION:  GENERAL: Alert, hard of hearing.    HEENT: Atraumatic. Oropharynx clear. Dry mucous membranes. Normal hard and soft palate. Eyes: Anicteric sclerae.  NECK: Supple. No lymphadenopathy. Trachea midline. Full range of motion.  LUNGS: Clear to auscultation bilaterally. Normal respiratory effort.  HEART: Regular rate, rhythm. S1 and S2 auscultated. No murmur.  ABDOMEN: Soft, nondistended. No tenderness. Positive bowel sounds.  EXTREMITIES: b/l leg dressing , per wound nurse note   a/p    PLAN ; s3 fracture - pain is better , continue ptot, b/l leg wounds- wound care is following, follow recommandation    RLE cellulitis with RLE ulcer   o ID on consult.   o S/p IV cefepime completed   o Wound care   o Superficial cx with PSAR.   o Woundcare  Acute on chronic hyponatremia   o Baseline sodium 132 per  EMR.   o Usodium 61. Likely SIADH   o Fluid restriction   o S/p one dose of tolvaptan monitor  Essential HTN monitor bp  Paroxysmal a.fib   o Rate controlled.   o Eliquis   o Diltiazem    CKD III- monitor bmp  Sciatica and chronic pain syndrome.     Ptot , wound care

## 2024-08-09 ENCOUNTER — EXTERNAL FACILITY (OUTPATIENT)
Dept: INTERNAL MEDICINE CLINIC | Facility: CLINIC | Age: 89
End: 2024-08-09

## 2024-08-09 DIAGNOSIS — S32.10XD CLOSED FRACTURE OF SACRUM WITH ROUTINE HEALING, UNSPECIFIED FRACTURE MORPHOLOGY, SUBSEQUENT ENCOUNTER: Primary | ICD-10-CM

## 2024-08-09 DIAGNOSIS — I48.0 PAROXYSMAL ATRIAL FIBRILLATION (HCC): ICD-10-CM

## 2024-08-09 DIAGNOSIS — I10 PRIMARY HYPERTENSION: ICD-10-CM

## 2024-08-09 PROCEDURE — 99308 SBSQ NF CARE LOW MDM 20: CPT | Performed by: INTERNAL MEDICINE

## 2024-08-09 PROCEDURE — 1111F DSCHRG MED/CURRENT MED MERGE: CPT | Performed by: INTERNAL MEDICINE

## 2024-08-09 NOTE — PROGRESS NOTES
follow up    PAST MEDICAL HISTORY: Per the record, patient had a history of paroxysmal atrial fibrillation, anticoagulated with Eliquis; generalized osteoarthritis; osteoporosis; chronic kidney disease stage 3; peripheral neuropathy; hypertension; hyperlipidemia; gastroesophageal reflux disease; asthma; diverticulosis; supraventricular tachycardia; and glaucoma; chronic hearing impairment.     PAST SURGICAL HISTORY: Appendectomy, cholecystectomy, laparoscopic lysis of adhesions, and partial small-bowel resection for small-bowel obstruction presentation. She had bilateral total knee arthroplasties, left femur open reduction and internal fixation, cystocele repair and bladder sling.     MEDICATIONS: medication reviewed     ALLERGIES: Adhesive tape and latex.     FAMILY HISTORY: Mother had diabetes mellitus type 2. Father had cerebrovascular accident and hypertension.     SOCIAL HISTORY: No tobacco, alcohol, or drug use. Uses a walker. Independent in her basic activities of daily living, but still require some assistance.     REVIEW OF SYSTEMS: she is sleepy , per nurse she didnt sleep last night   PHYSICAL EXAMINATION:  GENERAL: Alert, hard of hearing.    HEENT: Atraumatic. Oropharynx clear. Dry mucous membranes. Normal hard and soft palate. Eyes: Anicteric sclerae.  NECK: Supple. No lymphadenopathy. Trachea midline. Full range of motion.  LUNGS: Clear to auscultation bilaterally. Normal respiratory effort.  HEART: Regular rate, rhythm. S1 and S2 auscultated. No murmur.  ABDOMEN: Soft, nondistended. No tenderness. Positive bowel sounds.  EXTREMITIES: b/l leg dressing , per wound nurse note   a/p    PLAN ; s3 fracture stable, continue ptot , sleepy/drowsy ? check cbc , bmp , ua , monitor , bl leg wounds continue with wound care    RLE cellulitis with RLE ulcer   o ID on consult.   o S/p IV cefepime completed   o Wound care   o Superficial cx with PSAR.   o Woundcare  Acute on chronic hyponatremia   o Baseline sodium  132 per EMR.   o Usodium 61. Likely SIADH   o Fluid restriction   o S/p one dose of tolvaptan monitor  Essential HTN monitor bp  Paroxysmal a.fib   o Rate controlled.   o Eliquis   o Diltiazem    CKD III- monitor bmp  Sciatica and chronic pain syndrome.     Ptot , wound care

## 2024-08-12 PROCEDURE — 99308 SBSQ NF CARE LOW MDM 20: CPT | Performed by: INTERNAL MEDICINE

## 2024-08-13 ENCOUNTER — EXTERNAL FACILITY (OUTPATIENT)
Dept: INTERNAL MEDICINE CLINIC | Facility: CLINIC | Age: 89
End: 2024-08-13

## 2024-08-13 DIAGNOSIS — R63.0 POOR APPETITE: Primary | ICD-10-CM

## 2024-08-13 DIAGNOSIS — I48.20 ATRIAL FIBRILLATION, CHRONIC (HCC): ICD-10-CM

## 2024-08-13 NOTE — PROGRESS NOTES
seen 8/12/2024  PAST MEDICAL HISTORY: Per the record, patient had a history of paroxysmal atrial fibrillation, anticoagulated with Eliquis; generalized osteoarthritis; osteoporosis; chronic kidney disease stage 3; peripheral neuropathy; hypertension; hyperlipidemia; gastroesophageal reflux disease; asthma; diverticulosis; supraventricular tachycardia; and glaucoma; chronic hearing impairment.     PAST SURGICAL HISTORY: Appendectomy, cholecystectomy, laparoscopic lysis of adhesions, and partial small-bowel resection for small-bowel obstruction presentation. She had bilateral total knee arthroplasties, left femur open reduction and internal fixation, cystocele repair and bladder sling.     MEDICATIONS: medication reviewed     ALLERGIES: Adhesive tape and latex.     FAMILY HISTORY: Mother had diabetes mellitus type 2. Father had cerebrovascular accident and hypertension.     SOCIAL HISTORY: No tobacco, alcohol, or drug use. Uses a walker. Independent in her basic activities of daily living, but still require some assistance.     REVIEW OF SYSTEMS: she is sitting  in her bed,poor appetite     PHYSICAL EXAMINATION:  GENERAL: Alert, hard of hearing.    HEENT: Atraumatic. Oropharynx clear. Dry mucous membranes. Normal hard and soft palate. Eyes: Anicteric sclerae.  NECK: Supple. No lymphadenopathy. Trachea midline. Full range of motion.  LUNGS: Clear to auscultation bilaterally. Normal respiratory effort.  HEART: Regular rate, rhythm. S1 and S2 auscultated. No murmur.  ABDOMEN: Soft, nondistended. No tenderness. Positive bowel sounds.  EXTREMITIES: b/l leg dressing , per wound nurse note   a/p    PLAN ; poor appetite / dietary is following , labs reviewed, consider remeron if family agrees, b/l leg wounds, wound care is following    RLE cellulitis with RLE ulcer   o ID on consult.   o S/p IV cefepime completed   o Wound care   o Superficial cx with PSAR.   o Woundcare  Acute on chronic hyponatremia   o Baseline sodium  132 per EMR.   o Usodium 61. Likely SIADH   o Fluid restriction   o S/p one dose of tolvaptan monitor  Essential HTN monitor bp  Paroxysmal a.fib   o Rate controlled.   o Eliquis   o Diltiazem    CKD III- monitor bmp  Sciatica and chronic pain syndrome.     Ptot , wound care

## 2024-08-14 ENCOUNTER — EXTERNAL FACILITY (OUTPATIENT)
Dept: INTERNAL MEDICINE CLINIC | Facility: CLINIC | Age: 89
End: 2024-08-14

## 2024-08-14 DIAGNOSIS — S81.802S OPEN LEG WOUND, LEFT, SEQUELA: ICD-10-CM

## 2024-08-14 DIAGNOSIS — S81.801S OPEN LEG WOUND, RIGHT, SEQUELA: Primary | ICD-10-CM

## 2024-08-14 PROCEDURE — 99309 SBSQ NF CARE MODERATE MDM 30: CPT | Performed by: INTERNAL MEDICINE

## 2024-08-14 NOTE — PROGRESS NOTES
follow up    PAST MEDICAL HISTORY: Per the record, patient had a history of paroxysmal atrial fibrillation, anticoagulated with Eliquis; generalized osteoarthritis; osteoporosis; chronic kidney disease stage 3; peripheral neuropathy; hypertension; hyperlipidemia; gastroesophageal reflux disease; asthma; diverticulosis; supraventricular tachycardia; and glaucoma; chronic hearing impairment.     PAST SURGICAL HISTORY: Appendectomy, cholecystectomy, laparoscopic lysis of adhesions, and partial small-bowel resection for small-bowel obstruction presentation. She had bilateral total knee arthroplasties, left femur open reduction and internal fixation, cystocele repair and bladder sling.     MEDICATIONS: medication reviewed     ALLERGIES: Adhesive tape and latex.     FAMILY HISTORY: Mother had diabetes mellitus type 2. Father had cerebrovascular accident and hypertension.     SOCIAL HISTORY: No tobacco, alcohol, or drug use. Uses a walker. Independent in her basic activities of daily living, but still require some assistance.     REVIEW OF SYSTEMS:  wound nurse changing the dressing , at her baseline     PHYSICAL EXAMINATION:  GENERAL: Alert, hard of hearing.    HEENT: Atraumatic. Oropharynx clear. Dry mucous membranes. Normal hard and soft palate. Eyes: Anicteric sclerae.  NECK: Supple. No lymphadenopathy. Trachea midline. Full range of motion.  LUNGS: Clear to auscultation bilaterally. Normal respiratory effort.  HEART: Regular rate, rhythm. S1 and S2 auscultated. No murmur.  ABDOMEN: Soft, nondistended. No tenderness. Positive bowel sounds.  EXTREMITIES: b/l leg dressing , per wound nurse note   a/p    PLAN ; bl leg wound , not healing , declining further , discussed with wound md and nurse, son is aware , wound md discussed with him , palliative following, hospice appropriate,       RLE cellulitis with RLE ulcer   o ID on consult.   o S/p IV cefepime completed   o Wound care   o Superficial cx with PSAR.   o  Woundcare  Acute on chronic hyponatremia   o Baseline sodium 132 per EMR.   o Usodium 61. Likely SIADH   o Fluid restriction   o S/p one dose of tolvaptan monitor  Essential HTN monitor bp  Paroxysmal a.fib   o Rate controlled.   o Eliquis   o Diltiazem    CKD III- monitor bmp  Sciatica and chronic pain syndrome.     Ptot , wound care

## 2024-08-20 ENCOUNTER — TELEPHONE (OUTPATIENT)
Dept: OTOLARYNGOLOGY | Facility: CLINIC | Age: 89
End: 2024-08-20

## 2024-08-20 ENCOUNTER — TELEPHONE (OUTPATIENT)
Dept: INTERNAL MEDICINE CLINIC | Facility: CLINIC | Age: 89
End: 2024-08-20

## 2024-08-20 NOTE — TELEPHONE ENCOUNTER
Rica from [facility] called checking to see if the patients death certificate was received.

## 2024-08-21 NOTE — TELEPHONE ENCOUNTER
Rica with the  Home called to follow up on a Death Certificate that was faxed.     Rica will like it signed and faxed back to the number provided on the form.

## 2025-01-27 NOTE — Clinical Note
3 months in office.  Thanks Reason for call:   [x] Refill   [] Prior Auth  [] Other:     Office:   [x] PCP/Provider - [place]  [] Specialty/Provider -     Medication:   Torsemide 20 mg, 1 bid, 60    [facility]    Does the patient have enough for 3 days?   [] Yes   [x] No - Send as HP to POD

## (undated) DEVICE — OCCLUSIVE GAUZE STRIP OVERWRAP,3% BISMUTH TRIBROMOPHENATE IN PETROLATUM BLEND: Brand: XEROFORM

## (undated) DEVICE — REM POLYHESIVE ADULT PATIENT RETURN ELECTRODE: Brand: VALLEYLAB

## (undated) DEVICE — BATTERY

## (undated) DEVICE — TRAY SRGPRP PVP IOD WT SCRB SM

## (undated) DEVICE — SPONGE LAP 18X18 XRAY STRL

## (undated) DEVICE — WEBRIL COTTON UNDERCAST PADDING: Brand: WEBRIL

## (undated) DEVICE — SUTURE VICRYL 0 CP-1

## (undated) DEVICE — SUTURE VICRYL 2-0 FS-1

## (undated) DEVICE — STERILE POLYISOPRENE POWDER-FREE SURGICAL GLOVES: Brand: PROTEXIS

## (undated) DEVICE — COVER SGL STRL LGHT HNDL BLU

## (undated) DEVICE — HIP PINNING: Brand: MEDLINE INDUSTRIES, INC.

## (undated) DEVICE — Device

## (undated) DEVICE — 3M™ MICROFOAM™ TAPE 1528-4: Brand: 3M™ MICROFOAM™

## (undated) DEVICE — PROXIMATE SKIN STAPLERS (35 WIDE) CONTAINS 35 STAINLESS STEEL STAPLES (FIXED HEAD): Brand: PROXIMATE

## (undated) DEVICE — SOL  .9 1000ML BTL

## (undated) DEVICE — GAUZE SPONGES,12 PLY: Brand: CURITY

## (undated) NOTE — LETTER
AUTHORIZATION FOR SURGICAL OPERATION OR OTHER PROCEDURE    1. I hereby authorize Dr. Kaitlynn Espinoza and the [facility] staff assigned to my case to perform the following operation and/or procedure at the [facility]:    Ultrasound guided right carpal tunnel injection with corticosteroid     2. My physician has explained the nature and purpose of the operation or other procedure, possible alternative methods of treatment, the risks involved, and the possibility of complication to me. I acknowledge that no guarantee has been made as to the result that may be obtained. 3.  I recognize that, during the course of this operation, or other procedure, unforseen conditions may necessitate additional or different procedure than those listed above. I, therefore, further authorize and request that the above named physician, his/her physician assistants or designees perform such procedures as are, in his/her professional opinion, necessary and desirable. 4.  Any tissue or organs removed in the operation or other procedure may be disposed of by and at the discretion of the [facility] staff and [facility] AT [facility]. 5.  I understand that in the event of a medical emergency, I will be transported by local paramedics to [facility] or other hospital emergency department. 6.  I certify that I have read and fully understand the above consent to operation and/or other procedure. 7.  I acknowledge that my physician has explained sedation/analgesia administration to me including the risks and benefits. I consent to the administration of sedation/analgesia as may be necessary or desirable in the judgement of my physician. Witness signature: ___________________________________________________ Date:  ______/______/_____                    Time:  ________ A. M.  P.M.        Patient Name: Oliverio Salcedo  12/11/1931  [id number]         Patient signature: ___________________________________________________               Statement of Physician  My signature below affirms that prior to the time of the procedure, I have explained to the patient and/or his/her guardian, the risks and benefits involved in the proposed treatment and any reasonable alternative to the proposed treatment. I have also explained the risks and benefits involved in the refusal of the proposed treatment and have answered the patient's questions.                         Date:  ______/______/_______  Provider                      Signature:  __________________________________________________________       Time:  ___________ ALAZ WEAVER

## (undated) NOTE — LETTER
AUTHORIZATION FOR SURGICAL OPERATION OR OTHER PROCEDURE    1. I hereby authorize Dr. Mart Farris and the [facility] staff assigned to my case to perform the following operation and/or procedure at the [facility]:    BL carpal tunnel injection     2. My physician has explained the nature and purpose of the operation or other procedure, possible alternative methods of treatment, the risks involved, and the possibility of complication to me. I acknowledge that no guarantee has been made as to the result that may be obtained. 3.  I recognize that, during the course of this operation, or other procedure, unforseen conditions may necessitate additional or different procedure than those listed above. I, therefore, further authorize and request that the above named physician, his/her physician assistants or designees perform such procedures as are, in his/her professional opinion, necessary and desirable. 4.  Any tissue or organs removed in the operation or other procedure may be disposed of by and at the discretion of the [facility] staff and [facility] AT [facility]. 5.  I understand that in the event of a medical emergency, I will be transported by local paramedics to [facility] or other hospital emergency department. 6.  I certify that I have read and fully understand the above consent to operation and/or other procedure. 7.  I acknowledge that my physician has explained sedation/analgesia administration to me including the risks and benefits. I consent to the administration of sedation/analgesia as may be necessary or desirable in the judgement of my physician. Witness signature: ___________________________________________________ Date:  ______/______/_____                    Time:  ________ A. M.  P.MShady Watson  12/11/1931  [id number]         Patient signature:  ___________________________________________________      Statement of Physician  My signature below affirms that prior to the time of the procedure, I have explained to the patient and/or his/her guardian, the risks and benefits involved in the proposed treatment and any reasonable alternative to the proposed treatment. I have also explained the risks and benefits involved in the refusal of the proposed treatment and have answered the patient's questions.                         Date:  ______/______/_______  Provider                      Signature:  __________________________________________________________       Time:  ___________ ALAZ MOYAMShady

## (undated) NOTE — LETTER
AUTHORIZATION FOR SURGICAL OPERATION OR OTHER PROCEDURE    1. I hereby authorize Dr. Romina Nieto and the [facility] staff assigned to my case to perform the following operation and/or procedure at the [facility]:    Bilateral carpal tunnel CSI under ultrasound guidance    2. My physician has explained the nature and purpose of the operation or other procedure, possible alternative methods of treatment, the risks involved, and the possibility of complication to me. I acknowledge that no guarantee has been made as to the result that may be obtained. 3.  I recognize that, during the course of this operation, or other procedure, unforseen conditions may necessitate additional or different procedure than those listed above. I, therefore, further authorize and request that the above named physician, his/her physician assistants or designees perform such procedures as are, in his/her professional opinion, necessary and desirable. 4.  Any tissue or organs removed in the operation or other procedure may be disposed of by and at the discretion of the [facility] staff and [facility] AT [facility]. 5.  I understand that in the event of a medical emergency, I will be transported by local paramedics to [facility] or other hospital emergency department. 6.  I certify that I have read and fully understand the above consent to operation and/or other procedure. 7.  I acknowledge that my physician has explained sedation/analgesia administration to me including the risks and benefits. I consent to the administration of sedation/analgesia as may be necessary or desirable in the judgement of my physician. Witness signature: ___________________________________________________ Date:  ______/______/_____                    Time:  ________ A. M.  P.M.        Patient Name: Nilam Muhammad  12/11/1931  [id number]         Patient signature:  ___________________________________________________        Statement of Physician  My signature below affirms that prior to the time of the procedure, I have explained to the patient and/or his/her guardian, the risks and benefits involved in the proposed treatment and any reasonable alternative to the proposed treatment. I have also explained the risks and benefits involved in the refusal of the proposed treatment and have answered the patient's questions.                         Date:  ______/______/_______  Provider                      Signature:  __________________________________________________________       Time:  ___________ A.M    P.M.

## (undated) NOTE — LETTER
Hospital Discharge Documentation    From: [address] Hospitalist's Office  Phone: [phone]    Patient discharged time/date: 1/9/2017  3:33 PM  Patient discharge disposition:  SNF, Patient discharged to [facility]  See below for last patient progres

## (undated) NOTE — LETTER
No referring provider defined for this encounter. 06/11/18        Patient: Rosemary Mo   YOB: 1931   Date of Visit: 6/11/2018       Dear  Dr. Bonilla Deutsch MD,      Thank you for referring Rosemary Mo to my practice.   Please

## (undated) NOTE — LETTER
PALLAVI Notifier: Dwaine[employer]   ALANA. Patient Name: Vesna Hernandes. Identification Number: [id number]      Advance Beneficiary Notice of Noncoverage (ABN)  NOTE:  If Medicare doesn’t pay for D. Item/service(s) below, you may have to pay.   Medicare do this choice  I am not responsible for payment, and I cannot appeal to see if Medicare would pay. H. Additional Information: This notice gives our opinion, not an official Medicare decision.  If you have other questions on this notice or Medicare billin

## (undated) NOTE — Clinical Note
Dr. Kendal Cardoso, I evaluated her for RLE wound, started her on treatment, she will be coming to clinic for dressing and compression for her leg. She said her foot wound is under the care of her podiatrist (?), I do not see any notes, wanted to clarify. . Also she was wheezing. I asked her to follow up with you as soon as she can.

## (undated) NOTE — LETTER
Date: 2022      Patient Name: Tamanna Argueta      : 1931        Thank you for choosing [facility] as your health care provider. Your physician has deemed the following medical service(s) necessary. However, your insurance plan may not pay for all of your health care and costs and may deny payment for this service. The fact that your insurance plan does not pay for an item or service does not mean you should not receive it. The purpose of this form is to help you make an informed decision about whether or not you want to receive this service(s) that may not be paid for by your insurance plan. CPT Code Description     Cost     _________ Ultrasound guided left carpal tunnel injection with corticosteroid      _________ ______________________________ _____________      _________ ______________________________ _____________      I understand that the above mentioned service(s) or supply may not be covered by my insurance company.  I agree to be financially responsible for the cost of this service or supply in the event of my insurance denies payment as a non-covered benefit.        ______________________________________________________________________  Signature of Patient or Patient's Representative  Relationship  Date    ______________________________________________________________________  Signature of Witness to signing of form   Printed Name

## (undated) NOTE — LETTER
AUTHORIZATION FOR SURGICAL OPERATION OR OTHER PROCEDURE    1. I hereby authorize Dr. Minor Sandoval and the [facility] staff assigned to my case to perform the following operation and/or procedure at the [facility]:    Ultrasound guided left carpal tunnel injection with corticosteroid    2. My physician has explained the nature and purpose of the operation or other procedure, possible alternative methods of treatment, the risks involved, and the possibility of complication to me. I acknowledge that no guarantee has been made as to the result that may be obtained. 3.  I recognize that, during the course of this operation, or other procedure, unforseen conditions may necessitate additional or different procedure than those listed above. I, therefore, further authorize and request that the above named physician, his/her physician assistants or designees perform such procedures as are, in his/her professional opinion, necessary and desirable. 4.  Any tissue or organs removed in the operation or other procedure may be disposed of by and at the discretion of the [facility] staff and [facility] AT [facility]. 5.  I understand that in the event of a medical emergency, I will be transported by local paramedics to [facility] or other [facility] [place] [facility]\"" emergency department. 6.  I certify that I have read and fully understand the above consent to operation and/or other procedure. 7.  I acknowledge that my physician has explained sedation/analgesia administration to me including the risks and benefits. I consent to the administration of sedation/analgesia as may be necessary or desirable in the judgement of my physician. Witness signature: ___________________________________________________ Date:  ______/______/_____                    Time:  ________ A. M.  P.M.        Patient Name:  Dominik Delong  12/11/1931  [id number]         Patient signature: ___________________________________________________               Statement of Physician  My signature below affirms that prior to the time of the procedure, I have explained to the patient and/or his/her guardian, the risks and benefits involved in the proposed treatment and any reasonable alternative to the proposed treatment. I have also explained the risks and benefits involved in the refusal of the proposed treatment and have answered the patient's questions.                         Date:  ______/______/_______  Provider                      Signature:  __________________________________________________________       Time:  ___________ ALAZ WEAVER

## (undated) NOTE — LETTER
PALLAVI Notifier: Dwaine[employer]   ALANA. Patient Name: Vangie Merino. Identification Number: [id number]      Advance Beneficiary Notice of Noncoverage (ABN)  NOTE:  If Medicare doesn’t pay for D. Item/service(s) below, you may have to pay.   Medicare do for payment, and I cannot appeal to see if Medicare would pay. H. Additional Information: This notice gives our opinion, not an official Medicare decision.  If you have other questions on this notice or Medicare billing, call 1-800-MEDICARE ([phone]-

## (undated) NOTE — LETTER
AUTHORIZATION FOR SURGICAL OPERATION OR OTHER PROCEDURE    1.  I hereby authorize Dr. Taye Benítez and the [facility] Office staff assigned to my case to perform the following operation and/or procedure at the [facility] Office:    Ultrasound guided left carpal tunnel injection Relationship to Patient:           []  Parent    Responsible person                          []  Spouse  In case of minor or                    [] Other  _____________   Incompetent name:  __________________________________________________

## (undated) NOTE — MR AVS SNAPSHOT
[facility] [place]  [address] [place] [address]  [phone]               Thank you for choosing us for your health care visit with Oral Lizarraga MD.  We are glad to serve you and happy to provide you with this summary of your visit.   Nuris Ipratropium-Albuterol  MCG/ACT Aers   Inhale 1 puff into the lungs 4 (four) times daily. Commonly known as:  COMBIVENT RESPIMAT           MIRALAX Powd   Generic drug:  Polyethylene Glycol 3350   Take  by mouth.  take 15 milliliter (17G)  by

## (undated) NOTE — LETTER
[address], [place]  Authorization for Invasive Procedures  1.  I hereby authorize  Dr. Siena Chao  , my physician and whomever may be designated as the doctor's assistant, to perform the following operation and/or procedure: Cardiac 5. I consent to the photographing of the operations or procedures to be performed for the purposes of advancing medicine, science, and/or education, provided my identity is not revealed.  If the procedure has been videotaped, the physician/surgeon will obta __________ Time: ___________    Statement of Physician  My signature below affirms that prior to the time of the procedure, I have explained to the patient and/or her legal representative, the risks and benefits involved in the proposed treatment and any r

## (undated) NOTE — MR AVS SNAPSHOT
[place]  [facility] [address] [place] [address]  [phone]               Thank you for choosing us for your health care visit with Jason Veronica MD.  We are glad to serve you and happy to provide you with this summary of your visit.   Nuris Ipratropium-Albuterol  MCG/ACT Aers   Inhale 1 puff into the lungs 4 (four) times daily. Commonly known as:  COMBIVENT RESPIMAT           lisinopril 10 MG Tabs   Take 10 mg by mouth daily.    Commonly known as:  PRINIVIL,ZESTRIL           MIRALAX P CBC W Differential W Platelet    Complete by:  Jan 24, 2017 (Approximate)    Assoc Dx: Anemia due to blood loss, acute [D62]                 Follow-up Instructions     Return 6-8 wks.          Results of Recent Testing       MyChart               Educatio

## (undated) NOTE — MR AVS SNAPSHOT
[facility] [place]  [facility] [address] [place] [address]  [phone]               Thank you for choosing us for your health care visit with Lico Reno MD.  We are glad to serve you and happy to provide you with this summary of your visit.   Nuris TAKE 1 TABLET DAILY   Commonly known as:  VASOTEC           FLOVENT  MCG/ACT Aero   Generic drug:  Fluticasone Propionate HFA   INHALE 1 PUFF TWICE DAILY           furosemide 20 MG Tabs   Take one tab on Mon, Wed , Fridays.    Commonly known as:  LAS

## (undated) NOTE — LETTER
AUTHORIZATION FOR SURGICAL OPERATION OR OTHER PROCEDURE    1. I hereby authorize Dr. Rica Fountain and [facility] staff assigned to my case to perform the following operation and/or procedure at the [facility]:    _______________________________________________________________________________________________      _____________________Myringotomy of left ear __________________________________________________________________________    2. My physician has explained the nature and purpose of the operation or other procedure, possible alternative methods of treatment, the risks involved, and the possibility of complication to me. I acknowledge that no guarantee has been made as to the result that may be obtained. 3.  I recognize that, during the course of this operation, or other procedure, unforseen conditions may necessitate additional or different procedure than those listed above. I, therefore, further authorize and request that the above named physician, his/her physician assistants or designees perform such procedures as are, in his/her professional opinion, necessary and desirable. 4.  Any tissue or organs removed in the operation or other procedure may be disposed of by and at the discretion of the [facility], [facility] and [facility] AT [facility]. 5.  I understand that in the event of a medical emergency, I will be transported by local paramedics to [facility] or other hospital emergency department. 6.  I certify that I have read and fully understand the above consent to operation and/or other procedure. 7.  I acknowledge that my physician has explained sedation/analgesia administration to me including the risks and benefits. I consent to the administration of sedation/analgesia as may be necessary or desirable in the judgement of my physician.     Witness signature: ___________________________________________________ Date:  ______/______/_____                    Time: ________ A. M.  P.M. Patient Name:  ______________________________________________________  (please print)      Patient signature:  ___________________________________________________             Relationship to Patient:           []  Parent    Responsible person                          []  Spouse  In case of minor or                    [] Other  _____________   Incompetent name:  __________________________________________________                               (please print)      _____________      Responsible person  In case of minor or  Incompetent signature:  _______________________________________________    Statement of Physician  My signature below affirms that prior to the time of the procedure, I have explained to the patient and/or his/her guardian, the risks and benefits involved in the proposed treatment and any reasonable alternative to the proposed treatment. I have also explained the risks and benefits involved in the refusal of the proposed treatment and have answered the patient's questions.                         Date:  ______/______/_______  Provider                      Signature:  __________________________________________________________       Time:  ___________ A.M    P.M.

## (undated) NOTE — IP AVS SNAPSHOT
[address]  [address] [facility] [phone]                Discharge Summary   1/5/2017    [address] [facility]           Admission Information        Provider Department    1/5/2017 Angelique May MD [facility] 4w/Sw/Se HYDROcodone-acetaminophen 5-325 MG Tabs   Last time this was given:  1 tablet on 1/9/2017  2:55 PM   Commonly known as:  [address]   Next dose due:  As needed for pain         Take 1 tablet by mouth every 4 (four) hours as needed.     Napolean Pen 9pm             triamcinolone acetonide 0.1 % Crea   Commonly known as:  KENALOG   Next dose due:  As needed         Apply topically 2 (two) times daily as needed.     Marjan Porter                           Vitamin B-12 1000 MCG Tabs   Commonly know INFLUENZA 10/26/2016, 11/11/2014, 10/25/2013    Pneumococcal (Prevnar 13) 11/11/2014    Pneumovax 23 (Lot Mgr) 1/12/1998      Recent Hematology Lab Results  (Last 3 results in the past 90 days)    WBC RBC Hemoglobin Hematocrit MCV MCH MCHC RDW Platelet MP 128 (L) (01/09/17)  4.6 (01/09/17)  96      Radiology Exams     None      Patient Belongings       Most Recent Value    All belongings returned to patient at discharge Pt's bedside belongings    Medications Sent Home None to return    Medications Returned: Use: Treat abnormal blood pressure (high or low), cardiac conditions; and/or abnormal heart rates/rhythms   Most common side effects: Dizziness or feeling lightheaded (especially with standing), heart rate changes, headaches, nausea/vomiting   What to repo What to report to your healthcare team: Pain, nausea/vomiting, no bowel movement in 2+ days, diarrhea           Respiratory Medications     FLOVENT  MCG/ACT Inhalation Aerosol    promethazine-codeine 6.25-10 MG/5ML Oral Syrup    Ipratropium-Albutero

## (undated) NOTE — LETTER
AUTHORIZATION FOR SURGICAL OPERATION OR OTHER PROCEDURE    1. I hereby authorize Dr. Suki Phelan and the [facility] staff assigned to my case to perform the following operation and/or procedure at the [facility]:    Left carpal tunnel CSI under ultrasound guidance    2. My physician has explained the nature and purpose of the operation or other procedure, possible alternative methods of treatment, the risks involved, and the possibility of complication to me. I acknowledge that no guarantee has been made as to the result that may be obtained. 3.  I recognize that, during the course of this operation, or other procedure, unforseen conditions may necessitate additional or different procedure than those listed above. I, therefore, further authorize and request that the above named physician, his/her physician assistants or designees perform such procedures as are, in his/her professional opinion, necessary and desirable. 4.  Any tissue or organs removed in the operation or other procedure may be disposed of by and at the discretion of the [facility] staff and [facility] AT [facility] 5.  I understand that in the event of a medical emergency, I will be transported by local paramedics to [facility] or other hospital emergency department. 6.  I certify that I have read and fully understand the above consent to operation and/or other procedure. 7.  I acknowledge that my physician has explained sedation/analgesia administration to me including the risks and benefits. I consent to the administration of sedation/analgesia as may be necessary or desirable in the judgement of my physician. Witness signature: ___________________________________________________ Date:  ______/______/_____                    Time:  ________ A. M.  P.M.        Patient Name:  Betancur Fall 12/11/1931  [id number]       Patient signature:  ___________________________________________________                Statement of Physician  My signature below affirms that prior to the time of the procedure, I have explained to the patient and/or his/her guardian, the risks and benefits involved in the proposed treatment and any reasonable alternative to the proposed treatment. I have also explained the risks and benefits involved in the refusal of the proposed treatment and have answered the patient's questions.                         Date:  ______/______/_______  Provider                      Signature:  __________________________________________________________       Time:  ___________ A.M    P.M.

## (undated) NOTE — LETTER
AUTHORIZATION FOR SURGICAL OPERATION OR OTHER PROCEDURE    1.  I hereby authorize Dr. Orly Mcdonald and the [facility] staff assigned to my case to perform the following operation and/or procedure at the [facility]:     Ultrasound guided LEFT carpal tunnel injectio Physician  My signature below affirms that prior to the time of the procedure, I have explained to the patient and/or his/her guardian, the risks and benefits involved in the proposed treatment and any reasonable alternative to the proposed treatment.   I h

## (undated) NOTE — ED AVS SNAPSHOT
Leandra Santos   MRN: [id number]    Department:  [facility] Emergency Department   Date of Visit:  11/4/2019           Disclosure     Insurance plans vary and the physician(s) referred by the ER may not be covered by your plan.  Please contact within the next three months to obtain basic health screening including reassessment of your blood pressure.     IF THERE IS ANY CHANGE OR WORSENING OF YOUR CONDITION, CALL YOUR PRIMARY CARE PHYSICIAN AT ONCE OR RETURN IMMEDIATELY TO THE EMERGENCY DEPARTMEN

## (undated) NOTE — MR AVS SNAPSHOT
[facility]  [address] [place] [address] [phone]               Thank you for choosing us for your health care visit with Benjamin Gautam MD.  We are glad to serve you and happy to provide you with this summary of your INHALE 1 PUFF TWICE DAILY           furosemide 20 MG Tabs   Take 1 tablet (20 mg total) by mouth daily. Commonly known as:  LASIX           HYDROcodone-acetaminophen 5-325 MG Tabs   Take 1 tablet by mouth every 4 (four) hours as needed.    Commonly known

## (undated) NOTE — LETTER
AUTHORIZATION FOR SURGICAL OPERATION OR OTHER PROCEDURE    1.  I hereby authorize Dr. Alcides Bone and the [facility] staff assigned to my case to perform the following operation and/or procedure at the [facility]:    Right glenohumeral joint aspiration and inject Relationship to Patient:           []  Parent    Responsible person                          []  Spouse  In case of minor or                    [] Other  _____________   Incompetent name:  __________________________________________________

## (undated) NOTE — LETTER
Hospital Discharge Documentation  Patient was discharged to  [facility] [phone]     From: [facility] Hospitalist's Office  Phone: [phone]    Patient discharged time/date: 2023  2:54 PM  Patient discharge disposition:  SNF Subacute Rehab       Discharge Summary - D/C Summary        Discharge Summary signed by Andrés Rowland MD at 2024  8:05 AM  Version 1 of 1      Author: Andrés Rowland MD Service: Internal Medicine Author Type: Physician    Filed: 2024  8:05 AM Date of Service: 2023 10:17 AM Status: Signed    : Andrés Rowland MD (Physician)         [facility]    Discharge Summary    Frances Whitehead Patient Status:  Inpatient    1931 MRN [id number]   Location [facility] 3W/SW Attending Andrés Rowland MD   Hosp Day # 4 PCP Remigio Perez MD     Date of Admission: 2023     Date of Discharge: 23      Lace+ Score: 71  59-90 High Risk  29-58 Medium Risk  0-28   Low Risk.    TCM Follow-Up Recommendation:  LACE > 58: High Risk of readmission after discharge from the hospital.    DISCHARGE DX: Principal Problem:    Closed fracture of multiple ribs of left side, initial encounter  Active Problems:    Syncope, unspecified syncope type       The patient was seen and examined on day of discharge and this discharge summary is in conjunction with any daily progress note from day of discharge.    HPI per admitting physician: \"Frances Whitehead is a(n) 89 year old female, who presents for evaluation of a syncopal episode.  Patient with multiple chronic medical conditions including atrial fibrillation on Eliquis, COPD/asthma, obstructive CAD, hyperlipidemia.  Patient is very hard of hearing, surrounded by family members.  Patient was sitting on her chair at home when all of a sudden she was noticed by her son to not be responding and making gurgling noises.  Her son placed her on the ground and try to feel a pulse, he did not  feel a pulse and started doing chest compressions.  Unknown how long the chest compression lasted.  He then was able to feel a pulse and paramedics arrived shortly after.  Patient reports that she does not remember the episode.  Denies feeling lightheaded or dizzy prior to passing out. She currently states that she has chest pain but did not have chest pain before the CPR.  Patient denies feeling lightheaded or dizzy.  Denies nausea or vomiting.  Denies any diarrhea or constipation. \"    Hospital Course:     Closed fracture of multiple ribs of left side, initial encounter  -Likely secondary to CPR administered outside of hospital  -Pain control as able  -Encouraging incentive spirometry  -PT/OT     Right apical pneumothorax  -Initially noted on CT  -Repeat chest x-ray 12/29 reviewed.  Noted resolution of pneumothorax.  -Likely secondary to trauma from above  -Weaning supplemental O2     Syncope and collapse  -Unclear etiology  -Cardiac monitoring without signs of new arrhythmia  -echocardiogram reviewed.  -PT/OT     Paroxysmal atrial fibrillation on Eliquis   -remains in sinus rhythm  - rate controlled  -Continue Eliquis   -Continue diltiazem     Essential hypertension   -Mild elevations noted  -Continue diltiazem and enalapril  -Continue to monitor and adjust agents as needed     Peripheral neuropathy  -Gabapentin     COPD/asthma   -No current signs of acute exacerbation  -Continue home inhaler.     History of GERD  -Protonix     Hyperlipidemia  -Lipitor      Physical Exam:    Vitals:    12/30/23 1900 12/30/23 2121 12/31/23 0456 12/31/23 1011   BP:  145/55 145/53 131/53   BP Location:  Left arm Right arm Right arm   Pulse: 78 79 83 75   Resp:  17 17 18   Temp:  98 °F (36.7 °C) 98.4 °F (36.9 °C) 97.8 °F (36.6 °C)   TempSrc:  Oral Oral Oral   SpO2:  94% 92% 92%   Weight:   123 lb 3.2 oz (55.9 kg)      Patient Weight for the past 72 hrs:   Weight   12/29/23 0510 122 lb (55.3 kg)   12/30/23 0408 122 lb 11.2 oz (55.7  kg)   12/31/23 0456 123 lb 3.2 oz (55.9 kg)       Intake/Output Summary (Last 24 hours) at 12/31/2023 1017  Last data filed at 12/31/2023 0958  Gross per 24 hour   Intake 250 ml   Output 200 ml   Net 50 ml         GENERAL:  Awake and alert, in no acute distress.  HEART:  S1 and S2 heard.  RRR   LUNGS:  Air entry was decreased.  No increased work of breathing or wheezes   ABDOMEN: Soft and non-tender.    PSYCHIATRIC: Normal mood    CULTURE:   No results found for this visit on 12/27/23.    IMAGING STUDIES: SOME MAY NEED FOLLOW UP WITH PCP   XR CHEST PA + LAT CHEST (CPT=71046)    Result Date: 12/29/2023  CONCLUSION: No pneumothorax    Dictated by (CST): Minh Jarquin MD on 12/29/2023 at 9:34 AM     Finalized by (CST): Minh Jarquin MD on 12/29/2023 at 9:35 AM          XR CHEST AP PORTABLE  (CPT=71045)    Result Date: 12/28/2023  CONCLUSION: Tiny right apical pneumothorax    Dictated by (CST): Minh Jarquin MD on 12/28/2023 at 1:30 PM     Finalized by (CST): Minh Jarquin MD on 12/28/2023 at 1:31 PM          CT CHEST PE AORTA (IV ONLY) (CPT=71260)    Result Date: 12/27/2023  CONCLUSION:  1. No evidence of acute pulmonary embolism.  2. Trace approximate 5% right apical and anterior pneumothorax.  As there are subtle acute nondisplaced left lateral 5th and 6th rib fractures, this pneumothorax is likely posttraumatic.  Other chronic bilateral rib fractures are seen.  There is also a chronic-appearing severe T11 vertebral body compression fracture with vertebra plana. 3. Dependent airspace disease in the left greater than right lower lobes with low-grade intrinsic air bronchograms.  Findings could relate to atelectasis or early pneumonia/aspiration. 4. Multi-vessel coronary artery calcification. 5. Thyroid nodules, which measure up to 1.4 cm in the left lobe. 6. Indeterminate attenuation 3.1 and 0.5 cm right upper pole renal cortical lesions.  Depending upon goals of care, consider renal ultrasound correlation to determine  if these are solid or cystic. 7. Curvilinear calcification at the periphery of the spleen, which likely relates to sequelae of a chronic/remote insult. 8. Lesser incidental findings as above.   Results of this examination were discussed with the patient's physician, Dr. Quinones, by Dr. Sidhu at 2210 on 12/27/2023.   Dictated by (CST): Rubén Sidhu MD on 12/27/2023 at 10:03 PM     Finalized by (CST): Rubén Sidhu MD on 12/27/2023 at 10:14 PM          CT BRAIN OR HEAD (88498)    Result Date: 12/27/2023  CONCLUSION:  1. No acute intracranial process by noncontrast CT technique. Nonspecific white matter changes involving both cerebral hemispheres that most likely reflect sequelae of chronic microangiopathy. 2. Intracranial atherosclerosis. 3. Acute on chronic multifocal sinusitis.  Additional chronic nonspecific opacification of the mastoid and middle ear cavities bilaterally.   Dictated by (CST): Rubén Sidhu MD on 12/27/2023 at 9:02 PM     Finalized by (CST): Rubén Sidhu MD on 12/27/2023 at 9:05 PM          XR CHEST AP PORTABLE  (CPT=71045)    Result Date: 12/27/2023  CONCLUSION:   Stable streaky left greater than right basilar opacities, which likely represent atelectasis/scarring.  No other focal airspace disease or significant pleural effusion.   Dictated by (CST): Rubén Sidhu MD on 12/27/2023 at 8:32 PM     Finalized by (CST): Rubén Sidhu MD on 12/27/2023 at 8:33 PM           LABS :     Lab Results   Component Value Date    WBC 9.0 12/31/2023    HGB 11.2 (L) 12/31/2023    HCT 33.0 (L) 12/31/2023    .0 12/31/2023    CREATSERUM 0.94 12/31/2023    BUN 28 (H) 12/31/2023     (L) 12/31/2023    K 4.3 12/31/2023    CL 97 (L) 12/31/2023    CO2 24.0 12/31/2023     (H) 12/31/2023    CA 9.3 12/31/2023    ALB 4.3 12/27/2023    ALKPHO 95 12/27/2023    BILT 0.3 12/27/2023    TP 7.1 12/27/2023    AST 36 (H) 12/27/2023    ALT 20 12/27/2023    TSH 1.560 07/27/2021    DDIMER 7.28 (H) 12/27/2023     ESRML 29 04/20/2022    CRP 1.16 (H) 04/20/2022    MG 1.9 01/09/2021    PHOS 2.5 01/08/2021    TROP <0.045 01/05/2021     01/05/2021    B12 199 07/27/2021       Recent Labs   Lab 12/29/23  0644 12/30/23  0518 12/31/23  0531   RBC 3.37* 3.50* 3.67*   HGB 10.5* 10.9* 11.2*   HCT 30.4* 31.1* 33.0*   MCV 90.2 88.9 89.9   MCH 31.2 31.1 30.5   MCHC 34.5 35.0 33.9   RDW 15.1* 14.8 14.8   NEPRELIM 8.87* 9.08* 6.92   WBC 10.5 10.8 9.0   .0 243.0 287.0     Recent Labs   Lab 12/27/23 2000 12/28/23  0657 12/29/23 0644 12/30/23 0518 12/31/23  0531   *   < > 135* 146* 128*   BUN 17   < > 18 20 28*   CREATSERUM 0.95   < > 0.93 0.89 0.94   CA 9.2   < > 9.0 9.0 9.3   ALB 4.3  --   --   --   --    *   < > 128* 128* 128*   K 4.8   < > 4.4 4.5 4.3   CL 98   < > 97* 97* 97*   CO2 26.0   < > 27.0 24.0 24.0   ALKPHO 95  --   --   --   --    AST 36*  --   --   --   --    ALT 20  --   --   --   --    BILT 0.3  --   --   --   --    TP 7.1  --   --   --   --     < > = values in this interval not displayed.     No results found for: \"PT\", \"INR\"    Disposition: Discharge to [facility]    Condition at Discharge: Stable     Discharge Medications:      Discharge Medications        CHANGE how you take these medications        Instructions Prescription details   enalapril 5 MG Tabs  Commonly known as: Vasotec  What changed: how much to take      Take 2 tablets (10 mg total) by mouth daily.   Refills: 0            CONTINUE taking these medications        Instructions Prescription details   acetaminophen 500 MG Tabs  Commonly known as: Tylenol Extra Strength      Take 1 tablet (500 mg total) by mouth in the morning and 1 tablet (500 mg total) before bedtime.   Refills: 0     apixaban 2.5 MG Tabs  Commonly known as: Eliquis      Take 1 tablet (2.5 mg total) by mouth 2 (two) times daily.   Quantity: 180 tablet  Refills: 3     ascorbic acid 500 MG Tabs  Commonly known as: Vitamin C      Take 1 tablet (500 mg total) by mouth  daily.   Quantity: 30 tablet  Refills: 0     aspirin 81 MG Tbec      Take 1 tablet (81 mg total) by mouth daily.   Quantity: 100 tablet  Refills: 3     atorvastatin 10 MG Tabs  Commonly known as: Lipitor      Take 1 tablet (10 mg total) by mouth nightly.   Quantity: 90 tablet  Refills: 3     Benefiber Powd      Take 2 teaspoons daily.   Refills: 0     cholecalciferol 25 MCG (1000 UT) Tabs  Commonly known as: VITAMIN D3      TAKE TWO TABLETS BY MOUTH DAILY   Quantity: 180 tablet  Refills: 3     dilTIAZem  MG Cp24  Commonly known as: Cardizem CD      Take 1 capsule (120 mg total) by mouth daily.   Quantity: 90 capsule  Refills: 3     Ferrous Sulfate 325 (65 Fe) MG Tabs      Take one tab on Mon Wed Fri   Quantity: 90 tablet  Refills: 3     Flovent  MCG/ACT Aero  Generic drug: Fluticasone Propionate HFA      Inhale 1 puff into the lungs 2 (two) times daily.   Quantity: 1 each  Refills: 3     fluticasone propionate 50 MCG/ACT Susp  Commonly known as: Flonase      SPRAY 2 SPRAYS INTO EACH NOSTRIL  IN THE MORNING AND 2 SPRAYS BEFORE BEDTIME   Quantity: 16 g  Refills: 0     gabapentin 100 MG Caps  Commonly known as: Neurontin      Take 1 capsule (100 mg total) by mouth nightly.   Quantity: 90 capsule  Refills: 3     hydrocortisone 2.5 % Crea  Commonly known as: Anusol-HC      APPLY RECTALLY TWICE DAILY AS NEEDED   Quantity: 30 g  Refills: 2     lactulose 10 GM/15ML Soln  Commonly known as: CHRONULAC      Take 30 mL (20 g total) by mouth daily as needed (constipated bowel).   Quantity: 237 mL  Refills: 3     loratadine 10 MG Tabs  Commonly known as: Claritin      TAKE ONE TABLET BY MOUTH ONE TIME DAILY   Quantity: 30 tablet  Refills: 2     Lumigan 0.01 % Soln  Generic drug: bimatoprost       Refills: 0     melatonin 3 MG Tabs      Take 1 tablet (3 mg total) by mouth nightly.   Refills: 0     pantoprazole 40 MG Tbec  Commonly known as: Protonix      Take 1 tablet (40 mg total) by mouth before breakfast.    Quantity: 90 tablet  Refills: 3     polyethylene glycol (PEG 3350) 17 GM/SCOOP Powd  Commonly known as: MiraLax      Take by mouth nightly. take 15 milliliter (17G)  by oral route  every day powder mixed with 8 oz. water, juice, soda, coffee, or tea   Refills: 0     sennosides-docusate 8.6-50 MG Tabs  Commonly known as: Senna Plus      Take 2 tablets twice daily as needed for constipation   Quantity: 100 tablet  Refills: 3     triamcinolone 0.1 % Crea  Commonly known as: Kenalog      Apply 1 Application topically 2 (two) times daily as needed. APPLY TO AFFECTED AREA   Quantity: 15 g  Refills: 1     Vitamin B12 1000 MCG Tbcr      Take 1 tablet by mouth daily.   Refills: 0            STOP taking these medications      dilTIAZem HCl ER Beads 120 MG Cp24  Commonly known as: TIAZAC                 Follow up Visits  No follow-up provider specified.  Remigio Perez MD          Other Discharge Instructions:       ----------------------------------------------------  38 MIN SPENT ON THIS DC   Andrés Rowland MD    12/31/2023      Electronically signed by Andrés Rowland MD on 1/1/2024  8:05 AM

## (undated) NOTE — LETTER
Date: 6/13/2024  Patient name: Frances Whitehead  YOB: 1931  Medical Record Number: [id number]  Primary Coverage: Payor: [employer] MCR / Plan: [employer] MEDICARE ADV PPO / Product Type: Medicare /   Secondary Coverage:   Insurance ID: [id number]  Patient Address: [address] [place], [place] [id number]  Telephone Information:   Home Phone [phone]   Mobile [phone]     Encounter Date: 6/13/2024  Provider: GUI Del Rio  Diagnosis:     ICD-10-CM   1. Open wound of right lower leg, initial encounter  S81.801A   2. Blister of left lower leg, initial encounter  S80.822A   3. Cellulitis of right lower leg  L03.115     Compression Wrap 09/20/23 Leg Left;Lower (Active)   Placement Date: 09/20/23   Location: Leg  Wound Location Orientation: Left;Lower      Assessments 9/20/2023  2:13 PM 6/13/2024  1:23 PM   Response to Treatment Well tolerated Well tolerated   Compression Layers Single Single   Compression Product Type Tubigrip/Spanda Tubigrip/Spanda   Dressing Applied Yes Yes   Compression Wrap Location Toes to Knee Toes to Knee   Compression Wrap Status Clean;Intact;Dry Clean;Dry;Intact           Wound 06/13/24 1 Leg Right;Lateral;Posterior (Active)   Date First Assessed/Time First Assessed: 06/13/24 1319    Wound Number (Wound Clinic Only): 1  Location: Leg  Wound Location Orientation: Right;Lateral;Posterior      Assessments 6/13/2024  1:23 PM   Wound Image     Site Assessment Moist;Red;Yellow;White;Purple   Closure Not approximated   Drainage Amount Large   Drainage Description Clear   Treatments Cleansed;Saline;Compression;Topical (Barrier/Moisturizer/Ointment)   Dressing Hydrofiber with silver;Kerlix roll;Tape   Dressing Changed New   Dressing Status Clean;Dry;Intact   Wound Length (cm) 7.4 cm   Wound Width (cm) 4 cm   Wound Surface Area (cm^2) 29.6 cm^2   Wound Depth (cm) 0.1 cm   Wound Volume (cm^3) 2.96 cm^3   Margins Well-defined edges   Non-staged Wound Description Full thickness   Shandra-wound  Assessment Fragile;Edema;Intact   Wound Granulation Tissue Red   Wound Bed Granulation (%) 10 %   Wound Bed Epithelium (%) 0 %   Wound Bed Slough (%) 90 %   Wound Bed Eschar (%) 0 %   Wound Bed Fibrin (%) 0 %   State of Healing Non-healing   Wound Odor None           Wound 06/13/24 2 Leg Right;Lateral (Active)   Date First Assessed/Time First Assessed: 06/13/24 1319    Wound Number (Wound Clinic Only): 2  Location: Leg  Wound Location Orientation: Right;Lateral      Assessments 6/13/2024  1:23 PM   Wound Image     Site Assessment Dry;Yellow   Closure Not approximated   Drainage Amount Scant   Drainage Description Yellow   Treatments Cleansed;Saline;Compression   Dressing Xeroform   Dressing Changed New   Dressing Status Clean;Dry;Intact   Wound Length (cm) 0.7 cm   Wound Width (cm) 1.5 cm   Wound Surface Area (cm^2) 1.05 cm^2   Wound Depth (cm) 0 cm   Wound Volume (cm^3) 0 cm^3   Margins Flat and Intact   Non-staged Wound Description Full thickness   Shandra-wound Assessment Fragile;Edema;Intact   Wound Bed Granulation (%) 0 %   Wound Bed Epithelium (%) 0 %   Wound Bed Slough (%) 100 %   Wound Bed Eschar (%) 0 %   Wound Bed Fibrin (%) 0 %   State of Healing Non-healing   Wound Odor None       Wound 06/13/24 3 Leg Right;Medial (Active)   Date First Assessed/Time First Assessed: 06/13/24 1320    Wound Number (Wound Clinic Only): 3  Location: Leg  Wound Location Orientation: Right;Medial      Assessments 6/13/2024  1:23 PM   Wound Image     Site Assessment Moist;Yellow;Pink   Closure Not approximated   Drainage Amount Large   Drainage Description Clear   Treatments Cleansed;Saline;Compression;Compression Sleeve;Topical (Barrier/Moisturizer/Ointment)   Dressing Hydrofiber with silver;Kerlix roll;Tape   Dressing Changed New   Dressing Status Clean;Dry;Intact   Wound Length (cm) 3.7 cm   Wound Width (cm) 3 cm   Wound Surface Area (cm^2) 11.1 cm^2   Wound Depth (cm) 0.2 cm   Wound Volume (cm^3) 2.22 cm^3   Margins  Well-defined edges   Non-staged Wound Description Full thickness   Shandra-wound Assessment Edema;Fragile;Pink   Wound Granulation Tissue Pink   Wound Bed Granulation (%) 10 %   Wound Bed Epithelium (%) 0 %   Wound Bed Slough (%) 90 %   Wound Bed Eschar (%) 0 %   Wound Bed Fibrin (%) 0 %   State of Healing Non-healing   Wound Odor None       Wound 06/13/24 4 Leg Right;Medial;Posterior (Active)   Date First Assessed/Time First Assessed: 06/13/24 1320    Wound Number (Wound Clinic Only): 4  Location: Leg  Wound Location Orientation: Right;Medial;Posterior      Assessments 6/13/2024  1:23 PM   Wound Image     Site Assessment Moist;Yellow   Closure Not approximated   Drainage Amount Large   Drainage Description Clear   Treatments Cleansed;Saline;Compression   Dressing Hydrofiber with silver;Kerlix roll;Tape   Dressing Changed New   Dressing Status Clean;Dry;Intact   Wound Length (cm) 2 cm   Wound Width (cm) 1.7 cm   Wound Surface Area (cm^2) 3.4 cm^2   Wound Depth (cm) 0.1 cm   Wound Volume (cm^3) 0.34 cm^3   Margins Attached edges   Non-staged Wound Description Full thickness   Shandra-wound Assessment Edema;Fragile   Wound Bed Granulation (%) 0 %   Wound Bed Epithelium (%) 0 %   Wound Bed Slough (%) 100 %   Wound Bed Eschar (%) 0 %   Wound Bed Fibrin (%) 0 %   State of Healing Non-healing   Wound Odor None       Wound 06/13/24 5 Foot Right;Plantar (Active)   Date First Assessed/Time First Assessed: 06/13/24 1320    Wound Number (Wound Clinic Only): 5  Location: Foot  Wound Location Orientation: Right;Plantar      Assessments 6/13/2024  1:23 PM   Wound Image     Site Assessment Dry;Pink;Yellow   Closure Not approximated   Drainage Amount Scant   Drainage Description Yellow   Treatments Cleansed;Saline;Compression   Dressing Aquacel Foam   Dressing Changed New   Dressing Status Clean;Dry;Intact   Wound Length (cm) 0.2 cm   Wound Width (cm) 0.1 cm   Wound Surface Area (cm^2) 0.02 cm^2   Wound Depth (cm) 0.1 cm   Wound Volume  (cm^3) 0.002 cm^3   Margins Not attached   Shandra-wound Assessment Dry;Intact;Clean;Edema   Wound Granulation Tissue Pink   Wound Bed Granulation (%) 90 %   Wound Bed Epithelium (%) 0 %   Wound Bed Slough (%) 10 %   Wound Bed Eschar (%) 0 %   Wound Bed Fibrin (%) 0 %   State of Healing Non-healing   Wound Odor None   Undermining? Yes   Number of Undermines 1   Undermine 1 Start Position 12   Undermine 1 End Position 12   Pressure Injury Stage Stage 3           Wound 06/13/24 6 Leg Left (Active)   Date First Assessed/Time First Assessed: 06/13/24 1326    Wound Number (Wound Clinic Only): 6  Location: Leg  Wound Location Orientation: Left      Assessments 6/13/2024  1:23 PM   Wound Image     Site Assessment Edema;Dry;Intact;Clean   Drainage Amount None   Dressing Open to air   Wound Length (cm) 18 cm   Wound Width (cm) 17 cm   Wound Surface Area (cm^2) 306 cm^2   Margins Flat and Intact   Non-staged Wound Description Partial thickness   Shandra-wound Assessment Edema;Fragile;Intact   Wound Bed Epithelium (%) 100 %   State of Healing Epithelialized   Wound Odor None       No associated orders.       Compression Wrap 06/13/24 Leg Lower;Right (Active)   Placement Date: 06/13/24   Location: Leg  Wound Location Orientation: Lower;Right      Assessments 6/13/2024  1:23 PM   Response to Treatment Well tolerated   Compression Layers Single   Compression Product Type Tubigrip/Spanda   Dressing Applied Yes   Compression Wrap Location Toes to Knee   Compression Wrap Status Clean;Dry;Intact         Wound Number: 1 , 3, and 4     Wound Cleaning and Dressings:  Showering directions: May shower with protection  Wound cleansing:  Cleanse with Vashe  Wound cleaning frequency:  With dressing changes  Wound product: Zinc paste to periwounds, Aquacel Ag, Kerramax, Kerlix  Dressing change frequency:  Change dressing 3x per week and PRN for saturated dressings    Wound Number: 2  Wound Cleaning and Dressings:  Showering directions: May shower  with protection  Wound cleansing:  Cleanse with Vashe  Wound cleaning frequency: With dressing changes  Wound product: Xeroform  Dressing change frequency:  Change dressing 3x per week and PRN    Compression Therapy: Medigrip E to both lower legs     RN please notify office with any questions or concerns   [phone]    Follow Up:  Return in about 1 week (around 6/20/2024) for 30 min APN visit x 1.      NIVIA Del Rio  NPI [id number]

## (undated) NOTE — LETTER
PALLAVI Notifier: [facility]   ALANA. Patient Name: Eric Santiago Identification Number: [id number]      Advance Beneficiary Notice of Noncoverage (ABN)  NOTE:  If Medicare doesn’t pay for D. Item/service(s) below, you may have to pay.   Medicare do choice  I am not responsible for payment, and I cannot appeal to see if Medicare would pay. H. Additional Information: This notice gives our opinion, not an official Medicare decision.  If you have other questions on this notice or Medicare billing, ca

## (undated) NOTE — IP AVS SNAPSHOT
Patient Demographics     Address Phone E-mail Address    [address] [place] [address] Valdemar Domingo [phone] (Home) *Preferred*  [phone] (Mobile) [email or web address]      Emergency Contact(s)     Name Relation Home Work [employer] Daughter [phone] FLOVENT  [employer]   Generic drug:  Fluticasone Propionate HFA   Next dose due:   Tonight         INHALE 1 PUFF TWICE DAILY    Marjan Porter        9am             9pm             HYDROcodone-acetaminophen 5-325 MG Tabs   Last time this was gi SENNA-TIME S 8.6-50 MG Tabs   Generic drug:  Sennosides-Docusate Sodium   Next dose due: Tonight         Take 2 tablets by mouth 2 (two) times daily.          9am             9pm             triamcinolone acetonide 0.1 % Crea   Commonly known a [id number] Senna (SENOKOT) tab TABS 17.2 mg 01/08/17 2034 Given      [id number] bisacodyl (DULCOLAX) rectal suppository 10 mg 01/08/17 1845 Given      [id number] docusate sodium (COLACE) cap 100 mg 01/08/17 2034 Given      [id number] docusate sodium (COLACE) Glucose 152 70-99 mg/dL [facility]   Sodium 128 136-144 mmol/L [facility]   Potassium 4.6 3.3-5.1 mmol/L  [facility]   Chloride 96  mmol/L  [facility]   CO2 25 22-32 mmol/L  [facility]   BUN 19 8-20 mg/dL  [facility]   Creatinine 0.91 Monocyte % 9 %  [facility]   Eosinophil % 1 %  [facility]   Basophil % 0 %  [facility]   Neutrophil Absolute 4.9 1.8-7.7 K/UL  [facility]   Lymphocyte Absolute 1.2 1.0-4.0 K/UL  [facility]   Monocyte Absolute 0.6 0.0-1.0 K/UL  [facility] which is around her baseline. X-ray of the hip showed intertrochanteric left proximal femur fracture. The patient had an EKG which showed sinus bradycardia with no acute findings.   She will be admitted to the hospital for further management and evaluatio NECK:  Trachea midline. Full range of motion. Supple. No thyromegaly or lymphadenopathy. LUNGS:  Clear to auscultation bilaterally. Normal respiratory effort. No intercostal retractions. HEART:  Regular rate and rhythm. S1, S2 auscultated.   No murm Dede Mccormick is a a(n) 80year old female. Pt. S/p fall at home. Pt. Lost her balance and fell yesterday and suffered left hip fracture. I was consulted for fracture management. Pt. Lives at her own home with son.   Pt uses a walker or cane to ambulat ABDOMEN SURGERY PROC UNLISTED      Comment TERESA and small bowel resection-Dr Jaramillo    SLING  2016    Comment vag sling procedure at [facility] -Dr Ramsey Presume     Family History   Problem Relation Age of Onset   • Diabetes Mother       age 59 °C), temperature source Oral, resp. rate 20, height 5' 1\" (1.549 m), weight 134 lb (60.782 kg), SpO2 97 %, not currently breastfeeding. Extremities:  Left hip shortened and externally rotated. Neuro vascular grossly intact    .      Laboratory Data: PHYSICAL THERAPY TREATMENT NOTE - INPATIENT    Room Number: 417/417-A       Presenting Problem: L hip pin    Problem List  Principal Problem:    Closed displaced intertrochanteric fracture of left femur, initial encounter [facility])  Active Problems: -   Turning over in bed (including adjusting bedclothes, sheets and blankets)?: A Little   -   Sitting down on and standing up from a chair with arms (e.g., wheelchair, bedside commode, etc.): A Lot   -   Moving from lying on back to sitting on the side of Goal Comments: Goals established on 1/7/2017       Physical Therapy Note by Mahnaz Mcgee PTA at 1/8/2017  3:45 PM  Version 1 of 1    Author:  Mahnaz Mcgee PTA Service:  (none) Author Type:  Physical Therapist    Filed:  1/8/2017  3:56 PM Note T BALANCE                                                                                                                     Static Sitting: Good  Dynamic Sitting: Fair +           Static Standing: Poor +  Dynamic Standing: Poor +    ACTIVITY TOLERANCE  Nazanin If going home: Pt to ascend/descend 2 steps with RW or cane and MOD A of caregiver.  (has no rails at home)  1/8/17 nt pt going to rehab   Goal #5    Patient verbalizes and/or demonstrates all precautions and safety concerns independently  1/8/17 NT   Goal • Diverticulosis 2003     cscopy '03, '07, '10   • Renal calculus 2010     on CT abd 5/10-6 mm or less in L lower kidney   • Lung nodule 4/06-3/03     LLL nodule resolved on serial CT scans   • Diverticulitis 2010     hospitalized 3/10 and 5/10   • Lumbar Management Techniques: Activity promotion;Relaxation;Repositioning    COGNITION  · AXO x 3, motivated, engaged in rehab session.      RANGE OF MOTION AND STRENGTH ASSESSMENT  Upper extremity ROM and strength are within functional limits    Lower extremity R notify RN when ready to recline in chair. Reminded not to get up without staff assist. Educated on WBS.      Exercise/Education Provided:  WBS, safety, use call bell to notify RN when ready to change position or get back to bed     Patient End of Session: U Author:  Alireza Edwards OT Service:  (none) Author Type:  Occupational Therapist    Filed:  1/9/2017  1:47 PM Note Time:  1/9/2017 10:20 AM Status:  Signed    :  Alireza Edwards OT (Occupational Therapist)           OCCUPATIONAL THERAPY TREATMENT How much help from another person does the patient currently need…  -   Putting on and taking off regular lower body clothing?: A Lot  -   Bathing (including washing, rinsing, drying)?: A Lot  -   Toileting, which includes using toilet, bedpan or urinal? : Presenting Problem: L hip fx, S/P pinning.      Physician Order: IP Consult to Occupational Therapy  Reason for Therapy: ADL/IADL Dysfunction and Discharge Planning    ASSESSMENT     Patient is a 80year old female admitted 1/5/2017 for L hip fx, S/P pinnin • Lung nodule 4/06-3/03     LLL nodule resolved on serial CT scans   • Diverticulitis 2010     hospitalized 3/10 and 5/10   • Lumbar stenosis 2004     MRI lumbar 2004-djd spinal stenosis-xray lumbar 12/13-DJD scoliosis, wedge comp T8, 9, 10, L1   • Osteope Management Techniques: Relaxation;Repositioning; Other (Comment) (ice)    ACTIVITY TOLERANCE  Room air    COGNITION  WFL for ADL    RANGE OF MOTION   Upper extremity ROM is within functional limits     STRENGTH ASSESSMENT  Upper extremity strength is within Video Swallow Study Notes     No notes of this type exist for this encounter. SLP Notes     No notes of this type exist for this encounter.             Immunizations     Name Date      INFLUENZA 10/26/16     INFLUENZA 11/11/14     INFLUENZA 10/25/13

## (undated) NOTE — LETTER
Date: 6/20/2024  Patient name: Frances Whitehead  YOB: 1931  Medical Record Number: [id number]  Primary Coverage: Payor: HUMANA MCR / Plan: HUMANA MEDICARE ADV PPO / Product Type: Medicare /   Secondary Coverage:   Insurance ID: [id number]  Patient Address: [address] [place] [id number]  Telephone Information:   Home Phone [phone]   Mobile [phone]       Encounter Date: 6/20/2024  Provider: GUI Del Rio  Diagnosis:     ICD-10-CM   1. Open wound of right lower leg, subsequent encounter  S81.801D   2. Cellulitis of right lower leg  L03.115   3. 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 2 diabetes mellitus ([facility])  E11.610   4. Skin tear of left hand without complication, initial encounter  S61.412A   5. Blister of left lower leg, subsequent encounter  S80.822D   6. Diabetic ulcer of toe associated with diabetes mellitus due to underlying condition, limited to breakdown of skin, unspecified laterality ([facility])  E08.621    L97.501   7. Diabetic ulcer of right midfoot associated with diabetes mellitus due to underlying condition, limited to breakdown of skin ([facility])  E08.621    L97.411     Compression Wrap 09/20/23 Leg Left;Lower (Active)   Placement Date: 09/20/23   Location: Leg  Wound Location Orientation: Left;Lower      Assessments 9/20/2023  2:13 PM 6/20/2024  3:54 PM   Response to Treatment Well tolerated Well tolerated   Compression Layers Single Single   Compression Product Type Tubigrip/Spanda Tubigrip/Spanda   Dressing Applied Yes Yes   Compression Wrap Location Toes to Knee Toes to Knee   Compression Wrap Status Clean;Intact;Dry Clean;Dry;Intact       No associated orders.       Wound 06/13/24 1 Leg Right;Lateral;Posterior (Active)   Date First Assessed/Time First Assessed: 06/13/24 1319    Wound Number (Wound Clinic Only): 1  Location: Leg  Wound Location Orientation: Right;Lateral;Posterior      Assessments 6/13/2024  1:23 PM 6/20/2024  3:54 PM   Wound Image       Site Assessment  Moist;Red;Yellow;White;Purple Moist;Red;Yellow;White;Purple   Closure Not approximated Not approximated   Drainage Amount Large Large   Drainage Description Clear Clear   Treatments Cleansed;Saline;Compression;Topical (Barrier/Moisturizer/Ointment) Cleansed;Saline;Topical (Barrier/Moisturizer/Ointment);Compression   Dressing Hydrofiber with silver;Kerlix roll;Tape Kerlix roll;Tape   Dressing Changed New Changed   Dressing Status Clean;Dry;Intact Clean;Dry;Intact   Wound Length (cm) 7.4 cm 8 cm   Wound Width (cm) 4 cm 5 cm   Wound Surface Area (cm^2) 29.6 cm^2 40 cm^2   Wound Depth (cm) 0.1 cm 0.1 cm   Wound Volume (cm^3) 2.96 cm^3 4 cm^3   Wound Healing % -- -35   Margins Well-defined edges Well-defined edges   Non-staged Wound Description Full thickness Full thickness   Shandra-wound Assessment Fragile;Edema;Intact Fragile;Edema;Intact   Wound Granulation Tissue Red Red   Wound Bed Granulation (%) 10 % 20 %   Wound Bed Epithelium (%) 0 % 0 %   Wound Bed Slough (%) 90 % 80 %   Wound Bed Eschar (%) 0 % 0 %   Wound Bed Fibrin (%) 0 % 0 %   State of Healing Non-healing Non-healing   Wound Odor None None           Wound 06/13/24 2 Leg Right;Lateral (Active)   Date First Assessed/Time First Assessed: 06/13/24 1319    Wound Number (Wound Clinic Only): 2  Location: Leg  Wound Location Orientation: Right;Lateral      Assessments 6/13/2024  1:23 PM 6/20/2024  3:54 PM   Wound Image       Site Assessment Dry;Yellow Dry;Yellow   Closure Not approximated Not approximated   Drainage Amount Scant None   Drainage Description Yellow Scabbed   Treatments Cleansed;Saline;Compression Cleansed;Saline   Dressing Xeroform Kerlix roll;Tape   Dressing Changed New Changed   Dressing Status Clean;Dry;Intact Clean;Dry;Intact   Wound Length (cm) 0.7 cm 0.7 cm   Wound Width (cm) 1.5 cm 2 cm   Wound Surface Area (cm^2) 1.05 cm^2 1.4 cm^2   Wound Depth (cm) 0 cm 0 cm   Wound Volume (cm^3) 0 cm^3 0 cm^3   Margins Flat and Intact Flat and Intact    Non-staged Wound Description Full thickness Full thickness   Shandra-wound Assessment Fragile;Edema;Intact Fragile;Edema;Intact   Wound Bed Granulation (%) 0 % 0 %   Wound Bed Epithelium (%) 0 % 0 %   Wound Bed Slough (%) 100 % 100 %   Wound Bed Eschar (%) 0 % 0 %   Wound Bed Fibrin (%) 0 % 0 %   State of Healing Non-healing Non-healing   Wound Odor None None           Wound 06/13/24 3 Leg Right;Medial (Active)   Date First Assessed/Time First Assessed: 06/13/24 1320    Wound Number (Wound Clinic Only): 3  Location: Leg  Wound Location Orientation: Right;Medial      Assessments 6/13/2024  1:23 PM 6/20/2024  3:54 PM   Wound Image       Site Assessment Moist;Yellow;Pink Moist;Yellow   Closure Not approximated Not approximated   Drainage Amount Large Large   Drainage Description Clear Clear   Treatments Cleansed;Saline;Compression;Compression Sleeve;Topical (Barrier/Moisturizer/Ointment) Cleansed;Saline;Compression;Topical (Barrier/Moisturizer/Ointment)   Dressing Hydrofiber with silver;Kerlix roll;Tape Kerlix roll;Tape   Dressing Changed New Changed   Dressing Status Clean;Dry;Intact Clean;Dry;Intact   Wound Length (cm) 3.7 cm 3.7 cm   Wound Width (cm) 3 cm 3 cm   Wound Surface Area (cm^2) 11.1 cm^2 11.1 cm^2   Wound Depth (cm) 0.2 cm 0.2 cm   Wound Volume (cm^3) 2.22 cm^3 2.22 cm^3   Wound Healing % -- 0   Margins Well-defined edges Poorly defined   Non-staged Wound Description Full thickness Full thickness   Shandra-wound Assessment Edema;Fragile;Pink Edema;Fragile;Pink   Wound Granulation Tissue Pink --   Wound Bed Granulation (%) 10 % 0 %   Wound Bed Epithelium (%) 0 % 0 %   Wound Bed Slough (%) 90 % 100 %   Wound Bed Eschar (%) 0 % 0 %   Wound Bed Fibrin (%) 0 % 0 %   State of Healing Non-healing Non-healing   Wound Odor None None           Wound 06/13/24 4 Leg Right;Medial;Posterior (Active)   Date First Assessed/Time First Assessed: 06/13/24 1320    Wound Number (Wound Clinic Only): 4  Location: Leg  Wound Location  Orientation: Right;Medial;Posterior      Assessments 6/13/2024  1:23 PM 6/20/2024  3:54 PM   Wound Image       Site Assessment Moist;Yellow Moist;Yellow   Closure Not approximated Not approximated   Drainage Amount Large Large   Drainage Description Clear Clear   Treatments Cleansed;Saline;Compression Cleansed;Saline;Compression   Dressing Hydrofiber with silver;Kerlix roll;Tape Kerlix roll;Tape   Dressing Changed New Changed   Dressing Status Clean;Dry;Intact Clean;Dry;Intact   Wound Length (cm) 2 cm 2.1 cm   Wound Width (cm) 1.7 cm 1.8 cm   Wound Surface Area (cm^2) 3.4 cm^2 3.78 cm^2   Wound Depth (cm) 0.1 cm 0.1 cm   Wound Volume (cm^3) 0.34 cm^3 0.378 cm^3   Wound Healing % -- -11   Margins Attached edges Attached edges   Non-staged Wound Description Full thickness Full thickness   Shandra-wound Assessment Edema;Fragile Edema;Fragile   Wound Bed Granulation (%) 0 % 0 %   Wound Bed Epithelium (%) 0 % 0 %   Wound Bed Slough (%) 100 % 100 %   Wound Bed Eschar (%) 0 % 0 %   Wound Bed Fibrin (%) 0 % 0 %   State of Healing Non-healing Non-healing   Wound Odor None None       Wound 06/13/24 5 Foot Right;Plantar;Medial (Active)   Date First Assessed/Time First Assessed: 06/13/24 1320    Wound Number (Wound Clinic Only): 5  Location: Foot  Wound Location Orientation: Right;Plantar;Medial      Assessments 6/13/2024  1:23 PM 6/20/2024  3:54 PM   Wound Image       Site Assessment Dry;Pink;Yellow Dry;Pink   Closure Not approximated Not approximated   Drainage Amount Scant None   Drainage Description Yellow --   Treatments Cleansed;Saline;Compression Cleansed;Saline   Dressing Aquacel Foam Aquacel Foam   Dressing Changed New Changed   Dressing Status Clean;Dry;Intact Clean;Dry;Intact   Wound Length (cm) 0.2 cm 0.1 cm   Wound Width (cm) 0.1 cm 0.1 cm   Wound Surface Area (cm^2) 0.02 cm^2 0.01 cm^2   Wound Depth (cm) 0.1 cm 0.1 cm   Wound Volume (cm^3) 0.002 cm^3 0.001 cm^3   Wound Healing % -- 50   Margins Not attached Not  attached   Shandra-wound Assessment Dry;Intact;Clean;Edema Dry;Intact;Clean;Edema   Wound Granulation Tissue Pink --   Wound Bed Granulation (%) 90 % 100 %   Wound Bed Epithelium (%) 0 % 0 %   Wound Bed Slough (%) 10 % 0 %   Wound Bed Eschar (%) 0 % 0 %   Wound Bed Fibrin (%) 0 % 0 %   State of Healing Non-healing Non-healing   Wound Odor None None   Undermining? Yes Yes   Number of Undermines 1 1   Undermine 1 Start Position 12 12   Undermine 1 End Position 12 12   Pressure Injury Stage Stage 3 Stage 3       Active Orders   Date Order Priority Status Authorizing Provider   06/13/24 1620 OP Wound Dressing Routine Active Tasha Goins APRN     - Cleansing:    Cleanse with normal saline or wound cleanser     - Dressing:    Prism collagen     - Dressing:    Dry gauze     - Frequency:    Change dressing two times per week       Wound 06/13/24 6 Leg Left (Active)   Date First Assessed/Time First Assessed: 06/13/24 1326    Wound Number (Wound Clinic Only): 6  Location: Leg  Wound Location Orientation: Left      Assessments 6/13/2024  1:23 PM 6/20/2024  3:54 PM   Wound Image       Site Assessment Edema;Dry;Intact;Clean Edema;Dry;Intact;Clean   Drainage Amount None None   Dressing Open to air Open to air   Wound Length (cm) 18 cm 24 cm   Wound Width (cm) 17 cm 35 cm   Wound Surface Area (cm^2) 306 cm^2 840 cm^2   Margins Flat and Intact Flat and Intact   Non-staged Wound Description Partial thickness Partial thickness   Shandra-wound Assessment Edema;Fragile;Intact Edema;Fragile;Intact   Wound Bed Epithelium (%) 100 % 100 %   State of Healing Epithelialized Epithelialized   Wound Odor None None       No associated orders.       Compression Wrap 06/13/24 Leg Lower;Right (Active)   Placement Date: 06/13/24   Location: Leg  Wound Location Orientation: Lower;Right      Assessments 6/13/2024  1:23 PM 6/20/2024  3:54 PM   Response to Treatment Well tolerated Well tolerated   Compression Layers Single Single   Compression Product  Type Tubigrip/Spanda Tubigrip/Spanda   Dressing Applied Yes Yes   Compression Wrap Location Toes to Knee Toes to Knee   Compression Wrap Status Clean;Dry;Intact Clean;Dry;Intact       No associated orders.       Wound 06/20/24 7 Hand Right;Posterior (Active)   Date First Assessed: 06/20/24    Wound Number (Wound Clinic Only): 7  Primary Wound Type: Skin Tear  Location: Hand  Wound Location Orientation: Right;Posterior      Assessments 6/20/2024  3:54 PM   Wound Image     Site Assessment Dry;Yellow;Red;Purple;Pink   Closure Not approximated   Drainage Amount Small   Drainage Description Serosanguineous   Treatments Cleansed;Saline   Dressing Xeroform;Gustabo;Tape   Dressing Changed New   Dressing Status Clean;Dry;Intact   Wound Length (cm) 7 cm   Wound Width (cm) 6.5 cm   Wound Surface Area (cm^2) 45.5 cm^2   Wound Depth (cm) 0.1 cm   Wound Volume (cm^3) 4.55 cm^3   Margins Poorly defined   Non-staged Wound Description Full thickness   Shandra-wound Assessment Fragile;Blanchable erythema   Wound Granulation Tissue Pink;Red   Wound Bed Granulation (%) 5 %   State of Healing Non-healing   Wound Odor None       No associated orders.       Wound 06/20/24 8 Right;Plantar;Lateral (Active)   Date First Assessed: 06/20/24    Wound Number (Wound Clinic Only): 8  Primary Wound Type: Pressure Injury  Wound Location Orientation: Right;Plantar;Lateral      Assessments 6/20/2024  3:54 PM   Wound Image     Site Assessment Dry;Pink   Closure Not approximated   Drainage Amount None   Treatments Cleansed;Saline   Dressing Aquacel Foam   Dressing Changed New   Dressing Status Clean;Dry;Intact   Wound Length (cm) 0.3 cm   Wound Width (cm) 0.2 cm   Wound Surface Area (cm^2) 0.06 cm^2   Wound Depth (cm) 0.1 cm   Wound Volume (cm^3) 0.006 cm^3   Margins Attached edges;Well-defined edges   Shandra-wound Assessment Clean;Dry;Intact   Wound Granulation Tissue Pink   Wound Bed Granulation (%) 100 %   Wound Bed Epithelium (%) 0 %   Wound Bed Slough (%)  0 %   Wound Bed Eschar (%) 0 %   Wound Bed Fibrin (%) 0 %   State of Healing Non-healing   Wound Odor None   Pressure Injury Stage Stage 3       No associated orders.     Wound Number: 1 , 2, 3, 4 Right lower leg wounds   Wound Cleaning and Dressings:  Showering directions: May shower with protection  Wound cleansing:  Cleanse with Vashe  Wound cleaning frequency:  With dressing changes  Wound product: Zinc paste to periwounds, Aquacel Ag OR Enluxtra, Kerlix, tape   Dressing change frequency:  Change dressing 3x per week and PRN for saturated dressings     Wound Number: 5,8 Right plantar foot wounds  Wound Cleaning and Dressings:  Showering directions: May shower with protection  Wound cleansing:  Cleanse with Vashe  Wound cleaning frequency: With dressing changes  Wound product: Marguerite, bordered foam   Dressing change frequency:  Change dressing 3x per week and PRN     Wound Number: 7 Right hand  Wound Cleaning and Dressings:  Showering directions: May shower with protection  Wound cleansing:  Cleanse with Vashe  Wound cleaning frequency: With dressing changes  Wound product: Xeroform, dry gauze, rosetta, tape  Dressing change frequency:  Change dressing daily and PRN (patient's son will change on off days for HH and clinic)    Compression Therapy: Medigrip E to both lower legs. Please do not apply compression wrap, only tubular compression sleeve     HH RN please notify office with any questions or concerns   [phone]    Follow Up:  Return in about 1 week (around 6/27/2024) for 30 min APN visit x 1.        NIVIA Del Rio  NPI [id number]

## (undated) NOTE — LETTER
Hospital Discharge Documentation  Please phone to schedule a hospital follow up appointment. No discharge summary available at this time, below is the most recent progress note  for your review .         From: [id number] [facility]  Phone: [phone] Lace+ Score: 77[LS.2]  59-90 High Risk  29-58 Medium Risk  0-28   Low Risk. TCM Follow-Up Recommendation:  LACE > 58:  High Risk of readmission after discharge from the hospital.tcm fu recommended[LS.1]        Electronically signed by Tasneem Nguyen Psychiatric: Her behavior is normal. Judgment normal.         Assessment and Plan:      Covid pneumonia reviewed CT which reveals bilateral infiltrates appreciate ID and pulmonary consult plan for remdesivir.  Continue to monitor D-dimer which currently is Electronically signed by Aida Zuniga MD at 1/8/2021  4:36 PM     ED to Hosp-Admission (Discharged) on 1/4/2021        Revision History        Detailed Report        Note shared with patient  Chart Review: Note Routing History    No routing his

## (undated) NOTE — LETTER
11/06/20        Alley Santos [address]      Dear Danielle Pineda,    [facility] records indicate that you have outstanding lab work and or testing that was ordered for you and has not yet been completed:        Ferritin      Iron And Tibc

## (undated) NOTE — MR AVS SNAPSHOT
[facility] [place]  [address] [place] [address]  [phone]               Thank you for choosing us for your health care visit with J Luis Tsai MD.  We are glad to serve you and happy to provide you with this summary of your visit.   [facility] Generic drug:  Fluticasone Propionate HFA   INHALE 1 PUFF TWICE DAILY           furosemide 20 MG Tabs   Take 1 tablet (20 mg total) by mouth daily. What changed:  Another medication with the same name was removed.  Continue taking this medication, and fol Phone:  [phone] - furosemide 20 MG Tabs            Dylan     Call the eeden for assistance with your inactive [employer] account    If you have questions, you can call [phone] to talk to our [facility] Staff.  Remember, MyChart is NO ? Use a cane or walker (indoors and out) if you are unsteady on your feet.              Visit [facility] online at  [email or web address]

## (undated) NOTE — LETTER
AUTHORIZATION FOR SURGICAL OPERATION OR OTHER PROCEDURE    1.  I hereby authorize Dr. Davonna Schirmer and the [facility] staff assigned to my case to perform the following operation and/or procedure at the [facility]:    Ultrasound guided right median nerve sheath in Relationship to Patient:           []  Parent    Responsible person                          []  Spouse  In case of minor or                    [] Other  _____________   Incompetent name:  __________________________________________________

## (undated) NOTE — MR AVS SNAPSHOT
[facility]  [facility] [address] [place] [address]  [phone]               Thank you for choosing us for your health care visit with Phoebe Hamlin MD.  We are glad to serve you and happy to provide you with this summary of your visit.   [facility] Controlled type 2 diabetes mellitus with diabetic neuropathy, without long-term current use of insulin (HCC)        Bilateral swelling of feet          Instructions and Information about Your Health     None      Allergies as of Apr 14, 2017     Adhesive Take 1 tablet (40 mg total) by mouth every morning before breakfast.   Commonly known as:  PROTONIX           PROCTOSOL HC 2.5 % Crea   Generic drug:  hydrocortisone   APPLY RECTALLY TWICE DAILY AS NEEDED           promethazine-codeine 6.25-10 MG/5ML Syrp including white bread, rice and pasta   Eat plenty of protein, keep the fat content low Sugars:  sodas and sports drinks, candies and desserts   Eat plenty of low-fat dairy products High fat meats and dairy   Choose whole grain products Foods high in sodiu

## (undated) NOTE — LETTER
PALLAVI Notifier: [email or web address]   ALANA. Patient Name: Jacquelin Davis. Identification Number: [id number]      Advance Beneficiary Notice of Noncoverage (ABN)  NOTE:  If Medicare doesn’t pay for D. item/service below, you may have to pay.   Medicare does ? OPTION 3. I don’t want the D. item/service listed above. I understand with this choice  I am not responsible for payment, and I cannot appeal to see if Medicare would pay. H. Additional Information:     This notice gives our opinion, not an official M